# Patient Record
Sex: FEMALE | Race: WHITE | Employment: UNEMPLOYED | URBAN - METROPOLITAN AREA
[De-identification: names, ages, dates, MRNs, and addresses within clinical notes are randomized per-mention and may not be internally consistent; named-entity substitution may affect disease eponyms.]

---

## 2021-04-11 ENCOUNTER — APPOINTMENT (OUTPATIENT)
Dept: CT IMAGING | Age: 36
End: 2021-04-11
Payer: COMMERCIAL

## 2021-04-11 ENCOUNTER — HOSPITAL ENCOUNTER (EMERGENCY)
Age: 36
Discharge: HOME | End: 2021-04-11
Payer: COMMERCIAL

## 2021-04-11 VITALS — WEIGHT: 140 LBS | OXYGEN SATURATION: 98 % | HEART RATE: 82 BPM | TEMPERATURE: 97.4 F | RESPIRATION RATE: 17 BRPM
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
## 2021-04-11 DIAGNOSIS — S00.03XA CONTUSION OF SCALP, INITIAL ENCOUNTER: ICD-10-CM

## 2021-04-11 DIAGNOSIS — S16.1XXA STRAIN OF NECK MUSCLE, INITIAL ENCOUNTER: ICD-10-CM

## 2021-04-11 PROCEDURE — 99285 EMERGENCY DEPT VISIT HI MDM: CPT

## 2021-04-11 PROCEDURE — 72125 CT NECK SPINE W/O DYE: CPT

## 2021-04-11 PROCEDURE — 70450 CT HEAD/BRAIN W/O DYE: CPT

## 2021-04-11 PROCEDURE — 6370000000 HC RX 637 (ALT 250 FOR IP): Performed by: NURSE PRACTITIONER

## 2021-04-11 RX ORDER — NAPROXEN 500 MG/1
500 TABLET ORAL 2 TIMES DAILY
Qty: 20 TABLET | Refills: 0 | Status: ON HOLD | OUTPATIENT
Start: 2021-04-11 | End: 2022-06-13 | Stop reason: HOSPADM

## 2021-04-11 RX ORDER — ACETAMINOPHEN 500 MG
1000 TABLET ORAL ONCE
Status: COMPLETED | OUTPATIENT
Start: 2021-04-11 | End: 2021-04-11

## 2021-04-11 RX ADMIN — ACETAMINOPHEN 1000 MG: 500 TABLET ORAL at 16:32

## 2021-04-11 ASSESSMENT — ENCOUNTER SYMPTOMS
DIARRHEA: 0
SORE THROAT: 0
BACK PAIN: 0
CHEST TIGHTNESS: 0
WHEEZING: 0
NAUSEA: 0
ABDOMINAL PAIN: 0
SINUS PAIN: 0
COUGH: 0
SHORTNESS OF BREATH: 0
VOMITING: 0

## 2021-04-11 ASSESSMENT — PAIN SCALES - GENERAL: PAINLEVEL_OUTOF10: 10

## 2021-04-11 ASSESSMENT — PAIN DESCRIPTION - DESCRIPTORS: DESCRIPTORS: BURNING
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
## 2022-02-05 ENCOUNTER — APPOINTMENT (OUTPATIENT)
Dept: GENERAL RADIOLOGY | Age: 37
End: 2022-02-05
Payer: COMMERCIAL

## 2022-02-05 ENCOUNTER — HOSPITAL ENCOUNTER (EMERGENCY)
Age: 37
Discharge: HOME | End: 2022-02-05
Attending: EMERGENCY MEDICINE
Payer: COMMERCIAL

## 2022-02-05 VITALS
SYSTOLIC BLOOD PRESSURE: 156 MMHG | RESPIRATION RATE: 22 BRPM | DIASTOLIC BLOOD PRESSURE: 99 MMHG | HEIGHT: 65 IN | TEMPERATURE: 97.2 F | HEART RATE: 145 BPM | OXYGEN SATURATION: 98 % | BODY MASS INDEX: 27.49 KG/M2 | WEIGHT: 165 LBS

## 2022-02-05 DIAGNOSIS — R53.83 OTHER FATIGUE: Primary | ICD-10-CM

## 2022-02-05 LAB
ALBUMIN SERPL-MCNC: 4.4 G/DL (ref 3.5–4.6)
ALP BLD-CCNC: 109 U/L (ref 40–130)
ALT SERPL-CCNC: 156 U/L (ref 0–33)
ANION GAP SERPL CALCULATED.3IONS-SCNC: 16 MEQ/L (ref 9–15)
ANISOCYTOSIS: ABNORMAL
AST SERPL-CCNC: 201 U/L (ref 0–35)
BASOPHILS ABSOLUTE: 0 K/UL (ref 0–0.2)
BASOPHILS RELATIVE PERCENT: 0.9 %
BILIRUB SERPL-MCNC: 0.4 MG/DL (ref 0.2–0.7)
BUN BLDV-MCNC: 9 MG/DL (ref 6–20)
CALCIUM SERPL-MCNC: 9.2 MG/DL (ref 8.5–9.9)
CHLORIDE BLD-SCNC: 94 MEQ/L (ref 95–107)
CO2: 26 MEQ/L (ref 20–31)
CREAT SERPL-MCNC: 0.73 MG/DL (ref 0.5–0.9)
EOSINOPHILS ABSOLUTE: 0 K/UL (ref 0–0.7)
EOSINOPHILS RELATIVE PERCENT: 0.5 %
GFR AFRICAN AMERICAN: >60
GFR NON-AFRICAN AMERICAN: >60
GLOBULIN: 2.9 G/DL (ref 2.3–3.5)
GLUCOSE BLD-MCNC: 143 MG/DL (ref 70–99)
HCT VFR BLD CALC: 30.2 % (ref 37–47)
HEMOGLOBIN: 9.4 G/DL (ref 12–16)
HYPOCHROMIA: ABNORMAL
LYMPHOCYTES ABSOLUTE: 1.5 K/UL (ref 1–4.8)
LYMPHOCYTES RELATIVE PERCENT: 29.8 %
MCH RBC QN AUTO: 19.7 PG (ref 27–31.3)
MCHC RBC AUTO-ENTMCNC: 31.1 % (ref 33–37)
MCV RBC AUTO: 63.4 FL (ref 82–100)
MICROCYTES: ABNORMAL
MONOCYTES ABSOLUTE: 0.5 K/UL (ref 0.2–0.8)
MONOCYTES RELATIVE PERCENT: 10.1 %
NEUTROPHILS ABSOLUTE: 2.9 K/UL (ref 1.4–6.5)
NEUTROPHILS RELATIVE PERCENT: 58.7 %
PDW BLD-RTO: 21.2 % (ref 11.5–14.5)
PLATELET # BLD: 331 K/UL (ref 130–400)
PLATELET SLIDE REVIEW: NORMAL
POLYCHROMASIA: ABNORMAL
POTASSIUM SERPL-SCNC: 3.4 MEQ/L (ref 3.4–4.9)
RBC # BLD: 4.76 M/UL (ref 4.2–5.4)
SODIUM BLD-SCNC: 136 MEQ/L (ref 135–144)
TOTAL PROTEIN: 7.3 G/DL (ref 6.3–8)
WBC # BLD: 4.9 K/UL (ref 4.8–10.8)

## 2022-02-05 PROCEDURE — 36415 COLL VENOUS BLD VENIPUNCTURE: CPT

## 2022-02-05 PROCEDURE — 2580000003 HC RX 258: Performed by: EMERGENCY MEDICINE

## 2022-02-05 PROCEDURE — 71045 X-RAY EXAM CHEST 1 VIEW: CPT

## 2022-02-05 PROCEDURE — 80053 COMPREHEN METABOLIC PANEL: CPT

## 2022-02-05 PROCEDURE — 85025 COMPLETE CBC W/AUTO DIFF WBC: CPT

## 2022-02-05 PROCEDURE — 99284 EMERGENCY DEPT VISIT MOD MDM: CPT

## 2022-02-05 PROCEDURE — 93005 ELECTROCARDIOGRAM TRACING: CPT | Performed by: EMERGENCY MEDICINE

## 2022-02-05 RX ORDER — FERROUS SULFATE 325(65) MG
325 TABLET ORAL
Qty: 30 TABLET | Refills: 0 | Status: ON HOLD | OUTPATIENT
Start: 2022-02-05 | End: 2022-06-13 | Stop reason: HOSPADM

## 2022-02-05 RX ORDER — 0.9 % SODIUM CHLORIDE 0.9 %
1000 INTRAVENOUS SOLUTION INTRAVENOUS ONCE
Status: COMPLETED | OUTPATIENT
Start: 2022-02-05 | End: 2022-02-05

## 2022-02-05 RX ADMIN — SODIUM CHLORIDE 1000 ML: 9 INJECTION, SOLUTION INTRAVENOUS at 12:37

## 2022-02-05 NOTE — ED NOTES
Pt reports she had \"2-4 shots of vodka\" this morning prior to coming in because she \" didn't feel well and thought it would help\".      Millicent Barber RN  02/05/22 4652

## 2022-02-05 NOTE — ED PROVIDER NOTES
[address] [facility] ED  EMERGENCY DEPARTMENT ENCOUNTER      Pt Name: Lance Kaiser  MRN: [id number]  [place] 1985  Date of evaluation: 2/5/2022  Provider: River Mirza MD    [address] [place] [address]       Chief Complaint   Patient presents with    Fatigue     dx covid 1 week ago, went back to work 3 days ago, feels \"no energy\"         HISTORY OF PRESENT ILLNESS   (Location/Symptom, Timing/Onset, Context/Setting, Quality, Duration, Modifying Factors, Severity)  Note limiting factors. 58-year-old female presenting with no energy. Was diagnosed with Covid couple of weeks ago. For the last week has noted no energy. No specific symptoms noted. She denies any pain. Has not taken anything for relief. Notes significant alcohol use prior to arrival.  Denies SOB. Nursing Notes were reviewed. REVIEW OF SYSTEMS    (2-9 systems for level 4, 10 or more for level 5)     Review of Systems   Constitutional: Positive for fatigue. All other systems reviewed and are negative. Except as noted above the remainder of the review of systems was reviewed and negative. PAST MEDICAL HISTORY   History reviewed. No pertinent past medical history. SURGICAL HISTORY       Past Surgical History:   Procedure Laterality Date    APPENDECTOMY      FOOT SURGERY           CURRENT MEDICATIONS       Previous Medications    NAPROXEN (NAPROSYN) 500 MG TABLET    Take 1 tablet by mouth 2 times daily for 20 doses       ALLERGIES     Oxycodone-acetaminophen    FAMILY HISTORY     History reviewed. No pertinent family history.        SOCIAL HISTORY       Social History     Socioeconomic History    Marital status: Single     Spouse name: None    Number of children: None    Years of education: None    Highest education level: None   Occupational History    None   Tobacco Use    Smoking status: Current Every Day Smoker     Packs/day: 1.00     Types: Cigarettes    Smokeless tobacco: Never Used   Vaping Use    Vaping Use: Never used   Substance and Sexual Activity    Alcohol use: Not Currently    Drug use: None    Sexual activity: None   Other Topics Concern    None   Social History Narrative    None     Social Determinants of Health     Financial Resource Strain:     Difficulty of Paying Living Expenses: Not on file   Food Insecurity:     Worried About Running Out of Food in the Last Year: Not on file    Hitesh of Food in the Last Year: Not on file   Transportation Needs:     Lack of Transportation (Medical): Not on file    Lack of Transportation (Non-Medical): Not on file   Physical Activity:     Days of Exercise per Week: Not on file    Minutes of Exercise per Session: Not on file   Stress:     Feeling of Stress : Not on file   Social Connections:     Frequency of Communication with Friends and Family: Not on file    Frequency of Social Gatherings with Friends and Family: Not on file    Attends Voodoo Services: Not on file    Active Member of [address] [place] [address] [employer] or Organizations: Not on file    Attends Club or Organization Meetings: Not on file    Marital Status: Not on file   Intimate Partner Violence:     Fear of Current or Ex-Partner: Not on file    Emotionally Abused: Not on file    Physically Abused: Not on file    Sexually Abused: Not on file   Housing Stability:     Unable to Pay for Housing in the Last Year: Not on file    Number of [place] in the Last Year: Not on file    Unstable Housing in the Last Year: Not on file       SCREENINGS    [place] Coma Scale  Eye Opening: Spontaneous  Best Verbal Response: Oriented  Best Motor Response: Obeys commands  [place] Coma Scale Score: 15          PHYSICAL EXAM    (up to 7 for level 4, 8 or more for level 5)     ED Triage Vitals [02/05/22 1140]   BP Temp Temp Source Pulse Resp SpO2 Height Weight   (!) 156/99 97.2 °F (36.2 °C) Temporal 145 22 98 % 5' 5\" (1.651 m) 165 lb (74.8 kg)       Physical Exam  Vitals and nursing note reviewed.    Constitutional: General: She is not in acute distress. Appearance: Normal appearance. She is well-developed. She is not ill-appearing. Comments: Appears intoxicated   HENT:      Head: Normocephalic and atraumatic. Mouth/Throat:      Mouth: Mucous membranes are moist.      Pharynx: Oropharynx is clear. Eyes:      Extraocular Movements: Extraocular movements intact. Conjunctiva/sclera: Conjunctivae normal.   Cardiovascular:      Rate and Rhythm: Regular rhythm. Tachycardia present. Pulmonary:      Effort: Pulmonary effort is normal.      Breath sounds: Normal breath sounds. Abdominal:      General: Bowel sounds are normal.      Palpations: Abdomen is soft. Tenderness: There is no abdominal tenderness. Musculoskeletal:         General: No deformity. Normal range of motion. Cervical back: Normal range of motion and neck supple. Skin:     General: Skin is warm and dry. Capillary Refill: Capillary refill takes less than 2 seconds. Neurological:      General: No focal deficit present. Mental Status: She is alert and oriented to person, place, and time. Mental status is at baseline. Cranial Nerves: No cranial nerve deficit. Psychiatric:         Thought Content: Thought content normal.         DIAGNOSTIC RESULTS     EKG: All EKG's are interpreted by the Emergency Department Physician who either signs or Co-signs this chart in the absence of a cardiologist.    Sinus tachycardia, rate 111, normal intervals, no ST elevation/ depression    RADIOLOGY:   Non-plain film images such as CT, Ultrasound and MRI are read by the radiologist. Plain radiographic images are visualized and preliminarily interpreted by the emergency physician with the below findings:    Interpretation per the Radiologist below, if available at the time of this note:    XR CHEST PORTABLE   Final Result      No radiographic evidence of acute intrathoracic process.                 LABS:  Labs Reviewed   COMPREHENSIVE METABOLIC PANEL - Abnormal; Notable for the following components:       Result Value    Chloride 94 (*)     Anion Gap 16 (*)     Glucose 143 (*)      (*)      (*)     All other components within normal limits   CBC WITH AUTO DIFFERENTIAL - Abnormal; Notable for the following components:    Hemoglobin 9.4 (*)     Hematocrit 30.2 (*)     MCV 63.4 (*)     MCH 19.7 (*)     MCHC 31.1 (*)     RDW 21.2 (*)     All other components within normal limits       All other labs were within normal range or not returned as of this dictation. EMERGENCY DEPARTMENT COURSE and DIFFERENTIAL DIAGNOSIS/MDM:   Vitals:    Vitals:    02/05/22 1140   BP: (!) 156/99   Pulse: 145   Resp: 22   Temp: 97.2 °F (36.2 °C)   TempSrc: Temporal   SpO2: 98%   Weight: 165 lb (74.8 kg)   Height: 5' 5\" (1.651 m)       MDM  Number of Diagnoses or Management Options  Other fatigue  Diagnosis management comments: Presents with fatigue. Appears intoxicated but answers questions appropriately. Patient has a friend to come pick her up. Noted to be anemic, but based on MCV I think this is iron deficiency. Started on iron supplement. Patient will be discharged home in good condition. Patient has been hemodynamically stable throughout ED course and is appropriate for outpatient follow up. Patient should follow up with PCP in 2-3 days or return to ED immediately for any new or worsening symptoms. Patient is well appearing on discharge and agreeable with plan of care. Procedures    CRITICAL CARE TIME   Total Critical Care time was 0 minutes, excluding separately reportable procedures. There was a high probability of clinically significant/life threatening deterioration in the patient's condition which required my urgent intervention. FINAL IMPRESSION      1.  Other fatigue          DISPOSITION/PLAN   DISPOSITION Decision To Discharge 02/05/2022 01:22:15 PM      (Please note that portions of this note were completed with a voice recognition program.  Efforts were made to edit the dictations but occasionally words are mis-transcribed.)    Mark Anderson MD (electronically signed)  Attending Emergency Physician        Mark Anderson MD  02/05/22 3475

## 2022-02-05 NOTE — ED TRIAGE NOTES
Pt to Ed with c/o fatigue. States she was diagnosed with covid about a week ago, went back to work recently and now just feels like sleeping. Skin warm and dry. No cough, no SOB. Pt noted to be tachycardic. Denies chest pain.

## 2022-02-07 LAB
EKG ATRIAL RATE: 111 BPM
EKG P AXIS: 62 DEGREES
EKG P-R INTERVAL: 134 MS
EKG Q-T INTERVAL: 344 MS
EKG QRS DURATION: 90 MS
EKG QTC CALCULATION (BAZETT): 467 MS
EKG R AXIS: 48 DEGREES
EKG T AXIS: 36 DEGREES
EKG VENTRICULAR RATE: 111 BPM

## 2022-02-07 PROCEDURE — 93010 ELECTROCARDIOGRAM REPORT: CPT | Performed by: INTERNAL MEDICINE

## 2022-04-27 ENCOUNTER — HOSPITAL ENCOUNTER (OUTPATIENT)
Age: 37
Setting detail: SPECIMEN
Discharge: HOME | End: 2022-04-27
Payer: COMMERCIAL

## 2022-04-27 LAB
ALBUMIN SERPL-MCNC: 3.8 G/DL (ref 3.5–5.2)
ALBUMIN/GLOBULIN RATIO: 1.2 (ref 1–2.5)
ALP BLD-CCNC: 111 U/L (ref 35–104)
ALT SERPL-CCNC: 84 U/L (ref 5–33)
ANION GAP SERPL CALCULATED.3IONS-SCNC: 8 MMOL/L (ref 9–17)
AST SERPL-CCNC: 63 U/L
BILIRUB SERPL-MCNC: <0.1 MG/DL (ref 0.3–1.2)
BUN BLDV-MCNC: 10 MG/DL (ref 6–20)
BUN/CREAT BLD: 14 (ref 9–20)
CALCIUM SERPL-MCNC: 10 MG/DL (ref 8.6–10.4)
CHLORIDE BLD-SCNC: 99 MMOL/L (ref 98–107)
CO2: 28 MMOL/L (ref 20–31)
CREAT SERPL-MCNC: 0.72 MG/DL (ref 0.5–0.9)
GFR AFRICAN AMERICAN: >60 ML/MIN
GFR NON-AFRICAN AMERICAN: >60 ML/MIN
GFR SERPL CREATININE-BSD FRML MDRD: ABNORMAL ML/MIN/{1.73_M2}
GFR SERPL CREATININE-BSD FRML MDRD: ABNORMAL ML/MIN/{1.73_M2}
GLUCOSE BLD-MCNC: 108 MG/DL (ref 70–99)
HAV IGM SER IA-ACNC: NONREACTIVE
HCG QUALITATIVE: NEGATIVE
HCT VFR BLD CALC: 39.9 % (ref 36.3–47.1)
HEMOGLOBIN: 12.5 G/DL (ref 11.9–15.1)
HEPATITIS B CORE IGM ANTIBODY: NONREACTIVE
HEPATITIS B SURFACE ANTIGEN: NONREACTIVE
HEPATITIS C ANTIBODY: NONREACTIVE
HIV AG/AB: NONREACTIVE
MCH RBC QN AUTO: 29.6 PG (ref 25.2–33.5)
MCHC RBC AUTO-ENTMCNC: 31.3 G/DL (ref 28.4–34.8)
MCV RBC AUTO: 94.3 FL (ref 82.6–102.9)
NRBC AUTOMATED: 0 PER 100 WBC
PDW BLD-RTO: 17.8 % (ref 11.8–14.4)
PLATELET # BLD: 172 K/UL (ref 138–453)
PMV BLD AUTO: 10.1 FL (ref 8.1–13.5)
POTASSIUM SERPL-SCNC: 4.4 MMOL/L (ref 3.7–5.3)
RBC # BLD: 4.23 M/UL (ref 3.95–5.11)
SODIUM BLD-SCNC: 135 MMOL/L (ref 135–144)
TOTAL PROTEIN: 7 G/DL (ref 6.4–8.3)
WBC # BLD: 5.9 K/UL (ref 3.5–11.3)

## 2022-04-27 PROCEDURE — 36415 COLL VENOUS BLD VENIPUNCTURE: CPT

## 2022-04-27 PROCEDURE — 84703 CHORIONIC GONADOTROPIN ASSAY: CPT

## 2022-04-27 PROCEDURE — 80074 ACUTE HEPATITIS PANEL: CPT

## 2022-04-27 PROCEDURE — 85027 COMPLETE CBC AUTOMATED: CPT

## 2022-04-27 PROCEDURE — 80053 COMPREHEN METABOLIC PANEL: CPT

## 2022-04-27 PROCEDURE — P9603 ONE-WAY ALLOW PRORATED MILES: HCPCS

## 2022-04-27 PROCEDURE — 87389 HIV-1 AG W/HIV-1&-2 AB AG IA: CPT

## 2022-06-08 ENCOUNTER — HOSPITAL ENCOUNTER (INPATIENT)
Age: 37
LOS: 4 days | Discharge: HOME | DRG: 751 | End: 2022-06-13
Attending: STUDENT IN AN ORGANIZED HEALTH CARE EDUCATION/TRAINING PROGRAM | Admitting: PSYCHIATRY & NEUROLOGY
Payer: COMMERCIAL

## 2022-06-08 DIAGNOSIS — F10.929 ACUTE ALCOHOLIC INTOXICATION WITH COMPLICATION (HCC): Primary | ICD-10-CM

## 2022-06-08 DIAGNOSIS — F32.2 CURRENT SEVERE EPISODE OF MAJOR DEPRESSIVE DISORDER WITHOUT PSYCHOTIC FEATURES, UNSPECIFIED WHETHER RECURRENT (HCC): ICD-10-CM

## 2022-06-08 LAB
ACETAMINOPHEN LEVEL: <5 UG/ML (ref 10–30)
ALBUMIN SERPL-MCNC: 3.9 G/DL (ref 3.5–4.6)
ALP BLD-CCNC: 60 U/L (ref 40–130)
ALT SERPL-CCNC: 22 U/L (ref 0–33)
AMPHETAMINE SCREEN, URINE: NORMAL
ANION GAP SERPL CALCULATED.3IONS-SCNC: 11 MEQ/L (ref 9–15)
AST SERPL-CCNC: 34 U/L (ref 0–35)
BACTERIA: ABNORMAL /HPF
BARBITURATE SCREEN URINE: NORMAL
BASOPHILS ABSOLUTE: 0 K/UL (ref 0–0.2)
BASOPHILS RELATIVE PERCENT: 0.2 %
BENZODIAZEPINE SCREEN, URINE: NORMAL
BILIRUB SERPL-MCNC: 0.5 MG/DL (ref 0.2–0.7)
BILIRUBIN URINE: NEGATIVE
BLOOD, URINE: NEGATIVE
BUN BLDV-MCNC: 12 MG/DL (ref 6–20)
CALCIUM SERPL-MCNC: 7.8 MG/DL (ref 8.5–9.9)
CANNABINOID SCREEN URINE: NORMAL
CHLORIDE BLD-SCNC: 108 MEQ/L (ref 95–107)
CHOLESTEROL, TOTAL: 115 MG/DL (ref 0–199)
CLARITY: CLEAR
CO2: 27 MEQ/L (ref 20–31)
COCAINE METABOLITE SCREEN URINE: NORMAL
COLOR: YELLOW
CREAT SERPL-MCNC: 0.73 MG/DL (ref 0.5–0.9)
EOSINOPHILS ABSOLUTE: 0 K/UL (ref 0–0.7)
EOSINOPHILS RELATIVE PERCENT: 0.4 %
EPITHELIAL CELLS, UA: ABNORMAL /HPF (ref 0–5)
ETHANOL PERCENT: 0.38 G/DL
ETHANOL: 436 MG/DL (ref 0–0.08)
GFR AFRICAN AMERICAN: >60
GFR NON-AFRICAN AMERICAN: >60
GLOBULIN: 2.4 G/DL (ref 2.3–3.5)
GLUCOSE BLD-MCNC: 126 MG/DL (ref 70–99)
GLUCOSE URINE: NEGATIVE MG/DL
HCG(URINE) PREGNANCY TEST: NEGATIVE
HCT VFR BLD CALC: 36.1 % (ref 37–47)
HDLC SERPL-MCNC: 59 MG/DL (ref 40–59)
HEMOGLOBIN: 12.3 G/DL (ref 12–16)
HYALINE CASTS: ABNORMAL /HPF (ref 0–5)
KETONES, URINE: NEGATIVE MG/DL
LDL CHOLESTEROL CALCULATED: 8 MG/DL (ref 0–129)
LEUKOCYTE ESTERASE, URINE: ABNORMAL
LYMPHOCYTES ABSOLUTE: 1.9 K/UL (ref 1–4.8)
LYMPHOCYTES RELATIVE PERCENT: 35.4 %
Lab: NORMAL
MCH RBC QN AUTO: 29.1 PG (ref 27–31.3)
MCHC RBC AUTO-ENTMCNC: 34.2 % (ref 33–37)
MCV RBC AUTO: 85.2 FL (ref 82–100)
METHADONE SCREEN, URINE: NORMAL
MONOCYTES ABSOLUTE: 0.2 K/UL (ref 0.2–0.8)
MONOCYTES RELATIVE PERCENT: 4.4 %
NEUTROPHILS ABSOLUTE: 3.2 K/UL (ref 1.4–6.5)
NEUTROPHILS RELATIVE PERCENT: 59.6 %
NITRITE, URINE: NEGATIVE
OPIATE SCREEN URINE: NORMAL
OXYCODONE URINE: NORMAL
PDW BLD-RTO: 16.3 % (ref 11.5–14.5)
PH UA: 6 (ref 5–9)
PHENCYCLIDINE SCREEN URINE: NORMAL
PLATELET # BLD: 170 K/UL (ref 130–400)
POTASSIUM SERPL-SCNC: 3.4 MEQ/L (ref 3.4–4.9)
PROPOXYPHENE SCREEN: NORMAL
PROTEIN UA: NEGATIVE MG/DL
RBC # BLD: 4.24 M/UL (ref 4.2–5.4)
RBC UA: ABNORMAL /HPF (ref 0–5)
SALICYLATE, SERUM: <0.3 MG/DL (ref 15–30)
SARS-COV-2, NAAT: NOT DETECTED
SODIUM BLD-SCNC: 146 MEQ/L (ref 135–144)
SPECIFIC GRAVITY UA: 1.01 (ref 1–1.03)
TOTAL CK: 406 U/L (ref 0–170)
TOTAL PROTEIN: 6.3 G/DL (ref 6.3–8)
TRIGL SERPL-MCNC: 239 MG/DL (ref 0–150)
TSH SERPL DL<=0.05 MIU/L-ACNC: 0.59 UIU/ML (ref 0.44–3.86)
URINE REFLEX TO CULTURE: YES
UROBILINOGEN, URINE: 0.2 E.U./DL
WBC # BLD: 5.3 K/UL (ref 4.8–10.8)
WBC UA: ABNORMAL /HPF (ref 0–5)

## 2022-06-08 PROCEDURE — 80143 DRUG ASSAY ACETAMINOPHEN: CPT

## 2022-06-08 PROCEDURE — 80307 DRUG TEST PRSMV CHEM ANLYZR: CPT

## 2022-06-08 PROCEDURE — 80179 DRUG ASSAY SALICYLATE: CPT

## 2022-06-08 PROCEDURE — 87086 URINE CULTURE/COLONY COUNT: CPT

## 2022-06-08 PROCEDURE — 99285 EMERGENCY DEPT VISIT HI MDM: CPT

## 2022-06-08 PROCEDURE — 81001 URINALYSIS AUTO W/SCOPE: CPT

## 2022-06-08 PROCEDURE — 82077 ASSAY SPEC XCP UR&BREATH IA: CPT

## 2022-06-08 PROCEDURE — 85025 COMPLETE CBC W/AUTO DIFF WBC: CPT

## 2022-06-08 PROCEDURE — 6370000000 HC RX 637 (ALT 250 FOR IP): Performed by: STUDENT IN AN ORGANIZED HEALTH CARE EDUCATION/TRAINING PROGRAM

## 2022-06-08 PROCEDURE — 87635 SARS-COV-2 COVID-19 AMP PRB: CPT

## 2022-06-08 PROCEDURE — 36415 COLL VENOUS BLD VENIPUNCTURE: CPT

## 2022-06-08 PROCEDURE — 80053 COMPREHEN METABOLIC PANEL: CPT

## 2022-06-08 PROCEDURE — 80061 LIPID PANEL: CPT

## 2022-06-08 PROCEDURE — 82550 ASSAY OF CK (CPK): CPT

## 2022-06-08 PROCEDURE — 84443 ASSAY THYROID STIM HORMONE: CPT

## 2022-06-08 PROCEDURE — 84703 CHORIONIC GONADOTROPIN ASSAY: CPT

## 2022-06-08 RX ORDER — SODIUM CHLORIDE 0.9 % (FLUSH) 0.9 %
5-40 SYRINGE (ML) INJECTION EVERY 12 HOURS SCHEDULED
Status: DISCONTINUED | OUTPATIENT
Start: 2022-06-08 | End: 2022-06-09

## 2022-06-08 RX ORDER — MULTIVITAMIN WITH IRON
1 TABLET ORAL ONCE
Status: COMPLETED | OUTPATIENT
Start: 2022-06-08 | End: 2022-06-11

## 2022-06-08 RX ORDER — LORAZEPAM 2 MG/ML
2 INJECTION INTRAMUSCULAR
Status: DISCONTINUED | OUTPATIENT
Start: 2022-06-08 | End: 2022-06-13 | Stop reason: HOSPADM

## 2022-06-08 RX ORDER — LORAZEPAM 1 MG/1
3 TABLET ORAL
Status: DISCONTINUED | OUTPATIENT
Start: 2022-06-08 | End: 2022-06-13 | Stop reason: HOSPADM

## 2022-06-08 RX ORDER — LORAZEPAM 1 MG/1
4 TABLET ORAL
Status: DISCONTINUED | OUTPATIENT
Start: 2022-06-08 | End: 2022-06-13 | Stop reason: HOSPADM

## 2022-06-08 RX ORDER — LORAZEPAM 2 MG/ML
3 INJECTION INTRAMUSCULAR
Status: DISCONTINUED | OUTPATIENT
Start: 2022-06-08 | End: 2022-06-13 | Stop reason: HOSPADM

## 2022-06-08 RX ORDER — LANOLIN ALCOHOL/MO/W.PET/CERES
100 CREAM (GRAM) TOPICAL ONCE
Status: COMPLETED | OUTPATIENT
Start: 2022-06-08 | End: 2022-06-10

## 2022-06-08 RX ORDER — LORAZEPAM 1 MG/1
2 TABLET ORAL
Status: DISCONTINUED | OUTPATIENT
Start: 2022-06-08 | End: 2022-06-13 | Stop reason: HOSPADM

## 2022-06-08 RX ORDER — SODIUM CHLORIDE 9 MG/ML
INJECTION, SOLUTION INTRAVENOUS PRN
Status: DISCONTINUED | OUTPATIENT
Start: 2022-06-08 | End: 2022-06-09

## 2022-06-08 RX ORDER — LORAZEPAM 2 MG/ML
1 INJECTION INTRAMUSCULAR
Status: DISCONTINUED | OUTPATIENT
Start: 2022-06-08 | End: 2022-06-13 | Stop reason: HOSPADM

## 2022-06-08 RX ORDER — LORAZEPAM 2 MG/ML
4 INJECTION INTRAMUSCULAR
Status: DISCONTINUED | OUTPATIENT
Start: 2022-06-08 | End: 2022-06-13 | Stop reason: HOSPADM

## 2022-06-08 RX ORDER — SODIUM CHLORIDE 0.9 % (FLUSH) 0.9 %
5-40 SYRINGE (ML) INJECTION PRN
Status: DISCONTINUED | OUTPATIENT
Start: 2022-06-08 | End: 2022-06-09

## 2022-06-08 RX ORDER — LORAZEPAM 1 MG/1
1 TABLET ORAL
Status: DISCONTINUED | OUTPATIENT
Start: 2022-06-08 | End: 2022-06-13 | Stop reason: HOSPADM

## 2022-06-08 RX ADMIN — LORAZEPAM 3 MG: 1 TABLET ORAL at 14:01

## 2022-06-08 ASSESSMENT — LIFESTYLE VARIABLES
HOW OFTEN DO YOU HAVE A DRINK CONTAINING ALCOHOL: 4 OR MORE TIMES A WEEK
HOW MANY STANDARD DRINKS CONTAINING ALCOHOL DO YOU HAVE ON A TYPICAL DAY: 10 OR MORE

## 2022-06-08 ASSESSMENT — PAIN - FUNCTIONAL ASSESSMENT: PAIN_FUNCTIONAL_ASSESSMENT: NONE - DENIES PAIN

## 2022-06-08 ASSESSMENT — PATIENT HEALTH QUESTIONNAIRE - PHQ9: SUM OF ALL RESPONSES TO PHQ QUESTIONS 1-9: 20

## 2022-06-08 NOTE — ED NOTES
Patient awake and requested to use the phone to call her mom. States she would like her cell phone from security to obtain phone numbers. Call placed to [facility].       Holly Del Rio RN  06/08/22 0548

## 2022-06-08 NOTE — ED NOTES
Call placed to provider to request medication for anxiety. Patient crying out and wailing out at times.       Claudine Chandler RN  06/08/22 0721

## 2022-06-08 NOTE — ED NOTES
Patient admitted to [facility] bed 4. Patient changed into psych safe clothing and belongings secured. [employer] notified of belongings. Patient oriented to unit.       Elina Romo RN  06/08/22 4904
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
## 2022-06-08 NOTE — ED NOTES
Lab to bedside for draw at this time . Dr Luis Eduardo Benton saw patient for head to toe exam at [phone]. Skin check by Nicki Herrera. Now in hospital clothes. [facility] logging and securing belongings. Jaime Kendall  06/08/22 3550 [facility]  06/09/22 5111

## 2022-06-09 PROBLEM — F32.2 MAJOR DEPRESSIVE DISORDER, SEVERE (HCC): Status: ACTIVE | Noted: 2022-06-09

## 2022-06-09 LAB
ETHANOL PERCENT: 0.08 G/DL
ETHANOL: 93 MG/DL (ref 0–0.08)
URINE CULTURE, ROUTINE: NORMAL

## 2022-06-09 PROCEDURE — 1240000000 HC EMOTIONAL WELLNESS R&B

## 2022-06-09 PROCEDURE — 36415 COLL VENOUS BLD VENIPUNCTURE: CPT

## 2022-06-09 PROCEDURE — 6370000000 HC RX 637 (ALT 250 FOR IP): Performed by: EMERGENCY MEDICINE

## 2022-06-09 PROCEDURE — 6370000000 HC RX 637 (ALT 250 FOR IP): Performed by: PHYSICIAN ASSISTANT

## 2022-06-09 PROCEDURE — 6370000000 HC RX 637 (ALT 250 FOR IP): Performed by: PSYCHIATRY & NEUROLOGY

## 2022-06-09 PROCEDURE — 82077 ASSAY SPEC XCP UR&BREATH IA: CPT

## 2022-06-09 RX ORDER — NICOTINE 21 MG/24HR
1 PATCH, TRANSDERMAL 24 HOURS TRANSDERMAL DAILY
Status: DISCONTINUED | OUTPATIENT
Start: 2022-06-09 | End: 2022-06-13 | Stop reason: HOSPADM

## 2022-06-09 RX ORDER — MAGNESIUM HYDROXIDE/ALUMINUM HYDROXICE/SIMETHICONE 120; 1200; 1200 MG/30ML; MG/30ML; MG/30ML
30 SUSPENSION ORAL PRN
Status: DISCONTINUED | OUTPATIENT
Start: 2022-06-09 | End: 2022-06-13 | Stop reason: HOSPADM

## 2022-06-09 RX ORDER — HALOPERIDOL 5 MG/ML
5 INJECTION INTRAMUSCULAR EVERY 4 HOURS PRN
Status: DISCONTINUED | OUTPATIENT
Start: 2022-06-09 | End: 2022-06-13 | Stop reason: HOSPADM

## 2022-06-09 RX ORDER — LORAZEPAM 2 MG/ML
2 INJECTION INTRAMUSCULAR
Status: DISCONTINUED | OUTPATIENT
Start: 2022-06-09 | End: 2022-06-09

## 2022-06-09 RX ORDER — HYDROXYZINE HYDROCHLORIDE 50 MG/ML
50 INJECTION, SOLUTION INTRAMUSCULAR EVERY 6 HOURS PRN
Status: DISCONTINUED | OUTPATIENT
Start: 2022-06-09 | End: 2022-06-13 | Stop reason: HOSPADM

## 2022-06-09 RX ORDER — HYDROXYZINE HYDROCHLORIDE 50 MG/ML
50 INJECTION, SOLUTION INTRAMUSCULAR EVERY 6 HOURS PRN
Status: DISCONTINUED | OUTPATIENT
Start: 2022-06-09 | End: 2022-06-09

## 2022-06-09 RX ORDER — LORAZEPAM 1 MG/1
4 TABLET ORAL
Status: DISCONTINUED | OUTPATIENT
Start: 2022-06-09 | End: 2022-06-09

## 2022-06-09 RX ORDER — TRAZODONE HYDROCHLORIDE 50 MG/1
50 TABLET ORAL NIGHTLY PRN
Status: DISCONTINUED | OUTPATIENT
Start: 2022-06-09 | End: 2022-06-13 | Stop reason: HOSPADM

## 2022-06-09 RX ORDER — HYDROXYZINE PAMOATE 50 MG/1
50 CAPSULE ORAL ONCE
Status: COMPLETED | OUTPATIENT
Start: 2022-06-09 | End: 2022-06-09

## 2022-06-09 RX ORDER — LANOLIN ALCOHOL/MO/W.PET/CERES
100 CREAM (GRAM) TOPICAL DAILY
Status: DISCONTINUED | OUTPATIENT
Start: 2022-06-10 | End: 2022-06-13 | Stop reason: HOSPADM

## 2022-06-09 RX ORDER — LORAZEPAM 2 MG/ML
3 INJECTION INTRAMUSCULAR
Status: DISCONTINUED | OUTPATIENT
Start: 2022-06-09 | End: 2022-06-09

## 2022-06-09 RX ORDER — LORAZEPAM 1 MG/1
3 TABLET ORAL
Status: DISCONTINUED | OUTPATIENT
Start: 2022-06-09 | End: 2022-06-09

## 2022-06-09 RX ORDER — LORAZEPAM 0.5 MG/1
0.5 TABLET ORAL EVERY 4 HOURS PRN
Status: DISCONTINUED | OUTPATIENT
Start: 2022-06-09 | End: 2022-06-13 | Stop reason: HOSPADM

## 2022-06-09 RX ORDER — ONDANSETRON 4 MG/1
4 TABLET, ORALLY DISINTEGRATING ORAL ONCE
Status: COMPLETED | OUTPATIENT
Start: 2022-06-09 | End: 2022-06-09

## 2022-06-09 RX ORDER — HALOPERIDOL 5 MG
5 TABLET ORAL EVERY 4 HOURS PRN
Status: DISCONTINUED | OUTPATIENT
Start: 2022-06-09 | End: 2022-06-13 | Stop reason: HOSPADM

## 2022-06-09 RX ORDER — LORAZEPAM 2 MG/ML
1 INJECTION INTRAMUSCULAR
Status: DISCONTINUED | OUTPATIENT
Start: 2022-06-09 | End: 2022-06-09

## 2022-06-09 RX ORDER — LORAZEPAM 2 MG/ML
0.5 INJECTION INTRAMUSCULAR EVERY 4 HOURS PRN
Status: DISCONTINUED | OUTPATIENT
Start: 2022-06-09 | End: 2022-06-13 | Stop reason: HOSPADM

## 2022-06-09 RX ORDER — ACETAMINOPHEN 500 MG
1000 TABLET ORAL ONCE
Status: COMPLETED | OUTPATIENT
Start: 2022-06-09 | End: 2022-06-09

## 2022-06-09 RX ORDER — LORAZEPAM 2 MG/ML
4 INJECTION INTRAMUSCULAR
Status: DISCONTINUED | OUTPATIENT
Start: 2022-06-09 | End: 2022-06-09

## 2022-06-09 RX ORDER — LORAZEPAM 1 MG/1
1 TABLET ORAL
Status: DISCONTINUED | OUTPATIENT
Start: 2022-06-09 | End: 2022-06-09

## 2022-06-09 RX ORDER — LORAZEPAM 1 MG/1
2 TABLET ORAL
Status: DISCONTINUED | OUTPATIENT
Start: 2022-06-09 | End: 2022-06-09

## 2022-06-09 RX ORDER — LORAZEPAM 1 MG/1
1 TABLET ORAL ONCE
Status: COMPLETED | OUTPATIENT
Start: 2022-06-09 | End: 2022-06-09

## 2022-06-09 RX ORDER — HYDROXYZINE PAMOATE 50 MG/1
50 CAPSULE ORAL EVERY 6 HOURS PRN
Status: DISCONTINUED | OUTPATIENT
Start: 2022-06-09 | End: 2022-06-13 | Stop reason: HOSPADM

## 2022-06-09 RX ORDER — HYDROXYZINE PAMOATE 50 MG/1
50 CAPSULE ORAL EVERY 6 HOURS PRN
Status: DISCONTINUED | OUTPATIENT
Start: 2022-06-09 | End: 2022-06-09

## 2022-06-09 RX ORDER — ACETAMINOPHEN 325 MG/1
650 TABLET ORAL EVERY 4 HOURS PRN
Status: DISCONTINUED | OUTPATIENT
Start: 2022-06-09 | End: 2022-06-13 | Stop reason: HOSPADM

## 2022-06-09 RX ORDER — BENZTROPINE MESYLATE 1 MG/ML
2 INJECTION INTRAMUSCULAR; INTRAVENOUS 2 TIMES DAILY PRN
Status: DISCONTINUED | OUTPATIENT
Start: 2022-06-09 | End: 2022-06-13 | Stop reason: HOSPADM

## 2022-06-09 RX ORDER — MULTIVITAMIN WITH IRON
1 TABLET ORAL DAILY
Status: DISCONTINUED | OUTPATIENT
Start: 2022-06-10 | End: 2022-06-13 | Stop reason: HOSPADM

## 2022-06-09 RX ADMIN — LORAZEPAM 1 MG: 1 TABLET ORAL at 07:20

## 2022-06-09 RX ADMIN — LORAZEPAM 0.5 MG: 0.5 TABLET ORAL at 17:03

## 2022-06-09 RX ADMIN — LORAZEPAM 0.5 MG: 0.5 TABLET ORAL at 13:08

## 2022-06-09 RX ADMIN — TRAZODONE HYDROCHLORIDE 50 MG: 50 TABLET ORAL at 23:56

## 2022-06-09 RX ADMIN — ACETAMINOPHEN 1000 MG: 500 TABLET ORAL at 07:20

## 2022-06-09 RX ADMIN — HYDROXYZINE PAMOATE 50 MG: 50 CAPSULE ORAL at 16:58

## 2022-06-09 RX ADMIN — ONDANSETRON 4 MG: 4 TABLET, ORALLY DISINTEGRATING ORAL at 10:56

## 2022-06-09 RX ADMIN — HYDROXYZINE PAMOATE 50 MG: 50 CAPSULE ORAL at 01:23

## 2022-06-09 RX ADMIN — LORAZEPAM 0.5 MG: 0.5 TABLET ORAL at 21:19

## 2022-06-09 ASSESSMENT — SLEEP AND FATIGUE QUESTIONNAIRES
DO YOU HAVE DIFFICULTY SLEEPING: YES
AVERAGE NUMBER OF SLEEP HOURS: 6
DO YOU USE A SLEEP AID: NO
SLEEP PATTERN: DIFFICULTY FALLING ASLEEP;DISTURBED/INTERRUPTED SLEEP;DIFFICULTY ARISING

## 2022-06-09 ASSESSMENT — PAIN SCALES - GENERAL: PAINLEVEL_OUTOF10: 3

## 2022-06-09 ASSESSMENT — PAIN DESCRIPTION - ORIENTATION: ORIENTATION: RIGHT

## 2022-06-09 ASSESSMENT — PAIN DESCRIPTION - LOCATION: LOCATION: HEAD

## 2022-06-09 ASSESSMENT — PAIN DESCRIPTION - DESCRIPTORS: DESCRIPTORS: ACHING

## 2022-06-09 NOTE — ED NOTES
Patient resting quietly in bed no distress noted but patient requesting medication for withdraw and headache doctor informed     Mary Coronado RN  06/09/22 Talya Moss RN  06/09/22 4092
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
## 2022-06-09 NOTE — CONSULTS
Jos[facility]  MEDICINE    HISTORY AND PHYSICAL EXAM    PATIENT NAME:  Gregory Kauffman    MRN:  [id number]  SERVICE DATE:  6/9/2022   SERVICE TIME:  3:20 PM    Primary Care Physician: Radha Allen MD         SUBJECTIVE  CHIEF COMPLAINT:  Medically appropriate for inpatient psychiatry admission. Consult for medical H/P encounter. HPI:  This is a pleasant 43 y.o. female with PMH of tobacco use, marijuana use, EtOH use disorder who is admitted to the behavioral health unit for further psychiatric evaluation. Hospitalist service has been placed on consult for medical evaluation and comanagement of chronic conditions. Patient was seen and examined on the behavioral health unit. She states that she was recently released from 30-day alcohol rehab. Reports that she was sober for 1 week, then began drinking. She states that she previously drank unknown quantities of vodka. States that when she got out of rehab, she was drinking fireball, again states unknown quantities. She is a current smoker. Endorses marijuana use. Denies other illicit drug use or abuse.     PAST MEDICAL HISTORY:    Past Medical History:   Diagnosis Date    Tobacco dependence      PAST SURGICAL HISTORY:    Past Surgical History:   Procedure Laterality Date    APPENDECTOMY      FOOT SURGERY      reports 6 sx on L foot and one on right foot    TUBAL LIGATION       FAMILY HISTORY:    Family History   Problem Relation Age of Onset    High Cholesterol Mother     Hypertension Mother     Melanoma Father     High Cholesterol Father     Hypertension Father      SOCIAL HISTORY:    Social History     Socioeconomic History    Marital status: Single     Spouse name: Not on file    Number of children: Not on file    Years of education: Not on file    Highest education level: Not on file   Occupational History    Not on file   Tobacco Use    Smoking status: Current Every Day Smoker     Packs/day: 1.00     Types: Cigarettes    Smokeless tobacco: Never Used   Vaping Use    Vaping Use: Never used   Substance and Sexual Activity    Alcohol use: Not Currently    Drug use: Not on file    Sexual activity: Not on file   Other Topics Concern    Not on file   Social History Narrative    Not on file     Social Determinants of Health     Financial Resource Strain:     Difficulty of Paying Living Expenses: Not on file   Food Insecurity:     Worried About Running Out of Food in the Last Year: Not on file    Hitesh of Food in the Last Year: Not on file   Transportation Needs:     Lack of Transportation (Medical): Not on file    Lack of Transportation (Non-Medical):  Not on file   Physical Activity:     Days of Exercise per Week: Not on file    Minutes of Exercise per Session: Not on file   Stress:     Feeling of Stress : Not on file   Social Connections:     Frequency of Communication with Friends and Family: Not on file    Frequency of Social Gatherings with Friends and Family: Not on file    Attends [employer] Services: Not on file    Active Member of [address] [place] [address] or Organizations: Not on file    Attends Club or Organization Meetings: Not on file    Marital Status: Not on file   Intimate Partner Violence:     Fear of Current or Ex-Partner: Not on file    Emotionally Abused: Not on file    Physically Abused: Not on file    Sexually Abused: Not on file   Housing Stability:     Unable to Pay for Housing in the Last Year: Not on file    Number of [place] in the Last Year: Not on file    Unstable Housing in the Last Year: Not on file     MEDICATIONS:    Current Facility-Administered Medications   Medication Dose Route Frequency Provider Last Rate Last Admin    [START ON 6/10/2022] thiamine tablet 100 mg  100 mg Oral Daily MD Ulices Petty ON 6/10/2022] multivitamin 1 tablet  1 tablet Oral Daily Hanh Soler MD        acetaminophen (TYLENOL) tablet 650 mg  650 mg Oral Q4H PRN Hanh Soler MD        magnesium hydroxide (MILK OF MAGNESIA) 400 MG/5ML suspension 30 mL  30 mL Oral Daily PRN Phil Garrison MD        aluminum & magnesium hydroxide-simethicone (MAALOX) 200-200-20 MG/5ML suspension 30 mL  30 mL Oral PRN Phil Garrison MD        haloperidol (HALDOL) tablet 5 mg  5 mg Oral Q4H PRN Phil Garrison MD        Or    haloperidol lactate (HALDOL) injection 5 mg  5 mg IntraMUSCular Q4H PRN Phil Garrison MD        benztropine mesylate (COGENTIN) injection 2 mg  2 mg IntraMUSCular BID PRN Phil Garrison MD        hydrOXYzine (VISTARIL) injection 50 mg  50 mg IntraMUSCular Q6H PRN Phil Garrison MD        Or    hydrOXYzine (VISTARIL) injection 50 mg  50 mg IntraMUSCular Q6H PRN Phil Garrison MD        traZODone (DESYREL) tablet 50 mg  50 mg Oral Nightly PRN Phil Garrison MD        nicotine (NICODERM CQ) 21 MG/24HR 1 patch  1 patch TransDERmal Daily Phil Garrison MD   1 patch at 06/09/22 1247    LORazepam (ATIVAN) tablet 0.5 mg  0.5 mg Oral Q4H PRN Phil Garrison MD   0.5 mg at 06/09/22 1308    Or    LORazepam (ATIVAN) injection 0.5 mg  0.5 mg IntraMUSCular Q4H PRN Phil Garrison MD        thiamine tablet 100 mg  100 mg Oral Once Sandra Mccoy MD        multivitamin 1 tablet  1 tablet Oral Once Sandra Mccoy MD        LORazepam (ATIVAN) tablet 1 mg  1 mg Oral Q1H PRN Sandra Mccoy MD        Or    LORazepam (ATIVAN) injection 1 mg  1 mg IntraVENous Q1H PRN Sandra Mccoy MD        Or    LORazepam (ATIVAN) tablet 2 mg  2 mg Oral Q1H PRN Sandra Mccoy MD        Or    LORazepam (ATIVAN) injection 2 mg  2 mg IntraVENous Q1H PRN Sandra Mccoy MD        Or    LORazepam (ATIVAN) tablet 3 mg  3 mg Oral Q1H PRN Sandra Mccoy MD   3 mg at 06/08/22 1401    Or    LORazepam (ATIVAN) injection 3 mg  3 mg IntraVENous Q1H PRN Sandra Mccoy MD        Or    LORazepam (ATIVAN) tablet 4 mg  4 mg Oral Q1H PRN Sandra Mccoy MD        Or   [facility] LORazepam (ATIVAN) injection 4 mg  4 mg IntraVENous Q1H PRN Isaura Membreno MD           ALLERGIES: Oxycodone-acetaminophen    REVIEW OF SYSTEM:   ROS as noted in HPI, 12 point ROS reviewed and otherwise negative. OBJECTIVE  PHYSICAL EXAM: /82   Pulse 93   Temp 97.8 °F (36.6 °C) (Oral)   Resp 20   Wt 165 lb (74.8 kg)   SpO2 97%   BMI 27.46 kg/m²   CONSTITUTIONAL:  awake, alert, cooperative, no apparent distress, and appears stated age  EYES:  Lids and lashes normal, pupils equal, round and reactive to light, extra ocular muscles intact, sclera clear, conjunctiva normal  ENT:  Normocephalic, without obvious abnormality, atraumatic, sinuses nontender on palpation, external ears without lesions, oral pharynx with moist mucus membranes, tonsils without erythema or exudates, gums normal and good dentition. NECK:  Supple, symmetrical, trachea midline, no adenopathy, thyroid symmetric, not enlarged and no tenderness, skin normal  LUNGS:  No increased work of breathing, good air exchange, clear to auscultation bilaterally, no crackles or wheezing  CARDIOVASCULAR:  Normal apical impulse, regular rate and rhythm, normal S1 and S2, no S3 or S4, and no murmur noted  ABDOMEN:  No scars, normal bowel sounds, soft, non-distended, non-tender, no masses palpated, no hepatosplenomegally  MUSCULOSKELETAL:  There is no redness, warmth, or swelling of the joints. Full range of motion noted. Motor strength is 5 out of 5 all extremities bilaterally. Tone is normal.  NEUROLOGIC:  Awake, alert, oriented to name, place and time. Cranial nerves II-XII are grossly intact. Motor is 5 out of 5 bilaterally. Sensory is intact.  gait is normal.  SKIN:  no bruising or bleeding, normal skin color, texture, turgor, no redness, warmth, or swelling and no jaundice    DATA:     Diagnostic tests reviewed for today's visit:    Most recent labs and imaging results reviewed.      VTE Prophylaxis:     ASSESSMENT AND PLAN  Principal Problem:    Major depressive disorder, severe (HCC)  Plan:     Major depressive disorder, severe  Suicidal ideation  Alcohol use disorder  - Patient is admitted to the behavioral health unit for further evaluation and treatment. Further POC for behavioral health therapy, psychiatric regimen, substance abuse counseling/resources per primary team  -Continue vitamin regimen    Current smoker  - Cessation education, nicotine patch    This is only a history and physical examination and not medical management. The patient is to contact and follow up with their primary care physician and go over any abnormal labs, imaging, findings, medical concerns, or conditions that we have and have not addressed during this encounter.     Plan of care discussed with: patient    SIGNATURE: SANTA Cooper - CNP  DATE: June 9, 2022  TIME: 3:20 PM

## 2022-06-09 NOTE — ED NOTES
Patient up to the nurses station stating that she doesn't know what to do because she has court in the morning and requesting something to drink. Patient was reassured about being here and given gingerale to drink.       Hawa Her RN  06/09/22 2030

## 2022-06-09 NOTE — PROGRESS NOTES
Behavioral Services  Medicare Certification Upon Admission    I certify that this patient's inpatient psychiatric hospital admission is medically necessary for:    [x] (1) Treatment which could reasonably be expected to improve this patient's condition,       [x] (2) Or for diagnostic study;     AND     [x](2) The inpatient psychiatric services are provided while the individual is under the care of a physician and are included in the individualized plan of care.     Estimated length of stay/service 3-5 days    Plan for post-hospital care Op care    Electronically signed by Adarsh Machuca MD on 6/9/2022 at 11:49 AM

## 2022-06-09 NOTE — PROGRESS NOTES
Patient arrived to the unit, skin check completed with Sharda Wheeler RN. Patient oriented to unit and room. Assessment complete.
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
## 2022-06-09 NOTE — PROGRESS NOTES
Patient arrives to the unit via w/c and ambulates to room. She advised she was at an inpatient detox center for 30 days when she left and started drinking heavily again 2 weeks ago. Patient reports drinking heavily for the past 3 years. She has had thoughts of cutting her wrists in the past. She said she had a major breakdown and said she was going to hurt herself without a plan but not hurt anyone else. Patient states she does not feel this way anymore, but she would like help getting off of the alcohol. Patient is a half a pack per day smoker. She uses marijuana \"from time to time\". Patient reports losing her job at [employer] due to her drinking. She has 2 children, ages 3 and 23. She reports an amazing support system and states she does not know why she goes back to drinking because there are plenty of people to help her. Patient states she went through a really bad break up with her significant other of 20 years about 1 year ago. She appears sad and reports she is helpless. Rates her anxiety at 8/10 and depression 8/10 on a 10 point scale where 10 is the worst.  Patient denies SI, HI, and AVH. Patient requested a shower and change of clothes. She denies additional needs at this time.

## 2022-06-09 NOTE — ED NOTES
Patient resting in bed with even non-labored respirations at this time.        Diya Titus RN  06/08/22 2017

## 2022-06-09 NOTE — ED NOTES
Patient sent all belongings home with mother. Mother signed form acknowledging same and patient initialed. Brissa Thomas  06/09/22 8803

## 2022-06-09 NOTE — ED NOTES
HIPPA compliant message left for mother for collateral information. Octaviano Turner  [place], [facility]  06/09/22 7970

## 2022-06-09 NOTE — ED NOTES
Patient asking for something to help her sleep.  Dr. Edouard Mcleod informed     Nathalie Guthrie, RN  06/09/22 6420

## 2022-06-09 NOTE — ED NOTES
Medication considered effective since patient is resting with eyes closed and even non-labored respirations at this time.       Jacqueline Elizondo RN  06/09/22 6932

## 2022-06-09 NOTE — GROUP NOTE
Group Therapy Note    Date: 6/9/2022    Group Start Time: 4025  Group End Time: 1686  Group Topic: Healthy Living/Wellness    [facility]    Regine Onofre        Group Therapy Note    Attendees: 9/14         Patient's Goal:  To learn how to live a healthy lifestyle. Notes:  Patient participated in group discussion.      Status After Intervention:  Improved    Participation Level: Interactive    Participation Quality: Appropriate, Attentive and Sharing      Speech:  normal      Thought Process/Content: Logical      Affective Functioning: Congruent      Mood: euthymic      Level of consciousness:  Alert and Attentive      Response to Learning: Able to verbalize current knowledge/experience      Endings: None Reported    Modes of Intervention: Education      Discipline Responsible: [address], [facility] [employer]      Signature:  Regine Onofre
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
## 2022-06-09 NOTE — PROGRESS NOTES
Patient has been out and cooperative. Was able to eat partial dinner after getting vistaril. Patient denies all. States she only has visual hallucinations (shadows) when she is drinking and in her house. She also said she has good support which helps her stop drinking. Hopes to get into an IOP program after d/c.

## 2022-06-09 NOTE — ED NOTES
Collateral from Mother Estelita Flowers: [phone], [phone]    Mother reports that patient is labile at home. Mostly due to alcohol use. She does not believed that she would actually go through with any suicide attempt but states she did make the comments she was having thoughts of ending her life. Mother states she definetly does not  feel that she would ever harm anyone else. Mother believes that patient definitely needs help with her addiction and depression but is unsure if she would harm anyone else. Sofie Monsivaisia Crimes  06/09/22 602 [address]  06/09/22 3533

## 2022-06-10 PROCEDURE — 6370000000 HC RX 637 (ALT 250 FOR IP): Performed by: PSYCHIATRY & NEUROLOGY

## 2022-06-10 PROCEDURE — 99223 1ST HOSP IP/OBS HIGH 75: CPT | Performed by: PSYCHIATRY & NEUROLOGY

## 2022-06-10 PROCEDURE — 6370000000 HC RX 637 (ALT 250 FOR IP): Performed by: STUDENT IN AN ORGANIZED HEALTH CARE EDUCATION/TRAINING PROGRAM

## 2022-06-10 PROCEDURE — 1240000000 HC EMOTIONAL WELLNESS R&B

## 2022-06-10 RX ORDER — MIRTAZAPINE 15 MG/1
15 TABLET, FILM COATED ORAL NIGHTLY
Status: DISCONTINUED | OUTPATIENT
Start: 2022-06-10 | End: 2022-06-13 | Stop reason: HOSPADM

## 2022-06-10 RX ADMIN — ACETAMINOPHEN 650 MG: 325 TABLET ORAL at 05:49

## 2022-06-10 RX ADMIN — HYDROXYZINE PAMOATE 50 MG: 50 CAPSULE ORAL at 19:50

## 2022-06-10 RX ADMIN — Medication 100 MG: at 08:40

## 2022-06-10 RX ADMIN — ACETAMINOPHEN 650 MG: 325 TABLET ORAL at 22:06

## 2022-06-10 RX ADMIN — THERA TABS 1 TABLET: TAB at 08:37

## 2022-06-10 RX ADMIN — ACETAMINOPHEN 650 MG: 325 TABLET ORAL at 16:39

## 2022-06-10 RX ADMIN — Medication 100 MG: at 08:37

## 2022-06-10 RX ADMIN — MIRTAZAPINE 15 MG: 15 TABLET, FILM COATED ORAL at 20:53

## 2022-06-10 ASSESSMENT — PAIN SCALES - GENERAL
PAINLEVEL_OUTOF10: 7
PAINLEVEL_OUTOF10: 5
PAINLEVEL_OUTOF10: 7
PAINLEVEL_OUTOF10: 6

## 2022-06-10 ASSESSMENT — PAIN DESCRIPTION - LOCATION
LOCATION: BACK
LOCATION: BACK

## 2022-06-10 NOTE — GROUP NOTE
Group Therapy Note    Date: 6/9/2022    Group Start Time: 2000  Group End Time: 2130  Group Topic: Recreational    [facility]    Mandi Mention        Group Therapy Note    Attendees: 9/16         Patient's Goal:  To play Heads Up with the group. Notes:  Patient played the game with peers.      Status After Intervention:  Improved    Participation Level: Interactive    Participation Quality: Appropriate and Attentive      Speech:  normal      Thought Process/Content: Logical      Affective Functioning: Congruent      Mood: euthymic      Level of consciousness:  Alert and Attentive      Response to Learning: Progressing to goal      Endings: None Reported    Modes of Intervention: Activity      Discipline Responsible: [employer]      Signature:  Mandi Brower

## 2022-06-10 NOTE — PROGRESS NOTES
Pt assessment completed in the day room. Pt pleasant and cooperative. Pt visible on the unit and social with peers. Pt rated anxiety 2/10 and depression 0/10, with 10 being the highest.  Pt denies SI, HI, and AVH. Pt reports appetite is improving and sleep good after pt received trazodone 6/9/2022. LBM today no problems reported.

## 2022-06-10 NOTE — CARE COORDINATION
BH Psychosocial Assessment    Current Level of Psychosocial Functioning     Independent x  Dependent    Minimal Assist     Comments:  Patient      Psychosocial High Risk Factors (check all that apply)    Unable to obtain meds   Chronic illness/pain    Substance abuse   Lack of Family Support   Financial stress   Isolation   Inadequate Community Resources  Suicide attempt(s)  Not taking medications   Victim of crime   Developmental Delay  Unable to manage personal needs    Age 72 or older   Homeless  No transportation   Readmission within 30 days  Unemployment  Traumatic Event    Family/Supports identified: hanh Bassett [phone]    Sexual Orientation:  Did not identify    Patient Strengths: support, housing    Patient Barriers: no income, AOD issues    Safety plan:completed    CMHC/MH history: wants service restarted at [facility]     Plan of Care:  medication management, group/individual therapies, family meetings, psycho -education, treatment team meetings to assist with stabilization    Initial Discharge Plan:  Call collateral, refer to [facility]    Clinical Summary:  Patient stated that she has been drinking heavily for the past 1.5 years. Patient would like a referral to [facility] and IOP services set up. Patient lives alone and has a lot of support Denies SI/HI. Denies A/V hallucinations, .

## 2022-06-10 NOTE — GROUP NOTE
Group Therapy Note    Date: 6/10/2022    Group Start Time: 1330  Group End Time: 1400  Group Topic: Cognitive Skills    [facility]    Benny Hardy RN        Group Therapy Note    Attendees: 8/14         Patient's Goal:  To become more resilient. Status After Intervention:  Improved    Participation Level:  Active Listener and Interactive    Participation Quality: Appropriate and Attentive      Speech:  normal      Thought Process/Content: Logical  Linear      Affective Functioning: Congruent      Mood: euthymic      Level of consciousness:  Alert and Oriented x4      Response to Learning: Progressing to goal      Endings: None Reported    Modes of Intervention: Education, Support and Problem-solving      Discipline Responsible: Registered Nurse      Signature:  Benny Hardy RN

## 2022-06-10 NOTE — CARE COORDINATION
Brief Intervention and Referral to Treatment Summary    Patient was provided PHQ-9, AUDIT-C and DAST Screening:      PHQ-9 Score: 20  AUDIT-C Score:  12  DAST Score:  2    Patients substance use is considered     Low Risk/Healthy  Moderate Risk  Harmful x  Dependent    Patients depression is considered:     Minimal  Mild   Moderate  Moderately Severex  Severe    Brief Education Was Provided    Patient was receptivex  Patient was not receptive      Brief Intervention Is Provided (Only for AUDIT-C or DAST)     Patient reports readiness to decrease and/or stop use and a plan was discussed x  Patient denies readiness to decrease and/or stop use and a plan was not discussed      Recommendations/Referrals for Brief and/or Specialized Treatment Provided to Patient   Patient stated that she drinks daily. Patient wants to follow up with [facility] for IOP and add LGR.

## 2022-06-10 NOTE — GROUP NOTE
Group Therapy Note    Date: 6/10/2022    Group Start Time: 1600  Group End Time: 1700  Group Topic: Psychoeducation    [facility]    Emma Jerezident, YE BOSEW        Group Therapy Note    Attendees: 10         Patient's Goal:  To participate in a Psychoeducational group    Notes:  Patient participate in stress management techniques    Status After Intervention:  Improved    Participation Level: Interactive    Participation Quality: Attentive      Speech:  normal      Thought Process/Content: Logical      Affective Functioning: Congruent      Mood: calm      Level of consciousness:  Alert      Response to Learning: Able to verbalize current knowledge/experience      Endings: None Reported    Modes of Intervention: Education      Discipline Responsible: /Counselor      Signature:  Emma Castillo, JUHI BOSE

## 2022-06-10 NOTE — GROUP NOTE
Group Therapy Note    Date: 6/10/2022    Group Start Time: 1000  Group End Time: 1050  Group Topic: Psychoeducation    [facility]    Denny Sauceda        Group Therapy Note    Attendees: 12         Patient's Goal:  \"To make a phone call to court\"    Notes:  Patient attended the 1000 skills group. Patient was attentive and she worked well on her task.     Status After Intervention:  Unchanged    Participation Level: Good    Participation Quality: Appropriate      Speech:  normal      Thought Process/Content: Logical      Affective Functioning: Congruent      Mood: calm      Level of consciousness:  Alert      Response to Learning: Progressing to goal      Endings: None Reported    Modes of Intervention: Education, Socialization and Activity      Discipline Responsible: Psychoeducational Specialist      Signature:  Denny Sauceda

## 2022-06-10 NOTE — GROUP NOTE
Group Therapy Note    Date: 6/9/2022    Group Start Time: 1300  Group End Time: 9995  Group Topic: Music Therapy    [facility] MELA Renteria , [facility]        Group Therapy Note    Attendees: 9         Patient's Goal:  To participate in music group     Notes:  Patient played an instrument    Status After Intervention:  Improved    Participation Level: Interactive    Participation Quality: Appropriate      Speech:  normal      Thought Process/Content: Logical      Affective Functioning: Congruent      Mood: anxious      Level of consciousness:  Alert      Response to Learning: Progressing to goal      Endings: None Reported    Modes of Intervention: Support      Discipline Responsible: /Counselor      Signature:  Dexter Leonard, [facility]

## 2022-06-10 NOTE — GROUP NOTE
Group Therapy Note    Date: 6/10/2022    Group Start Time: 0200  Group End Time: 0230  Group Topic: Cognitive Skills    [facility]    HAYES Rivera        Group Therapy Note    Attendees: 7/13         Patient's Goal:  To identify techniques to utilize for self care    Notes:  Engaged appropriately     Status After Intervention:  Improved    Participation Level:  Active Listener and Interactive    Participation Quality: Appropriate, Attentive, Sharing and Supportive      Speech:  normal      Thought Process/Content: Logical      Affective Functioning: Flat      Mood: anxious and depressed      Level of consciousness:  Alert      Response to Learning: Progressing to goal      Endings: None Reported    Modes of Intervention: Education      Discipline Responsible: /Counselor      Signature:  HAYES Rivera

## 2022-06-10 NOTE — PROGRESS NOTES
Patient asks this nurse for help calling the [address] [place] [address] court. Patient is helped by the  with a number. Patient is instructed to speak with SALVADOR.

## 2022-06-10 NOTE — CARE COORDINATION
Faxed over letter to juvenile court that patient is currently admitted to excuse patient from her court at 9:30am today.

## 2022-06-10 NOTE — PROGRESS NOTES
Pt assessment completed in the day room. Pt pleasant and cooperative. Pt social with peers, attending groups, and watching TV. Pt rated anxiety 0/10 and depression 0/10, with 10 being the highest. Pt denies SI,HI, and AVH. Pt reports appetite is improving and sleep improved with Trazodone. Pt reported showering today.

## 2022-06-10 NOTE — PROGRESS NOTES
Morning Community Meeting Topics    Stephen Debbie attended the morning community meeting on 6/10/22. Topics discussed today     [x] Introduction   Day of the week and date   Mask distribution   Current mask requirements  [x]Teams   Explanation of  Green and Blue team criteria   Nurses assigned to each team for today   Explanation about green and blue paper  o Date  o Patient's Name  o Patient's Nurse  o Goals  [x] Visitation   Announce the visiting hours for the day   Announce which team is allowed to have visitors for the day   Review any updated Covid 19 requirements for visitors during visitation  o Vaccine Card or negative Covid test within 48 hours of visit  o State Identification   Patients are reminded to alert the  at least 1 hour before visitation   [x] Unit Orientation   Coffee use   Phone location and etiquette   Shower locations  [employer] and dryer location and process   Common area expectations   Staff rounds expectation  [x] Meals    Educate patient to the menu  o The patient is encouraged to fill out the menu to get preferences at mealtime  o The patient is educated that if they do not fill out the menu, they will get the standard tray  o The coffee pot is decaf, patient encouraged to order regular coffee from menu.    Educate patient to the meal process   Patient encouraged to eat snacks provided twice daily  o Snacks may stay in patient room     [x] Discharge Process   Discharge expectations   Fill out the survey after discharge   [x] Hygiene   Daily showers encouraged  o Showers availability discussed    Daily dressing encouraged  o Discussed wearing street clothing   Education provided on where to place linens and clothing  o Linens in the hamper  o personal clothing does not go into the linen hamper  [x] Group    Patient encouraged to attend group provided   Time of Group Meetings discussed   Gentle reminder that attendance is a Physician order  [x] Movement   Chair exercises completed   Stretching completed  Notes: Goal - \"To make a phone call to court\" Electronically signed by DANIEL Sargent on 6/10/2022 at 2:10 PM

## 2022-06-10 NOTE — PROGRESS NOTES
Pt requested/recieved PRN Tylenol as ordered for back pain rated 6/10, with 10 being the highest. will continue to monitor for effectiveness of medication.

## 2022-06-10 NOTE — PROGRESS NOTES
Pt approached this RN c/o anxiety 7/10. Stated she has been fine all day and is now suddenly feeling anxious. Pt was encouraged to walk some laps around the unit and deep breathing. Pt requested vistaril PRN and this was administered per [facility], pt currently walking the unit. Will continue to monitor for effectiveness of PRN.

## 2022-06-10 NOTE — GROUP NOTE
Group Therapy Note    Date: 6/10/2022    Group Start Time: 1100  Group End Time: 8555  Group Topic: Psychotherapy    [facility] MELA Renteria , [facility]        Group Therapy Note    Attendees: 8     to understand boundaries    Notes:  Patient participated    Status After Intervention:  Improved    Participation Level: Interactive    Participation Quality: Appropriate      Speech:  normal      Thought Process/Content: Logical      Affective Functioning: Congruent      Mood: anxious      Level of consciousness:  Alert      Response to Learning: Progressing to goal      Endings: None Reported    Modes of Intervention: Support      Discipline Responsible: /Counselor      Signature:  Dexter Leonard, [facility]

## 2022-06-11 PROCEDURE — 6370000000 HC RX 637 (ALT 250 FOR IP): Performed by: STUDENT IN AN ORGANIZED HEALTH CARE EDUCATION/TRAINING PROGRAM

## 2022-06-11 PROCEDURE — 1240000000 HC EMOTIONAL WELLNESS R&B

## 2022-06-11 PROCEDURE — 6370000000 HC RX 637 (ALT 250 FOR IP): Performed by: PSYCHIATRY & NEUROLOGY

## 2022-06-11 RX ADMIN — ACETAMINOPHEN 650 MG: 325 TABLET ORAL at 20:53

## 2022-06-11 RX ADMIN — ALUMINA, MAGNESIA, AND SIMETHICONE ORAL SUSPENSION REGULAR STRENGTH 30 ML: 1200; 1200; 120 SUSPENSION ORAL at 10:58

## 2022-06-11 RX ADMIN — MIRTAZAPINE 15 MG: 15 TABLET, FILM COATED ORAL at 20:53

## 2022-06-11 RX ADMIN — TRAZODONE HYDROCHLORIDE 50 MG: 50 TABLET ORAL at 20:53

## 2022-06-11 RX ADMIN — HYDROXYZINE PAMOATE 50 MG: 50 CAPSULE ORAL at 22:26

## 2022-06-11 RX ADMIN — ACETAMINOPHEN 650 MG: 325 TABLET ORAL at 16:31

## 2022-06-11 RX ADMIN — HYDROXYZINE PAMOATE 50 MG: 50 CAPSULE ORAL at 02:15

## 2022-06-11 RX ADMIN — ACETAMINOPHEN 650 MG: 325 TABLET ORAL at 02:15

## 2022-06-11 RX ADMIN — Medication 100 MG: at 08:01

## 2022-06-11 RX ADMIN — ACETAMINOPHEN 650 MG: 325 TABLET ORAL at 08:01

## 2022-06-11 RX ADMIN — THERA TABS 1 TABLET: TAB at 08:01

## 2022-06-11 ASSESSMENT — PAIN SCALES - GENERAL
PAINLEVEL_OUTOF10: 10
PAINLEVEL_OUTOF10: 8
PAINLEVEL_OUTOF10: 5
PAINLEVEL_OUTOF10: 2
PAINLEVEL_OUTOF10: 8
PAINLEVEL_OUTOF10: 7

## 2022-06-11 ASSESSMENT — PAIN DESCRIPTION - LOCATION
LOCATION: BACK
LOCATION: BACK
LOCATION: HEAD
LOCATION: BACK

## 2022-06-11 ASSESSMENT — PAIN DESCRIPTION - DESCRIPTORS
DESCRIPTORS: ACHING;CRAMPING
DESCRIPTORS: ACHING
DESCRIPTORS: DISCOMFORT

## 2022-06-11 ASSESSMENT — PAIN DESCRIPTION - ORIENTATION
ORIENTATION: MID
ORIENTATION: LOWER
ORIENTATION: RIGHT

## 2022-06-11 ASSESSMENT — PAIN - FUNCTIONAL ASSESSMENT: PAIN_FUNCTIONAL_ASSESSMENT: ACTIVITIES ARE NOT PREVENTED

## 2022-06-11 ASSESSMENT — PAIN SCALES - WONG BAKER: WONGBAKER_NUMERICALRESPONSE: 2

## 2022-06-11 NOTE — GROUP NOTE
Group Therapy Note    Date: 6/11/2022    Group Start Time: 1000  Group End Time: 1050  Group Topic: Activity    [facility]    LIDYA Duque LSW        Group Therapy Note    Attendees: 9         Patient's Goal:  To participate in a game/activity    Notes:  Patient participated in bingo    Status After Intervention:  Improved    Participation Level: Interactive    Participation Quality: Appropriate      Speech:  normal      Thought Process/Content: Logical      Affective Functioning: Congruent      Mood: calm      Level of consciousness:  Alert      Response to Learning: Able to verbalize current knowledge/experience      Endings: None Reported    Modes of Intervention: Education      Discipline Responsible: /Counselor      Signature:  LIDYA Duque LSW

## 2022-06-11 NOTE — PROGRESS NOTES
Pt to nurse's station requesting Tylenol for 7/10 back pain related to previous rib injury/fracture, per patient. Medicated with 650mg Tylenol per PRN order. No other complaints or concerns noted.

## 2022-06-11 NOTE — GROUP NOTE
Group Therapy Note    Date: 6/11/2022    Group Start Time: 1400  Group End Time: 4580  Group Topic: Healthy Living/Wellness    [facility] I    Rangel Majano RN        Group Therapy Note    Attendees: 10/14         Patient's Goal:  Participate in guided imagery meditation. Notes:   Pt actively participated group. Status After Intervention:  Improved    Participation Level:  Active Listener    Participation Quality: Appropriate      Speech:  normal      Thought Process/Content: Logical      Affective Functioning: Congruent      Mood: euthymic      Level of consciousness:  Alert and Oriented x4      Response to Learning: Progressing to goal      Endings: None Reported    Modes of Intervention: Education, Support and Socialization      Discipline Responsible: Registered Nurse      Signature:  Rangel Majano RN

## 2022-06-11 NOTE — GROUP NOTE
Group Therapy Note    Date: 6/10/2022    Group Start Time: 2000  Group End Time: 2045  Group Topic: Recreational    [facility]    LIDYA Duque LSW        Group Therapy Note    Attendees: 9         Patient's Goal:  To participate in a recreational group and wrap up    Notes:  Patient participated in padma fay and met her goal of attending groups.     Status After Intervention:  Improved    Participation Level: Interactive    Participation Quality: Appropriate      Speech:  normal      Thought Process/Content: Logical      Affective Functioning: Congruent      Mood: calm      Level of consciousness:  Alert      Response to Learning: Able to verbalize current knowledge/experience      Endings: None Reported    Modes of Intervention: Education      Discipline Responsible: /Counselor      Signature:  LIDYA Duque LSW

## 2022-06-11 NOTE — PROGRESS NOTES
Patient asks for Tylenol for back pain of 6-7/10. Patient is sitting in the day area coloring to ease her anxiety in the dining area.  Electronically signed by Kelly Benton LPN on 9/18/4217 at 0:82 AM

## 2022-06-11 NOTE — PROGRESS NOTES
Pt requested and received PRN Tylenol as ordered for back pain rated 8/10, with 10 being the highest. Will continue to monitor.

## 2022-06-11 NOTE — GROUP NOTE
Group Therapy Note    Date: 6/11/2022    Group Start Time: 1100  Group End Time: 1200  Group Topic: Psychoeducation    [facility]    LIDYA Sullivan LSW        Group Therapy Note    Attendees: 10         Patient's Goal:  To participate in a Psychoeducational group    Notes:  Patient participated in coping skills for anxiety    Status After Intervention:  Improved    Participation Level: Interactive    Participation Quality: Sharing      Speech:  normal      Thought Process/Content: Logical      Affective Functioning: Congruent      Mood: calm      Level of consciousness:  Alert      Response to Learning: Able to verbalize current knowledge/experience      Endings: None Reported    Modes of Intervention: Education      Discipline Responsible: /Counselor      Signature:  LIDYA Sullivan LSW

## 2022-06-11 NOTE — CARE COORDINATION
FAMILY COLLATERAL NOTE    Family/Support Name: Leydi Hernadez  Contact #: [phone]  Relationship to Pt[de-identified] Mother     Family/Support contact aware of hospitalization: Yes  Presenting Symptoms/Current Concerns:  Per collateral, patient  has an alcohol problem. Patient is having a hard time letting go of alcohol and keeping herself clean (from alcohol)  Mother went to Keli's house to check on her. \"She was crying saying she doesn't want to do this anymore. She was screaming she needed help. She was intoxicated at the time. \"  Collateral called an ambulance to take Nancy Naranjo to the hospital.  Collateral states she is not sure if patient would ever harm herself (when intoxicated),  but is certain that patient would not harm another person. I     Life Stressors:   Addiction to alcohol  Fired from her job of 15 years  Negative thoughts from the past  Misses her 11year old daughter (grandmother has temporary custody)    Background History Relevant to Current Hospitalization:  Patient been at [facility] but relapsed within 1-3 days after leaving that facility      Family Mental Health/Substance Use History:   Alcoholism grand parents and great grand parents (material)  Anxiety & depression cousin  No substance abuse history      Support Network's Goal for Hospitalization:    collateral would like patient to receive alcohol treatment services and counseling. *Per patient chart, [facility] is [facility] with [facility]. Discharge Plan:   Patient plans to discharge to her home. Mom will pick patient up and take her to her home.      Support Network Supportive of Discharge Plan:   yes    Support can confirm Safety of Location and Security of Weapons:   No weapons in the home    Support agreeable to Safeguard and Monitor Medications (including Prescription and OTC):   yes    Identified Barriers to Compliance with Discharge Plan:   None identified  Recommendations for Support Network:   Please be available for additional questions. Call [facility] if you have any questions or concerns.        Osmany Calvin, MSW, LSW

## 2022-06-11 NOTE — PROGRESS NOTES
Pt awake and requested PRN Vistaril for anxiety 7/10, pt reports she felt something when she woke up and it made her anxious and Tylenol for back pain and cramping related to possible starting menstrual cycle per patient 10/10. Will continue to monitor for effectiveness.

## 2022-06-12 VITALS
WEIGHT: 165 LBS | HEART RATE: 100 BPM | RESPIRATION RATE: 18 BRPM | DIASTOLIC BLOOD PRESSURE: 85 MMHG | BODY MASS INDEX: 27.46 KG/M2 | OXYGEN SATURATION: 100 % | SYSTOLIC BLOOD PRESSURE: 129 MMHG | TEMPERATURE: 97.9 F

## 2022-06-12 PROCEDURE — 1240000000 HC EMOTIONAL WELLNESS R&B

## 2022-06-12 PROCEDURE — 6370000000 HC RX 637 (ALT 250 FOR IP): Performed by: PSYCHIATRY & NEUROLOGY

## 2022-06-12 RX ADMIN — MIRTAZAPINE 15 MG: 15 TABLET, FILM COATED ORAL at 21:31

## 2022-06-12 RX ADMIN — ACETAMINOPHEN 650 MG: 325 TABLET ORAL at 21:45

## 2022-06-12 RX ADMIN — HYDROXYZINE PAMOATE 50 MG: 50 CAPSULE ORAL at 22:50

## 2022-06-12 RX ADMIN — THERA TABS 1 TABLET: TAB at 08:50

## 2022-06-12 RX ADMIN — TRAZODONE HYDROCHLORIDE 50 MG: 50 TABLET ORAL at 21:31

## 2022-06-12 RX ADMIN — Medication 100 MG: at 08:50

## 2022-06-12 RX ADMIN — ACETAMINOPHEN 650 MG: 325 TABLET ORAL at 03:11

## 2022-06-12 RX ADMIN — ACETAMINOPHEN 650 MG: 325 TABLET ORAL at 11:41

## 2022-06-12 ASSESSMENT — PAIN SCALES - GENERAL
PAINLEVEL_OUTOF10: 9
PAINLEVEL_OUTOF10: 6
PAINLEVEL_OUTOF10: 10

## 2022-06-12 ASSESSMENT — PAIN DESCRIPTION - DESCRIPTORS: DESCRIPTORS: CRAMPING

## 2022-06-12 ASSESSMENT — PAIN DESCRIPTION - LOCATION
LOCATION: BACK
LOCATION: PELVIS;ABDOMEN
LOCATION: HIP

## 2022-06-12 ASSESSMENT — PAIN - FUNCTIONAL ASSESSMENT: PAIN_FUNCTIONAL_ASSESSMENT: PREVENTS OR INTERFERES SOME ACTIVE ACTIVITIES AND ADLS

## 2022-06-12 ASSESSMENT — PAIN DESCRIPTION - ORIENTATION: ORIENTATION: MID

## 2022-06-12 NOTE — GROUP NOTE
Group Therapy Note    Date: 6/12/2022    Group Start Time: 1000  Group End Time: 1100  Group Topic: Activity    [facility]    Emma President, MSW, YEW        Group Therapy Note    Attendees: 9         Patient's Goal: to participate in a group activity    Notes: Patient participated in bingo.      Status After Intervention:  Improved    Participation Level: Interactive    Participation Quality: Attentive      Speech:  normal      Thought Process/Content: Logical      Affective Functioning: Congruent      Mood: calm      Level of consciousness:  Alert      Response to Learning: Able to verbalize current knowledge/experience      Endings: None Reported    Modes of Intervention: Education      Discipline Responsible: /Counselor      Signature:  Emma Jerezident, MSW, YEW

## 2022-06-12 NOTE — GROUP NOTE
Group Therapy Note    Date: 6/11/2022    Group Start Time: 1930  Group End Time: 2000  Group Topic: Recreational    [facility]    Aileen Fay        Group Therapy Note    Attendees: 12/15         Patient's Goal:  To play Heads Up with the group. Notes:  Patient played the game with peers.      Status After Intervention:  Improved    Participation Level: Interactive    Participation Quality: Appropriate and Attentive      Speech:  normal      Thought Process/Content: Logical      Affective Functioning: Congruent      Mood: euthymic      Level of consciousness:  Alert and Attentive      Response to Learning: Progressing to goal      Endings: None Reported    Modes of Intervention: Activity      Discipline Responsible: [employer]      Signature:  Aileen Fay

## 2022-06-12 NOTE — PROGRESS NOTES
Morning Community Meeting Topics    Jonniedemisbah Kirk attended the morning community meeting on 6/12/22. Topics discussed today     [x] Introduction   Day of the week and date   Mask distribution   Current mask requirements  [x]Teams   Explanation of  Green and Blue team criteria   Nurses assigned to each team for today   Explanation about green and blue paper  o Date  o Patient's Name  o Patient's Nurse  o Goals  [x] Visitation   Announce the visiting hours for the day   Announce which team is allowed to have visitors for the day   Review any updated Covid 19 requirements for visitors during visitation  o Vaccine Card or negative Covid test within 48 hours of visit  o State Identification   Patients are reminded to alert the  at least 1 hour before visitation   [x] Unit Orientation   Coffee use   Phone location and etiquette   Shower locations  [employer] and dryer location and process   Common area expectations   Staff rounds expectation  [x] Meals    Educate patient to the menu  o The patient is encouraged to fill out the menu to get preferences at mealtime  o The patient is educated that if they do not fill out the menu, they will get the standard tray  o The coffee pot is decaf, patient encouraged to order regular coffee from menu.    Educate patient to the meal process   Patient encouraged to eat snacks provided twice daily  o Snacks may stay in patient room     [x] Discharge Process   Discharge expectations   Fill out the survey after discharge   [x] Hygiene   Daily showers encouraged  o Showers availability discussed    Daily dressing encouraged  o Discussed wearing street clothing   Education provided on where to place linens and clothing  o Linens in the hamper  o personal clothing does not go into the linen hamper  [x] Group    Patient encouraged to attend group provided   Time of Group Meetings discussed   Gentle reminder that attendance is a Physician order  [x] Movement   Chair exercises completed   Stretching completed  Notes: Patient's goal is to do 10 laps around the unit and to do yoga.

## 2022-06-12 NOTE — PROGRESS NOTES
Pt reports no depression or anxiety, denies SI/HI/AVH. Pt reports attending all groups. Pt is visible on unit with select peers. Pt reports good appetite, and showers daily.

## 2022-06-12 NOTE — GROUP NOTE
Group Therapy Note    Date: 6/11/2022    Group Start Time: 2130  Group End Time: 2140  Group Topic: Wrap-Up    [facility]    Mandi Mention        Group Therapy Note    Attendees: 6/15         Patient's Goal:  \"to relax and enjoy the day\"    Notes:   Patient reported meeting their goal for the day. Patient shared she enjoyed coloring and proudly showed this tech a picture. Patient explained she colors a lot at home to cope with stress.     Status After Intervention:  Unchanged    Participation Level: Interactive    Participation Quality: Appropriate, Attentive and Sharing      Speech:  normal      Thought Process/Content: Logical      Affective Functioning: Congruent      Mood: euthymic      Level of consciousness:  Alert and Attentive      Response to Learning: Progressing to goal      Endings: None Reported    Modes of Intervention: Support      Discipline Responsible: [employer]      Signature:  Mandi Brower

## 2022-06-12 NOTE — PROGRESS NOTES
Pt assessment completed in the day room. Pt rates anxiety and depression 0/10, with 10 being the highest. Denies SI,HI, and AVH. Pt reports appetite good. Pt reports at bedtime anxiety increases. Pt reports that she is a night owl and usually watches TV until falling asleep. Pt is D/C focused, looking forward to starting IOP. Pt goal today is to walk 10 laps around the unit.

## 2022-06-12 NOTE — GROUP NOTE
Group Therapy Note    Date: 6/12/2022    Group Start Time: 3825  Group End Time: 5465  Group Topic: Healthy Living/Wellness    [facility] MELA Castro        Group Therapy Note    Attendees: 12/17         Patient's Goal:  To learn about sleep hygiene. Notes:  Patient participated in group discussion.      Status After Intervention:  Unchanged    Participation Level: Interactive    Participation Quality: Appropriate and Attentive      Speech:  normal      Thought Process/Content: Logical      Affective Functioning: Congruent      Mood: euthymic      Level of consciousness:  Alert and Attentive      Response to Learning: Able to verbalize current knowledge/experience      Endings: None Reported    Modes of Intervention: Education      Discipline Responsible: [address], [employer]      Signature:  Krishan Castro

## 2022-06-12 NOTE — GROUP NOTE
Group Therapy Note    Date: 6/12/2022    Group Start Time: 1400  Group End Time: 8134  Group Topic: Group Therapy    [facility]    BONNIE Ornelas        Group Therapy Note    Attendees: 12         Patient's Goal:  To participate in a goal oriented group. Notes:  Patient stated her goal is to go home, remain sober and take care of her children. Status After Intervention:  Improved    Participation Level: Active Listener    Participation Quality: Appropriate      Speech:  normal      Thought Process/Content: Logical      Affective Functioning: Congruent      Mood: elevated      Level of consciousness:  Alert      Response to Learning: Able to verbalize current knowledge/experience      Endings: None Reported    Modes of Intervention: Education      Discipline Responsible: /Counselor      Signature:   BONNIE Ornelas

## 2022-06-13 PROCEDURE — 99239 HOSP IP/OBS DSCHRG MGMT >30: CPT | Performed by: PSYCHIATRY & NEUROLOGY

## 2022-06-13 PROCEDURE — 6370000000 HC RX 637 (ALT 250 FOR IP): Performed by: PSYCHIATRY & NEUROLOGY

## 2022-06-13 RX ORDER — MULTIVITAMIN WITH IRON
1 TABLET ORAL DAILY
Qty: 30 TABLET | Refills: 1 | Status: SHIPPED | OUTPATIENT
Start: 2022-06-14

## 2022-06-13 RX ORDER — MIRTAZAPINE 15 MG/1
15 TABLET, FILM COATED ORAL NIGHTLY
Qty: 15 TABLET | Refills: 3 | Status: SHIPPED | OUTPATIENT
Start: 2022-06-13

## 2022-06-13 RX ADMIN — THERA TABS 1 TABLET: TAB at 08:56

## 2022-06-13 RX ADMIN — Medication 100 MG: at 08:56

## 2022-06-13 RX ADMIN — ACETAMINOPHEN 650 MG: 325 TABLET ORAL at 05:35

## 2022-06-13 ASSESSMENT — PAIN DESCRIPTION - LOCATION: LOCATION: BACK

## 2022-06-13 ASSESSMENT — PAIN SCALES - GENERAL: PAINLEVEL_OUTOF10: 7

## 2022-06-13 NOTE — GROUP NOTE
Group Therapy Note    Date: 6/13/2022    Group Start Time: 1300  Group End Time: 7754  Group Topic: Music Therapy    [facility]    Burnetta Fix        Group Therapy Note    Attendees: 11/18         Patient actively participated

## 2022-06-13 NOTE — PROGRESS NOTES
Department of Psychiatry  Attending Progress Note      SUBJECTIVE:  42 yo female admitted from ED after voicing suicidal ideations with plan and even homicidal ideations toward the father of her children. Patient had been drinking daily since released from rehab. Patient says she  was drinking heavily, difficult relationship drinking hard liqour  Today Reports less suicidal thoughts, mood still anxious denies homicidal ideations. Sleep reports being erratic.     OBJECTIVE slightly shaky feels a little better  Believes she is still in a mild withdrawal.  Physical  VITALS:  /85   Pulse 100   Temp 97.9 °F (36.6 °C) (Oral)   Resp 18   Wt 165 lb (74.8 kg)   SpO2 100%   BMI 27.46 kg/m²   CONSTITUTIONAL:  awake, alert, cooperative, no apparent distress, and appears stated age and   Mental Status Examination:  Level of consciousness: awake and alert  Appearance:  well-appearing  Behavior/Motor:  psychomotor agitation  Attitude toward examiner:  cooperative  Speech:  spontaneous, normal rate, normal volume and well articulated  Mood:  Still anxious and sad  Affect:  mood congruent  Thought processes:  goal directed  Thought content:  Homocidal ideation denies  Suicidal Ideation:  passive  Delusions:  paranoid  Perceptual Disturbance:  denies any perceptual disturbance  Cognition:  oriented to person, place, and time  Speech lisp  Mildly distractible  Poor insight  Poor judgement      Data  Labs:    CBC with Differential:    Lab Results   Component Value Date    WBC 5.3 06/08/2022    RBC 4.24 06/08/2022    HGB 12.3 06/08/2022    HCT 36.1 06/08/2022     06/08/2022    MCV 85.2 06/08/2022    MCH 29.1 06/08/2022    MCHC 34.2 06/08/2022    RDW 16.3 06/08/2022    LYMPHOPCT 35.4 06/08/2022    MONOPCT 4.4 06/08/2022    BASOPCT 0.2 06/08/2022    MONOSABS 0.2 06/08/2022    LYMPHSABS 1.9 06/08/2022    EOSABS 0.0 06/08/2022    BASOSABS 0.0 06/08/2022     CMP:    Lab Results   Component Value Date     06/08/2022 K 3.4 06/08/2022     06/08/2022    CO2 27 06/08/2022    BUN 12 06/08/2022    CREATININE 0.73 06/08/2022    GFRAA >60.0 06/08/2022    LABGLOM >60.0 06/08/2022    GLUCOSE 126 06/08/2022    PROT 6.3 06/08/2022    LABALBU 3.9 06/08/2022    CALCIUM 7.8 06/08/2022    BILITOT 0.5 06/08/2022    ALKPHOS 60 06/08/2022    AST 34 06/08/2022    ALT 22 06/08/2022       Medications  Current Facility-Administered Medications: mirtazapine (REMERON) tablet 15 mg, 15 mg, Oral, Nightly  thiamine tablet 100 mg, 100 mg, Oral, Daily  multivitamin 1 tablet, 1 tablet, Oral, Daily  acetaminophen (TYLENOL) tablet 650 mg, 650 mg, Oral, Q4H PRN  magnesium hydroxide (MILK OF MAGNESIA) 400 MG/5ML suspension 30 mL, 30 mL, Oral, Daily PRN  aluminum & magnesium hydroxide-simethicone (MAALOX) 200-200-20 MG/5ML suspension 30 mL, 30 mL, Oral, PRN  haloperidol (HALDOL) tablet 5 mg, 5 mg, Oral, Q4H PRN **OR** haloperidol lactate (HALDOL) injection 5 mg, 5 mg, IntraMUSCular, Q4H PRN  benztropine mesylate (COGENTIN) injection 2 mg, 2 mg, IntraMUSCular, BID PRN  traZODone (DESYREL) tablet 50 mg, 50 mg, Oral, Nightly PRN  nicotine (NICODERM CQ) 21 MG/24HR 1 patch, 1 patch, TransDERmal, Daily  LORazepam (ATIVAN) tablet 0.5 mg, 0.5 mg, Oral, Q4H PRN **OR** LORazepam (ATIVAN) injection 0.5 mg, 0.5 mg, IntraMUSCular, Q4H PRN  hydrOXYzine pamoate (VISTARIL) capsule 50 mg, 50 mg, Oral, Q6H PRN **OR** hydrOXYzine (VISTARIL) injection 50 mg, 50 mg, IntraMUSCular, Q6H PRN  LORazepam (ATIVAN) tablet 1 mg, 1 mg, Oral, Q1H PRN **OR** LORazepam (ATIVAN) injection 1 mg, 1 mg, IntraVENous, Q1H PRN **OR** LORazepam (ATIVAN) tablet 2 mg, 2 mg, Oral, Q1H PRN **OR** LORazepam (ATIVAN) injection 2 mg, 2 mg, IntraVENous, Q1H PRN **OR** LORazepam (ATIVAN) tablet 3 mg, 3 mg, Oral, Q1H PRN **OR** LORazepam (ATIVAN) injection 3 mg, 3 mg, IntraVENous, Q1H PRN **OR** LORazepam (ATIVAN) tablet 4 mg, 4 mg, Oral, Q1H PRN **OR** LORazepam (ATIVAN) injection 4 mg, 4 mg, IntraVENous, Q1H PRN    ASSESSMENT AND PLAN    Dx: Major Depressive Disorder  Alcohol use disorder  Cont medications. Jerod Dyson MD

## 2022-06-13 NOTE — PROGRESS NOTES
06/08/2022    K 3.4 06/08/2022     06/08/2022    CO2 27 06/08/2022    BUN 12 06/08/2022    CREATININE 0.73 06/08/2022    GFRAA >60.0 06/08/2022    LABGLOM >60.0 06/08/2022    GLUCOSE 126 06/08/2022    PROT 6.3 06/08/2022    LABALBU 3.9 06/08/2022    CALCIUM 7.8 06/08/2022    BILITOT 0.5 06/08/2022    ALKPHOS 60 06/08/2022    AST 34 06/08/2022    ALT 22 06/08/2022       Medications  Current Facility-Administered Medications: mirtazapine (REMERON) tablet 15 mg, 15 mg, Oral, Nightly  thiamine tablet 100 mg, 100 mg, Oral, Daily  multivitamin 1 tablet, 1 tablet, Oral, Daily  acetaminophen (TYLENOL) tablet 650 mg, 650 mg, Oral, Q4H PRN  magnesium hydroxide (MILK OF MAGNESIA) 400 MG/5ML suspension 30 mL, 30 mL, Oral, Daily PRN  aluminum & magnesium hydroxide-simethicone (MAALOX) 200-200-20 MG/5ML suspension 30 mL, 30 mL, Oral, PRN  haloperidol (HALDOL) tablet 5 mg, 5 mg, Oral, Q4H PRN **OR** haloperidol lactate (HALDOL) injection 5 mg, 5 mg, IntraMUSCular, Q4H PRN  benztropine mesylate (COGENTIN) injection 2 mg, 2 mg, IntraMUSCular, BID PRN  traZODone (DESYREL) tablet 50 mg, 50 mg, Oral, Nightly PRN  nicotine (NICODERM CQ) 21 MG/24HR 1 patch, 1 patch, TransDERmal, Daily  LORazepam (ATIVAN) tablet 0.5 mg, 0.5 mg, Oral, Q4H PRN **OR** LORazepam (ATIVAN) injection 0.5 mg, 0.5 mg, IntraMUSCular, Q4H PRN  hydrOXYzine pamoate (VISTARIL) capsule 50 mg, 50 mg, Oral, Q6H PRN **OR** hydrOXYzine (VISTARIL) injection 50 mg, 50 mg, IntraMUSCular, Q6H PRN  LORazepam (ATIVAN) tablet 1 mg, 1 mg, Oral, Q1H PRN **OR** LORazepam (ATIVAN) injection 1 mg, 1 mg, IntraVENous, Q1H PRN **OR** LORazepam (ATIVAN) tablet 2 mg, 2 mg, Oral, Q1H PRN **OR** LORazepam (ATIVAN) injection 2 mg, 2 mg, IntraVENous, Q1H PRN **OR** LORazepam (ATIVAN) tablet 3 mg, 3 mg, Oral, Q1H PRN **OR** LORazepam (ATIVAN) injection 3 mg, 3 mg, IntraVENous, Q1H PRN **OR** LORazepam (ATIVAN) tablet 4 mg, 4 mg, Oral, Q1H PRN **OR** LORazepam (ATIVAN) injection 4 mg, 4 mg, IntraVENous, Q1H PRN    ASSESSMENT AND PLAN    Dx:   Major depressive disorder  alcohol use disorder  Cont medications. Alisa Yuan MD

## 2022-06-13 NOTE — GROUP NOTE
Group Therapy Note    Date: 6/13/2022    Group Start Time: 8986  Group End Time: 5621  Group Topic: Group Therapy    [facility] ALMA DELIA García        Group Therapy Note    Attendees: 14/17         Patient's Goal:  To participate in an activity socializing and sharing with peers. Notes:  Patient actively participated when present. Arrived late. Status After Intervention:  Improved    Participation Level:  Active Listener and Interactive    Participation Quality: Appropriate, Attentive and Sharing      Speech:  normal      Thought Process/Content: Logical      Affective Functioning: Congruent      Mood: euthymic      Level of consciousness:  Alert and Attentive      Response to Learning: Progressing to goal      Endings: None Reported    Modes of Intervention: Socialization      Discipline Responsible: [address], [employer]      Signature:  Amie García

## 2022-06-13 NOTE — DISCHARGE INSTR - DIET
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
## 2022-06-13 NOTE — GROUP NOTE
Group Therapy Note    Date: 6/12/2022    Group Start Time: 2000  Group End Time: 2030  Group Topic: Recreational    [facility] I    Flaca Vazquez        Group Therapy Note    Attendees: 10/19         Patient's Goal:  To participate in activity group. Notes:  Patient participated in group with peers.      Status After Intervention:  Unchanged    Participation Level: Interactive    Participation Quality: Appropriate and Attentive      Speech:  normal      Thought Process/Content: Logical      Affective Functioning: Congruent      Mood: euthymic      Level of consciousness:  Alert and Attentive      Response to Learning: Progressing to goal      Endings: None Reported    Modes of Intervention: Activity      Discipline Responsible: [address], [employer]      Signature:  Flaca Vazquez

## 2022-06-13 NOTE — GROUP NOTE
Group Therapy Note    Date: 6/13/2022    Group Start Time: 1000  Group End Time: 1100  Group Topic: Psychoeducation    MLOZ 3W MELA Mcrae, CTRS        Group Therapy Note    Attendees: 13         Patient's Goal:  \"to read my book and prepare for dc\"     Notes:  Pt. attended the 1000 skill group. Regrets losing her job of 15 years and is motivated to get back up and get a job. Feels good and is hopeful. Status After Intervention:  Improved    Participation Level:  Active Listener and Interactive    Participation Quality: Appropriate, Attentive and Sharing      Speech:  normal      Thought Process/Content: Logical      Affective Functioning: Congruent      Mood: calm, happy      Level of consciousness:  Alert, Oriented x4 and Attentive      Response to Learning: Progressing to goal      Endings: None Reported    Modes of Intervention: Education, Support, Socialization and Activity      Discipline Responsible: Psychoeducational Specialist      Signature:  Danyell Kennedy

## 2022-06-13 NOTE — FLOWSHEET NOTE
Reviewed discharge instructions with patient. Pt. Verbalized understanding. Denies SI/HI/AVH. Awaiting ride.

## 2022-06-13 NOTE — GROUP NOTE
Group Therapy Note    Date: 6/13/2022    Group Start Time: 1100  Group End Time: 4963  Group Topic: Psychotherapy    MLNICOLE 3W MASHAI    Carmelina Borges, [facility]        Group Therapy Note    Attendees:9         Patient's Goal: to learn resources    Notes:  Patient participated    Status After Intervention:  Improved    Participation Level: Interactive    Participation Quality: Appropriate      Speech:  normal      Thought Process/Content: Logical      Affective Functioning: Congruent      Mood: anxious      Level of consciousness:  Alert      Response to Learning: Progressing to goal      Endings: None Reported    Modes of Intervention: Support      Discipline Responsible: /Counselor      Signature:  Tracie Springer, [facility]

## 2022-06-13 NOTE — GROUP NOTE
Group Therapy Note    Date: 6/12/2022    Group Start Time: 2105  Group End Time: 2115  Group Topic: Wrap-Up    MLOZ 3W ALMA DELIA Boo        Group Therapy Note    Attendees: 9/19         Patient's Goal:  \"do 10 laps\"    Notes:  Patient reported meeting their goal for the day. Patient shared she is looking forward to discharge tomorrow.     Status After Intervention:  Unchanged    Participation Level: Interactive    Participation Quality: Appropriate, Attentive and Sharing      Speech:  normal      Thought Process/Content: Logical      Affective Functioning: Congruent      Mood: euthymic      Level of consciousness:  Alert and Attentive      Response to Learning: Progressing to goal      Endings: None Reported    Modes of Intervention: Support      Discipline Responsible: [employer]      Signature:  Yessenia Boo

## 2022-06-13 NOTE — PROGRESS NOTES
Morning Community Meeting Topics    Aliza Lopez attended the morning community meeting on 6/13/22. Topics discussed today     [x] Introduction   Day of the week and date   Mask distribution   Current mask requirements  [x]Teams   Explanation of  Green and Blue team criteria   Nurses assigned to each team for today   Explanation about green and blue paper  o Date  o Patient's Name  o Patient's Nurse  o Goals  [x] Visitation   Announce the visiting hours for the day   Announce which team is allowed to have visitors for the day   Review any updated Covid 19 requirements for visitors during visitation  o Vaccine Card or negative Covid test within 48 hours of visit  o State Identification   Patients are reminded to alert the  at least 1 hour before visitation   [x] Unit Orientation   Coffee use   Phone location and etiquette   Shower locations  [employer] and dryer location and process   Common area expectations   Staff rounds expectation  [x] Meals    Educate patient to the menu  o The patient is encouraged to fill out the menu to get preferences at mealtime  o The patient is educated that if they do not fill out the menu, they will get the standard tray  o The coffee pot is decaf, patient encouraged to order regular coffee from menu.    Educate patient to the meal process   Patient encouraged to eat snacks provided twice daily  o Snacks may stay in patient room     [x] Discharge Process   Discharge expectations   Fill out the survey after discharge   [x] Hygiene   Daily showers encouraged  o Showers availability discussed    Daily dressing encouraged  o Discussed wearing street clothing   Education provided on where to place linens and clothing  o Linens in the hamper  o personal clothing does not go into the linen hamper  [x] Group    Patient encouraged to attend group provided   Time of Group Meetings discussed   Gentle reminder that attendance is a Physician order  [x] Movement   Chair exercises completed   Stretching completed  Notes:  GOAL : \" to read my book and prepare for dc\" Electronically signed by Shamar Atkins on 6/13/2022 at 2:26 PM

## 2022-06-18 ENCOUNTER — APPOINTMENT (OUTPATIENT)
Dept: CT IMAGING | Age: 37
End: 2022-06-18
Payer: COMMERCIAL

## 2022-06-18 ENCOUNTER — HOSPITAL ENCOUNTER (EMERGENCY)
Age: 37
Discharge: HOME | End: 2022-06-18
Payer: COMMERCIAL

## 2022-06-18 VITALS
TEMPERATURE: 97.5 F | BODY MASS INDEX: 29.99 KG/M2 | WEIGHT: 180 LBS | HEART RATE: 87 BPM | SYSTOLIC BLOOD PRESSURE: 112 MMHG | HEIGHT: 65 IN | RESPIRATION RATE: 16 BRPM | OXYGEN SATURATION: 97 % | DIASTOLIC BLOOD PRESSURE: 78 MMHG

## 2022-06-18 DIAGNOSIS — S00.03XA HEMATOMA OF FRONTAL SCALP, INITIAL ENCOUNTER: ICD-10-CM

## 2022-06-18 DIAGNOSIS — S02.2XXA CLOSED FRACTURE OF NASAL BONE, INITIAL ENCOUNTER: Primary | ICD-10-CM

## 2022-06-18 DIAGNOSIS — S00.81XA ABRASION OF FOREHEAD, INITIAL ENCOUNTER: ICD-10-CM

## 2022-06-18 PROCEDURE — 6370000000 HC RX 637 (ALT 250 FOR IP): Performed by: PHYSICIAN ASSISTANT

## 2022-06-18 PROCEDURE — 99285 EMERGENCY DEPT VISIT HI MDM: CPT

## 2022-06-18 PROCEDURE — 70450 CT HEAD/BRAIN W/O DYE: CPT

## 2022-06-18 PROCEDURE — 70486 CT MAXILLOFACIAL W/O DYE: CPT

## 2022-06-18 PROCEDURE — 90471 IMMUNIZATION ADMIN: CPT | Performed by: PHYSICIAN ASSISTANT

## 2022-06-18 PROCEDURE — 72125 CT NECK SPINE W/O DYE: CPT

## 2022-06-18 PROCEDURE — 90715 TDAP VACCINE 7 YRS/> IM: CPT | Performed by: PHYSICIAN ASSISTANT

## 2022-06-18 PROCEDURE — 6360000002 HC RX W HCPCS: Performed by: PHYSICIAN ASSISTANT

## 2022-06-18 RX ORDER — ACETAMINOPHEN 500 MG
1000 TABLET ORAL ONCE
Status: COMPLETED | OUTPATIENT
Start: 2022-06-18 | End: 2022-06-18

## 2022-06-18 RX ORDER — DIAPER,BRIEF,INFANT-TODD,DISP
EACH MISCELLANEOUS ONCE
Status: COMPLETED | OUTPATIENT
Start: 2022-06-18 | End: 2022-06-18

## 2022-06-18 RX ADMIN — BACITRACIN ZINC 1 G: 500 OINTMENT TOPICAL at 17:18

## 2022-06-18 RX ADMIN — TETANUS TOXOID, REDUCED DIPHTHERIA TOXOID AND ACELLULAR PERTUSSIS VACCINE, ADSORBED 0.5 ML: 5; 2.5; 8; 8; 2.5 SUSPENSION INTRAMUSCULAR at 17:19

## 2022-06-18 RX ADMIN — ACETAMINOPHEN 1000 MG: 500 TABLET ORAL at 17:39

## 2022-06-18 ASSESSMENT — PAIN DESCRIPTION - LOCATION
LOCATION: HEAD
LOCATION: HEAD;NECK

## 2022-06-18 ASSESSMENT — PAIN SCALES - GENERAL
PAINLEVEL_OUTOF10: 10
PAINLEVEL_OUTOF10: 10

## 2022-06-18 ASSESSMENT — ENCOUNTER SYMPTOMS
VOMITING: 0
EYE PAIN: 0
COUGH: 0
RHINORRHEA: 0
SORE THROAT: 0
BACK PAIN: 0
NAUSEA: 0
SHORTNESS OF BREATH: 0
ABDOMINAL PAIN: 0
DIARRHEA: 0
PHOTOPHOBIA: 0

## 2022-06-18 ASSESSMENT — PAIN - FUNCTIONAL ASSESSMENT: PAIN_FUNCTIONAL_ASSESSMENT: 0-10

## 2022-06-18 ASSESSMENT — PAIN DESCRIPTION - PAIN TYPE: TYPE: ACUTE PAIN

## 2022-06-18 ASSESSMENT — PAIN DESCRIPTION - ONSET: ONSET: ON-GOING

## 2022-06-18 ASSESSMENT — PAIN DESCRIPTION - DESCRIPTORS: DESCRIPTORS: PRESSURE

## 2022-06-18 NOTE — ED TRIAGE NOTES
Pt states she was picking up a car yordan when she lost her balance, fell forward and slid face across concrete last night. Abrasion down middle of forehead and onto nose, pt c/o neck pain. Pt states she went home and slept, c/o being tired.   Pt denies LOC

## 2022-06-18 NOTE — ED PROVIDER NOTES
[address] [facility] ED  eMERGENCY dEPARTMENTeNCOUnter      Pt Name: German Jorge  MRN: [id number]  [place] 1985  Date ofevaluation: 6/18/2022  Provider: Army Fred PA-C    CHIEF COMPLAINT       Chief Complaint   Patient presents with    Head Injury     slide across the concrete, abrasion on forehead and nose         HISTORY OF PRESENT ILLNESS   (Location/Symptom, Timing/Onset,Context/Setting, Quality, Duration, Modifying Factors, Severity)  Note limiting factors. German Jorge is a 43 y.o. female who presents to the emergency  injury. Patient states that she was trying to lift a car yordan and walk with her and fell and scraped her face on the concrete. She does admit to being intoxicated. She complains of swelling and abrasion to her face pain to her nose and neck pain. She denies any numbness tingling weakness. She denies any confusion she is acting her normal self per her mom who is at bedside. HPI    NursingNotes were reviewed. REVIEW OF SYSTEMS    (2-9 systems for level 4, 10 or more for level 5)     Review of Systems   Constitutional: Negative for chills, diaphoresis, fatigue and fever. HENT: Negative for congestion, rhinorrhea and sore throat. Eyes: Negative for photophobia and pain. Respiratory: Negative for cough and shortness of breath. Cardiovascular: Negative for chest pain and palpitations. Gastrointestinal: Negative for abdominal pain, diarrhea, nausea and vomiting. Genitourinary: Negative for dysuria and flank pain. Musculoskeletal: Positive for neck pain. Negative for back pain. Skin: Positive for wound. Negative for rash. Neurological: Negative for dizziness, light-headedness and headaches. Psychiatric/Behavioral: Negative. All other systems reviewed and are negative. Except as noted above the remainder of the review of systems was reviewed and negative.        PAST MEDICAL HISTORY     Past Medical History:   Diagnosis Date    Alcohol abuse     Tobacco dependence          SURGICALHISTORY       Past Surgical History:   Procedure Laterality Date    APPENDECTOMY      FOOT SURGERY      reports 6 sx on L foot and one on right foot    TUBAL LIGATION           CURRENT MEDICATIONS       Previous Medications    MIRTAZAPINE (REMERON) 15 MG TABLET    Take 1 tablet by mouth nightly    MULTIPLE VITAMIN (MULTIVITAMIN) TABS TABLET    Take 1 tablet by mouth daily       ALLERGIES     Oxycodone-acetaminophen    FAMILY HISTORY       Family History   Problem Relation Age of Onset    High Cholesterol Mother     Hypertension Mother     Melanoma Father     High Cholesterol Father     Hypertension Father           SOCIAL HISTORY       Social History     Socioeconomic History    Marital status: Single     Spouse name: None    Number of children: None    Years of education: None    Highest education level: None   Occupational History    None   Tobacco Use    Smoking status: Current Every Day Smoker     Packs/day: 1.00     Types: Cigarettes    Smokeless tobacco: Never Used   Vaping Use    Vaping Use: Never used   Substance and Sexual Activity    Alcohol use: Yes     Comment: 6/9/22: completed 30 day rehab 2 weeks ago, relapse one week ago; unable to quantify EtOH consumption    Drug use: Yes     Types: Marijuana Fronie Rafael)    Sexual activity: None   Other Topics Concern    None   Social History Narrative    None     Social Determinants of Health     Financial Resource Strain:     Difficulty of Paying Living Expenses: Not on file   Food Insecurity:     Worried About Running Out of Food in the Last Year: Not on file    Hitesh of Food in the Last Year: Not on file   Transportation Needs:     Lack of Transportation (Medical): Not on file    Lack of Transportation (Non-Medical):  Not on file   Physical Activity:     Days of Exercise per Week: Not on file    Minutes of Exercise per Session: Not on file   Stress:     Feeling of Stress : Not on file Social Connections:     Frequency of Communication with Friends and Family: Not on file    Frequency of Social Gatherings with Friends and Family: Not on file    Attends [employer] Services: Not on file    Active Member of Clubs or Organizations: Not on file    Attends Club or Organization Meetings: Not on file    Marital Status: Not on file   Intimate Partner Violence:     Fear of Current or Ex-Partner: Not on file    Emotionally Abused: Not on file    Physically Abused: Not on file    Sexually Abused: Not on file   Housing Stability:     Unable to Pay for Housing in the Last Year: Not on file    Number of [place] in the Last Year: Not on file    Unstable Housing in the Last Year: Not on file       SCREENINGS    [place] Coma Scale  Eye Opening: Spontaneous  Best Verbal Response: Oriented  Best Motor Response: Obeys commands  [place] Coma Scale Score: 15 @FLOW(62400903)@      PHYSICAL EXAM    (up to 7 for level 4, 8 or more for level 5)     ED Triage Vitals [06/18/22 1621]   BP Temp Temp Source Heart Rate Resp SpO2 Height Weight   131/84 97.5 °F (36.4 °C) Oral 96 16 99 % 5' 5\" (1.651 m) 180 lb (81.6 kg)       Physical Exam  Vitals and nursing note reviewed. Constitutional:       General: She is not in acute distress. Appearance: Normal appearance. She is well-developed. She is not diaphoretic. HENT:      Head: Normocephalic. Abrasion and contusion present. No raccoon eyes, Angulo's sign, right periorbital erythema or left periorbital erythema. Jaw: There is normal jaw occlusion. Nose: Signs of injury and nasal tenderness present. Right Nostril: No septal hematoma. Left Nostril: No septal hematoma. Eyes:      General: Lids are normal.      Conjunctiva/sclera: Conjunctivae normal.   Neck:      Comments: Bilateral equal hand grasp. Bilateral equal push pulls of upper lower extremities. Sensation tact light touch. Plus distal pulses.   Cardiovascular:      Rate and Rhythm: Normal rate and regular rhythm. Pulses: Normal pulses. Heart sounds: Normal heart sounds. Pulmonary:      Effort: Pulmonary effort is normal.      Breath sounds: Normal breath sounds. Abdominal:      General: Bowel sounds are normal.      Palpations: Abdomen is soft. Tenderness: There is no abdominal tenderness. Musculoskeletal:      Cervical back: Normal range of motion and neck supple. Pain with movement and muscular tenderness present. No spinous process tenderness. Lymphadenopathy:      Cervical: No cervical adenopathy. Skin:     General: Skin is warm and dry. Capillary Refill: Capillary refill takes less than 2 seconds. Findings: No rash. Neurological:      Mental Status: She is alert and oriented to person, place, and time. Comments: Patient is alert and oriented. Mom is at bedside. Psychiatric:         Thought Content: Thought content normal.         Judgment: Judgment normal.         DIAGNOSTIC RESULTS     EKG: All EKG's are interpreted by the Emergency Department Physician who either signs or Co-signsthis chart in the absence of a cardiologist.        RADIOLOGY:   Justice Bigger such as CT, Ultrasound and MRI are read by the radiologist. Plain radiographic images are visualized and preliminarily interpreted by the emergency physician with the below findings:        Interpretation per the Radiologist below, if available at the time ofthis note:    CT HEAD WO CONTRAST   Final Result   Impression:      Negative CT of the brain. All CT scans at this facility use dose modulation, iterative reconstruction, and/or weight based dosing when appropriate to reduce radiation dose to as low as reasonably achievable. CT FACIAL BONES WO CONTRAST   Final Result   Impression:      Minimally displaced nasal bone fractures. Bilateral frontal/supraorbital scalp hematoma.          All CT scans at this facility use dose modulation, iterative reconstruction, and/or weight based dosing when appropriate to reduce radiation dose to as low as reasonably achievable. CT CERVICAL SPINE WO CONTRAST   Final Result            No fracture. No malalignment. Loss cervical lordosis may be secondary to muscle spasm versus patient positioning. All CT scans at this facility use dose modulation, iterative reconstruction, and/or weight based dosing when appropriate to reduce radiation dose to as low as reasonably achievable. ED BEDSIDE ULTRASOUND:   Performed by ED Physician - none    LABS:  Labs Reviewed - No data to display    All other labs were within normal range or not returned as of this dictation. EMERGENCY DEPARTMENT COURSE and DIFFERENTIAL DIAGNOSIS/MDM:   Vitals:    Vitals:    06/18/22 1621 06/18/22 1725 06/18/22 1730   BP: 131/84 109/80 112/78   Pulse: 96 89 87   Resp: 16 16 16   Temp: 97.5 °F (36.4 °C)     TempSrc: Oral     SpO2: 99% 99% 97%   Weight: 180 lb (81.6 kg)     Height: 5' 5\" (1.651 m)             MDM    Patient fell while intoxicated last night. She sustained abrasions and swelling to her frontal region. She does not believe she lost consciousness but she is poor historian. She does have significant swelling to frontal region. CT head shows hematoma with no signs of fracture to the skull or intracranial bleeding. CT facial bones does show nasal fractures. Patient states that she has had a broken nose in the past.  She has no nosebleeds not think she needs antibiotics at this time. We will apply antibiotic ointment to facial abrasions after they were cleaned and tetanus was updated. Strict her to follow-up with family doctor in 2 days for wound recheck and to return to the ED for any new worse or concerning symptoms. Patient and mother verbalized understanding. Patient stable for discharge.   REASSESSMENT          CRITICAL CARE TIME   Total Critical Care time was  minutes, excluding separatelyreportable procedures. There was a high probability ofclinically significant/life threatening deterioration in the patient's condition which required my urgent intervention. CONSULTS:  None    PROCEDURES:  Unless otherwise noted below, none     Procedures    FINAL IMPRESSION      1. Closed fracture of nasal bone, initial encounter    2. Hematoma of frontal scalp, initial encounter    3.  Abrasion of forehead, initial encounter          DISPOSITION/PLAN   DISPOSITION Decision To Discharge 06/18/2022 05:36:42 PM      PATIENT REFERREDTO:  Korey Macedo MD  [phone]    Schedule an appointment as soon as possible for a visit in 2 days        DISCHARGEMEDICATIONS:  New Prescriptions    No medications on file          (Please note that portions of this note were completed with a voice recognition program.  Efforts were made to edit the dictations but occasionally words are mis-transcribed.)    Treasure Carter PA-C (electronically signed)  Attending Emergency Physician         Treasure Carter PA-C  06/18/22 [id number]

## 2022-07-03 ENCOUNTER — HOSPITAL ENCOUNTER (EMERGENCY)
Age: 37
Discharge: HOME | End: 2022-07-03
Payer: COMMERCIAL

## 2022-07-03 ENCOUNTER — APPOINTMENT (OUTPATIENT)
Dept: CT IMAGING | Age: 37
End: 2022-07-03
Payer: COMMERCIAL

## 2022-07-03 VITALS
DIASTOLIC BLOOD PRESSURE: 87 MMHG | HEART RATE: 105 BPM | RESPIRATION RATE: 18 BRPM | WEIGHT: 180 LBS | OXYGEN SATURATION: 97 % | BODY MASS INDEX: 29.99 KG/M2 | HEIGHT: 65 IN | SYSTOLIC BLOOD PRESSURE: 126 MMHG | TEMPERATURE: 98.3 F

## 2022-07-03 DIAGNOSIS — G44.209 MUSCLE TENSION HEADACHE: Primary | ICD-10-CM

## 2022-07-03 DIAGNOSIS — S16.1XXA STRAIN OF NECK MUSCLE, INITIAL ENCOUNTER: ICD-10-CM

## 2022-07-03 PROCEDURE — 99284 EMERGENCY DEPT VISIT MOD MDM: CPT

## 2022-07-03 PROCEDURE — 70450 CT HEAD/BRAIN W/O DYE: CPT

## 2022-07-03 RX ORDER — CYCLOBENZAPRINE HCL 10 MG
10 TABLET ORAL 3 TIMES DAILY PRN
Qty: 21 TABLET | Refills: 0 | Status: SHIPPED | OUTPATIENT
Start: 2022-07-03 | End: 2022-07-10

## 2022-07-03 ASSESSMENT — PAIN DESCRIPTION - FREQUENCY: FREQUENCY: CONTINUOUS

## 2022-07-03 ASSESSMENT — ENCOUNTER SYMPTOMS
ABDOMINAL DISTENTION: 0
RHINORRHEA: 0
EYE DISCHARGE: 0
CONSTIPATION: 0
ABDOMINAL PAIN: 0
COLOR CHANGE: 0
SORE THROAT: 0
SHORTNESS OF BREATH: 0

## 2022-07-03 ASSESSMENT — PAIN - FUNCTIONAL ASSESSMENT
PAIN_FUNCTIONAL_ASSESSMENT: 0-10
PAIN_FUNCTIONAL_ASSESSMENT: ACTIVITIES ARE NOT PREVENTED

## 2022-07-03 ASSESSMENT — PAIN DESCRIPTION - PAIN TYPE: TYPE: ACUTE PAIN

## 2022-07-03 ASSESSMENT — PAIN DESCRIPTION - DESCRIPTORS: DESCRIPTORS: ACHING

## 2022-07-03 ASSESSMENT — PAIN SCALES - GENERAL: PAINLEVEL_OUTOF10: 10

## 2022-07-03 NOTE — ED TRIAGE NOTES
Pt has co neck, back of head post trauma two weeks ago. Pt states she has broken nose and has not been able to sleep since the accident. Pt is aox4 pwd.

## 2022-07-03 NOTE — ED PROVIDER NOTES
[address] [facility] ED  eMERGENCY dEPARTMENT eNCOUnter      Pt Name: Taina Orta  MRN: [id number]  [place] 1985  Date of evaluation: 7/3/2022  Provider: Lydia Moeller PA-C    CHIEF COMPLAINT       Chief Complaint   Patient presents with    Neck Pain         HISTORY OF PRESENT ILLNESS   (Location/Symptom, Timing/Onset,Context/Setting, Quality, Duration, Modifying Factors, Severity)  Note limiting factors. Taina Orta is a 43 y.o. female who presents to the emergency department complaint of head pain with radiation to her neck, which patient states been ongoing since 6/18/2022 when she had fallen and injured herself. She states she was seen in the emergency department that time, a CT scan of her head and neck, which showed nasal fracture. She states that she is an alcoholic, she states she drinks every day. She states since initial injury she has had ongoing head pain, and sleeping a lot. She denies any nausea or vomiting, she rates her current pain at this time is an 8 out of 10, she states she has not take anything at home for pain control, she had a scheduled appointment 1 week ago for follow-up with her family doctor, but she states she did not go. Denies any numbness or tingling, no bowel or bladder dysfunction, no paresthesias. Past medical history significant for tobacco use, alcohol dependence. HPI    NursingNotes were reviewed. REVIEW OF SYSTEMS    (2-9 systems for level 4, 10 or more for level 5)     Review of Systems   Constitutional: Negative for activity change and appetite change. HENT: Negative for congestion, ear discharge, ear pain, nosebleeds, rhinorrhea and sore throat. Head pain   Eyes: Negative for discharge. Respiratory: Negative for shortness of breath. Cardiovascular: Negative for chest pain, palpitations and leg swelling. Gastrointestinal: Negative for abdominal distention, abdominal pain and constipation.    Genitourinary: Negative for difficulty urinating and dysuria. Musculoskeletal: Negative for arthralgias. Skin: Negative for color change. Neurological: Negative for dizziness, syncope, numbness and headaches. Psychiatric/Behavioral: Negative for agitation and confusion. Except as noted above the remainder of the review of systems was reviewed and negative.        PAST MEDICAL HISTORY     Past Medical History:   Diagnosis Date    Alcohol abuse     Tobacco dependence          SURGICALHISTORY       Past Surgical History:   Procedure Laterality Date    APPENDECTOMY      FOOT SURGERY      reports 6 sx on L foot and one on right foot    TUBAL LIGATION           CURRENT MEDICATIONS       Discharge Medication List as of 7/3/2022  7:50 AM      CONTINUE these medications which have NOT CHANGED    Details   mirtazapine (REMERON) 15 MG tablet Take 1 tablet by mouth nightly, Disp-15 tablet, R-3Normal      Multiple Vitamin (MULTIVITAMIN) TABS tablet Take 1 tablet by mouth daily, Disp-30 tablet, R-1Normal             ALLERGIES     Oxycodone-acetaminophen    FAMILY HISTORY       Family History   Problem Relation Age of Onset    High Cholesterol Mother     Hypertension Mother     Melanoma Father     High Cholesterol Father     Hypertension Father           SOCIAL HISTORY       Social History     Socioeconomic History    Marital status: Single     Spouse name: None    Number of children: None    Years of education: None    Highest education level: None   Occupational History    None   Tobacco Use    Smoking status: Current Every Day Smoker     Packs/day: 1.00     Types: Cigarettes    Smokeless tobacco: Never Used   Vaping Use    Vaping Use: Never used   Substance and Sexual Activity    Alcohol use: Yes     Comment: 6/9/22: completed 30 day rehab 2 weeks ago, relapse one week ago; unable to quantify EtOH consumption    Drug use: Yes     Types: Marijuana Kary Marie)    Sexual activity: None   Other Topics Concern    None   Social History Narrative    None     Social Determinants of Health     Financial Resource Strain:     Difficulty of Paying Living Expenses: Not on file   Food Insecurity:     Worried About Running Out of Food in the Last Year: Not on file    Hitesh of Food in the Last Year: Not on file   Transportation Needs:     Lack of Transportation (Medical): Not on file    Lack of Transportation (Non-Medical): Not on file   Physical Activity:     Days of Exercise per Week: Not on file    Minutes of Exercise per Session: Not on file   Stress:     Feeling of Stress : Not on file   Social Connections:     Frequency of Communication with Friends and Family: Not on file    Frequency of Social Gatherings with Friends and Family: Not on file    Attends Hinduism Services: Not on file    Active Member of [employer] or Organizations: Not on file    Attends Club or Organization Meetings: Not on file    Marital Status: Not on file   Intimate Partner Violence:     Fear of Current or Ex-Partner: Not on file    Emotionally Abused: Not on file    Physically Abused: Not on file    Sexually Abused: Not on file   Housing Stability:     Unable to Pay for Housing in the Last Year: Not on file    Number of [place] in the Last Year: Not on file    Unstable Housing in the Last Year: Not on file       SCREENINGS    Viry Coma Scale  Eye Opening: Spontaneous  Best Verbal Response: Oriented  Best Motor Response: Obeys commands  [place] Coma Scale Score: 15 @FLOW([id number])@      PHYSICAL EXAM    (up to 7 for level 4, 8 or more for level 5)     ED Triage Vitals [07/03/22 0536]   BP Temp Temp Source Heart Rate Resp SpO2 Height Weight   126/87 98.3 °F (36.8 °C) Temporal (!) 105 18 97 % 5' 5\" (1.651 m) 180 lb (81.6 kg)       Physical Exam  Vitals and nursing note reviewed. Constitutional:       General: She is not in acute distress. Appearance: She is well-developed. She is not ill-appearing, toxic-appearing or diaphoretic.    HENT:      Head: Normocephalic. Right Ear: Tympanic membrane normal.      Left Ear: Tympanic membrane normal.      Nose: No congestion. Mouth/Throat:      Mouth: Mucous membranes are moist.      Pharynx: No oropharyngeal exudate or posterior oropharyngeal erythema. Comments: Strong odor of alcohol to breath  Eyes:      Extraocular Movements: Extraocular movements intact. Conjunctiva/sclera: Conjunctivae normal.      Pupils: Pupils are equal, round, and reactive to light. Comments: Pupils equal round reactive to light, no nystagmus. Neck:      Vascular: No JVD. Trachea: No tracheal deviation. Comments: Neck is supple, there is no midline tenderness, no step-off, no crepitus, no deformity, full range of motion with no pain  Cardiovascular:      Rate and Rhythm: Normal rate. Pulses: Normal pulses. Heart sounds: Normal heart sounds. No murmur heard. No friction rub. No gallop. Pulmonary:      Effort: Pulmonary effort is normal. No tachypnea, accessory muscle usage, respiratory distress or retractions. Breath sounds: Normal breath sounds. No stridor. No wheezing, rhonchi or rales. Chest:      Chest wall: No tenderness. Abdominal:      General: Abdomen is flat. Bowel sounds are normal. There is no distension or abdominal bruit. Palpations: There is no shifting dullness, fluid wave, hepatomegaly, splenomegaly, mass or pulsatile mass. Tenderness: There is no abdominal tenderness. There is no right CVA tenderness, left CVA tenderness, guarding or rebound. Negative signs include Gonzalez's sign, Rovsing's sign and McBurney's sign. Musculoskeletal:         General: No deformity. Cervical back: Normal range of motion and neck supple. No rigidity. Comments: Patient is moving all extremities well,   Skin:     General: Skin is warm and dry. Capillary Refill: Capillary refill takes less than 2 seconds. Coloration: Skin is not jaundiced.    Neurological: General: No focal deficit present. Mental Status: She is alert and oriented to person, place, and time. Mental status is at baseline. Cranial Nerves: No cranial nerve deficit. Sensory: No sensory deficit. Motor: No weakness. Coordination: Coordination normal.      Comments: Patient alert and oriented, neurologically intact, no facial droop, no slurring of speech. Psychiatric:         Mood and Affect: Mood normal.         DIAGNOSTIC RESULTS     EKG: All EKG's are interpreted by the Emergency Department Physician who either signs or Co-signsthis chart in the absence of a cardiologist.        RADIOLOGY:   Edward Allis such as CT, Ultrasound and MRI are read by the radiologist. Jerrica Be radiographic images are visualized and preliminarily interpreted by the emergency physician with the below findings:        Interpretation per the Radiologist below, if available at the time ofthis note:    CT Head WO Contrast   Final Result      No acute intracranial process. Mild soft tissue edema/stranding within the frontal region near midline, overall improved from prior. ED BEDSIDE ULTRASOUND:   Performed by ED Physician - none    LABS:  Labs Reviewed - No data to display    All other labs were within normal range or not returned as of this dictation. EMERGENCY DEPARTMENT COURSE and DIFFERENTIAL DIAGNOSIS/MDM:   Vitals:    Vitals:    07/03/22 0536   BP: 126/87   Pulse: (!) 105   Resp: 18   Temp: 98.3 °F (36.8 °C)   TempSrc: Temporal   SpO2: 97%   Weight: 180 lb (81.6 kg)   Height: 5' 5\" (1.651 m)          MDM  Number of Diagnoses or Management Options  Muscle tension headache  Strain of neck muscle, initial encounter  Diagnosis management comments: Patient had presented ED with complaint of head and neck pain secondary to a fall which she states occurred on 6/18/2022, she was seen in the emergency department at that time for the same issue, diagnosed with nasal fracture.

## 2022-08-26 ENCOUNTER — HOSPITAL ENCOUNTER (EMERGENCY)
Age: 37
Discharge: HOME | End: 2022-08-26
Payer: COMMERCIAL

## 2022-08-26 VITALS
WEIGHT: 155 LBS | HEIGHT: 65 IN | TEMPERATURE: 98.1 F | HEART RATE: 115 BPM | OXYGEN SATURATION: 97 % | RESPIRATION RATE: 22 BRPM | SYSTOLIC BLOOD PRESSURE: 92 MMHG | BODY MASS INDEX: 25.83 KG/M2 | DIASTOLIC BLOOD PRESSURE: 63 MMHG

## 2022-08-26 DIAGNOSIS — F10.20 ALCOHOLISM (HCC): Primary | ICD-10-CM

## 2022-08-26 LAB
ALBUMIN SERPL-MCNC: 2.9 G/DL (ref 3.5–4.6)
ALP BLD-CCNC: 568 U/L (ref 40–130)
ALT SERPL-CCNC: 92 U/L (ref 0–33)
AMPHETAMINE SCREEN, URINE: NORMAL
ANION GAP SERPL CALCULATED.3IONS-SCNC: 12 MEQ/L (ref 9–15)
ANISOCYTOSIS: ABNORMAL
AST SERPL-CCNC: 339 U/L (ref 0–35)
BACTERIA: ABNORMAL /HPF
BARBITURATE SCREEN URINE: NORMAL
BASOPHILS ABSOLUTE: 0 K/UL (ref 0–0.2)
BASOPHILS RELATIVE PERCENT: 0.7 %
BENZODIAZEPINE SCREEN, URINE: NORMAL
BILIRUB SERPL-MCNC: 4.3 MG/DL (ref 0.2–0.7)
BILIRUBIN URINE: NEGATIVE
BLOOD, URINE: NEGATIVE
BUN BLDV-MCNC: <2 MG/DL (ref 6–20)
CALCIUM SERPL-MCNC: 8.4 MG/DL (ref 8.5–9.9)
CANNABINOID SCREEN URINE: NORMAL
CHLORIDE BLD-SCNC: 96 MEQ/L (ref 95–107)
CHP ED QC CHECK: YES
CLARITY: CLEAR
CO2: 31 MEQ/L (ref 20–31)
COCAINE METABOLITE SCREEN URINE: NORMAL
COLOR: YELLOW
CREAT SERPL-MCNC: 0.42 MG/DL (ref 0.5–0.9)
EKG ATRIAL RATE: 115 BPM
EKG P AXIS: 60 DEGREES
EKG P-R INTERVAL: 132 MS
EKG Q-T INTERVAL: 340 MS
EKG QRS DURATION: 88 MS
EKG QTC CALCULATION (BAZETT): 470 MS
EKG R AXIS: 40 DEGREES
EKG T AXIS: 47 DEGREES
EKG VENTRICULAR RATE: 115 BPM
EOSINOPHILS ABSOLUTE: 0 K/UL (ref 0–0.7)
EOSINOPHILS RELATIVE PERCENT: 0 %
EPITHELIAL CELLS, UA: ABNORMAL /HPF (ref 0–5)
ETHANOL PERCENT: 0.33 G/DL
ETHANOL: 377 MG/DL (ref 0–0.08)
FENTANYL SCREEN, URINE: NORMAL
GFR AFRICAN AMERICAN: >60
GFR NON-AFRICAN AMERICAN: >60
GLOBULIN: 3.7 G/DL (ref 2.3–3.5)
GLUCOSE BLD-MCNC: 197 MG/DL (ref 70–99)
GLUCOSE URINE: NEGATIVE MG/DL
HCT VFR BLD CALC: 33.4 % (ref 37–47)
HEMOGLOBIN: 11.5 G/DL (ref 12–16)
HYALINE CASTS: ABNORMAL /HPF (ref 0–5)
KETONES, URINE: NEGATIVE MG/DL
LEUKOCYTE ESTERASE, URINE: ABNORMAL
LIPASE: 25 U/L (ref 12–95)
LYMPHOCYTES ABSOLUTE: 2.1 K/UL (ref 1–4.8)
LYMPHOCYTES RELATIVE PERCENT: 32 %
Lab: NORMAL
MACROCYTES: ABNORMAL
MAGNESIUM: 1.3 MG/DL (ref 1.7–2.4)
MCH RBC QN AUTO: 38.8 PG (ref 27–31.3)
MCHC RBC AUTO-ENTMCNC: 34.4 % (ref 33–37)
MCV RBC AUTO: 112.8 FL (ref 82–100)
METHADONE SCREEN, URINE: NORMAL
MONOCYTES ABSOLUTE: 0.5 K/UL (ref 0.2–0.8)
MONOCYTES RELATIVE PERCENT: 7.8 %
NEUTROPHILS ABSOLUTE: 4 K/UL (ref 1.4–6.5)
NEUTROPHILS RELATIVE PERCENT: 60 %
NITRITE, URINE: NEGATIVE
OPIATE SCREEN URINE: NORMAL
OXYCODONE URINE: NORMAL
PDW BLD-RTO: 15.7 % (ref 11.5–14.5)
PH UA: 7 (ref 5–9)
PHENCYCLIDINE SCREEN URINE: NORMAL
PLATELET # BLD: 102 K/UL (ref 130–400)
PLATELET SLIDE REVIEW: ABNORMAL
POIKILOCYTES: ABNORMAL
POTASSIUM SERPL-SCNC: 2.9 MEQ/L (ref 3.4–4.9)
PREGNANCY TEST URINE, POC: NEGATIVE
PROPOXYPHENE SCREEN: NORMAL
PROTEIN UA: NEGATIVE MG/DL
RBC # BLD: 2.96 M/UL (ref 4.2–5.4)
RBC UA: ABNORMAL /HPF (ref 0–5)
SODIUM BLD-SCNC: 139 MEQ/L (ref 135–144)
SPECIFIC GRAVITY UA: 1 (ref 1–1.03)
TOTAL PROTEIN: 6.6 G/DL (ref 6.3–8)
URINE REFLEX TO CULTURE: ABNORMAL
UROBILINOGEN, URINE: 1 E.U./DL
WBC # BLD: 6.7 K/UL (ref 4.8–10.8)
WBC UA: ABNORMAL /HPF (ref 0–5)

## 2022-08-26 PROCEDURE — 96375 TX/PRO/DX INJ NEW DRUG ADDON: CPT

## 2022-08-26 PROCEDURE — 6360000002 HC RX W HCPCS: Performed by: PERSONAL EMERGENCY RESPONSE ATTENDANT

## 2022-08-26 PROCEDURE — 81001 URINALYSIS AUTO W/SCOPE: CPT

## 2022-08-26 PROCEDURE — C9113 INJ PANTOPRAZOLE SODIUM, VIA: HCPCS | Performed by: PERSONAL EMERGENCY RESPONSE ATTENDANT

## 2022-08-26 PROCEDURE — 83690 ASSAY OF LIPASE: CPT

## 2022-08-26 PROCEDURE — 96365 THER/PROPH/DIAG IV INF INIT: CPT

## 2022-08-26 PROCEDURE — 2580000003 HC RX 258: Performed by: PERSONAL EMERGENCY RESPONSE ATTENDANT

## 2022-08-26 PROCEDURE — 36415 COLL VENOUS BLD VENIPUNCTURE: CPT

## 2022-08-26 PROCEDURE — 6370000000 HC RX 637 (ALT 250 FOR IP): Performed by: PERSONAL EMERGENCY RESPONSE ATTENDANT

## 2022-08-26 PROCEDURE — 82077 ASSAY SPEC XCP UR&BREATH IA: CPT

## 2022-08-26 PROCEDURE — 2500000003 HC RX 250 WO HCPCS: Performed by: PERSONAL EMERGENCY RESPONSE ATTENDANT

## 2022-08-26 PROCEDURE — 80307 DRUG TEST PRSMV CHEM ANLYZR: CPT

## 2022-08-26 PROCEDURE — 83735 ASSAY OF MAGNESIUM: CPT

## 2022-08-26 PROCEDURE — 93005 ELECTROCARDIOGRAM TRACING: CPT | Performed by: PERSONAL EMERGENCY RESPONSE ATTENDANT

## 2022-08-26 PROCEDURE — 85025 COMPLETE CBC W/AUTO DIFF WBC: CPT

## 2022-08-26 PROCEDURE — 80053 COMPREHEN METABOLIC PANEL: CPT

## 2022-08-26 PROCEDURE — A4216 STERILE WATER/SALINE, 10 ML: HCPCS | Performed by: PERSONAL EMERGENCY RESPONSE ATTENDANT

## 2022-08-26 PROCEDURE — 96368 THER/DIAG CONCURRENT INF: CPT

## 2022-08-26 PROCEDURE — 93010 ELECTROCARDIOGRAM REPORT: CPT | Performed by: INTERNAL MEDICINE

## 2022-08-26 PROCEDURE — 96366 THER/PROPH/DIAG IV INF ADDON: CPT

## 2022-08-26 PROCEDURE — 99284 EMERGENCY DEPT VISIT MOD MDM: CPT

## 2022-08-26 RX ORDER — POTASSIUM CHLORIDE 20 MEQ/1
40 TABLET, EXTENDED RELEASE ORAL ONCE
Status: COMPLETED | OUTPATIENT
Start: 2022-08-26 | End: 2022-08-26

## 2022-08-26 RX ORDER — ONDANSETRON 2 MG/ML
4 INJECTION INTRAMUSCULAR; INTRAVENOUS ONCE
Status: COMPLETED | OUTPATIENT
Start: 2022-08-26 | End: 2022-08-26

## 2022-08-26 RX ORDER — MAGNESIUM SULFATE IN WATER 40 MG/ML
4000 INJECTION, SOLUTION INTRAVENOUS ONCE
Status: COMPLETED | OUTPATIENT
Start: 2022-08-26 | End: 2022-08-26

## 2022-08-26 RX ORDER — 0.9 % SODIUM CHLORIDE 0.9 %
1000 INTRAVENOUS SOLUTION INTRAVENOUS ONCE
Status: COMPLETED | OUTPATIENT
Start: 2022-08-26 | End: 2022-08-26

## 2022-08-26 RX ADMIN — SODIUM CHLORIDE 40 MG: 9 INJECTION, SOLUTION INTRAMUSCULAR; INTRAVENOUS; SUBCUTANEOUS at 05:36

## 2022-08-26 RX ADMIN — SODIUM CHLORIDE 1000 ML: 9 INJECTION, SOLUTION INTRAVENOUS at 05:38

## 2022-08-26 RX ADMIN — FOLIC ACID: 5 INJECTION, SOLUTION INTRAMUSCULAR; INTRAVENOUS; SUBCUTANEOUS at 05:39

## 2022-08-26 RX ADMIN — ONDANSETRON 4 MG: 2 INJECTION INTRAMUSCULAR; INTRAVENOUS at 05:36

## 2022-08-26 RX ADMIN — POTASSIUM CHLORIDE 40 MEQ: 1500 TABLET, EXTENDED RELEASE ORAL at 07:03

## 2022-08-26 RX ADMIN — MAGNESIUM SULFATE HEPTAHYDRATE 4000 MG: 40 INJECTION, SOLUTION INTRAVENOUS at 07:14

## 2022-08-26 ASSESSMENT — PAIN DESCRIPTION - LOCATION: LOCATION: ABDOMEN

## 2022-08-26 ASSESSMENT — ENCOUNTER SYMPTOMS
BLOOD IN STOOL: 0
VOMITING: 1
ABDOMINAL PAIN: 1
COUGH: 0
SHORTNESS OF BREATH: 0
ABDOMINAL DISTENTION: 1
COLOR CHANGE: 0
DIARRHEA: 1
NAUSEA: 1
RHINORRHEA: 0
SORE THROAT: 0

## 2022-08-26 ASSESSMENT — PAIN DESCRIPTION - FREQUENCY: FREQUENCY: CONTINUOUS

## 2022-08-26 ASSESSMENT — PAIN - FUNCTIONAL ASSESSMENT
PAIN_FUNCTIONAL_ASSESSMENT: NONE - DENIES PAIN
PAIN_FUNCTIONAL_ASSESSMENT: 0-10
PAIN_FUNCTIONAL_ASSESSMENT: NONE - DENIES PAIN

## 2022-08-26 ASSESSMENT — PAIN DESCRIPTION - ORIENTATION: ORIENTATION: RIGHT;LEFT

## 2022-08-26 ASSESSMENT — PAIN SCALES - GENERAL: PAINLEVEL_OUTOF10: 9

## 2022-08-26 ASSESSMENT — PAIN DESCRIPTION - DESCRIPTORS: DESCRIPTORS: CRAMPING

## 2022-08-26 ASSESSMENT — PAIN DESCRIPTION - ONSET: ONSET: ON-GOING

## 2022-08-26 NOTE — ED NOTES
Pt attempting contact boyfriend whom she said is sleeping in parking lot in truck.       Fredy Huang RN  08/26/22 8826

## 2022-08-26 NOTE — ED NOTES
MPD going to attempt to wake boyfriend in the parking lot to  pt.       Bhupinder Mccormack RN  08/26/22 0655

## 2022-08-26 NOTE — ED NOTES
Pt at this time states that she will call [facility] \"On my own\". Pt states that boyfriend is in parking lot to drive pt home at this time. Pt states \"I have things I have to do before I can go. \" Twan SANCHEZ updated at this time     Anna Read RN  08/26/22 [facility] [address], [facility]  08/26/22 3673

## 2022-08-26 NOTE — ED NOTES
Pt lying in bed a this time. Pt easily awoken at this time. Pt is alert and oriented times 4.  Pt up to restroom at this time     Yani Ochoa RN  08/26/22 0290

## 2022-08-26 NOTE — ED PROVIDER NOTES
[address] [facility] ED  eMERGENCY dEPARTMENT eNCOUnter      Pt Name: Miguel House  MRN: [id number]  [place] 1985  Date of evaluation: 8/26/2022  Provider: Karalee Favre, PA-C      HISTORY OF PRESENT ILLNESS    Miguel House is a 43 y.o. female with PMHx of alcohol abuse, depression presents to the emergency department with requesting alcohol detox. Patient states she relapsed on alcohol May 20 and has been drinking 1 to 2 pints of vodka/fireball daily. Last drink was 1 hour ago. She was having intermittent and scattered abdominal pain yesterday throughout lower abdomen with abdominal bloating. She is supposed to see hepatic specialist.  she does have emesis every day but states she has been trying to decrease her alcohol use that has improved. She has minimal oral intake for the past 5 days. She has had loose stools, states that been black but this has been occurring for months. She does take vitamins at home. She denies fevers, headaches, cough, chest pain, shortness of breath, urinary symptoms, body aches. She has not had a period in 2 months and is requesting pregnancy test    HPI    Nursing Notes were reviewed. REVIEW OF SYSTEMS       Review of Systems   Constitutional:  Negative for appetite change, chills and fever. HENT:  Negative for congestion, rhinorrhea and sore throat. Respiratory:  Negative for cough and shortness of breath. Cardiovascular:  Negative for chest pain. Gastrointestinal:  Positive for abdominal distention, abdominal pain, diarrhea, nausea and vomiting. Negative for blood in stool. Genitourinary:  Negative for difficulty urinating. Musculoskeletal:  Negative for neck stiffness. Skin:  Negative for color change and rash. Neurological:  Negative for dizziness, syncope, weakness, light-headedness, numbness and headaches. All other systems reviewed and are negative.           PAST MEDICAL HISTORY     Past Medical History:   Diagnosis Date    Alcohol abuse Tobacco dependence          SURGICAL HISTORY       Past Surgical History:   Procedure Laterality Date    APPENDECTOMY      FOOT SURGERY      reports 6 sx on L foot and one on right foot    TUBAL LIGATION           CURRENT MEDICATIONS       Previous Medications    MIRTAZAPINE (REMERON) 15 MG TABLET    Take 1 tablet by mouth nightly    MULTIPLE VITAMIN (MULTIVITAMIN) TABS TABLET    Take 1 tablet by mouth daily       ALLERGIES     Oxycodone-acetaminophen    FAMILY HISTORY       Family History   Problem Relation Age of Onset    High Cholesterol Mother     Hypertension Mother     Melanoma Father     High Cholesterol Father     Hypertension Father           SOCIAL HISTORY       Social History     Socioeconomic History    Marital status: Single     Spouse name: None    Number of children: None    Years of education: None    Highest education level: None   Tobacco Use    Smoking status: Every Day     Packs/day: 1.00     Types: Cigarettes    Smokeless tobacco: Never   Vaping Use    Vaping Use: Never used   Substance and Sexual Activity    Alcohol use: Yes     Comment: 6/9/22: completed 30 day rehab 2 weeks ago, relapse one week ago; unable to quantify EtOH consumption    Drug use: Yes     Types: Marijuana (Weed)         PHYSICAL EXAM         ED Triage Vitals [08/26/22 0425]   BP Temp Temp Source Heart Rate Resp SpO2 Height Weight   126/70 98.1 °F (36.7 °C) Oral (!) 134 18 99 % 5' 5\" (1.651 m) 155 lb (70.3 kg)       Physical Exam  Constitutional:       Appearance: She is well-developed. HENT:      Head: Normocephalic and atraumatic. Eyes:      Conjunctiva/sclera: Conjunctivae normal.      Pupils: Pupils are equal, round, and reactive to light. Neck:      Trachea: No tracheal deviation. Cardiovascular:      Heart sounds: Normal heart sounds. Pulmonary:      Effort: Pulmonary effort is normal. No respiratory distress. Breath sounds: Normal breath sounds. No stridor.    Abdominal:      General: Bowel sounds are normal. There is distension. Palpations: Abdomen is soft. There is no mass. Tenderness: There is no abdominal tenderness. There is no guarding or rebound. Comments: No obvious tenderness to palpation, abdomen soft but distended, no rebound or rigidity, no pulsatile mass or bruit, no ecchymosis   Musculoskeletal:         General: Normal range of motion. Cervical back: Normal range of motion and neck supple. Skin:     General: Skin is warm and dry. Capillary Refill: Capillary refill takes less than 2 seconds. Findings: No rash. Neurological:      Mental Status: She is alert and oriented to person, place, and time. Deep Tendon Reflexes: Reflexes are normal and symmetric. Psychiatric:         Behavior: Behavior normal.         Thought Content:  Thought content normal.         Judgment: Judgment normal.       DIAGNOSTIC RESULTS     EKG:All EKG's are interpreted by the Emergency Department Physician who either signs or Co-signs this chart in the absence of a cardiologist.    Sinus tachycardia, rate 115, normal intervals, normal axis, no ST segment changes    RADIOLOGY:   Non-plain film images such as CT, Ultrasound and MRI are read by theradiologist. Plain radiographic images are visualized and preliminarily interpreted by the emergency physician with the below findings:    Interpretation per theRadiologist below, if available at the time of this note:    No orders to display           LABS:  Labs Reviewed   COMPREHENSIVE METABOLIC PANEL - Abnormal; Notable for the following components:       Result Value    Potassium 2.9 (*)     Glucose 197 (*)     BUN <2 (*)     Creatinine 0.42 (*)     Calcium 8.4 (*)     Albumin 2.9 (*)     Total Bilirubin 4.3 (*)     Alkaline Phosphatase 568 (*)     ALT 92 (*)      (*)     Globulin 3.7 (*)     All other components within normal limits    Narrative:     Carissa LUCEROED tel. [id number],  K results called to and read back by Magdiel Hernandez, 08/26/2022 05:40, by Michael Fofana   CBC WITH AUTO DIFFERENTIAL - Abnormal; Notable for the following components:    RBC 2.96 (*)     Hemoglobin 11.5 (*)     Hematocrit 33.4 (*)     .8 (*)     MCH 38.8 (*)     RDW 15.7 (*)     Platelets 678 (*)     All other components within normal limits   URINALYSIS WITH REFLEX TO CULTURE - Abnormal; Notable for the following components:    Leukocyte Esterase, Urine SMALL (*)     All other components within normal limits   MAGNESIUM - Abnormal; Notable for the following components:    Magnesium 1.3 (*)     All other components within normal limits    Narrative:     Trina Bethany tel. [phone],  K results called to and read back by Magdiel Hernandez, 08/26/2022 05:40, by [address] [place] [address] [id number] - Abnormal; Notable for the following components:    Bacteria, UA MODERATE (*)     RBC, UA 3-5 (*)     All other components within normal limits   POCT URINE PREGNANCY - Normal   ETHANOL   URINE DRUG SCREEN   LIPASE    Narrative:     Trinaolivia Sims tel. [id number],  K results called to and read back by Magdiel 82, 08/26/2022 05:40, by Michael Fofana       All other labs were within normal range or not returned as of this dictation. EMERGENCY DEPARTMENT COURSE and DIFFERENTIAL DIAGNOSIS/MDM:   Vitals:    Vitals:    08/26/22 0915 08/26/22 0916 08/26/22 1049 08/26/22 1100   BP: (!) 91/52 (!) 100/52 (!) 94/54 92/63   Pulse: (!) 128 (!) 124 (!) 115 (!) 115   Resp: 19 22 22    Temp:       TempSrc:       SpO2: 98% 97% 99% 97%   Weight:       Height:             MDM  Number of Diagnoses or Management Options  Alcoholism ([facility] [address]  Diagnosis management comments: Reevaluation of patient at 10 AM in the morning, she is resting comfortably, sleeping, she displays no acute distress. Awaiting [facility] for evaluation for alcohol use disorder.     11:06 AM, patient states she does not want to wait for [facility] any longer, she states she knows one of the counselors personally, she will contact them herself from home. Patient was advised of risk for ongoing call consumption, she states she will follow-up as outpatient. Potassium 2.9, magnesium 1.3, ALT 92, , bilirubin 4.3. Ethanol 0.331. Patient given 1 L IV fluid, banana bag, Protonix, zofran, potassium 40meq and Mg2G IV. [facility] was called and cannot be here until ~11am d/t ETOH intoxication. CRITICAL CARE TIME   Total Critical Caretime was 0 minutes, excluding separately reportable procedures. There was a high probability of clinically significant/life threatening deterioration in the patient's condition which required my urgent intervention. Procedures    FINAL IMPRESSION      1.  Alcoholism [facility])          DISPOSITION/PLAN   DISPOSITION Decision To Discharge 08/26/2022 11:03:40 AM      PATIENT REFERRED TO:  [facility]  call today to schedule follow up treatment        DISCHARGE MEDICATIONS:  New Prescriptions    No medications on file          (Please notethat portions of this note were completed with a voice recognition program.  Efforts were made to edit the dictations but occasionally words are mis-transcribed.)    Monica Perez PA-C (electronically signed)  Emergency Physician Assistant         Monica Perez PA-C  08/26/22 9458

## 2022-08-26 NOTE — ED TRIAGE NOTES
Patient arrived from home via family with complaint of etoh use and wanting help. Patient also stated she hasn't had a menstrual cycle in 2 months. Patient A&OX4. Skin pink, warm, and dry. Patient very tearful in triage. Patient stated she also hasn't had anything to eat in 5 days. Patient is a 2-3 pints of liquor a day with 3-4 behzad hard drinks. Patient last drink was 1 hour ago. Patient requesting etoh assistance.

## 2022-09-04 ENCOUNTER — APPOINTMENT (OUTPATIENT)
Dept: CT IMAGING | Age: 37
DRG: 280 | End: 2022-09-04
Payer: COMMERCIAL

## 2022-09-04 ENCOUNTER — HOSPITAL ENCOUNTER (INPATIENT)
Age: 37
LOS: 11 days | Discharge: NURSING, REHAB OR LONG-TERM CARE | DRG: 280 | End: 2022-09-15
Attending: EMERGENCY MEDICINE | Admitting: INTERNAL MEDICINE
Payer: COMMERCIAL

## 2022-09-04 DIAGNOSIS — K70.10 ACUTE ALCOHOLIC HEPATITIS: Primary | ICD-10-CM

## 2022-09-04 PROBLEM — K70.11 ALCOHOLIC HEPATITIS WITH ASCITES: Status: ACTIVE | Noted: 2022-09-04

## 2022-09-04 LAB
ALBUMIN SERPL-MCNC: 2.3 G/DL (ref 3.5–4.6)
ALP BLD-CCNC: 524 U/L (ref 40–130)
ALT SERPL-CCNC: 98 U/L (ref 0–33)
AMYLASE: 60 U/L (ref 22–93)
ANION GAP SERPL CALCULATED.3IONS-SCNC: 21 MEQ/L (ref 9–15)
ANISOCYTOSIS: ABNORMAL
AST SERPL-CCNC: 371 U/L (ref 0–35)
BASOPHILS ABSOLUTE: 0 K/UL (ref 0–0.2)
BASOPHILS RELATIVE PERCENT: 0.4 %
BILIRUB SERPL-MCNC: 7.2 MG/DL (ref 0.2–0.7)
BUN BLDV-MCNC: <2 MG/DL (ref 6–20)
C-REACTIVE PROTEIN: 25 MG/L (ref 0–5)
CALCIUM SERPL-MCNC: 8.1 MG/DL (ref 8.5–9.9)
CHLORIDE BLD-SCNC: 90 MEQ/L (ref 95–107)
CO2: 23 MEQ/L (ref 20–31)
CREAT SERPL-MCNC: 0.69 MG/DL (ref 0.5–0.9)
EOSINOPHILS ABSOLUTE: 0 K/UL (ref 0–0.7)
EOSINOPHILS RELATIVE PERCENT: 0 %
GFR AFRICAN AMERICAN: >60
GFR NON-AFRICAN AMERICAN: >60
GLOBULIN: 4 G/DL (ref 2.3–3.5)
GLUCOSE BLD-MCNC: 127 MG/DL (ref 70–99)
HCT VFR BLD CALC: 34.2 % (ref 37–47)
HEMOGLOBIN: 11.1 G/DL (ref 12–16)
INR BLD: 1.4
LACTIC ACID: 12.1 MMOL/L (ref 0.5–2.2)
LIPASE: 118 U/L (ref 12–95)
LYMPHOCYTES ABSOLUTE: 0.7 K/UL (ref 1–4.8)
LYMPHOCYTES RELATIVE PERCENT: 10.6 %
MACROCYTES: ABNORMAL
MCH RBC QN AUTO: 37.2 PG (ref 27–31.3)
MCHC RBC AUTO-ENTMCNC: 32.5 % (ref 33–37)
MCV RBC AUTO: 114.4 FL (ref 82–100)
MONOCYTES ABSOLUTE: 0.5 K/UL (ref 0.2–0.8)
MONOCYTES RELATIVE PERCENT: 7.3 %
NEUTROPHILS ABSOLUTE: 5.2 K/UL (ref 1.4–6.5)
NEUTROPHILS RELATIVE PERCENT: 81.7 %
PDW BLD-RTO: 14.6 % (ref 11.5–14.5)
PLATELET # BLD: 50 K/UL (ref 130–400)
PLATELET SLIDE REVIEW: ABNORMAL
POLYCHROMASIA: ABNORMAL
POTASSIUM REFLEX MAGNESIUM: 3.7 MEQ/L (ref 3.4–4.9)
PROCALCITONIN: 0.69 NG/ML (ref 0–0.15)
PROTHROMBIN TIME: 17.5 SEC (ref 12.3–14.9)
RBC # BLD: 2.98 M/UL (ref 4.2–5.4)
SLIDE REVIEW: ABNORMAL
SODIUM BLD-SCNC: 134 MEQ/L (ref 135–144)
TOTAL PROTEIN: 6.3 G/DL (ref 6.3–8)
WBC # BLD: 6.3 K/UL (ref 4.8–10.8)

## 2022-09-04 PROCEDURE — 84145 PROCALCITONIN (PCT): CPT

## 2022-09-04 PROCEDURE — 80053 COMPREHEN METABOLIC PANEL: CPT

## 2022-09-04 PROCEDURE — 74176 CT ABD & PELVIS W/O CONTRAST: CPT

## 2022-09-04 PROCEDURE — 6360000002 HC RX W HCPCS: Performed by: FAMILY MEDICINE

## 2022-09-04 PROCEDURE — 85025 COMPLETE CBC W/AUTO DIFF WBC: CPT

## 2022-09-04 PROCEDURE — 2580000003 HC RX 258: Performed by: FAMILY MEDICINE

## 2022-09-04 PROCEDURE — 83605 ASSAY OF LACTIC ACID: CPT

## 2022-09-04 PROCEDURE — 82150 ASSAY OF AMYLASE: CPT

## 2022-09-04 PROCEDURE — 83690 ASSAY OF LIPASE: CPT

## 2022-09-04 PROCEDURE — 6370000000 HC RX 637 (ALT 250 FOR IP): Performed by: INTERNAL MEDICINE

## 2022-09-04 PROCEDURE — 85610 PROTHROMBIN TIME: CPT

## 2022-09-04 PROCEDURE — 96375 TX/PRO/DX INJ NEW DRUG ADDON: CPT

## 2022-09-04 PROCEDURE — 99285 EMERGENCY DEPT VISIT HI MDM: CPT

## 2022-09-04 PROCEDURE — 36415 COLL VENOUS BLD VENIPUNCTURE: CPT

## 2022-09-04 PROCEDURE — 96374 THER/PROPH/DIAG INJ IV PUSH: CPT

## 2022-09-04 PROCEDURE — 2500000003 HC RX 250 WO HCPCS: Performed by: FAMILY MEDICINE

## 2022-09-04 PROCEDURE — 1210000000 HC MED SURG R&B

## 2022-09-04 PROCEDURE — 86140 C-REACTIVE PROTEIN: CPT

## 2022-09-04 RX ORDER — LORAZEPAM 2 MG/ML
1 INJECTION INTRAMUSCULAR
Status: DISCONTINUED | OUTPATIENT
Start: 2022-09-04 | End: 2022-09-15 | Stop reason: HOSPADM

## 2022-09-04 RX ORDER — THIAMINE HYDROCHLORIDE 100 MG/ML
100 INJECTION, SOLUTION INTRAMUSCULAR; INTRAVENOUS DAILY
Status: DISCONTINUED | OUTPATIENT
Start: 2022-09-05 | End: 2022-09-15 | Stop reason: HOSPADM

## 2022-09-04 RX ORDER — LORAZEPAM 2 MG/ML
3 INJECTION INTRAMUSCULAR
Status: DISCONTINUED | OUTPATIENT
Start: 2022-09-04 | End: 2022-09-15 | Stop reason: HOSPADM

## 2022-09-04 RX ORDER — LORAZEPAM 1 MG/1
4 TABLET ORAL
Status: DISCONTINUED | OUTPATIENT
Start: 2022-09-04 | End: 2022-09-15 | Stop reason: HOSPADM

## 2022-09-04 RX ORDER — ONDANSETRON 2 MG/ML
4 INJECTION INTRAMUSCULAR; INTRAVENOUS EVERY 6 HOURS PRN
Status: DISCONTINUED | OUTPATIENT
Start: 2022-09-04 | End: 2022-09-15 | Stop reason: HOSPADM

## 2022-09-04 RX ORDER — LORAZEPAM 1 MG/1
1 TABLET ORAL
Status: DISCONTINUED | OUTPATIENT
Start: 2022-09-04 | End: 2022-09-15 | Stop reason: HOSPADM

## 2022-09-04 RX ORDER — POLYETHYLENE GLYCOL 3350 17 G/17G
17 POWDER, FOR SOLUTION ORAL DAILY PRN
Status: DISCONTINUED | OUTPATIENT
Start: 2022-09-04 | End: 2022-09-15 | Stop reason: HOSPADM

## 2022-09-04 RX ORDER — LORAZEPAM 2 MG/ML
2 INJECTION INTRAMUSCULAR
Status: DISCONTINUED | OUTPATIENT
Start: 2022-09-04 | End: 2022-09-15 | Stop reason: HOSPADM

## 2022-09-04 RX ORDER — LORAZEPAM 1 MG/1
3 TABLET ORAL
Status: DISCONTINUED | OUTPATIENT
Start: 2022-09-04 | End: 2022-09-15 | Stop reason: HOSPADM

## 2022-09-04 RX ORDER — LORAZEPAM 2 MG/ML
4 INJECTION INTRAMUSCULAR
Status: DISCONTINUED | OUTPATIENT
Start: 2022-09-04 | End: 2022-09-15 | Stop reason: HOSPADM

## 2022-09-04 RX ORDER — CHLORDIAZEPOXIDE HYDROCHLORIDE 10 MG/1
10 CAPSULE, GELATIN COATED ORAL 4 TIMES DAILY
Status: DISCONTINUED | OUTPATIENT
Start: 2022-09-04 | End: 2022-09-05

## 2022-09-04 RX ORDER — ONDANSETRON 4 MG/1
4 TABLET, ORALLY DISINTEGRATING ORAL EVERY 8 HOURS PRN
Status: DISCONTINUED | OUTPATIENT
Start: 2022-09-04 | End: 2022-09-15 | Stop reason: HOSPADM

## 2022-09-04 RX ORDER — LORAZEPAM 1 MG/1
2 TABLET ORAL
Status: DISCONTINUED | OUTPATIENT
Start: 2022-09-04 | End: 2022-09-15 | Stop reason: HOSPADM

## 2022-09-04 RX ORDER — NICOTINE 21 MG/24HR
1 PATCH, TRANSDERMAL 24 HOURS TRANSDERMAL DAILY
Status: DISCONTINUED | OUTPATIENT
Start: 2022-09-04 | End: 2022-09-15 | Stop reason: HOSPADM

## 2022-09-04 RX ORDER — LORAZEPAM 2 MG/ML
1 INJECTION INTRAMUSCULAR ONCE
Status: COMPLETED | OUTPATIENT
Start: 2022-09-04 | End: 2022-09-04

## 2022-09-04 RX ADMIN — ONDANSETRON 4 MG: 4 TABLET, ORALLY DISINTEGRATING ORAL at 20:44

## 2022-09-04 RX ADMIN — LORAZEPAM 2 MG: 1 TABLET ORAL at 20:43

## 2022-09-04 RX ADMIN — LORAZEPAM 1 MG: 2 INJECTION INTRAMUSCULAR; INTRAVENOUS at 14:46

## 2022-09-04 RX ADMIN — CHLORDIAZEPOXIDE HYDROCHLORIDE 10 MG: 10 CAPSULE ORAL at 20:43

## 2022-09-04 RX ADMIN — FOLIC ACID: 5 INJECTION, SOLUTION INTRAMUSCULAR; INTRAVENOUS; SUBCUTANEOUS at 14:51

## 2022-09-04 ASSESSMENT — ENCOUNTER SYMPTOMS
EYES NEGATIVE: 1
NAUSEA: 1
ALLERGIC/IMMUNOLOGIC NEGATIVE: 1
RESPIRATORY NEGATIVE: 1
VOMITING: 1
ABDOMINAL PAIN: 1

## 2022-09-04 ASSESSMENT — PAIN DESCRIPTION - LOCATION
LOCATION: ABDOMEN
LOCATION: ABDOMEN

## 2022-09-04 ASSESSMENT — PAIN - FUNCTIONAL ASSESSMENT: PAIN_FUNCTIONAL_ASSESSMENT: 0-10

## 2022-09-04 ASSESSMENT — LIFESTYLE VARIABLES
HOW OFTEN DO YOU HAVE A DRINK CONTAINING ALCOHOL: 4 OR MORE TIMES A WEEK
HOW MANY STANDARD DRINKS CONTAINING ALCOHOL DO YOU HAVE ON A TYPICAL DAY: 3 OR 4

## 2022-09-04 ASSESSMENT — PAIN SCALES - GENERAL
PAINLEVEL_OUTOF10: 8
PAINLEVEL_OUTOF10: 9

## 2022-09-04 ASSESSMENT — PAIN DESCRIPTION - PAIN TYPE: TYPE: ACUTE PAIN

## 2022-09-04 NOTE — ED TRIAGE NOTES
Patient presents with complaints of sharp abdominal pain and distention x 2 days. Patient has jaundiced sclera which she reports has been ongoing. Patient has a history of alcohol abuse, admits to still using alcohol. Patient reports headache and chills, tremors, and vomiting. Patient reports she has been trying to cut back on her drinking. No distress noted on arrival. Abdomen distended.

## 2022-09-04 NOTE — ED PROVIDER NOTES
[address]  eMERGENCY dEPARTMENT eNCOUnter      Pt Name: Javad Scruggs  MRN: [id number]  [place] 1985  Date of evaluation: 9/4/2022  Provider: Hillary Clemente MD    CHIEF COMPLAINT       Chief Complaint   Patient presents with    Abdominal Pain     Abd pain and distention x 2 days    Withdrawal     Alcohol withdrawal         HISTORY OF PRESENT ILLNESS   (Location/Symptom, Timing/Onset,Context/Setting, Quality, Duration, Modifying Factors, Severity)  Note limiting factors. Javad Scruggs is a 43 y.o. female who presents to the emergency department ab pain     43years old female patient female patient with known history of alcohol abuse in the last 3 to 4 years presented to the ER today with nausea abdominal distention and pain for the last few days decreased appetite unable to eat or drink anything and have body ache. The history is provided by the patient. NursingNotes were reviewed. REVIEW OF SYSTEMS    (2-9 systems for level 4, 10 or more for level 5)     Review of Systems   Constitutional: Negative. HENT: Negative. Eyes: Negative. Respiratory: Negative. Cardiovascular: Negative. Gastrointestinal:  Positive for abdominal pain, nausea and vomiting. Endocrine: Negative. Genitourinary: Negative. Musculoskeletal: Negative. Skin: Negative. Allergic/Immunologic: Negative. Neurological: Negative. Hematological: Negative. Psychiatric/Behavioral: Negative. All other systems reviewed and are negative. Except as noted above the remainder of the review of systems was reviewed and negative.        PAST MEDICAL HISTORY     Past Medical History:   Diagnosis Date    Alcohol abuse     Tobacco dependence          SURGICALHISTORY       Past Surgical History:   Procedure Laterality Date    APPENDECTOMY      FOOT SURGERY      reports 6 sx on L foot and one on right foot    TUBAL LIGATION           CURRENT MEDICATIONS       Current Discharge Medication List CONTINUE these medications which have NOT CHANGED    Details   mirtazapine (REMERON) 15 MG tablet Take 1 tablet by mouth nightly  Qty: 15 tablet, Refills: 3      Multiple Vitamin (MULTIVITAMIN) TABS tablet Take 1 tablet by mouth daily  Qty: 30 tablet, Refills: 1             ALLERGIES     Oxycodone-acetaminophen    FAMILY HISTORY       Family History   Problem Relation Age of Onset    High Cholesterol Mother     Hypertension Mother     Melanoma Father     High Cholesterol Father     Hypertension Father           SOCIAL HISTORY       Social History     Socioeconomic History    Marital status: Single     Spouse name: None    Number of children: None    Years of education: None    Highest education level: None   Tobacco Use    Smoking status: Every Day     Packs/day: 1.00     Types: Cigarettes    Smokeless tobacco: Never   Vaping Use    Vaping Use: Never used   Substance and Sexual Activity    Alcohol use: Yes     Comment: 6/9/22: completed 30 day rehab 2 weeks ago, relapse one week ago; unable to quantify EtOH consumption    Drug use: Yes     Types: Marijuana (Weed)       SCREENINGS    Viry Coma Scale  Eye Opening: Spontaneous  Best Verbal Response: Oriented  Best Motor Response: Obeys commands  Oakley Coma Scale Score: 15 @FLOW([id number])@      PHYSICAL EXAM    (up to 7 for level 4, 8 or more for level 5)     ED Triage Vitals [09/04/22 1250]   BP Temp Temp Source Heart Rate Resp SpO2 Height Weight   (!) 155/106 98.5 °F (36.9 °C) Oral (!) 145 20 100 % 5' 5\" (1.651 m) 140 lb (63.5 kg)       Physical Exam  Constitutional:       Appearance: She is well-developed. HENT:      Head: Normocephalic and atraumatic. Mouth/Throat:      Pharynx: Oropharynx is clear. Eyes:      General: Scleral icterus present. Extraocular Movements: Extraocular movements intact. Pupils: Pupils are equal, round, and reactive to light. Cardiovascular:      Rate and Rhythm: Normal rate and regular rhythm.    Pulmonary: Effort: Pulmonary effort is normal.      Breath sounds: Normal breath sounds. Abdominal:      General: There is distension. Tenderness: There is generalized abdominal tenderness. Skin:     General: Skin is warm and dry. Capillary Refill: Capillary refill takes less than 2 seconds. Neurological:      General: No focal deficit present. Mental Status: She is alert. DIAGNOSTIC RESULTS     EKG: All EKG's are interpreted by the Emergency Department Physician who either signs or Co-signsthis chart in the absence of a cardiologist.        RADIOLOGY:   Anitha Monmouth such as CT, Ultrasound and MRI are read by the radiologist. Plain radiographic images are visualized and preliminarily interpreted by the emergency physician with the below findings:        Interpretation per the Radiologist below, if available at the time ofthis note:    CT ABDOMEN PELVIS WO CONTRAST Additional Contrast? None   Final Result      ENLARGED FATTY LIVER. SMALL VOLUME ASCITES AND MILD ILL-DEFINED MESENTERIC AND RETROPERITONEAL INFLAMMATION, LIKELY EDEMA AND/OR PANCREATITIS. MILD PROBABLY REACTIVE WALL THICKENING OF THE COLON.                      ED BEDSIDE ULTRASOUND:   Performed by ED Physician - none    LABS:  Labs Reviewed   COMPREHENSIVE METABOLIC PANEL W/ REFLEX TO MG FOR LOW K - Abnormal; Notable for the following components:       Result Value    Sodium 134 (*)     Chloride 90 (*)     Anion Gap 21 (*)     Glucose 127 (*)     BUN <2 (*)     Calcium 8.1 (*)     Albumin 2.3 (*)     Total Bilirubin 7.2 (*)     Alkaline Phosphatase 524 (*)     ALT 98 (*)      (*)     Globulin 4.0 (*)     All other components within normal limits   CBC WITH AUTO DIFFERENTIAL - Abnormal; Notable for the following components:    RBC 2.98 (*)     Hemoglobin 11.1 (*)     Hematocrit 34.2 (*)     .4 (*)     MCH 37.2 (*)     MCHC 32.5 (*)     RDW 14.6 (*)     Platelets 50 (*)     Lymphocytes Absolute 0.7 (*)     All other components within normal limits   C-REACTIVE PROTEIN - Abnormal; Notable for the following components:    CRP 25.0 (*)     All other components within normal limits   LIPASE - Abnormal; Notable for the following components:    Lipase 118 (*)     All other components within normal limits   PROCALCITONIN - Abnormal; Notable for the following components:    Procalcitonin 0.69 (*)     All other components within normal limits   LACTIC ACID - Abnormal; Notable for the following components:    Lactic Acid 12.1 (*)     All other components within normal limits    Narrative:     CALL  [facility] tel. [phone],  Lactic  results called to and read back by Todd Flores, 09/04/2022 15:37, by  Loly Aviles - Abnormal; Notable for the following components:    Protime 17.5 (*)     All other components within normal limits   AMYLASE   HEPATITIS PANEL, ACUTE   COMPREHENSIVE METABOLIC PANEL W/ REFLEX TO MG FOR LOW K   PHOSPHORUS   VITAMIN B12   PROTIME-INR   CBC WITH AUTO DIFFERENTIAL       All other labs were within normal range or not returned as of this dictation. EMERGENCY DEPARTMENT COURSE and DIFFERENTIAL DIAGNOSIS/MDM:   Vitals:    Vitals:    09/04/22 1600 09/04/22 1630 09/04/22 1812 09/04/22 1915   BP: 117/74 123/83  123/75   Pulse: (!) 133 (!) 133  (!) 157   Resp: 21 22     Temp:    98.6 °F (37 °C)   TempSrc:   Oral    SpO2: 98% 100%  98%   Weight:       Height:                    MDM  Number of Diagnoses or Management Options  Acute alcoholic hepatitis  Diagnosis management comments: 43years old with known history of alcohol dependence who is currently trying to quit presented to the ER with abdominal pain distention nausea vomiting inability to keep anything down for the last week have gotten worse in the last 2 days patient was found to have an elevated liver function test bilirubin jaundiced on exam CT of the abdomen pelvis consists consistent with mild bursitis and fatty liver.   She will likely have acute alcoholic hepatitis will be admitted under the hospitalist service with a GI consult for acute alcoholic hepatitis with intractable nausea and vomit       Amount and/or Complexity of Data Reviewed  Clinical lab tests: ordered and reviewed  Tests in the radiology section of CPT®: ordered and reviewed          CONSULTS:  IP CONSULT TO SOCIAL WORK  IP CONSULT TO GI    PROCEDURES:  Unless otherwise noted below, none     Procedures    FINAL IMPRESSION      1. Acute alcoholic hepatitis          DISPOSITION/PLAN   DISPOSITION Decision To Admit 09/04/2022 04:43:10 PM      PATIENT REFERRED TO:  No follow-up provider specified.     DISCHARGE MEDICATIONS:  Current Discharge Medication List             (Please note thatportions of this note were completed with a voice recognition program.  Efforts were made to edit the dictations but occasionally words are mis-transcribed.)    Anju Alexandre MD (electronically signed)  Attending Emergency Physician          Kamila Alejandra MD  09/04/22 1164

## 2022-09-04 NOTE — H&P
123/83   Pulse (!) 133   Temp 98.5 °F (36.9 °C) (Oral)   Resp 22   Ht 5' 5\" (1.651 m)   Wt 140 lb (63.5 kg)   LMP  (LMP Unknown) Comment: tubal ligation  SpO2 100%   BMI 23.30 kg/m²   General appearance: alert, appears stated age and cooperative  Skin: Icterus  HEENT: Slceral icterus  Neck: supple, symmetrical, trachea midline  Lungs: clear to auscultation bilaterally  Heart: Tachycardic  Abdomen: RUQ pain and tenderness  Extremities: extremities normal, atraumatic, no cyanosis or edema  Neurologic: Generalized tremors    Recent Labs     09/04/22  1415   WBC 6.3   HGB 11.1*   PLT 50*     Recent Labs     09/04/22  1415   *   K 3.7   CL 90*   CO2 23   BUN <2*   CREATININE 0.69   GLUCOSE 127*   *   ALT 98*   BILITOT 7.2*   ALKPHOS 524*     Troponin T: No results for input(s): TROPONINI in the last 72 hours. ABGs: No results found for: PHART, PO2ART, FWQ4QZP  INR: No results for input(s): INR in the last 72 hours. URINALYSIS:No results for input(s): NITRITE, COLORU, PHUR, LABCAST, WBCUA, RBCUA, MUCUS, TRICHOMONAS, YEAST, BACTERIA, CLARITYU, SPECGRAV, LEUKOCYTESUR, UROBILINOGEN, BILIRUBINUR, BLOODU, GLUCOSEU, AMORPHOUS in the last 72 hours. Invalid input(s): Grecia Nicole  -----------------------------------------------------------------   CT ABDOMEN PELVIS WO CONTRAST Additional Contrast? None    Result Date: 9/4/2022  CT ABDOMEN PELVIS WO CONTRAST: 9/4/2022 CLINICAL HISTORY:  ab pain . COMPARISON: None available. TECHNIQUE: Spiral images were obtained of the abdomen and pelvis without contrast. All CT scans at this facility use dose modulation, iterative reconstruction, and/or weight based dosing when appropriate to reduce radiation dose to as low as reasonably achievable. FINDINGS: The liver is moderate to markedly enlarged with extensive fatty infiltration. A small volume of ascites is present in the pelvis. Mild retroperitoneal and mesenteric inflammation is likely edema and/or pancreatitis.

## 2022-09-05 LAB
ALBUMIN SERPL-MCNC: 2.3 G/DL (ref 3.5–4.6)
ALP BLD-CCNC: 470 U/L (ref 40–130)
ALT SERPL-CCNC: 90 U/L (ref 0–33)
ANION GAP SERPL CALCULATED.3IONS-SCNC: 14 MEQ/L (ref 9–15)
ANISOCYTOSIS: ABNORMAL
AST SERPL-CCNC: 349 U/L (ref 0–35)
BASOPHILS ABSOLUTE: 0 K/UL (ref 0–0.2)
BASOPHILS RELATIVE PERCENT: 0.3 %
BILIRUB SERPL-MCNC: 8.8 MG/DL (ref 0.2–0.7)
BUN BLDV-MCNC: 3 MG/DL (ref 6–20)
CALCIUM SERPL-MCNC: 7.5 MG/DL (ref 8.5–9.9)
CHLORIDE BLD-SCNC: 94 MEQ/L (ref 95–107)
CO2: 29 MEQ/L (ref 20–31)
CREAT SERPL-MCNC: 0.93 MG/DL (ref 0.5–0.9)
EOSINOPHILS ABSOLUTE: 0 K/UL (ref 0–0.7)
EOSINOPHILS RELATIVE PERCENT: 0 %
GFR AFRICAN AMERICAN: >60
GFR NON-AFRICAN AMERICAN: >60
GLOBULIN: 3.8 G/DL (ref 2.3–3.5)
GLUCOSE BLD-MCNC: 110 MG/DL (ref 70–99)
GLUCOSE BLD-MCNC: 52 MG/DL (ref 70–99)
GLUCOSE BLD-MCNC: 53 MG/DL (ref 70–99)
GLUCOSE BLD-MCNC: 73 MG/DL (ref 70–99)
GLUCOSE BLD-MCNC: 91 MG/DL (ref 70–99)
HAV IGM SER IA-ACNC: NONREACTIVE
HCT VFR BLD CALC: 29.9 % (ref 37–47)
HEMOGLOBIN: 9.8 G/DL (ref 12–16)
HEPATITIS B CORE IGM ANTIBODY: NONREACTIVE
HEPATITIS B SURFACE ANTIGEN: NONREACTIVE
HEPATITIS C ANTIBODY: NONREACTIVE
INR BLD: 1.4
LYMPHOCYTES ABSOLUTE: 1.3 K/UL (ref 1–4.8)
LYMPHOCYTES RELATIVE PERCENT: 19.1 %
MACROCYTES: ABNORMAL
MCH RBC QN AUTO: 37.7 PG (ref 27–31.3)
MCHC RBC AUTO-ENTMCNC: 32.8 % (ref 33–37)
MCV RBC AUTO: 115.2 FL (ref 82–100)
MONOCYTES ABSOLUTE: 0.6 K/UL (ref 0.2–0.8)
MONOCYTES RELATIVE PERCENT: 9.5 %
NEUTROPHILS ABSOLUTE: 4.7 K/UL (ref 1.4–6.5)
NEUTROPHILS RELATIVE PERCENT: 71.1 %
PDW BLD-RTO: 14.5 % (ref 11.5–14.5)
PERFORMED ON: ABNORMAL
PERFORMED ON: ABNORMAL
PERFORMED ON: NORMAL
PERFORMED ON: NORMAL
PHOSPHORUS: 3.2 MG/DL (ref 2.3–4.8)
PLATELET # BLD: 53 K/UL (ref 130–400)
PLATELET SLIDE REVIEW: ABNORMAL
POIKILOCYTES: ABNORMAL
POLYCHROMASIA: ABNORMAL
POTASSIUM REFLEX MAGNESIUM: 3.6 MEQ/L (ref 3.4–4.9)
PROTHROMBIN TIME: 17.4 SEC (ref 12.3–14.9)
RBC # BLD: 2.6 M/UL (ref 4.2–5.4)
SLIDE REVIEW: ABNORMAL
SODIUM BLD-SCNC: 137 MEQ/L (ref 135–144)
STOMATOCYTES: ABNORMAL
TARGET CELLS: ABNORMAL
TOTAL PROTEIN: 6.1 G/DL (ref 6.3–8)
VITAMIN B-12: >2000 PG/ML (ref 232–1245)
WBC # BLD: 6.6 K/UL (ref 4.8–10.8)

## 2022-09-05 PROCEDURE — 82607 VITAMIN B-12: CPT

## 2022-09-05 PROCEDURE — 6360000002 HC RX W HCPCS: Performed by: INTERNAL MEDICINE

## 2022-09-05 PROCEDURE — 99253 IP/OBS CNSLTJ NEW/EST LOW 45: CPT | Performed by: SPECIALIST

## 2022-09-05 PROCEDURE — 80074 ACUTE HEPATITIS PANEL: CPT

## 2022-09-05 PROCEDURE — 94761 N-INVAS EAR/PLS OXIMETRY MLT: CPT

## 2022-09-05 PROCEDURE — 2580000003 HC RX 258: Performed by: INTERNAL MEDICINE

## 2022-09-05 PROCEDURE — 94664 DEMO&/EVAL PT USE INHALER: CPT

## 2022-09-05 PROCEDURE — 1210000000 HC MED SURG R&B

## 2022-09-05 PROCEDURE — 85025 COMPLETE CBC W/AUTO DIFF WBC: CPT

## 2022-09-05 PROCEDURE — 85610 PROTHROMBIN TIME: CPT

## 2022-09-05 PROCEDURE — 6370000000 HC RX 637 (ALT 250 FOR IP): Performed by: INTERNAL MEDICINE

## 2022-09-05 PROCEDURE — 80053 COMPREHEN METABOLIC PANEL: CPT

## 2022-09-05 PROCEDURE — 36415 COLL VENOUS BLD VENIPUNCTURE: CPT

## 2022-09-05 PROCEDURE — 84100 ASSAY OF PHOSPHORUS: CPT

## 2022-09-05 RX ORDER — OXYCODONE HYDROCHLORIDE 5 MG/1
5 TABLET ORAL EVERY 4 HOURS PRN
Status: DISCONTINUED | OUTPATIENT
Start: 2022-09-05 | End: 2022-09-07

## 2022-09-05 RX ORDER — MORPHINE SULFATE 2 MG/ML
2 INJECTION, SOLUTION INTRAMUSCULAR; INTRAVENOUS EVERY 4 HOURS PRN
Status: DISCONTINUED | OUTPATIENT
Start: 2022-09-05 | End: 2022-09-15 | Stop reason: HOSPADM

## 2022-09-05 RX ORDER — CHLORDIAZEPOXIDE HYDROCHLORIDE 10 MG/1
10 CAPSULE, GELATIN COATED ORAL 3 TIMES DAILY
Status: COMPLETED | OUTPATIENT
Start: 2022-09-06 | End: 2022-09-08

## 2022-09-05 RX ORDER — DEXTROSE MONOHYDRATE 100 MG/ML
INJECTION, SOLUTION INTRAVENOUS CONTINUOUS PRN
Status: DISCONTINUED | OUTPATIENT
Start: 2022-09-05 | End: 2022-09-15 | Stop reason: HOSPADM

## 2022-09-05 RX ORDER — MULTIVITAMIN WITH IRON
1 TABLET ORAL DAILY
Status: DISCONTINUED | OUTPATIENT
Start: 2022-09-05 | End: 2022-09-15 | Stop reason: HOSPADM

## 2022-09-05 RX ORDER — ALBUTEROL SULFATE 2.5 MG/3ML
2.5 SOLUTION RESPIRATORY (INHALATION) 3 TIMES DAILY
Status: DISCONTINUED | OUTPATIENT
Start: 2022-09-05 | End: 2022-09-05

## 2022-09-05 RX ORDER — CLONIDINE HYDROCHLORIDE 0.1 MG/1
0.1 TABLET ORAL 2 TIMES DAILY
Status: DISPENSED | OUTPATIENT
Start: 2022-09-05 | End: 2022-09-07

## 2022-09-05 RX ORDER — MORPHINE SULFATE 2 MG/ML
2 INJECTION, SOLUTION INTRAMUSCULAR; INTRAVENOUS EVERY 4 HOURS PRN
Status: DISCONTINUED | OUTPATIENT
Start: 2022-09-05 | End: 2022-09-05

## 2022-09-05 RX ORDER — CHLORDIAZEPOXIDE HYDROCHLORIDE 25 MG/1
25 CAPSULE, GELATIN COATED ORAL 3 TIMES DAILY
Status: COMPLETED | OUTPATIENT
Start: 2022-09-05 | End: 2022-09-06

## 2022-09-05 RX ORDER — MORPHINE SULFATE 2 MG/ML
1 INJECTION, SOLUTION INTRAMUSCULAR; INTRAVENOUS EVERY 4 HOURS PRN
Status: DISCONTINUED | OUTPATIENT
Start: 2022-09-05 | End: 2022-09-05

## 2022-09-05 RX ORDER — ALBUTEROL SULFATE 2.5 MG/3ML
2.5 SOLUTION RESPIRATORY (INHALATION) EVERY 4 HOURS PRN
Status: DISCONTINUED | OUTPATIENT
Start: 2022-09-05 | End: 2022-09-15 | Stop reason: HOSPADM

## 2022-09-05 RX ORDER — GABAPENTIN 100 MG/1
100 CAPSULE ORAL 3 TIMES DAILY
Status: DISCONTINUED | OUTPATIENT
Start: 2022-09-05 | End: 2022-09-06

## 2022-09-05 RX ORDER — DEXTROSE AND SODIUM CHLORIDE 5; .9 G/100ML; G/100ML
INJECTION, SOLUTION INTRAVENOUS CONTINUOUS
Status: DISPENSED | OUTPATIENT
Start: 2022-09-05 | End: 2022-09-06

## 2022-09-05 RX ADMIN — LORAZEPAM 2 MG: 1 TABLET ORAL at 04:43

## 2022-09-05 RX ADMIN — LORAZEPAM 2 MG: 2 INJECTION INTRAMUSCULAR; INTRAVENOUS at 08:21

## 2022-09-05 RX ADMIN — GABAPENTIN 100 MG: 100 CAPSULE ORAL at 14:40

## 2022-09-05 RX ADMIN — DEXTROSE AND SODIUM CHLORIDE: 5; 900 INJECTION, SOLUTION INTRAVENOUS at 11:02

## 2022-09-05 RX ADMIN — LORAZEPAM 2 MG: 1 TABLET ORAL at 01:35

## 2022-09-05 RX ADMIN — CHLORDIAZEPOXIDE HYDROCHLORIDE 10 MG: 10 CAPSULE ORAL at 08:21

## 2022-09-05 RX ADMIN — DEXTROSE AND SODIUM CHLORIDE: 5; 900 INJECTION, SOLUTION INTRAVENOUS at 20:32

## 2022-09-05 RX ADMIN — CLONIDINE HYDROCHLORIDE 0.1 MG: 0.1 TABLET ORAL at 14:40

## 2022-09-05 RX ADMIN — CHLORDIAZEPOXIDE HYDROCHLORIDE 25 MG: 25 CAPSULE ORAL at 20:26

## 2022-09-05 RX ADMIN — CHLORDIAZEPOXIDE HYDROCHLORIDE 25 MG: 25 CAPSULE ORAL at 14:41

## 2022-09-05 RX ADMIN — GABAPENTIN 100 MG: 100 CAPSULE ORAL at 20:26

## 2022-09-05 RX ADMIN — DEXTROSE MONOHYDRATE 125 ML: 100 INJECTION, SOLUTION INTRAVENOUS at 08:28

## 2022-09-05 RX ADMIN — THIAMINE HYDROCHLORIDE 100 MG: 100 INJECTION, SOLUTION INTRAMUSCULAR; INTRAVENOUS at 08:20

## 2022-09-05 RX ADMIN — THERA TABS 1 TABLET: TAB at 14:40

## 2022-09-05 ASSESSMENT — PAIN SCALES - GENERAL: PAINLEVEL_OUTOF10: 0

## 2022-09-05 NOTE — CONSULTS
Consults    Patient Name: Raoul Joe Date: 2022  1:47 PM  MR #: [id number]  : 1985    Attending Physician: Rober Lynn DO  Reason for consult: Jaundice    History of Presenting Illness:      Radha Carvalho is a 43 y.o. female on hospital day 1 with a history of alcohol withdrawal, patient has a history of alcohol abuse and has been trying to cut down her alcohol consumption but still drinks occasionally and she felt that she was going through withdrawal symptoms. ,  She noticed that she turned yellow and was having upper abdominal discomfort, she reports having some dark stool. No history of hematemesis, no vomiting, no prior history of ulcer disease, patient is going through [facility] protocol now.  history Obtained From:  patient      History:      Past Medical History:   Diagnosis Date    Alcohol abuse     Tobacco dependence      Past Surgical History:   Procedure Laterality Date    APPENDECTOMY      FOOT SURGERY      reports 6 sx on L foot and one on right foot    TUBAL LIGATION         Family History  Family History   Problem Relation Age of Onset    High Cholesterol Mother     Hypertension Mother     Melanoma Father     High Cholesterol Father     Hypertension Father      [] Unable to obtain due to ventilated and/ or neurologic status    Social History     Socioeconomic History    Marital status: Single     Spouse name: Not on file    Number of children: Not on file    Years of education: Not on file    Highest education level: Not on file   Occupational History    Not on file   Tobacco Use    Smoking status: Every Day     Packs/day: 1.00     Types: Cigarettes    Smokeless tobacco: Never   Vaping Use    Vaping Use: Never used   Substance and Sexual Activity    Alcohol use: Yes     Comment: 20: completed 30 day rehab 2 weeks ago, relapse one week ago; unable to quantify EtOH consumption    Drug use: Yes     Types: Marijuana Mona Zhou)    Sexual activity: Not on file   Other Topics Concern    Not on file   Social History Narrative    Not on file     Social Determinants of Health     Financial Resource Strain: Not on file   Food Insecurity: Not on file   Transportation Needs: Not on file   Physical Activity: Not on file   Stress: Not on file   Social Connections: Not on file   Intimate Partner Violence: Not on file   Housing Stability: Not on file      [] Unable to obtain due to ventilated and/ or neurologic status      Home Medications:      Medications Prior to Admission: mirtazapine (REMERON) 15 MG tablet, Take 1 tablet by mouth nightly (Patient not taking: Reported on 9/4/2022)  Multiple Vitamin (MULTIVITAMIN) TABS tablet, Take 1 tablet by mouth daily (Patient not taking: Reported on 9/4/2022)    Current Hospital Medications:     Scheduled Meds:   chlordiazePOXIDE  25 mg Oral TID    Followed by    Rikki Burgess ON 9/6/2022] chlordiazePOXIDE  10 mg Oral TID    gabapentin  100 mg Oral TID    cloNIDine  0.1 mg Oral BID    multivitamin  1 tablet Oral Daily    thiamine  100 mg IntraVENous Daily    nicotine  1 patch TransDERmal Daily     Continuous Infusions:   dextrose      dextrose 5 % and 0.9 % NaCl 100 mL/hr at 09/05/22 1102     PRN Meds:.glucose, dextrose bolus **OR** dextrose bolus, glucagon (rDNA), dextrose, oxyCODONE, morphine, albuterol, ondansetron **OR** ondansetron, polyethylene glycol, LORazepam **OR** LORazepam **OR** LORazepam **OR** LORazepam **OR** LORazepam **OR** LORazepam **OR** LORazepam **OR** LORazepam  .   dextrose      dextrose 5 % and 0.9 % NaCl 100 mL/hr at 09/05/22 1102        Allergies: Allergies   Allergen Reactions    Oxycodone-Acetaminophen Itching     Itching and nausea          Review of Systems:       [x] CV, Resp, Neuro, , and all other systems reviewed and negative other than listed in HPI.       [] Unable to obtain due to ventilated and/ or neurologic status      Objective Findings:     Vitals:   Vitals:    09/04/22 1915 09/04/22 2215 09/05/22 0130 09/05/22 0819   BP: 123/75 (!) 117/59 (!) 110/56 (!) 105/52   Pulse: (!) 157 (!) 134 (!) 140 (!) 130   Resp:       Temp: 98.6 °F (37 °C)      TempSrc:       SpO2: 98% 99% 96%    Weight:       Height:            Physical Examination:  General: In some discomfort because of abdominal pain  HEENT: Normocephalic,  scleral icterus. Present. Neck: No jugular venous distention. Heart: Regular, no murmur, no rub/gallop. No right ventricular heave. Lungs: Clear to ascultation, no rales/wheezing/rhonchi. Good chest wall excursion. Abdomen: Soft, liver palpable about 3 to 4 fingerbreadths below the right costal margin and mildly tender, no clinical evidence of ascites  Extremities: No clubbing/cyanosis, no edema. Skin: Warm, dry, normal turgor, no rash, no bruise, no petichiae.   No cutaneous stigmata of chronic liver disease  Neuro: Alert and oriented, has mild tremor  Psych: Normal affect    Results/ Medications reviewed 9/5/2022, 2:56 PM     Laboratory, Microbiology, Pathology, Radiology, Cardiology, Medications and Transcriptions reviewed  Scheduled Meds:   chlordiazePOXIDE  25 mg Oral TID    Followed by    Devin Lopez ON 9/6/2022] chlordiazePOXIDE  10 mg Oral TID    gabapentin  100 mg Oral TID    cloNIDine  0.1 mg Oral BID    multivitamin  1 tablet Oral Daily    thiamine  100 mg IntraVENous Daily    nicotine  1 patch TransDERmal Daily     Continuous Infusions:   dextrose      dextrose 5 % and 0.9 % NaCl 100 mL/hr at 09/05/22 1102       Recent Labs     09/04/22  1415 09/05/22  0500   WBC 6.3 6.6   HGB 11.1* 9.8*   HCT 34.2* 29.9*   .4* 115.2*   PLT 50* 53*     Recent Labs     09/04/22  1415 09/05/22  0459   * 137   K 3.7 3.6   CL 90* 94*   CO2 23 29   PHOS  --  3.2   BUN <2* 3*   CREATININE 0.69 0.93*     Recent Labs     09/04/22  1415 09/05/22  0459   * 349*   ALT 98* 90*   BILITOT 7.2* 8.8*   ALKPHOS 524* 470*     Recent Labs     09/04/22  1415   LIPASE 118*   AMYLASE 60     Recent Labs     09/04/22  1415 09/04/22  1080 09/05/22  0459 09/05/22  0500   PROT 6.3  --  6.1*  --    INR  --  1.4  --  1.4     CT ABDOMEN PELVIS WO CONTRAST Additional Contrast? None    Result Date: 9/4/2022  CT ABDOMEN PELVIS WO CONTRAST: 9/4/2022 CLINICAL HISTORY:  ab pain . COMPARISON: None available. TECHNIQUE: Spiral images were obtained of the abdomen and pelvis without contrast. All CT scans at this facility use dose modulation, iterative reconstruction, and/or weight based dosing when appropriate to reduce radiation dose to as low as reasonably achievable. FINDINGS: The liver is moderate to markedly enlarged with extensive fatty infiltration. A small volume of ascites is present in the pelvis. Mild retroperitoneal and mesenteric inflammation is likely edema and/or pancreatitis. Mild wall thickening of the essentially collapsed colon is probably reactive and/or related to incomplete distention, rather than colitis. The gallbladder is mildly distended, but otherwise unremarkable in appearance. A very small fat-containing periumbilical hernia is noted. There is no abscess, free air, abnormal bowel or biliary dilatation, focal inflammatory changes, significant lymphadenopathy, hernias, or other complication identified. The spleen, pancreas, adrenal glands, kidneys, great vessels, small bowel loops, uterus, adnexa, urinary bladder, and additional images of the pelvis are unremarkable. The visualized lung bases are clear. ENLARGED FATTY LIVER. SMALL VOLUME ASCITES AND MILD ILL-DEFINED MESENTERIC AND RETROPERITONEAL INFLAMMATION, LIKELY EDEMA AND/OR PANCREATITIS. MILD PROBABLY REACTIVE WALL THICKENING OF THE COLON. Impression:   22-year-old female admitted with jaundice and alcohol withdrawal, serum bilirubin is 8.8 and AST 34 ALT 90 alk phos is 470. Albumin is 2.3.   AST ALT ratio more than 2.,  And lipase is 118.,  CBC shows white count of 6.6 hemoglobin is 9.8 and 29.9 the MCV is 115.2, platelet count is 53, pro time 17.4 with INR of 1.4, because of acute hepatitis AB and C was negative. It seems patient has alcoholic hepatitis superimposed on chronic liver disease, she has thrombocytopenia and hypoalbuminemia.,  Madrey's discriminant score is 20.3. Patient also has anemia and reports having dark stool rule out ulcer disease or esophageal varices  Plan:   Continue alcohol withdrawal protocol and other supportive care and will consider EGD in the next few days prior to discharge. Follow LFT and CBC  Comments: Thank you for allowing us to participate in the care of this patient. Will continue to follow. Please call if questions or concerns arise.     Electronically signed by Alesha Multani MD on 9/5/2022 at 2:56 PM

## 2022-09-05 NOTE — CARE COORDINATION
[facility] Case Management Initial Discharge Assessment    Met with Patient to discuss discharge plan. PCP: Dior Churchill MD          Date of Last Visit: \"awhile ago\"    [facility] Patient: No        [facility] Notified: no    If no PCP, list provided? N/A    Discharge Planning    Living Arrangements: independently at home    Who do you live with? Alone    Who helps you with your care:  self    If lives at home:     Do you have any barriers navigating in your home? no    Patient can perform ADL? Yes    Current Services (outpatient and in home) :  None    Dialysis: No    Is transportation available to get to your appointments? Yes    DME Equipment:  no    Respiratory equipment: None    Respiratory provider:  no     Pharmacy:  yes -     Consult with Medication Assistance Program?  No      Patient agreeable to [place] [id number]? Declined    Patient agreeable to SNF/Rehab? Declined    Other discharge needs identified? Other Let's get real?    Does Patient Have a High-Risk for Readmission Diagnosis (CHF, PN, MI, COPD)? No    Initial Discharge Plan? (Note: please see concurrent daily documentation for any updates after initial note). INITIAL D/C PLAN IS TO GO HOME. ACCEPTED INFORMATION FOR [facility]. DENIES ANY OTHER NEEDS UPON D/C.     Readmission Risk              Risk of Unplanned Readmission:  20         Electronically signed by Rudy Arceo RN on 9/5/2022 at 3:29 PM  [place]

## 2022-09-05 NOTE — PROGRESS NOTES
[facility] EMERGENCY [facility] AT [place] Respiratory Therapy Evaluation   Current Order:  TID albuterol      Home Regimen: no      Ordering Physician: Andrew Trotter  Re-evaluation Date:  na     Diagnosis: Alcohol, hepatitis,acitis      Patient Status: Stable / Unstable + Physician notified    The following MDI Criteria must be met in order to convert aerosol to MDI with spacer. If unable to meet, MDI will be converted to aerosol:  []  Patient able to demonstrate the ability to use MDI effectively  []  Patient alert and cooperative  []  Patient able to take deep breath with 5-10 second hold  []  Medication(s) available in this delivery method   []  Peak flow greater than or equal to 200 ml/min            Current Order Substituted To  (same drug, same frequency)   Aerosol to MDI [] Albuterol Sulfate 0.083% unit dose by aerosol Albuterol Sulfate MDI 2 puffs by inhalation with spacer    [] Levalbuterol 1.25 mg unit dose by aerosol Levalbuterol MDI 2 puffs by inhalation with spacer    [] Levalbuterol 0.63 mg unit dose by aerosol Levalbuterol MDI 2 puffs by inhalation with spacer    [] Ipratropium Bromide 0.02% unit dose by aerosol Ipratropium Bromide MDI 2 puffs by inhalation with spacer    [] Duoneb (Ipratropium + Albuterol) unit dose by aerosol Ipratropium MDI + Albuterol MDI 2 puffs by inhalation w/spacer   MDI to Aerosol [] Albuterol Sulfate MDI Albuterol Sulfate 0.083% unit dose by aerosol    [] Levalbuterol MDI 2 puffs by inhalation Levalbuterol 1.25 mg unit dose by aerosol    [] Ipratropium Bromide MDI by inhalation Ipratropium Bromide 0.02% unit dose by aerosol    [] Combivent (Ipratropium + Albuterol) MDI by inhalation Duoneb (Ipratropium + Albuterol) unit dose by aerosol       Treatment Assessment [Frequency/Schedule]:  Change frequency to: _________PRN albuterol_________________________________________per Protocol, P&T, MEC      Points 0 1 2 3 4   Pulmonary Status  Non-Smoker  []   Smoking history   < 20 pack years  [x]   Smoking history  ? 20 pack years  []   Pulmonary Disorder  (acute or chronic)  []   Severe or Chronic w/ Exacerbation  []     Surgical Status No [x]   Surgeries     General []   Surgery Lower []   Abdominal Thoracic or []   Upper Abdominal Thoracic with  PulmonaryDisorder  []     Chest X-ray Clear/Not  Ordered     [x]  Chronic Changes  Results Pending  []  Infiltrates, atelectasis, pleural effusion, or edema  []  Infiltrates in more than one lobe []  Infiltrate + Atelectasis, &/or pleural effusion  []    Respiratory Pattern Regular,  RR = 12-20 [x]  Increased,  RR = 21-25 []  BUSTAMANTE, irregular,  or RR = 26-30 []  Decreased FEV1  or RR = 31-35 []  Severe SOB, use  of accessory muscles, or RR ? 35  []    Mental Status Alert, oriented,  Cooperative [x]  Confused but Follows commands []  Lethargic or unable to follow commands []  Obtunded  []  Comatose  []    Breath Sounds Clear to  auscultation  []  Decreased unilaterally or  in bases only []  Decreased  bilaterally  [x]  Crackles or intermittent wheezes []  Wheezes []    Cough Strong, Spontan., & nonproductive []  Strong,  spontaneous, &  productive [x]  Weak,  Nonproductive []  Weak, productive or  with wheezes []  No spontaneous  cough or may require suctioning []    Level of Activity Ambulatory [x]  Ambulatory w/ Assist  []  Non-ambulatory []  Paraplegic []  Quadriplegic []    Total    Score:___4____     Triage Score:____5____      Tri       Triage:     1. (>20) Freq: Q3    2. (16-20) Freq: Q4   3. (11-15) Freq: QID & Albuterol Q2 PRN    4. (6-10) Freq: TID & Albuterol Q2 PRN    5. (0-5) Freq Q4prn

## 2022-09-06 LAB
ALBUMIN SERPL-MCNC: 1.7 G/DL (ref 3.5–4.6)
ALP BLD-CCNC: 392 U/L (ref 40–130)
ALT SERPL-CCNC: 68 U/L (ref 0–33)
ANION GAP SERPL CALCULATED.3IONS-SCNC: 8 MEQ/L (ref 9–15)
AST SERPL-CCNC: 284 U/L (ref 0–35)
BASOPHILS ABSOLUTE: 0 K/UL (ref 0–0.2)
BASOPHILS RELATIVE PERCENT: 0.9 %
BILIRUB SERPL-MCNC: 8.8 MG/DL (ref 0.2–0.7)
BUN BLDV-MCNC: 4 MG/DL (ref 6–20)
CALCIUM SERPL-MCNC: 6.8 MG/DL (ref 8.5–9.9)
CHLORIDE BLD-SCNC: 95 MEQ/L (ref 95–107)
CO2: 29 MEQ/L (ref 20–31)
CREAT SERPL-MCNC: 1.3 MG/DL (ref 0.5–0.9)
EOSINOPHILS ABSOLUTE: 0.1 K/UL (ref 0–0.7)
EOSINOPHILS RELATIVE PERCENT: 1.1 %
GFR AFRICAN AMERICAN: 55.3
GFR NON-AFRICAN AMERICAN: 45.7
GLOBULIN: 3.2 G/DL (ref 2.3–3.5)
GLUCOSE BLD-MCNC: 110 MG/DL (ref 70–99)
GLUCOSE BLD-MCNC: 121 MG/DL (ref 70–99)
GLUCOSE BLD-MCNC: 95 MG/DL (ref 70–99)
GLUCOSE BLD-MCNC: 96 MG/DL (ref 70–99)
HCT VFR BLD CALC: 25.7 % (ref 37–47)
HEMOGLOBIN: 8.7 G/DL (ref 12–16)
LYMPHOCYTES ABSOLUTE: 1.4 K/UL (ref 1–4.8)
LYMPHOCYTES RELATIVE PERCENT: 25.6 %
MAGNESIUM: 1 MG/DL (ref 1.7–2.4)
MCH RBC QN AUTO: 37.8 PG (ref 27–31.3)
MCHC RBC AUTO-ENTMCNC: 33.8 % (ref 33–37)
MCV RBC AUTO: 112 FL (ref 82–100)
MONOCYTES ABSOLUTE: 0.3 K/UL (ref 0.2–0.8)
MONOCYTES RELATIVE PERCENT: 6.5 %
NEUTROPHILS ABSOLUTE: 3.5 K/UL (ref 1.4–6.5)
NEUTROPHILS RELATIVE PERCENT: 65.9 %
PDW BLD-RTO: 14.4 % (ref 11.5–14.5)
PERFORMED ON: ABNORMAL
PERFORMED ON: NORMAL
PERFORMED ON: NORMAL
PHOSPHORUS: 0.8 MG/DL (ref 2.3–4.8)
PLATELET # BLD: 52 K/UL (ref 130–400)
POTASSIUM REFLEX MAGNESIUM: 3.1 MEQ/L (ref 3.4–4.9)
RBC # BLD: 2.3 M/UL (ref 4.2–5.4)
SODIUM BLD-SCNC: 132 MEQ/L (ref 135–144)
TOTAL PROTEIN: 4.9 G/DL (ref 6.3–8)
WBC # BLD: 5.3 K/UL (ref 4.8–10.8)

## 2022-09-06 PROCEDURE — 6370000000 HC RX 637 (ALT 250 FOR IP): Performed by: INTERNAL MEDICINE

## 2022-09-06 PROCEDURE — 6360000002 HC RX W HCPCS: Performed by: INTERNAL MEDICINE

## 2022-09-06 PROCEDURE — 83735 ASSAY OF MAGNESIUM: CPT

## 2022-09-06 PROCEDURE — 84100 ASSAY OF PHOSPHORUS: CPT

## 2022-09-06 PROCEDURE — 85025 COMPLETE CBC W/AUTO DIFF WBC: CPT

## 2022-09-06 PROCEDURE — 99232 SBSQ HOSP IP/OBS MODERATE 35: CPT | Performed by: SPECIALIST

## 2022-09-06 PROCEDURE — 36415 COLL VENOUS BLD VENIPUNCTURE: CPT

## 2022-09-06 PROCEDURE — 87040 BLOOD CULTURE FOR BACTERIA: CPT

## 2022-09-06 PROCEDURE — 2500000003 HC RX 250 WO HCPCS: Performed by: INTERNAL MEDICINE

## 2022-09-06 PROCEDURE — 80053 COMPREHEN METABOLIC PANEL: CPT

## 2022-09-06 PROCEDURE — 2580000003 HC RX 258: Performed by: INTERNAL MEDICINE

## 2022-09-06 PROCEDURE — 1210000000 HC MED SURG R&B

## 2022-09-06 RX ORDER — GABAPENTIN 100 MG/1
200 CAPSULE ORAL 3 TIMES DAILY
Status: DISCONTINUED | OUTPATIENT
Start: 2022-09-06 | End: 2022-09-10

## 2022-09-06 RX ORDER — POTASSIUM BICARBONATE 25 MEQ/1
50 TABLET, EFFERVESCENT ORAL EVERY 4 HOURS
Status: COMPLETED | OUTPATIENT
Start: 2022-09-06 | End: 2022-09-06

## 2022-09-06 RX ORDER — POTASSIUM BICARBONATE 25 MEQ/1
50 TABLET, EFFERVESCENT ORAL EVERY 4 HOURS
Status: DISCONTINUED | OUTPATIENT
Start: 2022-09-06 | End: 2022-09-06

## 2022-09-06 RX ORDER — POTASSIUM CHLORIDE 20 MEQ/1
40 TABLET, EXTENDED RELEASE ORAL EVERY 4 HOURS
Status: DISCONTINUED | OUTPATIENT
Start: 2022-09-06 | End: 2022-09-06

## 2022-09-06 RX ORDER — SODIUM CHLORIDE 9 MG/ML
INJECTION, SOLUTION INTRAVENOUS CONTINUOUS
Status: DISPENSED | OUTPATIENT
Start: 2022-09-06 | End: 2022-09-07

## 2022-09-06 RX ADMIN — CLONIDINE HYDROCHLORIDE 0.1 MG: 0.1 TABLET ORAL at 20:28

## 2022-09-06 RX ADMIN — POTASSIUM PHOSPHATE, MONOBASIC AND POTASSIUM PHOSPHATE, DIBASIC 30 MMOL: 224; 236 INJECTION, SOLUTION, CONCENTRATE INTRAVENOUS at 19:04

## 2022-09-06 RX ADMIN — LORAZEPAM 1 MG: 1 TABLET ORAL at 20:38

## 2022-09-06 RX ADMIN — THIAMINE HYDROCHLORIDE 100 MG: 100 INJECTION, SOLUTION INTRAMUSCULAR; INTRAVENOUS at 09:29

## 2022-09-06 RX ADMIN — POTASSIUM BICARBONATE 50 MEQ: 978 TABLET, EFFERVESCENT ORAL at 13:00

## 2022-09-06 RX ADMIN — CHLORDIAZEPOXIDE HYDROCHLORIDE 10 MG: 10 CAPSULE ORAL at 20:27

## 2022-09-06 RX ADMIN — CHLORDIAZEPOXIDE HYDROCHLORIDE 10 MG: 10 CAPSULE ORAL at 16:39

## 2022-09-06 RX ADMIN — DEXTROSE AND SODIUM CHLORIDE: 5; 900 INJECTION, SOLUTION INTRAVENOUS at 05:34

## 2022-09-06 RX ADMIN — GABAPENTIN 200 MG: 100 CAPSULE ORAL at 20:27

## 2022-09-06 RX ADMIN — CHLORDIAZEPOXIDE HYDROCHLORIDE 25 MG: 25 CAPSULE ORAL at 09:28

## 2022-09-06 RX ADMIN — MORPHINE SULFATE 2 MG: 2 INJECTION, SOLUTION INTRAMUSCULAR; INTRAVENOUS at 20:28

## 2022-09-06 RX ADMIN — MAGNESIUM SULFATE HEPTAHYDRATE 6000 MG: 500 INJECTION, SOLUTION INTRAMUSCULAR; INTRAVENOUS at 13:02

## 2022-09-06 RX ADMIN — POTASSIUM BICARBONATE 50 MEQ: 978 TABLET, EFFERVESCENT ORAL at 16:39

## 2022-09-06 RX ADMIN — THERA TABS 1 TABLET: TAB at 09:28

## 2022-09-06 RX ADMIN — GABAPENTIN 100 MG: 100 CAPSULE ORAL at 09:30

## 2022-09-06 RX ADMIN — SODIUM CHLORIDE: 9 INJECTION, SOLUTION INTRAVENOUS at 09:44

## 2022-09-06 RX ADMIN — GABAPENTIN 200 MG: 100 CAPSULE ORAL at 16:39

## 2022-09-06 ASSESSMENT — PAIN DESCRIPTION - LOCATION: LOCATION: ABDOMEN;BACK

## 2022-09-06 ASSESSMENT — PAIN SCALES - GENERAL: PAINLEVEL_OUTOF10: 7

## 2022-09-06 ASSESSMENT — PAIN DESCRIPTION - DESCRIPTORS: DESCRIPTORS: ACHING

## 2022-09-06 ASSESSMENT — PAIN DESCRIPTION - ORIENTATION: ORIENTATION: MID

## 2022-09-06 NOTE — PROGRESS NOTES
Assessment documented. Vital signs stable. Meds taken well. CIWA  negative. Denies any discomfort or pain. Call light within reach.      BP (!) 92/53   Pulse (!) 116   Temp 99.9 °F (37.7 °C) (Oral)   Resp 20   Ht 5' 5\" (1.651 m)   Wt 140 lb (63.5 kg)   LMP  (LMP Unknown) Comment: tubal ligation  SpO2 100%   BMI 23.30 kg/m²

## 2022-09-06 NOTE — PROGRESS NOTES
Physician Progress Note    9/6/2022   4:50 PM    Name:  Noelle Romo  MRN:    [id number]     IP Day: 2     Admit Date: 9/4/2022  1:47 PM  PCP: Satinder Anguiano MD    Code Status:  Full Code    Subjective:     She continues to feel better but is still having right side abdominal pain. Mild nausea but able to take liquids right now. Still with diarrhea.     Current Facility-Administered Medications   Medication Dose Route Frequency Provider Last Rate Last Admin    magnesium sulfate 6,000 mg in dextrose 5 % 100 mL IVPB  6,000 mg IntraVENous Once Reino Inks, DO 25 mL/hr at 09/06/22 1302 6,000 mg at 09/06/22 1302    0.9 % sodium chloride infusion   IntraVENous Continuous Reino Inks,  mL/hr at 09/06/22 0944 New Bag at 09/06/22 0944    gabapentin (NEURONTIN) capsule 200 mg  200 mg Oral TID Reino Inks, DO   200 mg at 09/06/22 1639    potassium phosphate 30 mmol in dextrose 5 % 250 mL IVPB  30 mmol IntraVENous Once Reino Inks, DO        Followed by    potassium phosphate 30 mmol in dextrose 5 % 250 mL IVPB  30 mmol IntraVENous Once Sigifredo Justin, DO        glucose chewable tablet 16 g  4 tablet Oral PRN David Landau, DO        dextrose bolus 10% 125 mL  125 mL IntraVENous PRN David Landau, DO   Stopped at 09/05/22 0848    Or    dextrose bolus 10% 250 mL  250 mL IntraVENous PRN David Landau, DO        glucagon (rDNA) injection 1 mg  1 mg SubCUTAneous PRN David Landau, DO        dextrose 10 % infusion   IntraVENous Continuous PRN David Landau, DO        chlordiazePOXIDE (LIBRIUM) capsule 10 mg  10 mg Oral TID Reino Inks, DO   10 mg at 09/06/22 1639    oxyCODONE (ROXICODONE) immediate release tablet 5 mg  5 mg Oral Q4H PRN Reino Inks, DO        morphine (PF) injection 2 mg  2 mg IntraVENous Q4H PRN Reino Inks, DO        cloNIDine (CATAPRES) tablet 0.1 mg  0.1 mg Oral BID Reino Inks, DO   0.1 mg at 09/05/22 1440    albuterol (PROVENTIL) nebulizer solution 2.5 mg  2.5 mg Nebulization Q4H PRN Cyndia Prom, DO        multivitamin 1 tablet  1 tablet Oral Daily Cyndia Prom, DO   1 tablet at 22 0928    ondansetron (ZOFRAN-ODT) disintegrating tablet 4 mg  4 mg Oral Q8H PRN Sterling Kauffman MD   4 mg at 22 2044    Or    ondansetron (ZOFRAN) injection 4 mg  4 mg IntraVENous Q6H PRN Sterling Kauffman MD        polyethylene glycol (GLYCOLAX) packet 17 g  17 g Oral Daily PRN Sterling Kauffman MD        thiamine (B-1) injection 100 mg  100 mg IntraVENous Daily Sterling Kauffman MD   100 mg at 22 8001    nicotine (NICODERM CQ) 14 MG/24HR 1 patch  1 patch TransDERmal Daily Sterling Kauffman MD        LORazepam (ATIVAN) tablet 1 mg  1 mg Oral Q1H PRN Sterling Kauffman MD        Or    LORazepam (ATIVAN) injection 1 mg  1 mg IntraVENous Q1H PRN Sterling Kauffman MD        Or    LORazepam (ATIVAN) tablet 2 mg  2 mg Oral Q1H PRN Sterling Kauffman MD   2 mg at 22 0443    Or    LORazepam (ATIVAN) injection 2 mg  2 mg IntraVENous Q1H PRN Sterling Kauffman MD   2 mg at 22 8774    Or    LORazepam (ATIVAN) tablet 3 mg  3 mg Oral Q1H PRN Sterling Kauffman MD        Or    LORazepam (ATIVAN) injection 3 mg  3 mg IntraVENous Q1H PRN Sterling Kauffman MD        Or    LORazepam (ATIVAN) tablet 4 mg  4 mg Oral Q1H PRN Sterling Kauffman MD        Or    LORazepam (ATIVAN) injection 4 mg  4 mg IntraVENous Q1H PRN Sterling Kauffman MD           Physical Examination:      Vitals:  BP (!) 99/56   Pulse (!) 118   Temp 99.7 °F (37.6 °C) (Oral)   Resp 18   Ht 5' 5\" (1.651 m)   Wt 140 lb (63.5 kg)   LMP  (LMP Unknown) Comment: tubal ligation  SpO2 97%   BMI 23.30 kg/m²   Temp (24hrs), Av.6 °F (37.6 °C), Min:99.3 °F (37.4 °C), Max:99.9 °F (37.7 °C)      General appearance: alert, cooperative and no distress.   Scleral icterus present  Mental Status: oriented to person, place and time and normal affect  Lungs: clear to auscultation bilaterally, normal effort  Heart: regular rate and rhythm, no murmur  Abdomen: TTP primarily in right upper quadrant. Otherwise abdomen is soft. Bowel sounds are present. No rebound tenderness or guarding. Extremities: no edema, redness, tenderness in the calves. Cap refill <2s    Data:     Labs:  Recent Labs     09/05/22  0500 09/06/22 0527   WBC 6.6 5.3   HGB 9.8* 8.7*   PLT 53* 52*     Recent Labs     09/05/22  0459 09/06/22 0527    132*   K 3.6 3.1*   CL 94* 95   CO2 29 29   BUN 3* 4*   CREATININE 0.93* 1.30*   GLUCOSE 52* 121*     Recent Labs     09/05/22  0459 09/06/22 0527   * 284*   ALT 90* 68*   BILITOT 8.8* 8.8*   ALKPHOS 470* 392*       Assessment and Plan:        1. Acute alcoholic hepatitis and pancreatitis: Gradually improving  -Supportive care with IVF, pain control  -Continue clears  -GI following    2. Severe alcohol use disorder with risk for acute alcohol withdrawal  -Supplement benzodiazepine and monitor on CIWA scale  -Adjunctive gabapentin and clonidine  -Thiamine, multivitamin    3. Hypokalemia, hypomagnesemia, hypophosphatemia: Replete     Tobacco use disorder  Thrombocytopenia    Diet: ADULT DIET; Clear Liquid;  Low Sodium (2 gm)  Ppx: SCDs  Full Code    >35 minutes in total care time    Electronically signed by Yani Corado DO on 9/6/2022 at 4:50 PM

## 2022-09-06 NOTE — CARE COORDINATION
This LSW met with patient at bedside this morning. Patient and I discussed discharge plans. Information given to patient on \" [facility] program\"- patient stated that she will consider calling to enroll in program.  Patient states that she will return home when medically cleared. She lives alone. Patient states that she does have good relationship with parents, sister. LSW / FABIAN to follow.   Electronically signed by LIDYA Nice, JUHI on 9/6/22 at 10:31 AM EDT

## 2022-09-06 NOTE — PROGRESS NOTES
0900: Shift assessment complete, VS low, Dr. Perla Fairly aware. Lab called critical Mag of 1.0, Dr. Perla Fairly aware. New orders in, AM meds given as per [facility]. Pt expresses shakiness, slight anxiety. Walked with pt to bathroom, slightly unsteady. Pt had liquid BM, brown/tan in color.      Electronically signed by Quirino Weathers RN on 9/6/22 at 9:53 AM EDT

## 2022-09-07 LAB
ALBUMIN SERPL-MCNC: 1.8 G/DL (ref 3.5–4.6)
ALP BLD-CCNC: 426 U/L (ref 40–130)
ALT SERPL-CCNC: 64 U/L (ref 0–33)
ANION GAP SERPL CALCULATED.3IONS-SCNC: 8 MEQ/L (ref 9–15)
AST SERPL-CCNC: 260 U/L (ref 0–35)
BASOPHILS ABSOLUTE: 0 K/UL (ref 0–0.2)
BASOPHILS RELATIVE PERCENT: 0.6 %
BILIRUB SERPL-MCNC: 10.4 MG/DL (ref 0.2–0.7)
BUN BLDV-MCNC: 5 MG/DL (ref 6–20)
CALCIUM SERPL-MCNC: 6.5 MG/DL (ref 8.5–9.9)
CHLORIDE BLD-SCNC: 97 MEQ/L (ref 95–107)
CO2: 29 MEQ/L (ref 20–31)
CREAT SERPL-MCNC: 1.14 MG/DL (ref 0.5–0.9)
EOSINOPHILS ABSOLUTE: 0.1 K/UL (ref 0–0.7)
EOSINOPHILS RELATIVE PERCENT: 1.7 %
GFR AFRICAN AMERICAN: >60
GFR NON-AFRICAN AMERICAN: 53.1
GLOBULIN: 3.4 G/DL (ref 2.3–3.5)
GLUCOSE BLD-MCNC: 81 MG/DL (ref 70–99)
GLUCOSE BLD-MCNC: 89 MG/DL (ref 70–99)
GLUCOSE BLD-MCNC: 91 MG/DL (ref 70–99)
GLUCOSE BLD-MCNC: 99 MG/DL (ref 70–99)
HCT VFR BLD CALC: 28.1 % (ref 37–47)
HEMOGLOBIN: 9.2 G/DL (ref 12–16)
LYMPHOCYTES ABSOLUTE: 1.3 K/UL (ref 1–4.8)
LYMPHOCYTES RELATIVE PERCENT: 22.6 %
MCH RBC QN AUTO: 37.3 PG (ref 27–31.3)
MCHC RBC AUTO-ENTMCNC: 32.7 % (ref 33–37)
MCV RBC AUTO: 114.1 FL (ref 82–100)
MONOCYTES ABSOLUTE: 0.5 K/UL (ref 0.2–0.8)
MONOCYTES RELATIVE PERCENT: 8.8 %
NEUTROPHILS ABSOLUTE: 3.8 K/UL (ref 1.4–6.5)
NEUTROPHILS RELATIVE PERCENT: 66.3 %
PDW BLD-RTO: 14.7 % (ref 11.5–14.5)
PERFORMED ON: NORMAL
PHOSPHORUS: 5.2 MG/DL (ref 2.3–4.8)
PLATELET # BLD: 51 K/UL (ref 130–400)
POTASSIUM REFLEX MAGNESIUM: 5.1 MEQ/L (ref 3.4–4.9)
RBC # BLD: 2.46 M/UL (ref 4.2–5.4)
SODIUM BLD-SCNC: 134 MEQ/L (ref 135–144)
TOTAL PROTEIN: 5.2 G/DL (ref 6.3–8)
WBC # BLD: 5.8 K/UL (ref 4.8–10.8)

## 2022-09-07 PROCEDURE — 85025 COMPLETE CBC W/AUTO DIFF WBC: CPT

## 2022-09-07 PROCEDURE — 99231 SBSQ HOSP IP/OBS SF/LOW 25: CPT | Performed by: SPECIALIST

## 2022-09-07 PROCEDURE — 6370000000 HC RX 637 (ALT 250 FOR IP): Performed by: INTERNAL MEDICINE

## 2022-09-07 PROCEDURE — 84100 ASSAY OF PHOSPHORUS: CPT

## 2022-09-07 PROCEDURE — 2500000003 HC RX 250 WO HCPCS: Performed by: INTERNAL MEDICINE

## 2022-09-07 PROCEDURE — 2580000003 HC RX 258: Performed by: INTERNAL MEDICINE

## 2022-09-07 PROCEDURE — 36415 COLL VENOUS BLD VENIPUNCTURE: CPT

## 2022-09-07 PROCEDURE — 6360000002 HC RX W HCPCS: Performed by: INTERNAL MEDICINE

## 2022-09-07 PROCEDURE — 80053 COMPREHEN METABOLIC PANEL: CPT

## 2022-09-07 PROCEDURE — 1210000000 HC MED SURG R&B

## 2022-09-07 RX ORDER — HYDROCODONE BITARTRATE AND ACETAMINOPHEN 5; 325 MG/1; MG/1
1 TABLET ORAL EVERY 4 HOURS PRN
Status: DISCONTINUED | OUTPATIENT
Start: 2022-09-07 | End: 2022-09-15 | Stop reason: HOSPADM

## 2022-09-07 RX ADMIN — SODIUM CHLORIDE: 9 INJECTION, SOLUTION INTRAVENOUS at 08:12

## 2022-09-07 RX ADMIN — HYDROCODONE BITARTRATE AND ACETAMINOPHEN 1 TABLET: 5; 325 TABLET ORAL at 08:44

## 2022-09-07 RX ADMIN — CHLORDIAZEPOXIDE HYDROCHLORIDE 10 MG: 10 CAPSULE ORAL at 22:00

## 2022-09-07 RX ADMIN — CHLORDIAZEPOXIDE HYDROCHLORIDE 10 MG: 10 CAPSULE ORAL at 08:13

## 2022-09-07 RX ADMIN — GABAPENTIN 200 MG: 100 CAPSULE ORAL at 22:00

## 2022-09-07 RX ADMIN — THIAMINE HYDROCHLORIDE 100 MG: 100 INJECTION, SOLUTION INTRAMUSCULAR; INTRAVENOUS at 08:13

## 2022-09-07 RX ADMIN — THERA TABS 1 TABLET: TAB at 08:13

## 2022-09-07 RX ADMIN — LORAZEPAM 1 MG: 1 TABLET ORAL at 08:44

## 2022-09-07 RX ADMIN — HYDROCODONE BITARTRATE AND ACETAMINOPHEN 1 TABLET: 5; 325 TABLET ORAL at 12:58

## 2022-09-07 RX ADMIN — GABAPENTIN 200 MG: 100 CAPSULE ORAL at 14:41

## 2022-09-07 RX ADMIN — GABAPENTIN 200 MG: 100 CAPSULE ORAL at 08:13

## 2022-09-07 RX ADMIN — POTASSIUM PHOSPHATE, MONOBASIC AND POTASSIUM PHOSPHATE, DIBASIC 30 MMOL: 224; 236 INJECTION, SOLUTION, CONCENTRATE INTRAVENOUS at 00:00

## 2022-09-07 RX ADMIN — CHLORDIAZEPOXIDE HYDROCHLORIDE 10 MG: 10 CAPSULE ORAL at 14:42

## 2022-09-07 RX ADMIN — HYDROCODONE BITARTRATE AND ACETAMINOPHEN 1 TABLET: 5; 325 TABLET ORAL at 22:01

## 2022-09-07 ASSESSMENT — PAIN SCALES - GENERAL
PAINLEVEL_OUTOF10: 8
PAINLEVEL_OUTOF10: 10
PAINLEVEL_OUTOF10: 8

## 2022-09-07 ASSESSMENT — PAIN DESCRIPTION - DESCRIPTORS: DESCRIPTORS: ACHING

## 2022-09-07 ASSESSMENT — PAIN DESCRIPTION - ORIENTATION: ORIENTATION: LEFT

## 2022-09-07 ASSESSMENT — PAIN DESCRIPTION - LOCATION
LOCATION: BACK
LOCATION: ANKLE

## 2022-09-07 ASSESSMENT — PAIN - FUNCTIONAL ASSESSMENT: PAIN_FUNCTIONAL_ASSESSMENT: PREVENTS OR INTERFERES SOME ACTIVE ACTIVITIES AND ADLS

## 2022-09-07 NOTE — PROGRESS NOTES
Assessment complete. Alert and oriented X4. Complaining of abdominal pain; morphine given per order. Patient scored a 7 for CIWA protocol; 1mg ativan was given. Denies any other needs and call light is within reach.   Electronically signed by Lenin Spence RN on 9/6/2022 at 8:27 PM

## 2022-09-07 NOTE — CARE COORDINATION
This LSW met with patient at bedside this am.  Patient continues to plan to return home when medically stable. [employer] [employer] information given to patient, she is considering calling to enroll in program.  Patient denies needs at this time. YEW / FABIAN to follow.   Electronically signed by LIDYA Francis, JUHI on 9/7/22 at 10:18 AM EDT

## 2022-09-07 NOTE — PROGRESS NOTES
Physician Progress Note      Genevieve Spears  CSN #:                  [id number]  :                       1985  ADMIT DATE:       2022 1:47 PM  [address] DATE:   Service  PROVIDER #:        Leah Boyer DO          QUERY TEXT:    Pt admitted with acute alcoholic hepatitis and pancreatitis. Pt noted to have   Scr 0.69>1.3. If possible, please document in the progress notes and discharge   summary if you are evaluating and/or treating any of the following: The medical record reflects the following:  Risk Factors: severe alcohol use d/o, acute alcoholic hepatitis and   pancreatitis  Clinical Indicators: Admission Scr 0.69>0.93>1.3>1.14  Historical  Scr 0.42-0.73  Treatment: IVF, BMP    Defined by Kidney Disease Improving Global Outcomes (KDIGO) clinical practice   guideline for a  -Increase or decrease in SCr to greater than or equal to 1.5 times baseline,   which is known or presumed to have occurred within the prior 7 days; or  -Urine volume < 0.5ml/kg/h for 6 hours    Thank you, Sammy Chaudhari RN BSN [facility]  [phone]  Options provided:  -- Acute kidney injury  -- Acute kidney failure  -- Other - I will add my own diagnosis  -- Disagree - Not applicable / Not valid  -- Disagree - Clinically unable to determine / Unknown  -- Refer to Clinical Documentation Reviewer    PROVIDER RESPONSE TEXT:    This patient has an Acute kidney injury.     Query created by: Anjel Lomeli on 2022 10:32 AM      Electronically signed by:  Leah Boyer DO 2022 4:18 PM

## 2022-09-07 NOTE — PROGRESS NOTES
Ayah Moseley is a 43 y.o. female patient.     Current Facility-Administered Medications   Medication Dose Route Frequency Provider Last Rate Last Admin    HYDROcodone-acetaminophen (NORCO) 5-325 MG per tablet 1 tablet  1 tablet Oral Q4H PRN Jame Sandovals, DO   1 tablet at 09/07/22 1258    gabapentin (NEURONTIN) capsule 200 mg  200 mg Oral TID Jame Sandovals, DO   200 mg at 09/07/22 1441    glucose chewable tablet 16 g  4 tablet Oral PRN [facility] DO        dextrose bolus 10% 125 mL  125 mL IntraVENous PRN [facility] DO   Stopped at 09/05/22 0848    Or    dextrose bolus 10% 250 mL  250 mL IntraVENous PRN [facility] DO        glucagon (rDNA) injection 1 mg  1 mg SubCUTAneous PRN [facility] DO        dextrose 10 % infusion   IntraVENous Continuous PRN [facility] DO        chlordiazePOXIDE (LIBRIUM) capsule 10 mg  10 mg Oral TID Jame Sandovals, DO   10 mg at 09/07/22 1442    morphine (PF) injection 2 mg  2 mg IntraVENous Q4H PRN Jame Gonzalez, DO   2 mg at 09/06/22 2028    albuterol (PROVENTIL) nebulizer solution 2.5 mg  2.5 mg Nebulization Q4H PRN Jame Sandovals, DO        multivitamin 1 tablet  1 tablet Oral Daily Jame Gonzalez, DO   1 tablet at 09/07/22 0813    ondansetron (ZOFRAN-ODT) disintegrating tablet 4 mg  4 mg Oral Q8H PRN Renuka Romano MD   4 mg at 09/04/22 2044    Or    ondansetron (ZOFRAN) injection 4 mg  4 mg IntraVENous Q6H PRN Renuka Romano MD        polyethylene glycol (GLYCOLAX) packet 17 g  17 g Oral Daily PRN Renuka Romano MD        thiamine (B-1) injection 100 mg  100 mg IntraVENous Daily Renuka Romano MD   100 mg at 09/07/22 0813    nicotine (NICODERM CQ) 14 MG/24HR 1 patch  1 patch TransDERmal Daily Renuka Romano MD        LORazepam (ATIVAN) tablet 1 mg  1 mg Oral Q1H PRN Renuka Romano MD   1 mg at 09/07/22 0844    Or    LORazepam (ATIVAN) injection 1 mg  1 mg IntraVENous Q1H PRN Renuka Romano MD        Or    LORazepam (ATIVAN) tablet 2 mg  2 mg Oral Q1H PRN Zainab Tamayo MD   2 mg at 09/05/22 0443    Or    LORazepam (ATIVAN) injection 2 mg  2 mg IntraVENous Q1H PRN Zainab Tamayo MD   2 mg at 09/05/22 9213    Or    LORazepam (ATIVAN) tablet 3 mg  3 mg Oral Q1H PRN Zainab Tamayo MD        Or    LORazepam (ATIVAN) injection 3 mg  3 mg IntraVENous Q1H PRN Zainab Tamayo MD        Or    LORazepam (ATIVAN) tablet 4 mg  4 mg Oral Q1H PRN Zainab Tamayo MD        Or    LORazepam (ATIVAN) injection 4 mg  4 mg IntraVENous Q1H PRN Zainab Tamayo MD         Allergies   Allergen Reactions    Oxycodone-Acetaminophen Itching     Itching and nausea       Principal Problem:    Alcoholic hepatitis with ascites  Resolved Problems:    * No resolved hospital problems. *    Blood pressure 96/60, pulse (!) 102, temperature 98.8 °F (37.1 °C), temperature source Oral, resp. rate 16, height 5' 5\" (1.651 m), weight 140 lb (63.5 kg), SpO2 100 %. Subjective no emesis. Objective hemoglobin and hematocrit is 9.2 and 28.1, serum bilirubin is 10.4 this has gone up  Assessment & Plan history of dark stool earlier.,  Will schedule EGD a.m. to rule out any significant esophageal or gastric varices.     Onur Stephens MD  9/7/2022

## 2022-09-07 NOTE — PROGRESS NOTES
Physician Progress Note    9/7/2022   4:17 PM    Name:  Casey Mitchell  MRN:    [id number]     IP Day: 3     Admit Date: 9/4/2022  1:47 PM  PCP: Payton Nuñez MD    Code Status:  Full Code    Subjective:     She reports significant improvement in abdominal pain. She would like to try solid food.     Current Facility-Administered Medications   Medication Dose Route Frequency Provider Last Rate Last Admin    HYDROcodone-acetaminophen (NORCO) 5-325 MG per tablet 1 tablet  1 tablet Oral Q4H PRN Alvaro Marana, DO   1 tablet at 09/07/22 1258    gabapentin (NEURONTIN) capsule 200 mg  200 mg Oral TID Alvaro Marana, DO   200 mg at 09/07/22 1441    glucose chewable tablet 16 g  4 tablet Oral PRN Zena Lei, DO        dextrose bolus 10% 125 mL  125 mL IntraVENous PRN Zena Lei, DO   Stopped at 09/05/22 0848    Or    dextrose bolus 10% 250 mL  250 mL IntraVENous PRN Zena Lei, DO        glucagon (rDNA) injection 1 mg  1 mg SubCUTAneous PRN Zena Lei, DO        dextrose 10 % infusion   IntraVENous Continuous PRN Zena Lei, DO        chlordiazePOXIDE (LIBRIUM) capsule 10 mg  10 mg Oral TID Alvaro Marana, DO   10 mg at 09/07/22 1442    morphine (PF) injection 2 mg  2 mg IntraVENous Q4H PRN Alvaro Marana, DO   2 mg at 09/06/22 2028    albuterol (PROVENTIL) nebulizer solution 2.5 mg  2.5 mg Nebulization Q4H PRN Alvaro Marana, DO        multivitamin 1 tablet  1 tablet Oral Daily Alvaro Marana, DO   1 tablet at 09/07/22 0813    ondansetron (ZOFRAN-ODT) disintegrating tablet 4 mg  4 mg Oral Q8H PRN Darnell Guy MD   4 mg at 09/04/22 2044    Or    ondansetron (ZOFRAN) injection 4 mg  4 mg IntraVENous Q6H PRN Darnell Guy MD        polyethylene glycol (GLYCOLAX) packet 17 g  17 g Oral Daily PRN Darnell Guy MD        thiamine (B-1) injection 100 mg  100 mg IntraVENous Daily Darnell Guy MD   100 mg at 09/07/22 0813    nicotine (NICODERM CQ) 14 MG/24HR 1 patch  1 patch TransDERmal Daily Darnell Guy, MD        LORazepam (ATIVAN) tablet 1 mg  1 mg Oral Q1H PRN Raymond Ball MD   1 mg at 22 0844    Or    LORazepam (ATIVAN) injection 1 mg  1 mg IntraVENous Q1H PRN Raymond Ball MD        Or    LORazepam (ATIVAN) tablet 2 mg  2 mg Oral Q1H PRN Raymond Ball MD   2 mg at 22 0443    Or    LORazepam (ATIVAN) injection 2 mg  2 mg IntraVENous Q1H PRN Raymond Ball MD   2 mg at 22 7769    Or    LORazepam (ATIVAN) tablet 3 mg  3 mg Oral Q1H PRN Raymond Ball MD        Or    LORazepam (ATIVAN) injection 3 mg  3 mg IntraVENous Q1H PRN Raymond Ball MD        Or    LORazepam (ATIVAN) tablet 4 mg  4 mg Oral Q1H PRN Raymond Ball MD        Or    LORazepam (ATIVAN) injection 4 mg  4 mg IntraVENous Q1H PRN Raymond Ball MD           Physical Examination:      Vitals:  BP (!) 93/51   Pulse (!) 106   Temp 98.8 °F (37.1 °C) (Oral)   Resp 16   Ht 5' 5\" (1.651 m)   Wt 140 lb (63.5 kg)   LMP  (LMP Unknown) Comment: tubal ligation  SpO2 100%   BMI 23.30 kg/m²   Temp (24hrs), Av.8 °F (37.1 °C), Min:98.8 °F (37.1 °C), Max:98.8 °F (37.1 °C)      General appearance: alert, cooperative and no distress. Scleral icterus present  Mental Status: oriented to person, place and time and normal affect  Lungs: clear to auscultation bilaterally, normal effort  Heart: regular rate and rhythm, no murmur  Abdomen: TTP primarily in right upper quadrant. Otherwise abdomen is soft. Bowel sounds are present. No rebound tenderness or guarding. Extremities: no edema, redness, tenderness in the calves.  Cap refill <2s    Data:     Labs:  Recent Labs     22   WBC 5.3 5.8   HGB 8.7* 9.2*   PLT 52* 51*     Recent Labs     22   * 134*   K 3.1* 5.1*   CL 95 97   CO2 29 29   BUN 4* 5*   CREATININE 1.30* 1.14*   GLUCOSE 121* 81     Recent Labs     22  0522  0542   * 260*   ALT 68* 64*   BILITOT 8.8* 10.4*   ALKPHOS 392* 426*       Assessment and Plan:        1. Acute alcoholic hepatitis and pancreatitis: Gradually improving  -Supportive care with IVF, pain control  -Continue clears  -GI following    2. Severe alcohol use disorder with risk for acute alcohol withdrawal  -Supplement benzodiazepine and monitor on CIWA scale  -Adjunctive gabapentin and clonidine  -Thiamine, multivitamin    3. Hypokalemia, hypomagnesemia, hypophosphatemia: Replete    4. Mild WILLIE: Continue IVF. Improved     Tobacco use disorder  Thrombocytopenia    Diet: ADULT DIET; Regular;  Low Sodium (2 gm)  Ppx: SCDs  Full Code    >35 minutes in total care time    Electronically signed by Varsha York DO on 9/7/2022 at 4:17 PM

## 2022-09-08 ENCOUNTER — ANESTHESIA (OUTPATIENT)
Dept: ENDOSCOPY | Age: 37
DRG: 280 | End: 2022-09-08
Payer: COMMERCIAL

## 2022-09-08 ENCOUNTER — ANESTHESIA EVENT (OUTPATIENT)
Dept: ENDOSCOPY | Age: 37
DRG: 280 | End: 2022-09-08
Payer: COMMERCIAL

## 2022-09-08 PROBLEM — K92.2 GI BLEEDING: Status: ACTIVE | Noted: 2022-09-04

## 2022-09-08 LAB
ALBUMIN SERPL-MCNC: 1.8 G/DL (ref 3.5–4.6)
ALP BLD-CCNC: 416 U/L (ref 40–130)
ALT SERPL-CCNC: 59 U/L (ref 0–33)
ANION GAP SERPL CALCULATED.3IONS-SCNC: 10 MEQ/L (ref 9–15)
AST SERPL-CCNC: 251 U/L (ref 0–35)
BILIRUB SERPL-MCNC: 11 MG/DL (ref 0.2–0.7)
BUN BLDV-MCNC: 6 MG/DL (ref 6–20)
CALCIUM SERPL-MCNC: 6.5 MG/DL (ref 8.5–9.9)
CHLORIDE BLD-SCNC: 95 MEQ/L (ref 95–107)
CO2: 25 MEQ/L (ref 20–31)
CREAT SERPL-MCNC: 1.48 MG/DL (ref 0.5–0.9)
GFR AFRICAN AMERICAN: 47.6
GFR NON-AFRICAN AMERICAN: 39.3
GLOBULIN: 3.4 G/DL (ref 2.3–3.5)
GLUCOSE BLD-MCNC: 105 MG/DL (ref 70–99)
GLUCOSE BLD-MCNC: 112 MG/DL (ref 70–99)
GLUCOSE BLD-MCNC: 65 MG/DL (ref 70–99)
GLUCOSE BLD-MCNC: 90 MG/DL (ref 70–99)
PERFORMED ON: ABNORMAL
PERFORMED ON: ABNORMAL
PERFORMED ON: NORMAL
PHOSPHORUS: 2.7 MG/DL (ref 2.3–4.8)
POTASSIUM REFLEX MAGNESIUM: 4.3 MEQ/L (ref 3.4–4.9)
SODIUM BLD-SCNC: 130 MEQ/L (ref 135–144)
TOTAL PROTEIN: 5.2 G/DL (ref 6.3–8)

## 2022-09-08 PROCEDURE — 1210000000 HC MED SURG R&B

## 2022-09-08 PROCEDURE — 36415 COLL VENOUS BLD VENIPUNCTURE: CPT

## 2022-09-08 PROCEDURE — 80053 COMPREHEN METABOLIC PANEL: CPT

## 2022-09-08 PROCEDURE — 6370000000 HC RX 637 (ALT 250 FOR IP): Performed by: INTERNAL MEDICINE

## 2022-09-08 PROCEDURE — 84100 ASSAY OF PHOSPHORUS: CPT

## 2022-09-08 PROCEDURE — 2580000003 HC RX 258: Performed by: SPECIALIST

## 2022-09-08 PROCEDURE — 6360000002 HC RX W HCPCS: Performed by: INTERNAL MEDICINE

## 2022-09-08 PROCEDURE — 2580000003 HC RX 258: Performed by: INTERNAL MEDICINE

## 2022-09-08 RX ORDER — DEXTROSE AND SODIUM CHLORIDE 5; .9 G/100ML; G/100ML
INJECTION, SOLUTION INTRAVENOUS CONTINUOUS
Status: DISCONTINUED | OUTPATIENT
Start: 2022-09-08 | End: 2022-09-11

## 2022-09-08 RX ORDER — SODIUM CHLORIDE 0.9 % (FLUSH) 0.9 %
5-40 SYRINGE (ML) INJECTION EVERY 12 HOURS SCHEDULED
Status: DISCONTINUED | OUTPATIENT
Start: 2022-09-08 | End: 2022-09-15 | Stop reason: HOSPADM

## 2022-09-08 RX ORDER — SODIUM CHLORIDE 0.9 % (FLUSH) 0.9 %
5-40 SYRINGE (ML) INJECTION PRN
Status: DISCONTINUED | OUTPATIENT
Start: 2022-09-08 | End: 2022-09-15 | Stop reason: HOSPADM

## 2022-09-08 RX ORDER — SODIUM CHLORIDE 9 MG/ML
25 INJECTION, SOLUTION INTRAVENOUS PRN
Status: DISCONTINUED | OUTPATIENT
Start: 2022-09-08 | End: 2022-09-15 | Stop reason: HOSPADM

## 2022-09-08 RX ADMIN — GABAPENTIN 200 MG: 100 CAPSULE ORAL at 20:00

## 2022-09-08 RX ADMIN — GABAPENTIN 200 MG: 100 CAPSULE ORAL at 08:33

## 2022-09-08 RX ADMIN — Medication 10 ML: at 20:00

## 2022-09-08 RX ADMIN — THERA TABS 1 TABLET: TAB at 08:33

## 2022-09-08 RX ADMIN — Medication 10 ML: at 10:01

## 2022-09-08 RX ADMIN — DEXTROSE AND SODIUM CHLORIDE: 5; 900 INJECTION, SOLUTION INTRAVENOUS at 13:08

## 2022-09-08 RX ADMIN — THIAMINE HYDROCHLORIDE 100 MG: 100 INJECTION, SOLUTION INTRAMUSCULAR; INTRAVENOUS at 08:33

## 2022-09-08 RX ADMIN — CHLORDIAZEPOXIDE HYDROCHLORIDE 10 MG: 10 CAPSULE ORAL at 08:33

## 2022-09-08 RX ADMIN — GABAPENTIN 200 MG: 100 CAPSULE ORAL at 13:03

## 2022-09-08 ASSESSMENT — PAIN SCALES - GENERAL: PAINLEVEL_OUTOF10: 0

## 2022-09-08 NOTE — PLAN OF CARE
Problem: Discharge Planning  Goal: Discharge to home or other facility with appropriate resources  Outcome: Progressing     Problem: Pain  Goal: Verbalizes/displays adequate comfort level or baseline comfort level  Outcome: Progressing     Problem: Safety - Adult  Goal: Free from fall injury  Outcome: Progressing     Problem: Confusion  Goal: Confusion, delirium, dementia, or psychosis is improved or at baseline  Description: INTERVENTIONS:  1. Assess for possible contributors to thought disturbance, including medications, impaired vision or hearing, underlying metabolic abnormalities, dehydration, psychiatric diagnoses, and notify attending LIP  2. [place] high risk fall precautions, as indicated  3. Provide frequent short contacts to provide reality reorientation, refocusing and direction  4. Decrease environmental stimuli, including noise as appropriate  5. Monitor and intervene to maintain adequate nutrition, hydration, elimination, sleep and activity  6. If unable to ensure safety without constant attention obtain sitter and review sitter guidelines with assigned personnel  7.  Initiate Psychosocial CNS and Spiritual Care consult, as indicated  Outcome: Progressing     Problem: ABCDS Injury Assessment  Goal: Absence of physical injury  Outcome: Progressing

## 2022-09-08 NOTE — CARE COORDINATION
This LSW met with patient at bedside this am. Patient and I discussed discharge plans. Patient will return home when medically stable. Patient stated that she has not called \"[facility] [facility] [employer] \". Patient denies needs. YEW /FABIAN to follow.   Electronically signed by LIDYA Menjivar, LSW on 9/8/22 at 10:53 AM EDT

## 2022-09-08 NOTE — PROGRESS NOTES
Physician Progress Note    9/8/2022   3:29 PM    Name:  Ayah Moseley  MRN:    [id number]      Day: 4     Admit Date: 9/4/2022  1:47 PM  PCP: Sam Vaca MD    Code Status:  Full Code    Subjective:     She was tolerating diet yesterday and this morning but just had emesis. Abdominal pain overall is much improved. She is with slight nausea right now.     Current Facility-Administered Medications   Medication Dose Route Frequency Provider Last Rate Last Admin    sodium chloride flush 0.9 % injection 5-40 mL  5-40 mL IntraVENous 2 times per day Kyra Mclaughlin MD   10 mL at 09/08/22 1001    sodium chloride flush 0.9 % injection 5-40 mL  5-40 mL IntraVENous PRN Kyra Mclaughlin MD        0.9 % sodium chloride infusion  25 mL IntraVENous PRN Kyra Mclaughlin MD        dextrose 5 % and 0.9 % sodium chloride infusion   IntraVENous Continuous Kilgoreroselia Sandovals,  mL/hr at 09/08/22 1308 New Bag at 09/08/22 1308    HYDROcodone-acetaminophen (NORCO) 5-325 MG per tablet 1 tablet  1 tablet Oral Q4H PRN Jame Sandovals, DO   1 tablet at 09/07/22 2201    gabapentin (NEURONTIN) capsule 200 mg  200 mg Oral TID Kilgoreroselia Sandovals, DO   200 mg at 09/08/22 1303    glucose chewable tablet 16 g  4 tablet Oral PRN [facility] DO        dextrose bolus 10% 125 mL  125 mL IntraVENous PRN [facility] DO   Stopped at 09/05/22 0848    Or    dextrose bolus 10% 250 mL  250 mL IntraVENous PRN [facility] DO        glucagon (rDNA) injection 1 mg  1 mg [facility]neous [facility]        dextrose 10 % infusion   IntraVENous Continuous PRN [facility] DO        morphine (PF) injection 2 mg  2 mg IntraVENous Q4H PRN Jame Sandovals, DO   2 mg at 09/06/22 2028    albuterol (PROVENTIL) nebulizer solution 2.5 mg  2.5 mg Nebulization Q4H PRN Kilgore Jews, DO        multivitamin 1 tablet  1 tablet Oral Daily Jame Gonzalez DO   1 tablet at 09/08/22 0833    ondansetron (ZOFRAN-ODT) disintegrating tablet 4 mg  4 mg Oral Q8H PRN Raymond Ball MD   4 mg at 22 2044    Or    ondansetron (ZOFRAN) injection 4 mg  4 mg IntraVENous Q6H PRN Raymond Ball MD        polyethylene glycol (GLYCOLAX) packet 17 g  17 g Oral Daily PRN Raymond Ball MD        thiamine (B-1) injection 100 mg  100 mg IntraVENous Daily Raymond Ball MD   100 mg at 22 6486    nicotine (NICODERM CQ) 14 MG/24HR 1 patch  1 patch TransDERmal Daily Raymond Ball MD        LORazepam (ATIVAN) tablet 1 mg  1 mg Oral Q1H PRN Raymond Ball MD   1 mg at 22 0844    Or    LORazepam (ATIVAN) injection 1 mg  1 mg IntraVENous Q1H PRN Raymond Ball MD        Or    LORazepam (ATIVAN) tablet 2 mg  2 mg Oral Q1H PRN Raymond Ball MD   2 mg at 22 0443    Or    LORazepam (ATIVAN) injection 2 mg  2 mg IntraVENous Q1H PRN Raymond Ball MD   2 mg at 22 8425    Or    LORazepam (ATIVAN) tablet 3 mg  3 mg Oral Q1H PRN Raymond Ball MD        Or    LORazepam (ATIVAN) injection 3 mg  3 mg IntraVENous Q1H PRN Raymond Ball MD        Or    LORazepam (ATIVAN) tablet 4 mg  4 mg Oral Q1H PRN Raymond Ball MD        Or    LORazepam (ATIVAN) injection 4 mg  4 mg IntraVENous Q1H PRN Raymond Ball MD           Physical Examination:      Vitals:  BP (!) 102/56   Pulse (!) 108   Temp 99 °F (37.2 °C) (Oral)   Resp 17   Ht 5' 5\" (1.651 m)   Wt 140 lb (63.5 kg)   LMP  (LMP Unknown) Comment: tubal ligation  SpO2 96%   BMI 23.30 kg/m²   Temp (24hrs), Av.6 °F (37 °C), Min:98.1 °F (36.7 °C), Max:99 °F (37.2 °C)      General appearance: alert, cooperative and no distress. Scleral icterus present  Mental Status: oriented to person, place and time and normal affect  Lungs: clear to auscultation bilaterally, normal effort  Heart: regular rate and rhythm, no murmur  Abdomen: TTP primarily in right upper quadrant. Otherwise abdomen is soft. Bowel sounds are present. No rebound tenderness or guarding. Extremities: no edema, redness, tenderness in the calves.  Cap refill <2s    Data:     Labs:  Recent Labs     09/06/22  0527 09/07/22  0542   WBC 5.3 5.8   HGB 8.7* 9.2*   PLT 52* 51*     Recent Labs     09/07/22  0542 09/08/22  0855   * 130*   K 5.1* 4.3   CL 97 95   CO2 29 25   BUN 5* 6   CREATININE 1.14* 1.48*   GLUCOSE 81 65*     Recent Labs     09/07/22  0542 09/08/22  0855   * 251*   ALT 64* 59*   BILITOT 10.4* 11.0*   ALKPHOS 426* 416*       Assessment and Plan:        1. Acute alcoholic hepatitis and pancreatitis: Gradually improving  -Supportive care with IVF, pain control  -Continue clears  -GI following-EGD 9/8    2. Severe alcohol use disorder with risk for acute alcohol withdrawal  -Supplement benzodiazepine and monitor on CIWA scale  -Adjunctive gabapentin and clonidine  -Thiamine, multivitamin    3. Hypokalemia, hypomagnesemia, hypophosphatemia: Repleted    4.   Mild WILLIE: Resume dextrose containing IVF today     Tobacco use disorder  Thrombocytopenia    Diet: Diet NPO Exceptions are: Sips of Water with Meds  Ppx: SCDs  Full Code    >35 minutes in total care time    Electronically signed by Duy Diamond DO on 9/8/2022 at 3:29 PM

## 2022-09-08 NOTE — FLOWSHEET NOTE
Assessment completed. VS obtained. Alert et oriented x4. No c/o nausea or vomiting. Denies pain. Pt up to bathroom with walker and standby assist, gait is unsteady. Electronically signed by Mica Varma RN on 9/8/2022 at 6:45 PM

## 2022-09-08 NOTE — ANESTHESIA PRE PROCEDURE
Department of Anesthesiology  Preprocedure Note       Name:  Taina Orta   Age:  43 y.o.  :  1985                                          MRN:  [id number]         Date:  2022      Surgeon: Rika Chan):  Pablito Burrows MD    Procedure: Procedure(s):  EGD BIOPSY    Medications prior to admission:   Prior to Admission medications    Medication Sig Start Date End Date Taking?  Authorizing Provider   mirtazapine (REMERON) 15 MG tablet Take 1 tablet by mouth nightly  Patient not taking: Reported on 2022   Jacoby Zazueta MD   Multiple Vitamin (MULTIVITAMIN) TABS tablet Take 1 tablet by mouth daily  Patient not taking: Reported on 2022   Jacoby Zazueta MD       Current medications:    Current Facility-Administered Medications   Medication Dose Route Frequency Provider Last Rate Last Admin    sodium chloride flush 0.9 % injection 5-40 mL  5-40 mL IntraVENous 2 times per day Pablito Burrows MD   10 mL at 22 1001    sodium chloride flush 0.9 % injection 5-40 mL  5-40 mL IntraVENous PRN Pablito Burrows MD        0.9 % sodium chloride infusion  25 mL IntraVENous PRN Pablito Burrows MD        HYDROcodone-acetaminophen [facility]) 5-325 MG per tablet 1 tablet  1 tablet Oral Q4H PRN Amymisbah Deist, DO   1 tablet at 22 2201    gabapentin (NEURONTIN) capsule 200 mg  200 mg Oral TID Amye Deist, DO   200 mg at 22 1594    glucose chewable tablet 16 g  4 tablet Oral PRN Mary Mariedo, DO        dextrose bolus 10% 125 mL  125 mL IntraVENous PRN Mary Mariedo, DO   Stopped at 22 0848    Or    dextrose bolus 10% 250 mL  250 mL IntraVENous PRN Mary Mariedo, DO        glucagon (rDNA) injection 1 mg  1 mg SubCUTAneous PRN Lyndy Albino, DO        dextrose 10 % infusion   IntraVENous Continuous PRN Mary Mariedo, DO        morphine (PF) injection 2 mg  2 mg IntraVENous Q4H PRN Amymisbah Deist, DO   2 mg at 22    albuterol (PROVENTIL) nebulizer solution 2.5 mg  2.5 mg Nebulization Q4H PRN Marnette Res, DO        multivitamin 1 tablet  1 tablet Oral Daily Marnette Res, DO   1 tablet at 09/08/22 1629    ondansetron (ZOFRAN-ODT) disintegrating tablet 4 mg  4 mg Oral Q8H PRN Vivien Primrose, MD   4 mg at 09/04/22 2044    Or    ondansetron (ZOFRAN) injection 4 mg  4 mg IntraVENous Q6H PRN Vivien Primrose, MD        polyethylene glycol (GLYCOLAX) packet 17 g  17 g Oral Daily PRN Vivien Primrose, MD        thiamine (B-1) injection 100 mg  100 mg IntraVENous Daily Vivien Primrose, MD   100 mg at 09/08/22 0833    nicotine (NICODERM CQ) 14 MG/24HR 1 patch  1 patch TransDERmal Daily Vivien Primrose, MD        LORazepam (ATIVAN) tablet 1 mg  1 mg Oral Q1H PRN Vivien Primrose, MD   1 mg at 09/07/22 0844    Or    LORazepam (ATIVAN) injection 1 mg  1 mg IntraVENous Q1H PRN Vivien Primrose, MD        Or    LORazepam (ATIVAN) tablet 2 mg  2 mg Oral Q1H PRN Vivien Primrose, MD   2 mg at 09/05/22 0443    Or    LORazepam (ATIVAN) injection 2 mg  2 mg IntraVENous Q1H PRN Vivien Primrose, MD   2 mg at 09/05/22 0715    Or    LORazepam (ATIVAN) tablet 3 mg  3 mg Oral Q1H PRN Vivien Primrose, MD        Or    LORazepam (ATIVAN) injection 3 mg  3 mg IntraVENous Q1H PRN Vivien Primrose, MD        Or    LORazepam (ATIVAN) tablet 4 mg  4 mg Oral Q1H PRN Vivien Primrose, MD        Or    LORazepam (ATIVAN) injection 4 mg  4 mg IntraVENous Q1H PRN Vivien Primrose, MD           Allergies:     Allergies   Allergen Reactions    Oxycodone-Acetaminophen Itching     Itching and nausea         Problem List:    Patient Active Problem List   Diagnosis Code    Major depressive disorder, severe ([facility] [address] H28.9    Alcoholic hepatitis with ascites K70.11    GI bleeding K92.2       Past Medical History:        Diagnosis Date    Alcohol abuse     Tobacco dependence        Past Surgical History:        Procedure Laterality Date    APPENDECTOMY      FOOT SURGERY      reports 6 sx on L foot and one on right foot    TUBAL LIGATION         Social History:    Social History     Tobacco Use    Smoking status: Every Day     Packs/day: 1.00     Types: Cigarettes    Smokeless tobacco: Never   Substance Use Topics    Alcohol use: Yes     Comment: 6/9/22: completed 30 day rehab 2 weeks ago, relapse one week ago; unable to quantify EtOH consumption                                Ready to quit: Not Answered  Counseling given: Not Answered      Vital Signs (Current):   Vitals:    09/07/22 2231 09/08/22 0713 09/08/22 0715 09/08/22 0840   BP:  (!) 85/43 (!) 89/38 (!) 102/56   Pulse:  (!) 107 (!) 108    Resp: 16 17     Temp:  37.2 °C (99 °F)     TempSrc:  Oral     SpO2:  96% 96%    Weight:       Height:                                                  BP Readings from Last 3 Encounters:   09/08/22 (!) 102/56   08/26/22 92/63   07/03/22 126/87       NPO Status:                                                                                 BMI:   Wt Readings from Last 3 Encounters:   09/04/22 140 lb (63.5 kg)   08/26/22 155 lb (70.3 kg)   07/03/22 180 lb (81.6 kg)     Body mass index is 23.3 kg/m².     CBC:   Lab Results   Component Value Date/Time    WBC 5.8 09/07/2022 05:42 AM    RBC 2.46 09/07/2022 05:42 AM    HGB 9.2 09/07/2022 05:42 AM    HCT 28.1 09/07/2022 05:42 AM    .1 09/07/2022 05:42 AM    RDW 14.7 09/07/2022 05:42 AM    PLT 51 09/07/2022 05:42 AM       CMP:   Lab Results   Component Value Date/Time     09/08/2022 08:55 AM    K 4.3 09/08/2022 08:55 AM    CL 95 09/08/2022 08:55 AM    CO2 25 09/08/2022 08:55 AM    BUN 6 09/08/2022 08:55 AM    CREATININE 1.48 09/08/2022 08:55 AM    GFRAA 47.6 09/08/2022 08:55 AM    LABGLOM 39.3 09/08/2022 08:55 AM    GLUCOSE 65 09/08/2022 08:55 AM    PROT 5.2 09/08/2022 08:55 AM    CALCIUM 6.5 09/08/2022 08:55 AM    BILITOT 11.0 09/08/2022 08:55 AM    ALKPHOS 416 09/08/2022 08:55 AM     09/08/2022 08:55 AM    ALT 59 09/08/2022 08:55 AM       POC Tests:   Recent Labs 09/08/22  1113   POCGLU 90       Coags:   Lab Results   Component Value Date/Time    PROTIME 17.4 09/05/2022 05:00 AM    INR 1.4 09/05/2022 05:00 AM       HCG (If Applicable):   Lab Results   Component Value Date    PREGTESTUR Negative 08/26/2022        ABGs: No results found for: PHART, PO2ART, YLR9QNF, YMU8KEZ, BEART, L3FTXBUY     Type & Screen (If Applicable):  No results found for: LABABO, LABRH    Drug/Infectious Status (If Applicable):  Lab Results   Component Value Date/Time    HEPCAB NONREACTIVE 09/05/2022 05:00 AM       COVID-19 Screening (If Applicable):   Lab Results   Component Value Date/Time    COVID19 Not Detected 06/08/2022 01:35 PM           Anesthesia Evaluation  Patient summary reviewed and Nursing notes reviewed  Airway: Mallampati: II  TM distance: >3 FB   Neck ROM: full  Mouth opening: > = 3 FB   Dental: normal exam         Pulmonary:                              Cardiovascular:                      Neuro/Psych:               GI/Hepatic/Renal:             Endo/Other:                     Abdominal:             Vascular: Other Findings:           Anesthesia Plan      MAC     ASA 3             Anesthetic plan and risks discussed with patient. Use of blood products discussed with patient whom consented to blood products.                      SANTA Andrade - CAMILO   9/8/2022

## 2022-09-09 ENCOUNTER — ANESTHESIA (OUTPATIENT)
Dept: ENDOSCOPY | Age: 37
DRG: 280 | End: 2022-09-09
Payer: COMMERCIAL

## 2022-09-09 ENCOUNTER — ANESTHESIA EVENT (OUTPATIENT)
Dept: ENDOSCOPY | Age: 37
DRG: 280 | End: 2022-09-09
Payer: COMMERCIAL

## 2022-09-09 LAB
ALBUMIN SERPL-MCNC: 1.7 G/DL (ref 3.5–4.6)
ALP BLD-CCNC: 451 U/L (ref 40–130)
ALT SERPL-CCNC: 56 U/L (ref 0–33)
ANION GAP SERPL CALCULATED.3IONS-SCNC: 10 MEQ/L (ref 9–15)
ANISOCYTOSIS: ABNORMAL
AST SERPL-CCNC: 243 U/L (ref 0–35)
BASOPHILS ABSOLUTE: 0 K/UL (ref 0–0.2)
BASOPHILS RELATIVE PERCENT: 1 %
BILIRUB SERPL-MCNC: 11 MG/DL (ref 0.2–0.7)
BILIRUBIN DIRECT: 8.7 MG/DL (ref 0–0.4)
BILIRUBIN, INDIRECT: 2.3 MG/DL (ref 0–0.6)
BUN BLDV-MCNC: 7 MG/DL (ref 6–20)
CALCIUM SERPL-MCNC: 6.6 MG/DL (ref 8.5–9.9)
CHLORIDE BLD-SCNC: 101 MEQ/L (ref 95–107)
CO2: 24 MEQ/L (ref 20–31)
CREAT SERPL-MCNC: 1.28 MG/DL (ref 0.5–0.9)
EOSINOPHILS ABSOLUTE: 0.1 K/UL (ref 0–0.7)
EOSINOPHILS RELATIVE PERCENT: 1 %
GFR AFRICAN AMERICAN: 56.2
GFR NON-AFRICAN AMERICAN: 46.5
GLUCOSE BLD-MCNC: 102 MG/DL (ref 70–99)
GLUCOSE BLD-MCNC: 112 MG/DL (ref 70–99)
GLUCOSE BLD-MCNC: 113 MG/DL (ref 70–99)
GLUCOSE BLD-MCNC: 118 MG/DL (ref 70–99)
HCT VFR BLD CALC: 32.2 % (ref 37–47)
HEMOGLOBIN: 10.2 G/DL (ref 12–16)
HYPOCHROMIA: ABNORMAL
INR BLD: 1.5
LYMPHOCYTES ABSOLUTE: 0.5 K/UL (ref 1–4.8)
LYMPHOCYTES RELATIVE PERCENT: 7 %
MACROCYTES: ABNORMAL
MCH RBC QN AUTO: 37.3 PG (ref 27–31.3)
MCHC RBC AUTO-ENTMCNC: 31.7 % (ref 33–37)
MCV RBC AUTO: 117.7 FL (ref 82–100)
METAMYELOCYTES RELATIVE PERCENT: 1 %
MONOCYTES ABSOLUTE: 0.5 K/UL (ref 0.2–0.8)
MONOCYTES RELATIVE PERCENT: 6.7 %
MYELOCYTE PERCENT: 1 %
NEUTROPHILS ABSOLUTE: 6.4 K/UL (ref 1.4–6.5)
NEUTROPHILS RELATIVE PERCENT: 84 %
PDW BLD-RTO: 15.1 % (ref 11.5–14.5)
PERFORMED ON: ABNORMAL
PHOSPHORUS: 2 MG/DL (ref 2.3–4.8)
PLATELET # BLD: 81 K/UL (ref 130–400)
PLATELET SLIDE REVIEW: ABNORMAL
POLYCHROMASIA: ABNORMAL
POTASSIUM REFLEX MAGNESIUM: 3.8 MEQ/L (ref 3.4–4.9)
PROTHROMBIN TIME: 18.6 SEC (ref 12.3–14.9)
RBC # BLD: 2.73 M/UL (ref 4.2–5.4)
SARS-COV-2, NAAT: NOT DETECTED
SODIUM BLD-SCNC: 135 MEQ/L (ref 135–144)
TARGET CELLS: ABNORMAL
TOTAL PROTEIN: 5.4 G/DL (ref 6.3–8)
WBC # BLD: 7.4 K/UL (ref 4.8–10.8)

## 2022-09-09 PROCEDURE — 6370000000 HC RX 637 (ALT 250 FOR IP): Performed by: INTERNAL MEDICINE

## 2022-09-09 PROCEDURE — 3609017100 HC EGD: Performed by: SPECIALIST

## 2022-09-09 PROCEDURE — 85025 COMPLETE CBC W/AUTO DIFF WBC: CPT

## 2022-09-09 PROCEDURE — 6370000000 HC RX 637 (ALT 250 FOR IP): Performed by: SPECIALIST

## 2022-09-09 PROCEDURE — 2580000003 HC RX 258: Performed by: NURSE ANESTHETIST, CERTIFIED REGISTERED

## 2022-09-09 PROCEDURE — 87186 SC STD MICRODIL/AGAR DIL: CPT

## 2022-09-09 PROCEDURE — 0DJ08ZZ INSPECTION OF UPPER INTESTINAL TRACT, VIA NATURAL OR ARTIFICIAL OPENING ENDOSCOPIC: ICD-10-PCS | Performed by: SPECIALIST

## 2022-09-09 PROCEDURE — 2580000003 HC RX 258: Performed by: INTERNAL MEDICINE

## 2022-09-09 PROCEDURE — 84100 ASSAY OF PHOSPHORUS: CPT

## 2022-09-09 PROCEDURE — 3700000000 HC ANESTHESIA ATTENDED CARE: Performed by: SPECIALIST

## 2022-09-09 PROCEDURE — 7100000010 HC PHASE II RECOVERY - FIRST 15 MIN: Performed by: SPECIALIST

## 2022-09-09 PROCEDURE — 2500000003 HC RX 250 WO HCPCS: Performed by: NURSE ANESTHETIST, CERTIFIED REGISTERED

## 2022-09-09 PROCEDURE — 85610 PROTHROMBIN TIME: CPT

## 2022-09-09 PROCEDURE — 2580000003 HC RX 258: Performed by: SPECIALIST

## 2022-09-09 PROCEDURE — 87088 URINE BACTERIA CULTURE: CPT

## 2022-09-09 PROCEDURE — 6360000002 HC RX W HCPCS: Performed by: INTERNAL MEDICINE

## 2022-09-09 PROCEDURE — 6360000002 HC RX W HCPCS: Performed by: NURSE ANESTHETIST, CERTIFIED REGISTERED

## 2022-09-09 PROCEDURE — 80048 BASIC METABOLIC PNL TOTAL CA: CPT

## 2022-09-09 PROCEDURE — 87086 URINE CULTURE/COLONY COUNT: CPT

## 2022-09-09 PROCEDURE — 80076 HEPATIC FUNCTION PANEL: CPT

## 2022-09-09 PROCEDURE — 2500000003 HC RX 250 WO HCPCS: Performed by: INTERNAL MEDICINE

## 2022-09-09 PROCEDURE — 43235 EGD DIAGNOSTIC BRUSH WASH: CPT | Performed by: SPECIALIST

## 2022-09-09 PROCEDURE — 2709999900 HC NON-CHARGEABLE SUPPLY: Performed by: SPECIALIST

## 2022-09-09 PROCEDURE — 87635 SARS-COV-2 COVID-19 AMP PRB: CPT

## 2022-09-09 PROCEDURE — 1210000000 HC MED SURG R&B

## 2022-09-09 PROCEDURE — 36415 COLL VENOUS BLD VENIPUNCTURE: CPT

## 2022-09-09 PROCEDURE — 7100000011 HC PHASE II RECOVERY - ADDTL 15 MIN: Performed by: SPECIALIST

## 2022-09-09 RX ORDER — MAGNESIUM HYDROXIDE 1200 MG/15ML
LIQUID ORAL PRN
Status: DISCONTINUED | OUTPATIENT
Start: 2022-09-09 | End: 2022-09-09 | Stop reason: ALTCHOICE

## 2022-09-09 RX ORDER — SIMETHICONE 20 MG/.3ML
EMULSION ORAL PRN
Status: DISCONTINUED | OUTPATIENT
Start: 2022-09-09 | End: 2022-09-09 | Stop reason: ALTCHOICE

## 2022-09-09 RX ORDER — SODIUM CHLORIDE 9 MG/ML
INJECTION, SOLUTION INTRAVENOUS PRN
Status: CANCELLED | OUTPATIENT
Start: 2022-09-09

## 2022-09-09 RX ORDER — SODIUM CHLORIDE 0.9 % (FLUSH) 0.9 %
5-40 SYRINGE (ML) INJECTION EVERY 12 HOURS SCHEDULED
Status: CANCELLED | OUTPATIENT
Start: 2022-09-09

## 2022-09-09 RX ORDER — LIDOCAINE HYDROCHLORIDE 10 MG/ML
1 INJECTION, SOLUTION EPIDURAL; INFILTRATION; INTRACAUDAL; PERINEURAL
Status: CANCELLED | OUTPATIENT
Start: 2022-09-09 | End: 2022-09-09

## 2022-09-09 RX ORDER — SODIUM CHLORIDE 9 MG/ML
INJECTION, SOLUTION INTRAVENOUS CONTINUOUS PRN
Status: DISCONTINUED | OUTPATIENT
Start: 2022-09-09 | End: 2022-09-09 | Stop reason: SDUPTHER

## 2022-09-09 RX ORDER — SODIUM CHLORIDE 9 MG/ML
INJECTION, SOLUTION INTRAVENOUS CONTINUOUS
Status: DISCONTINUED | OUTPATIENT
Start: 2022-09-09 | End: 2022-09-11

## 2022-09-09 RX ORDER — PROPOFOL 10 MG/ML
INJECTION, EMULSION INTRAVENOUS PRN
Status: DISCONTINUED | OUTPATIENT
Start: 2022-09-09 | End: 2022-09-09 | Stop reason: SDUPTHER

## 2022-09-09 RX ORDER — SODIUM CHLORIDE 0.9 % (FLUSH) 0.9 %
5-40 SYRINGE (ML) INJECTION PRN
Status: CANCELLED | OUTPATIENT
Start: 2022-09-09

## 2022-09-09 RX ORDER — LIDOCAINE HYDROCHLORIDE 20 MG/ML
INJECTION, SOLUTION INFILTRATION; PERINEURAL PRN
Status: DISCONTINUED | OUTPATIENT
Start: 2022-09-09 | End: 2022-09-09 | Stop reason: SDUPTHER

## 2022-09-09 RX ORDER — ONDANSETRON 2 MG/ML
4 INJECTION INTRAMUSCULAR; INTRAVENOUS
Status: CANCELLED | OUTPATIENT
Start: 2022-09-09 | End: 2022-09-09

## 2022-09-09 RX ORDER — PANTOPRAZOLE SODIUM 40 MG/1
40 TABLET, DELAYED RELEASE ORAL
Status: DISCONTINUED | OUTPATIENT
Start: 2022-09-10 | End: 2022-09-15 | Stop reason: HOSPADM

## 2022-09-09 RX ADMIN — GABAPENTIN 200 MG: 100 CAPSULE ORAL at 08:38

## 2022-09-09 RX ADMIN — POTASSIUM PHOSPHATE, MONOBASIC POTASSIUM PHOSPHATE, DIBASIC 15 MMOL: 224; 236 INJECTION, SOLUTION, CONCENTRATE INTRAVENOUS at 10:01

## 2022-09-09 RX ADMIN — GABAPENTIN 200 MG: 100 CAPSULE ORAL at 20:56

## 2022-09-09 RX ADMIN — Medication 10 ML: at 20:57

## 2022-09-09 RX ADMIN — THIAMINE HYDROCHLORIDE 100 MG: 100 INJECTION, SOLUTION INTRAMUSCULAR; INTRAVENOUS at 08:37

## 2022-09-09 RX ADMIN — DEXTROSE AND SODIUM CHLORIDE: 5; 900 INJECTION, SOLUTION INTRAVENOUS at 03:21

## 2022-09-09 RX ADMIN — DEXTROSE AND SODIUM CHLORIDE: 5; 900 INJECTION, SOLUTION INTRAVENOUS at 15:08

## 2022-09-09 RX ADMIN — PROPOFOL 160 MG: 10 INJECTION, EMULSION INTRAVENOUS at 13:06

## 2022-09-09 RX ADMIN — THERA TABS 1 TABLET: TAB at 08:38

## 2022-09-09 RX ADMIN — GABAPENTIN 200 MG: 100 CAPSULE ORAL at 15:08

## 2022-09-09 RX ADMIN — LIDOCAINE HYDROCHLORIDE 60 MG: 20 INJECTION, SOLUTION INFILTRATION; PERINEURAL at 13:06

## 2022-09-09 RX ADMIN — HYDROCODONE BITARTRATE AND ACETAMINOPHEN 1 TABLET: 5; 325 TABLET ORAL at 08:37

## 2022-09-09 RX ADMIN — SODIUM CHLORIDE: 9 INJECTION, SOLUTION INTRAVENOUS at 13:01

## 2022-09-09 RX ADMIN — Medication 10 ML: at 08:38

## 2022-09-09 ASSESSMENT — PAIN SCALES - GENERAL
PAINLEVEL_OUTOF10: 8
PAINLEVEL_OUTOF10: 0

## 2022-09-09 ASSESSMENT — LIFESTYLE VARIABLES: SMOKING_STATUS: 1

## 2022-09-09 ASSESSMENT — PAIN - FUNCTIONAL ASSESSMENT: PAIN_FUNCTIONAL_ASSESSMENT: 0-10

## 2022-09-09 NOTE — ANESTHESIA POSTPROCEDURE EVALUATION
Department of Anesthesiology  Postprocedure Note    Patient: Goldy Greene  MRN: [id number]  YOB: 1985  Date of evaluation: 9/9/2022      Procedure Summary     Date: 09/09/22 Room / Location: [facility]  OR 01 / [facility]     Anesthesia Start: 1301 Anesthesia Stop: 7790    Procedure: EGD DIAGNOSTIC ONLY Diagnosis:       Dark stools      (Dark stools [R19.5])    Surgeons: Asa Ormond, MD Responsible Provider: SANTA Colon CRNA    Anesthesia Type: MAC ASA Status: 3          Anesthesia Type: No value filed.     Josi Phase I:      Josi Phase II:        Anesthesia Post Evaluation    Patient location during evaluation: PACU  Patient participation: complete - patient participated  Level of consciousness: awake  Pain score: 0  Airway patency: patent  Nausea & Vomiting: no nausea and no vomiting  Complications: no  Cardiovascular status: hemodynamically stable  Respiratory status: acceptable  Hydration status: euvolemic

## 2022-09-09 NOTE — PROGRESS NOTES
Shift assessment complete. VSS. Pt is AxOx4. Lungs clear. Abd is soft, bowel sounds hypoactive. Meds given per mar. Norco given for 9/10 pain. Pt up to the sick and washed up. Call light within reach. Will continue to monitor.      Electronically signed by Kristine Villegas RN on 9/9/2022 at 9:05 AM

## 2022-09-09 NOTE — ANESTHESIA PRE PROCEDURE
Department of Anesthesiology  Preprocedure Note       Name:  Kimberly Martinez   Age:  43 y.o.  :  1985                                          MRN:  [id number]         Date:  2022      Surgeon: Ana Chaudhary):  Sofía Guillory MD    Procedure: Procedure(s):  EGD DIAGNOSTIC ONLY    Medications prior to admission:   Prior to Admission medications    Medication Sig Start Date End Date Taking?  Authorizing Provider   mirtazapine (REMERON) 15 MG tablet Take 1 tablet by mouth nightly  Patient not taking: Reported on 2022   Catina Arora MD   Multiple Vitamin (MULTIVITAMIN) TABS tablet Take 1 tablet by mouth daily  Patient not taking: Reported on 2022   Catina Arora MD       Current medications:    Current Facility-Administered Medications   Medication Dose Route Frequency Provider Last Rate Last Admin    0.9 % sodium chloride infusion   IntraVENous Continuous Sofía Guillory MD        sodium chloride flush 0.9 % injection 5-40 mL  5-40 mL IntraVENous 2 times per day Sofía Guillory MD   10 mL at 22 0838    sodium chloride flush 0.9 % injection 5-40 mL  5-40 mL IntraVENous PRN Sofía Guillory MD        0.9 % sodium chloride infusion  25 mL IntraVENous PRN Sofía Guillory MD        dextrose 5 % and 0.9 % sodium chloride infusion   IntraVENous Continuous Queenie Levo,  mL/hr at 22 0321 New Bag at 22 0321    HYDROcodone-acetaminophen (NORCO) 5-325 MG per tablet 1 tablet  1 tablet Oral Q4H PRN Queenie Levo, DO   1 tablet at 22 0837    gabapentin (NEURONTIN) capsule 200 mg  200 mg Oral TID Queenie Levo, DO   200 mg at 22 0838    glucose chewable tablet 16 g  4 tablet Oral PRN [facility] DO        dextrose bolus 10% 125 mL  125 mL IntraVENous PRN [facility] DO   Stopped at 22 0848    Or    dextrose bolus 10% 250 mL  250 mL IntraVENous PRN [facility] DO        glucagon (rDNA) injection 1 mg  1 mg SubCUTAneous PRN Derril Peru AM     09/09/2022 05:42 AM    CO2 24 09/09/2022 05:42 AM    BUN 7 09/09/2022 05:42 AM    CREATININE 1.28 09/09/2022 05:42 AM    GFRAA 56.2 09/09/2022 05:42 AM    LABGLOM 46.5 09/09/2022 05:42 AM    GLUCOSE 112 09/09/2022 05:42 AM    PROT 5.4 09/09/2022 05:42 AM    CALCIUM 6.6 09/09/2022 05:42 AM    BILITOT 11.0 09/09/2022 05:42 AM    ALKPHOS 451 09/09/2022 05:42 AM     09/09/2022 05:42 AM    ALT 56 09/09/2022 05:42 AM       POC Tests:   Recent Labs     09/09/22  1105   POCGLU 102*       Coags:   Lab Results   Component Value Date/Time    PROTIME 17.4 09/05/2022 05:00 AM    INR 1.4 09/05/2022 05:00 AM       HCG (If Applicable):   Lab Results   Component Value Date    PREGTESTUR Negative 08/26/2022        ABGs: No results found for: PHART, PO2ART, XTX6MMZ, ABR4UUQ, BEART, C1SYGJTY     Type & Screen (If Applicable):  No results found for: LABABO, LABRH    Drug/Infectious Status (If Applicable):  Lab Results   Component Value Date/Time    HEPCAB NONREACTIVE 09/05/2022 05:00 AM       COVID-19 Screening (If Applicable):   Lab Results   Component Value Date/Time    COVID19 Not Detected 06/08/2022 01:35 PM           Anesthesia Evaluation  Patient summary reviewed and Nursing notes reviewed no history of anesthetic complications:   Airway: Mallampati: II  TM distance: >3 FB   Neck ROM: full  Mouth opening: > = 3 FB   Dental:    (+) poor dentition      Pulmonary:   (+) current smoker                           Cardiovascular:          ECG reviewed                        Neuro/Psych:   (+) depression/anxiety  (dep)            GI/Hepatic/Renal:   (+) hepatitis:,          ROS comment: Marijuana use    Acute etoh withdrawal, last drink 4 days ago per pt. Endo/Other:                     Abdominal:             Vascular: Other Findings:           Anesthesia Plan      MAC     ASA 3       Induction: intravenous. Anesthetic plan and risks discussed with patient.                         Burak Reveal, APRN - CRNA   9/9/2022

## 2022-09-09 NOTE — PROGRESS NOTES
Physician Progress Note    9/9/2022   4:26 PM    Name:  Pati Colon  MRN:    [id number]     IP Day: 5     Admit Date: 9/4/2022  1:47 PM  PCP: Ángel Fritz MD    Code Status:  Full Code    Subjective:     Tired after endoscopy but requesting to eat. No nausea at the moment and abd pain has currently subsided.      Current Facility-Administered Medications   Medication Dose Route Frequency Provider Last Rate Last Admin    0.9 % sodium chloride infusion   IntraVENous Continuous Clifford Brothers MD        [START ON 9/10/2022] pantoprazole (PROTONIX) tablet 40 mg  40 mg Oral QAM AC Clifford Brothers MD        sodium chloride flush 0.9 % injection 5-40 mL  5-40 mL IntraVENous 2 times per day Clifford Brothers MD   10 mL at 09/09/22 0838    sodium chloride flush 0.9 % injection 5-40 mL  5-40 mL IntraVENous PRN Clifford Brothers MD        0.9 % sodium chloride infusion  25 mL IntraVENous PRN Clifford Brothers MD        dextrose 5 % and 0.9 % sodium chloride infusion   IntraVENous Continuous Marnette Res,  mL/hr at 09/09/22 1508 New Bag at 09/09/22 1508    HYDROcodone-acetaminophen (NORCO) 5-325 MG per tablet 1 tablet  1 tablet Oral Q4H PRN Marnette Res, DO   1 tablet at 09/09/22 0837    gabapentin (NEURONTIN) capsule 200 mg  200 mg Oral TID Marnette Res, DO   200 mg at 09/09/22 1508    glucose chewable tablet 16 g  4 tablet Oral PRN [facility] DO        dextrose bolus 10% 125 mL  125 mL IntraVENous PRN [facility] DO   Stopped at 09/05/22 0848    Or    dextrose bolus 10% 250 mL  250 mL IntraVENous PRN [facility] DO        glucagon (rDNA) injection 1 mg  1 mg SubCUTAneous PRN [facility]        dextrose 10 % infusion   IntraVENous Continuous PRN [facility] DO        morphine (PF) injection 2 mg  2 mg IntraVENous Q4H PRN Marnette Res, DO   2 mg at 09/06/22 2028    albuterol (PROVENTIL) nebulizer solution 2.5 mg  2.5 mg Nebulization Q4H PRN Radha Res, DO        multivitamin 1 tablet  1 tablet Oral Daily Duy Ou, DO   1 tablet at 22 0838    ondansetron (ZOFRAN-ODT) disintegrating tablet 4 mg  4 mg Oral Q8H PRN Josey Arora MD   4 mg at 22 2044    Or    ondansetron (ZOFRAN) injection 4 mg  4 mg IntraVENous Q6H PRN Josey Arora MD        polyethylene glycol (GLYCOLAX) packet 17 g  17 g Oral Daily PRN Josey Arora MD        thiamine (B-1) injection 100 mg  100 mg IntraVENous Daily Josey Arora MD   100 mg at 22 0837    nicotine (NICODERM CQ) 14 MG/24HR 1 patch  1 patch TransDERmal Daily Josey Arora MD        LORazepam (ATIVAN) tablet 1 mg  1 mg Oral Q1H PRN Josey Arora MD   1 mg at 22 0844    Or    LORazepam (ATIVAN) injection 1 mg  1 mg IntraVENous Q1H PRN Josey Arora MD        Or    LORazepam (ATIVAN) tablet 2 mg  2 mg Oral Q1H PRN Josey Arora MD   2 mg at 22 0443    Or    LORazepam (ATIVAN) injection 2 mg  2 mg IntraVENous Q1H PRN Josey Arora MD   2 mg at 22 5199    Or    LORazepam (ATIVAN) tablet 3 mg  3 mg Oral Q1H PRN Josey Arora MD        Or    LORazepam (ATIVAN) injection 3 mg  3 mg IntraVENous Q1H PRN Josey Arora MD        Or    LORazepam (ATIVAN) tablet 4 mg  4 mg Oral Q1H PRN Josey Arora MD        Or    LORazepam (ATIVAN) injection 4 mg  4 mg IntraVENous Q1H PRN Josey Arora MD           Physical Examination:      Vitals:  BP (!) 94/54   Pulse 94   Temp 98 °F (36.7 °C) (Temporal)   Resp 16   Ht 5' 5\" (1.651 m)   Wt 140 lb (63.5 kg)   LMP  (LMP Unknown) Comment: tubal ligation  SpO2 96%   BMI 23.30 kg/m²   Temp (24hrs), Av.1 °F (37.3 °C), Min:98 °F (36.7 °C), Max:100.2 °F (37.9 °C)      General appearance: alert, cooperative and no distress. Scleral icterus present  Mental Status: oriented to person, place and time and normal affect  Lungs: clear to auscultation bilaterally, normal effort  Heart: regular rate and rhythm, no murmur  Abdomen: TTP primarily in right upper quadrant.   Otherwise abdomen is soft.  Bowel sounds are present. No rebound tenderness or guarding. Extremities: no edema, redness, tenderness in the calves. Cap refill <2s    Data:     Labs:  Recent Labs     09/07/22  0542 09/09/22  0542   WBC 5.8 7.4   HGB 9.2* 10.2*   PLT 51* 81*     Recent Labs     09/08/22  0855 09/09/22  0542   * 135   K 4.3 3.8   CL 95 101   CO2 25 24   BUN 6 7   CREATININE 1.48* 1.28*   GLUCOSE 65* 112*     Recent Labs     09/08/22  0855 09/09/22  0542   * 243*   ALT 59* 56*   BILITOT 11.0* 11.0*   ALKPHOS 416* 451*       Assessment and Plan:        1. Acute alcoholic hepatitis and pancreatitis: Gradually improving  -Supportive care with IVF, pain control  -trial regular diet  -GI following-EGD 9/9  -noted low grade temperature- prior Bcx negative. Will check UA and Covid  -PT/OT    2. Severe alcohol use disorder with risk for acute alcohol withdrawal  -Supplement benzodiazepine and monitor on CIWA scale  -Adjunctive gabapentin   -Thiamine, multivitamin    3. Hypokalemia, hypomagnesemia, hypophosphatemia: Repleted    4. Mild WILLIE: Resume dextrose containing IVF today     Tobacco use disorder  Thrombocytopenia    Diet: ADULT DIET; Regular;  Low Sodium (2 gm)  Ppx: SCDs  Full Code    >35 minutes in total care time    Electronically signed by Lucy Aparicio DO on 9/9/2022 at 4:26 PM

## 2022-09-09 NOTE — PROGRESS NOTES
Serum bilirubin is 11 and INR is 1.5. Madrey's discriminant score is 28.0 , Dr. Justice Cardenas will follow the patient starting this evening.

## 2022-09-09 NOTE — CARE COORDINATION
This LSW met with patient at bedside this am. Patient and I discussed discharge plans. Patient states that she will return home when medically cleared. Patient again stated that she has not called \" [facility]\". Patient scheduled for EGD today, 9/9/2022. LSW / FABIAN to follow.

## 2022-09-10 LAB
ALBUMIN SERPL-MCNC: 1.4 G/DL (ref 3.5–4.6)
ALP BLD-CCNC: 348 U/L (ref 40–130)
ALT SERPL-CCNC: 43 U/L (ref 0–33)
ANION GAP SERPL CALCULATED.3IONS-SCNC: 8 MEQ/L (ref 9–15)
ANISOCYTOSIS: ABNORMAL
AST SERPL-CCNC: 187 U/L (ref 0–35)
BACTERIA: ABNORMAL /HPF
BASOPHILS ABSOLUTE: 0 K/UL (ref 0–0.2)
BASOPHILS RELATIVE PERCENT: 0.4 %
BILIRUB SERPL-MCNC: 8.9 MG/DL (ref 0.2–0.7)
BILIRUBIN DIRECT: 7.2 MG/DL (ref 0–0.4)
BILIRUBIN URINE: ABNORMAL
BILIRUBIN, INDIRECT: 1.7 MG/DL (ref 0–0.6)
BLOOD, URINE: NEGATIVE
BUN BLDV-MCNC: 7 MG/DL (ref 6–20)
CALCIUM SERPL-MCNC: 6.5 MG/DL (ref 8.5–9.9)
CHLORIDE BLD-SCNC: 105 MEQ/L (ref 95–107)
CLARITY: ABNORMAL
CO2: 24 MEQ/L (ref 20–31)
COLOR: ABNORMAL
CREAT SERPL-MCNC: 1.03 MG/DL (ref 0.5–0.9)
EOSINOPHILS ABSOLUTE: 0.1 K/UL (ref 0–0.7)
EOSINOPHILS RELATIVE PERCENT: 1.2 %
EPITHELIAL CELLS, UA: ABNORMAL /HPF (ref 0–5)
GFR AFRICAN AMERICAN: >60
GFR NON-AFRICAN AMERICAN: 59.7
GLUCOSE BLD-MCNC: 104 MG/DL (ref 70–99)
GLUCOSE BLD-MCNC: 118 MG/DL (ref 70–99)
GLUCOSE BLD-MCNC: 133 MG/DL (ref 70–99)
GLUCOSE BLD-MCNC: 98 MG/DL (ref 70–99)
GLUCOSE URINE: NEGATIVE MG/DL
HCT VFR BLD CALC: 28.2 % (ref 37–47)
HEMOGLOBIN: 9.3 G/DL (ref 12–16)
HYALINE CASTS: ABNORMAL /HPF (ref 0–5)
HYPOCHROMIA: ABNORMAL
KETONES, URINE: NEGATIVE MG/DL
LEUKOCYTE ESTERASE, URINE: ABNORMAL
LYMPHOCYTES ABSOLUTE: 1.4 K/UL (ref 1–4.8)
LYMPHOCYTES RELATIVE PERCENT: 27 %
MACROCYTES: ABNORMAL
MAGNESIUM: 2 MG/DL (ref 1.7–2.4)
MCH RBC QN AUTO: 37.6 PG (ref 27–31.3)
MCHC RBC AUTO-ENTMCNC: 33 % (ref 33–37)
MCV RBC AUTO: 113.8 FL (ref 82–100)
MONOCYTES ABSOLUTE: 0.8 K/UL (ref 0.2–0.8)
MONOCYTES RELATIVE PERCENT: 15 %
NEUTROPHILS ABSOLUTE: 3 K/UL (ref 1.4–6.5)
NEUTROPHILS RELATIVE PERCENT: 56.4 %
NITRITE, URINE: POSITIVE
PDW BLD-RTO: 14.5 % (ref 11.5–14.5)
PERFORMED ON: ABNORMAL
PH UA: 5.5 (ref 5–9)
PHOSPHORUS: 2.2 MG/DL (ref 2.3–4.8)
PLATELET # BLD: 82 K/UL (ref 130–400)
PLATELET SLIDE REVIEW: ABNORMAL
POIKILOCYTES: ABNORMAL
POLYCHROMASIA: ABNORMAL
POTASSIUM REFLEX MAGNESIUM: 3.2 MEQ/L (ref 3.4–4.9)
PROTEIN UA: ABNORMAL MG/DL
RBC # BLD: 2.48 M/UL (ref 4.2–5.4)
RBC UA: ABNORMAL /HPF (ref 0–2)
SODIUM BLD-SCNC: 137 MEQ/L (ref 135–144)
SPECIFIC GRAVITY UA: 1.02 (ref 1–1.03)
STOMATOCYTES: ABNORMAL
TARGET CELLS: ABNORMAL
TOTAL PROTEIN: 4.5 G/DL (ref 6.3–8)
URINE REFLEX TO CULTURE: YES
UROBILINOGEN, URINE: 1 E.U./DL
WBC # BLD: 5.4 K/UL (ref 4.8–10.8)
WBC UA: ABNORMAL /HPF (ref 0–5)

## 2022-09-10 PROCEDURE — 80076 HEPATIC FUNCTION PANEL: CPT

## 2022-09-10 PROCEDURE — 84100 ASSAY OF PHOSPHORUS: CPT

## 2022-09-10 PROCEDURE — 97166 OT EVAL MOD COMPLEX 45 MIN: CPT

## 2022-09-10 PROCEDURE — 6360000002 HC RX W HCPCS: Performed by: INTERNAL MEDICINE

## 2022-09-10 PROCEDURE — 2500000003 HC RX 250 WO HCPCS: Performed by: INTERNAL MEDICINE

## 2022-09-10 PROCEDURE — 85025 COMPLETE CBC W/AUTO DIFF WBC: CPT

## 2022-09-10 PROCEDURE — 80048 BASIC METABOLIC PNL TOTAL CA: CPT

## 2022-09-10 PROCEDURE — 6370000000 HC RX 637 (ALT 250 FOR IP): Performed by: SPECIALIST

## 2022-09-10 PROCEDURE — 83735 ASSAY OF MAGNESIUM: CPT

## 2022-09-10 PROCEDURE — 2580000003 HC RX 258: Performed by: SPECIALIST

## 2022-09-10 PROCEDURE — 81001 URINALYSIS AUTO W/SCOPE: CPT

## 2022-09-10 PROCEDURE — 2580000003 HC RX 258: Performed by: INTERNAL MEDICINE

## 2022-09-10 PROCEDURE — 6370000000 HC RX 637 (ALT 250 FOR IP): Performed by: INTERNAL MEDICINE

## 2022-09-10 PROCEDURE — 99233 SBSQ HOSP IP/OBS HIGH 50: CPT | Performed by: NURSE PRACTITIONER

## 2022-09-10 PROCEDURE — 1210000000 HC MED SURG R&B

## 2022-09-10 PROCEDURE — 36415 COLL VENOUS BLD VENIPUNCTURE: CPT

## 2022-09-10 PROCEDURE — 97162 PT EVAL MOD COMPLEX 30 MIN: CPT

## 2022-09-10 RX ORDER — GABAPENTIN 100 MG/1
100 CAPSULE ORAL 3 TIMES DAILY
Status: COMPLETED | OUTPATIENT
Start: 2022-09-10 | End: 2022-09-12

## 2022-09-10 RX ORDER — POTASSIUM BICARBONATE 25 MEQ/1
50 TABLET, EFFERVESCENT ORAL ONCE
Status: COMPLETED | OUTPATIENT
Start: 2022-09-10 | End: 2022-09-10

## 2022-09-10 RX ADMIN — THERA TABS 1 TABLET: TAB at 08:14

## 2022-09-10 RX ADMIN — GABAPENTIN 200 MG: 100 CAPSULE ORAL at 13:19

## 2022-09-10 RX ADMIN — HYDROCODONE BITARTRATE AND ACETAMINOPHEN 1 TABLET: 5; 325 TABLET ORAL at 13:24

## 2022-09-10 RX ADMIN — Medication 10 ML: at 21:10

## 2022-09-10 RX ADMIN — Medication 10 ML: at 08:19

## 2022-09-10 RX ADMIN — POTASSIUM PHOSPHATE, MONOBASIC AND POTASSIUM PHOSPHATE, DIBASIC 20 MMOL: 224; 236 INJECTION, SOLUTION, CONCENTRATE INTRAVENOUS at 18:44

## 2022-09-10 RX ADMIN — PANTOPRAZOLE SODIUM 40 MG: 40 TABLET, DELAYED RELEASE ORAL at 08:14

## 2022-09-10 RX ADMIN — POTASSIUM BICARBONATE 50 MEQ: 978 TABLET, EFFERVESCENT ORAL at 18:44

## 2022-09-10 RX ADMIN — CEFTRIAXONE SODIUM 1000 MG: 1 INJECTION, POWDER, FOR SOLUTION INTRAMUSCULAR; INTRAVENOUS at 23:15

## 2022-09-10 RX ADMIN — GABAPENTIN 100 MG: 100 CAPSULE ORAL at 21:10

## 2022-09-10 RX ADMIN — DEXTROSE AND SODIUM CHLORIDE: 5; 900 INJECTION, SOLUTION INTRAVENOUS at 03:56

## 2022-09-10 RX ADMIN — GABAPENTIN 200 MG: 100 CAPSULE ORAL at 08:14

## 2022-09-10 RX ADMIN — HYDROCODONE BITARTRATE AND ACETAMINOPHEN 1 TABLET: 5; 325 TABLET ORAL at 08:13

## 2022-09-10 RX ADMIN — THIAMINE HYDROCHLORIDE 100 MG: 100 INJECTION, SOLUTION INTRAMUSCULAR; INTRAVENOUS at 08:12

## 2022-09-10 ASSESSMENT — PAIN - FUNCTIONAL ASSESSMENT
PAIN_FUNCTIONAL_ASSESSMENT: PREVENTS OR INTERFERES SOME ACTIVE ACTIVITIES AND ADLS
PAIN_FUNCTIONAL_ASSESSMENT: PREVENTS OR INTERFERES SOME ACTIVE ACTIVITIES AND ADLS

## 2022-09-10 ASSESSMENT — PAIN SCALES - GENERAL
PAINLEVEL_OUTOF10: 3
PAINLEVEL_OUTOF10: 8
PAINLEVEL_OUTOF10: 3
PAINLEVEL_OUTOF10: 8
PAINLEVEL_OUTOF10: 0

## 2022-09-10 ASSESSMENT — PAIN DESCRIPTION - ORIENTATION
ORIENTATION: LOWER
ORIENTATION: LOWER

## 2022-09-10 ASSESSMENT — PAIN DESCRIPTION - LOCATION
LOCATION: ABDOMEN
LOCATION: ABDOMEN

## 2022-09-10 ASSESSMENT — PAIN SCALES - WONG BAKER
WONGBAKER_NUMERICALRESPONSE: 2
WONGBAKER_NUMERICALRESPONSE: 2

## 2022-09-10 ASSESSMENT — PAIN DESCRIPTION - DESCRIPTORS
DESCRIPTORS: SORE;ACHING
DESCRIPTORS: SHARP

## 2022-09-10 NOTE — PROGRESS NOTES
Gastroenterology Progress Note    Noelle Romo is a 43 y.o. female patient. Hospitalization Day:6    Chief C/O: jaundice    SUBJECTIVE: Seen and examined, no abdominal pain, tolerating diet, no overt bleeding    ROS:  Gastrointestinal ROS: no abdominal pain, change in bowel habits, or black or bloody stools    Physical    VITALS:  BP (!) 97/58   Pulse (!) 106   Temp 98.6 °F (37 °C)   Resp 18   Ht 5' 5\" (1.651 m)   Wt 182 lb 3.2 oz (82.6 kg)   LMP  (LMP Unknown) Comment: tubal ligation  SpO2 98%   BMI 30.32 kg/m²   TEMPERATURE:  Current - Temp: 98.6 °F (37 °C); Max - Temp  Av.6 °F (37 °C)  Min: 98 °F (36.7 °C)  Max: 99.1 °F (37.3 °C)    General:  Alert and oriented,  No apparent distress  Skin- with jaundice  Eyes: icteric sclera  Cardiac: RRR, Nl s1s2, without murmurs  Lungs CTA Bilaterally, normal effort  Abdomen soft, distended, NT, no HSM, Bowel sounds normal  Ext: without edema  Neuro: no asterixis     Data    Data Review:    Recent Labs     09/09/22  0542 09/10/22  0452   WBC 7.4 5.4   HGB 10.2* 9.3*   HCT 32.2* 28.2*   .7* 113.8*   PLT 81* 82*     Recent Labs     09/08/22  0458 09/08/22  0855 09/09/22  0542 09/10/22  0452   NA  --  130* 135 137   K  --  4.3 3.8 3.2*   CL  --  95 101 105   CO2  --  25 24 24   PHOS 2.7  --  2.0*  --    BUN  --  6 7 7   CREATININE  --  1.48* 1.28* 1.03*     Recent Labs     09/08/22  0855 09/09/22  0542 09/10/22  0452   * 243* 187*   ALT 59* 56* 43*   BILIDIR  --  8.7* 7.2*   BILITOT 11.0* 11.0* 8.9*   ALKPHOS 416* 451* 348*     No results for input(s): LIPASE, AMYLASE in the last 72 hours. Recent Labs     22  1349   PROTIME 18.6*   INR 1.5           ASSESSMENT:  25-year-old female admitted with alcoholic hepatitis, with ? Underlying liver disease, clinically improved since arrival, on arrival  serum bilirubin is 8.8 and AST 34 ALT 90 alk phos is 470. Albumin is 2.3.   AST ALT ratio more than 2.,  And lipase is 118.,  CBC shows white count of 6.6 hemoglobin is 9.8 and 29.9 the MCV is 115.2, platelet count is 53, pro time 17.4 with INR of 1.4, viral acute hepatitis AB and C was negative. PLAN :  1- Alcoholic hepatitis VS Decompensated cirrhosis   - MELD 19, Maddrey Discriminant Factor < 32, No encephalopathy  - EtOH cessation stressed to pt. Rec EtOH abuse counseling as outpatient  -Monitor electrolytes and replete as needed  -noted low grade temp overnight, R/o infectious causes: UA with reflex culture pending , blood cultures negative   -Monitor LFTs and INR daily  - DF less than 32. No indication for prednisolone at this time   2- Mild Ascites by imaging:   Consider diagnostic paracentesis if etiology of fever unknown  3-  EGD for Variceal surveillance  LA grade A esophagitis and PHG  No active GI bleeding   4-  HCC screening:   Recent imaging Negative for liver mass  5-  No overt Hepatic encephalopathy     Thank you for allowing me to participate in the care of your patient. Please feel free to contact me with any concerns.     Yazmin Ervin, APRN - CNP

## 2022-09-10 NOTE — PROGRESS NOTES
Physician Progress Note    9/10/2022   5:23 PM    Name:  Ara Garcia  MRN:    [id number]      Day: 6     Admit Date: 9/4/2022  1:47 PM  PCP: Pratima King MD    Code Status:  Full Code    Subjective:     She continues to feel pretty tired and is unsteady on her feet. No nausea at the moment. Her abdominal discomfort has improved significantly.     Current Facility-Administered Medications   Medication Dose Route Frequency Provider Last Rate Last Admin    potassium phosphate 20 mmol in dextrose 5 % 250 mL IVPB  20 mmol IntraVENous Once Sigifredomauro Churchdar, DO        potassium bicarbonate (K-LYTE) disintegrating tablet 50 mEq  50 mEq Oral Once Camella Dings, DO        cefTRIAXone (ROCEPHIN) 1,000 mg in dextrose 5 % 50 mL IVPB mini-bag  1,000 mg IntraVENous Q24H Sigifredomauro Justin, DO        0.9 % sodium chloride infusion   IntraVENous Continuous Cortney Trent MD        pantoprazole (PROTONIX) tablet 40 mg  40 mg Oral QAM AC Cortney Trent MD   40 mg at 09/10/22 9172    sodium chloride flush 0.9 % injection 5-40 mL  5-40 mL IntraVENous 2 times per day Cortney Trent MD   10 mL at 09/10/22 0819    sodium chloride flush 0.9 % injection 5-40 mL  5-40 mL IntraVENous PRN Cortney Trent MD        0.9 % sodium chloride infusion  25 mL IntraVENous PRN Cortney Trent MD        dextrose 5 % and 0.9 % sodium chloride infusion   IntraVENous Continuous Louis Overton,  mL/hr at 09/10/22 0356 New Bag at 09/10/22 0356    HYDROcodone-acetaminophen (NORCO) 5-325 MG per tablet 1 tablet  1 tablet Oral Q4H PRN Louis Petersons, DO   1 tablet at 09/10/22 1324    gabapentin (NEURONTIN) capsule 200 mg  200 mg Oral TID Kathylla Nicholass, DO   200 mg at 09/10/22 1319    glucose chewable tablet 16 g  4 tablet Oral PRN [facility] DO        dextrose bolus 10% 125 mL  125 mL IntraVENous PRN [facility] DO   Stopped at 09/05/22 0848    Or    dextrose bolus 10% 250 mL  250 mL [facility]ous PRN [facility]        glucagon (rDNA) injection 1 mg  1 mg SubCUTAneous PRN Estrella Dilip, DO        dextrose 10 % infusion   IntraVENous Continuous PRN [place] Dilip, DO        morphine (PF) injection 2 mg  2 mg IntraVENous Q4H PRN Marnette Res, DO   2 mg at 22    albuterol (PROVENTIL) nebulizer solution 2.5 mg  2.5 mg Nebulization Q4H PRN Marnette Res, DO        multivitamin 1 tablet  1 tablet Oral Daily Marnette Res, DO   1 tablet at 09/10/22 0814    ondansetron (ZOFRAN-ODT) disintegrating tablet 4 mg  4 mg Oral Q8H PRN Vivien Primrose, MD   4 mg at 22    Or    ondansetron (ZOFRAN) injection 4 mg  4 mg IntraVENous Q6H PRN Vivien Primrose, MD        polyethylene glycol (GLYCOLAX) packet 17 g  17 g Oral Daily PRN Vivien Primrose, MD        thiamine (B-1) injection 100 mg  100 mg IntraVENous Daily Vivien Primrose, MD   100 mg at 09/10/22 6613    nicotine (NICODERM CQ) 14 MG/24HR 1 patch  1 patch TransDERmal Daily Vivien Primrose, MD        LORazepam (ATIVAN) tablet 1 mg  1 mg Oral Q1H PRN Vivien Primrose, MD   1 mg at 22 0844    Or    LORazepam (ATIVAN) injection 1 mg  1 mg IntraVENous Q1H PRN Vivien Primrose, MD        Or    LORazepam (ATIVAN) tablet 2 mg  2 mg Oral Q1H PRN Vivien Primrose, MD   2 mg at 22 0443    Or    LORazepam (ATIVAN) injection 2 mg  2 mg IntraVENous Q1H PRN Vivien Primrose, MD   2 mg at 22 6345    Or    LORazepam (ATIVAN) tablet 3 mg  3 mg Oral Q1H PRN Vivien Primrose, MD        Or    LORazepam (ATIVAN) injection 3 mg  3 mg IntraVENous Q1H PRN Vivien Primrose, MD        Or    LORazepam (ATIVAN) tablet 4 mg  4 mg Oral Q1H PRN Vivien Primrose, MD        Or    LORazepam (ATIVAN) injection 4 mg  4 mg IntraVENous Q1H PRN Vivien Primrose, MD           Physical Examination:      Vitals:  BP (!) 97/58   Pulse (!) 106   Temp 98.6 °F (37 °C)   Resp 18   Ht 5' 5\" (1.651 m)   Wt 182 lb 3.2 oz (82.6 kg)   LMP  (LMP Unknown) Comment: tubal ligation  SpO2 98%   BMI 30.32 kg/m²   Temp (24hrs), Av.9 °F (37.2

## 2022-09-10 NOTE — PROGRESS NOTES
Shift assessment complete. Meds as ordered. PRN pain med given as per request with good effect. Bed in lowest position. Call light within reach. Will continue to monitor.

## 2022-09-10 NOTE — PROGRESS NOTES
See OT evaluation for all goals and OT POC.  Electronically signed by GIOVANY Greenwood/L on 9/10/2022 at 1:04 PM

## 2022-09-10 NOTE — PROGRESS NOTES
[facility] OCCUPATIONAL THERAPY EVALUATION - ACUTE     NAME: Javad Scruggs  : 1985 (43 y.o.)  MRN: [id number]  CODE STATUS: Full Code  Room: [place] [id number]    Date of Service: 9/10/2022    Patient Diagnosis(es): Acute alcoholic hepatitis [Q68.52]  Alcoholic hepatitis with ascites [K70.11]   Patient Active Problem List    Diagnosis Date Noted    Alcoholic hepatitis with ascites 2022    Major depressive disorder, severe ([facility] [address] 2022    GI bleeding 2022        Past Medical History:   Diagnosis Date    Alcohol abuse     Tobacco dependence      Past Surgical History:   Procedure Laterality Date    APPENDECTOMY      FOOT SURGERY      reports 6 sx on L foot and one on right foot    TUBAL LIGATION      UPPER GASTROINTESTINAL ENDOSCOPY N/A 2022    EGD DIAGNOSTIC ONLY performed by Veda Perez MD at [facility]        Restrictions  Restrictions/Precautions: Seizure, Fall Risk              Safety Devices: Safety Devices  Type of Devices:  All fall risk precautions in place     Patient's date of birth confirmed: Yes    General:       Subjective          Pain at start of treatment: Yes: 10    Pain at end of treatment: Yes: 7/10    Location: stomach   Nursing notified: No  RN: pt stated RN  aware and recently had pain medication   Intervention: None    Prior Level of Function:  Social/Functional History  Lives With: Alone (SO sometimes stays with pt)  Type of Home: House  Home Layout: Two level, Laundry in basement (flights to and from basement and second floor with HR)  Home Access: Stairs to enter with rails  Entrance Stairs - Number of Steps: 5  Entrance Stairs - Rails: Both  Bathroom Shower/Tub: Tub/Shower unit  Home Equipment:  (no AD)  ADL Assistance: Independent  Homemaking Assistance: Independent  Ambulation Assistance: Independent (no AD)  Transfer Assistance: Independent  Active : Yes  Type of Occupation: unemployed    OBJECTIVE:     Orientation Status:  Orientation  Overall Orientation Status: Within Functional Limits    Observation:  Observation/Palpation  Observation: pt on RA, pt agreeable to OT eval    Cognition Status:  Cognition  Cognition Comment: pt with increased time to process    Perception Status:  Perception  Overall Perceptual Status: WFL    Vision and Hearing Status:  Hearing  Hearing: Within functional limits        GROSS ASSESSMENT AROM/PROM:  AROM: Within functional limits       ROM:   LUE AROM (degrees)  LUE AROM : WFL  Left Hand AROM (degrees)  Left Hand AROM: WFL  RUE AROM (degrees)  RUE AROM : WFL  Right Hand AROM (degrees)  Right Hand AROM: WFL    UE STRENGTH:  Strength: Generally decreased, functional    UE COORDINATION:  Coordination: Generally decreased, functional    UE TONE:  Tone: Normal    UE SENSATION:  Sensation: Intact    Hand Dominance:  Hand Dominance  Hand Dominance: Right    ADL Status:  ADL  Feeding: Independent  Grooming: Setup  UE Bathing: Setup  LE Bathing: Moderate assistance  UE Dressing: Setup  LE Dressing: Moderate assistance  LE Dressing Skilled Clinical Factors: pt unable to don socks d/t pain  Toileting: Unable to assess(comment) (pt declined need)  Additional Comments: ADL's simulated unless otherwise stated above  Toilet Transfers  Toilet Transfer: Unable to assess  Toilet Transfers Comments: anticpated CGA    Functional Mobility:    Transfers  Sit to stand: Contact guard assistance  Stand to sit: Contact guard assistance    Patient ambulated to head of bed with [facility] at [address]. VC's to sequence.      Bed Mobility  Bed mobility  Supine to Sit: Contact guard assistance  Sit to Supine: Contact guard assistance  Bed Mobility Comments: VC's to sequence    Seated and Standing Balance:  Balance  Sitting: Intact  Standing: Impaired  Standing - Static: Fair  Standing - Dynamic:  (CGA)    Functional Endurance:  Activity Tolerance  Activity Tolerance: Patient limited by pain    D/C Recommendations:  OT D/C RECOMMENDATIONS  REQUIRES OT FOLLOW-UP: Yes    Equipment Recommendations:  OT Equipment Recommendations  Other: continue to assess    OT Education:   Patient Education  Education Given To: Patient  Education Provided: Role of Therapy;Plan of Care  Education Method: Verbal  Barriers to Learning: None  Education Outcome: Verbalized understanding    OT Follow Up:   OT D/C RECOMMENDATIONS  REQUIRES OT FOLLOW-UP: Yes       Assessment/Discharge Disposition:  Assessment: Pt is a 43 y.o. female with above mentioned deficits impairing ability to complete ADL's at reported baseline. Pt may benefit from OT services to address deficits and maximize safety and function during ADL's.   Performance deficits / Impairments: Decreased functional mobility , Decreased ADL status, Decreased strength, Decreased cognition, Decreased endurance, Decreased high-level IADLs, Decreased coordination  Prognosis: Good  Discharge Recommendations: Continue to assess pending progress  Decision Making: Medium Complexity  History: multi comorb  Exam: 7 perf imp  Assistance / Modification: Min A    AMPA (Six Click) Self care Score   How much help for putting on and taking off regular lower body clothing?: A Lot  How much help for Bathing?: A Lot  How much help for Toileting?: A Little  How much help for putting on and taking off regular upper body clothing?: A Little  How much help for taking care of personal grooming?: A Little  How much help for eating meals?: None  AM-[facility] Inpatient Daily Activity Raw Score: 17  AM-PAC Inpatient ADL T-Scale Score : 37.26  ADL Inpatient CMS 0-100% Score: 50.11    Therapy key for assistance levels -   Independent/Mod I = Pt. is able to perform task with no assistance but may require a device   Stand by assistance = Pt. does not perform task at an independent level but does not need physical assistance, requires verbal cues  Minimal, Moderate, Maximal Assistance = Pt. requires physical assistance (25%, 50%, 75% assist from helper) for task but is able to actively participate in task   Dependent = Pt. requires total assistance with task and is not able to actively participate with task completion     Plan:  Plan  Times per Week: 1-4  Plan Weeks: LOS  Current Treatment Recommendations: Strengthening, Functional mobility training, Endurance training, Pain management, Safety education & training, Patient/Caregiver education & training, Equipment evaluation, education, & procurement, Self-Care / ADL, Coordination training, Home management training    Goals:   Patient will:    - Improve functional endurance to tolerate/complete 30 mins of ADL's  - Be Supervised in UB ADLs   - Be Supervised in LB ADLs  - Be Supervised in ADL transfers without LOB  - Be Supervised in toileting tasks  - Improve B UE strength and endurance to 5/5 in order to participate in self-care activities as projected. - Sequence self-care tasks with without VC's    Patient Goal:    get better   Discussed and agreed upon: Yes Comments:       Therapy Time:   Individual   Time In 1143   Time Out 1156   Minutes 13          Eval: 13 minutes     Electronically signed by:     ANNALISA Downing,   9/10/2022, 1:03 PM

## 2022-09-10 NOTE — PROGRESS NOTES
Physical Therapy Med Surg Initial Assessment  Facility/Department: [facility]  [facility]       NAME: Omar Martin  : 1985 (43 y.o.)  MRN: [id number]  CODE STATUS: Full Code    Date of Service: 9/10/2022    Patient Diagnosis(es): Acute alcoholic hepatitis [F75.71]  Alcoholic hepatitis with ascites [K70.11]   Chief Complaint   Patient presents with    Abdominal Pain     Abd pain and distention x 2 days    Withdrawal     Alcohol withdrawal     Patient Active Problem List    Diagnosis Date Noted    Alcoholic hepatitis with ascites 2022    Major depressive disorder, severe ([facility] [address] 2022    GI bleeding 2022        Past Medical History:   Diagnosis Date    Alcohol abuse     Tobacco dependence      Past Surgical History:   Procedure Laterality Date    APPENDECTOMY      FOOT SURGERY      reports 6 sx on L foot and one on right foot    TUBAL LIGATION      UPPER GASTROINTESTINAL ENDOSCOPY N/A 2022    EGD DIAGNOSTIC ONLY performed by Sunny Essex, MD at [facility]       Chart Reviewed: Yes  Family / Caregiver Present: No  General Comment  Comments: Pt resting in bed - agreeable to PT evaluation    Restrictions:  Restrictions/Precautions: Fall Risk     SUBJECTIVE:   Subjective: \"I just got my pain meds. \"    Pain  Pain: Abdominal pain 8/10 pre and post session. RN notified. Pt recieved pain medication prior to session.     Prior Level of Function:  Social/Functional History  Lives With: Alone (SO sometimes stays with pt)  Type of Home: House  Home Layout: Two level, Laundry in basement (flights to and from basement and second floor with HR)  Home Access: Stairs to enter with rails  Entrance Stairs - Number of Steps: 5  Entrance Stairs - Rails: Right  Home Equipment:  (no AD)  ADL Assistance: Independent  Homemaking Assistance: Independent  Ambulation Assistance: Independent (no AD)  Transfer Assistance: Independent  Active : Yes  Type of Occupation: unemployed    OBJECTIVE:   Vision  Vision: Within Functional Limits (pt reports a bit of blurred vision associated with her medical issues upon admission)  Hearing: Within functional limits    Cognition:  Overall Orientation Status: Within Functional Limits  Follows Commands: Within Functional Limits    Observation/Palpation  Observation: No acute distress noted. Pt with delay in processing in conversation. Mild flat affect. ROM:  RLE PROM: WFL  RLE General PROM: Uncomfortable hip flexion d/t abdominal distention and pain. LLE PROM: WFL  LLE General PROM: Uncomfortable hip flexion d/t abdominal distention and pain. Strength:  Strength RLE  Comment: Grossly 3-/5  Strength LLE  Comment: Grossly 3-/5  Strength Other  Other: Trunk strength grossly 2/5 and limited by pain. Neuro:  Balance  Sitting - Static: Good  Sitting - Dynamic: Fair  Standing - Static: Poor; Fair  Standing - Dynamic: Poor                      Tone: Normal    Sensation: Intact    Bed mobility  Rolling to Right: Minimal assistance  Supine to Sit: Minimal assistance  Sit to Supine: Contact guard assistance  Scooting: Contact guard assistance  Bed Mobility Comments: pt with slow completion of all transitions. Verbal cues for sequencing. Transfers  Sit to Stand: Contact guard assistance  Stand to sit: Contact guard assistance  Bed to Chair: Contact guard assistance  Comment: Verbal cues for sequencing and effecient    Ambulation  Surface: level tile  Device: Rolling Walker  Assistance: Contact guard assistance;Minimal assistance  Quality of Gait: Steadying assist required dt intermittent lateral LOB. Difficutly managing [employer] and obstacles requiring Iain. Slow pacing with [employer] posture. Cues for extension and approximation within walker.   Distance: 5ft X 2 with turn                   Activity Tolerance  Activity Tolerance: Patient tolerated treatment well    Patient Education  Education Given To: Patient  Education Provided: Role of Therapy;Plan of Care  Education Method: Verbal  Education Outcome: Verbalized understanding       ASSESSMENT:   Body Structures, Functions, Activity Limitations Requiring Skilled Therapeutic Intervention: Decreased functional mobility ; Decreased ADL status; Decreased strength;Decreased endurance;Decreased balance;Decreased vision/visual deficit; Decreased coordination; Increased pain  Decision Making: Medium Complexity  History: High  Exam: Med  Clinical Presentation: Med    Therapy Prognosis: Good  Barriers to Learning: none         DISCHARGE RECOMMENDATIONS:  Discharge Recommendations: Continue to assess pending progress, Patient would benefit from continued therapy after discharge    Assessment: Continued PT indicated to progress mobility and facilitate DC at highest level of indep and safety. Concerns with pt returning home upon DC d/t severity of her mobility deficits and neuromuscular control. Rec therapy stay prior to DC home. Requires PT Follow-Up: Yes      PLAN OF CARE:  Plan  Plan: 1 time a day 3-6 times a week  Current Treatment Recommendations: Strengthening, ROM, Balance training, Functional mobility training, Transfer training, ADL/Self-care training, Endurance training, Gait training, Stair training, Neuromuscular re-education, Pain management, Home exercise program, Safety education & training, Patient/Caregiver education & training, Equipment evaluation, education, & procurement, Modalities, Positioning    Safety Devices  Type of Devices:  All fall risk precautions in place    Goals:  Long Term Goals  Long term goal 1: Pt to complete bed mobility with indep  Long term goal 2: Pt to complete transfers with Supervision  Long term goal 3: Pt to ambulate 50-150ft with LRD and SBA  Long term goal 4: Pt to manage flight of steps with HR and SBA    [facility] (6 CLICK) BASIC MOBILITY  AM-PAC Inpatient Mobility Raw Score : 16     Therapy Time:   Individual   Time In 0840   Time Out 0856   Minutes Thu, [address] [facility],

## 2022-09-11 LAB
ALBUMIN SERPL-MCNC: 1.5 G/DL (ref 3.5–4.6)
ALP BLD-CCNC: 367 U/L (ref 40–130)
ALT SERPL-CCNC: 45 U/L (ref 0–33)
ANION GAP SERPL CALCULATED.3IONS-SCNC: 8 MEQ/L (ref 9–15)
ANISOCYTOSIS: ABNORMAL
AST SERPL-CCNC: 199 U/L (ref 0–35)
BASOPHILS ABSOLUTE: 0 K/UL (ref 0–0.2)
BASOPHILS RELATIVE PERCENT: 0.7 %
BILIRUB SERPL-MCNC: 9.6 MG/DL (ref 0.2–0.7)
BILIRUBIN DIRECT: 7.3 MG/DL (ref 0–0.4)
BILIRUBIN, INDIRECT: 2.3 MG/DL (ref 0–0.6)
BLOOD CULTURE, ROUTINE: NORMAL
BUN BLDV-MCNC: 7 MG/DL (ref 6–20)
CALCIUM SERPL-MCNC: 7.2 MG/DL (ref 8.5–9.9)
CHLORIDE BLD-SCNC: 105 MEQ/L (ref 95–107)
CO2: 21 MEQ/L (ref 20–31)
CREAT SERPL-MCNC: 1 MG/DL (ref 0.5–0.9)
CULTURE, BLOOD 2: NORMAL
EOSINOPHILS ABSOLUTE: 0 K/UL (ref 0–0.7)
EOSINOPHILS RELATIVE PERCENT: 0.9 %
GFR AFRICAN AMERICAN: >60
GFR NON-AFRICAN AMERICAN: >60
GLUCOSE BLD-MCNC: 79 MG/DL (ref 70–99)
GLUCOSE BLD-MCNC: 86 MG/DL (ref 70–99)
HCT VFR BLD CALC: 33.9 % (ref 37–47)
HEMOGLOBIN: 10.5 G/DL (ref 12–16)
LYMPHOCYTES ABSOLUTE: 1 K/UL (ref 1–4.8)
LYMPHOCYTES RELATIVE PERCENT: 21.9 %
MACROCYTES: ABNORMAL
MCH RBC QN AUTO: 36.5 PG (ref 27–31.3)
MCHC RBC AUTO-ENTMCNC: 31.1 % (ref 33–37)
MCV RBC AUTO: 117.6 FL (ref 82–100)
MONOCYTES ABSOLUTE: 0.7 K/UL (ref 0.2–0.8)
MONOCYTES RELATIVE PERCENT: 16.7 %
NEUTROPHILS ABSOLUTE: 2.6 K/UL (ref 1.4–6.5)
NEUTROPHILS RELATIVE PERCENT: 59.8 %
PDW BLD-RTO: 15 % (ref 11.5–14.5)
PERFORMED ON: NORMAL
PHOSPHORUS: 2.7 MG/DL (ref 2.3–4.8)
PLATELET # BLD: 90 K/UL (ref 130–400)
PLATELET SLIDE REVIEW: ABNORMAL
POLYCHROMASIA: ABNORMAL
POTASSIUM REFLEX MAGNESIUM: 4.8 MEQ/L (ref 3.4–4.9)
RBC # BLD: 2.89 M/UL (ref 4.2–5.4)
SODIUM BLD-SCNC: 134 MEQ/L (ref 135–144)
TARGET CELLS: ABNORMAL
TOTAL PROTEIN: 5 G/DL (ref 6.3–8)
WBC # BLD: 4.3 K/UL (ref 4.8–10.8)

## 2022-09-11 PROCEDURE — 2580000003 HC RX 258: Performed by: SPECIALIST

## 2022-09-11 PROCEDURE — 2580000003 HC RX 258: Performed by: INTERNAL MEDICINE

## 2022-09-11 PROCEDURE — 1210000000 HC MED SURG R&B

## 2022-09-11 PROCEDURE — 36415 COLL VENOUS BLD VENIPUNCTURE: CPT

## 2022-09-11 PROCEDURE — 85025 COMPLETE CBC W/AUTO DIFF WBC: CPT

## 2022-09-11 PROCEDURE — 6360000002 HC RX W HCPCS: Performed by: INTERNAL MEDICINE

## 2022-09-11 PROCEDURE — 6370000000 HC RX 637 (ALT 250 FOR IP): Performed by: SPECIALIST

## 2022-09-11 PROCEDURE — 80076 HEPATIC FUNCTION PANEL: CPT

## 2022-09-11 PROCEDURE — 6370000000 HC RX 637 (ALT 250 FOR IP): Performed by: INTERNAL MEDICINE

## 2022-09-11 PROCEDURE — 84100 ASSAY OF PHOSPHORUS: CPT

## 2022-09-11 PROCEDURE — 80048 BASIC METABOLIC PNL TOTAL CA: CPT

## 2022-09-11 PROCEDURE — 99232 SBSQ HOSP IP/OBS MODERATE 35: CPT | Performed by: NURSE PRACTITIONER

## 2022-09-11 RX ADMIN — GABAPENTIN 100 MG: 100 CAPSULE ORAL at 14:21

## 2022-09-11 RX ADMIN — THIAMINE HYDROCHLORIDE 100 MG: 100 INJECTION, SOLUTION INTRAMUSCULAR; INTRAVENOUS at 09:25

## 2022-09-11 RX ADMIN — Medication 10 ML: at 09:34

## 2022-09-11 RX ADMIN — CEFTRIAXONE SODIUM 1000 MG: 1 INJECTION, POWDER, FOR SOLUTION INTRAMUSCULAR; INTRAVENOUS at 17:49

## 2022-09-11 RX ADMIN — PANTOPRAZOLE SODIUM 40 MG: 40 TABLET, DELAYED RELEASE ORAL at 09:18

## 2022-09-11 RX ADMIN — THERA TABS 1 TABLET: TAB at 09:19

## 2022-09-11 RX ADMIN — GABAPENTIN 100 MG: 100 CAPSULE ORAL at 21:11

## 2022-09-11 RX ADMIN — GABAPENTIN 100 MG: 100 CAPSULE ORAL at 09:19

## 2022-09-11 RX ADMIN — Medication 5 ML: at 21:27

## 2022-09-11 ASSESSMENT — PAIN SCALES - GENERAL: PAINLEVEL_OUTOF10: 0

## 2022-09-11 NOTE — CARE COORDINATION
I spoke to the pt about PT recommendation for Rehab. Pt not safe to go home until she gains back some strength. Pt is in agreement with rehab her at [facility]. Order for consult obtained.

## 2022-09-11 NOTE — PROGRESS NOTES
Gastroenterology Progress Note    Noelle Romo is a 43 y.o. female patient. Hospitalization Day:7    Chief C/O: alcoholic hepatitis/cirrhosis    SUBJECTIVE: Seen and examined, overnight events noted, no abdominal pain, tolerating diet. A.m. labs noted, low-grade temp overnight, on ceftriaxone, UA is positive culture pending. ROS:  Gastrointestinal ROS: no abdominal pain, change in bowel habits, or black or bloody stools    Physical    VITALS:  BP (!) 105/53   Pulse (!) 103   Temp 99.3 °F (37.4 °C) (Oral)   Resp 16   Ht 5' 5\" (1.651 m)   Wt 185 lb 11.2 oz (84.2 kg)   LMP  (LMP Unknown) Comment: tubal ligation  SpO2 96%   BMI 30.90 kg/m²   TEMPERATURE:  Current - Temp: 99.3 °F (37.4 °C); Max - Temp  Av.8 °F (37.1 °C)  Min: 98.2 °F (36.8 °C)  Max: 99.3 °F (37.4 °C)    General:  Alert and oriented,  No apparent distress  Skin- without jaundice  Eyes: anicteric sclera  Cardiac: RRR, Nl s1s2, without murmurs  Lungs CTA Bilaterally, normal effort  Abdomen soft, ND, NT, no HSM, Bowel sounds normal  Ext: without edema  Neuro: no asterixis     Data    Data Review:    Recent Labs     22  0542 09/10/22  0452 09/11/22  0533   WBC 7.4 5.4 4.3*   HGB 10.2* 9.3* 10.5*   HCT 32.2* 28.2* 33.9*   .7* 113.8* 117.6*   PLT 81* 82* 90*     Recent Labs     22  0542 09/10/22  0452 22  0533    137 134*   K 3.8 3.2* 4.8    105 105   CO2 24 24 21   PHOS 2.0* 2.2* 2.7   BUN 7 7 7   CREATININE 1.28* 1.03* 1.00*     Recent Labs     22  0542 09/10/22  0452 22  0533   * 187* 199*   ALT 56* 43* 45*   BILIDIR 8.7* 7.2* 7.3*   BILITOT 11.0* 8.9* 9.6*   ALKPHOS 451* 348* 367*     No results for input(s): LIPASE, AMYLASE in the last 72 hours. Recent Labs     22  1349   PROTIME 18.6*   INR 1.5           ASSESSMENT:  12-year-old female admitted with alcoholic hepatitis, with ?  Underlying liver disease, clinically improved since arrival, on arrival  serum bilirubin is 8.8 and

## 2022-09-11 NOTE — CONSULTS
Physical Medicine & Rehabilitation  Consult Note      Admitting Physician: Mona Arias DO    Primary Care Provider: Spencer Romero MD     Reason for Consult:  Asses rehab needs, promote physical and mental function, analyze level of care to determine rehab needs, improve ability to actively participate in the rehabilitation process, and decrease likelihood of re-admit to the hospital after discharge. History of Present Illness:    Chanelle Thurman is a 43 y.o. female admitted to [facility] on 9/4/2022. Weakness         HPI     43 y.o. with PMH of alcohol abuse and tobacco abuse who presented to ED with alcoholic hepatitis. Patient stated she knew her liver was \"gone\" so she had been trying to cut back on drinking, but still drinks 4-5 small bottles of fireball a day. Her last drink was on 9/4/2022 (day of admission) at 4am after waking up. She later developed sharp RUQ pain. CT of abdomen showed enlarged fatty liver, small volume ascites and mild ill-defined mesenteric and retroperitoneal inflammation, likely edema and/or pancreatitis. I reviewed recent nursing notes discussed care with acute care providers, \"  see notes \". Events from the previous 24 hours reviewed    . Their inpatient work up has included:    Imaging:  Imaging and other studies reviewed and discussed with patient and staff    CT ABDOMEN PELVIS WO CONTRAST Additional Contrast? None    Result Date: 9/4/2022  CT ABDOMEN PELVIS WO CONTRAST: 9/4/2022 CLINICAL HISTORY:  ab pain . COMPARISON: None available. TECHNIQUE: Spiral images were obtained of the abdomen and pelvis without contrast. All CT scans at this facility use dose modulation, iterative reconstruction, and/or weight based dosing when appropriate to reduce radiation dose to as low as reasonably achievable. FINDINGS: The liver is moderate to markedly enlarged with extensive fatty infiltration. A small volume of ascites is present in the pelvis.  Mild retroperitoneal and mesenteric inflammation is likely edema and/or pancreatitis. Mild wall thickening of the essentially collapsed colon is probably reactive and/or related to incomplete distention, rather than colitis. The gallbladder is mildly distended, but otherwise unremarkable in appearance. A very small fat-containing periumbilical hernia is noted. There is no abscess, free air, abnormal bowel or biliary dilatation, focal inflammatory changes, significant lymphadenopathy, hernias, or other complication identified. The spleen, pancreas, adrenal glands, kidneys, great vessels, small bowel loops, uterus, adnexa, urinary bladder, and additional images of the pelvis are unremarkable. The visualized lung bases are clear. ENLARGED FATTY LIVER. SMALL VOLUME ASCITES AND MILD ILL-DEFINED MESENTERIC AND RETROPERITONEAL INFLAMMATION, LIKELY EDEMA AND/OR PANCREATITIS. MILD PROBABLY REACTIVE WALL THICKENING OF THE COLON.               Labs:    Labs reviewed and discussed with patient and staff    Lab Results   Component Value Date/Time    POCGLU 86 09/11/2022 04:08 PM    POCGLU 104 09/10/2022 09:05 PM    POCGLU 118 09/10/2022 04:21 PM    POCGLU 133 09/10/2022 11:42 AM    POCGLU 118 09/09/2022 04:15 PM     Lab Results   Component Value Date/Time     09/11/2022 05:33 AM    K 4.8 09/11/2022 05:33 AM     09/11/2022 05:33 AM    CO2 21 09/11/2022 05:33 AM    BUN 7 09/11/2022 05:33 AM    CREATININE 1.00 09/11/2022 05:33 AM    CALCIUM 7.2 09/11/2022 05:33 AM    LABALBU 1.5 09/11/2022 05:33 AM    BILITOT 9.6 09/11/2022 05:33 AM    ALKPHOS 367 09/11/2022 05:33 AM     09/11/2022 05:33 AM    ALT 45 09/11/2022 05:33 AM     Lab Results   Component Value Date/Time    WBC 4.3 09/11/2022 05:33 AM    RBC 2.89 09/11/2022 05:33 AM    HGB 10.5 09/11/2022 05:33 AM    HCT 33.9 09/11/2022 05:33 AM    .6 09/11/2022 05:33 AM    MCH 36.5 09/11/2022 05:33 AM    MCHC 31.1 09/11/2022 05:33 AM    RDW 15.0 09/11/2022 05:33 AM PLT 90 09/11/2022 05:33 AM    MPV 10.1 04/27/2022 08:00 AM     No results found for: VITD25  Lab Results   Component Value Date/Time    COLORU DARK YELLOW 09/10/2022 08:18 AM    NITRU POSITIVE 09/10/2022 08:18 AM    GLUCOSEU Negative 09/10/2022 08:18 AM    KETUA Negative 09/10/2022 08:18 AM    UROBILINOGEN 1.0 09/10/2022 08:18 AM    BILIRUBINUR LARGE 09/10/2022 08:18 AM     Lab Results   Component Value Date/Time    PROTIME 18.6 09/09/2022 01:49 PM     Lab Results   Component Value Date/Time    INR 1.5 09/09/2022 01:49 PM         I discussed results with patient. Current Rehabilitation Assessments:    Rehabilitation:  Physical Therapy  Bed mobility:  Bed mobility  Rolling to Right: Minimal assistance (09/10/22 0908)  Supine to Sit: Minimal assistance (09/10/22 0908)  Sit to Supine: Contact guard assistance (09/10/22 0908)  Scooting: Contact guard assistance (09/10/22 0908)  Bed Mobility Comments: pt with slow completion of all transitions. Verbal cues for sequencing. (09/10/22 0908)  Transfers:  Transfers  Sit to Stand: Contact guard assistance (09/10/22 0908)  Stand to sit: Contact guard assistance (09/10/22 0908)  Bed to Chair: Contact guard assistance (09/10/22 0908)  Comment: Verbal cues for sequencing and effecient (09/10/22 0908)  Gait:   Ambulation  Surface: level tile (09/10/22 0909)  Device: Kale [place] (09/10/22 0909)  Assistance: Contact guard assistance;Minimal assistance (09/10/22 0909)  Quality of Gait: Steadying assist required dt intermittent lateral LOB. Difficutly managing [employer] and obstacles requiring Iain. Slow pacing with [employer] posture. Cues for extension and approximation within walker. (09/10/22 0909)  Distance: 5ft X 2 with turn (09/10/22 0909)  Stairs:     W/C mobility:         Occupational therapy:   Hand Dominance: Right  ADL  Feeding: Independent (09/10/22 1252)  Grooming: Setup (09/10/22 1252)  UE Bathing: Setup (09/10/22 1252)  LE Bathing:  Moderate assistance (09/10/22 1252)  MANOJ Dressing: Setup (09/10/22 1252)  LE Dressing: Moderate assistance (09/10/22 1252)  LE Dressing Skilled Clinical Factors: pt unable to don socks d/t pain (09/10/22 1252)  Toileting: Unable to assess(comment) (pt declined need) (09/10/22 1252)  Additional Comments: ADL's simulated unless otherwise stated above (09/10/22 1252)  Toilet Transfers  Toilet Transfer: Unable to assess (09/10/22 1257)  Toilet Transfers Comments: anticpated CGA (09/10/22 1257)            Speech therapy:            Diet/Swallow:                         COGNITION  OT: Cognition Comment: pt with increased time to process  SP:             Re-evals pending      Past Medical History:        Diagnosis Date    Alcohol abuse     Tobacco dependence          PastSurgical History:        Procedure Laterality Date    APPENDECTOMY      FOOT SURGERY      reports 6 sx on L foot and one on right foot    TUBAL LIGATION      UPPER GASTROINTESTINAL ENDOSCOPY N/A 9/9/2022    EGD DIAGNOSTIC ONLY performed by Adelso Ramires MD at [facility]         Allergies:     Allergies   Allergen Reactions    Oxycodone-Acetaminophen Itching     Itching and nausea            CurrentMedications:   Current Facility-Administered Medications: cefTRIAXone (ROCEPHIN) 1,000 mg in dextrose 5 % 50 mL IVPB mini-bag, 1,000 mg, IntraVENous, Q24H  gabapentin (NEURONTIN) capsule 100 mg, 100 mg, Oral, TID  pantoprazole (PROTONIX) tablet 40 mg, 40 mg, Oral, QAM AC  sodium chloride flush 0.9 % injection 5-40 mL, 5-40 mL, IntraVENous, 2 times per day  sodium chloride flush 0.9 % injection 5-40 mL, 5-40 mL, IntraVENous, PRN  0.9 % sodium chloride infusion, 25 mL, IntraVENous, PRN  HYDROcodone-acetaminophen (NORCO) 5-325 MG per tablet 1 tablet, 1 tablet, Oral, Q4H PRN  glucose chewable tablet 16 g, 4 tablet, Oral, PRN  dextrose bolus 10% 125 mL, 125 mL, IntraVENous, PRN **OR** dextrose bolus 10% 250 mL, 250 mL, IntraVENous, PRN  glucagon (rDNA) injection 1 mg, 1 mg, SubCUTAneous, PRN  dextrose 10 % infusion, , IntraVENous, Continuous PRN  morphine (PF) injection 2 mg, 2 mg, IntraVENous, Q4H PRN  albuterol (PROVENTIL) nebulizer solution 2.5 mg, 2.5 mg, Nebulization, Q4H PRN  multivitamin 1 tablet, 1 tablet, Oral, Daily  ondansetron (ZOFRAN-ODT) disintegrating tablet 4 mg, 4 mg, Oral, Q8H PRN **OR** ondansetron (ZOFRAN) injection 4 mg, 4 mg, IntraVENous, Q6H PRN  polyethylene glycol (GLYCOLAX) packet 17 g, 17 g, Oral, Daily PRN  thiamine (B-1) injection 100 mg, 100 mg, IntraVENous, Daily  nicotine (NICODERM CQ) 14 MG/24HR 1 patch, 1 patch, TransDERmal, Daily  LORazepam (ATIVAN) tablet 1 mg, 1 mg, Oral, Q1H PRN **OR** LORazepam (ATIVAN) injection 1 mg, 1 mg, IntraVENous, Q1H PRN **OR** LORazepam (ATIVAN) tablet 2 mg, 2 mg, Oral, Q1H PRN **OR** LORazepam (ATIVAN) injection 2 mg, 2 mg, IntraVENous, Q1H PRN **OR** LORazepam (ATIVAN) tablet 3 mg, 3 mg, Oral, Q1H PRN **OR** LORazepam (ATIVAN) injection 3 mg, 3 mg, IntraVENous, Q1H PRN **OR** LORazepam (ATIVAN) tablet 4 mg, 4 mg, Oral, Q1H PRN **OR** LORazepam (ATIVAN) injection 4 mg, 4 mg, IntraVENous, Q1H PRN      Social History:  Social History     Socioeconomic History    Marital status: Single     Spouse name: Not on file    Number of children: Not on file    Years of education: Not on file    Highest education level: Not on file   Occupational History    Not on file   Tobacco Use    Smoking status: Every Day     Packs/day: 1.00     Types: Cigarettes    Smokeless tobacco: Never   Vaping Use    Vaping Use: Never used   Substance and Sexual Activity    Alcohol use: Yes     Comment: 6/9/22: completed 30 day rehab 2 weeks ago, relapse one week ago; unable to quantify EtOH consumption    Drug use: Yes     Types: Marijuana Hulon Gan)    Sexual activity: Not on file   Other Topics Concern    Not on file   Social History Narrative    Not on file     Social Determinants of Health     Financial Resource Strain: Not on file   Food Insecurity: Not on file significant change in deep tendon reflexes or sensation  Lungs:  Diminished, CTA-B. Respiration effort is   normal at rest.     Heart:   S1 = S2,   RRR. Abdomen:  Soft, non-tender, no enlargement of liver or spleen. Extremities:    lower extremity edema    Skin:   Intact to general survey,      Rehabilitation:  Physical Therapy:   Bed mobility:  Bed mobility  Rolling to Right: Minimal assistance (09/10/22 0908)  Supine to Sit: Minimal assistance (09/10/22 0908)  Sit to Supine: Contact guard assistance (09/10/22 0908)  Scooting: Contact guard assistance (09/10/22 0908)  Bed Mobility Comments: pt with slow completion of all transitions. Verbal cues for sequencing. (09/10/22 0908)  Transfers:  Transfers  Sit to Stand: Contact guard assistance (09/10/22 0908)  Stand to sit: Contact guard assistance (09/10/22 0908)  Bed to Chair: Contact guard assistance (09/10/22 0908)  Comment: Verbal cues for sequencing and effecient (09/10/22 0908)  Gait:   Ambulation  Surface: level tile (09/10/22 0909)  Device: Nando Rang (09/10/22 0909)  Assistance: Contact guard assistance;Minimal assistance (09/10/22 0909)  Quality of Gait: Steadying assist required dt intermittent lateral LOB. Difficutly managing [employer] and obstacles requiring Iain. Slow pacing with [employer] posture. Cues for extension and approximation within walker. (09/10/22 0909)  Distance: 5ft X 2 with turn (09/10/22 0909)  Stairs:     W/C mobility:       Occupational Therapy:   Hand Dominance: Right  ADL  Feeding: Independent (09/10/22 1252)  Grooming: Setup (09/10/22 1252)  UE Bathing: Setup (09/10/22 1252)  LE Bathing: Moderate assistance (09/10/22 1252)  UE Dressing: Setup (09/10/22 1252)  LE Dressing:  Moderate assistance (09/10/22 1252)  LE Dressing Skilled Clinical Factors: pt unable to don socks d/t pain (09/10/22 1252)  Toileting: Unable to assess(comment) (pt declined need) (09/10/22 1252)  Additional Comments: ADL's simulated unless otherwise stated above (09/10/22 1252)  Toilet Transfers  Toilet Transfer: Unable to assess (09/10/22 1257)  Toilet Transfers Comments: anticpated CGA (09/10/22 1257)          Speech Therapy:            Diet/Swallow:                      Lab/X-ray studies reviewed, analyzed and discussed with patient and staff:   Recent Results (from the past 24 hour(s))   POCT Glucose    Collection Time: 09/10/22  9:05 PM   Result Value Ref Range    POC Glucose 104 (H) 70 - 99 mg/dl    Performed on ACCU-CHEK    CBC with Auto Differential    Collection Time: 09/11/22  5:33 AM   Result Value Ref Range    WBC 4.3 (L) 4.8 - 10.8 K/uL    RBC 2.89 (L) 4.20 - 5.40 M/uL    Hemoglobin 10.5 (L) 12.0 - 16.0 g/dL    Hematocrit 33.9 (L) 37.0 - 47.0 %    .6 (H) 82.0 - 100.0 fL    MCH 36.5 (H) 27.0 - 31.3 pg    MCHC 31.1 (L) 33.0 - 37.0 %    RDW 15.0 (H) 11.5 - 14.5 %    Platelets 90 (L) 930 - 400 K/uL    PLATELET SLIDE REVIEW Decreased     Neutrophils % 59.8 %    Lymphocytes % 21.9 %    Monocytes % 16.7 %    Eosinophils % 0.9 %    Basophils % 0.7 %    Neutrophils Absolute 2.6 1.4 - 6.5 K/uL    Lymphocytes Absolute 1.0 1.0 - 4.8 K/uL    Monocytes Absolute 0.7 0.2 - 0.8 K/uL    Eosinophils Absolute 0.0 0.0 - 0.7 K/uL    Basophils Absolute 0.0 0.0 - 0.2 K/uL    Anisocytosis 1+     Macrocytes 1+     Polychromasia 1+     Target Cells 1+    Basic Metabolic Panel w/ Reflex to MG    Collection Time: 09/11/22  5:33 AM   Result Value Ref Range    Sodium 134 (L) 135 - 144 mEq/L    Potassium reflex Magnesium 4.8 3.4 - 4.9 mEq/L    Chloride 105 95 - 107 mEq/L    CO2 21 20 - 31 mEq/L    Anion Gap 8 (L) 9 - 15 mEq/L    Glucose 79 70 - 99 mg/dL    BUN 7 6 - 20 mg/dL    Creatinine 1.00 (H) 0.50 - 0.90 mg/dL    GFR Non-African American >60.0 >60    GFR  >60.0 >60    Calcium 7.2 (L) 8.5 - 9.9 mg/dL   Hepatic Function Panel    Collection Time: 09/11/22  5:33 AM   Result Value Ref Range    Total Protein 5.0 (L) 6.3 - 8.0 g/dL    Albumin 1.5 (L) 3.5 - 4.6 g/dL Alkaline Phosphatase 367 (H) 40 - 130 U/L    ALT 45 (H) 0 - 33 U/L     (H) 0 - 35 U/L    Total Bilirubin 9.6 (H) 0.2 - 0.7 mg/dL    Bilirubin, Direct 7.3 (H) 0.0 - 0.4 mg/dL    Bilirubin, Indirect 2.3 (H) 0.0 - 0.6 mg/dL   Phosphorus    Collection Time: 09/11/22  5:33 AM   Result Value Ref Range    Phosphorus 2.7 2.3 - 4.8 mg/dL   POCT Glucose    Collection Time: 09/11/22  4:08 PM   Result Value Ref Range    POC Glucose 86 70 - 99 mg/dl    Performed on ACCU-CHEK        CT ABDOMEN PELVIS WO CONTRAST Additional Contrast? None    Result Date: 9/4/2022  CT ABDOMEN PELVIS WO CONTRAST: 9/4/2022 CLINICAL HISTORY:  ab pain . COMPARISON: None available. TECHNIQUE: Spiral images were obtained of the abdomen and pelvis without contrast. All CT scans at this facility use dose modulation, iterative reconstruction, and/or weight based dosing when appropriate to reduce radiation dose to as low as reasonably achievable. FINDINGS: The liver is moderate to markedly enlarged with extensive fatty infiltration. A small volume of ascites is present in the pelvis. Mild retroperitoneal and mesenteric inflammation is likely edema and/or pancreatitis. Mild wall thickening of the essentially collapsed colon is probably reactive and/or related to incomplete distention, rather than colitis. The gallbladder is mildly distended, but otherwise unremarkable in appearance. A very small fat-containing periumbilical hernia is noted. There is no abscess, free air, abnormal bowel or biliary dilatation, focal inflammatory changes, significant lymphadenopathy, hernias, or other complication identified. The spleen, pancreas, adrenal glands, kidneys, great vessels, small bowel loops, uterus, adnexa, urinary bladder, and additional images of the pelvis are unremarkable. The visualized lung bases are clear. ENLARGED FATTY LIVER. SMALL VOLUME ASCITES AND MILD ILL-DEFINED MESENTERIC AND RETROPERITONEAL INFLAMMATION, LIKELY EDEMA AND/OR PANCREATITIS. MILD PROBABLY REACTIVE WALL THICKENING OF THE COLON. Previous extensive, complex labs, notes and diagnostics reviewed and analyzed.      ALLERGIES:    Allergies as of 09/04/2022 - Fully Reviewed 09/04/2022   Allergen Reaction Noted    Oxycodone-acetaminophen Itching 01/11/2016      (please also verify by checking MAR)           Diagnostics:    Recent Results (from the past 24 hour(s))   POCT Glucose    Collection Time: 09/10/22  9:05 PM   Result Value Ref Range    POC Glucose 104 (H) 70 - 99 mg/dl    Performed on ACCU-CHEK    CBC with Auto Differential    Collection Time: 09/11/22  5:33 AM   Result Value Ref Range    WBC 4.3 (L) 4.8 - 10.8 K/uL    RBC 2.89 (L) 4.20 - 5.40 M/uL    Hemoglobin 10.5 (L) 12.0 - 16.0 g/dL    Hematocrit 33.9 (L) 37.0 - 47.0 %    .6 (H) 82.0 - 100.0 fL    MCH 36.5 (H) 27.0 - 31.3 pg    MCHC 31.1 (L) 33.0 - 37.0 %    RDW 15.0 (H) 11.5 - 14.5 %    Platelets 90 (L) 548 - 400 K/uL    PLATELET SLIDE REVIEW Decreased     Neutrophils % 59.8 %    Lymphocytes % 21.9 %    Monocytes % 16.7 %    Eosinophils % 0.9 %    Basophils % 0.7 %    Neutrophils Absolute 2.6 1.4 - 6.5 K/uL    Lymphocytes Absolute 1.0 1.0 - 4.8 K/uL    Monocytes Absolute 0.7 0.2 - 0.8 K/uL    Eosinophils Absolute 0.0 0.0 - 0.7 K/uL    Basophils Absolute 0.0 0.0 - 0.2 K/uL    Anisocytosis 1+     Macrocytes 1+     Polychromasia 1+     Target Cells 1+    Basic Metabolic Panel w/ Reflex to MG    Collection Time: 09/11/22  5:33 AM   Result Value Ref Range    Sodium 134 (L) 135 - 144 mEq/L    Potassium reflex Magnesium 4.8 3.4 - 4.9 mEq/L    Chloride 105 95 - 107 mEq/L    CO2 21 20 - 31 mEq/L    Anion Gap 8 (L) 9 - 15 mEq/L    Glucose 79 70 - 99 mg/dL    BUN 7 6 - 20 mg/dL    Creatinine 1.00 (H) 0.50 - 0.90 mg/dL    GFR Non-African American >60.0 >60    GFR  >60.0 >60    Calcium 7.2 (L) 8.5 - 9.9 mg/dL   Hepatic Function Panel    Collection Time: 09/11/22  5:33 AM   Result Value Ref Range    Total Protein 5.0 (L) 6.3 - 8.0 g/dL    Albumin 1.5 (L) 3.5 - 4.6 g/dL    Alkaline Phosphatase 367 (H) 40 - 130 U/L    ALT 45 (H) 0 - 33 U/L     (H) 0 - 35 U/L    Total Bilirubin 9.6 (H) 0.2 - 0.7 mg/dL    Bilirubin, Direct 7.3 (H) 0.0 - 0.4 mg/dL    Bilirubin, Indirect 2.3 (H) 0.0 - 0.6 mg/dL   Phosphorus    Collection Time: 09/11/22  5:33 AM   Result Value Ref Range    Phosphorus 2.7 2.3 - 4.8 mg/dL   POCT Glucose    Collection Time: 09/11/22  4:08 PM   Result Value Ref Range    POC Glucose 86 70 - 99 mg/dl    Performed on ACCU-CHEK               Impression:    Impaired mobility and ADLs due to alcoholic encephalopathy awaiting cog eval, debility, proximal muscle weakness   Factors favoring recovery include:  near independent premorbid function        Complex Active General Medical Issues that complicate care and require daily medical supervision: 1. Principal Problem:    Alcoholic hepatitis with ascites  Active Problems:    GI bleeding  Resolved Problems:    * No resolved hospital problems. *      Patient Active Problem List   Diagnosis    Major depressive disorder, severe (HCC)    Alcoholic hepatitis with ascites    GI bleeding           Recommendations:    Considering all of the factors above including the patient's current medical status, social status/home environment, their functional needs, and their ability to participate in a therapy program, I feel that they would best be served at:    acute intensive comprehensive inpatient rehabilitation program.  Due to the diagnoses above the patient has had significant decline in function and is now requiring acute intensive therapy to optimize function. They are expected to achieve meaningful functional gains.   Due to medical complexity as above, rehabilitation physician services is reasonable and necessary including face-to-face visits at least 3 days/week with anticipated need to treat, manage and modify the rehabilitation course of treatment including interdisciplinary team conferences. They will require rehab physician care to monitor for neurogenic bowel bladder, postoperative pain, titration of opiate and high risk medications,   blood pressure and blood sugar control. It is my opinion that they will be able to tolerate and benefit from 3 hours of therapy a day. I reviewed the various options re: levels of care with the patient and family. Please see pre-admission screen note for further details. I discussed acute rehab with the patient and verify that the patient is able and willing to participate in 3 hours of therapy a day. Rehab and Acute Care Case Management has also reinforced this expectation. This patient requires multidisciplinary rehabilitation treatment, including daily care and management from a PM&R physician, 24-hour rehabilitation nursing, Physical Therapy, Occupational Therapy, rehabilitation psychology, consideration of speech and language pathology, recreational therapy, nutritional services, and a rehabilitation . I feel that it is reasonable to plan for a discharge to home setting after acute rehab. Specialized nursing care to focus on: Bowel and bladder issues-Monitor for urinary retention-check PVRs, bladder scan--cath if no void. Wound management re   -pressure relief protocols-side to side turns  IV medication administration      Monitor endurance and if necessary spread therapy out over a 7-day window-adding scheduled rest breaks when needed. Focus on energy conservation. Monitor heart rate and   cardiac medications effects on heart rate and blood pressure before, during and after therapy. Progress toward endurance training with pulse ox monitoring for saturation and heart rate. continue to monitor closely for dehydration-- Improve hydration and nutrition by adding Vitamin B12 shot times one, adding Protein supplements and push PO fluids.       Treat and monitor for higher level cognitive deficits, focus on difficulty with sequencing and problem-solving. Focus on higher-level balance and falls risk issues focusing on balance training and monitoring for orthostasis. Above recommendations are indicated to address medical complexity and need for appropriate rehab services. Will tailor individual care and rehab plan per individuals needs re  . Focus of today's plan-   transfer to acute rehab once medically stable, SLP eval       Required Certification Data (potential inpatient rehabilitation facility patient's only)    Deficits:weakness, nutrition, mobility, impaired gait, decreased endurance, deconditioning , debility, balance, and ADL's    Disability:mobility    Potential barriers to progress/discharge:complex medical conditions         It was my pleasure to evaluate [facility] today. Please call [phone] with questions.     Marissa Corbin MD

## 2022-09-11 NOTE — CARE COORDINATION
[facility] Pre-Admission Screening Document      Patient Name: Deysi Ramirez       MRN: [id number]    : 1985    Age: 43 y.o. Gender: female   Payor: Payor: Get Lara / Plan: Alina Oakes / Product Type: *No Product type* /   MSSP: No    Admitted from: [facility] Floor: 4W  Attending Care Provider: Lois Rizzo MD  Inpatient Rehab Referring Care Provider: Dr. Irasema Doe  Primary Care Provider: Ana Hood MD  Inpatient Treatment Team including Consults: Treatment Team: Attending Provider: Lois Rizzo MD; Consulting Physician: Clementina Holder MD; Surgeon: Clementina Holder MD; Utilization Reviewer: Lisbet Mason RN; Consulting Physician: Olegario Todd MD; : Johnathan Paez RN; Registered Nurse: Nida Sanchez RN; : Kathy Smith, RN; Registered Nurse: Sergio Capps RN    Reason for Hospitalization:   1. Acute alcoholic hepatitis      Chief Complaint   Patient presents with    Abdominal Pain     Abd pain and distention x 2 days    Withdrawal     Alcohol withdrawal     Isolation:No active isolations    Hospital Course:  Admit Date: 2022  1:47 PM  Inpatient Rehab Referral Date: 2022  Narrative of hospital course/history of present illness: 43 y.o. with PMH of alcohol abuse and tobacco abuse who presented to ED with alcoholic hepatitis. Patient stated she knew her liver was \"gone\" so she had been trying to cut back on drinking, but still drinks 4-5 small bottles of fireball a day. Her last drink was on 2022 (day of admission) at 4am after waking up. She later developed sharp RUQ pain. CT of abdomen showed enlarged fatty liver, small volume ascites and mild ill-defined mesenteric and retroperitoneal inflammation, likely edema and/or pancreatitis. Gastro: Alcoholic hepatitis VS Decompensated cirrhosis, Grade II Ascites. Monitor LFTs and INR daily, therapeutic paracentesis as needed.   22 US Guided Paracentesis. Total 1510 ml removed      Medical & Surgical History/Current Comorbidities:  Past Medical History:   Diagnosis Date    Alcohol abuse     Tobacco dependence      Past Surgical History:   Procedure Laterality Date    APPENDECTOMY      FOOT SURGERY      reports 6 sx on L foot and one on right foot    PARACENTESIS Left 09/13/2022    1510 ml removed per Dr Daksha Dewey  specimen obtained    TUBAL LIGATION      UPPER GASTROINTESTINAL ENDOSCOPY N/A 09/09/2022    EGD DIAGNOSTIC ONLY performed by Anette Barrow MD at [address] [place] [address] ACP-Advance Directive ACP-Power of     Not on File Not on File Not on File Not on File            Labs/Infection Control:  Recent Labs     09/13/22  0622 09/14/22  0530 09/15/22  0454 09/15/22  0455 09/15/22  0554   WBC 8.0 7.1 7.0  --   --    HGB 9.7* 9.6* 9.6*  --   --    HCT 29.7* 29.2* 29.2*  --   --    * 126* 124*  --   --    BUN 8 9  --  7  --    CREATININE 0.95* 0.77  --  0.81  --    GLUCOSE 88 84  --  93  --     134*  --  135  --    K 3.9 3.9  --  3.8  --    INR 1.5 1.5  --   --  1.4   CALCIUM 7.7* 7.8*  --  7.9*  --    PROT 5.0* 5.0*  --  5.3*  --       Blood cultures:  No results for input(s): BC in the last 72 hours. Urinalysis/C&S:  No results for input(s): NITRITE, LABCAST, WBCUA, RBCUA, MUCUS, TRICHOMONAS, YEAST, BACTERIA, LEUKOCYTESUR, BLOODU, GLUCOSEU, KETUA, AMORPHOUS, LABURIN in the last 72 hours. Radiology:  CT ABDOMEN PELVIS WO CONTRAST   Result Date: 9/4/2022  ENLARGED FATTY LIVER. SMALL VOLUME ASCITES AND MILD ILL-DEFINED MESENTERIC AND RETROPERITONEAL INFLAMMATION, LIKELY EDEMA AND/OR PANCREATITIS. MILD PROBABLY REACTIVE WALL THICKENING OF THE COLON. Medications/IV's:  The patient is currently None at this time for DVT prophylaxis.      Scheduled:    cefTRIAXone (ROCEPHIN) IV, 1,000 mg, IntraVENous, Q24H    magnesium oxide, 400 mg, Oral, Daily    pantoprazole, 40 mg, Oral, QAM AC    sodium chloride flush, 5-40 mL, IntraVENous, 2 times per day    multivitamin, 1 tablet, Oral, Daily    thiamine, 100 mg, IntraVENous, Daily    nicotine, 1 patch, TransDERmal, Daily    PRN:  sodium chloride flush, sodium chloride, HYDROcodone 5 mg - acetaminophen, glucose, dextrose bolus **OR** dextrose bolus, glucagon (rDNA), dextrose, morphine, albuterol, ondansetron **OR** ondansetron, polyethylene glycol, LORazepam **OR** LORazepam **OR** LORazepam **OR** LORazepam **OR** LORazepam **OR** LORazepam **OR** LORazepam **OR** LORazepam    Allergies: Allergies   Allergen Reactions    Oxycodone-Acetaminophen Itching     Itching and nausea           Most Recent Vitals, Height and Weight  BP 95/64   Pulse 97   Temp 98.6 °F (37 °C) (Oral)   Resp 18   Ht 5' 5\" (1.651 m)   Wt 185 lb 11.2 oz (84.2 kg)   LMP  (LMP Unknown) Comment: tubal ligation  SpO2 97%   BMI 30.90 kg/m²     Weight Bearing Restrictions: WBAT      Current Diet Order: ADULT DIET; Regular; Low Sodium (2 gm)    Skin: Jaundice, BLE weak and unsteady  Wound Care Documentation:   none       Lungs: WDL      Cognition and Behavior:  Language Preference (if other than English):      Alertness/Behavior  Neuro (WDL): Within Defined Limits  Level of Consciousness: Alert (0)  History of Falling: Yes Cognition Comment: Patient requires increased time for processing.     Short Term Memory Deficits     History of Falling: Yes    Safety          Prior Level of Function and Living Arrangements:  Social/Functional History  Lives With: Alone (SO sometimes stays with pt)  Type of Home: House  Home Layout: Two level, Laundry in basement (flights to and from basement and second floor with HR)  Home Access: Stairs to enter with rails  Entrance Stairs - Number of Steps: 5  Entrance Stairs - Rails: Both  Bathroom Shower/Tub: Tub/Shower unit  Home Equipment:  (no AD)  ADL Assistance: Independent  Homemaking Assistance: Independent  Ambulation Assistance: Independent (no AD)  Transfer Assistance: Independent  Active : Yes  Type of Occupation: unemployed  Living Arrangements: Alone  Support Systems: Children  Type of [address] None  Dental Appliances: None  Vision - Corrective Lenses: None  Hearing Aid: None  Personal Equipment:   Dental Appliances: None  Vision - Corrective Lenses: None  Hearing Aid: None      CURRENT FUNCTIONAL LEVEL:  Physical Therapy  Bed mobility:  Bed mobility  Rolling to Left: Stand by assistance (09/12/22 1410)  Rolling to Right: Stand by assistance (09/14/22 1103)  Supine to Sit: Stand by assistance (09/15/22 0834)  Sit to Supine: Stand by assistance (09/15/22 0834)  Scooting: Contact guard assistance (09/10/22 0908)  Bed Mobility Comments: HOB slightly elevated. vc's for sequencing and hand placement. Increased time and effort to complete. (09/15/22 0834)  Transfers:  Transfers  Sit to Stand: Stand by assistance (09/15/22 1022)  Stand to sit: Stand by assistance (09/15/22 9819)  Bed to Chair: Stand by assistance;Contact guard assistance (09/14/22 1104)  Comment: Slow paced d/t increased abdominal pain. steady. (09/15/22 1787)  Gait:   Ambulation  Surface: level tile (09/15/22 0835)  Device: No Device (09/15/22 0835)  Assistance: Stand by assistance (09/15/22 0835)  Quality of Gait: WBOS, waddle pattern, slight FF posture, varying pace, vc's for normalizing gait pattern (09/15/22 0835)  Gait Deviations: Slow Kamila; Increased JOHNNA; Decreased step length (09/14/22 1105)  Distance: 150' (09/15/22 0835)  Comments: pt tolerates increased distance without issue. (09/15/22 0835)  Stairs:  Stairs/Curb  Stairs?: Yes (09/15/22 0835)  Stairs  # Steps : 12 (09/15/22 0835)  Stairs Height: 6\" (09/15/22 0835)  Rails: Right ascending (09/15/22 0835)  Device: No Device (09/15/22 0835)  Assistance: Contact guard assistance (09/15/22 0835)  Comment: small posterior LOB while ascending, CGA needed to correct and prevent fall. pt given education to ensure family member is behind her while she completes stairs if dc home. (09/15/22 0891)  W/C mobility:         Occupational Therapy  Hand Dominance: Right  ADL  Feeding: Independent (09/10/22 1252)  Grooming: Stand by assistance (09/13/22 1201)  Grooming Skilled Clinical Factors: in standing (09/13/22 1201)  UE Bathing: Setup (09/10/22 1252)  LE Bathing: Moderate assistance (09/10/22 1252)  UE Dressing: Setup (09/10/22 1252)  LE Dressing:  Moderate assistance (09/13/22 1201)  LE Dressing Skilled Clinical Factors: Patient able to doff B hospital socks - unable to don B hospital socks (09/13/22 1201)  Toileting: Unable to assess(comment) (pt declined need) (09/10/22 1252)  Additional Comments: ADL's simulated unless otherwise stated above (09/10/22 1252)  Toilet Transfers  Toilet Transfer: Unable to assess (09/10/22 1257)  Toilet Transfers Comments: anticpated CGA (09/10/22 1257)            Speech Language Pathology n/a      Current Conditions Requiring Inpatient Rehabilitation  Bowel/Bladder Dysfunction: Yes  Intervention Required = Frequent toileting  Risk for Medical/Clinical Complications = low  Skin Healing/Breakdown Risk: Yes  Intervention Required = Side to side turns  Risk for Medical/Clinical Complications = moderate  Nutrition/Hydration Deficiency: Yes  Intervention Required = Monitor I&Os, Check Labs, and Dietary Eval  Risk for Medical/Clinical Complications = moderate  Medical Comorbidities: Yes  Intervention Required = DVT risk, alcohol abuse, tobacco dependence  Risk for Medical/Clinical Complications = high    Rehab/Skilled Needs:   3 hours of Intensive Acute Rehab therapy daily, 5 days/week for a total of 900 minutes  PT Treatment Time:  1.5 hrs/day  OT Treatment Time: 1.5 hrs/day  Rehabilitation Nursing   Case management/Social work  Dietitian/Nutrition    Cultural needs:   Values / Beliefs  Do You Have Any Ethnic, Cultural, Sacramental, or Spiritual Hoahaoism Needs You Would Like Us To Be Aware of While You Are in the Hospital : No   Funding needs:   Potential Assistance Purchasing Medications: No     Expected Level of Improvement with Rehab  Assist for ADL Supervision / [address] for Transfers Supervision / [address] for Gait Supervision / Standby Assist    Patient's willingness to participate: Yes  Patient's ability to tolerate proposed care: Yes  Patient/Family Goals of Rehab (in patient's/family's own words): return home alone    Anticipated Discharge Plan:  Home with   Home Health, RN PT OT Aide Social Work to be determined      Barriers to Discharge:  Home entry accessibility  Multi-level home  Equipment needs  Caregiver availability  Resource availability      Rehab evaluation plan: Recommend Acute Inpatient Rehab  Rehabilitation Impairment Group Code: 2.1  Rehab Impairment Group: Non-traumatic Brain Dysfunction   Estimated Length of Stay (days): 10  Rehab Diagnosis: Impaired mobility and ADL's due to alcoholic encephalopathy  Reviewer's Signature: Electronically signed by Alejandro Quijano RN on 9/15/22 at 2:43 PM EDT      I have reviewed and concur with the above Preadmission Screening.    Rehab Admitting Doctor: Dr. Byron Herrera DO

## 2022-09-11 NOTE — PROGRESS NOTES
09/011/2022    From: Home. Drinks 4-5 small bottles of Fireball daily    Admit: ETOH withdrawal; abd pain; nausea     PMH:ETOH abuse;  Tobacco use    Anticipated Discharge Disposition: Rehab    Patient Mobility or PT/OT ordered: yes  Consults: GI    Clinical:   Alcoholic hepatitis with ascites    Barriers to Discharge:  CIWA; IVF; NPO; Pain control; librium and thiamine  9/8 EGD-     Assessments: CMI/DCCOP DONE

## 2022-09-11 NOTE — PROGRESS NOTES
Shift assessment complete. Meds as ordered. Patient care provided. Bed in lowest position. Call light within reach. Will continue to monitor.

## 2022-09-11 NOTE — PROGRESS NOTES
Physician Progress Note    9/11/2022   9:23 AM    Name:  Rosanne Meehan  MRN:    [id number]     IP Day: 7     Admit Date: 9/4/2022  1:47 PM  PCP: Gavin Gomes MD    Code Status:  Full Code    Subjective:     No nausea or vomiting. Abdominal pain has improved significantly. Unfortunately she remains very weak and unsteady. She requires significant assistance going to the bathroom and back.       Current Facility-Administered Medications   Medication Dose Route Frequency Provider Last Rate Last Admin    cefTRIAXone (ROCEPHIN) 1,000 mg in dextrose 5 % 50 mL IVPB mini-bag  1,000 mg IntraVENous Q24H Margespinoza Blum, DO   Stopped at 09/10/22 2345    gabapentin (NEURONTIN) capsule 100 mg  100 mg Oral TID Ottoniel Blum, DO   100 mg at 09/11/22 0919    pantoprazole (PROTONIX) tablet 40 mg  40 mg Oral QAM AC Redia Pain, MD   40 mg at 09/11/22 0918    sodium chloride flush 0.9 % injection 5-40 mL  5-40 mL IntraVENous 2 times per day Redia Pain, MD   10 mL at 09/10/22 2110    sodium chloride flush 0.9 % injection 5-40 mL  5-40 mL IntraVENous PRN Teraia Pain, MD        0.9 % sodium chloride infusion  25 mL IntraVENous PRN Redia Pain, MD        HYDROcodone-acetaminophen [facility]) 5-325 MG per tablet 1 tablet  1 tablet Oral Q4H PRN Ottoniel Blum, DO   1 tablet at 09/10/22 1324    glucose chewable tablet 16 g  4 tablet Oral PRN Zuleika Barreto, DO        dextrose bolus 10% 125 mL  125 mL IntraVENous PRN Zuleika Barreto, DO   Stopped at 09/05/22 0848    Or    dextrose bolus 10% 250 mL  250 mL IntraVENous PRN Zuleika Barreto, DO        glucagon (rDNA) injection 1 mg  1 mg SubCUTAneous PRN Zuleika Barreto, DO        dextrose 10 % infusion   IntraVENous Continuous PRN Zuleika Barreto, DO        morphine (PF) injection 2 mg  2 mg IntraVENous Q4H PRN Ottoniel Blum, DO   2 mg at 09/06/22 2028    albuterol (PROVENTIL) nebulizer solution 2.5 mg  2.5 mg Nebulization Q4H PRN Ottoniel Marshallen, DO        multivitamin 1 tablet  1 tablet Oral Daily Sol Blush, DO   1 tablet at 22 0919    ondansetron (ZOFRAN-ODT) disintegrating tablet 4 mg  4 mg Oral Q8H PRANNE Patrick MD   4 mg at 22 2044    Or    ondansetron (ZOFRAN) injection 4 mg  4 mg IntraVENous Q6H PRN Chel Patrick MD        polyethylene glycol (GLYCOLAX) packet 17 g  17 g Oral Daily PRN Chel Patrick MD        thiamine (B-1) injection 100 mg  100 mg IntraVENous Daily Chel Patrick MD   100 mg at 09/10/22 1006    nicotine (NICODERM CQ) 14 MG/24HR 1 patch  1 patch TransDERmal Daily Chel Patrick MD        LORazepam (ATIVAN) tablet 1 mg  1 mg Oral Q1H PRN Chel Patrick MD   1 mg at 22 0844    Or    LORazepam (ATIVAN) injection 1 mg  1 mg IntraVENous Q1H PRN Chel Patrick MD        Or    LORazepam (ATIVAN) tablet 2 mg  2 mg Oral Q1H PRANNE Patrick MD   2 mg at 22 0443    Or    LORazepam (ATIVAN) injection 2 mg  2 mg IntraVENous Q1H PRN Chel Patrick MD   2 mg at 22 3973    Or    LORazepam (ATIVAN) tablet 3 mg  3 mg Oral Q1H PRN Chel Patrick MD        Or    LORazepam (ATIVAN) injection 3 mg  3 mg IntraVENous Q1H PRANNE Patrick MD        Or    LORazepam (ATIVAN) tablet 4 mg  4 mg Oral Q1H PRN Chel Patrick MD        Or    LORazepam (ATIVAN) injection 4 mg  4 mg IntraVENous Q1H PRN Chel Patrick MD           Physical Examination:      Vitals:  BP (!) 105/53   Pulse (!) 103   Temp 99.3 °F (37.4 °C) (Oral)   Resp 16   Ht 5' 5\" (1.651 m)   Wt 185 lb 11.2 oz (84.2 kg)   LMP  (LMP Unknown) Comment: tubal ligation  SpO2 96%   BMI 30.90 kg/m²   Temp (24hrs), Av.8 °F (37.1 °C), Min:98.2 °F (36.8 °C), Max:99.3 °F (37.4 °C)      General appearance: alert, cooperative and no distress. Mild jaundice. Scleral icterus present.   Obese  Mental Status: oriented to person, place and time and normal affect  Lungs: clear to auscultation bilaterally, normal effort  Heart: regular rate and rhythm, no murmur  Abdomen: Mild TTP primarily in right upper quadrant. Otherwise abdomen is soft. Bowel sounds are present. No rebound tenderness or guarding. Extremities: no edema, redness, tenderness in the calves. Cap refill <2s    Data:     Labs:  Recent Labs     09/10/22  0452 09/11/22  0533   WBC 5.4 4.3*   HGB 9.3* 10.5*   PLT 82* 90*     Recent Labs     09/10/22  0452 09/11/22  0533    134*   K 3.2* 4.8    105   CO2 24 21   BUN 7 7   CREATININE 1.03* 1.00*   GLUCOSE 98 79     Recent Labs     09/10/22  0452 09/11/22  0533   * 199*   ALT 43* 45*   BILITOT 8.9* 9.6*   ALKPHOS 348* 367*       Assessment and Plan:        1. Acute alcoholic hepatitis and pancreatitis: Gradually improving  -Supportive care with IVF, pain control  -Continue regular diet. Trial off IVF  -GI following  -Noted low-grade temperature-UA shows pyuria. Continue empiric ceftriaxone and follow culture  -PT/OT-May need placement    2. Severe alcohol use disorder with risk for acute alcohol withdrawal  -monitor on CIWA scale  -Thiamine, multivitamin    3. Hypokalemia, hypomagnesemia, hypophosphatemia: Repleted    4. Mild WILLIE: Improved    5. Pyuria: Empiric ceftriaxone 3-day course. Follow culture     Tobacco use disorder  Thrombocytopenia    Diet: ADULT DIET; Regular; Low Sodium (2 gm)  Ppx: SCDs  Full Code    Hepatitis, pancreatitis is stabilizing however patient demonstrates significant weakness-discussed possibility of placement with case management.     >35 minutes in total care time    Electronically signed by Kofi Veliz DO on 9/11/2022 at 9:23 AM

## 2022-09-12 PROBLEM — R52 GENERALIZED PAIN: Status: ACTIVE | Noted: 2022-09-12

## 2022-09-12 PROBLEM — M79.10 MYALGIA, UNSPECIFIED SITE: Status: ACTIVE | Noted: 2022-01-16

## 2022-09-12 PROBLEM — Z74.09 IMPAIRED MOBILITY AND ACTIVITIES OF DAILY LIVING: Status: ACTIVE | Noted: 2022-09-12

## 2022-09-12 PROBLEM — Z78.9 IMPAIRED MOBILITY AND ACTIVITIES OF DAILY LIVING: Status: ACTIVE | Noted: 2022-09-12

## 2022-09-12 LAB
ALBUMIN SERPL-MCNC: 1.4 G/DL (ref 3.5–4.6)
ALP BLD-CCNC: 341 U/L (ref 40–130)
ALT SERPL-CCNC: 39 U/L (ref 0–33)
ANION GAP SERPL CALCULATED.3IONS-SCNC: 9 MEQ/L (ref 9–15)
ANISOCYTOSIS: ABNORMAL
AST SERPL-CCNC: 164 U/L (ref 0–35)
BANDED NEUTROPHILS RELATIVE PERCENT: 2 % (ref 5–11)
BASOPHILS ABSOLUTE: 0 K/UL (ref 0–0.2)
BASOPHILS RELATIVE PERCENT: 0.5 %
BILIRUB SERPL-MCNC: 7.9 MG/DL (ref 0.2–0.7)
BUN BLDV-MCNC: 8 MG/DL (ref 6–20)
CALCIUM SERPL-MCNC: 7.6 MG/DL (ref 8.5–9.9)
CHLORIDE BLD-SCNC: 104 MEQ/L (ref 95–107)
CO2: 22 MEQ/L (ref 20–31)
CREAT SERPL-MCNC: 1 MG/DL (ref 0.5–0.9)
EOSINOPHILS ABSOLUTE: 0 K/UL (ref 0–0.7)
EOSINOPHILS RELATIVE PERCENT: 0.7 %
GFR AFRICAN AMERICAN: >60
GFR NON-AFRICAN AMERICAN: >60
GLOBULIN: 3.4 G/DL (ref 2.3–3.5)
GLUCOSE BLD-MCNC: 114 MG/DL (ref 70–99)
GLUCOSE BLD-MCNC: 74 MG/DL (ref 70–99)
GLUCOSE BLD-MCNC: 78 MG/DL (ref 70–99)
GLUCOSE BLD-MCNC: 96 MG/DL (ref 70–99)
HCT VFR BLD CALC: 30.6 % (ref 37–47)
HEMOGLOBIN: 9.8 G/DL (ref 12–16)
HYPOCHROMIA: ABNORMAL
LYMPHOCYTES ABSOLUTE: 0.3 K/UL (ref 1–4.8)
LYMPHOCYTES RELATIVE PERCENT: 5 %
MACROCYTES: ABNORMAL
MCH RBC QN AUTO: 36.7 PG (ref 27–31.3)
MCHC RBC AUTO-ENTMCNC: 32.2 % (ref 33–37)
MCV RBC AUTO: 114.1 FL (ref 82–100)
MONOCYTES ABSOLUTE: 0.6 K/UL (ref 0.2–0.8)
MONOCYTES RELATIVE PERCENT: 10 %
NEUTROPHILS ABSOLUTE: 4.8 K/UL (ref 1.4–6.5)
NEUTROPHILS RELATIVE PERCENT: 83 %
ORGANISM: ABNORMAL
PDW BLD-RTO: 14.8 % (ref 11.5–14.5)
PERFORMED ON: ABNORMAL
PERFORMED ON: NORMAL
PERFORMED ON: NORMAL
PLATELET # BLD: 103 K/UL (ref 130–400)
PLATELET SLIDE REVIEW: ABNORMAL
POIKILOCYTES: ABNORMAL
POLYCHROMASIA: ABNORMAL
POTASSIUM REFLEX MAGNESIUM: 4.2 MEQ/L (ref 3.4–4.9)
RBC # BLD: 2.68 M/UL (ref 4.2–5.4)
SODIUM BLD-SCNC: 135 MEQ/L (ref 135–144)
TARGET CELLS: ABNORMAL
TOTAL PROTEIN: 4.8 G/DL (ref 6.3–8)
URINE CULTURE, ROUTINE: ABNORMAL
URINE CULTURE, ROUTINE: ABNORMAL
WBC # BLD: 5.7 K/UL (ref 4.8–10.8)

## 2022-09-12 PROCEDURE — 36415 COLL VENOUS BLD VENIPUNCTURE: CPT

## 2022-09-12 PROCEDURE — 97535 SELF CARE MNGMENT TRAINING: CPT

## 2022-09-12 PROCEDURE — 6360000002 HC RX W HCPCS: Performed by: INTERNAL MEDICINE

## 2022-09-12 PROCEDURE — 2580000003 HC RX 258: Performed by: SPECIALIST

## 2022-09-12 PROCEDURE — 85025 COMPLETE CBC W/AUTO DIFF WBC: CPT

## 2022-09-12 PROCEDURE — 99232 SBSQ HOSP IP/OBS MODERATE 35: CPT | Performed by: PHYSICAL MEDICINE & REHABILITATION

## 2022-09-12 PROCEDURE — 2580000003 HC RX 258: Performed by: INTERNAL MEDICINE

## 2022-09-12 PROCEDURE — 99232 SBSQ HOSP IP/OBS MODERATE 35: CPT | Performed by: NURSE PRACTITIONER

## 2022-09-12 PROCEDURE — 6370000000 HC RX 637 (ALT 250 FOR IP): Performed by: INTERNAL MEDICINE

## 2022-09-12 PROCEDURE — 1210000000 HC MED SURG R&B

## 2022-09-12 PROCEDURE — 6370000000 HC RX 637 (ALT 250 FOR IP): Performed by: SPECIALIST

## 2022-09-12 PROCEDURE — 80053 COMPREHEN METABOLIC PANEL: CPT

## 2022-09-12 RX ADMIN — THIAMINE HYDROCHLORIDE 100 MG: 100 INJECTION, SOLUTION INTRAMUSCULAR; INTRAVENOUS at 08:11

## 2022-09-12 RX ADMIN — Medication 10 ML: at 22:10

## 2022-09-12 RX ADMIN — THERA TABS 1 TABLET: TAB at 08:11

## 2022-09-12 RX ADMIN — GABAPENTIN 100 MG: 100 CAPSULE ORAL at 13:26

## 2022-09-12 RX ADMIN — CEFTRIAXONE SODIUM 1000 MG: 1 INJECTION, POWDER, FOR SOLUTION INTRAMUSCULAR; INTRAVENOUS at 17:35

## 2022-09-12 RX ADMIN — PANTOPRAZOLE SODIUM 40 MG: 40 TABLET, DELAYED RELEASE ORAL at 05:08

## 2022-09-12 RX ADMIN — Medication 10 ML: at 08:20

## 2022-09-12 RX ADMIN — GABAPENTIN 100 MG: 100 CAPSULE ORAL at 08:11

## 2022-09-12 ASSESSMENT — PAIN SCALES - WONG BAKER: WONGBAKER_NUMERICALRESPONSE: 2

## 2022-09-12 NOTE — PROGRESS NOTES
Hospitalist Progress Note      PCP: Ana Hood MD    Date of Admission: 9/4/2022    Chief Complaint:  no acute events, afebrile, SBP in the 90-100s    Medications:  Reviewed    Infusion Medications    sodium chloride      dextrose       Scheduled Medications    cefTRIAXone (ROCEPHIN) IV  1,000 mg IntraVENous Q24H    gabapentin  100 mg Oral TID    pantoprazole  40 mg Oral QAM AC    sodium chloride flush  5-40 mL IntraVENous 2 times per day    multivitamin  1 tablet Oral Daily    thiamine  100 mg IntraVENous Daily    nicotine  1 patch TransDERmal Daily     PRN Meds: sodium chloride flush, sodium chloride, HYDROcodone 5 mg - acetaminophen, glucose, dextrose bolus **OR** dextrose bolus, glucagon (rDNA), dextrose, morphine, albuterol, ondansetron **OR** ondansetron, polyethylene glycol, LORazepam **OR** LORazepam **OR** LORazepam **OR** LORazepam **OR** LORazepam **OR** LORazepam **OR** LORazepam **OR** LORazepam    No intake or output data in the 24 hours ending 09/12/22 1132    Exam:    BP (!) 98/57   Pulse 92   Temp 97.7 °F (36.5 °C) (Oral)   Resp 18   Ht 5' 5\" (1.651 m)   Wt 185 lb 11.2 oz (84.2 kg)   LMP  (LMP Unknown) Comment: tubal ligation  SpO2 98%   BMI 30.90 kg/m²     General appearance: appears stated age and cooperative. Respiratory:  clear to auscultation, bilaterally   Cardiovascular: Regular rate and rhythm, S1/S2 . Abdomen: Soft, active bowel sounds. Musculoskeletal: No edema bilaterally.      Labs:   Recent Labs     09/10/22  0452 09/11/22  0533 09/12/22  0608   WBC 5.4 4.3* 5.7   HGB 9.3* 10.5* 9.8*   HCT 28.2* 33.9* 30.6*   PLT 82* 90* 103*     Recent Labs     09/10/22  0452 09/11/22  0533 09/12/22  0608    134* 135   K 3.2* 4.8 4.2    105 104   CO2 24 21 22   BUN 7 7 8   CREATININE 1.03* 1.00* 1.00*   CALCIUM 6.5* 7.2* 7.6*   PHOS 2.2* 2.7  --      Recent Labs     09/10/22  0452 09/11/22  0533 09/12/22  0608   * 199* 164*   ALT 43* 45* 39*   BILIDIR 7.2* 7.3*  -- BILITOT 8.9* 9.6* 7.9*   ALKPHOS 348* 367* 341*     Recent Labs     09/09/22  1349   INR 1.5     No results for input(s): CKTOTAL, TROPONINI in the last 72 hours. Urinalysis:      Lab Results   Component Value Date/Time    NITRU POSITIVE 09/10/2022 08:18 AM    WBCUA 21-50 09/10/2022 08:18 AM    BACTERIA MANY 09/10/2022 08:18 AM    RBCUA 0-2 09/10/2022 08:18 AM    BLOODU Negative 09/10/2022 08:18 AM    SPECGRAV 1.018 09/10/2022 08:18 AM    GLUCOSEU Negative 09/10/2022 08:18 AM       Radiology:  CT ABDOMEN PELVIS WO CONTRAST Additional Contrast? None   Final Result      ENLARGED FATTY LIVER. SMALL VOLUME ASCITES AND MILD ILL-DEFINED MESENTERIC AND RETROPERITONEAL INFLAMMATION, LIKELY EDEMA AND/OR PANCREATITIS. MILD PROBABLY REACTIVE WALL THICKENING OF THE COLON. Assessment/Plan:    42 y/o female with history of alcohol and tobacco use disorder who presented with:     Acute alcoholic hepatitis / pancreatitis  - slowly improving  - treated with IVFs, pain control  - Maddrey score < 32, no indication for prednisolone per GI    E. Coli UTI  - on IV Rocephin    Severe alcohol use disorder with risk for acute alcohol withdrawal  - monitor on CIWA scale  - Thiamine, multivitamin     Hypokalemia, hypomagnesemia, hypophosphatemia  - repleted    Mild WILLIE  - improved    Macrocytic anemia  - in the setting of alcohol use    Thrombocytopenia  - mild, likely related to alcohol use    Diet: ADULT DIET; Regular;  Low Sodium (2 gm)    Code Status: Full Code    Disposition -  acute rehab, waiting for precert          Electronically signed by Leanna Olmedo MD on 9/12/2022 at 11:32 AM

## 2022-09-12 NOTE — PROGRESS NOTES
470.  Albumin is 2.3. AST ALT ratio more than 2.,  And lipase is 118.,  CBC shows white count of 6.6 hemoglobin is 9.8 and 29.9 the MCV is 115.2, platelet count is 53, pro time 17.4 with INR of 1.4, viral acute hepatitis AB and C was negative  9/11/22 low-grade temp overnight, UA is positive, cultures pending, on ceftriaxone, no abdominal pain, tolerating diet. 9/12/22 no fever overnight, on ceftriaxone for UTI, no abdominal pain, tolerating diet, reports mild nausea. PLAN :  1- Alcoholic hepatitis VS Decompensated cirrhosis   - MELD 20, Maddrey Discriminant Factor < 32, No encephalopathy  - EtOH cessation stressed to pt. Rec EtOH abuse counseling as outpatient  -Monitor electrolytes and replete as needed  -no fever overnight, + UA with reflex culture pending - on ceftriaxone, blood cultures negative   -Monitor LFTs and INR daily  - DF less than 32. No indication for prednisolone at this time   2- Mild Ascites by imaging:   3-  EGD for Variceal surveillance 9/9/22  LA grade A esophagitis and PHG  No active GI bleeding   4-  HCC screening:   Recent imaging Negative for liver mass  5-  No overt Hepatic encephalopathy     Thank you for allowing me to participate in the care of your patient. Please feel free to contact me with any concerns.     SANTA Adorno - CNP

## 2022-09-12 NOTE — PROGRESS NOTES
the stairs her bedroom and bathroom are upstairs. \".    ROS x10: The patient also complains of severely impaired mobility and activities of daily living. Otherwise no new problems with vision, hearing, nose, mouth, throat, dermal, cardiovascular, GI, , pulmonary, musculoskeletal, psychiatric or neurological.        Vital signs:  /64   Pulse (!) 103   Temp 99.1 °F (37.3 °C) (Oral)   Resp 16   Ht 5' 5\" (1.651 m)   Wt 185 lb 11.2 oz (84.2 kg)   LMP  (LMP Unknown) Comment: tubal ligation  SpO2 97%   BMI 30.90 kg/m²   I/O:   PO/Intake:    fair PO intake, continue to monitor closely for dehydration    Bowel/Bladder:   continent,    General:  Patient is well developed, adequately nourished, and    well kempt. HEENT:    PERRLA, hearing intact to loud voice, external inspection of ear and nose benign. Inspection of lips, tongue and gums benign  Musculoskeletal: No significant change in strength or tone. All joints stable. Inspection and palpation of digits and nails show no clubbing, cyanosis or inflammatory conditions. Neuro/Psychiatric: Affect: flat-  Alert and oriented to self and situation with  min cues. No significant change in deep tendon reflexes or sensation  Lungs:  Diminished, CTA-B  . Respiration effort is normal at rest.   Heart:   S1 = S2,   RRR. Abdomen:  Soft, non-tender    Extremities:  Trace  lower extremity edema but no unusual tenderness. Skin:   BUE bruises dt blood draws      Rehabilitation:  Physical Therapy:   Bed mobility:  Bed mobility  Rolling to Right: Minimal assistance (09/10/22 0908)  Supine to Sit: Minimal assistance (09/10/22 0908)  Sit to Supine: Contact guard assistance (09/10/22 0908)  Scooting: Contact guard assistance (09/10/22 0908)  Bed Mobility Comments: pt with slow completion of all transitions.  Verbal cues for sequencing. (09/10/22 0908)  Transfers:  Transfers  Sit to Stand: Contact guard assistance (09/10/22 0908)  Stand to sit: Contact guard assistance (09/10/22 0908)  Bed to Chair: Contact guard assistance (09/10/22 0908)  Comment: Verbal cues for sequencing and effecient (09/10/22 0908)  Gait:   Ambulation  Surface: level tile (09/10/22 0909)  Device: Rolling Walker (09/10/22 0909)  Assistance: Contact guard assistance;Minimal assistance (09/10/22 0909)  Quality of Gait: Steadying assist required dt intermittent lateral LOB. Difficutly managing [employer] and obstacles requiring Iain. Slow pacing with [employer] posture. Cues for extension and approximation within walker. (09/10/22 0909)  Distance: 5ft X 2 with turn (09/10/22 0909)  Stairs:     W/C mobility:       Occupational Therapy:   Hand Dominance: Right  ADL  Feeding: Independent (09/10/22 1252)  Grooming: Setup (09/10/22 1252)  UE Bathing: Setup (09/10/22 1252)  LE Bathing: Moderate assistance (09/10/22 1252)  UE Dressing: Setup (09/10/22 1252)  LE Dressing:  Moderate assistance (09/10/22 1252)  LE Dressing Skilled Clinical Factors: pt unable to don socks d/t pain (09/10/22 1252)  Toileting: Unable to assess(comment) (pt declined need) (09/10/22 1252)  Additional Comments: ADL's simulated unless otherwise stated above (09/10/22 1252)  Toilet Transfers  Toilet Transfer: Unable to assess (09/10/22 1257)  Toilet Transfers Comments: anticpated CGA (09/10/22 1257)          Speech Therapy:            Diet/Swallow:                   COGNITION  OT: Cognition Comment: pt with increased time to process  SP:           Lab/X-ray studies reviewed, analyzed and discussed with patient and staff:   Recent Results (from the past 24 hour(s))   POCT Glucose    Collection Time: 09/11/22  4:08 PM   Result Value Ref Range    POC Glucose 86 70 - 99 mg/dl    Performed on ACCU-CHEK    CBC with Auto Differential    Collection Time: 09/12/22  6:08 AM   Result Value Ref Range    WBC 5.7 4.8 - 10.8 K/uL    RBC 2.68 (L) 4.20 - 5.40 M/uL    Hemoglobin 9.8 (L) 12.0 - 16.0 g/dL    Hematocrit 30.6 (L) 37.0 - 47.0 %    .1 (H) 82.0 - 100.0 fL    MCH 36.7 (H) 27.0 - 31.3 pg    MCHC 32.2 (L) 33.0 - 37.0 %    RDW 14.8 (H) 11.5 - 14.5 %    Platelets 285 (L) 886 - 400 K/uL     Previous extensive, complex labs, notes and diagnostics reviewed and analyzed. 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 Updated functional and medical status reassessed regarding patients ability to participate in therapies and patient found to be able to participate in:        acute intensive comprehensive inpatient rehabilitation program including PT/OT to improve balance, ambulation, ADLs, and to improve the P/AROM. It is my opinion that they will be able to tolerate 3 hours of therapy a day and benefit from it at an acute level. I again discussed acute rehab with the patient and verify that the patient is able and willing to participate in 3 hours of therapy a day. Rehab and Acute Care Case Management has also reinforced this expectation. Will continue to follow to attempt to get patient to the most efficient but most effective level of care will be in their best interest.  Continue to focus on energy conservation heart rate and blood pressure monitoring before during and after therapy endurance and consistency of function. We will need precertification from her insurance. Bowel constipation   and Bladder dysfunction   overactive, neurogenic bladder:  frequent toileting, ambulate to bathroom with assistance, check post void residuals.   Check for C.difficile x1 if >2 loose stools in 24 hours, continue bowel & bladder program.  Monitor for UTI symptoms including lethargy and confusion    Moderate to severe low back pain and generalized OA pain: reassess pain every shift and prior to and after each therapy session, give prn Tylenol   and consider scheduled Tylenol, modalities prn in therapy, consider Lidoderm, K-pad prn. Skin healing    breakdown   risk:  continue pressure relief program.  Daily skin exams and reports from nursing. Severe fatigue due to immobility and nutritional deficits: decline in PO intake is a concern-considering supplements   Add vitamin B12 vitamin D and CoQ10 titrate dosing and add protein supplementation with low carb content. Complex discharge planning:   Discussed with care team-last 24 hour events noted. I will continue to follow along and reassess functional and medical status as we strive to improve patient's functional and medical outcomes progressing to the most efficient and lowest level of care. Complex Active General Medical Issues that complicate care:     1. Principal Problem:    Alcoholic hepatitis with ascites  Active Problems:    Major depressive disorder, severe (HCC)    Impaired mobility and activities of daily living    Myalgia, unspecified site    GI bleeding  Resolved Problems:    * No resolved hospital problems. *          Events and functional changes in the past 24 hours reviewed improvements in functional status are encouraging       Focus of today's plan-   Focus on endurance, activity pacing, reassessing rehab goals and discharge planning.         Agusto Clifton D.O., PM&R     Attending    [address]

## 2022-09-12 NOTE — PROGRESS NOTES
Physical Therapy Med Surg Daily Treatment Note  [facility]  [facility]       NAME: Rey Parish  : 1985 (43 y.o.)  MRN: [id number]  CODE STATUS: Full Code    Date of Service: 2022    Patient Diagnosis(es): Acute alcoholic hepatitis [F24.81]  Alcoholic hepatitis with ascites [K70.11]   Chief Complaint   Patient presents with    Abdominal Pain     Abd pain and distention x 2 days    Withdrawal     Alcohol withdrawal     Patient Active Problem List    Diagnosis Date Noted    Impaired mobility and activities of daily living 2022    Generalized pain     Alcoholic hepatitis with ascites 2022    Major depressive disorder, severe ([facility] [address] 2022    Myalgia, unspecified site 2022    Toe deformity, acquired 2015    Pain in left foot 2014    Bursitis of foot 2012    GI bleeding 2022        Past Medical History:   Diagnosis Date    Alcohol abuse     Tobacco dependence      Past Surgical History:   Procedure Laterality Date    APPENDECTOMY      FOOT SURGERY      reports 6 sx on L foot and one on right foot    TUBAL LIGATION      UPPER GASTROINTESTINAL ENDOSCOPY N/A 2022    EGD DIAGNOSTIC ONLY performed by Chapis Angel MD at [facility]       Chart Reviewed: Yes    Restrictions:  Restrictions/Precautions: Seizure; Fall Risk    SUBJECTIVE:   Subjective: I'll try to get up to the chair. Pain  Pain: 8/10 abdominal pain pre and post session     OBJECTIVE:   Orientation  Overall Orientation Status: Within Functional Limits    Bed mobility  Rolling to Left: Stand by assistance  Supine to Sit: Minimal assistance  Bed Mobility Comments: HOB slightly elevated. vc's for sequencing and hand placement. Slight trunk lifting assist required.     Transfers  Sit to Stand: Contact guard assistance;Stand by assistance  Stand to sit: Contact guard assistance;Stand by assistance  Bed to Chair: Contact guard assistance;Stand by assistance  Comment: vc's for technique with [employer]. Once standing pt pushes [employer] out of the way and states \"I want to try this without the walker. \"    Ambulation  Surface: level tile  Device: No Device  Assistance: Contact guard assistance  Quality of Gait: WBOS, waddle gait pattern; increased time to complete  Gait Deviations: Slow Kamila; Increased JOHNNA; Decreased step length  Distance: 5'                              Activity Tolerance  Activity Tolerance: Patient limited by pain          ASSESSMENT   Assessment: Pt ambulated without device however with increased pain and slightly unsteady. Pt up to the chair post session. \"I don't think I can sit here for an hour. \"     Discharge Recommendations:  Continue to assess pending progress, Patient would benefit from continued therapy after discharge         Goals  Long Term Goals  Long term goal 1: Pt to complete bed mobility with indep  Long term goal 2: Pt to complete transfers with Supervision  Long term goal 3: Pt to ambulate 50-150ft with LRD and SBA  Long term goal 4: Pt to manage flight of steps with HR and SBA    PLAN    Plan: 1 time a day 3-6 times a week  Safety Devices  Type of Devices: All fall risk precautions in place, Call light within reach, Chair alarm in place, Left in chair     [facility] (6 CLICK) [address] Inpatient Mobility Raw Score : 16      Therapy Time   Individual   Time In 1348   Time Out 1409   Minutes 21      Bm/Trsf - 13 mins  Gait - 8 mins       Carlos Ruiz, PTA, 09/12/22 at 2:16 PM         Definitions for assistance levels  Independent = pt does not require any physical supervision or assistance from another person for activity completion. Device may be needed.   Stand by assistance = pt requires verbal cues or instructions from another person, close to but not touching, to perform the activity  Minimal assistance= pt performs 75% or more of the activity; assistance is required to complete the activity  Moderate assistance= pt performs 50% of the activity; assistance is required to complete the activity  Maximal assistance = pt performs 25% of the activity; assistance is required to complete the activity  Dependent = pt requires total physical assistance to accomplish the task

## 2022-09-12 NOTE — PROGRESS NOTES
Shift assessments completed and documented, see flowsheets. A&OX4. Denies pain. Medications administered per MAR. Pt very sluggish and slow-moving. Bed alarm on. Call light within reach. No further needs verbalized at this time by pt.

## 2022-09-12 NOTE — PROGRESS NOTES
Shift assessment complete, VSS, meds given per orders, Denies further needs at this time, call light within reach.  Electronically signed by Adrien Aase, RN on 9/12/2022 at 9:08 AM

## 2022-09-12 NOTE — CARE COORDINATION
This LSW met with patient at bedside this am. I notified patient that she has been accepted to [facility]. Pre-cert has been initiated, on 9/12/2022, per [facility] AND [facility] admissions. YEW / FABIAN to follow.   Electronically signed by LIDYA Gutierrez, YEW on 9/12/22 at 10:13 AM EDT

## 2022-09-12 NOTE — CARE COORDINATION
Inpatient Rehab referral received. Met with patient and explained [facility] Acute Inpatient Rehab program and requirements, including 3 hours of intense therapy daily, anticipated length of stay and goal of discharge to home. All questions answered and patient verbalized understanding. Freedom of choice provided and patient requests admit to [facility] if approved by insurance. PM&R consult completed. Patient is from home with her children she has a 10year old and 22 yo. She was not currently working. She admits to drinking ETOH daily 3 double shots and said her drinking was worsening for 3 months. She is interested in stopping now that she has come off of it. She would like to do acute inpatient rehab to get stronger and return home she has 4-5 steps into a multi-level home and needs to get stronger to do the stairs her bedroom and bathroom are upstairs.   Precert to [facility] YQ#[id number]  Electronically signed by Brendan Villagomez RN on 9/12/22 at 10:21 AM EDT

## 2022-09-13 ENCOUNTER — APPOINTMENT (OUTPATIENT)
Dept: INTERVENTIONAL RADIOLOGY/VASCULAR | Age: 37
DRG: 280 | End: 2022-09-13
Payer: COMMERCIAL

## 2022-09-13 LAB
ALBUMIN SERPL-MCNC: 1.6 G/DL (ref 3.5–4.6)
ALP BLD-CCNC: 342 U/L (ref 40–130)
ALT SERPL-CCNC: 38 U/L (ref 0–33)
ANION GAP SERPL CALCULATED.3IONS-SCNC: 8 MEQ/L (ref 9–15)
APPEARANCE FLUID: CLEAR
AST SERPL-CCNC: 158 U/L (ref 0–35)
BASOPHILS ABSOLUTE: 0 K/UL (ref 0–0.2)
BASOPHILS RELATIVE PERCENT: 0.3 %
BILIRUB SERPL-MCNC: 7.4 MG/DL (ref 0.2–0.7)
BUN BLDV-MCNC: 8 MG/DL (ref 6–20)
CALCIUM SERPL-MCNC: 7.7 MG/DL (ref 8.5–9.9)
CELL COUNT FLUID TYPE: NORMAL
CHLORIDE BLD-SCNC: 104 MEQ/L (ref 95–107)
CLOT EVALUATION: NORMAL
CO2: 24 MEQ/L (ref 20–31)
COLOR FLUID: YELLOW
CREAT SERPL-MCNC: 0.95 MG/DL (ref 0.5–0.9)
EOSINOPHILS ABSOLUTE: 0 K/UL (ref 0–0.7)
EOSINOPHILS RELATIVE PERCENT: 0.3 %
GFR AFRICAN AMERICAN: >60
GFR NON-AFRICAN AMERICAN: >60
GLOBULIN: 3.4 G/DL (ref 2.3–3.5)
GLUCOSE BLD-MCNC: 106 MG/DL (ref 70–99)
GLUCOSE BLD-MCNC: 108 MG/DL (ref 70–99)
GLUCOSE BLD-MCNC: 117 MG/DL (ref 70–99)
GLUCOSE BLD-MCNC: 83 MG/DL (ref 70–99)
GLUCOSE BLD-MCNC: 88 MG/DL (ref 70–99)
HCT VFR BLD CALC: 29.7 % (ref 37–47)
HEMOGLOBIN: 9.7 G/DL (ref 12–16)
INR BLD: 1.5
LYMPHOCYTES ABSOLUTE: 1.5 K/UL (ref 1–4.8)
LYMPHOCYTES RELATIVE PERCENT: 18.1 %
LYMPHOCYTES, BODY FLUID: 37 %
MAGNESIUM: 1.6 MG/DL (ref 1.7–2.4)
MCH RBC QN AUTO: 36.2 PG (ref 27–31.3)
MCHC RBC AUTO-ENTMCNC: 32.6 % (ref 33–37)
MCV RBC AUTO: 111.1 FL (ref 82–100)
MONOCYTE, FLUID: 30 %
MONOCYTES ABSOLUTE: 1 K/UL (ref 0.2–0.8)
MONOCYTES RELATIVE PERCENT: 12.2 %
NEUTROPHIL, FLUID: 33 %
NEUTROPHILS ABSOLUTE: 5.5 K/UL (ref 1.4–6.5)
NEUTROPHILS RELATIVE PERCENT: 69.1 %
NUCLEATED CELLS FLUID: 27 /CUMM
NUMBER OF CELLS COUNTED FLUID: 100
PDW BLD-RTO: 14.8 % (ref 11.5–14.5)
PERFORMED ON: ABNORMAL
PERFORMED ON: NORMAL
PLATELET # BLD: 117 K/UL (ref 130–400)
POTASSIUM REFLEX MAGNESIUM: 3.9 MEQ/L (ref 3.4–4.9)
PROTHROMBIN TIME: 18.4 SEC (ref 12.3–14.9)
RBC # BLD: 2.67 M/UL (ref 4.2–5.4)
RBC FLUID: 43 /CUMM
SODIUM BLD-SCNC: 136 MEQ/L (ref 135–144)
TOTAL PROTEIN: 5 G/DL (ref 6.3–8)
WBC # BLD: 8 K/UL (ref 4.8–10.8)

## 2022-09-13 PROCEDURE — 82150 ASSAY OF AMYLASE: CPT

## 2022-09-13 PROCEDURE — 99232 SBSQ HOSP IP/OBS MODERATE 35: CPT | Performed by: NURSE PRACTITIONER

## 2022-09-13 PROCEDURE — 0W9G3ZZ DRAINAGE OF PERITONEAL CAVITY, PERCUTANEOUS APPROACH: ICD-10-PCS | Performed by: RADIOLOGY

## 2022-09-13 PROCEDURE — 80053 COMPREHEN METABOLIC PANEL: CPT

## 2022-09-13 PROCEDURE — 88112 CYTOPATH CELL ENHANCE TECH: CPT

## 2022-09-13 PROCEDURE — 2500000003 HC RX 250 WO HCPCS: Performed by: RADIOLOGY

## 2022-09-13 PROCEDURE — 97116 GAIT TRAINING THERAPY: CPT

## 2022-09-13 PROCEDURE — 99223 1ST HOSP IP/OBS HIGH 75: CPT | Performed by: RADIOLOGY

## 2022-09-13 PROCEDURE — 88341 IMHCHEM/IMCYTCHM EA ADD ANTB: CPT

## 2022-09-13 PROCEDURE — 87205 SMEAR GRAM STAIN: CPT

## 2022-09-13 PROCEDURE — 88342 IMHCHEM/IMCYTCHM 1ST ANTB: CPT

## 2022-09-13 PROCEDURE — 88305 TISSUE EXAM BY PATHOLOGIST: CPT

## 2022-09-13 PROCEDURE — 83615 LACTATE (LD) (LDH) ENZYME: CPT

## 2022-09-13 PROCEDURE — 85025 COMPLETE CBC W/AUTO DIFF WBC: CPT

## 2022-09-13 PROCEDURE — 89051 BODY FLUID CELL COUNT: CPT

## 2022-09-13 PROCEDURE — 83735 ASSAY OF MAGNESIUM: CPT

## 2022-09-13 PROCEDURE — 6370000000 HC RX 637 (ALT 250 FOR IP): Performed by: INTERNAL MEDICINE

## 2022-09-13 PROCEDURE — 6360000002 HC RX W HCPCS: Performed by: INTERNAL MEDICINE

## 2022-09-13 PROCEDURE — 1210000000 HC MED SURG R&B

## 2022-09-13 PROCEDURE — 87186 SC STD MICRODIL/AGAR DIL: CPT

## 2022-09-13 PROCEDURE — 82042 OTHER SOURCE ALBUMIN QUAN EA: CPT

## 2022-09-13 PROCEDURE — 87077 CULTURE AEROBIC IDENTIFY: CPT

## 2022-09-13 PROCEDURE — 6370000000 HC RX 637 (ALT 250 FOR IP): Performed by: SPECIALIST

## 2022-09-13 PROCEDURE — 85610 PROTHROMBIN TIME: CPT

## 2022-09-13 PROCEDURE — 82945 GLUCOSE OTHER FLUID: CPT

## 2022-09-13 PROCEDURE — 49083 ABD PARACENTESIS W/IMAGING: CPT | Performed by: RADIOLOGY

## 2022-09-13 PROCEDURE — 49083 ABD PARACENTESIS W/IMAGING: CPT

## 2022-09-13 PROCEDURE — 97535 SELF CARE MNGMENT TRAINING: CPT

## 2022-09-13 PROCEDURE — 2709999900 IR US GUIDED PARACENTESIS

## 2022-09-13 PROCEDURE — 84157 ASSAY OF PROTEIN OTHER: CPT

## 2022-09-13 PROCEDURE — 36415 COLL VENOUS BLD VENIPUNCTURE: CPT

## 2022-09-13 PROCEDURE — 2580000003 HC RX 258: Performed by: SPECIALIST

## 2022-09-13 PROCEDURE — 87070 CULTURE OTHR SPECIMN AEROBIC: CPT

## 2022-09-13 PROCEDURE — 99232 SBSQ HOSP IP/OBS MODERATE 35: CPT | Performed by: PHYSICAL MEDICINE & REHABILITATION

## 2022-09-13 PROCEDURE — 87181 SC STD AGAR DILUTION PER AGT: CPT

## 2022-09-13 RX ORDER — LIDOCAINE HYDROCHLORIDE 20 MG/ML
INJECTION, SOLUTION INFILTRATION; PERINEURAL
Status: COMPLETED | OUTPATIENT
Start: 2022-09-13 | End: 2022-09-13

## 2022-09-13 RX ORDER — LANOLIN ALCOHOL/MO/W.PET/CERES
400 CREAM (GRAM) TOPICAL DAILY
Status: DISCONTINUED | OUTPATIENT
Start: 2022-09-13 | End: 2022-09-15 | Stop reason: HOSPADM

## 2022-09-13 RX ADMIN — THERA TABS 1 TABLET: TAB at 08:16

## 2022-09-13 RX ADMIN — PANTOPRAZOLE SODIUM 40 MG: 40 TABLET, DELAYED RELEASE ORAL at 06:19

## 2022-09-13 RX ADMIN — LIDOCAINE HYDROCHLORIDE 10 ML: 20 INJECTION, SOLUTION INFILTRATION; PERINEURAL at 14:42

## 2022-09-13 RX ADMIN — HYDROCODONE BITARTRATE AND ACETAMINOPHEN 1 TABLET: 5; 325 TABLET ORAL at 16:20

## 2022-09-13 RX ADMIN — Medication 10 ML: at 08:16

## 2022-09-13 RX ADMIN — Medication 10 ML: at 21:00

## 2022-09-13 RX ADMIN — HYDROCODONE BITARTRATE AND ACETAMINOPHEN 1 TABLET: 5; 325 TABLET ORAL at 08:22

## 2022-09-13 RX ADMIN — THIAMINE HYDROCHLORIDE 100 MG: 100 INJECTION, SOLUTION INTRAMUSCULAR; INTRAVENOUS at 08:16

## 2022-09-13 RX ADMIN — LORAZEPAM 1 MG: 1 TABLET ORAL at 08:31

## 2022-09-13 RX ADMIN — Medication 400 MG: at 13:42

## 2022-09-13 ASSESSMENT — PAIN SCALES - GENERAL
PAINLEVEL_OUTOF10: 0
PAINLEVEL_OUTOF10: 7

## 2022-09-13 NOTE — PROGRESS NOTES
Physical Therapy Med Surg Daily Treatment Note  [facility]  [facility]       NAME: Ara Garcia  : 1985 (43 y.o.)  MRN: [id number]  CODE STATUS: Full Code    Date of Service: 2022    Patient Diagnosis(es): Acute alcoholic hepatitis [T24.40]  Alcoholic hepatitis with ascites [K70.11]   Chief Complaint   Patient presents with    Abdominal Pain     Abd pain and distention x 2 days    Withdrawal     Alcohol withdrawal     Patient Active Problem List    Diagnosis Date Noted    Impaired mobility and activities of daily living 2022    Generalized pain     Alcoholic hepatitis with ascites 2022    Major depressive disorder, severe ([facility] [address] 2022    Myalgia, unspecified site 2022    Toe deformity, acquired 2015    Pain in left foot 2014    Bursitis of foot 2012    GI bleeding 2022        Past Medical History:   Diagnosis Date    Alcohol abuse     Tobacco dependence      Past Surgical History:   Procedure Laterality Date    APPENDECTOMY      FOOT SURGERY      reports 6 sx on L foot and one on right foot    PARACENTESIS Left 2022    1510 ml removed per Dr De Souza Mode  specimen obtained    TUBAL LIGATION      UPPER GASTROINTESTINAL ENDOSCOPY N/A 2022    EGD DIAGNOSTIC ONLY performed by Cortney Trent MD at [facility]       Chart Reviewed: Yes    Restrictions:  Restrictions/Precautions: Seizure; Fall Risk    SUBJECTIVE:   Subjective: I'm feeling a little bit better than yesterday. Pain  Pain: Pt reported abdominal pain however did not rate. OBJECTIVE:   Orientation  Overall Orientation Status: Within Functional Limits  Cognition  Cognition Comment: Patient requires increased time for processing. Bed mobility  Rolling to Right: Stand by assistance  Supine to Sit: Stand by assistance  Bed Mobility Comments: HOB slightly elevated. vc's for sequencing and hand placement.  Increased time and effort to complete. Transfers  Sit to Stand: Stand by assistance;Contact guard assistance  Stand to sit: Stand by assistance;Contact guard assistance  Bed to Chair: Stand by assistance;Contact guard assistance  Comment: vc's for hand placement and technique without device; Slow paced d/t increased abdominal pain. Ambulation  Surface: level tile  Device: No Device  Assistance: Contact guard assistance;Stand by assistance  Quality of Gait: WBOS, waddle pattern, slight FF posture; vc's for normalizing gait pattern  Gait Deviations: Slow Kamila; Increased JOHNNA; Decreased step length  Distance: 13' with turns                              Activity Tolerance  Activity Tolerance: Patient tolerated treatment well;Patient limited by pain          ASSESSMENT   Assessment: Ambulated without device and slow paced. Increased pain however reported that it \"felt better than yesterday. \" Pt recently return from paracentesis. Discharge Recommendations:  Continue to assess pending progress, Patient would benefit from continued therapy after discharge         Goals  Long Term Goals  Long term goal 1: Pt to complete bed mobility with indep  Long term goal 2: Pt to complete transfers with Supervision  Long term goal 3: Pt to ambulate 50-150ft with LRD and SBA  Long term goal 4: Pt to manage flight of steps with HR and SBA    PLAN    Plan: 1 time a day 3-6 times a week  Safety Devices  Type of Devices: All fall risk precautions in place, Call light within reach, Chair alarm in place, Left in chair     [facility] (6 CLICK) [place] [address] Raw Score : 17      Therapy Time   Individual   Time In 1538   Time Out 1553   Minutes 15      Bm/Trsf - 5 mins  Gait - 10 mins       [facility], [facility], 09/13/22 at 4:01 PM         Definitions for assistance levels  Independent = pt does not require any physical supervision or assistance from another person for activity completion. Device may be needed.   Stand by assistance = pt requires verbal cues or instructions from another person, close to but not touching, to perform the activity  Minimal assistance= pt performs 75% or more of the activity; assistance is required to complete the activity  Moderate assistance= pt performs 50% of the activity; assistance is required to complete the activity  Maximal assistance = pt performs 25% of the activity; assistance is required to complete the activity  Dependent = pt requires total physical assistance to accomplish the task

## 2022-09-13 NOTE — PROGRESS NOTES
Comprehensive Nutrition Assessment    Type and Reason for Visit:  Initial, RD Nutrition Re-Screen/LOS    Nutrition Recommendations/Plan:   Continue Low Sodium diet     Malnutrition Assessment:  Malnutrition Status:  No malnutrition (09/13/22 1424)    Context:  Chronic Illness     Findings of the 6 clinical characteristics of malnutrition:  Energy Intake:  No significant decrease in energy intake  Weight Loss:  No significant weight loss     Body Fat Loss:  No significant body fat loss     Muscle Mass Loss:  No significant muscle mass loss    Fluid Accumulation:  Unable to assess     Strength:  Not Performed    Nutrition Assessment:    Pt admitted with acute alcoholic hepatitis. Pt c/o severe weakness, however po intakes have been fair-good during admission. Pt declines need for oral nutrition supplements at this time    Nutrition Related Findings:      Wound Type: None       Current Nutrition Intake & Therapies:    Average Meal Intake: 51-75%  Average Supplements Intake: None Ordered  ADULT DIET; Regular; Low Sodium (2 gm)    Anthropometric Measures:  Height: 5' 5\" (165.1 cm)  Ideal Body Weight (IBW): 125 lbs (57 kg)    Admission Body Weight: 140 lb (63.5 kg) (stated)  Current Body Weight: 185 lb (83.9 kg),   IBW. Weight Source: Bed Scale  Current BMI (kg/m2): 30.8  Usual Body Weight: 140 lb (63.5 kg) (stated)  % Weight Change (Calculated): 32.1                    BMI Categories: Obese Class 1 (BMI 30.0-34. 9)    Nutrition Interventions:   Food and/or Nutrient Delivery: Continue Current Diet (Continue Low Sodium diet)  Nutrition Education/Counseling: No recommendation at this time  Coordination of Nutrition Care: Continue to monitor while inpatient       Goals:     Goals: PO intake 75% or greater       Nutrition Monitoring and Evaluation:      Food/Nutrient Intake Outcomes: Food and Nutrient Intake  Physical Signs/Symptoms Outcomes: Biochemical Data, Weight    Discharge Planning:     Too soon to determine Joseph [address] RD, LD

## 2022-09-13 NOTE — PROGRESS NOTES
Subjective: The patient complains of severe acute on chronic progressive fatigue and CONFUSION, DIZZINESS partially relieved by rest, PT, OT and meds   and exacerbated by exertion and recent illness. She initiallypresented to ED with alcoholic hepatitis. Patient stated she knew her liver was \"gone\" so she had been trying to cut back on drinking, but still drinks 4-5 small bottles of fireball a day. Her last drink was on 9/4/2022 (day of admission) at 4am after waking up. She later developed sharp RUQ pain. CT of abdomen showed enlarged fatty liver, small volume ascites and mild ill-defined mesenteric and retroperitoneal inflammation, likely edema and/or pancreatitis. I am concerned about patients medical complexities including:  Principal Problem:    Alcoholic hepatitis with ascites  Active Problems:    Major depressive disorder, severe (HCC)    Impaired mobility and activities of daily living    Myalgia, unspecified site    GI bleeding  Resolved Problems:    * No resolved hospital problems. *      .    Reviewed recent nursing note and discussed current status and planned care with acute care providers, \" Shift assessment complete, pt complained of back pain, denies nausea SOB. Pt is alert and oriented X4, friendly and talkative. Pt made it up to restroom twice and had two bowel movements. Meds given per mar. Pt denies any further needs, and will continue to monitor. \".  I am concerned about her cognitive deficits, ascites and possible need for paracentesis. ROS x10: The patient also complains of severely impaired mobility and activities of daily living.   Otherwise no new problems with vision, hearing, nose, mouth, throat, dermal, cardiovascular, GI, , pulmonary, musculoskeletal, psychiatric or neurological.        Vital signs:  /68   Pulse (!) 121   Temp 97.7 °F (36.5 °C) (Oral)   Resp 18   Ht 5' 5\" (1.651 m)   Wt 185 lb 11.2 oz (84.2 kg)   LMP  (LMP Unknown) Comment: tubal ligation  SpO2 98%   BMI 30.90 kg/m²   I/O:   PO/Intake:    fair PO intake, continue to monitor closely for dehydration    Bowel/Bladder:   continent,    General:  Patient is well developed, adequately nourished, and    well kempt. HEENT:    PERRLA, hearing intact to loud voice, external inspection of ear and nose benign. Inspection of lips, tongue and gums benign  Musculoskeletal: No significant change in strength or tone. All joints stable. Inspection and palpation of digits and nails show no clubbing, cyanosis or inflammatory conditions. Neuro/Psychiatric: Affect: flat-  Alert and oriented to self and situation with  min cues. No significant change in deep tendon reflexes or sensation  Lungs:  Diminished, CTA-B  . Respiration effort is normal at rest.   Heart:   S1 = S2,   RRR. Abdomen:  Ascites, distended, Soft, non-tender    Extremities:   Mild lower extremity edema but no unusual tenderness. Skin:   BUE bruises dt blood draws      Rehabilitation:  Physical Therapy:   Bed mobility:  Bed mobility  Rolling to Left: Stand by assistance (09/12/22 1410)  Rolling to Right: Minimal assistance (09/10/22 0908)  Supine to Sit: Minimal assistance (09/12/22 1410)  Sit to Supine: Contact guard assistance (09/10/22 0908)  Scooting: Contact guard assistance (09/10/22 0908)  Bed Mobility Comments: HOB slightly elevated. vc's for sequencing and hand placement. Slight trunk lifting assist required. (09/12/22 1410)  Transfers:  Transfers  Sit to Stand: Contact guard assistance;Stand by assistance (09/12/22 1412)  Stand to sit: Contact guard assistance;Stand by assistance (09/12/22 1412)  Bed to Chair: Contact guard assistance;Stand by assistance (09/12/22 1412)  Comment: vc's for technique with [employer]. Once standing pt pushes [employer] out of the way and states \"I want to try this without the walker. \" (09/12/22 1412)  Gait:   Ambulation  Surface: level tile (09/12/22 1413)  Device: No Device (09/12/22 1413)  Assistance: Contact guard assistance (09/12/22 1413)  Quality of Gait: WBOS, waddle gait pattern; increased time to complete (09/12/22 1413)  Gait Deviations: Slow Kamila; Increased JOHNNA; Decreased step length (09/12/22 1413)  Distance: 5' (09/12/22 1413)  Stairs:     W/C mobility:       Occupational Therapy:   Hand Dominance: Right  ADL  Feeding: Independent (09/10/22 1252)  Grooming: Setup (09/10/22 1252)  UE Bathing: Setup (09/10/22 1252)  LE Bathing: Moderate assistance (09/10/22 1252)  UE Dressing: Setup (09/10/22 1252)  LE Dressing:  Moderate assistance (09/10/22 1252)  LE Dressing Skilled Clinical Factors: pt unable to don socks d/t pain (09/10/22 1252)  Toileting: Unable to assess(comment) (pt declined need) (09/10/22 1252)  Additional Comments: ADL's simulated unless otherwise stated above (09/10/22 1252)  Toilet Transfers  Toilet Transfer: Unable to assess (09/10/22 1257)  Toilet Transfers Comments: anticpated CGA (09/10/22 1257)          Speech Therapy:            Diet/Swallow:                   COGNITION  OT: Cognition Comment: pt with increased time to process  SP:           Lab/X-ray studies reviewed, analyzed and discussed with patient and staff:   Recent Results (from the past 24 hour(s))   POCT Glucose    Collection Time: 09/12/22 11:22 AM   Result Value Ref Range    POC Glucose 96 70 - 99 mg/dl    Performed on ACCU-CHEK    POCT Glucose    Collection Time: 09/12/22  3:46 PM   Result Value Ref Range    POC Glucose 114 (H) 70 - 99 mg/dl    Performed on ACCU-CHEK    CBC with Auto Differential    Collection Time: 09/13/22  6:22 AM   Result Value Ref Range    WBC 8.0 4.8 - 10.8 K/uL    RBC 2.67 (L) 4.20 - 5.40 M/uL    Hemoglobin 9.7 (L) 12.0 - 16.0 g/dL    Hematocrit 29.7 (L) 37.0 - 47.0 %    .1 (H) 82.0 - 100.0 fL    MCH 36.2 (H) 27.0 - 31.3 pg    MCHC 32.6 (L) 33.0 - 37.0 %    RDW 14.8 (H) 11.5 - 14.5 %    Platelets 388 (L) 686 - 400 K/uL    Neutrophils % 69.1 %    Lymphocytes % 18.1 %    Monocytes % 12.2 %    Eosinophils % 0.3 %    Basophils % 0.3 %    Neutrophils Absolute 5.5 1.4 - 6.5 K/uL    Lymphocytes Absolute 1.5 1.0 - 4.8 K/uL    Monocytes Absolute 1.0 (H) 0.2 - 0.8 K/uL    Eosinophils Absolute 0.0 0.0 - 0.7 K/uL    Basophils Absolute 0.0 0.0 - 0.2 K/uL   Comprehensive Metabolic Panel w/ Reflex to MG    Collection Time: 09/13/22  6:22 AM   Result Value Ref Range    Sodium 136 135 - 144 mEq/L    Potassium reflex Magnesium 3.9 3.4 - 4.9 mEq/L    Chloride 104 95 - 107 mEq/L    CO2 24 20 - 31 mEq/L    Anion Gap 8 (L) 9 - 15 mEq/L    Glucose 88 70 - 99 mg/dL    BUN 8 6 - 20 mg/dL    Creatinine 0.95 (H) 0.50 - 0.90 mg/dL    GFR Non-African American >60.0 >60    GFR  >60.0 >60    Calcium 7.7 (L) 8.5 - 9.9 mg/dL    Total Protein 5.0 (L) 6.3 - 8.0 g/dL    Albumin 1.6 (L) 3.5 - 4.6 g/dL    Total Bilirubin 7.4 (H) 0.2 - 0.7 mg/dL    Alkaline Phosphatase 342 (H) 40 - 130 U/L    ALT 38 (H) 0 - 33 U/L     (H) 0 - 35 U/L    Globulin 3.4 2.3 - 3.5 g/dL   Magnesium    Collection Time: 09/13/22  6:22 AM   Result Value Ref Range    Magnesium 1.6 (L) 1.7 - 2.4 mg/dL   Protime-INR    Collection Time: 09/13/22  6:22 AM   Result Value Ref Range    Protime 18.4 (H) 12.3 - 14.9 sec    INR 1.5    POCT Glucose    Collection Time: 09/13/22  7:53 AM   Result Value Ref Range    POC Glucose 83 70 - 99 mg/dl    Performed on ACCU-CHEK      Previous extensive, complex labs, notes and diagnostics reviewed and analyzed. 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   Updated functional and medical status reassessed regarding patients ability to participate in therapies and patient found to be able to participate in:        acute intensive comprehensive inpatient rehabilitation program including PT/OT to improve balance, ambulation, ADLs, and to improve the P/AROM. It is my opinion that they will be able to tolerate 3 hours of therapy a day and benefit from it at an acute level. I again discussed acute rehab with the patient and verify that the patient is able and willing to participate in 3 hours of therapy a day. Rehab and Acute Care Case Management has also reinforced this expectation. Will continue to follow to attempt to get patient to the most efficient but most effective level of care will be in their best interest.  Continue to focus on energy conservation heart rate and blood pressure monitoring before during and after therapy endurance and consistency of function. We will need precertification from her insurance. Bowel constipation   and Bladder dysfunction   overactive, neurogenic bladder:  frequent toileting, ambulate to bathroom with assistance, check post void residuals. Check for C.difficile x1 if >2 loose stools in 24 hours, continue bowel & bladder program.  Monitor for UTI symptoms including lethargy and confusion    Moderate to severe low back pain and generalized OA pain: reassess pain every shift and prior to and after each therapy session, give prn try to limit Tylenol   and consider scheduled Oxy IR to avoid Tylenol, modalities prn in therapy, consider Lidoderm, K-pad prn. Skin healing    breakdown   risk:  continue pressure relief program.  Daily skin exams and reports from nursing. Severe fatigue due to immobility and nutritional deficits: decline in PO intake is a concern-considering supplements   Add vitamin B12 vitamin D and CoQ10 titrate dosing and add protein supplementation with low carb content. Complex discharge planning:   Discussed with care team-last 24 hour events noted.   I will continue to follow along and reassess functional

## 2022-09-13 NOTE — PROGRESS NOTES
Hospitalist Progress Note      PCP: Max Daniel MD    Date of Admission: 9/4/2022    Chief Complaint:  no acute events, afebrile, SBP in the 90-100s    Medications:  Reviewed    Infusion Medications    sodium chloride      dextrose       Scheduled Medications    pantoprazole  40 mg Oral QAM AC    sodium chloride flush  5-40 mL IntraVENous 2 times per day    multivitamin  1 tablet Oral Daily    thiamine  100 mg IntraVENous Daily    nicotine  1 patch TransDERmal Daily     PRN Meds: sodium chloride flush, sodium chloride, HYDROcodone 5 mg - acetaminophen, glucose, dextrose bolus **OR** dextrose bolus, glucagon (rDNA), dextrose, morphine, albuterol, ondansetron **OR** ondansetron, polyethylene glycol, LORazepam **OR** LORazepam **OR** LORazepam **OR** LORazepam **OR** LORazepam **OR** LORazepam **OR** LORazepam **OR** LORazepam      Intake/Output Summary (Last 24 hours) at 9/13/2022 1258  Last data filed at 9/13/2022 0512  Gross per 24 hour   Intake 500 ml   Output --   Net 500 ml       Exam:    /68   Pulse (!) 121   Temp 98.6 °F (37 °C) (Oral)   Resp 20   Ht 5' 5\" (1.651 m)   Wt 185 lb 11.2 oz (84.2 kg)   LMP  (LMP Unknown) Comment: tubal ligation  SpO2 (!) 83%   BMI 30.90 kg/m²     General appearance: appears stated age and cooperative. Respiratory:  clear to auscultation, bilaterally   Cardiovascular: Regular rate and rhythm, S1/S2 . Abdomen: Soft, distended, active bowel sounds. Musculoskeletal: No edema bilaterally.      Labs:   Recent Labs     09/11/22  0533 09/12/22  0608 09/13/22  0622   WBC 4.3* 5.7 8.0   HGB 10.5* 9.8* 9.7*   HCT 33.9* 30.6* 29.7*   PLT 90* 103* 117*       Recent Labs     09/11/22  0533 09/12/22  0608 09/13/22  0622   * 135 136   K 4.8 4.2 3.9    104 104   CO2 21 22 24   BUN 7 8 8   CREATININE 1.00* 1.00* 0.95*   CALCIUM 7.2* 7.6* 7.7*   PHOS 2.7  --   --        Recent Labs     09/11/22  0533 09/12/22  0608 09/13/22  0622   * 164* 158*   ALT 45* 39* 38* BILIDIR 7.3*  --   --    BILITOT 9.6* 7.9* 7.4*   ALKPHOS 367* 341* 342*       Recent Labs     09/13/22  0622   INR 1.5       No results for input(s): CKTOTAL, TROPONINI in the last 72 hours. Urinalysis:      Lab Results   Component Value Date/Time    NITRU POSITIVE 09/10/2022 08:18 AM    WBCUA 21-50 09/10/2022 08:18 AM    BACTERIA MANY 09/10/2022 08:18 AM    RBCUA 0-2 09/10/2022 08:18 AM    BLOODU Negative 09/10/2022 08:18 AM    SPECGRAV 1.018 09/10/2022 08:18 AM    GLUCOSEU Negative 09/10/2022 08:18 AM       Radiology:  CT ABDOMEN PELVIS WO CONTRAST Additional Contrast? None   Final Result      ENLARGED FATTY LIVER. SMALL VOLUME ASCITES AND MILD ILL-DEFINED MESENTERIC AND RETROPERITONEAL INFLAMMATION, LIKELY EDEMA AND/OR PANCREATITIS. MILD PROBABLY REACTIVE WALL THICKENING OF THE COLON. Assessment/Plan:    44 y/o female with history of alcohol and tobacco use disorder who presented with:     Acute alcoholic hepatitis / pancreatitis  - slowly improving  - treated with IVFs, pain control  - Maddrey score < 32, no indication for prednisolone per GI  - s/p paracentesis with drainage of 1500 ml per IR    Esophagitis   - continue protonix    E. Coli UTI  - treated with IV Rocephin    Severe alcohol use disorder with risk for acute alcohol withdrawal  - treated with librium, gabapentin, clonidine   - on Lorazepam per [facility] protocol  - continue thiamine, multivitamin     Hypokalemia, hypomagnesemia, hypophosphatemia  - repleted    Mild WLILIE  - improved    Macrocytic anemia  - in the setting of alcohol use    Thrombocytopenia  - mild, likely related to alcohol use and liver disease, improving    Diet: ADULT DIET; Regular;  Low Sodium (2 gm)    Code Status: Full Code      Disposition -  acute rehab, waiting for precert          Electronically signed by Maddie Bird MD on 9/13/2022 at 12:58 PM

## 2022-09-13 NOTE — PROGRESS NOTES
Gastroenterology Progress Note    Ryan Monahan is a 43 y.o. female patient. Hospitalization Day:9    Chief C/O: alcoholic hepatitis    SUBJECTIVE: Seen and examined, alert, oriented, tolerating diet, no abdominal pain, no overt bleeding    ROS:  Gastrointestinal ROS: no abdominal pain, change in bowel habits, or black or bloody stools    Physical    VITALS:  /68   Pulse (!) 121   Temp 98.6 °F (37 °C) (Oral)   Resp 20   Ht 5' 5\" (1.651 m)   Wt 185 lb 11.2 oz (84.2 kg)   LMP  (LMP Unknown) Comment: tubal ligation  SpO2 (!) 83%   BMI 30.90 kg/m²   TEMPERATURE:  Current - Temp: 98.6 °F (37 °C); Max - Temp  Av.6 °F (37 °C)  Min: 98.6 °F (37 °C)  Max: 98.6 °F (37 °C)    General:  Alert and oriented  Skin- with jaundice  Eyes: icteric sclera  Cardiac: RRR, Nl s1s2, without murmurs  Lungs CTA Bilaterally, normal effort  Abdomen soft, ND, NT, no HSM, Bowel sounds normal  Ext: without edema  Neuro: no asterixis     Data    Data Review:    Recent Labs     22  0522  0608 22   WBC 4.3* 5.7 8.0   HGB 10.5* 9.8* 9.7*   HCT 33.9* 30.6* 29.7*   .6* 114.1* 111.1*   PLT 90* 103* 117*     Recent Labs     22  0522  0608 22  06   * 135 136   K 4.8 4.2 3.9    104 104   CO2 21 22 24   PHOS 2.7  --   --    BUN 7 8 8   CREATININE 1.00* 1.00* 0.95*     Recent Labs     22  0522  0608 22   * 164* 158*   ALT 45* 39* 38*   BILIDIR 7.3*  --   --    BILITOT 9.6* 7.9* 7.4*   ALKPHOS 367* 341* 342*     No results for input(s): LIPASE, AMYLASE in the last 72 hours. Recent Labs     22  0622   PROTIME 18.4*   INR 1.5            ASSESSMENT:  61-year-old female admitted with alcoholic hepatitis, with ? Underlying liver disease, clinically improved since arrival, on arrival  serum bilirubin is 8.8 and AST 34 ALT 90 alk phos is 470. Albumin is 2.3.   AST ALT ratio more than 2.,  And lipase is 118.,  CBC shows white count of 6.6 hemoglobin is 9.8 and 29.9 the MCV is 115.2, platelet count is 53, pro time 17.4 with INR of 1.4, viral acute hepatitis AB and C was negative  9/11/22 low-grade temp overnight, UA is positive, cultures pending, on ceftriaxone, no abdominal pain, tolerating diet. 9/12/22 no fever overnight, on ceftriaxone for UTI, no abdominal pain, tolerating diet, reports mild nausea. 9/13/22 no fever overnight, on ceftriaxone for E. coli UTI, no abdominal pain, tolerating diet, no further nausea. Awaiting bed placement for acute rehab    PLAN :  1- Alcoholic hepatitis VS Decompensated cirrhosis   - MELD 20, Maddrey Discriminant Factor < 32, No encephalopathy  - EtOH cessation stressed to pt. Rec EtOH abuse counseling as outpatient  -Monitor electrolytes and replete as needed  -no fever overnight, + UA with reflex culture pending - on ceftriaxone, blood cultures negative   -Monitor LFTs and INR daily  - DF less than 32. No indication for prednisolone at this time   2- Grade II Ascites   Schedule for diagnostic/therapeutic paracentesis in AM. Albumin (6 to 8 g/L of fluid removed) should  be administered ( if removed more than 5 L)  -2 Gm Na diet  3-  EGD for Variceal surveillance 9/9/22  LA grade A esophagitis and PHG  No active GI bleeding   4-  HCC screening:   Recent imaging Negative for liver mass  5-  No overt Hepatic encephalopathy        Thank you for allowing me to participate in the care of your patient. Please feel free to contact me with any concerns.     Marci Bah, APRN - CNP

## 2022-09-13 NOTE — CARE COORDINATION
Patient awaiting pre-cert to be transferred to [address] [place] [address] will be transferred when pre-cert is obtained and patient is medically cleared. YEW / FABIAN to follow.   Electronically signed by LIDYA Bahena, JUHI on 9/13/22 at 10:04 AM EDT

## 2022-09-13 NOTE — PROGRESS NOTES
Shift assessment complete, pt in bed alert/oriented, c/o anxiety/nervous, CIWA scale 8, medicated per orders. Am medications given per orders, repositioned for comfort, denies further needs at this time, call light within reach.  .Tommy Hem

## 2022-09-13 NOTE — OR NURSING
NO SEDATION    1420 pt brought to xray room 6 via cart. jreport received from [facility]. Procedure explained. Lying on left side. Obtained images prior to start of procedure using U/S. Pt  VSS. 1425  Procedure explained, consent signed. 1440 Timeout completed for Ultrasound Guided Paracentesis. Using U/S, Dr. Ap Bee marked, prepped LLQ with Chloraprep and draped with sterile drape and towels. 1442  Site numbed with lidocaine. [id number] Centesis 5F catheter placed using Ultrasound guidance. Fluid appears clear yellow. Specimen collected for lab orders. Catheter tubing connected to Echo it machine to remove fluid. 1450 pt tolerating drain well, denies complaints. Reminded pt to keep her left arm above her head, very cooperative. 1505 Pt tolerated well. Total 1510 ml removed. Catheter removed, pressure held and bandaid applied. Verbal and tactile reassurance given throughout. 1510 report called to Johnna Yoon. Specimen taken to the lab.     1520 pt states she feels better, less pressure in her abdomen. Transport here to take her back to her room via cart.

## 2022-09-13 NOTE — PROGRESS NOTES
[facility] OCCUPATIONAL THERAPY MED SURG TREATMENT NOTE     Date: 2022  Patient Name: Rosanne Meehan        MRN: [id number]  Account: [de-identified]   : 1985  (43 y.o.)  [facility]    Chart Review:    Restrictions  Restrictions/Precautions  Restrictions/Precautions: Seizure, Fall Risk     Safety:  Safety Devices  Type of Devices: All fall risk precautions in place    Patient's birthday verified: Yes    Subjective:    Subjective: I don't care about my hair right now. Pain at start of treatment: Yes: 10/10    Pain at end of treatment: Yes: 10/10    Location: stomach  Nursing notified: Declined  Intervention: None    Objective:    ADL Status:  ADL  Grooming: Stand by assistance  Grooming Skilled Clinical Factors: in standing  LE Dressing: Moderate assistance  LE Dressing Skilled Clinical Factors: Patient able to doff B hospital socks - unable to don B hospital socks    Therapy key for assistance levels -   Independent/Mod I = Pt. is able to perform task with no assistance but may require a device   Stand by assistance = Pt. does not perform task at an independent level but does not need physical assistance, requires verbal cues  Minimal, Moderate, Maximal Assistance = Pt. requires physical assistance (25%, 50%, 75% assist from helper) for task but is able to actively participate in task   Dependent = Pt. requires total assistance with task and is not able to actively participate with task completion    Orientation Status:  Orientation  Overall Orientation Status: Within Functional Limits    Cognition Status:  Cognition  Cognition Comment: Patient requires increased time for processing. Functional Mobility:  Patient ambulated to/from sink with Other: ww -> sink - No AD -> bed  at [facility] level.      Transfers:  Transfers  Stand Step Transfers: Stand by assistance;Contact guard assistance  Sit to stand: Stand by assistance  Stand to sit: Stand by assistance  Transfer Comments: Patient ambulated with ww bed -> sink at SBA - Patient ambulated sink -> bed at CGA without AD.     Functional Endurance:  Activity Tolerance  Activity Tolerance: Patient limited by pain    Treatment consisted of:    ADL training    Plan:    Continue OT per POC    Patient Education:       Equipment recommendations:       Goals/Plan of care addressed during this session:        Patient Goal:      Improve [place] with ADLs    Therapy Time:   Individual Group Co-Treat   Time In 1107       Time Out 1122         Minutes 15                ADL/IADL training: 15 minutes    Electronically signed by:    CHAPINCITO Augustine    9/13/2022, 12:04 PM

## 2022-09-13 NOTE — PROGRESS NOTES
Shift assessment complete, pt complained of back pain, denies nausea SOB. Pt is alert and oriented X4, friendly and talkative. Pt made it up to restroom twice and had two bowel movements. Meds given per mar. Pt denies any further needs, and will continue to monitor.

## 2022-09-14 PROBLEM — K70.10 ACUTE ALCOHOLIC HEPATITIS: Status: ACTIVE | Noted: 2022-09-14

## 2022-09-14 LAB
ALBUMIN FLUID: 0.2 G/DL
ALBUMIN SERPL-MCNC: 1.5 G/DL (ref 3.5–4.6)
ALP BLD-CCNC: 306 U/L (ref 40–130)
ALT SERPL-CCNC: 37 U/L (ref 0–33)
AMYLASE FLUID: 8 U/L
ANION GAP SERPL CALCULATED.3IONS-SCNC: 8 MEQ/L (ref 9–15)
AST SERPL-CCNC: 149 U/L (ref 0–35)
BASOPHILS ABSOLUTE: 0 K/UL (ref 0–0.2)
BASOPHILS RELATIVE PERCENT: 0.5 %
BILIRUB SERPL-MCNC: 6.7 MG/DL (ref 0.2–0.7)
BUN BLDV-MCNC: 9 MG/DL (ref 6–20)
CALCIUM SERPL-MCNC: 7.8 MG/DL (ref 8.5–9.9)
CHLORIDE BLD-SCNC: 103 MEQ/L (ref 95–107)
CO2: 23 MEQ/L (ref 20–31)
CREAT SERPL-MCNC: 0.77 MG/DL (ref 0.5–0.9)
EOSINOPHILS ABSOLUTE: 0 K/UL (ref 0–0.7)
EOSINOPHILS RELATIVE PERCENT: 0.7 %
FLUID TYPE: NORMAL
GFR AFRICAN AMERICAN: >60
GFR NON-AFRICAN AMERICAN: >60
GLOBULIN: 3.5 G/DL (ref 2.3–3.5)
GLUCOSE BLD-MCNC: 101 MG/DL (ref 70–99)
GLUCOSE BLD-MCNC: 108 MG/DL (ref 70–99)
GLUCOSE BLD-MCNC: 117 MG/DL (ref 70–99)
GLUCOSE BLD-MCNC: 84 MG/DL (ref 70–99)
GLUCOSE BLD-MCNC: 85 MG/DL (ref 70–99)
GLUCOSE, FLUID: 120 MG/DL
HCT VFR BLD CALC: 29.2 % (ref 37–47)
HEMOGLOBIN: 9.6 G/DL (ref 12–16)
INR BLD: 1.5
LACTATE DEHYDROGENASE, FLUID: 68 U/L
LYMPHOCYTES ABSOLUTE: 1.6 K/UL (ref 1–4.8)
LYMPHOCYTES RELATIVE PERCENT: 22.6 %
MAGNESIUM: 1.6 MG/DL (ref 1.7–2.4)
MCH RBC QN AUTO: 36.3 PG (ref 27–31.3)
MCHC RBC AUTO-ENTMCNC: 32.9 % (ref 33–37)
MCV RBC AUTO: 110.3 FL (ref 82–100)
MONOCYTES ABSOLUTE: 0.9 K/UL (ref 0.2–0.8)
MONOCYTES RELATIVE PERCENT: 12 %
NEUTROPHILS ABSOLUTE: 4.6 K/UL (ref 1.4–6.5)
NEUTROPHILS RELATIVE PERCENT: 64.2 %
PDW BLD-RTO: 14.9 % (ref 11.5–14.5)
PERFORMED ON: ABNORMAL
PERFORMED ON: NORMAL
PLATELET # BLD: 126 K/UL (ref 130–400)
POTASSIUM REFLEX MAGNESIUM: 3.9 MEQ/L (ref 3.4–4.9)
PROTEIN FLUID: 0.6 G/DL
PROTHROMBIN TIME: 18.5 SEC (ref 12.3–14.9)
RBC # BLD: 2.65 M/UL (ref 4.2–5.4)
SODIUM BLD-SCNC: 134 MEQ/L (ref 135–144)
SPECIMEN TYPE: NORMAL
TOTAL PROTEIN: 5 G/DL (ref 6.3–8)
WBC # BLD: 7.1 K/UL (ref 4.8–10.8)

## 2022-09-14 PROCEDURE — 36415 COLL VENOUS BLD VENIPUNCTURE: CPT

## 2022-09-14 PROCEDURE — 1210000000 HC MED SURG R&B

## 2022-09-14 PROCEDURE — 97535 SELF CARE MNGMENT TRAINING: CPT

## 2022-09-14 PROCEDURE — 6370000000 HC RX 637 (ALT 250 FOR IP): Performed by: INTERNAL MEDICINE

## 2022-09-14 PROCEDURE — 2580000003 HC RX 258: Performed by: SPECIALIST

## 2022-09-14 PROCEDURE — 6360000002 HC RX W HCPCS: Performed by: INTERNAL MEDICINE

## 2022-09-14 PROCEDURE — 99232 SBSQ HOSP IP/OBS MODERATE 35: CPT | Performed by: PHYSICAL MEDICINE & REHABILITATION

## 2022-09-14 PROCEDURE — 97116 GAIT TRAINING THERAPY: CPT

## 2022-09-14 PROCEDURE — 85025 COMPLETE CBC W/AUTO DIFF WBC: CPT

## 2022-09-14 PROCEDURE — 85610 PROTHROMBIN TIME: CPT

## 2022-09-14 PROCEDURE — 99232 SBSQ HOSP IP/OBS MODERATE 35: CPT | Performed by: NURSE PRACTITIONER

## 2022-09-14 PROCEDURE — 6370000000 HC RX 637 (ALT 250 FOR IP): Performed by: SPECIALIST

## 2022-09-14 PROCEDURE — 83735 ASSAY OF MAGNESIUM: CPT

## 2022-09-14 PROCEDURE — 80053 COMPREHEN METABOLIC PANEL: CPT

## 2022-09-14 RX ADMIN — Medication 400 MG: at 07:51

## 2022-09-14 RX ADMIN — PANTOPRAZOLE SODIUM 40 MG: 40 TABLET, DELAYED RELEASE ORAL at 05:49

## 2022-09-14 RX ADMIN — HYDROCODONE BITARTRATE AND ACETAMINOPHEN 1 TABLET: 5; 325 TABLET ORAL at 15:46

## 2022-09-14 RX ADMIN — Medication 10 ML: at 20:16

## 2022-09-14 RX ADMIN — Medication 10 ML: at 07:51

## 2022-09-14 RX ADMIN — THERA TABS 1 TABLET: TAB at 07:51

## 2022-09-14 RX ADMIN — THIAMINE HYDROCHLORIDE 100 MG: 100 INJECTION, SOLUTION INTRAMUSCULAR; INTRAVENOUS at 07:51

## 2022-09-14 RX ADMIN — HYDROCODONE BITARTRATE AND ACETAMINOPHEN 1 TABLET: 5; 325 TABLET ORAL at 07:51

## 2022-09-14 ASSESSMENT — ENCOUNTER SYMPTOMS
RESPIRATORY NEGATIVE: 1
DIARRHEA: 0
CONSTIPATION: 0
SHORTNESS OF BREATH: 0
WHEEZING: 0
ABDOMINAL PAIN: 1
NAUSEA: 0
COUGH: 0
VOMITING: 0
PHOTOPHOBIA: 0
BACK PAIN: 0
EYES NEGATIVE: 1

## 2022-09-14 ASSESSMENT — PAIN SCALES - GENERAL
PAINLEVEL_OUTOF10: 10
PAINLEVEL_OUTOF10: 7

## 2022-09-14 ASSESSMENT — PAIN SCALES - WONG BAKER: WONGBAKER_NUMERICALRESPONSE: 2

## 2022-09-14 ASSESSMENT — PAIN DESCRIPTION - LOCATION: LOCATION: ABDOMEN;BACK

## 2022-09-14 NOTE — PROGRESS NOTES
VSS, Pt tearful and expressing feelings of worry. She is worried about the damage she may have done to her body. Pt declines using the walker and wants to do as much as she can by herself. She expressed wanting to get stronger and fight to get better. Meds given per mar orders. Call light in reach, safety maintained.

## 2022-09-14 NOTE — PROGRESS NOTES
Subjective: The patient complains of severe acute on chronic progressive fatigue and CONFUSION, DIZZINESS partially relieved by rest, PT, OT and meds   and exacerbated by exertion and recent illness. She initiallypresented to ED with alcoholic hepatitis. Patient stated she knew her liver was \"gone\" so she had been trying to cut back on drinking, but still drinks 4-5 small bottles of fireball a day. Her last drink was on 9/4/2022 (day of admission) at 4am after waking up. She later developed sharp RUQ pain. CT of abdomen showed enlarged fatty liver, small volume ascites and mild ill-defined mesenteric and retroperitoneal inflammation, likely edema and/or pancreatitis. I am concerned about patients medical complexities including:  Principal Problem:    Alcoholic hepatitis with ascites  Active Problems:    Major depressive disorder, severe (HCC)    Impaired mobility and activities of daily living    Myalgia, unspecified site    GI bleeding  Resolved Problems:    * No resolved hospital problems. *      .    Reviewed recent nursing note and discussed current status and planned care with acute care providers, \"VSS, Pt tearful and expressing feelings of worry. She is worried about the damage she may have done to her body. Pt declines using the walker and wants to do as much as she can by herself. She expressed wanting to get stronger and fight to get better. Meds given per mar orders. Call light in reach, safety maintained. \". He had a paracentesis done on 9/13/2022 1500 cc removed. Abd is feeling better. However is still feeling very emotional about her disease process and feels like the ascites is getting worse again. Its been less than a day since she was drained. ROS x10: The patient also complains of severely impaired mobility and activities of daily living.   Otherwise no new problems with vision, hearing, nose, mouth, throat, dermal, cardiovascular, GI, , pulmonary, musculoskeletal, psychiatric or neurological.        Vital signs:  BP (!) 109/55   Pulse 93   Temp 98.6 °F (37 °C) (Oral)   Resp 18   Ht 5' 5\" (1.651 m)   Wt 185 lb 11.2 oz (84.2 kg)   LMP  (LMP Unknown) Comment: tubal ligation  SpO2 93%   BMI 30.90 kg/m²   I/O:   PO/Intake:    fair PO intake, continue to monitor closely for dehydration    Bowel/Bladder:   continent,    General:  Patient is well developed, adequately nourished, and    well kempt. HEENT:    PERRLA, hearing intact to loud voice, external inspection of ear and nose benign. Inspection of lips, tongue and gums benign  Musculoskeletal: No significant change in strength or tone. All joints stable. Inspection and palpation of digits and nails show no clubbing, cyanosis or inflammatory conditions. Neuro/Psychiatric: Affect: flat-  Alert and oriented to self and situation with  min cues. No significant change in deep tendon reflexes or sensation  Lungs:  Diminished, CTA-B  . Respiration effort is normal at rest.   Heart:   S1 = S2,   RRR. Abdomen:  Ascites, less distended, Soft, non-tender    Extremities:   Mild lower extremity edema but no unusual tenderness. Skin:   BUE bruises dt blood draws      Rehabilitation:  Physical Therapy:   Bed mobility:  Bed mobility  Rolling to Left: Stand by assistance (09/12/22 1410)  Rolling to Right: Stand by assistance (09/13/22 1557)  Supine to Sit: Stand by assistance (09/13/22 1557)  Sit to Supine: Contact guard assistance (09/10/22 0908)  Scooting: Contact guard assistance (09/10/22 0908)  Bed Mobility Comments: HOB slightly elevated. vc's for sequencing and hand placement.  Increased time and effort to complete. (09/13/22 1557)  Transfers:  Transfers  Sit to Stand: Stand by assistance;Contact guard assistance (09/13/22 1558)  Stand to sit: Stand by assistance;Contact guard assistance (09/13/22 1558)  Bed to Chair: Stand by assistance;Contact guard assistance (09/13/22 1558)  Comment: vc's for hand placement and technique without device; Slow paced d/t increased abdominal pain. (09/13/22 1558)  Gait:   Ambulation  Surface: level tile (09/13/22 1559)  Device: No Device (09/13/22 1559)  Assistance: Contact guard assistance;Stand by assistance (09/13/22 1559)  Quality of Gait: WBOS, waddle pattern, slight FF posture; vc's for normalizing gait pattern (09/13/22 1559)  Gait Deviations: Slow Kamila; Increased JOHNNA; Decreased step length (09/13/22 1559)  Distance: 13' with turns (09/13/22 1559)  Stairs:     W/C mobility:       Occupational Therapy:   Hand Dominance: Right  ADL  Feeding: Independent (09/10/22 1252)  Grooming: Stand by assistance (09/13/22 1201)  Grooming Skilled Clinical Factors: in standing (09/13/22 1201)  UE Bathing: Setup (09/10/22 1252)  LE Bathing: Moderate assistance (09/10/22 1252)  UE Dressing: Setup (09/10/22 1252)  LE Dressing: Moderate assistance (09/13/22 1201)  LE Dressing Skilled Clinical Factors: Patient able to doff B hospital socks - unable to don B hospital socks (09/13/22 1201)  Toileting: Unable to assess(comment) (pt declined need) (09/10/22 1252)  Additional Comments: ADL's simulated unless otherwise stated above (09/10/22 1252)  Toilet Transfers  Toilet Transfer: Unable to assess (09/10/22 1257)  Toilet Transfers Comments: anticpated CGA (09/10/22 1257)          Speech Therapy:            Diet/Swallow:                   COGNITION  OT: Cognition Comment: Patient requires increased time for processing.   SP:           Lab/X-ray studies reviewed, analyzed and discussed with patient and staff:   Recent Results (from the past 24 hour(s))   POCT Glucose    Collection Time: 09/13/22 11:07 AM   Result Value Ref Range    POC Glucose 117 (H) 70 - 99 mg/dl    Performed on ACCU-CHEK    Cell Count with Differential, Body Fluid    Collection Time: 09/13/22  3:20 PM   Result Value Ref Range    Cell Count Fluid Type Ascitic Fluid     Color, Fluid Yellow     Appearance, Fluid Clear     Clot Eval. see below Nucl Cell, Fluid 27 /cumm    RBC, Fluid 43 /cumm    Neutrophil Count, Fluid 33 %    Lymphocytes, Body Fluid 37 %    Monocyte Count, Fluid 30 %    Number of Cells Counted Fluid 100    Lactate dehydrogenase, body fluid    Collection Time: 09/13/22  3:22 PM   Result Value Ref Range    Specimen Type Ascites    Amylase, body fluid    Collection Time: 09/13/22  3:22 PM   Result Value Ref Range    Specimen Type Ascites    Albumin, Body Fluid    Collection Time: 09/13/22  3:22 PM   Result Value Ref Range    Specimen Type Ascites    POCT Glucose    Collection Time: 09/13/22  4:00 PM   Result Value Ref Range    POC Glucose 106 (H) 70 - 99 mg/dl    Performed on ACCU-CHEK    POCT Glucose    Collection Time: 09/13/22  8:32 PM   Result Value Ref Range    POC Glucose 108 (H) 70 - 99 mg/dl    Performed on ACCU-CHEK    CBC with Auto Differential    Collection Time: 09/14/22  5:30 AM   Result Value Ref Range    WBC 7.1 4.8 - 10.8 K/uL    RBC 2.65 (L) 4.20 - 5.40 M/uL    Hemoglobin 9.6 (L) 12.0 - 16.0 g/dL    Hematocrit 29.2 (L) 37.0 - 47.0 %    .3 (H) 82.0 - 100.0 fL    MCH 36.3 (H) 27.0 - 31.3 pg    MCHC 32.9 (L) 33.0 - 37.0 %    RDW 14.9 (H) 11.5 - 14.5 %    Platelets 946 (L) 586 - 400 K/uL    Neutrophils % 64.2 %    Lymphocytes % 22.6 %    Monocytes % 12.0 %    Eosinophils % 0.7 %    Basophils % 0.5 %    Neutrophils Absolute 4.6 1.4 - 6.5 K/uL    Lymphocytes Absolute 1.6 1.0 - 4.8 K/uL    Monocytes Absolute 0.9 (H) 0.2 - 0.8 K/uL    Eosinophils Absolute 0.0 0.0 - 0.7 K/uL    Basophils Absolute 0.0 0.0 - 0.2 K/uL   Comprehensive Metabolic Panel w/ Reflex to MG    Collection Time: 09/14/22  5:30 AM   Result Value Ref Range    Sodium 134 (L) 135 - 144 mEq/L    Potassium reflex Magnesium 3.9 3.4 - 4.9 mEq/L    Chloride 103 95 - 107 mEq/L    CO2 23 20 - 31 mEq/L    Anion Gap 8 (L) 9 - 15 mEq/L    Glucose 84 70 - 99 mg/dL    BUN 9 6 - 20 mg/dL    Creatinine 0.77 0.50 - 0.90 mg/dL    GFR Non-African American >60.0 >60    GFR  >60.0 >60    Calcium 7.8 (L) 8.5 - 9.9 mg/dL    Total Protein 5.0 (L) 6.3 - 8.0 g/dL    Albumin 1.5 (L) 3.5 - 4.6 g/dL    Total Bilirubin 6.7 (H) 0.2 - 0.7 mg/dL    Alkaline Phosphatase 306 (H) 40 - 130 U/L    ALT 37 (H) 0 - 33 U/L     (H) 0 - 35 U/L    Globulin 3.5 2.3 - 3.5 g/dL   Magnesium    Collection Time: 09/14/22  5:30 AM   Result Value Ref Range    Magnesium 1.6 (L) 1.7 - 2.4 mg/dL   Protime-INR    Collection Time: 09/14/22  5:30 AM   Result Value Ref Range    Protime 18.5 (H) 12.3 - 14.9 sec    INR 1.5    POCT Glucose    Collection Time: 09/14/22  7:21 AM   Result Value Ref Range    POC Glucose 85 70 - 99 mg/dl    Performed on ACCU-CHEK      Previous extensive, complex labs, notes and diagnostics reviewed and analyzed. 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 Updated functional and medical status reassessed regarding patients ability to participate in therapies and patient found to be able to participate in:        acute intensive comprehensive inpatient rehabilitation program including PT/OT to improve balance, ambulation, ADLs, and to improve the P/AROM. It is my opinion that they will be able to tolerate 3 hours of therapy a day and benefit from it at an acute level. I again discussed acute rehab with the patient and verify that the patient is able and willing to participate in 3 hours of therapy a day. Rehab and Acute Care Case Management has also reinforced this expectation.   Will continue to follow to attempt to get patient to the most efficient but most effective level of care will be in their best interest.  Continue to focus on energy conservation heart rate and blood pressure monitoring before during and after therapy endurance and consistency of function. We will need precertification from her insurance. Bowel constipation   and Bladder dysfunction   overactive, neurogenic bladder:  frequent toileting, ambulate to bathroom with assistance, check post void residuals. Check for C.difficile x1 if >2 loose stools in 24 hours, continue bowel & bladder program.  Monitor for UTI symptoms including lethargy and confusion    Moderate to severe low back pain and generalized OA pain: reassess pain every shift and prior to and after each therapy session, give prn try to limit Tylenol   and consider scheduled Oxy IR to avoid Tylenol, modalities prn in therapy, consider Lidoderm, K-pad prn. Skin healing    breakdown   risk:  continue pressure relief program.  Daily skin exams and reports from nursing. Severe fatigue due to immobility and nutritional deficits: decline in PO intake is a concern-considering supplements   Add vitamin B12 vitamin D and CoQ10 titrate dosing and add protein supplementation with low carb content. Complex discharge planning:   Discussed with care team-last 24 hour events noted. I will continue to follow along and reassess functional and medical status as we strive to improve patient's functional and medical outcomes progressing to the most efficient and lowest level of care. Depression-add spiritual car      Complex Active General Medical Issues that complicate care:     1. Principal Problem:    Alcoholic hepatitis with ascites  Active Problems:    Major depressive disorder, severe (HCC)    Impaired mobility and activities of daily living    Myalgia, unspecified site    GI bleeding  Resolved Problems:    * No resolved hospital problems.  *          Events and functional changes in the past 24 hours reviewed improvements in functional status are encouraging      Review and simplify inpatient and outpatient medications and dosing times. Transition to self medication program if able. Await approval from care source for acute rehab.           Agustin Mcneill D.O., PM&R     Attending    [address]

## 2022-09-14 NOTE — CARE COORDINATION
Patient awaiting pre-cert for transfer to [facility]. LSW /FABIAN to follow.   Electronically signed by LIDYA Hernandez, JUHI on 9/14/22 at 8:31 AM EDT

## 2022-09-14 NOTE — PROGRESS NOTES
Shift assessment complete, vss, alert/oriented, denies sob/chest pain, c/o 8/10 back pain, prn norco given per orders, Denies further needs at this time, call light within reach.  Electronically signed by Tiara Vallejo RN on 9/14/2022 at 9:59 AM

## 2022-09-14 NOTE — FLOWSHEET NOTE
Spoke to [facility] to follow up after yesterday's paracentesis and states patient is feeling much better. Band-aide clean, dry, and intact, and no signs of infection. Encouraged nurse to call back if any questions or concerns arise regarding paracentesis.

## 2022-09-14 NOTE — PROGRESS NOTES
Gastroenterology Progress Note    Tammy Rivera is a 43 y.o. female patient. Hospitalization Day:10    Chief C/O: cirrhosis    SUBJECTIVE: Seen and examined, no acute events overnight, status post paracentesis, less abdominal distention, no abdominal pain, no nausea or vomiting, tolerating diet, fluid analysis is pending. ROS:  Gastrointestinal ROS: no abdominal pain, change in bowel habits, or black or bloody stools    Physical    VITALS:  BP (!) 109/55   Pulse 97   Temp 99 °F (37.2 °C) (Oral)   Resp 18   Ht 5' 5\" (1.651 m)   Wt 185 lb 11.2 oz (84.2 kg)   LMP  (LMP Unknown) Comment: tubal ligation  SpO2 97%   BMI 30.90 kg/m²   TEMPERATURE:  Current - Temp: 99 °F (37.2 °C); Max - Temp  Av.5 °F (36.9 °C)  Min: 98.1 °F (36.7 °C)  Max: 99 °F (37.2 °C)    General:  Alert and oriented,  No apparent distress  Skin- without jaundice  Eyes: anicteric sclera  Cardiac: RRR, Nl s1s2, without murmurs  Lungs CTA Bilaterally, normal effort  Abdomen soft, ND, NT, no HSM, Bowel sounds normal  Ext: without edema  Neuro: no asterixis     Data    Data Review:    Recent Labs     22  0622  0530   WBC 5.7 8.0 7.1   HGB 9.8* 9.7* 9.6*   HCT 30.6* 29.7* 29.2*   .1* 111.1* 110.3*   * 117* 126*     Recent Labs     22  0622  0622 22  0530    136 134*   K 4.2 3.9 3.9    104 103   CO2 22 24 23   BUN 8 8 9   CREATININE 1.00* 0.95* 0.77     Recent Labs     22  0622  0530   * 158* 149*   ALT 39* 38* 37*   BILITOT 7.9* 7.4* 6.7*   ALKPHOS 341* 342* 306*     No results for input(s): LIPASE, AMYLASE in the last 72 hours. Recent Labs     22  0622 22  0530   PROTIME 18.4* 18.5*   INR 1.5 1.5           ASSESSMENT:  78-year-old female admitted with alcoholic hepatitis, with ? Underlying liver disease, clinically improved since arrival, on arrival  serum bilirubin is 8.8 and AST 34 ALT 90 alk phos is 470.   Albumin is 2.3.  AST ALT ratio more than 2.,  And lipase is 118.,  CBC shows white count of 6.6 hemoglobin is 9.8 and 29.9 the MCV is 115.2, platelet count is 53, pro time 17.4 with INR of 1.4, viral acute hepatitis AB and C was negative  9/11/22 low-grade temp overnight, UA is positive, cultures pending, on ceftriaxone, no abdominal pain, tolerating diet. 9/12/22 no fever overnight, on ceftriaxone for UTI, no abdominal pain, tolerating diet, reports mild nausea. 9/13/22 no fever overnight, on ceftriaxone for E. coli UTI, no abdominal pain, tolerating diet, no further nausea. Awaiting bed placement for acute rehab   9/14/22 no fever overnight, status post paracentesis, ascitic fluid negative for SBP, on ceftriaxone for E. coli UTI, no abdominal pain, tolerating diet, no further nausea vomiting, awaiting bed placement for acute rehab. PLAN :  1- Alcoholic hepatitis VS Decompensated cirrhosis   - MELD 20, Maddrey Discriminant Factor < 32, No encephalopathy  - EtOH cessation stressed to pt. Rec EtOH abuse counseling as outpatient  -Monitor electrolytes and replete as needed  -no fever overnight, + UA with reflex culture pending - on ceftriaxone, blood cultures negative   -Monitor LFTs and INR daily  - DF less than 32. No indication for prednisolone at this time   2- Grade II Ascites   Schedule for therapeutic paracentesis asa needed. Albumin (6 to 8 g/L of fluid removed) should  be administered ( if removed more than 5 L)  -2 Gm Na diet  -ascitic fluid negative for SBP  3-  EGD for Variceal surveillance 9/9/22  LA grade A esophagitis and PHG  No active GI bleeding   4-  HCC screening:   Recent imaging Negative for liver mass  5-  No overt Hepatic encephalopathy     Thank you for allowing me to participate in the care of your patient. Please feel free to contact me with any concerns.     Melissa Frank, SANTA - CNP

## 2022-09-14 NOTE — CONSULTS
CC:   Chief Complaint   Patient presents with    Abdominal Pain     Abd pain and distention x 2 days    Withdrawal     Alcohol withdrawal        HPI:  Patient is a pleasant 26-year-old woman who presented to the hospital on 9/4/2022 with a complaint of abdominal pain and alcohol withdrawal. Patient has a past medical history of alcohol abuse and tobacco abuse and was diagnosed with alcoholic hepatitis. Upon admission she was noted to be in hepatic failure with ascites. Interventional radiology was consulted for diagnostic and therapeutic ultrasound-guided paracentesis.      Family History   Problem Relation Age of Onset    High Cholesterol Mother     Hypertension Mother     Melanoma Father     High Cholesterol Father     Hypertension Father        Past Surgical History:   Procedure Laterality Date    APPENDECTOMY      FOOT SURGERY      reports 6 sx on L foot and one on right foot    PARACENTESIS Left 09/13/2022    1510 ml removed per Dr Janette Garber  specimen obtained    TUBAL LIGATION      UPPER GASTROINTESTINAL ENDOSCOPY N/A 09/09/2022    EGD DIAGNOSTIC ONLY performed by Melburn Buerger, [facility] at [facility]        Past Medical History:   Diagnosis Date    Alcohol abuse     Tobacco dependence        Social History     Socioeconomic History    Marital status: Single     Spouse name: None    Number of children: None    Years of education: None    Highest education level: None   Tobacco Use    Smoking status: Every Day     Packs/day: 1.00     Types: Cigarettes    Smokeless tobacco: Never   Vaping Use    Vaping Use: Never used   Substance and Sexual Activity    Alcohol use: Yes     Comment: 6/9/22: completed 30 day rehab 2 weeks ago, relapse one week ago; unable to quantify EtOH consumption    Drug use: Yes     Types: Marijuana Champ Cue)   Social History Narrative    Lives With: Alone (SO sometimes stays with pt)    Type of Home: House in [address]    Home Layout: Two level, Laundry in basement (flights to and from basement and second floor with HR)    Home Access: Stairs to enter with rails- Number of Steps: 5- Rails: Both    Bathroom Shower/Tub: Tub/Shower unit    Home Equipment:  (no AD)    ADL Assistance: Independent    Homemaking Assistance: Independent    Ambulation Assistance: Independent (no AD)    Transfer Assistance: Independent    Active : Yes    Type of Occupation: unemployed           Allergies   Allergen Reactions    Oxycodone-Acetaminophen Itching     Itching and nausea         No current facility-administered medications on file prior to encounter. Current Outpatient Medications on File Prior to Encounter   Medication Sig Dispense Refill    mirtazapine (REMERON) 15 MG tablet Take 1 tablet by mouth nightly (Patient not taking: Reported on 9/4/2022) 15 tablet 3    Multiple Vitamin (MULTIVITAMIN) TABS tablet Take 1 tablet by mouth daily (Patient not taking: Reported on 9/4/2022) 30 tablet 1       Review of Systems   Constitutional: Negative. Negative for chills, diaphoresis and fever. HENT: Negative. Negative for congestion, ear pain, hearing loss and tinnitus. Eyes: Negative. Negative for photophobia. Respiratory: Negative. Negative for cough, shortness of breath and wheezing. Cardiovascular: Negative. Negative for chest pain, palpitations and leg swelling. Gastrointestinal:  Positive for abdominal pain. Negative for constipation, diarrhea, nausea and vomiting. Genitourinary: Negative. Negative for dysuria, flank pain, frequency, hematuria and urgency. Musculoskeletal: Negative. Negative for back pain and neck pain. Skin: Negative. Negative for rash. Allergic/Immunologic: Negative for environmental allergies. Neurological: Negative. Negative for dizziness, tremors, weakness and headaches. Hematological:  Does not bruise/bleed easily. Psychiatric/Behavioral: Negative. Negative for hallucinations and suicidal ideas. The patient is not nervous/anxious. OBJECTIVE:  BP (!) 109/55   Pulse 97   Temp 99 °F (37.2 °C) (Oral)   Resp 18   Ht 5' 5\" (1.651 m)   Wt 185 lb 11.2 oz (84.2 kg)   LMP  (LMP Unknown) Comment: tubal ligation  SpO2 97%   BMI 30.90 kg/m²     Physical Exam  Constitutional:       General: She is not in acute distress. Appearance: She is well-developed. She is not diaphoretic. HENT:      Head: Normocephalic and atraumatic. Nose: Nose normal.      Mouth/Throat:      Pharynx: No oropharyngeal exudate. Eyes:      General: Scleral icterus present. Right eye: No discharge. Left eye: No discharge. Conjunctiva/sclera: Conjunctivae normal.   Neck:      Thyroid: No thyromegaly. Vascular: No JVD. Trachea: No tracheal deviation. Cardiovascular:      Rate and Rhythm: Normal rate and regular rhythm. Heart sounds: Normal heart sounds. No murmur heard. No friction rub. No gallop. Pulmonary:      Effort: No respiratory distress. Breath sounds: No stridor. No wheezing or rales. Chest:      Chest wall: No tenderness. Abdominal:      General: Bowel sounds are normal. There is distension. Palpations: Abdomen is soft. There is no mass. Tenderness: There is abdominal tenderness. There is no guarding or rebound. Hernia: No hernia is present. Musculoskeletal:         General: No tenderness or deformity. Cervical back: Neck supple. Skin:     General: Skin is dry. Coloration: Skin is jaundiced. Skin is not pale. Findings: No erythema or rash. Neurological:      Mental Status: She is alert and oriented to person, place, and time. Cranial Nerves: No cranial nerve deficit. Psychiatric:         Behavior: Behavior normal.         Thought Content:  Thought content normal.         Judgment: Judgment normal.       Lab Results   Component Value Date/Time    WBC 7.1 09/14/2022 05:30 AM    HGB 9.6 09/14/2022 05:30 AM    HCT 29.2 09/14/2022 05:30 AM     09/14/2022 05:30 AM    .3 09/14/2022 05:30 AM         ASSESSMENT ANDPLAN:    ASSESSMENT: Patient with alcoholic hepatitis with decompensation, now with ascites    PLAN: Diagnostic and therapeutic ultrasound-guided paracentesis. Patient understands the risks, benefits, and alternatives including bleeding, infection, and damage to adjacent organs, and agrees to proceed with the procedure.     Rita Love MD, Frank Love

## 2022-09-14 NOTE — PROGRESS NOTES
Hospitalist Progress Note      PCP: Ailyn Iglesias MD    Date of Admission: 9/4/2022    Chief Complaint:  no acute events, afebrile, SBP in the 90-100s    Medications:  Reviewed    Infusion Medications    sodium chloride      dextrose       Scheduled Medications    magnesium oxide  400 mg Oral Daily    pantoprazole  40 mg Oral QAM AC    sodium chloride flush  5-40 mL IntraVENous 2 times per day    multivitamin  1 tablet Oral Daily    thiamine  100 mg IntraVENous Daily    nicotine  1 patch TransDERmal Daily     PRN Meds: sodium chloride flush, sodium chloride, HYDROcodone 5 mg - acetaminophen, glucose, dextrose bolus **OR** dextrose bolus, glucagon (rDNA), dextrose, morphine, albuterol, ondansetron **OR** ondansetron, polyethylene glycol, LORazepam **OR** LORazepam **OR** LORazepam **OR** LORazepam **OR** LORazepam **OR** LORazepam **OR** LORazepam **OR** LORazepam      Intake/Output Summary (Last 24 hours) at 9/14/2022 1111  Last data filed at 9/14/2022 0547  Gross per 24 hour   Intake 500 ml   Output 0 ml   Net 500 ml         Exam:    BP (!) 109/55   Pulse 97   Temp 99 °F (37.2 °C) (Oral)   Resp 18   Ht 5' 5\" (1.651 m)   Wt 185 lb 11.2 oz (84.2 kg)   LMP  (LMP Unknown) Comment: tubal ligation  SpO2 97%   BMI 30.90 kg/m²     General appearance: appears stated age and cooperative. Respiratory:  clear to auscultation, bilaterally   Cardiovascular: Regular rate and rhythm, S1/S2 . Abdomen: Soft, distended, active bowel sounds. Musculoskeletal: No edema bilaterally.      Labs:   Recent Labs     09/12/22  0608 09/13/22  0622 09/14/22  0530   WBC 5.7 8.0 7.1   HGB 9.8* 9.7* 9.6*   HCT 30.6* 29.7* 29.2*   * 117* 126*       Recent Labs     09/12/22  0608 09/13/22  0622 09/14/22  0530    136 134*   K 4.2 3.9 3.9    104 103   CO2 22 24 23   BUN 8 8 9   CREATININE 1.00* 0.95* 0.77   CALCIUM 7.6* 7.7* 7.8*       Recent Labs     09/12/22  0608 09/13/22  0622 09/14/22  0530   * 158* 149*   ALT 39* 38* 37*   BILITOT 7.9* 7.4* 6.7*   ALKPHOS 341* 342* 306*       Recent Labs     09/13/22  0622 09/14/22  0530   INR 1.5 1.5       No results for input(s): Adalbinoa Freddyose in the last 72 hours. Urinalysis:      Lab Results   Component Value Date/Time    NITRU POSITIVE 09/10/2022 08:18 AM    WBCUA 21-50 09/10/2022 08:18 AM    BACTERIA MANY 09/10/2022 08:18 AM    RBCUA 0-2 09/10/2022 08:18 AM    BLOODU Negative 09/10/2022 08:18 AM    SPECGRAV 1.018 09/10/2022 08:18 AM    GLUCOSEU Negative 09/10/2022 08:18 AM       Radiology:  CT ABDOMEN PELVIS WO CONTRAST Additional Contrast? None   Final Result      ENLARGED FATTY LIVER. SMALL VOLUME ASCITES AND MILD ILL-DEFINED MESENTERIC AND RETROPERITONEAL INFLAMMATION, LIKELY EDEMA AND/OR PANCREATITIS. MILD PROBABLY REACTIVE WALL THICKENING OF THE COLON. IR US GUIDED PARACENTESIS    (Results Pending)           Assessment/Plan:    44 y/o female with history of alcohol and tobacco use disorder who presented with:     Acute alcoholic hepatitis / pancreatitis  - slowly improving  - treated with IVFs, pain control  - Maddrey score < 32, no indication for prednisolone per GI  - s/p paracentesis with drainage of 1500 ml per IR  - fluid analysis was negative for SBP    Esophagitis   - continue protonix    E. Coli UTI  - treated with IV Rocephin    Severe alcohol use disorder with risk for acute alcohol withdrawal  - treated with librium, gabapentin, clonidine   - on Lorazepam per [facility] protocol  - continue thiamine, multivitamin     Hypokalemia, hypomagnesemia, hypophosphatemia  - repleted    Mild WILLIE  - improved    Macrocytic anemia  - in the setting of alcohol use    Thrombocytopenia  - mild, likely related to alcohol use and liver disease, improving    Diet: ADULT DIET; Regular;  Low Sodium (2 gm)    Code Status: Full Code      Disposition -  acute rehab, waiting for precert          Electronically signed by Karla Ni MD on 9/14/2022 at 11:11 AM

## 2022-09-15 ENCOUNTER — HOSPITAL ENCOUNTER (INPATIENT)
Age: 37
LOS: 7 days | Discharge: HOME | DRG: 280 | End: 2022-09-22
Attending: PHYSICAL MEDICINE & REHABILITATION | Admitting: PHYSICAL MEDICINE & REHABILITATION
Payer: COMMERCIAL

## 2022-09-15 VITALS
SYSTOLIC BLOOD PRESSURE: 95 MMHG | RESPIRATION RATE: 18 BRPM | DIASTOLIC BLOOD PRESSURE: 64 MMHG | WEIGHT: 185.7 LBS | BODY MASS INDEX: 30.94 KG/M2 | TEMPERATURE: 98.6 F | HEART RATE: 97 BPM | OXYGEN SATURATION: 97 % | HEIGHT: 65 IN

## 2022-09-15 DIAGNOSIS — M79.10 MYALGIA, UNSPECIFIED SITE: ICD-10-CM

## 2022-09-15 DIAGNOSIS — R10.10 PAIN OF UPPER ABDOMEN: Primary | ICD-10-CM

## 2022-09-15 DIAGNOSIS — Z78.9 IMPAIRED MOBILITY AND ACTIVITIES OF DAILY LIVING: ICD-10-CM

## 2022-09-15 DIAGNOSIS — Z78.9 IMPAIRED MOBILITY AND ADLS: ICD-10-CM

## 2022-09-15 DIAGNOSIS — K70.11 ALCOHOLIC HEPATITIS WITH ASCITES: ICD-10-CM

## 2022-09-15 DIAGNOSIS — Z74.09 IMPAIRED MOBILITY AND ACTIVITIES OF DAILY LIVING: ICD-10-CM

## 2022-09-15 DIAGNOSIS — Z74.09 IMPAIRED MOBILITY AND ADLS: ICD-10-CM

## 2022-09-15 LAB
ALBUMIN SERPL-MCNC: 1.5 G/DL (ref 3.5–4.6)
ALP BLD-CCNC: 299 U/L (ref 40–130)
ALT SERPL-CCNC: 37 U/L (ref 0–33)
ANION GAP SERPL CALCULATED.3IONS-SCNC: 6 MEQ/L (ref 9–15)
ANISOCYTOSIS: ABNORMAL
AST SERPL-CCNC: 151 U/L (ref 0–35)
BANDED NEUTROPHILS RELATIVE PERCENT: 1 % (ref 5–11)
BASOPHILS ABSOLUTE: 0 K/UL (ref 0–0.2)
BASOPHILS RELATIVE PERCENT: 0.6 %
BILIRUB SERPL-MCNC: 5.9 MG/DL (ref 0.2–0.7)
BUN BLDV-MCNC: 7 MG/DL (ref 6–20)
C-REACTIVE PROTEIN: 42.1 MG/L (ref 0–5)
CALCIUM SERPL-MCNC: 7.9 MG/DL (ref 8.5–9.9)
CHLORIDE BLD-SCNC: 103 MEQ/L (ref 95–107)
CO2: 26 MEQ/L (ref 20–31)
CREAT SERPL-MCNC: 0.81 MG/DL (ref 0.5–0.9)
EOSINOPHILS ABSOLUTE: 0.1 K/UL (ref 0–0.7)
EOSINOPHILS RELATIVE PERCENT: 1 %
GFR AFRICAN AMERICAN: >60
GFR NON-AFRICAN AMERICAN: >60
GLOBULIN: 3.8 G/DL (ref 2.3–3.5)
GLUCOSE BLD-MCNC: 113 MG/DL (ref 70–99)
GLUCOSE BLD-MCNC: 82 MG/DL (ref 70–99)
GLUCOSE BLD-MCNC: 93 MG/DL (ref 70–99)
HCT VFR BLD CALC: 29.2 % (ref 37–47)
HEMOGLOBIN: 9.6 G/DL (ref 12–16)
INR BLD: 1.4
LYMPHOCYTES ABSOLUTE: 0.6 K/UL (ref 1–4.8)
LYMPHOCYTES RELATIVE PERCENT: 8 %
MACROCYTES: ABNORMAL
MAGNESIUM: 1.6 MG/DL (ref 1.7–2.4)
MCH RBC QN AUTO: 36.2 PG (ref 27–31.3)
MCHC RBC AUTO-ENTMCNC: 32.7 % (ref 33–37)
MCV RBC AUTO: 110.4 FL (ref 82–100)
MONOCYTES ABSOLUTE: 0 K/UL (ref 0.2–0.8)
MONOCYTES RELATIVE PERCENT: 11 %
NEUTROPHILS ABSOLUTE: 6.4 K/UL (ref 1.4–6.5)
NEUTROPHILS RELATIVE PERCENT: 90 %
PDW BLD-RTO: 15.1 % (ref 11.5–14.5)
PERFORMED ON: ABNORMAL
PERFORMED ON: NORMAL
PLATELET # BLD: 124 K/UL (ref 130–400)
PLATELET SLIDE REVIEW: ABNORMAL
POIKILOCYTES: ABNORMAL
POLYCHROMASIA: ABNORMAL
POTASSIUM REFLEX MAGNESIUM: 3.8 MEQ/L (ref 3.4–4.9)
PROCALCITONIN: 0.74 NG/ML (ref 0–0.15)
PROTHROMBIN TIME: 17.5 SEC (ref 12.3–14.9)
RBC # BLD: 2.65 M/UL (ref 4.2–5.4)
SARS-COV-2, NAAT: NOT DETECTED
SODIUM BLD-SCNC: 135 MEQ/L (ref 135–144)
TARGET CELLS: ABNORMAL
TOTAL PROTEIN: 5.3 G/DL (ref 6.3–8)
WBC # BLD: 7 K/UL (ref 4.8–10.8)

## 2022-09-15 PROCEDURE — 36415 COLL VENOUS BLD VENIPUNCTURE: CPT

## 2022-09-15 PROCEDURE — 85610 PROTHROMBIN TIME: CPT

## 2022-09-15 PROCEDURE — 6370000000 HC RX 637 (ALT 250 FOR IP): Performed by: INTERNAL MEDICINE

## 2022-09-15 PROCEDURE — 99232 SBSQ HOSP IP/OBS MODERATE 35: CPT | Performed by: PHYSICAL MEDICINE & REHABILITATION

## 2022-09-15 PROCEDURE — 86140 C-REACTIVE PROTEIN: CPT

## 2022-09-15 PROCEDURE — 84145 PROCALCITONIN (PCT): CPT

## 2022-09-15 PROCEDURE — 87635 SARS-COV-2 COVID-19 AMP PRB: CPT

## 2022-09-15 PROCEDURE — 85025 COMPLETE CBC W/AUTO DIFF WBC: CPT

## 2022-09-15 PROCEDURE — 6360000002 HC RX W HCPCS: Performed by: INTERNAL MEDICINE

## 2022-09-15 PROCEDURE — 99232 SBSQ HOSP IP/OBS MODERATE 35: CPT | Performed by: NURSE PRACTITIONER

## 2022-09-15 PROCEDURE — 6370000000 HC RX 637 (ALT 250 FOR IP): Performed by: SPECIALIST

## 2022-09-15 PROCEDURE — 80053 COMPREHEN METABOLIC PANEL: CPT

## 2022-09-15 PROCEDURE — 97116 GAIT TRAINING THERAPY: CPT

## 2022-09-15 PROCEDURE — 94664 DEMO&/EVAL PT USE INHALER: CPT

## 2022-09-15 PROCEDURE — 1180000000 HC REHAB R&B

## 2022-09-15 PROCEDURE — 2580000003 HC RX 258: Performed by: INTERNAL MEDICINE

## 2022-09-15 PROCEDURE — 83735 ASSAY OF MAGNESIUM: CPT

## 2022-09-15 PROCEDURE — 2580000003 HC RX 258: Performed by: SPECIALIST

## 2022-09-15 RX ORDER — LORAZEPAM 2 MG/ML
3 INJECTION INTRAMUSCULAR
Status: DISCONTINUED | OUTPATIENT
Start: 2022-09-15 | End: 2022-09-22 | Stop reason: HOSPADM

## 2022-09-15 RX ORDER — LORAZEPAM 1 MG/1
3 TABLET ORAL
Status: DISCONTINUED | OUTPATIENT
Start: 2022-09-15 | End: 2022-09-22 | Stop reason: HOSPADM

## 2022-09-15 RX ORDER — SODIUM CHLORIDE 0.9 % (FLUSH) 0.9 %
5-40 SYRINGE (ML) INJECTION PRN
OUTPATIENT
Start: 2022-09-15

## 2022-09-15 RX ORDER — DEXTROSE MONOHYDRATE 100 MG/ML
INJECTION, SOLUTION INTRAVENOUS CONTINUOUS PRN
Status: DISCONTINUED | OUTPATIENT
Start: 2022-09-15 | End: 2022-09-22 | Stop reason: HOSPADM

## 2022-09-15 RX ORDER — LORAZEPAM 2 MG/ML
4 INJECTION INTRAMUSCULAR
Status: CANCELLED | OUTPATIENT
Start: 2022-09-15

## 2022-09-15 RX ORDER — FUROSEMIDE 10 MG/ML
20 INJECTION INTRAMUSCULAR; INTRAVENOUS ONCE
Status: COMPLETED | OUTPATIENT
Start: 2022-09-15 | End: 2022-09-15

## 2022-09-15 RX ORDER — MULTIVITAMIN WITH IRON
1 TABLET ORAL DAILY
Status: DISCONTINUED | OUTPATIENT
Start: 2022-09-16 | End: 2022-09-22 | Stop reason: HOSPADM

## 2022-09-15 RX ORDER — MULTIVITAMIN WITH IRON
1 TABLET ORAL DAILY
Status: CANCELLED | OUTPATIENT
Start: 2022-09-16

## 2022-09-15 RX ORDER — LORAZEPAM 1 MG/1
2 TABLET ORAL
Status: CANCELLED | OUTPATIENT
Start: 2022-09-15

## 2022-09-15 RX ORDER — LANOLIN ALCOHOL/MO/W.PET/CERES
400 CREAM (GRAM) TOPICAL DAILY
Status: DISCONTINUED | OUTPATIENT
Start: 2022-09-16 | End: 2022-09-22 | Stop reason: HOSPADM

## 2022-09-15 RX ORDER — HYDROCODONE BITARTRATE AND ACETAMINOPHEN 5; 325 MG/1; MG/1
1 TABLET ORAL EVERY 4 HOURS PRN
Status: DISCONTINUED | OUTPATIENT
Start: 2022-09-15 | End: 2022-09-16

## 2022-09-15 RX ORDER — LORAZEPAM 2 MG/ML
2 INJECTION INTRAMUSCULAR
Status: DISCONTINUED | OUTPATIENT
Start: 2022-09-15 | End: 2022-09-22 | Stop reason: HOSPADM

## 2022-09-15 RX ORDER — LORAZEPAM 1 MG/1
3 TABLET ORAL
Status: CANCELLED | OUTPATIENT
Start: 2022-09-15

## 2022-09-15 RX ORDER — LORAZEPAM 2 MG/ML
1 INJECTION INTRAMUSCULAR
Status: DISCONTINUED | OUTPATIENT
Start: 2022-09-15 | End: 2022-09-22 | Stop reason: HOSPADM

## 2022-09-15 RX ORDER — LANOLIN ALCOHOL/MO/W.PET/CERES
400 CREAM (GRAM) TOPICAL DAILY
Status: CANCELLED | OUTPATIENT
Start: 2022-09-16

## 2022-09-15 RX ORDER — NICOTINE 21 MG/24HR
1 PATCH, TRANSDERMAL 24 HOURS TRANSDERMAL DAILY
Status: CANCELLED | OUTPATIENT
Start: 2022-09-16

## 2022-09-15 RX ORDER — LORAZEPAM 1 MG/1
4 TABLET ORAL
Status: CANCELLED | OUTPATIENT
Start: 2022-09-15

## 2022-09-15 RX ORDER — ALBUTEROL SULFATE 2.5 MG/3ML
2.5 SOLUTION RESPIRATORY (INHALATION) EVERY 4 HOURS PRN
Status: DISCONTINUED | OUTPATIENT
Start: 2022-09-15 | End: 2022-09-22 | Stop reason: HOSPADM

## 2022-09-15 RX ORDER — ONDANSETRON 4 MG/1
4 TABLET, ORALLY DISINTEGRATING ORAL EVERY 8 HOURS PRN
Status: DISCONTINUED | OUTPATIENT
Start: 2022-09-15 | End: 2022-09-22 | Stop reason: HOSPADM

## 2022-09-15 RX ORDER — LORAZEPAM 2 MG/ML
4 INJECTION INTRAMUSCULAR
Status: DISCONTINUED | OUTPATIENT
Start: 2022-09-15 | End: 2022-09-22 | Stop reason: HOSPADM

## 2022-09-15 RX ORDER — ONDANSETRON 4 MG/1
4 TABLET, ORALLY DISINTEGRATING ORAL EVERY 8 HOURS PRN
Status: CANCELLED | OUTPATIENT
Start: 2022-09-15

## 2022-09-15 RX ORDER — PANTOPRAZOLE SODIUM 40 MG/1
40 TABLET, DELAYED RELEASE ORAL
Status: DISCONTINUED | OUTPATIENT
Start: 2022-09-16 | End: 2022-09-22 | Stop reason: HOSPADM

## 2022-09-15 RX ORDER — FUROSEMIDE 20 MG/1
20 TABLET ORAL DAILY
Status: DISCONTINUED | OUTPATIENT
Start: 2022-09-15 | End: 2022-09-15

## 2022-09-15 RX ORDER — LORAZEPAM 2 MG/ML
1 INJECTION INTRAMUSCULAR
Status: CANCELLED | OUTPATIENT
Start: 2022-09-15

## 2022-09-15 RX ORDER — SODIUM CHLORIDE 0.9 % (FLUSH) 0.9 %
5-40 SYRINGE (ML) INJECTION EVERY 12 HOURS SCHEDULED
OUTPATIENT
Start: 2022-09-15

## 2022-09-15 RX ORDER — LORAZEPAM 2 MG/ML
3 INJECTION INTRAMUSCULAR
Status: CANCELLED | OUTPATIENT
Start: 2022-09-15

## 2022-09-15 RX ORDER — SODIUM CHLORIDE 9 MG/ML
25 INJECTION, SOLUTION INTRAVENOUS PRN
OUTPATIENT
Start: 2022-09-15

## 2022-09-15 RX ORDER — LORAZEPAM 1 MG/1
4 TABLET ORAL
Status: DISCONTINUED | OUTPATIENT
Start: 2022-09-15 | End: 2022-09-22 | Stop reason: HOSPADM

## 2022-09-15 RX ORDER — NICOTINE 21 MG/24HR
1 PATCH, TRANSDERMAL 24 HOURS TRANSDERMAL DAILY
Status: DISCONTINUED | OUTPATIENT
Start: 2022-09-16 | End: 2022-09-22 | Stop reason: HOSPADM

## 2022-09-15 RX ORDER — DEXTROSE MONOHYDRATE 100 MG/ML
INJECTION, SOLUTION INTRAVENOUS CONTINUOUS PRN
Status: CANCELLED | OUTPATIENT
Start: 2022-09-15

## 2022-09-15 RX ORDER — SPIRONOLACTONE 25 MG/1
25 TABLET ORAL DAILY
Status: DISCONTINUED | OUTPATIENT
Start: 2022-09-15 | End: 2022-09-15

## 2022-09-15 RX ORDER — THIAMINE HYDROCHLORIDE 100 MG/ML
100 INJECTION, SOLUTION INTRAMUSCULAR; INTRAVENOUS DAILY
Status: CANCELLED | OUTPATIENT
Start: 2022-09-16

## 2022-09-15 RX ORDER — LORAZEPAM 2 MG/ML
2 INJECTION INTRAMUSCULAR
Status: CANCELLED | OUTPATIENT
Start: 2022-09-15

## 2022-09-15 RX ORDER — LORAZEPAM 1 MG/1
2 TABLET ORAL
Status: DISCONTINUED | OUTPATIENT
Start: 2022-09-15 | End: 2022-09-22 | Stop reason: HOSPADM

## 2022-09-15 RX ORDER — ONDANSETRON 2 MG/ML
4 INJECTION INTRAMUSCULAR; INTRAVENOUS EVERY 6 HOURS PRN
Status: CANCELLED | OUTPATIENT
Start: 2022-09-15

## 2022-09-15 RX ORDER — POLYETHYLENE GLYCOL 3350 17 G/17G
17 POWDER, FOR SOLUTION ORAL DAILY PRN
Status: CANCELLED | OUTPATIENT
Start: 2022-09-15

## 2022-09-15 RX ORDER — HYDROCODONE BITARTRATE AND ACETAMINOPHEN 5; 325 MG/1; MG/1
1 TABLET ORAL EVERY 4 HOURS PRN
Status: CANCELLED | OUTPATIENT
Start: 2022-09-15

## 2022-09-15 RX ORDER — LORAZEPAM 1 MG/1
1 TABLET ORAL
Status: CANCELLED | OUTPATIENT
Start: 2022-09-15

## 2022-09-15 RX ORDER — PANTOPRAZOLE SODIUM 40 MG/1
40 TABLET, DELAYED RELEASE ORAL
Status: CANCELLED | OUTPATIENT
Start: 2022-09-16

## 2022-09-15 RX ORDER — THIAMINE HYDROCHLORIDE 100 MG/ML
100 INJECTION, SOLUTION INTRAMUSCULAR; INTRAVENOUS DAILY
Status: DISCONTINUED | OUTPATIENT
Start: 2022-09-16 | End: 2022-09-17

## 2022-09-15 RX ORDER — ALBUTEROL SULFATE 2.5 MG/3ML
2.5 SOLUTION RESPIRATORY (INHALATION) EVERY 4 HOURS PRN
Status: CANCELLED | OUTPATIENT
Start: 2022-09-15

## 2022-09-15 RX ORDER — ONDANSETRON 2 MG/ML
4 INJECTION INTRAMUSCULAR; INTRAVENOUS EVERY 6 HOURS PRN
Status: DISCONTINUED | OUTPATIENT
Start: 2022-09-15 | End: 2022-09-22 | Stop reason: HOSPADM

## 2022-09-15 RX ORDER — LORAZEPAM 1 MG/1
1 TABLET ORAL
Status: DISCONTINUED | OUTPATIENT
Start: 2022-09-15 | End: 2022-09-22 | Stop reason: HOSPADM

## 2022-09-15 RX ORDER — POLYETHYLENE GLYCOL 3350 17 G/17G
17 POWDER, FOR SOLUTION ORAL DAILY PRN
Status: DISCONTINUED | OUTPATIENT
Start: 2022-09-15 | End: 2022-09-22 | Stop reason: HOSPADM

## 2022-09-15 RX ADMIN — Medication 400 MG: at 09:31

## 2022-09-15 RX ADMIN — PANTOPRAZOLE SODIUM 40 MG: 40 TABLET, DELAYED RELEASE ORAL at 06:05

## 2022-09-15 RX ADMIN — HYDROCODONE BITARTRATE AND ACETAMINOPHEN 1 TABLET: 5; 325 TABLET ORAL at 14:33

## 2022-09-15 RX ADMIN — THIAMINE HYDROCHLORIDE 100 MG: 100 INJECTION, SOLUTION INTRAMUSCULAR; INTRAVENOUS at 09:31

## 2022-09-15 RX ADMIN — FUROSEMIDE 20 MG: 10 INJECTION, SOLUTION INTRAMUSCULAR; INTRAVENOUS at 14:33

## 2022-09-15 RX ADMIN — Medication 10 ML: at 09:31

## 2022-09-15 RX ADMIN — THERA TABS 1 TABLET: TAB at 09:31

## 2022-09-15 RX ADMIN — CEFTRIAXONE SODIUM 1000 MG: 1 INJECTION, POWDER, FOR SOLUTION INTRAMUSCULAR; INTRAVENOUS at 14:40

## 2022-09-15 RX ADMIN — HYDROCODONE BITARTRATE AND ACETAMINOPHEN 1 TABLET: 5; 325 TABLET ORAL at 20:21

## 2022-09-15 RX ADMIN — HYDROCODONE BITARTRATE AND ACETAMINOPHEN 1 TABLET: 5; 325 TABLET ORAL at 09:31

## 2022-09-15 RX ADMIN — HYDROCODONE BITARTRATE AND ACETAMINOPHEN 1 TABLET: 5; 325 TABLET ORAL at 05:21

## 2022-09-15 ASSESSMENT — PAIN SCALES - GENERAL
PAINLEVEL_OUTOF10: 7
PAINLEVEL_OUTOF10: 9
PAINLEVEL_OUTOF10: 8

## 2022-09-15 ASSESSMENT — PAIN DESCRIPTION - DESCRIPTORS: DESCRIPTORS: ACHING;SHOOTING

## 2022-09-15 ASSESSMENT — PAIN DESCRIPTION - LOCATION
LOCATION: BACK;ABDOMEN
LOCATION: BACK

## 2022-09-15 ASSESSMENT — PAIN DESCRIPTION - ORIENTATION: ORIENTATION: LEFT;MID

## 2022-09-15 NOTE — PROGRESS NOTES
Assessment complete. Alert and oriented X4. Denies any pain. Resting comfortably in bed; repositioned. Denies any other needs and call light within reach.   Electronically signed by Jose Mcrae RN on 9/14/2022 at 8:16 PM

## 2022-09-15 NOTE — PROGRESS NOTES
Gastroenterology Progress Note    Kris Cuellar is a 43 y.o. female patient. Hospitalization Day:11    Chief C/O: cirrhosis    SUBJECTIVE: Seen and examined, tolerating diet, no acute events overnight, no abdominal pain or distention, no nausea or vomiting, fluid negative for SBP. ROS:  Gastrointestinal ROS: no abdominal pain, change in bowel habits, or black or bloody stools    Physical    VITALS:  BP 95/64   Pulse 97   Temp 98.6 °F (37 °C) (Oral)   Resp 18   Ht 5' 5\" (1.651 m)   Wt 185 lb 11.2 oz (84.2 kg)   LMP  (LMP Unknown) Comment: tubal ligation  SpO2 97%   BMI 30.90 kg/m²   TEMPERATURE:  Current - Temp: 98.6 °F (37 °C); Max - Temp  Av.4 °F (36.9 °C)  Min: 98.1 °F (36.7 °C)  Max: 98.6 °F (37 °C)    General:  Alert and oriented,  No apparent distress  Skin- without jaundice  Eyes: anicteric sclera  Cardiac: RRR, Nl s1s2, without murmurs  Lungs CTA Bilaterally, normal effort  Abdomen soft, ND, NT, no HSM, Bowel sounds normal  Ext: without edema  Neuro: no asterixis     Data    Data Review:    Recent Labs     09/13/22  0622 09/14/22  0530 09/15/22  0454   WBC 8.0 7.1 7.0   HGB 9.7* 9.6* 9.6*   HCT 29.7* 29.2* 29.2*   .1* 110.3* 110.4*   * 126* 124*     Recent Labs     09/13/22  0622 09/14/22  0530 09/15/22  0455    134* 135   K 3.9 3.9 3.8    103 103   CO2 24 23 26   BUN 8 9 7   CREATININE 0.95* 0.77 0.81     Recent Labs     09/13/22  0622 09/14/22  0530 09/15/22  0455   * 149* 151*   ALT 38* 37* 37*   BILITOT 7.4* 6.7* 5.9*   ALKPHOS 342* 306* 299*     No results for input(s): LIPASE, AMYLASE in the last 72 hours. Recent Labs     22/22  0530 09/15/22  0554   PROTIME 18.4* 18.5* 17.5*   INR 1.5 1.5 1.4           ASSESSMENT:  58-year-old female admitted with alcoholic hepatitis, with ? Underlying liver disease, clinically improved since arrival, on arrival  serum bilirubin is 8.8 and AST 34 ALT 90 alk phos is 470. Albumin is 2.3.   AST ALT ratio more than 2.,  And lipase is 118.,  CBC shows white count of 6.6 hemoglobin is 9.8 and 29.9 the MCV is 115.2, platelet count is 53, pro time 17.4 with INR of 1.4, viral acute hepatitis AB and C was negative  9/11/22 low-grade temp overnight, UA is positive, cultures pending, on ceftriaxone, no abdominal pain, tolerating diet. 9/12/22 no fever overnight, on ceftriaxone for UTI, no abdominal pain, tolerating diet, reports mild nausea. 9/13/22 no fever overnight, on ceftriaxone for E. coli UTI, no abdominal pain, tolerating diet, no further nausea. Awaiting bed placement for acute rehab   9/14/22 no fever overnight, status post paracentesis, ascitic fluid negative for SBP, on ceftriaxone for E. coli UTI, no abdominal pain, tolerating diet, no further nausea vomiting, awaiting bed placement for acute rehab  9/15/22 no fever overnight, status post paracentesis ascitic fluid is negative for SBP, on ceftriaxone for E. coli UTI, no abdominal pain, tolerating diet, for acute rehab today. PLAN :  1- Alcoholic hepatitis VS Decompensated cirrhosis   - MELD 20, Maddrey Discriminant Factor < 32, No encephalopathy  - EtOH cessation stressed to pt. Rec EtOH abuse counseling as outpatient  -Monitor electrolytes and replete as needed  -no fever overnight, + UA with reflex culture pending - on ceftriaxone, blood cultures negative   -Monitor LFTs and INR daily  - DF less than 32. No indication for prednisolone at this time   2- Grade II Ascites   Schedule for therapeutic paracentesis as needed. Albumin (6 to 8 g/L of fluid removed) should  be administered ( if removed more than 5 L)  -2 Gm Na diet  -ascitic fluid negative for SBP  3-  EGD for Variceal surveillance 9/9/22  LA grade A esophagitis and PHG  No active GI bleeding   4-  HCC screening:   Recent imaging Negative for liver mass  5-  No overt Hepatic encephalopathy   OK for acute rehab    Thank you for allowing me to participate in the care of your patient.   Please feel free to contact me with any concerns.     Milton Landry, SANTA - CNP

## 2022-09-15 NOTE — PROGRESS NOTES
Subjective: The patient complains of severe acute on chronic progressive fatigue and CONFUSION, DIZZINESS partially relieved by rest, PT, OT and meds   and exacerbated by exertion and recent illness. She initiallypresented to ED with alcoholic hepatitis. Patient stated she knew her liver was \"gone\" so she had been trying to cut back on drinking, but still drinks 4-5 small bottles of fireball a day. Her last drink was on 9/4/2022 (day of admission) at 4am after waking up. She later developed sharp RUQ pain. CT of abdomen showed enlarged fatty liver, small volume ascites and mild ill-defined mesenteric and retroperitoneal inflammation, likely edema and/or pancreatitis. I am concerned about patients medical complexities including:  Principal Problem:    Alcoholic hepatitis with ascites  Active Problems:    Major depressive disorder, severe (HCC)    Impaired mobility and activities of daily living    Myalgia, unspecified site    Acute alcoholic hepatitis    GI bleeding  Resolved Problems:    * No resolved hospital problems. *      .    Reviewed recent nursing note and discussed current status and planned care with acute care providers, \" Notified Dr. Adelso Thao via perfect serve regarding critical value of fluid cultures drawn from 9/13 growing gram + cocci in chains\". He had a paracentesis done on 9/13/2022 1500 cc removed. Abd is feeling better. However is still feeling very emotional about her disease process and feels like the ascites is getting worse again. Its been less than a day since she was drained. ROS x10: The patient also complains of severely impaired mobility and activities of daily living.   Otherwise no new problems with vision, hearing, nose, mouth, throat, dermal, cardiovascular, GI, , pulmonary, musculoskeletal, psychiatric or neurological.        Vital signs:  BP 95/64   Pulse 97   Temp 98.6 °F (37 °C) (Oral)   Resp 18   Ht 5' 5\" (1.651 m)   Wt 185 lb 11.2 oz (84.2 kg) FF posture, varying pace, vc's for normalizing gait pattern (09/15/22 0835)  Gait Deviations: Slow Kamila; Increased JOHNNA; Decreased step length (09/14/22 1105)  Distance: 150' (09/15/22 0835)  Comments: pt tolerates increased distance without issue. (09/15/22 0835)  Stairs:  Stairs/Curb  Stairs?: Yes (09/15/22 0835)  Stairs  # Steps : 12 (09/15/22 0835)  Stairs Height: 6\" (09/15/22 0835)  Rails: Right ascending (09/15/22 0835)  Device: No Device (09/15/22 0835)  Assistance: Contact guard assistance (09/15/22 0835)  Comment: small posterior LOB while ascending, CGA needed to correct and prevent fall. pt given education to ensure family member is behind her while she completes stairs if dc home. (09/15/22 0835)  W/C mobility:       Occupational Therapy:   Hand Dominance: Right  ADL  Feeding: Independent (09/10/22 1252)  Grooming: Stand by assistance (09/13/22 1201)  Grooming Skilled Clinical Factors: in standing (09/13/22 1201)  UE Bathing: Setup (09/10/22 1252)  LE Bathing: Moderate assistance (09/10/22 1252)  UE Dressing: Setup (09/10/22 1252)  LE Dressing: Moderate assistance (09/13/22 1201)  LE Dressing Skilled Clinical Factors: Patient able to doff B hospital socks - unable to don B hospital socks (09/13/22 1201)  Toileting: Unable to assess(comment) (pt declined need) (09/10/22 1252)  Additional Comments: ADL's simulated unless otherwise stated above (09/10/22 1252)  Toilet Transfers  Toilet Transfer: Unable to assess (09/10/22 1257)  Toilet Transfers Comments: anticpated CGA (09/10/22 1257)          Speech Therapy:            Diet/Swallow:                   COGNITION  OT: Cognition Comment: Patient requires increased time for processing.   SP:           Lab/X-ray studies reviewed, analyzed and discussed with patient and staff:   Recent Results (from the past 24 hour(s))   POCT Glucose    Collection Time: 09/14/22 11:17 AM   Result Value Ref Range    POC Glucose 101 (H) 70 - 99 mg/dl    Performed on ACCU-CHEK POCT Glucose    Collection Time: 09/14/22  4:10 PM   Result Value Ref Range    POC Glucose 117 (H) 70 - 99 mg/dl    Performed on ACCU-CHEK    POCT Glucose    Collection Time: 09/14/22  7:42 PM   Result Value Ref Range    POC Glucose 108 (H) 70 - 99 mg/dl    Performed on ACCU-CHEK    CBC with Auto Differential    Collection Time: 09/15/22  4:54 AM   Result Value Ref Range    WBC 7.0 4.8 - 10.8 K/uL    RBC 2.65 (L) 4.20 - 5.40 M/uL    Hemoglobin 9.6 (L) 12.0 - 16.0 g/dL    Hematocrit 29.2 (L) 37.0 - 47.0 %    .4 (H) 82.0 - 100.0 fL    MCH 36.2 (H) 27.0 - 31.3 pg    MCHC 32.7 (L) 33.0 - 37.0 %    RDW 15.1 (H) 11.5 - 14.5 %    Platelets 144 (L) 416 - 400 K/uL    PLATELET SLIDE REVIEW Decreased     Neutrophils % 90.0 %    Lymphocytes % 8.0 %    Monocytes % 11.0 %    Eosinophils % 1.0 %    Basophils % 0.6 %    Neutrophils Absolute 6.4 1.4 - 6.5 K/uL    Lymphocytes Absolute 0.6 (L) 1.0 - 4.8 K/uL    Monocytes Absolute 0.0 (L) 0.2 - 0.8 K/uL    Eosinophils Absolute 0.1 0.0 - 0.7 K/uL    Basophils Absolute 0.0 0.0 - 0.2 K/uL    Bands Relative 1 (L) 5 - 11 %    Anisocytosis 2+     Macrocytes 3+     Polychromasia 1+     Poikilocytes 1+     Target Cells 2+    Comprehensive Metabolic Panel w/ Reflex to MG    Collection Time: 09/15/22  4:55 AM   Result Value Ref Range    Sodium 135 135 - 144 mEq/L    Potassium reflex Magnesium 3.8 3.4 - 4.9 mEq/L    Chloride 103 95 - 107 mEq/L    CO2 26 20 - 31 mEq/L    Anion Gap 6 (L) 9 - 15 mEq/L    Glucose 93 70 - 99 mg/dL    BUN 7 6 - 20 mg/dL    Creatinine 0.81 0.50 - 0.90 mg/dL    GFR Non-African American >60.0 >60    GFR  >60.0 >60    Calcium 7.9 (L) 8.5 - 9.9 mg/dL    Total Protein 5.3 (L) 6.3 - 8.0 g/dL    Albumin 1.5 (L) 3.5 - 4.6 g/dL    Total Bilirubin 5.9 (H) 0.2 - 0.7 mg/dL    Alkaline Phosphatase 299 (H) 40 - 130 U/L    ALT 37 (H) 0 - 33 U/L     (H) 0 - 35 U/L    Globulin 3.8 (H) 2.3 - 3.5 g/dL   Magnesium    Collection Time: 09/15/22  4:55 AM Result Value Ref Range    Magnesium 1.6 (L) 1.7 - 2.4 mg/dL   Protime-INR    Collection Time: 09/15/22  5:54 AM   Result Value Ref Range    Protime 17.5 (H) 12.3 - 14.9 sec    INR 1.4    POCT Glucose    Collection Time: 09/15/22  7:21 AM   Result Value Ref Range    POC Glucose 82 70 - 99 mg/dl    Performed on ACCU-CHEK      Previous extensive, complex labs, notes and diagnostics reviewed and analyzed. 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 Updated functional and medical status reassessed regarding patients ability to participate in therapies and patient found to be able to participate in:      Short stay  acute intensive comprehensive inpatient rehabilitation program including PT/OT to improve balance, ambulation, ADLs, and to improve the P/AROM. It is my opinion that they will be able to tolerate 3 hours of therapy a day and benefit from it at an acute level. I again discussed acute rehab with the patient and verify that the patient is able and willing to participate in 3 hours of therapy a day. Rehab and Acute Care Case Management has also reinforced this expectation. Will continue to follow to attempt to get patient to the most efficient but most effective level of care will be in their best interest.  Continue to focus on energy conservation heart rate and blood pressure monitoring before during and after therapy endurance and consistency of function. We will need precertification from her insurance.   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 Bowel constipation   and Bladder dysfunction   overactive, neurogenic bladder:  frequent toileting, ambulate to bathroom with assistance, check post void residuals. Check for C.difficile x1 if >2 loose stools in 24 hours, continue bowel & bladder program.  Monitor for UTI symptoms including lethargy and confusion    Moderate to severe low back pain and generalized OA pain: reassess pain every shift and prior to and after each therapy session, give prn try to limit Tylenol   and consider scheduled Oxy IR to avoid Tylenol, modalities prn in therapy, consider Lidoderm, K-pad prn. Skin healing    breakdown   risk:  continue pressure relief program.  Daily skin exams and reports from nursing. Severe fatigue due to immobility and nutritional deficits: decline in PO intake is a concern-considering supplements   Add vitamin B12 vitamin D and CoQ10 titrate dosing and add protein supplementation with low carb content. Complex discharge planning:   Discussed with care team-last 24 hour events noted. I will continue to follow along and reassess functional and medical status as we strive to improve patient's functional and medical outcomes progressing to the most efficient and lowest level of care. Depression-add spiritual car      Complex Active General Medical Issues that complicate care:     1. Principal Problem:    Alcoholic hepatitis with ascites  Active Problems:    Major depressive disorder, severe (HCC)    Impaired mobility and activities of daily living    Myalgia, unspecified site    Acute alcoholic hepatitis    GI bleeding  Resolved Problems:    * No resolved hospital problems. *          Events and functional changes in the past 24 hours reviewed improvements in functional status are encouraging      Focus on reassessing rehab goals and coordinating therapy and medical self care issues. Await pre-cert.             Devin Townsend D.O., PM&R     Attending    [phone]   [facility] [address] [place] [address]

## 2022-09-15 NOTE — PROGRESS NOTES
Shift assessment complete. VSS. Pt is AxOx4. Lungs clear. Abd distended. BLE +2, pitting. Meds given per mar. Call light within reach. Will continue to monitor.     Electronically signed by Juanjose Dc RN on 9/15/2022 at 9:43 AM

## 2022-09-15 NOTE — PROGRESS NOTES
Physical Therapy Med Surg Daily Treatment Note  Facility/Department: [facility]  [place] [facility] [place]       NAME: Andrew Lomas  : 1985 (43 y.o.)  MRN: [id number]  CODE STATUS: Full Code    Date of Service: 9/15/2022    Patient Diagnosis(es): Acute alcoholic hepatitis [Y21.96]  Alcoholic hepatitis with ascites [K70.11]   Chief Complaint   Patient presents with    Abdominal Pain     Abd pain and distention x 2 days    Withdrawal     Alcohol withdrawal     Patient Active Problem List    Diagnosis Date Noted    Acute alcoholic hepatitis     Impaired mobility and activities of daily living 2022    Generalized pain     Alcoholic hepatitis with ascites 2022    Major depressive disorder, severe ([address] 2022    Myalgia, unspecified site 2022    Toe deformity, acquired 2015    Pain in left foot 2014    Bursitis of foot 2012    GI bleeding 2022        Past Medical History:   Diagnosis Date    Alcohol abuse     Tobacco dependence      Past Surgical History:   Procedure Laterality Date    APPENDECTOMY      FOOT SURGERY      reports 6 sx on L foot and one on right foot    PARACENTESIS Left 2022    1510 ml removed per Dr Springer Shark  specimen obtained    TUBAL LIGATION      UPPER GASTROINTESTINAL ENDOSCOPY N/A 2022    EGD DIAGNOSTIC ONLY performed by Octaviano De Los Santos MD at [facility]       Chart Reviewed: Yes  Family / Caregiver Present: No    Restrictions:  Restrictions/Precautions: Seizure; Fall Risk    SUBJECTIVE:   Subjective: \"I had a good day then a bad day then an okay day. \"    Pain  Pain: 6/10 abdominal/back pain. OBJECTIVE:        Bed mobility  Supine to Sit: Stand by assistance  Sit to Supine: Stand by assistance  Bed Mobility Comments: HOB slightly elevated. vc's for sequencing and hand placement. Increased time and effort to complete.     Transfers  Sit to Stand: Stand by assistance  Stand to sit: Stand by assistance  Comment: Slow paced d/t increased abdominal pain. steady. Ambulation  Surface: level tile  Device: No Device  Assistance: Stand by assistance  Quality of Gait: WBOS, waddle pattern, slight FF posture, varying pace, vc's for normalizing gait pattern  Distance: 150'  Comments: pt tolerates increased distance without issue. Stairs/Curb  Stairs?: Yes  Stairs  # Steps : 12  Stairs Height: 6\"  Rails: Right ascending  Device: No Device  Assistance: Contact guard assistance  Comment: small posterior LOB while ascending, CGA needed to correct and prevent fall. pt given education to ensure family member is behind her while she completes stairs if dc home. PT Exercises  Dynamic Standing Balance Exercises: Self care /hygenic activities at mirror. x 2'          Activity Tolerance  Activity Tolerance: Patient tolerated treatment well;Patient limited by pain          ASSESSMENT   Assessment: pt able to greatly increase gait distance and progress to stair training with CGA, pt reports desire to dc to rehab d/t needing to be responsible for  of 10year old once home.      Discharge Recommendations:  Continue to assess pending progress, Patient would benefit from continued therapy after discharge         Goals  Long Term Goals  Long term goal 1: Pt to complete bed mobility with indep  Long term goal 2: Pt to complete transfers with Supervision  Long term goal 3: Pt to ambulate 50-150ft with LRD and SBA  Long term goal 4: Pt to manage flight of steps with HR and SBA    PLAN    Plan: 1 time a day 3-6 times a week  Safety Devices  Type of Devices: Call light within reach, Left in bed     AMPAC (6 CLICK) BASIC MOBILITY  AM-PAC Inpatient Mobility Raw Score : 18      Therapy Time   Individual   Time In 0759   Time Out 0825   Minutes 26      Gait: 23  BM/Trsf: 3        Huber Cesar PTA, 09/15/22 at 8:38 AM         Definitions for assistance levels  Independent = pt does not require any physical supervision or assistance from another person for activity completion. Device may be needed.   Stand by assistance = pt requires verbal cues or instructions from another person, close to but not touching, to perform the activity  Minimal assistance= pt performs 75% or more of the activity; assistance is required to complete the activity  Moderate assistance= pt performs 50% of the activity; assistance is required to complete the activity  Maximal assistance = pt performs 25% of the activity; assistance is required to complete the activity  Dependent = pt requires total physical assistance to accomplish the task

## 2022-09-15 NOTE — PROGRESS NOTES
Hospitalist Progress Note      PCP: Jayden Daniel MD    Date of Admission: 9/4/2022    Chief Complaint:  no acute events, afebrile, SBP in the 90-100s    Medications:  Reviewed    Infusion Medications    sodium chloride      dextrose       Scheduled Medications    magnesium oxide  400 mg Oral Daily    pantoprazole  40 mg Oral QAM AC    sodium chloride flush  5-40 mL IntraVENous 2 times per day    multivitamin  1 tablet Oral Daily    thiamine  100 mg IntraVENous Daily    nicotine  1 patch TransDERmal Daily     PRN Meds: sodium chloride flush, sodium chloride, HYDROcodone 5 mg - acetaminophen, glucose, dextrose bolus **OR** dextrose bolus, glucagon (rDNA), dextrose, morphine, albuterol, ondansetron **OR** ondansetron, polyethylene glycol, LORazepam **OR** LORazepam **OR** LORazepam **OR** LORazepam **OR** LORazepam **OR** LORazepam **OR** LORazepam **OR** LORazepam    No intake or output data in the 24 hours ending 09/15/22 1203      Exam:    BP 95/64   Pulse 97   Temp 98.6 °F (37 °C) (Oral)   Resp 18   Ht 5' 5\" (1.651 m)   Wt 185 lb 11.2 oz (84.2 kg)   LMP  (LMP Unknown) Comment: tubal ligation  SpO2 97%   BMI 30.90 kg/m²     General appearance: appears stated age and cooperative. Respiratory:  clear to auscultation, bilaterally   Cardiovascular: Regular rate and rhythm, S1/S2 . Abdomen: Soft, distended, active bowel sounds. Musculoskeletal: No edema bilaterally.      Labs:   Recent Labs     09/13/22  0622 09/14/22  0530 09/15/22  0454   WBC 8.0 7.1 7.0   HGB 9.7* 9.6* 9.6*   HCT 29.7* 29.2* 29.2*   * 126* 124*       Recent Labs     09/13/22 0622 09/14/22  0530 09/15/22  0455    134* 135   K 3.9 3.9 3.8    103 103   CO2 24 23 26   BUN 8 9 7   CREATININE 0.95* 0.77 0.81   CALCIUM 7.7* 7.8* 7.9*       Recent Labs     09/13/22 0622 09/14/22  0530 09/15/22  0455   * 149* 151*   ALT 38* 37* 37*   BILITOT 7.4* 6.7* 5.9*   ALKPHOS 342* 306* 299*       Recent Labs     09/13/22  0622 09/14/22  0530 09/15/22  0554   INR 1.5 1.5 1.4       No results for input(s): Stacey Polo in the last 72 hours. Urinalysis:      Lab Results   Component Value Date/Time    NITRU POSITIVE 09/10/2022 08:18 AM    WBCUA 21-50 09/10/2022 08:18 AM    BACTERIA MANY 09/10/2022 08:18 AM    RBCUA 0-2 09/10/2022 08:18 AM    BLOODU Negative 09/10/2022 08:18 AM    SPECGRAV 1.018 09/10/2022 08:18 AM    GLUCOSEU Negative 09/10/2022 08:18 AM       Radiology:  IR US GUIDED PARACENTESIS   Final Result   1. Status post technically successful ultrasound-guided paracentesis. Radha Molina is a Female of 43 years age, referred for Ultrasound Guided Paracentesis. PROCEDURE: Survey of the abdomen showed large amount of ascites fluid. After obtaining informed consent, the patient was positioned supine on the sonography table. Using ultrasound, the skin over the left hemiabdomen was locally anesthetized with 1% lidocaine. Following that, a Yueh needle was advanced into the fluid    pocket using ultrasound visualization. 1510cc, of clear yellow fluid were aspirated and sent for cytology, and pathology. The needle was removed, and hemostasis was obtained with pressure. A Band-Aid was placed. Post procedure images did not demonstrate hemorrhage at the target site. The patient tolerated the procedure well. The patient left the department in good condition. A radiology nurse was in presence monitoring vital signs, assisting throughout the procedure. CT ABDOMEN PELVIS WO CONTRAST Additional Contrast? None   Final Result      ENLARGED FATTY LIVER. SMALL VOLUME ASCITES AND MILD ILL-DEFINED MESENTERIC AND RETROPERITONEAL INFLAMMATION, LIKELY EDEMA AND/OR PANCREATITIS. MILD PROBABLY REACTIVE WALL THICKENING OF THE COLON.                        Assessment/Plan:    42 y/o female with history of alcohol and tobacco use disorder who presented with:     Acute alcoholic hepatitis / pancreatitis   - treated with IVFs, pain control  - Maddrey score < 32, no indication for prednisolone per GI  - s/p paracentesis with drainage of 1500 ml per IR  - slowly improving  - fluid analysis was not suggestive of SBP  - fluid culture is growing GPC in chains  - started on Rocephin, follow final culture result    Esophagitis   - continue protonix    E. Coli UTI  - on IV Rocephin    Severe alcohol use disorder with risk for acute alcohol withdrawal  - treated with librium, gabapentin, clonidine   - on Lorazepam per [facility] protocol  - continue thiamine, multivitamin     Hypokalemia, hypomagnesemia, hypophosphatemia  - repleted    Mild WILLIE  - improved    Macrocytic anemia  - in the setting of alcohol use    Thrombocytopenia  - mild, likely related to alcohol use and liver disease, improving    Diet: ADULT DIET; Regular;  Low Sodium (2 gm)    Code Status: Full Code      Disposition -  acute rehab today          Electronically signed by Patricia Cooley MD on 9/15/2022 at 12:03 PM

## 2022-09-15 NOTE — CARE COORDINATION
This LSW met with patient at bedside this am. Patient resting comfortably. D/C PLAN : Patient awaiting pre-cert to [address] [place] [address]  / [facility] to follow.   Electronically signed by LIDYA Martin, JUHI on 9/15/22 at 10:04 AM EDT

## 2022-09-15 NOTE — PROGRESS NOTES
Notified Dr. Michelle Lema via perfect serve regarding critical value of fluid cultures drawn from 9/13 growing gram + cocci in chains.

## 2022-09-15 NOTE — DISCHARGE SUMMARY
Hospital Medicine Discharge Summary    Omar Martin  :  1985  MRN:  [id number]    Admit date:  2022  Discharge date:  9/15/2022    Admitting Physician:  Shai Zhang MD  Primary Care Physician: Romulo Birch MD      Discharge Diagnoses:    Principal Problem:    Alcoholic hepatitis with ascites  Active Problems:    Major depressive disorder, severe (HCC)    Impaired mobility and activities of daily living    Myalgia, unspecified site    Acute alcoholic hepatitis    GI bleeding  Resolved Problems:    * No resolved hospital problems. *      Hospital Course:   Omar Martin is a 43 y.o. female that was admitted and treated at [facility] for the following medical issues:     Acute alcoholic hepatitis / pancreatitis   - treated with IVFs, pain control  - Maddrey score < 32, no indication for prednisolone per GI  - s/p paracentesis with drainage of 1500 ml per IR  - slowly improving  - fluid analysis was not suggestive of SBP  - fluid culture is growing GPC in chains  - started on Rocephin, follow final culture result    Esophagitis   - continue Protonix    E. Coli UTI  - on IV Rocephin    Severe alcohol use disorder with risk for acute alcohol withdrawal  - treated with librium, gabapentin, clonidine   - improved  - on Lorazepam per [facility] protocol  - continue thiamine, multivitamin     Hypokalemia, hypomagnesemia, hypophosphatemia  - repleted    Mild WILLIE  - improved    Macrocytic anemia  - in the setting of alcohol use    Thrombocytopenia  - mild, likely related to alcohol use and liver disease, improving        Disposition -  acute rehab      Patient was seen by the following consultants while admitted to [facility]:   Consults:  IP CONSULT TO SOCIAL WORK  IP CONSULT TO GI  IP CONSULT TO REHAB/TCU ADMISSION COORDINATOR  IP CONSULT TO INTERVENTIONAL RADIOLOGY    Significant Diagnostic Studies:    CT ABDOMEN PELVIS WO CONTRAST Additional Contrast? None    Result Date: 2022  CT ABDOMEN PELVIS WO CONTRAST: 9/4/2022 CLINICAL HISTORY:  ab pain . COMPARISON: None available. TECHNIQUE: Spiral images were obtained of the abdomen and pelvis without contrast. All CT scans at this facility use dose modulation, iterative reconstruction, and/or weight based dosing when appropriate to reduce radiation dose to as low as reasonably achievable. FINDINGS: The liver is moderate to markedly enlarged with extensive fatty infiltration. A small volume of ascites is present in the pelvis. Mild retroperitoneal and mesenteric inflammation is likely edema and/or pancreatitis. Mild wall thickening of the essentially collapsed colon is probably reactive and/or related to incomplete distention, rather than colitis. The gallbladder is mildly distended, but otherwise unremarkable in appearance. A very small fat-containing periumbilical hernia is noted. There is no abscess, free air, abnormal bowel or biliary dilatation, focal inflammatory changes, significant lymphadenopathy, hernias, or other complication identified. The spleen, pancreas, adrenal glands, kidneys, great vessels, small bowel loops, uterus, adnexa, urinary bladder, and additional images of the pelvis are unremarkable. The visualized lung bases are clear. ENLARGED FATTY LIVER. SMALL VOLUME ASCITES AND MILD ILL-DEFINED MESENTERIC AND RETROPERITONEAL INFLAMMATION, LIKELY EDEMA AND/OR PANCREATITIS. MILD PROBABLY REACTIVE WALL THICKENING OF THE COLON. Disposition:   Discharged to acute rehab. Any [facility] needs that were indicated and/or required as been addressed and set up by Social Work. Condition at discharge: Pt was medically stable at the time of discharge. Activity: activity as tolerated, fall precautions. Total time taken for discharging this patient: 40 minutes. Greater than 70% of time was spent focused exclusively on this patient.  Time was taken to review chart, discuss plans with consultants, reconciling medications, discussing plan answering questions with patient. Signed:  Pretty Cook MD  9/15/2022, 4:11 PM  ----------------------------------------------------------------------------------------------------------------------    Funmilayo Franklin,     Please return to ER or call 911 if you develop any significant signs or symptoms. I may not have addressed all of your medical illnesses or the abnormal blood work or imaging therefore please ask your PCP Taniya Jorge MD and other out patient specialists and providers  to obtain [facility] record entirely to follow up on all of the abnormal labs, imaging and findings that I have and have not addressed during your hospitalization. Discharging you from the hospital does not mean that your medical care ends here and now. You may still need additional work up, investigation, monitoring, and treatment to be handled from this point on by outside providers including your PCP, Taniya Jorge MD , Specialists and other healthcare providers. Please review your list of discharge medications prior to resuming medications you might still have at home, as the medications you need to be taking, dosages or how often you must take them may have changed. For medication questions, contact your retail pharmacy and your PCP, Taniya Jorge MD .     ** I STRONGLY RECOMMEND that you follow up with Taniya Jorge MD within 3 to 5 days for a post hospitalization evaluation. This specific office visit is covered by your insurance, and is not the same as your annual doctor visit/ check up. This office visit is important, as it may prevent need for repeat and/or future hospitalizations. **    Your medical team at [facility]) appreciates the opportunity to work with you to get well!     Sincerely,  Pretty Cook MD

## 2022-09-16 PROBLEM — Z78.9 IMPAIRED MOBILITY AND ADLS: Status: ACTIVE | Noted: 2022-09-16

## 2022-09-16 PROBLEM — Z74.09 IMPAIRED MOBILITY AND ADLS: Status: ACTIVE | Noted: 2022-09-16

## 2022-09-16 PROCEDURE — 97530 THERAPEUTIC ACTIVITIES: CPT

## 2022-09-16 PROCEDURE — 97166 OT EVAL MOD COMPLEX 45 MIN: CPT

## 2022-09-16 PROCEDURE — 6370000000 HC RX 637 (ALT 250 FOR IP): Performed by: PHYSICAL MEDICINE & REHABILITATION

## 2022-09-16 PROCEDURE — 92610 EVALUATE SWALLOWING FUNCTION: CPT

## 2022-09-16 PROCEDURE — 6360000002 HC RX W HCPCS: Performed by: PHYSICAL MEDICINE & REHABILITATION

## 2022-09-16 PROCEDURE — 6360000002 HC RX W HCPCS: Performed by: INTERNAL MEDICINE

## 2022-09-16 PROCEDURE — 1180000000 HC REHAB R&B

## 2022-09-16 PROCEDURE — 97116 GAIT TRAINING THERAPY: CPT

## 2022-09-16 PROCEDURE — 2580000003 HC RX 258: Performed by: INTERNAL MEDICINE

## 2022-09-16 PROCEDURE — 97162 PT EVAL MOD COMPLEX 30 MIN: CPT

## 2022-09-16 PROCEDURE — 99223 1ST HOSP IP/OBS HIGH 75: CPT | Performed by: PHYSICAL MEDICINE & REHABILITATION

## 2022-09-16 PROCEDURE — 97129 THER IVNTJ 1ST 15 MIN: CPT

## 2022-09-16 PROCEDURE — 92523 SPEECH SOUND LANG COMPREHEN: CPT

## 2022-09-16 PROCEDURE — 6370000000 HC RX 637 (ALT 250 FOR IP): Performed by: INTERNAL MEDICINE

## 2022-09-16 PROCEDURE — 97112 NEUROMUSCULAR REEDUCATION: CPT

## 2022-09-16 RX ORDER — BISACODYL 10 MG
10 SUPPOSITORY, RECTAL RECTAL DAILY PRN
Status: DISCONTINUED | OUTPATIENT
Start: 2022-09-16 | End: 2022-09-22 | Stop reason: HOSPADM

## 2022-09-16 RX ORDER — CALCIUM CARBONATE 200(500)MG
500 TABLET,CHEWABLE ORAL 2 TIMES DAILY
Status: DISCONTINUED | OUTPATIENT
Start: 2022-09-16 | End: 2022-09-22 | Stop reason: HOSPADM

## 2022-09-16 RX ORDER — LIDOCAINE 4 G/G
3 PATCH TOPICAL DAILY
Status: DISCONTINUED | OUTPATIENT
Start: 2022-09-16 | End: 2022-09-22 | Stop reason: HOSPADM

## 2022-09-16 RX ORDER — SODIUM PHOSPHATE, DIBASIC AND SODIUM PHOSPHATE, MONOBASIC 7; 19 G/133ML; G/133ML
1 ENEMA RECTAL DAILY PRN
Status: DISCONTINUED | OUTPATIENT
Start: 2022-09-16 | End: 2022-09-22 | Stop reason: HOSPADM

## 2022-09-16 RX ORDER — HYDROCODONE BITARTRATE AND ACETAMINOPHEN 5; 325 MG/1; MG/1
1 TABLET ORAL EVERY 6 HOURS PRN
Status: DISCONTINUED | OUTPATIENT
Start: 2022-09-16 | End: 2022-09-19

## 2022-09-16 RX ORDER — VITAMIN B COMPLEX
2000 TABLET ORAL
Status: DISCONTINUED | OUTPATIENT
Start: 2022-09-16 | End: 2022-09-22 | Stop reason: HOSPADM

## 2022-09-16 RX ORDER — CYANOCOBALAMIN 1000 UG/ML
1000 INJECTION INTRAMUSCULAR; SUBCUTANEOUS WEEKLY
Status: DISCONTINUED | OUTPATIENT
Start: 2022-09-16 | End: 2022-09-22 | Stop reason: HOSPADM

## 2022-09-16 RX ORDER — UBIDECARENONE 100 MG
100 CAPSULE ORAL DAILY
Status: DISCONTINUED | OUTPATIENT
Start: 2022-09-16 | End: 2022-09-22 | Stop reason: HOSPADM

## 2022-09-16 RX ORDER — L. ACIDOPHILUS/L.BULGARICUS 1MM CELL
2 TABLET ORAL
Status: DISCONTINUED | OUTPATIENT
Start: 2022-09-16 | End: 2022-09-22 | Stop reason: HOSPADM

## 2022-09-16 RX ADMIN — THERA TABS 1 TABLET: TAB at 08:31

## 2022-09-16 RX ADMIN — LACTOBACILLUS TAB 2 TABLET: TAB at 17:26

## 2022-09-16 RX ADMIN — PANTOPRAZOLE SODIUM 40 MG: 40 TABLET, DELAYED RELEASE ORAL at 05:40

## 2022-09-16 RX ADMIN — HYDROCODONE BITARTRATE AND ACETAMINOPHEN 1 TABLET: 5; 325 TABLET ORAL at 08:31

## 2022-09-16 RX ADMIN — Medication 2000 UNITS: at 17:25

## 2022-09-16 RX ADMIN — CEFTRIAXONE SODIUM 1000 MG: 1 INJECTION, POWDER, FOR SOLUTION INTRAMUSCULAR; INTRAVENOUS at 12:58

## 2022-09-16 RX ADMIN — THIAMINE HYDROCHLORIDE 100 MG: 100 INJECTION, SOLUTION INTRAMUSCULAR; INTRAVENOUS at 08:30

## 2022-09-16 RX ADMIN — HYDROCODONE BITARTRATE AND ACETAMINOPHEN 1 TABLET: 5; 325 TABLET ORAL at 00:28

## 2022-09-16 RX ADMIN — ANTACID TABLETS 500 MG: 500 TABLET, CHEWABLE ORAL at 20:39

## 2022-09-16 RX ADMIN — ANTACID TABLETS 500 MG: 500 TABLET, CHEWABLE ORAL at 10:35

## 2022-09-16 RX ADMIN — CYANOCOBALAMIN 1000 MCG: 1000 INJECTION, SOLUTION INTRAMUSCULAR; SUBCUTANEOUS at 08:31

## 2022-09-16 RX ADMIN — Medication 400 MG: at 08:31

## 2022-09-16 RX ADMIN — HYDROCODONE BITARTRATE AND ACETAMINOPHEN 1 TABLET: 5; 325 TABLET ORAL at 14:18

## 2022-09-16 RX ADMIN — LACTOBACILLUS TAB 2 TABLET: TAB at 10:35

## 2022-09-16 RX ADMIN — Medication 100 MG: at 08:31

## 2022-09-16 ASSESSMENT — ENCOUNTER SYMPTOMS
CONSTIPATION: 1
NAUSEA: 0
COUGH: 0
VISUAL CHANGE: 0
BLOOD IN STOOL: 0
EYE REDNESS: 0
ABDOMINAL PAIN: 1
BOWEL INCONTINENCE: 0
SORE THROAT: 0
PHOTOPHOBIA: 0
WHEEZING: 0
VOMITING: 0
BACK PAIN: 1
DIARRHEA: 0
EYE PAIN: 0
SHORTNESS OF BREATH: 1
ABDOMINAL DISTENTION: 1
STRIDOR: 0

## 2022-09-16 ASSESSMENT — PAIN DESCRIPTION - DESCRIPTORS
DESCRIPTORS: SHARP
DESCRIPTORS: ACHING
DESCRIPTORS: SHARP

## 2022-09-16 ASSESSMENT — PAIN DESCRIPTION - LOCATION
LOCATION: ABDOMEN
LOCATION: ANKLE;ABDOMEN
LOCATION: OTHER (COMMENT)
LOCATION: ABDOMEN

## 2022-09-16 ASSESSMENT — PAIN SCALES - GENERAL
PAINLEVEL_OUTOF10: 9
PAINLEVEL_OUTOF10: 9
PAINLEVEL_OUTOF10: 8
PAINLEVEL_OUTOF10: 9

## 2022-09-16 ASSESSMENT — PAIN DESCRIPTION - ORIENTATION
ORIENTATION: LEFT
ORIENTATION: LEFT

## 2022-09-16 NOTE — CONSULTS
Hospitalist Consult/Progress Note  9/16/2022 10:12 AM    Assessment and Plan:   Acute alcoholic hepatitis / pancreatitis   - treated with IVFs, pain control  - Maddrey score < 32, no indication for prednisolone per GI  - s/p paracentesis with drainage of 1500 ml per IR  - slowly improving  - fluid analysis was not suggestive of SBP  - fluid culture is growing GPC in chains  - started on Rocephin, follow final culture result     Esophagitis   - continue protonix     E. Coli UTI  - on IV Rocephin    Severe alcohol use disorder with risk for acute alcohol withdrawal  - treated with librium, gabapentin, clonidine   - on Lorazepam per [facility] protocol  - continue thiamine, multivitamin     Hypokalemia, hypomagnesemia, hypophosphatemia  - labs in am    Mild WILLIE  - improved  - check labs in am     Macrocytic anemia  - in the setting of alcohol use     Thrombocytopenia  - mild, likely related to alcohol use and liver disease, improving     Generalized weakness , Gait instability and Decreased, Functional Status  - Fall precautions  - PT OT to evaluate  - Maximize nutrition status  - Assessing if needs DME at home  - SW on board. Bowel Regimen and GI Ppx  - stool softners PRN     Diet:  ADULT DIET; Regular; Low Sodium (2 gm)    Advance Directive:  Full Code     Nutrition status  - Supplemental Vitamins ordered  - Dietitian assessment    Vaccinations  - Immunization records reviewed    DVT prophylaxis  - DIVINE hose    Discharge planning  - SW on board. High Risk Readmission Screening Tool Score Noted.      Additionally, the following hospital problems were addressed:  Principal Problem:    Impaired mobility and activities of daily living dt encephalopathy and gait ataxia  Active Problems:    Alcoholic hepatitis with ascites    Generalized pain    Myalgia, unspecified site    Toe deformity, acquired    Acute alcoholic hepatitis    Impaired mobility and ADLs    GI bleeding  Resolved Problems:    * No resolved hospital problems. *      ** Total time spent reviewing medical records, evaluating patient, speaking with RN's and consultants where I was focused exclusively on this patient: 60 minutes. This time is excluding time spent performing procedures or significant events occurring earlier or later in the day requiring my attention and focus. Subjective:   Admit Date: 9/15/2022  PCP: Chris Stewart MD  44 y/o female with history of alcohol and tobacco use disorder who is transferred to acute rehab for further therapies after inpatient admission for acute alcoholic hepatitis and pancreatitis. Underwent paracentesis. Cultures growing GPC in chains and she is on rocephin. Once medically optimized she was transferred to acute rehab for further treatment and therapies. No acute events overnight. Afebrile. No new complaints. Pt denies chest pain, SOB, N/V, fevers or chills. Objective:     Vitals:    09/15/22 1845 09/15/22 1930 09/15/22 2200 09/16/22 0728   BP: 108/66 110/72  102/67   Pulse: 95 89  97   Resp: 18 17  16   Temp: 98.2 °F (36.8 °C) 98.2 °F (36.8 °C)  97.9 °F (36.6 °C)   TempSrc: Oral Oral  Oral   SpO2: 98%   96%   Weight:   185 lb (83.9 kg)    Height:   5' 5\" (1.651 m)      General appearance: No acute distress,  No conversational dyspnea noted. Dentition intact. Answers questions appropriately  Neurological: Alert, awake, and oriented x3. Motor and sensory grossly intact. No focal deficits. GCS of 15. Lungs: CTAB, no exp wheezes, No rales No retractions; No use of accessory muscles  Heart:  S1, S2 normal, RRR, no MRG appreciated  Abdomen: (+) BS, round, distended,   Extremities:  no cyanosis, 2+ edema bilat lower exts, no calf tenderness bilaterally.  Dry skin noted       Medications:      dextrose        Vitamin D  2,000 Units Oral Dinner    cyanocobalamin  1,000 mcg IntraMUSCular Weekly    coenzyme Q10  100 mg Oral Daily    lidocaine  3 patch TransDERmal Daily    calcium carbonate  500 mg Oral BID lactobacillus acidophilus  2 tablet Oral TID AC    cefTRIAXone (ROCEPHIN) IV  1,000 mg IntraVENous Q24H    magnesium oxide  400 mg Oral Daily    multivitamin  1 tablet Oral Daily    nicotine  1 patch TransDERmal Daily    pantoprazole  40 mg Oral QAM AC    thiamine  100 mg IntraVENous Daily       LABS Reviewed    IMAGING Reviewed    SANTA Conte - NP  Rounding Hospitalist    Additional work up or/and treatment plan may be added today or then after based on clinical progression. I am managing a portion of pt care. Some medical issues are handled by other specialists and Primary Rehabilitation provider. Additional work up and treatment should be done in out pt setting by pt PCP and other out pt providers.

## 2022-09-16 NOTE — PROGRESS NOTES
OCCUPATIONAL THERAPY  INPATIENT REHAB TREATMENT NOTE  [facility]      NAME: Gibran Ross  : 1985 (43 y.o.)  MRN: [id number]  CODE STATUS: Full Code  Room: R251/R251-01    Date of Service: 2022    Referring Physician: Piero aShu  Rehab Diagnosis: Impaired mobility and ADLs due to alcoholic encephalopathy    Restrictions  Restrictions/Precautions  Restrictions/Precautions: Seizure, Fall Risk              Patient's date of birth confirmed: Yes    SAFETY:  Safety Devices  Safety Devices in place: Yes  Type of devices: All fall risk precautions in place    SUBJECTIVE:       Pain at start of treatment: Yes: 8/10    Pain at end of treatment: Yes: 8/10    Location: abdomen  Nursing notified: Yes  RN: Lon Arenas  Intervention: Repositioned    COGNITION:   Initiated SLUMS assessment. Patient is right handed and had IV actively running at antecubital. IV reported occlusion with elbow bending so RN requested patient keep her arm completely straight. Patient was unable to complete the written portion of the SLUMS. Will finish it at a later date    OBJECTIVE:     Patient rolled red theraputty into a 2 foot long roll and then pinched it with the left hand while unable to use the right arm per RN request.     Patient required extra effort to get out of bed and into the wheelchair but no physical assistance       Education:   Importance of therapy    Equipment recommendations:  OT Equipment Recommendations  Other: continue to assess      ASSESSMENT:  Activity Tolerance: Patient tolerated treatment well      PLAN OF CARE:  Strengthening, Functional mobility training, Endurance training, Pain management, Safety education & training, Patient/Caregiver education & training, Equipment evaluation, education, & procurement, Self-Care / ADL, Home management training, Coordination training  continue with OT plan of care       Long Term Goal 1: Improve endurance for self care  Long Term Goal 2:  Increase self care independence  Long Term Goal 3: Improve bilateral upper extremity strength for I/ADL performance        Therapy Time:   Individual Group Co-Treat   Time In 1300       Time Out 1330         Minutes 30                   Therapeutic activities: 15 minutes  Cognitive Retraining: 15 minutes     Electronically signed by:    ANNALISA Duron,   9/16/2022, 4:00 PM
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
## 2022-09-16 NOTE — PROGRESS NOTES
Physical Therapy Rehab Treatment Note  Facility/Department: [facility]  Room: R251/R251-01       NAME: Miguel House  : 1985 (43 y.o.)  MRN: [id number]  CODE STATUS: Full Code    Date of Service: 2022       Restrictions:  Restrictions/Precautions: Seizure, Fall Risk       SUBJECTIVE:   Subjective: \"I want to get back to my normal self. \"    Pain  Pain: 7-8/10 L side/stomach pre and post uncomfortable Pt reports she was already medicated. OBJECTIVE:         Bed mobility  Sit to Supine: Supervision    Transfers  Sit to Stand: Stand by assistance  Stand to sit: Stand by assistance           Balance  Comments: Kendra Sensing 46/56               ASSESSMENT/PROGRESS TOWARDS GOALS:   Assessment: Pt is in the low risk for falls catagory per Quiñones balance test however has not yet met goal.  Pt challanged in SLS during Quiñones. Goals:  Long Term Goals  Long term goal 1: indep and effecient bed mobility  Long term goal 2: indep and effecient bed, chair and car transfers  Long term goal 3: indep gait with no device 50 feet in familiar environment and supervision for 150 feet in open or unpredictable environments  Long term goal 5: Quiñones to be completed at 50/56 level    PLAN OF CARE/Safety:   Plan Comment: Cont per POC.       Therapy Time:   Individual   Time In 1000   Time Out 1030   Minutes 30     Minutes:  Transfer/Bed mobility trainin  Gait trainin  Neuro re education:25  Therapeutic ex:0      Toby Correa PTA, 22 at 11:28 AM

## 2022-09-16 NOTE — PROGRESS NOTES
Swallowing Strategies : Alternate solids and liquids;Upright as possible for all oral intake;Remain upright for 30-45 minutes after meals      Reason for Referral  Rosanne Meehan was referred for a bedside swallow evaluation to assess the efficiency of her swallow function, identify signs and symptoms of aspiration, identify risk factors, and make recommendations regarding safe dietary consistencies, effective compensatory strategies, and safe eating environment. General  Chart Reviewed: Yes  Behavior/Cognition: Cooperative  Temperature Spikes Noted: No  Respiratory Status: Room air  O2 Device: None (Room air)  Communication Observation: Dysarthria  Follows Directions: Simple  Dentition: Some missing teeth  Patient Positioning: Upright in bed  Baseline Vocal Quality: Normal  Prior Dysphagia History: No recorded history. Pt said occasionally she has reflux. Consistencies Administered: Regular; Soft and Bite-Sized;Pureed; Thin    Vision and Hearing  Hearing  Hearing: Within functional limits    Current Diet level  Current Diet : Regular  Current Liquid Diet : Thin    Oral Motor  Labial: No impairment  Dentition: Natural  Oral Hygiene: Moist;Clean  Lingual: No impairment  Velum: No Impairment  Mandible: No impairment  Gag: No Impairment    Oral/Pharyngeal Phase  Oral Phase - Comment: Oral phase WFL  Pharyngeal Phase: Pharyngeal phase WFL via observation with no overt s/s of aspiration    PO Trials  Neuromuscular Estim Used: No  Assessment Method(s): Observation;Palpation  Vocal Quality: Low volume  Consistency Presented: Regular; Soft & Bite Sized;Pureed; Thin  How Presented: Self-fed/presented  How much presented:  (2 trials of each)  Bolus Acceptance: No impairment  Bolus Formation/Control: No impairment  Propulsion: No impairment  Oral Residue: None  Initiation of Swallow: No impairment  Laryngeal Elevation: Functional  Aspiration Signs/Symptoms: None  Pharyngeal Phase Characteristics: No impairment, issues, or

## 2022-09-16 NOTE — CARE COORDINATION
YEW met with pt and significant other and discussed discharge date of 9/25, both are in agreement.  Electronically signed by LIDYA Wilkins, UJHI on 9/16/2022 at 5:06 PM

## 2022-09-16 NOTE — PROGRESS NOTES
IV Rocephin infused well into 20g in RAC. Pt working with therapy in the gym, c/o abd pain 9/10. PRN Norco administered, will monitor.

## 2022-09-16 NOTE — PROGRESS NOTES
Facility/Department: [facility] Initial Assessment: Occupational Therapy  Room: R251/R251-01    NAME: Juan Ramon Epperson  : 1985  MRN: [id number]    Date of Service: 2022    Rehab Diagnosis(es): Impaired mobility and ADLs due to alcoholic encephalopathy  Patient Active Problem List    Diagnosis Date Noted    Impaired mobility and ADLs     Acute alcoholic hepatitis     Impaired mobility and activities of daily living dt encephalopathy and gait ataxia 2022    Generalized pain     Alcoholic hepatitis with ascites 2022    Major depressive disorder, severe ([facility] [address] 2022    Myalgia, unspecified site 2022    Toe deformity, acquired 2015    Pain in left foot 2014    Bursitis of foot 2012    GI bleeding 2022       Past Medical History:   Diagnosis Date    Alcohol abuse     Tobacco dependence      Past Surgical History:   Procedure Laterality Date    APPENDECTOMY      FOOT SURGERY      reports 6 sx on L foot and one on right foot    PARACENTESIS Left 2022    1510 ml removed per Dr Dudley Se  specimen obtained    TUBAL LIGATION      UPPER GASTROINTESTINAL ENDOSCOPY N/A 2022    EGD DIAGNOSTIC ONLY performed by Ronit Elizondo MD at [facility]       Restrictions:  Restrictions/Precautions  Restrictions/Precautions: Seizure; Fall Risk    Subjective:  General  Chart Reviewed: Yes  Patient assessed for rehabilitation services?: Yes  Referring Practitioner: Brandee Trujillo  Diagnosis: Impaired mobility and ADLs due to alcoholic encephalopathy  Subjective: I feel like my arms are weaker than my legs are    Patient's date of birth confirmed: Yes     Pain at start of treatment: Yes: 9/10    Pain at end of treatment: Yes: 8/10    Location: Abdomen  Nursing notified: Yes  RN: Dejan Walter  Intervention: Repositioned    Social Functional:  Social/Functional History  Lives With: Alone (Pt caregiver for daughter who lives with her sometimes. Pt has support from her mother, father, and boyfriend)  Type of Home: House   Home Layout: Two level; Laundry in basement   Home Access: Stairs to enter with rails   Entrance Stairs - Number of Steps: 5   Entrance Stairs - Rails: Both   Bathroom Shower/Tub: Tub/Shower unit  Bathroom Toilet: Standard  Bathroom Equipment:  (Pt does not have any bathroom equipment)  Has the patient had two or more falls in the past year or any fall with injury in the past year?: Yes (One fall a few months ago d/t alcohol consumption)  ADL Assistance: Independent   Homemaking Assistance: Independent   Ambulation Assistance: Independent   Transfer Assistance: Independent  Active : Yes   Mode of Transportation: Car  Occupation: Unemployed  Type of Occupation: Pt is currently unemployed and was not working prior to hospital admission  IADL Comments: Pt is driving. Pt is indpendent in IADL activities but can recieve support from family members as needed for household tasks and grocery shopping    Objective:  Self Care Status:  ADL  Feeding: Independent  Grooming: Setup  Grooming Skilled Clinical Factors: Grooming standing at the sink  UE Bathing: Setup  LE Bathing: Minimal assistance  LE Bathing Skilled Clinical Factors: Required assistance to wash feet  UE Dressing: Setup  LE Dressing: Minimal assistance  LE Dressing Skilled Clinical Factors: Minimal assist. Pt required min assist to don/pull up pants. Pt able to don/doff B hosptial socks but was limited d/t pain   Toileting: Unable to assess(comment)  Toileting Skilled Clinical Factors: Pt declined need to toliet      Functional Mobility:  Balance  Sitting Balance: Contact guard assistance  Standing Balance: Contact guard assistance  Functional Mobility  Functional - Mobility Device: No device  Activity: To/from bathroom;Transport items; Retrieve items  Assist Level: Contact guard assistance     Bed mobility and transfers:  Bed mobility  Rolling to Right: Stand by assistance  Supine to Sit: Stand by assistance  Sit to Supine: Stand by assistance  Bed Mobility Comments: HOB slightly elevated. Pt required increased time and effort      Observation:  Observation/Palpation  Posture: Fair    Orientation and Cognition:  Orientation  Overall Orientation Status: Within Functional Limits  Orientation Level: Oriented X4  Cognition  Overall Cognitive Status: WFL  Cognition Comment: Patient requires increased time for processing and problem solving      Vision and Hearing:  Vision  Vision: Within Functional Limits (Pt reports occasional blurry vision associated to her medical conditions)    Range of Motion:  LUE AROM (degrees)  LUE AROM : WFL  Left Hand AROM (degrees)  Left Hand AROM: WFL  RUE AROM (degrees)  RUE AROM : WFL  Right Hand AROM (degrees)  Right Hand AROM: WFL    Hand dominance: Right handed     Strength:  LUE Strength  LUE Strength: 4-/5  RUE Strength: 4-/5  RUE Strength Comment    Sensation:  Sensation  Overall Sensation Status: WNL      Safety:  Safety Devices  Type of Devices: Left in bed; All fall risk precautions in place; Bed alarm in place;Call light within reach    Assessment:  Activity Tolerance  Activity Tolerance: Patient limited by pain; Patient limited by fatigue      OT Education:  Education Given To: Patient  Education Provided: Role of Therapy, Plan of Care, Safety  Education Method: Verbal  Barriers to Learning: None  Education Outcome: Verbalized understanding      Plan:  Plan  Times per Week: 5-7  Plan Weeks: 1-1.5  Current Treatment Recommendations: Strengthening; Functional mobility training; Endurance training;Pain management; Safety education & training;Patient/Caregiver education & training;Equipment evaluation, education, & procurement;Self-Care / ADL; Home management training;Coordination training    Goals:  Patient goals : \"I want to get stronger because I need to be able to take care of my daughter\"    Long Term Goal 1: improve bilateral upper extremity strength/endurance for functional tasks  Long Term Goal 4: improve independence with all self care to return to PLOF  Long Term Goal 3: Improve endurance for self care tasks     Patient will complete self care as followed using the recommended adaptive equipment and/or adaptive techniques as instructed:  Feeding: Independent   Grooming: Independent  Bathing: Mod I  UE Dressing: Independent  LE Dressing: Mod I  Toileting: Mod I  Toilet Transfer: Mod I  Shower/Tub Transfer: Mod I   - Patient will improve static and dynamic standing balance to complete pants management at Mod I  level  - Patient will improve functional endurance to tolerate/complete 60 minutes of ADLs. - Patient will improve B UE strength and endurance to 5/5 in order to participate in self-care activities as projected. - Patient will improve B hand fine motor coordination to [facility] in order to manage clothing fasteners/self-care containers in a timely manner  - Patient will perform kitchen mobility at independent level without episodes of LOB and good safety awareness   - Patient will perform basic room mobility at Independent level. - Patient and/or caregiver will demonstrate understanding of recommended HEP for BUE  HEP.        Therapy Time:   Individual   Time In 0830   Time Out 0930   Minutes 60       Eval: 60 minutes     Electronically signed by:    Yaniv Gaona OT,   9/16/2022, 12:53 PM

## 2022-09-16 NOTE — PROGRESS NOTES
Admission completed  Patient in bed during assessment  Patient alert and oriented x 4  Skin warm and dry  Patient c/o abdominal and left ankle pain  Patient medicated per order for pain with [facility]  Patient denies falling or injuring ankle  Skin warm and dry  No tremors noted  VSS  Patient given call light and advised patient to call when getting up to use restroom. Patient states understanding.
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
## 2022-09-16 NOTE — H&P
HISTORY & PHYSICAL       DATE OF ADMISSION:  9/15/2022    DATE OF SERVICE:  9/16/22    Subjective:    Ara Richardson, 43 y.o. female presents today with:     CHIEF COMPLAINT:   43 y.o. with PMH of alcohol abuse and tobacco abuse who presented to ED with alcoholic hepatitis. Patient stated she knew her liver was \"gone\" so she had been trying to cut back on drinking, but still drinks 4-5 small bottles of fireball a day. Her last drink was on 9/4/2022 (day of admission) at 4am after waking up. She later developed sharp RUQ pain. CT of abdomen showed enlarged fatty liver, small volume ascites and mild ill-defined mesenteric and retroperitoneal inflammation, likely edema and/or pancreatitis. According to the chart she has a history of alcohol abuse and tobacco abuse she presented to the emergency room with alcoholic hepatitis. She apparently had been trying to cut back her drinking but still drinks 4-5 small bottles of fireball's a day. She apparently had her last drink the day of admission around 4 AM.  She had woken that morning with right upper quadrant pain. Work-up included CT of the abdomen which revealed fatty liver and ascites. Patient has had paracentesis with fair results regarding her abdominal pain but continues to have decreased appetite abdominal bloating and pancreatitis for which she is on antibiotics. She was initially noted to have generalized weakness and cognitive deficits secondary to hepatic encephalopathy and metabolic encephalopathy. Her balance is improving she continues to have poor judgment reasoning and insight. 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 We need to focus on balance cognition strengthening energy conservation and ambulation on uneven uneven surfaces.   Problem-solving judgment reasoning and memory will be focused on. Neurologic Problem  The patient's primary symptoms include an altered mental status, clumsiness, a loss of balance, memory loss and weakness. The patient's pertinent negatives include no focal sensory loss, focal weakness or visual change. This is a new problem. The current episode started in the past 7 days. The neurological problem developed insidiously. The problem has been gradually improving since onset. There was no focality noted. Associated symptoms include abdominal pain, back pain, confusion, dizziness, fatigue and shortness of breath. Pertinent negatives include no bladder incontinence, bowel incontinence, chest pain, diaphoresis, fever, headaches, nausea, neck pain, palpitations or vomiting. Past treatments include sleep, walking, drinking, bed rest and medication. The treatment provided moderate relief. Her past medical history is significant for a clotting disorder, liver disease and mood changes. Mental Health Problem  The primary symptoms include dysphoric mood, bizarre behavior and somatic symptoms. The primary symptoms do not include hallucinations. This is a recurrent problem. Somatic symptoms include fatigue, abdominal pain, constipation, back pain and myalgias. Somatic symptoms do not include headaches. Additional symptoms of the illness include anhedonia, insomnia, hypersomnia, appetite change, fatigue, psychomotor retardation, feelings of worthlessness, distractible, poor judgment and abdominal pain. Additional symptoms of the illness do not include attention impairment, euphoric mood, increased goal-directed activity, flight of ideas, inflated self-esteem, decreased need for sleep, visual change, headaches or seizures. She does not admit to suicidal ideas. She does not have a plan to attempt suicide. She does not contemplate harming herself. She does not contemplate injuring another person. She has not already  injured another person.  Risk factors that are present for mental illness include substance abuse. Abdominal Pain  This is a new problem. The current episode started 1 to 4 weeks ago. The onset quality is gradual. The problem has been gradually improving. The pain is located in the generalized abdominal region. The pain is at a severity of 8/10. The pain is severe. The quality of the pain is aching. The abdominal pain does not radiate. Associated symptoms include constipation and myalgias. Pertinent negatives include no diarrhea, dysuria, fever, frequency, headaches, hematuria, nausea or vomiting. The pain is relieved by Movement, sitting up and liquids. The treatment provided moderate relief. Prior diagnostic workup includes CT scan. I reviewed recent nursing note, \" Admission completed. Jen Trejo Patient in bed during assessment. Jen Trejo Patient alert and oriented x 4. .Skin warm and dry. Jen Trejo Patient c/o abdominal and left ankle pain  Patient medicated per order for pain with Soper  Patient denies falling or injuring ankle  Skin warm and dry  No tremors noted  VSS  Patient given call light and advised patient to call when getting up to use restroom. Patient states understanding. \". Today I evaluated this patient for periodic reassessment of medical and functional status. The patient was discussed in detail at the treatment team meeting focusing on current medical issues, progress in therapies, social issues, psychological issues, barriers to progress and strategies to address these barriers, and discharge planning. See the addendum to rehab progress note-as a second progress note in the chart. The patient continues to be high risk for future disability and their medical and rehabilitation prognosis continue to be good and therefore, we will continue the patient's rehabilitation course as planned. The patient's tentative discharge date was set. Patient and family education was discussed.   The patient was made aware of the team discussion regarding their progress. The patient has stabilized medically and is able to participate at acute level rehab but is too medically complex for SNF due to need for therapy at the acute level with at least 15 hours a week of PT OT and cognitive and recreational therapy at an acute level with daily medical monitoring. Imaging:  Imaging and other studies reviewed and discussed with patient and staff    CT ABDOMEN PELVIS   9/4/2022   The liver is moderate to markedly enlarged with extensive fatty infiltration. A small volume of ascites is present in the pelvis. Mild retroperitoneal and mesenteric inflammation is likely edema and/or pancreatitis. Mild wall thickening of the essentially collapsed colon is probably reactive and/or related to incomplete distention, rather than colitis. The gallbladder is mildly distended, but otherwise unremarkable in appearance. A very small fat-containing periumbilical hernia is noted. There is no abscess, free air, abnormal bowel or biliary dilatation, focal inflammatory changes, significant lymphadenopathy, hernias, or other complication identified. The spleen, pancreas, adrenal glands, kidneys, great vessels, small bowel loops, uterus, adnexa, urinary bladder, and additional images of the pelvis are unremarkable. The visualized lung bases are clear. ENLARGED FATTY LIVER. SMALL VOLUME ASCITES AND MILD ILL-DEFINED MESENTERIC AND RETROPERITONEAL INFLAMMATION, LIKELY EDEMA AND/OR PANCREATITIS. MILD PROBABLY REACTIVE WALL THICKENING OF THE COLON.      IR US GUIDED PARACENTESIS    WBC 7.1 09/14/2022 05:30 AM     HGB 9.6 09/14/2022 05:30 AM     HCT 29.2 09/14/2022 05:30 AM      09/14/2022 05:30 AM     .3 09/14/2022 05:30 AM               Labs:    Labs reviewed and discussed with patient and staff    Lab Results   Component Value Date/Time    POCGLU 113 09/15/2022 11:16 AM    POCGLU 82 09/15/2022 07:21 AM    POCGLU 108 09/14/2022 07:42 PM    POCGLU 117 09/14/2022 04:10 PM POCGLU 101 09/14/2022 11:17 AM     Lab Results   Component Value Date/Time     09/15/2022 04:55 AM    K 3.8 09/15/2022 04:55 AM     09/15/2022 04:55 AM    CO2 26 09/15/2022 04:55 AM    BUN 7 09/15/2022 04:55 AM    CREATININE 0.81 09/15/2022 04:55 AM    CALCIUM 7.9 09/15/2022 04:55 AM    LABALBU 1.5 09/15/2022 04:55 AM    BILITOT 5.9 09/15/2022 04:55 AM    ALKPHOS 299 09/15/2022 04:55 AM     09/15/2022 04:55 AM    ALT 37 09/15/2022 04:55 AM     Lab Results   Component Value Date/Time    WBC 7.0 09/15/2022 04:54 AM    RBC 2.65 09/15/2022 04:54 AM    HGB 9.6 09/15/2022 04:54 AM    HCT 29.2 09/15/2022 04:54 AM    .4 09/15/2022 04:54 AM    MCH 36.2 09/15/2022 04:54 AM    MCHC 32.7 09/15/2022 04:54 AM    RDW 15.1 09/15/2022 04:54 AM     09/15/2022 04:54 AM    MPV 10.1 04/27/2022 08:00 AM     No results found for: VITD25  Lab Results   Component Value Date/Time    COLORU DARK YELLOW 09/10/2022 08:18 AM    NITRU POSITIVE 09/10/2022 08:18 AM    GLUCOSEU Negative 09/10/2022 08:18 AM    KETUA Negative 09/10/2022 08:18 AM    UROBILINOGEN 1.0 09/10/2022 08:18 AM    BILIRUBINUR LARGE 09/10/2022 08:18 AM     Lab Results   Component Value Date/Time    PROTIME 17.5 09/15/2022 05:54 AM     Lab Results   Component Value Date/Time    INR 1.4 09/15/2022 05:54 AM           The patient remains highly medically complex and continues to have severe problems with activities of daily living and mobility. The patient was assessed to be able to tolerate intensive rehabilitation and therefore was admitted to Rehabilitation to address these needs.     The patient has been found to have severe abnormality of gait and mobility with impaired self care and is admitted to the acute inpatient rehab program.       Prior Function; everyday activities:     Social History     Socioeconomic History    Marital status: Single     Spouse name: Not on file    Number of children: Not on file    Years of education: Not on file Highest education level: Not on file   Occupational History    Not on file   Tobacco Use    Smoking status: Every Day     Packs/day: 1.00     Types: Cigarettes    Smokeless tobacco: Never   Vaping Use    Vaping Use: Never used   Substance and Sexual Activity    Alcohol use: Yes     Comment: 6/9/22: completed 30 day rehab 2 weeks ago, relapse one week ago; unable to quantify EtOH consumption    Drug use: Yes     Types: Marijuana Taniya Aelx)    Sexual activity: Not on file   Other Topics Concern    Not on file   Social History Narrative    Lives With: Alone (SO sometimes stays with pt)    Type of Home: House in [address]     Home Layout: Two level, Laundry in basement (flights to and from basement and second floor with HR)    Home Access: Stairs to enter with rails- Number of Steps: 5- Rails: Both    Bathroom Shower/Tub: Tub/Shower unit    Home Equipment:  (no AD)    ADL Assistance: Independent    Homemaking Assistance: Independent    Ambulation Assistance: Independent (no AD)    Transfer Assistance: Independent    Active : Yes    Type of Occupation: unemployed         Social Determinants of Health     Financial Resource Strain: Not on file   Food Insecurity: Not on file   Transportation Needs: Not on file   Physical Activity: Not on file   Stress: Not on file   Social Connections: Not on file   Intimate Partner Violence: Not on file   Housing Stability: Not on file     Social supports listed above.       Prior Device(s) used:  As above    History of falls:  Rarely falls    In depth analysis of complex functional data; the patient has been:    Current Rehabilitation Assessments:  Physical Therapy:   Bed mobility:  Bed mobility  Bridging: Modified independent  (09/16/22 0953)  Rolling to Left: Stand by assistance (09/16/22 0953)  Rolling to Right: Stand by assistance (09/16/22 0953)  Supine to Sit: Stand by assistance (09/16/22 0953)  Sit to Supine: Stand by assistance (09/16/22 8970)  Transfers:  Transfers  Sit to Stand: Stand by assistance (09/16/22 0955)  Stand to sit: Stand by assistance (09/16/22 0955)  Bed to Chair: Stand by assistance;Contact guard assistance (09/16/22 0955)  Gait:      Stairs:     W/C mobility:       Occupational Therapy:      ADL  Feeding: Independent (09/16/22 0953)  Grooming: Setup (09/16/22 0953)  Grooming Skilled Clinical Factors: Grooming standing at the sink (09/16/22 0953)  UE Bathing: Setup (09/16/22 0953)  LE Bathing: Minimal assistance (09/16/22 0953)  LE Bathing Skilled Clinical Factors: Required assistance to wash feet (09/16/22 0953)  UE Dressing: Setup (09/16/22 0953)  LE Dressing: Minimal assistance (09/16/22 0953)  LE Dressing Skilled Clinical Factors: Pt able to don/doff B hosptial socks but required min assist to don/pull up pants (09/16/22 0953)  Toileting: Unable to assess(comment) (09/16/22 0953)  Toileting Skilled Clinical Factors: Pt declined need to toliet (09/16/22 0953)             Speech Therapy:            Diet/Swallow:                         COGNITION:  OT: Cognition Comment: Patient requires increased time for processing and problem solving  SP:      Poor judgment reasoning and insight. Prior to admission patient was independent with all ADLs and mobilityand did not require any outside services.        Past Medical History:   Diagnosis Date    Alcohol abuse     Tobacco dependence        Past Surgical History:   Procedure Laterality Date    APPENDECTOMY      FOOT SURGERY      reports 6 sx on L foot and one on right foot    PARACENTESIS Left 09/13/2022    1510 ml removed per Dr You Hall  specimen obtained    TUBAL LIGATION      UPPER GASTROINTESTINAL ENDOSCOPY N/A 09/09/2022    EGD DIAGNOSTIC ONLY performed by Veda Perez MD at [facility]       Current Facility-Administered Medications   Medication Dose Route Frequency Provider Last Rate Last Admin    Vitamin D (CHOLECALCIFEROL) tablet 2,000 Units  2,000 Units Oral Dinner Lucrecia Scullin, DO        cyanocobalamin injection 1,000 mcg  1,000 mcg IntraMUSCular Weekly Lucrecia Scullin, DO   1,000 mcg at 09/16/22 0831    coenzyme Q10 capsule 100 mg  100 mg Oral Daily Lucrecia Scullin, DO   100 mg at 09/16/22 0831    lidocaine 4 % external patch 3 patch  3 patch TransDERmal Daily Lucrecia Scullin, DO        bisacodyl (DULCOLAX) suppository 10 mg  10 mg Rectal Daily PRN Sona Luke, DO        fleet rectal enema 1 enema  1 enema Rectal Daily PRN Lucrecia Scullin, DO        HYDROcodone-acetaminophen (NORCO) 5-325 MG per tablet 1 tablet  1 tablet Oral Q6H PRN Lucrecia Scullin, DO   1 tablet at 09/16/22 0831    calcium carbonate (TUMS) chewable tablet 500 mg  500 mg Oral BID Lucrecia Scullin, DO        lactobacillus acidophilus (FLORANEX) 2 tablet  2 tablet Oral TID AC Lucrecia Scullin, DO        albuterol (PROVENTIL) nebulizer solution 2.5 mg  2.5 mg Nebulization Q4H PRN Frederick Richard MD        cefTRIAXone (ROCEPHIN) 1,000 mg in dextrose 5 % 50 mL IVPB mini-bag  1,000 mg IntraVENous Q24H Frederick Richard MD        dextrose 10 % infusion   IntraVENous Continuous PRN Frederick Richard MD        dextrose bolus 10% 125 mL  125 mL IntraVENous PRN Frederick Richard MD        Or    dextrose bolus 10% 250 mL  250 mL IntraVENous PRN Frederick Richard MD        glucagon (rDNA) injection 1 mg  1 mg SubCUTAneous PRN Frederick Richard MD        glucose chewable tablet 16 g  4 tablet Oral PRN Frederick Richard MD        LORazepam (ATIVAN) tablet 1 mg  1 mg Oral Q1H PRN Frederick Richard MD        Or    LORazepam (ATIVAN) injection 1 mg  1 mg IntraVENous Q1H PRN Frederick Richard MD        Or    LORazepam (ATIVAN) tablet 2 mg  2 mg Oral Q1H PRN Frederick Richard MD        Or    LORazepam (ATIVAN) injection 2 mg  2 mg IntraVENous Q1H PRN Frederick Richard MD        Or    LORazepam (ATIVAN) tablet 3 mg  3 mg Oral Q1H PRN Frederick Richard MD        Or    LORazepam (ATIVAN) injection 3 mg  3 mg IntraVENous Q1H PRN Frederick Richard MD        Or Musculoskeletal:  Positive for back pain, gait problem and myalgias. Negative for neck pain. Skin:  Negative for rash. Allergic/Immunologic: Positive for immunocompromised state. Negative for environmental allergies. Neurological:  Positive for dizziness, weakness and loss of balance. Negative for tremors, focal weakness, seizures and headaches. Hematological:  Does not bruise/bleed easily. Psychiatric/Behavioral:  Positive for confusion, dysphoric mood and memory loss. Negative for hallucinations and suicidal ideas. The patient has insomnia. The patient is not nervous/anxious. Objective  /67   Pulse 97   Temp 97.9 °F (36.6 °C) (Oral)   Resp 16   Ht 5' 5\" (1.651 m)   Wt 185 lb (83.9 kg)   LMP  (LMP Unknown) Comment: tubal ligation  SpO2 96%   BMI 30.79 kg/m² *    Physical Exam  Constitutional:       General: She is not in acute distress. Appearance: She is well-developed. She is not toxic-appearing or diaphoretic. HENT:      Head: Normocephalic. Not macrocephalic and not microcephalic. No raccoon eyes or Angulo's sign. Mouth/Throat:      Pharynx: Oropharyngeal exudate present. Eyes:      General: No scleral icterus. Right eye: No discharge. Left eye: No discharge. Conjunctiva/sclera: Conjunctivae normal.      Pupils: Pupils are equal, round, and reactive to light. Neck:      Thyroid: No thyromegaly. Vascular: Normal carotid pulses. No carotid bruit, hepatojugular reflux or JVD. Trachea: No tracheal deviation. Pulmonary:      Effort: Pulmonary effort is normal.      Breath sounds: No stridor. Decreased breath sounds present. Abdominal:      General: There is distension. Tenderness: There is generalized abdominal tenderness. Musculoskeletal:      Cervical back: Normal range of motion. Tenderness present. Spinous process tenderness and muscular tenderness present. Thoracic back: Tenderness present. Decreased range of motion. Lumbar back: Tenderness present. Decreased range of motion. Right lower leg: Swelling present. Edema present. Left lower leg: Swelling present. Edema present. Skin:     General: Skin is warm and dry. Coloration: Skin is pale. Findings: Bruising present. Comments: Old IV sites   Neurological:      Sensory: Sensory deficit present. Coordination: Coordination abnormal. Finger-Nose-Finger Test abnormal and Romberg Test abnormal.      Gait: Gait abnormal and tandem walk abnormal.      Deep Tendon Reflexes:      Reflex Scores:       Tricep reflexes are 1+ on the right side and 1+ on the left side. Bicep reflexes are 1+ on the right side and 1+ on the left side. Brachioradialis reflexes are 1+ on the right side and 1+ on the left side. Patellar reflexes are 0 on the right side and 0 on the left side. Achilles reflexes are 0 on the right side and 0 on the left side. Psychiatric:         Attention and Perception: She is attentive. Mood and Affect: Mood is not anxious or depressed. Affect is not angry. Speech: Speech is tangential. Speech is not rapid and pressured. Behavior: Behavior is slowed. Behavior is not withdrawn. Thought Content: Thought content normal.         Cognition and Memory: Memory is not impaired. She exhibits impaired recent memory. She does not exhibit impaired remote memory. Judgment: Judgment is not impulsive or inappropriate. Ortho Exam  Neurologic Exam     Mental Status   Follows 2 step commands. Attention: decreased. Concentration: decreased. Level of consciousness: alert  Knowledge: poor. Able to name object. Able to read. Able to repeat. Able to write. Abnormal comprehension. Cranial Nerves     CN III, IV, VI   Pupils are equal, round, and reactive to light.     Motor Exam   Muscle bulk: normal  Overall muscle tone: normal    Sensory Exam   Right leg light touch: decreased from ankle  Left leg light touch: decreased from ankle    Gait, Coordination, and Reflexes     Gait  Gait: wide-based    Coordination   Romberg: positive  Finger to nose coordination: abnormal  Tandem walking coordination: abnormal    Reflexes   Right brachioradialis: 1+  Left brachioradialis: 1+  Right biceps: 1+  Left biceps: 1+  Right triceps: 1+  Left triceps: 1+  Right patellar: 0  Left patellar: 0  Right achilles: 0  Left achilles: 0    After extensive review of the records and above physical exam, I have formulated the following diagnoses and plan:      DIAGNOSES:    1. The patient was admitted to the acute rehabilitation unit with the primary rehab diagnosis being severe abnormality of gait and mobility and impaired self care and ADL's due to Hepatitc Encephalopathy. Compared to Pre-Admission Assessment, patients medical and functional status remain challengingly complex and patient continues to requireintensive therapeutic intervention from multiple therapies, therefore, initiate acute intensive comprehensive inpatient rehabilitation program including PT/OT to improve balance, ambulation, ADLs, and to improve the P/AROM. Functional and medical status reassessed regarding patients ability to participate in therapies and patient found to be able to participate in acute intensive comprehensive inpatient rehabilitation program.  Therapeutic modifications regarding activities in therapies, place, amount of time per day and intensity of therapy made daily. Enroll in acute course of therapy program to include 1 1/2 hours per day of PT 5 to 7days per week and 1 1/2 hours per day of OT 5 to 7 days per week,   SLP and Rec T 1/2 hour per day 3-5 days per week. The patient is stable medically and physically on previous exam.  If deemed necessary therapy will be spread out over a 7-day window.      This patient presented with significant new onset decreased mobility and inability to perform activities of daily living skills independently and is at significant risk for prolonged disability  For this reason they have been admitted to [address] [place] [address]. The patient's current functional and medical status are highly complex but the patient is able to participate in intensive rehabilitation. A comprehensive inpatient rehabilitation program is appropriate. The patient will undergo initial evaluation by the rehabilitation team and be discussed at regular treatment team meetings to assess progress, mobility, self care, mood and discharge issues. Physical therapy will be consulted for mobility and endurance issues and should be performed 1 to 2 times per day, 7 days per week for the length of stay. Occupational therapy will be consulted for activities of daily living and should be performed 1 to 2 times per day, 7 days per week for the length of stay. Their capacity to participate at an acute level, decision to be treated in the gym, room or on the unit, their activity goals for the day and the number of minutes of active participation will be reassessed and re-prescribed daily. Because this patient is medically complex, I will check a CBC, BMP, UA and orthostatic blood pressures. They will be reassessed daily regarding their ability to participate in an acute level rehabilitation program.  Recreational Therapy will be consulted for community re-entry and adjustment to disability. Communication, cognitive and emotional issues will also be addressed during this rehabilitation stay by rehabilitation psychologist or speech therapist as appropriate. I reviewed the patient's old and current charts and discussed other rehabilitation options with the rehabilitation team including the rehab RN and the admission team as well as the patient.   I feel that the patient's functional recovery would be best served at an acute inpatient rehabilitation program because the patient needs intense therapy three hours per day, direct RN supervision and daily monitoring by a physician for medical status. This cannot be sufficiently provided by home health care, a skilled nursing facility or in an outpatient setting. I further feel that the patient has the potential to improve functional abilities in an acute intensive rehabilitation program.    Old records were reviewed and summarized. 2.  Other diagnoses which complicate rehabilitation stay include:     Principal Problem:    Impaired mobility and activities of daily living dt encephalopathy and gait ataxia  Active Problems:    Alcoholic hepatitis with ascites-avoid toxic medications discontinue Tylenol and limited use of Norco for severe pain  GERD,   GI bleeding-add Tums add PPI-Elevate head of bed after meals, monitor stools for blood, lowest effective dose of PPI, consider Tums. Acute pancreatitis and abdominal infection-IV Rocephin add lactobacillus  ETOHism-transition to top 12-step program add CIWA protocol  Depression-emotional support provided daily, vitamin B12, encourage participation in rehabilitation support group and recreational therapy, adjust/add medications (titration of benzodiazepines consider SSRI)  COPD-Acute rehab for endurance traing with Pulse Ox to monitoring oxygen saturation and heart rate with O2 titration to lowest effective dose. Pulse oximeter checks to shift and at HS to dose and titrate oxygen and aerosol treatments monitor for nocturnal hypoxemia, monitor vital signs, oxygen prn. Focus on energy conservation.   Vit B12 deficiency-Add high-dose vitamin D, recheck vitamin D level out after discharge,    Generalized pain, Myalgia, unspecified site    Toe deformity, and active chronic foot pain-topical agents, probably proper footwear avoid opiates if possible    Acute alcoholic hepatitis-avoid toxic medications  Impulsivity-and encephalopathy with impaired cognition-focus on balance and safety add speech-language pathology         I am especially concerned about their recent medical complexities. The patient's high risk medication use includes Norco.    The patient is high risk for urinary tract infection, an admission urinalysis has been ordered. I will have the nurses check post void residual bladder volumes and place acatheter if excessive urine is retained in the bladder after voiding. The patient is risk for deep venous thromosis, complex deep venous thrombosis protocol prophylaxis has been ordered avoid blood thinners for now because of acute liver problems. The patient is high risk for orthostasis and a hydration program and orthostatic blood pressure screening have been ordered. I will attempt to get old records from the patient's previous hospital stay. Care everywhere on two.42.solutions was utilized. 3.  Current and previous medications were reviewed and summarized and compared to old medication lists from home and from the acute floor. 4.  Complex labs and x-rays were reviewed. I will review patient's old EKG and labs. 5.  Will provide emotional support for this patient regarding adjustment to their disability. Cognition and mood will be screened daily and addressed by rehabilitation psychology and/or speech therapy as appropriate. I have encouraged the patient to attend the Rehab Adjustment to Disability Support Group and recreational therapy. 6.  Estimated length of stay is  1/ 2  weeks. Discharge to home with help from family and home health PT, OT, RN, and aide. Patient should be independent at discharge. 7.  The patient's medical and rehab prognosis are good. [address] regarding the patient's back up to general medical needs. A welcome letter was presented with an explanation of my services, my specialty and what to expect during the rehabilitation process.   As well as introducing myself, I also wrote my name on their bedside marker board with their name as well as the names of the other physicians with an explanation of our individual roles in their care, as well as the rehab process.             Kentrell Way D.O., F.A.A.PLLOYD.&ANA

## 2022-09-16 NOTE — PROGRESS NOTES
INDIVIDUALIZED OVERALL REHAB PLAN OF CARE  ADDENDUM TO REHAB PROGRESS NOTE-for audit purposes must also refer to this day's clinical note and combine the information      Date: 2022  Patient Name: Jorje Bah   Room: G820/U384-06    MRN: [id number]    : 1985  (43 y.o.)  Gender: female       Today 2022 during weekly team meeting, I reviewed the patient Jorje Bah in detail with the therapists and nurses involved in patient's care gathering complex physiatric data regarding current medical issues, progress in therapies, factors limiting progress, social issues, psychological issues, ongoing therapeutic plans and discharge planning. Legend:  I= independent Im =Modified independent  S=Supervised SB=stand by MOREIRA=set up CG=contact akil Min= minimal Mod=Moderate Max=maximal Max of 2 =maximal assist of 2 people      CURRENT FUNCTIONAL STATUS:    Barriers to progress and discharge complex medical conditions      NURSING ISSUES:     Patient in bed during assessment, Patient alert and oriented x 4, Skin warm and dry  Patient c/o abdominal and left ankle pain, Patient medicated per order for pain with Reardan  Patient denies falling or injuring ankle, Skin warm and dry, No tremors noted, VSS  Patient given call light and advised patient to call when getting up to use restroom. Patient states understanding., Nursing will continue to focus on bowel and bladder continence transitioning toward independence by time of discharge. Monitoring post void residuals monitoring for severe constipation and bowel obstruction.     Bowel function- constipation  Plans to address- laxative     Bladder function-  continent    Plans to address- schedule voids before bed and therapy     Skin deficits- dressing changes   Plans to address- pressure relief     Hydration/Nutritional deficits- monitoring for dysphagia  Plans to address-Push PO, assist with feeds as needed     Low BP- poor PO intake  Plans to address- check ortho BPs before therapies-and use abdominal binder and TEDs as needed    Pt and Family training goals-  teach home technology options like Essie use and home assistive devices      Focus on achieving ADL goals with co-treating with OT when possible. Focus on cognition and co treat with SLP when possible. PHYSICAL THERAPY  Bed mobility:  Bed mobility  Bridging: Modified independent  (09/16/22 0953)  Rolling to Left: Stand by assistance (09/16/22 0953)  Rolling to Right: Stand by assistance (09/16/22 0953)  Supine to Sit: Stand by assistance (09/16/22 0953)  Sit to Supine: Supervision (09/16/22 1118)  Bed Mobility Comments: HOB flat with no rails - increased time and effort required (09/16/22 0953)  Transfers:  Transfers  Sit to Stand: Stand by assistance (09/16/22 1024)  Stand to sit: Stand by assistance (09/16/22 1024)  Bed to Chair: Stand by assistance;Contact guard assistance (Contact cues required due to mild lat LOB) (09/16/22 0955)  Comment: Pt performs tasks with good attention and appropriately slow pace. Unsteadiness noted (09/16/22 0955)  Gait:   Ambulation  Surface: level tile (09/16/22 1013)  Device: No Device (09/16/22 1013)  Assistance: Stand by assistance;Contact guard assistance;Minimal assistance (09/16/22 1013)  Quality of Gait: WBOS with excessive lat sway of trunk, slight FF posture, varying pace, knees stable however diminished knee flexion in swing (09/16/22 1013)  Gait Deviations: Decreased head and trunk rotation;Decreased step height;Decreased step length (09/16/22 1013)  Distance: 15 feet; 50 feet (09/16/22 1013)  Comments: Pt fatigued following OT session for shower and unable to ambulate further distance.  Lat sway results in intermittent LOB requiring recovery assistance intermittently (09/16/22 1013)  Stairs:  Stairs/Curb  Stairs?: Yes (09/16/22 1013)  Stairs  # Steps : 4 (09/16/22 1013)  Stairs Height: 6\" (09/16/22 1013)  Rails: Bilateral (09/16/22 1013)  Assistance: Contact guard assistance (09/16/22 1013)  Comment: mildly unsteady iwth each step with intermittent hands on assist required (09/16/22 1013)  W/C mobility:  Wheelchair Activities  Propulsion: Yes (09/16/22 1013)  Propulsion 1  Propulsion: Manual (09/16/22 1013)  Level: Level Tile (09/16/22 1013)  Method: Bernardine Finders (09/16/22 1013)  Level of Assistance: Modified independent (09/16/22 1013)  Distance: 50 feet - limited by time (09/16/22 1013)        Pt and Family training goals-  Focus on energy conservation and consistency of function        OCCUPATIONAL THERAPY     ADL  Feeding: Independent (09/16/22 0953)  Grooming: Setup (09/16/22 0953)  Grooming Skilled Clinical Factors: Grooming standing at the sink (09/16/22 0953)  UE Bathing: Setup (09/16/22 0953)  LE Bathing: Minimal assistance (09/16/22 0953)  LE Bathing Skilled Clinical Factors: Required assistance to wash feet (09/16/22 0953)  UE Dressing: Setup (09/16/22 0953)  LE Dressing: Minimal assistance (09/16/22 0953)  LE Dressing Skilled Clinical Factors: Pt able to don/doff B hosptial socks but required min assist to don/pull up pants (09/16/22 0953)  Toileting: Unable to assess(comment) (09/16/22 0953)  Toileting Skilled Clinical Factors: Pt declined need to toliet (09/16/22 0754)    Plans for addressing ADL deficits affecting: Focus on sequencing. Skin deficits- no problems noted   Plans to address- continue to monitor                SPEECH THERAPY/SLP             Plans for cognitive and communication issues affecting addressing:   Pt and Family training goals-  teach patient family memory strategies      Diet/Swallow:        Dysphagia Outcome Severity Scale: Level 7: Normal in all situations    Compensatory Swallowing Strategies :  Alternate solids and liquids, Upright as possible for all oral intake, Remain upright for 30-45 minutes after meals             COGNITION  OT: Cognition Comment: Patient requires increased time for processing and problem solving @FLOWTIME(304  SP: Social History     Socioeconomic History    Marital status: Single     Spouse name: Not on file    Number of children: 2    Years of education: Not on file    Highest education level: Not on file   Occupational History    Not on file   Tobacco Use    Smoking status: Every Day     Packs/day: 1.00     Types: Cigarettes    Smokeless tobacco: Never   Vaping Use    Vaping Use: Never used   Substance and Sexual Activity    Alcohol use: Yes     Comment: 6/9/22: completed 30 day rehab 2 weeks ago, relapse one week ago; unable to quantify EtOH consumption    Drug use: Yes     Types: Marijuana Charmayne Stai)    Sexual activity: Not on file   Other Topics Concern    Not on file   Social History Narrative    Lives With: 10 yo daughter, SO sometimes stays with pt, he works odd jobs    Older 24 yo dtr lives with a boyfriend    Type of Home: [place] [facility] Two level, Laundry in basement (flights to and from basement and second floor with HR)    Home Access: Stairs to enter with rails- Number of Steps: 5- Rails: Both    Bathroom Shower/Tub: Tub/Shower unit    Home Equipment:  (no AD)    ADL Assistance: Independent    Homemaking Assistance: Independent    Ambulation Assistance: Independent (no AD)    Transfer Assistance: Independent    Active : Yes    Type of Occupation: unemployed    GED--then some college.          Social Determinants of Health     Financial Resource Strain: Not on file   Food Insecurity: Not on file   Transportation Needs: Not on file   Physical Activity: Not on file   Stress: Not on file   Social Connections: Not on file   Intimate Partner Violence: Not on file   Housing Stability: Not on file           THERAPY, MEDICAL AND NURSING COORDINATION:    [x]  Pain medication before therapies     [x]  Check orthostatic BP and monitor heart rate and medications effects with therapy      [x]  Ambulate to the bathroom in room    [x]  Add scheduled rest beaks     [x]  In room therapies as Severe pain exacerbation     [x]  Impaired mental status    []  Urinary incontinence    []  Bowel incontinence           Plan to correct barriers to functional progress: Add scheduled rest breaks, CoQ 10 and Vit B 12, control pain by using ice Lidoderm rest and massage as well as pain medications prior to therapy. Spread therapy of 15 hours out over a 7 day window to accommodate rest breaks and medical interventions. Patient seems to be making fair to good response to these interventions. Based on a comprehensive evaluation of the above, the individualized therapy and Discharge plan will be:    -Times stated are an average that will be varied based on the patient's daily need. PT    1 1/2  hrs/day 5-7 days per week      OT    1 1/2 hrs per day 5-7 days per week     ST     1/2    hrs /day 3-5 days per week       Estimated LOS  1 1/2 week(s)    -Overall functional prognosis:     [x]  Good    []  Fair    []  Poor     -Medical Prognosis:   [x]  Good to   [x]  Fair    []  Poor    This patient was made aware of the discussion of Plan of Care, their projected dicharge date and their projected function at discharge.          Annette Heimlich, DO

## 2022-09-16 NOTE — PROGRESS NOTES
Facility/Department: [facility]  Rehabilitation Initial Assessment: Physical Therapy  Room: R251/R251-01    NAME: Rosanne Meehan  : 1985  MRN: [id number]    Date of Service: 2022    Rehab Diagnosis(es): Impaired mobility and ADL's due to alcoholic encephalopathy  Patient Active Problem List    Diagnosis Date Noted    Impaired mobility and ADLs     Acute alcoholic hepatitis     Impaired mobility and activities of daily living dt encephalopathy and gait ataxia 2022    Generalized pain     Alcoholic hepatitis with ascites 2022    Major depressive disorder, severe ([facility] [address] 2022    Myalgia, unspecified site 2022    Toe deformity, acquired 2015    Pain in left foot 2014    Bursitis of foot 2012    GI bleeding 2022       Past Medical History:   Diagnosis Date    Alcohol abuse     Tobacco dependence      Past Surgical History:   Procedure Laterality Date    APPENDECTOMY      FOOT SURGERY      reports 6 sx on L foot and one on right foot    PARACENTESIS Left 2022    1510 ml removed per Dr Chikis Araujo  specimen obtained    TUBAL LIGATION      UPPER GASTROINTESTINAL ENDOSCOPY N/A 2022    EGD DIAGNOSTIC ONLY performed by Joel Leung MD at [facility]       Chart Reviewed: Yes  Patient assessed for rehabilitation services?: Yes  Diagnosis: Impaired mobility and ADL's due to alcoholic encephalopathy    Restrictions:  Restrictions/Precautions: Seizure, Fall Risk     SUBJECTIVE:      Pain  Pain: 9-10/10 in left flank at drain site initially - medication given. 7-8/10 pain reported at end of session - Pt requests no pain medication    Prior Level of Function:  Social/Functional History  Lives With: Alone (SO sometimes stays with pt)  Type of Home: House  Home Layout: Two level, Laundry in basement (flights to and from basement and second floor with HR)  Home Access: Stairs to enter with rails  Entrance Stairs - Number of Steps: 5  Entrance Stairs - Rails: Both  Bathroom Shower/Tub: Tub/Shower unit  Home Equipment:  (no AD)  ADL Assistance: Independent  Homemaking Assistance: Independent  Ambulation Assistance: Independent (no AD)  Transfer Assistance: Independent  Active : Yes  Type of Occupation: unemployed    OBJECTIVE:   Vision/Hearing:  Vision  Vision: Within Functional Limits (occ blurring)  Hearing: Within functional limits    Cognition/Observation:  Follows Commands: Within Functional Limits         ROM:  RLE AROM: WFL  LLE AROM : WFL    Strength:  Strength RLE  Comment: 3+/5  Strength LLE  Comment: 3+/5  Strength Other  Other: Poor abdominal strength due to abdominal distention    Neuro:  Balance  Sitting - Static: Good  Sitting - Dynamic: Good  Standing - Static: Fair;- (pt able to stand with SBA due to increased sway initially with standing)  Standing - Dynamic: Poor (frequesnt LOB results in pt requiring assistance for gait without device)                         Bed mobility  Bridging: Modified independent   Rolling to Left: Stand by assistance  Rolling to Right: Stand by assistance  Supine to Sit: Stand by assistance  Sit to Supine: Stand by assistance  Bed Mobility Comments: HOB flat with no rails - increased time and effort required    Transfers  Sit to Stand: Stand by assistance (pt demonstrated mild unsteadiness in intial standing however no physical assist for balance required)  Stand to sit: Stand by assistance  Bed to Chair: Stand by assistance;Contact guard assistance (Contact cues required due to mild lat LOB)  Comment: Pt performs tasks with good attention and appropriately slow pace.  Unsteadiness noted    Ambulation  Surface: level tile  Device: No Device  Assistance: Stand by assistance;Contact guard assistance;Minimal assistance  Quality of Gait: WBOS with excessive lat sway of trunk, slight FF posture, varying pace, knees stable however diminished knee flexion in swing  Gait Deviations: Decreased head and trunk rotation;Decreased step height;Decreased step length  Distance: 15 feet; 50 feet  Comments: Pt fatigued following OT session for shower and unable to ambulate further distance. Lat sway results in intermittent LOB requiring recovery assistance intermittently    Stairs/Curb  Stairs?: Yes  Stairs  # Steps : 4  Stairs Height: 6\"  Rails: Bilateral  Assistance: Contact guard assistance  Comment: mildly unsteady iwth each step with intermittent hands on assist required    Wheelchair Activities  Propulsion: Yes  Propulsion 1  Propulsion: Manual  Level: Level Tile  Method: LUE;RUE  Level of Assistance: Modified independent  Distance: 50 feet - limited by time         Activity Tolerance  Activity Tolerance: Patient tolerated evaluation without incident    Patient Education  Education Given To: Patient  Education Provided: Role of Therapy;Plan of Care  Barriers to Learning: None  Education Outcome: Verbalized understanding    Quality Indicators (IRF-SHANI):  Rolling L and R: Supervision or Touching Assistance - 4  Sit>Supine: Supervision or Touching Assistance - 4  Supine>Sit: Supervision or Touching Assistance - 4  Sit>Stand: Supervision or Touching Assistance - 4  Chair/Bed>Chair Transfer: Supervision or Touching Assistance - 4  Car Transfers: Not attempted due to Environmental Limitations (i.e. weather, equipment unavailable) - 10  Walk 10 ft: Supervision or Touching Assistance - 4  Walk 50 ft with two 90 degree turns: Supervision or Touching Assistance - 4  Walk 150 ft in [address]: Not attempted due to Medical Condition or Safety Concerns (I.e. unsafe or physician orders) - 88  Walking 10 ft on Unlevel Surface: Supervision or Touching Assistance - 4  Picking up Objects from Standing Position: Supervision or Touching Assistance - 4  Stairs: Yes  WC Mobility: Yes    ASSESSMENT:  Body Structures, Functions, Activity Limitations Requiring Skilled Therapeutic Intervention: Decreased functional mobility ; Decreased ADL status; Decreased strength;Decreased endurance;Decreased balance;Decreased vision/visual deficit; Decreased coordination; Increased pain  Decision Making: Medium Complexity  History: High  Exam: Med  Clinical Presentation: Med    Barriers to Learning: none           CLINICAL IMPRESSION: Pt is in the low risk for falls catagory per Quiñones balance test however has not yet met goal.  Pt challanged in SLS during [facility]. PLAN OF CARE:  Frequency: 1-2 treatment sessions per day, 5-7 days per week  Plan Comment: Cont per POC.   Current Treatment Recommendations: Strengthening, Balance training, Functional mobility training, Transfer training, ADL/Self-care training, Endurance training, Gait training, Stair training, Neuromuscular re-education, Pain management, Home exercise program, Safety education & training, Patient/Caregiver education & training, Equipment evaluation, education, & procurement, Modalities, Positioning    Patient's Goal:  to be able to return to home indep with no devices for walking    GOALS:  Patient goals : to be able to return to home indep with no devices for walking  Long Term Goals  Long term goal 1: indep and effecient bed mobility  Long term goal 2: indep and effecient bed, chair and car transfers  Long term goal 3: indep gait with no device 50 feet in familiar environment and supervision for 150 feet in open or unpredictable environments  Long term goal 4: indep flight of stairs with one rail  Long term goal 5: Quiñones to be completed at 50/56 level    ELOS:   Plan weeks: 1 - 11/2    Therapy Time:    Individual   Time In 0930   Time Out 1000   Minutes [address], [facility], 09/16/22 at 2:07 PM

## 2022-09-16 NOTE — PROGRESS NOTES
[facility] EMERGENCY [facility] AT [place] Respiratory Therapy Evaluation   Current Order:albuterol q4prn     Home Regimen: none     Ordering Physician: karoline  Re-evaluation Date:  eval done    Diagnosis: cirrhosis  Patient Status: Stable / Unstable + Physician notified    The following MDI Criteria must be met in order to convert aerosol to MDI with spacer. If unable to meet, MDI will be converted to aerosol:  []  Patient able to demonstrate the ability to use MDI effectively  []  Patient alert and cooperative  []  Patient able to take deep breath with 5-10 second hold  []  Medication(s) available in this delivery method   []  Peak flow greater than or equal to 200 ml/min            Current Order Substituted To  (same drug, same frequency)   Aerosol to MDI [] Albuterol Sulfate 0.083% unit dose by aerosol Albuterol Sulfate MDI 2 puffs by inhalation with spacer    [] Levalbuterol 1.25 mg unit dose by aerosol Levalbuterol MDI 2 puffs by inhalation with spacer    [] Levalbuterol 0.63 mg unit dose by aerosol Levalbuterol MDI 2 puffs by inhalation with spacer    [] Ipratropium Bromide 0.02% unit dose by aerosol Ipratropium Bromide MDI 2 puffs by inhalation with spacer    [] Duoneb (Ipratropium + Albuterol) unit dose by aerosol Ipratropium MDI + Albuterol MDI 2 puffs by inhalation w/spacer   MDI to Aerosol [] Albuterol Sulfate MDI Albuterol Sulfate 0.083% unit dose by aerosol    [] Levalbuterol MDI 2 puffs by inhalation Levalbuterol 1.25 mg unit dose by aerosol    [] Ipratropium Bromide MDI by inhalation Ipratropium Bromide 0.02% unit dose by aerosol    [] Combivent (Ipratropium + Albuterol) MDI by inhalation Duoneb (Ipratropium + Albuterol) unit dose by aerosol       Treatment Assessment [Frequency/Schedule]:  Change frequency to: ____no changes______________________________________________per Protocol, P&T, MEC      Points 0 1 2 3 4   Pulmonary Status  Non-Smoker  []   Smoking history   < 20 pack years  []   Smoking history  ?  20 pack years  [x]
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
## 2022-09-16 NOTE — PROGRESS NOTES
Physical Therapy  Facility/Department: [facility] S559/G140-90  Physical Therapy Discharge      NAME: Javad Scruggs    : 1985 (43 y.o.)  MRN: [id number]    Account: [de-identified]  Gender: female      Patient has been discharged from acute care hospital. DC patient from current PT program.      Electronically signed by Maria Ines Lucia PT on 22 at 1:03 PM EDT

## 2022-09-16 NOTE — PROGRESS NOTES
Physical Therapy Rehab Treatment Note  Facility/Department: [facility]  Room: R251/R251-01       NAME: Ara Richardson  : 1985 (43 y.o.)  MRN: [id number]  CODE STATUS: Full Code    Date of Service: 2022       Restrictions:  Restrictions/Precautions: Seizure, Fall Risk       SUBJECTIVE:   Subjective: \"I want to get back to my normal self. \"    Pain  Pain: 9-10/10 stomach uncomfortable pain  RN notified      OBJECTIVE:        Bed mobility  Sit to Supine: Supervision    Transfers  Sit to Stand: Stand by assistance  Stand to sit: Stand by assistance    Ambulation  Surface: carpet  Device: No Device  Assistance: Stand by assistance;Contact guard assistance  Quality of Gait: Increased lateral sway; gaurded  Gait Deviations: Slow Kamila;Decreased step length;Decreased arm swing  Distance: 125ft       Balance  Comments: DGI 15/24       ASSESSMENT/PROGRESS TOWARDS GOALS:   Assessment: Pt is a falls risk per DGI. Goals:  Long Term Goals  Long term goal 1: indep and effecient bed mobility  Long term goal 2: indep and effecient bed, chair and car transfers  Long term goal 3: indep gait with no device 50 feet in familiar environment and supervision for 150 feet in open or unpredictable environments  Long term goal 5: Quiñones to be completed at 50/56 level    PLAN OF CARE/Safety:   Plan Comment: Cont per POC.       Therapy Time:   Individual   Time In 1400   Time Out 1430   Minutes 30     Minutes:  Transfer/Bed mobility trainin  Gait training:10  Neuro re education:15  Therapeutic ex:0    Didier Felix PTA, 22 at 3:47 PM

## 2022-09-17 LAB
ALBUMIN SERPL-MCNC: 1.4 G/DL (ref 3.5–4.6)
ALP BLD-CCNC: 283 U/L (ref 40–130)
ALT SERPL-CCNC: 35 U/L (ref 0–33)
ANION GAP SERPL CALCULATED.3IONS-SCNC: 8 MEQ/L (ref 9–15)
AST SERPL-CCNC: 145 U/L (ref 0–35)
BASOPHILS ABSOLUTE: 0.1 K/UL (ref 0–0.2)
BASOPHILS RELATIVE PERCENT: 0.8 %
BILIRUB SERPL-MCNC: 5.8 MG/DL (ref 0.2–0.7)
BUN BLDV-MCNC: 8 MG/DL (ref 6–20)
CALCIUM SERPL-MCNC: 7.9 MG/DL (ref 8.5–9.9)
CHLORIDE BLD-SCNC: 104 MEQ/L (ref 95–107)
CO2: 24 MEQ/L (ref 20–31)
CREAT SERPL-MCNC: 0.72 MG/DL (ref 0.5–0.9)
EOSINOPHILS ABSOLUTE: 0.1 K/UL (ref 0–0.7)
EOSINOPHILS RELATIVE PERCENT: 0.9 %
GFR AFRICAN AMERICAN: >60
GFR NON-AFRICAN AMERICAN: >60
GLOBULIN: 3.6 G/DL (ref 2.3–3.5)
GLUCOSE BLD-MCNC: 71 MG/DL (ref 70–99)
HCT VFR BLD CALC: 29.4 % (ref 37–47)
HEMOGLOBIN: 9.5 G/DL (ref 12–16)
LYMPHOCYTES ABSOLUTE: 1.7 K/UL (ref 1–4.8)
LYMPHOCYTES RELATIVE PERCENT: 22.2 %
MAGNESIUM: 1.6 MG/DL (ref 1.7–2.4)
MCH RBC QN AUTO: 35.3 PG (ref 27–31.3)
MCHC RBC AUTO-ENTMCNC: 32.5 % (ref 33–37)
MCV RBC AUTO: 108.9 FL (ref 82–100)
MONOCYTES ABSOLUTE: 0.7 K/UL (ref 0.2–0.8)
MONOCYTES RELATIVE PERCENT: 8.5 %
NEUTROPHILS ABSOLUTE: 5.3 K/UL (ref 1.4–6.5)
NEUTROPHILS RELATIVE PERCENT: 67.6 %
PDW BLD-RTO: 15.2 % (ref 11.5–14.5)
PLATELET # BLD: 126 K/UL (ref 130–400)
POTASSIUM REFLEX MAGNESIUM: 3.7 MEQ/L (ref 3.4–4.9)
RBC # BLD: 2.7 M/UL (ref 4.2–5.4)
SODIUM BLD-SCNC: 136 MEQ/L (ref 135–144)
TOTAL PROTEIN: 5 G/DL (ref 6.3–8)
WBC # BLD: 7.8 K/UL (ref 4.8–10.8)

## 2022-09-17 PROCEDURE — 36415 COLL VENOUS BLD VENIPUNCTURE: CPT

## 2022-09-17 PROCEDURE — 6360000002 HC RX W HCPCS: Performed by: INTERNAL MEDICINE

## 2022-09-17 PROCEDURE — 85025 COMPLETE CBC W/AUTO DIFF WBC: CPT

## 2022-09-17 PROCEDURE — 6370000000 HC RX 637 (ALT 250 FOR IP): Performed by: PHYSICAL MEDICINE & REHABILITATION

## 2022-09-17 PROCEDURE — 2580000003 HC RX 258: Performed by: INTERNAL MEDICINE

## 2022-09-17 PROCEDURE — 97530 THERAPEUTIC ACTIVITIES: CPT

## 2022-09-17 PROCEDURE — 1180000000 HC REHAB R&B

## 2022-09-17 PROCEDURE — 80053 COMPREHEN METABOLIC PANEL: CPT

## 2022-09-17 PROCEDURE — 6370000000 HC RX 637 (ALT 250 FOR IP): Performed by: INTERNAL MEDICINE

## 2022-09-17 PROCEDURE — 97110 THERAPEUTIC EXERCISES: CPT

## 2022-09-17 PROCEDURE — 97535 SELF CARE MNGMENT TRAINING: CPT

## 2022-09-17 PROCEDURE — 83735 ASSAY OF MAGNESIUM: CPT

## 2022-09-17 PROCEDURE — 97116 GAIT TRAINING THERAPY: CPT

## 2022-09-17 RX ORDER — LANOLIN ALCOHOL/MO/W.PET/CERES
100 CREAM (GRAM) TOPICAL DAILY
Status: DISCONTINUED | OUTPATIENT
Start: 2022-09-17 | End: 2022-09-22 | Stop reason: HOSPADM

## 2022-09-17 RX ADMIN — LACTOBACILLUS TAB 2 TABLET: TAB at 17:03

## 2022-09-17 RX ADMIN — HYDROCODONE BITARTRATE AND ACETAMINOPHEN 1 TABLET: 5; 325 TABLET ORAL at 00:02

## 2022-09-17 RX ADMIN — HYDROCODONE BITARTRATE AND ACETAMINOPHEN 1 TABLET: 5; 325 TABLET ORAL at 17:02

## 2022-09-17 RX ADMIN — HYDROCODONE BITARTRATE AND ACETAMINOPHEN 1 TABLET: 5; 325 TABLET ORAL at 09:00

## 2022-09-17 RX ADMIN — LACTOBACILLUS TAB 2 TABLET: TAB at 05:39

## 2022-09-17 RX ADMIN — Medication 400 MG: at 09:00

## 2022-09-17 RX ADMIN — Medication 100 MG: at 20:00

## 2022-09-17 RX ADMIN — Medication 2000 UNITS: at 17:03

## 2022-09-17 RX ADMIN — LACTOBACILLUS TAB 2 TABLET: TAB at 12:47

## 2022-09-17 RX ADMIN — THERA TABS 1 TABLET: TAB at 09:00

## 2022-09-17 RX ADMIN — Medication 100 MG: at 09:00

## 2022-09-17 RX ADMIN — CEFTRIAXONE SODIUM 1000 MG: 1 INJECTION, POWDER, FOR SOLUTION INTRAMUSCULAR; INTRAVENOUS at 12:56

## 2022-09-17 RX ADMIN — PANTOPRAZOLE SODIUM 40 MG: 40 TABLET, DELAYED RELEASE ORAL at 05:39

## 2022-09-17 ASSESSMENT — PAIN SCALES - GENERAL
PAINLEVEL_OUTOF10: 10
PAINLEVEL_OUTOF10: 9
PAINLEVEL_OUTOF10: 8

## 2022-09-17 ASSESSMENT — PAIN DESCRIPTION - LOCATION
LOCATION: FLANK
LOCATION: GENERALIZED
LOCATION: ABDOMEN

## 2022-09-17 ASSESSMENT — PAIN DESCRIPTION - DESCRIPTORS
DESCRIPTORS: ACHING
DESCRIPTORS: ACHING

## 2022-09-17 ASSESSMENT — PAIN DESCRIPTION - ORIENTATION
ORIENTATION: LEFT
ORIENTATION: LEFT

## 2022-09-17 NOTE — PROGRESS NOTES
Patient was referred for psychological evaluation due to history of chronic alcoholism. Chart was reviewed    When I came in this morning, she said that she had been having severe abdominal pain since last night, and was trying to sleep. She said that fluid in her abdomen seemed to be increasing. A/P- I consulted with the RN about the patient's concern. From reviewing the records, it seems that the patient refused a referral to the substance abuse program (see note by  from 9/16). As the patient may not be a reliable informant, I would recommend that we contact the family concerning the extent of the alcohol use disorder, and specifically assess whether the patient's alcohol use is presently interfering with her ability to care for her child. (The patient would need to provide consent for us to do so.) We may need to contact [facility].

## 2022-09-17 NOTE — PROGRESS NOTES
OCCUPATIONAL THERAPY  INPATIENT REHAB TREATMENT NOTE  [facility]      NAME: Taina Orta  : 1985 (43 y.o.)  MRN: [id number]  CODE STATUS: Full Code  Room: R251/R251-01    Date of Service: 2022    Referring Physician: Elif Zhang  Rehab Diagnosis: Impaired mobility and ADLs due to alcoholic encephalopathy    Restrictions  Restrictions/Precautions  Restrictions/Precautions: Seizure, Fall Risk        Patient's date of birth confirmed: Yes    SAFETY:  Safety Devices  Type of devices: All fall risk precautions in place;Call light within reach; Bed alarm in place; Left in bed    SUBJECTIVE:  Subjective: \"I would like a shower this morning. \"    Pain at start of treatment: Yes: 7/10    Pain at end of treatment: Yes: 7/10    Location: left side  Nursing notified: Yes  RN: Marlyn Abebe  Intervention: RN provided pain medication    COGNITION:    Pt's current cognitive status is:  Patient's cognition will improve or maintain baseline to safely perform ADLs:  Comprehension: Independent  Expression: Independent  Social Interaction: Independent  Problem Solving: Supervision  Memory: Independent    OBJECTIVE:         Grooming/Oral Hygiene  Assistance Level: Independent  Skilled Clinical Factors: Pt completed self-care tasks of brushing teeth and combing hair while standing at sink. Upper Extremity Bathing  Assistance Level: Set-up  Skilled Clinical Factors: Pt complete UE bathing seated in shower chair. Lower Extremity Bathing  Assistance Level: Minimal assistance  Skilled Clinical Factors: Pt requires assistance reaching lower parts of LEs and feet due to limited ROM/functional reach at this time. Upper Extremity Dressing  Assistance Level: Set-up  Lower Extremity Dressing  Assistance Level: Minimal assistance  Skilled Clinical Factors: Pt requires assistance threading hospital underwear and pants over feet due to limited functional reach abilities at this time.   Putting [address]. training, Pain management, Safety education & training, Patient/Caregiver education & training, Equipment evaluation, education, & procurement, Self-Care / ADL, Home management training, Coordination training  continue with OT plan of care       Long Term Goal 1: Improve endurance for self care  Long Term Goal 2:  Increase self care independence  Long Term Goal 3: Improve bilateral upper extremity strength for I/ADL performance        Therapy Time:   Individual Group Co-Treat   Time In 0800       Time Out 0900         Minutes 60                   ADL/IADL trainin minutes  Therapeutic activities: 10 minutes     Electronically signed by:    CHAPINCITO Llamas,   2022, 9:54 AM

## 2022-09-17 NOTE — PROGRESS NOTES
2: indep and effecient bed, chair and car transfers  Long term goal 3: indep gait with no device 50 feet in familiar environment and supervision for 150 feet in open or unpredictable environments  Long term goal 4: indep flight of stairs with one rail  Long term goal 5: Quiñones to be completed at 50/56 level  Patient Goals   Patient goals : to be able to return to home indep with no devices for walking    PLAN OF CARE/Safety:   Safety Devices  Type of Devices:  All fall risk precautions in place;Call light within reach      Therapy Time:   Individual   Time In 0900   Time Out 1000   Minutes 60     Minutes:60  Transfer/Bed mobility training: 10  Gait trainin  Neuro re education: 0  Therapeutic ex: [address], [place], 22 at 10:06 AM

## 2022-09-17 NOTE — PROGRESS NOTES
Patient resting in bed, and expresses generalized pain. Medication intervention given. Bed alarm is on, call light is within reach. Will continue to monitor.
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
## 2022-09-17 NOTE — PROGRESS NOTES
Physical Therapy Rehab Treatment Note  [facility]  [facility]       NAME: Fam Bruner  : 1985 (43 y.o.)  MRN: [id number]  CODE STATUS: Full Code    Date of Service: 2022       Restrictions:  Restrictions/Precautions: Seizure, Fall Risk       SUBJECTIVE:   Subjective: \"Im feeling a little better than this morning. \"    Pain  Pain: 7/10 abdominal pain - patient states she recieved pain medication at lunch      OBJECTIVE:             Bed mobility  Supine to Sit: Supervision  Bed Mobility Comments: HOB slightly elevated    Transfers  Sit to Stand: Stand by assistance  Stand to sit: Stand by assistance  Comment: Good sequencing and safety. Ambulation  Surface: carpet  Device: No Device  Assistance: Stand by assistance  Quality of Gait: Improved arm swing and less guarding d/t reduced pain. Gait Deviations: Slow Kamila;Decreased step length  Distance: 150ft with turns                PT Exercises  Exercise Treatment: Step taps to 6\" box 2 x 10  A/AROM Exercises: Seated: Toe/Heel Raises, LAQ, Heel Slides, marches x 10  Static Standing Balance Exercises: FT static stand x 1 minute  Dynamic Standing Balance Exercises: Modifed tandem with OOBOS reaching in various directions, each side          Activity Tolerance  Activity Tolerance: Patient tolerated treatment well             ASSESSMENT/PROGRESS TOWARDS GOALS:   Assessment: Patient demonstrated improving dynamic balance with OOBOS reaching and step taps with no UE support. Displayed increased arm swing when ambulating this session d/t reduced abdominal pain.   Assessment  Activity Tolerance: Patient tolerated treatment well    Goals:  Long Term Goals  Long term goal 1: indep and effecient bed mobility  Long term goal 2: indep and effecient bed, chair and car transfers  Long term goal 3: indep gait with no device 50 feet in familiar environment and supervision for 150 feet in open or unpredictable environments  Long term goal 4: indep flight of stairs with one rail  Long term goal 5: Quiñones to be completed at 50/56 level  Patient Goals   Patient goals : to be able to return to home indep with no devices for walking    PLAN OF CARE/Safety:   Safety Devices  Type of Devices:  All fall risk precautions in place;Call light within reach      Therapy Time:   Individual   Time In 1330   Time Out 1400   Minutes 30     Minutes:  Transfer/Bed mobility trainin  Gait training: 10  Neuro re education:  Therapeutic ex: [address], PTA, 22 at 3:25 PM

## 2022-09-17 NOTE — PROGRESS NOTES
Subjective: The patient complains of ,\" moderate acute on chronic progressive fatigue and  weakness  partially relieved by rest, medications, PT,  OT,   SLP and rest and exacerbated by recent illness  . I am concerned about patients medical complexities and barriers to advancing in rehab goals including ETOH abuse. I reviewed current care and plans for further care with other rehab providers including nursing and case management. According to recent nursing note, \" see notes \". ROS x10: The patient also complains of severely impaired mobility and activities of daily living. Otherwise no new problems with vision, hearing, nose, mouth, throat, dermal, cardiovascular, GI, , pulmonary, musculoskeletal, psychiatric or neurological. See also Acute Rehab PM&R H&P. Vital signs:  /73   Pulse 96   Temp 98.2 °F (36.8 °C) (Oral)   Resp 18   Ht 5' 5\" (1.651 m)   Wt 188 lb 9.6 oz (85.5 kg)   LMP  (LMP Unknown) Comment: tubal ligation  SpO2 100%   BMI 31.38 kg/m²   I/O:   PO/Intake:  fair PO intake,    diet    Bowel:   continent   Bladder: continent  no    greenberg  General:  Patient is well developed,   adequately nourished, and    well kempt. HEENT:    Pupils equal, hearing intact to loud voice, external inspection of ear and nose benign. Inspection of lips, tongue and gums benign    Musculoskeletal: No significant change in strength or tone. All joints stable. Inspection and palpation of digits and nails show no clubbing, cyanosis or inflammatory conditions. Neuro/Psychiatric: Affect: flat but pleasant. Alert and oriented to person, place and situation with    cues. No significant change in deep tendon reflexes or sensation  Lungs:  Diminished, CTA-B. Respiration effort is   normal at rest.     Heart:   S1 = S2,   RRR. Abdomen:  Soft, non-tender, no enlargement of liver or spleen.   Extremities:    lower extremity edema    Skin:   Intact to general survey, Rehabilitation:  Physical Therapy:   Bed mobility:  Bed mobility  Bridging: Modified independent  (09/16/22 0953)  Rolling to Left: Stand by assistance (09/16/22 0953)  Rolling to Right: Stand by assistance (09/16/22 0953)  Supine to Sit: Supervision (09/17/22 0912)  Sit to Supine: Supervision (09/16/22 1118)  Bed Mobility Comments: HOB slightly elevated (09/17/22 0912)  Transfers:  Transfers  Sit to Stand: Stand by assistance (09/17/22 1334)  Stand to sit: Stand by assistance (09/17/22 1334)  Bed to Chair: Stand by assistance;Contact guard assistance (Contact cues required due to mild lat LOB) (09/16/22 0955)  Comment: Good sequencing and safety. (09/17/22 0912)  Gait:   Ambulation  Surface: carpet (09/17/22 1341)  Device: No Device (09/17/22 1341)  Assistance: Stand by assistance (09/17/22 1341)  Quality of Gait: Improved arm swing and less guarding d/t reduced pain. (09/17/22 1341)  Gait Deviations: Slow Kamila;Decreased step length (09/17/22 1341)  Distance: 150ft with turns (09/17/22 1341)  Comments: Pt fatigued following OT session for shower and unable to ambulate further distance.  Lat sway results in intermittent LOB requiring recovery assistance intermittently (09/16/22 1013)  Stairs:  Stairs/Curb  Stairs?: Yes (09/17/22 0917)  Stairs  # Steps : 12 (09/17/22 0917)  Stairs Height: 6\" (09/17/22 0917)  Rails: Bilateral (Ligth touch) (09/17/22 0917)  Device: No Device (09/17/22 0917)  Assistance: Stand by assistance (09/17/22 7990)  Comment: Recip pattern with safe technique ascending and descending. (09/17/22 0917)  W/C mobility:  Wheelchair Activities  Propulsion: Yes (09/16/22 1013)  Propulsion 1  Propulsion: Manual (09/16/22 1013)  Level: Level Tile (09/16/22 1013)  Method: [place] (09/16/22 1013)  Level of Assistance: Modified independent (09/16/22 1013)  Distance: 50 feet - limited by time (09/16/22 1013)    Occupational Therapy:      ADL  Feeding: Independent (09/16/22 0953)  Grooming: Setup (09/16/22 5494)  Grooming Skilled Clinical Factors: Grooming standing at the sink (09/16/22 0953)  UE Bathing: Setup (09/16/22 0953)  LE Bathing: Minimal assistance (09/16/22 0953)  LE Bathing Skilled Clinical Factors: Required assistance to wash feet (09/16/22 0953)  UE Dressing: Setup (09/16/22 0953)  LE Dressing: Minimal assistance (09/16/22 0953)  LE Dressing Skilled Clinical Factors: Pt able to don/doff B hosptial socks but required min assist to don/pull up pants (09/16/22 0953)  Toileting: Unable to assess(comment) (09/16/22 0953)  Toileting Skilled Clinical Factors: Pt declined need to toliet (09/16/22 0953)             Speech Therapy:      Comprehension: Within Functional Limits  Verbal Expression: Within functional limits  Diet/Swallow:        Dysphagia Outcome Severity Scale: Level 7: Normal in all situations  Compensatory Swallowing Strategies :  Alternate solids and liquids, Upright as possible for all oral intake, Remain upright for 30-45 minutes after meals          Lab/X-ray studies reviewed, analyzed and discussed with patient and staff:   Recent Results (from the past 24 hour(s))   Comprehensive Metabolic Panel w/ Reflex to MG    Collection Time: 09/17/22  5:38 AM   Result Value Ref Range    Sodium 136 135 - 144 mEq/L    Potassium reflex Magnesium 3.7 3.4 - 4.9 mEq/L    Chloride 104 95 - 107 mEq/L    CO2 24 20 - 31 mEq/L    Anion Gap 8 (L) 9 - 15 mEq/L    Glucose 71 70 - 99 mg/dL    BUN 8 6 - 20 mg/dL    Creatinine 0.72 0.50 - 0.90 mg/dL    GFR Non-African American >60.0 >60    GFR  >60.0 >60    Calcium 7.9 (L) 8.5 - 9.9 mg/dL    Total Protein 5.0 (L) 6.3 - 8.0 g/dL    Albumin 1.4 (L) 3.5 - 4.6 g/dL    Total Bilirubin 5.8 (H) 0.2 - 0.7 mg/dL    Alkaline Phosphatase 283 (H) 40 - 130 U/L    ALT 35 (H) 0 - 33 U/L     (H) 0 - 35 U/L    Globulin 3.6 (H) 2.3 - 3.5 g/dL   Magnesium    Collection Time: 09/17/22  5:38 AM   Result Value Ref Range    Magnesium 1.6 (L) 1.7 - 2.4 mg/dL CBC with Auto Differential    Collection Time: 09/17/22  5:38 AM   Result Value Ref Range    WBC 7.8 4.8 - 10.8 K/uL    RBC 2.70 (L) 4.20 - 5.40 M/uL    Hemoglobin 9.5 (L) 12.0 - 16.0 g/dL    Hematocrit 29.4 (L) 37.0 - 47.0 %    .9 (H) 82.0 - 100.0 fL    MCH 35.3 (H) 27.0 - 31.3 pg    MCHC 32.5 (L) 33.0 - 37.0 %    RDW 15.2 (H) 11.5 - 14.5 %    Platelets 172 (L) 879 - 400 K/uL    Neutrophils % 67.6 %    Lymphocytes % 22.2 %    Monocytes % 8.5 %    Eosinophils % 0.9 %    Basophils % 0.8 %    Neutrophils Absolute 5.3 1.4 - 6.5 K/uL    Lymphocytes Absolute 1.7 1.0 - 4.8 K/uL    Monocytes Absolute 0.7 0.2 - 0.8 K/uL    Eosinophils Absolute 0.1 0.0 - 0.7 K/uL    Basophils Absolute 0.1 0.0 - 0.2 K/uL       CT ABDOMEN PELVIS WO CONTRAST Additional Contrast? None    Result Date: 9/4/2022  CT ABDOMEN PELVIS WO CONTRAST: 9/4/2022 CLINICAL HISTORY:  ab pain . COMPARISON: None available. TECHNIQUE: Spiral images were obtained of the abdomen and pelvis without contrast. All CT scans at this facility use dose modulation, iterative reconstruction, and/or weight based dosing when appropriate to reduce radiation dose to as low as reasonably achievable. FINDINGS: The liver is moderate to markedly enlarged with extensive fatty infiltration. A small volume of ascites is present in the pelvis. Mild retroperitoneal and mesenteric inflammation is likely edema and/or pancreatitis. Mild wall thickening of the essentially collapsed colon is probably reactive and/or related to incomplete distention, rather than colitis. The gallbladder is mildly distended, but otherwise unremarkable in appearance. A very small fat-containing periumbilical hernia is noted. There is no abscess, free air, abnormal bowel or biliary dilatation, focal inflammatory changes, significant lymphadenopathy, hernias, or other complication identified.  The spleen, pancreas, adrenal glands, kidneys, great vessels, small bowel loops, uterus, adnexa, urinary bladder, and additional images of the pelvis are unremarkable. The visualized lung bases are clear. ENLARGED FATTY LIVER. SMALL VOLUME ASCITES AND MILD ILL-DEFINED MESENTERIC AND RETROPERITONEAL INFLAMMATION, LIKELY EDEMA AND/OR PANCREATITIS. MILD PROBABLY REACTIVE WALL THICKENING OF THE COLON. IR US GUIDED PARACENTESIS    1. Status post technically successful ultrasound-guided paracentesis. Larry Garcia is a Female of 43 years age, referred for Ultrasound Guided Paracentesis. PROCEDURE: Survey of the abdomen showed large amount of ascites fluid. After obtaining informed consent, the patient was positioned supine on the sonography table. Using ultrasound, the skin over the left hemiabdomen was locally anesthetized with 1% lidocaine. Following that, a Retewieh needle was advanced into the fluid pocket using ultrasound visualization. 1510cc, of clear yellow fluid were aspirated and sent for cytology, and pathology. The needle was removed, and hemostasis was obtained with pressure. A Band-Aid was placed. Post procedure images did not demonstrate hemorrhage at the target site. The patient tolerated the procedure well. The patient left the department in good condition. A radiology nurse was in presence monitoring vital signs, assisting throughout the procedure. Previous extensive, complex labs, notes and diagnostics reviewed and analyzed. ALLERGIES:    Allergies as of 09/15/2022 - Fully Reviewed 09/15/2022   Allergen Reaction Noted    Oxycodone-acetaminophen Itching 01/11/2016      (please also verify by checking [facility])       Complex Physical Medicine & Rehab Issues Assess & Plan:   Severe abnormality of gait and mobility and impaired self-care and ADL's secondary to progressive  Hepatitc Encephalopathy .   Functional and medical status reassessed regarding patients ability to participate in therapies and patient found to be able to participate in acute intensive comprehensive inpatient rehabilitation program including PT/OT to improve balance, ambulation, ADLs, and to improve the P/AROM. Therapeutic modifications regarding activities in therapies, place, amount of time per day and intensity of therapy made daily. In bed therapies or bedside therapies prn. Bowel and Bladder dysfunction  , Neurogenic bowel and bladder:  frequent toileting, ambulate to bathroom with assistance, check post void residuals. Check for C.difficile x1 if >2 loose stools in 24 hours, continue bowel & bladder program.  Monitor bowel and bladder function. Lactinex 2 PO every AC. MOM prn, Brown Bomb prn, Glycerin suppository prn, enema prn. Encourage therapy and nursing to co-treat and problem solve re continence. Moderate   pain as well as generalized OA pain: reassess pain every shift and prior to and after each therapy session, give prn Tylenol and consider scheduled Tylenol, modalities prn in therapy, masage, Lidoderm, K-pad prn. Consider scheduled AM pain meds. Skin healing   and breakdown risk:  continue pressure relief program.  Daily skin exams and reports from nursing. Fatigue due to nutritional and hydration deficiency: Add and titrate vitamin B12 vitamin D and CoQ10 continue to monitor I&Os, calorie counts prn, dietary consult prn. Add healthy snack at night. Acute episodic insomnia with situational adjustment disorder:  prn Ambien, monitor for day time sedation. Falls risk elevated:  patient to use call light to get nursing assistance to get up, bed and chair alarm. Elevated DVT risk: progressive activities in PT, continue prophylaxis DIVINE hose, elevation and  see mar  . Complex discharge planning:  Weekly team meeting every Monday to re-assess progress towards goals, discuss and address social, psychological and medical comorbidities and to address difficulties they may be having progressing in therapy. Patient and family education is in progress.   The patient is to follow-up with their family physician after discharge.         Complex Active General Medical Issues that complicate care Assess & Plan:    Patient Active Problem List   Diagnosis    Major depressive disorder, severe (HCC)    Alcoholic hepatitis with ascites    GI bleeding    Impaired mobility and activities of daily living dt encephalopathy and gait ataxia    Bursitis of foot    Generalized pain    Myalgia, unspecified site    Pain in left foot    Toe deformity, acquired    Acute alcoholic hepatitis    Impaired mobility and ADLs            Focus of today's plan-  Initiate and modify therapuetic plan to meet patients individual needs, add rest breaks as needed, and  see orders     MD Gamal Navas D.O., PM&R     Attending    [address] [place], [place] [address]en [facility]

## 2022-09-18 PROCEDURE — 6360000002 HC RX W HCPCS: Performed by: INTERNAL MEDICINE

## 2022-09-18 PROCEDURE — 6370000000 HC RX 637 (ALT 250 FOR IP): Performed by: INTERNAL MEDICINE

## 2022-09-18 PROCEDURE — 6370000000 HC RX 637 (ALT 250 FOR IP): Performed by: PHYSICAL MEDICINE & REHABILITATION

## 2022-09-18 PROCEDURE — 2580000003 HC RX 258: Performed by: INTERNAL MEDICINE

## 2022-09-18 PROCEDURE — 1180000000 HC REHAB R&B

## 2022-09-18 PROCEDURE — 99222 1ST HOSP IP/OBS MODERATE 55: CPT | Performed by: INTERNAL MEDICINE

## 2022-09-18 RX ORDER — FUROSEMIDE 20 MG/1
20 TABLET ORAL DAILY
Status: DISCONTINUED | OUTPATIENT
Start: 2022-09-18 | End: 2022-09-20

## 2022-09-18 RX ORDER — LACTULOSE 10 G/15ML
20 SOLUTION ORAL 3 TIMES DAILY
Status: DISCONTINUED | OUTPATIENT
Start: 2022-09-18 | End: 2022-09-22 | Stop reason: HOSPADM

## 2022-09-18 RX ORDER — SPIRONOLACTONE 25 MG/1
50 TABLET ORAL DAILY
Status: DISCONTINUED | OUTPATIENT
Start: 2022-09-18 | End: 2022-09-20

## 2022-09-18 RX ADMIN — Medication 100 MG: at 09:34

## 2022-09-18 RX ADMIN — PANTOPRAZOLE SODIUM 40 MG: 40 TABLET, DELAYED RELEASE ORAL at 06:25

## 2022-09-18 RX ADMIN — CEFTRIAXONE SODIUM 1000 MG: 1 INJECTION, POWDER, FOR SOLUTION INTRAMUSCULAR; INTRAVENOUS at 14:28

## 2022-09-18 RX ADMIN — HYDROCODONE BITARTRATE AND ACETAMINOPHEN 1 TABLET: 5; 325 TABLET ORAL at 23:14

## 2022-09-18 RX ADMIN — LACTULOSE 20 G: 20 SOLUTION ORAL at 21:06

## 2022-09-18 RX ADMIN — Medication 2000 UNITS: at 17:08

## 2022-09-18 RX ADMIN — LACTOBACILLUS TAB 2 TABLET: TAB at 17:07

## 2022-09-18 RX ADMIN — HYDROCODONE BITARTRATE AND ACETAMINOPHEN 1 TABLET: 5; 325 TABLET ORAL at 17:07

## 2022-09-18 RX ADMIN — LACTULOSE 20 G: 20 SOLUTION ORAL at 17:08

## 2022-09-18 RX ADMIN — LACTOBACILLUS TAB 2 TABLET: TAB at 06:25

## 2022-09-18 RX ADMIN — FUROSEMIDE 20 MG: 20 TABLET ORAL at 17:07

## 2022-09-18 RX ADMIN — HYDROCODONE BITARTRATE AND ACETAMINOPHEN 1 TABLET: 5; 325 TABLET ORAL at 03:55

## 2022-09-18 RX ADMIN — HYDROCODONE BITARTRATE AND ACETAMINOPHEN 1 TABLET: 5; 325 TABLET ORAL at 09:34

## 2022-09-18 RX ADMIN — SPIRONOLACTONE 50 MG: 25 TABLET ORAL at 17:08

## 2022-09-18 RX ADMIN — Medication 400 MG: at 09:34

## 2022-09-18 RX ADMIN — THERA TABS 1 TABLET: TAB at 09:34

## 2022-09-18 RX ADMIN — LACTOBACILLUS TAB 2 TABLET: TAB at 14:28

## 2022-09-18 ASSESSMENT — PAIN SCALES - GENERAL
PAINLEVEL_OUTOF10: 9
PAINLEVEL_OUTOF10: 6
PAINLEVEL_OUTOF10: 9
PAINLEVEL_OUTOF10: 8
PAINLEVEL_OUTOF10: 9

## 2022-09-18 ASSESSMENT — PAIN DESCRIPTION - ORIENTATION: ORIENTATION: RIGHT;LEFT

## 2022-09-18 ASSESSMENT — PAIN DESCRIPTION - DESCRIPTORS
DESCRIPTORS: ACHING
DESCRIPTORS: ACHING

## 2022-09-18 ASSESSMENT — PAIN DESCRIPTION - LOCATION
LOCATION: BACK;FOOT
LOCATION: ABDOMEN;LEG
LOCATION: ABDOMEN

## 2022-09-18 NOTE — CONSULTS
Inpatient consult to GI  Consult performed by: Senait Luis MD  Consult ordered by: Sharee Mart MD    Patient Name: Nettie Lewis Date: 9/15/2022  6:32 PM  MR #: [id number]  : 1985    Attending Physician: Michael aRmos DO  Reason for consult: Cirrhosis, recurrent ascites, role of diuretics  History Obtained From:  patient, electronic medical record, staff nurse  History of Presenting Illness:      Deysi Ramirez is a 43 y.o. female on hospital day 3 with PMH ETOH abuse and tobacco dependence. Past surgical history includes appendectomy, tubal ligation, paracentesis (2022). Admitted to inpatient rehab on 2022 after inpatient hospital stay from 2022 - 9/15/2022 for acute alcoholic hepatitis/pancreatitis, EtOH withdrawal, mild WILLIE, macrocytic anemia, thrombocytopenia, treated with IV fluids and pain control, s/p paracentesis with approximately 1500 cc removed per IR, fluid analysis not suggestive of SBP however fluid culture growing GPC in chains, started on Rocephin, EGD revealing esophagitis, and UA positive for E. coli UTI. GI consulted for cirrhosis, recurrent ascites, role of diuretics. Patient reports feeling as though her abdomen has increased in girth since admission to inpatient rehab. She endorses history of heavy alcohol use, increased over the past 3 years. She denies GERD, nausea/vomiting, hematemesis, dysphagia, hematochezia or melena. She does report issues with constipation since admission to the hospital.  States she normally has a bowel movement 1-2 times a day. States she still has bowel movements here in the hospital, however they are small in caliber. She reports smoking approximately half pack of cigarettes per day prior to admission. States she uses marijuana on rare occasion. Also endorses taking ibuprofen as needed on rare occasion.   On 2022, sodium 134, glucose 127, BUN less than 2, creatinine 0.69, Albumin 2.3, total bilirubin 7.2, alkaline phosphatase 524, ALT 98, , WBC 6.3, Hgb 11.1, .4, platelets 50, CRP 25, lipase 118, amylase 60, INR 1.4, lactic acid 12.1, procalcitonin 0.69, acute hepatitis panel was negative, blood cultures negative, urine culture positive for E. coli, ascitic fluid culture preliminary results positive for gram-positive cocci in chains, culture in progress. On 9/17/2022, WBCs 7.8, Hgb 9.5, .9, platelets 128, BUN 8, creatinine 0.72, albumin 1.4, total bilirubin 5.8, alk phos 283, ALT 35, ,   CT abdomen pelvis without contrast 9/4/2022: Enlarged fatty liver. Small volume ascites and mild ill-defined mesenteric and retroperitoneal inflammation, likely edema and/or pancreatitis. Mild probably reactive wall thickening of the colon. IR performed ultrasound-guided paracentesis on 9/14/2022 with 1510 cc of clear yellow fluid aspirated. Previous endoscopic history:  EGD 9/9/2022: Web in the upper third of the esophagus. Z-line regular, 36 cm from the incisors. Erythematous, granular and congested mucosa in the cardia, gastric fundus, gastric body and antrum. Normal examined duodenum. LA Grade A reflux esophagitis with no bleeding. No specimens collected.     History:      Past Medical History:   Diagnosis Date    Alcohol abuse     Tobacco dependence      Past Surgical History:   Procedure Laterality Date    APPENDECTOMY      FOOT SURGERY      reports 6 sx on L foot and one on right foot    PARACENTESIS Left 09/13/2022    1510 ml removed per Dr Dana Mcnair  specimen obtained    TUBAL LIGATION      UPPER GASTROINTESTINAL ENDOSCOPY N/A 09/09/2022    EGD DIAGNOSTIC ONLY performed by Coni Harrington MD at [facility]     Family History  Family History   Problem Relation Age of Onset    High Cholesterol Mother     Hypertension Mother     Melanoma Father     High Cholesterol Father     Hypertension Father      [] Unable to obtain due to ventilated and/ or neurologic status  Social History Socioeconomic History    Marital status: Single     Spouse name: Not on file    Number of children: 2    Years of education: Not on file    Highest education level: Not on file   Occupational History    Not on file   Tobacco Use    Smoking status: Every Day     Packs/day: 1.00     Types: Cigarettes    Smokeless tobacco: Never   Vaping Use    Vaping Use: Never used   Substance and Sexual Activity    Alcohol use: Yes     Comment: 6/9/22: completed 30 day rehab 2 weeks ago, relapse one week ago; unable to quantify EtOH consumption    Drug use: Yes     Types: Marijuana Maciej Kos)    Sexual activity: Not on file   Other Topics Concern    Not on file   Social History Narrative    Lives With: 10 yo daughter, SO sometimes stays with pt, he works odd jobs    Older 22 yo dtr lives with a boyfriend    Type of Home: House in [facility] [place] [address]: Two level, Laundry in basement (flights to and from basement and second floor with HR)    Home Access: Stairs to enter with rails- Number of Steps: 5- Rails: Both    Bathroom Shower/Tub: Tub/Shower unit    Home Equipment:  (no AD)    ADL Assistance: Independent    Homemaking Assistance: Independent    Ambulation Assistance: Independent (no AD)    Transfer Assistance: Independent    Active : Yes    Type of Occupation: unemployed    GED--then some college.          Social Determinants of Health     Financial Resource Strain: Not on file   Food Insecurity: Not on file   Transportation Needs: Not on file   Physical Activity: Not on file   Stress: Not on file   Social Connections: Not on file   Intimate Partner Violence: Not on file   Housing Stability: Not on file      [] Unable to obtain due to ventilated and/ or neurologic status  Home Medications:   Medications Prior to Admission: mirtazapine (REMERON) 15 MG tablet, Take 1 tablet by mouth nightly (Patient not taking: Reported on 9/4/2022)  Multiple Vitamin (MULTIVITAMIN) TABS tablet, Take 1 tablet by mouth daily (Patient not taking: Reported on 9/4/2022)  Current Hospital Medications:   Scheduled Meds:   thiamine  100 mg Oral Daily    Vitamin D  2,000 Units Oral Dinner    cyanocobalamin  1,000 mcg IntraMUSCular Weekly    coenzyme Q10  100 mg Oral Daily    lidocaine  3 patch TransDERmal Daily    calcium carbonate  500 mg Oral BID    lactobacillus acidophilus  2 tablet Oral TID AC    cefTRIAXone (ROCEPHIN) IV  1,000 mg IntraVENous Q24H    magnesium oxide  400 mg Oral Daily    multivitamin  1 tablet Oral Daily    nicotine  1 patch TransDERmal Daily    pantoprazole  40 mg Oral QAM AC     Continuous Infusions:   dextrose       PRN Meds:.bisacodyl, fleet, HYDROcodone 5 mg - acetaminophen, albuterol, dextrose, dextrose bolus **OR** dextrose bolus, glucagon (rDNA), glucose, LORazepam **OR** LORazepam **OR** LORazepam **OR** LORazepam **OR** LORazepam **OR** LORazepam **OR** LORazepam **OR** LORazepam, ondansetron **OR** ondansetron, polyethylene glycol   dextrose        Allergies: Allergies   Allergen Reactions    Oxycodone-Acetaminophen Itching     Itching and nausea        Review of Systems:    [x] CV, Resp, Neuro, , and all other systems reviewed and negative other than listed in HPI. Objective Findings:     Vitals:   Vitals:    09/17/22 0808 09/17/22 1900 09/17/22 2002 09/18/22 0802   BP: 114/73  (!) 99/55 125/76   Pulse: 96  97 98   Resp: 18 16 18   Temp: 98.2 °F (36.8 °C)  98.4 °F (36.9 °C) 98.2 °F (36.8 °C)   TempSrc: Oral   Oral   SpO2: 100%  98% 99%   Weight:  188 lb 4 oz (85.4 kg)     Height:          Physical Examination:  General: Alert, oriented, in no acute distress  HEENT: Normocephalic, + scleral icterus. Neck: Soft/supple  Heart: Regular, no murmur, no rub/gallop. Lungs: clear to ascultation, no rales/wheezing/rhonchi. equal chest wall excursion. Abdomen: Abdomen soft, non-tender. BS normal. No masses,  No organomegaly  Extremities: no clubbing/cyanosis, 2-3+ BLE edema.    Skin: Warm, dry, normal turgor, no rash, + BUE bruising, no petichiae. Neuro: without focal deficit, moves all extremities, no asterixis  Psych: normal affect    Results/ Medications reviewed 9/18/2022, 8:16 AM     Laboratory, Microbiology, Pathology, Radiology, Cardiology, Medications and Transcriptions reviewed  Scheduled Meds:   thiamine  100 mg Oral Daily    Vitamin D  2,000 Units Oral Dinner    cyanocobalamin  1,000 mcg IntraMUSCular Weekly    coenzyme Q10  100 mg Oral Daily    lidocaine  3 patch TransDERmal Daily    calcium carbonate  500 mg Oral BID    lactobacillus acidophilus  2 tablet Oral TID AC    cefTRIAXone (ROCEPHIN) IV  1,000 mg IntraVENous Q24H    magnesium oxide  400 mg Oral Daily    multivitamin  1 tablet Oral Daily    nicotine  1 patch TransDERmal Daily    pantoprazole  40 mg Oral QAM AC     Continuous Infusions:   dextrose         Recent Labs     09/17/22  0538   WBC 7.8   HGB 9.5*   HCT 29.4*   .9*   *     Recent Labs     09/17/22  0538      K 3.7      CO2 24   BUN 8   CREATININE 0.72     Recent Labs     09/17/22  0538   *   ALT 35*   BILITOT 5.8*   ALKPHOS 283*     No results for input(s): LIPASE, AMYLASE in the last 72 hours. Recent Labs     09/17/22  0538   PROT 5.0*     CT ABDOMEN PELVIS WO CONTRAST Additional Contrast? None  Result Date: 9/4/2022  CT ABDOMEN PELVIS WO CONTRAST: 9/4/2022 CLINICAL HISTORY:  ab pain . COMPARISON: None available. TECHNIQUE: Spiral images were obtained of the abdomen and pelvis without contrast. All CT scans at this facility use dose modulation, iterative reconstruction, and/or weight based dosing when appropriate to reduce radiation dose to as low as reasonably achievable. FINDINGS: The liver is moderate to markedly enlarged with extensive fatty infiltration. A small volume of ascites is present in the pelvis. Mild retroperitoneal and mesenteric inflammation is likely edema and/or pancreatitis.  Mild wall thickening of the essentially collapsed colon is probably reactive and/or related to incomplete distention, rather than colitis. The gallbladder is mildly distended, but otherwise unremarkable in appearance. A very small fat-containing periumbilical hernia is noted. There is no abscess, free air, abnormal bowel or biliary dilatation, focal inflammatory changes, significant lymphadenopathy, hernias, or other complication identified. The spleen, pancreas, adrenal glands, kidneys, great vessels, small bowel loops, uterus, adnexa, urinary bladder, and additional images of the pelvis are unremarkable. The visualized lung bases are clear. ENLARGED FATTY LIVER. SMALL VOLUME ASCITES AND MILD ILL-DEFINED MESENTERIC AND RETROPERITONEAL INFLAMMATION, LIKELY EDEMA AND/OR PANCREATITIS. MILD PROBABLY REACTIVE WALL THICKENING OF THE COLON. IR US GUIDED PARACENTESIS    1. Status post technically successful ultrasound-guided paracentesis. Alesha Multani is a Female of 43 years age, referred for Ultrasound Guided Paracentesis. PROCEDURE: Survey of the abdomen showed large amount of ascites fluid. After obtaining informed consent, the patient was positioned supine on the sonography table. Using ultrasound, the skin over the left hemiabdomen was locally anesthetized with 1% lidocaine. Following that, a Yueh needle was advanced into the fluid pocket using ultrasound visualization. 1510cc, of clear yellow fluid were aspirated and sent for cytology, and pathology. The needle was removed, and hemostasis was obtained with pressure. A Band-Aid was placed. Post procedure images did not demonstrate hemorrhage at the target site. The patient tolerated the procedure well. The patient left the department in good condition. A radiology nurse was in presence monitoring vital signs, assisting throughout the procedure.      Impression:   43 y.o. female admitted to inpatient rehab 9/16/2022 after hospital stay from 9/4/2022 - 9/15/2022 for acute alcoholic hepatitis/pancreatitis, EtOH withdrawal, mild WILLIE, macrocytic anemia, thrombocytopenia. Given IV fluids and pain control, s/p paracentesis with approximately 1500 cc removed, fluid analysis not suggestive of SBP, however fluid culture growing GPC in chains, started on Rocephin, EGD revealing esophagitis-no varices, and UA positive for E. coli UTI. GI consulted for cirrhosis, recurrent ascites, role of diuretics. Pt reports increased abdominal girth and bilateral lower extremity edema worsening since admission to rehab. On 9/4/2022, sodium 134, glucose 127, BUN less than 2, creatinine 0.69, Albumin 2.3, total bilirubin 7.2, alkaline phosphatase 524, ALT 98, , WBC 6.3, Hgb 11.1, .4, platelets 50, CRP 25, lipase 118, amylase 60, INR 1.4, lactic acid 12.1, procalcitonin 0.69, acute hepatitis panel was negative, blood cultures negative, urine culture positive for E. coli, ascitic fluid culture preliminary results positive for gram-positive cocci in chains, culture in progress. On 9/17/2022, WBCs 7.8, Hgb 9.5, .9, platelets 182, BUN 8, creatinine 0.72, albumin 1.4, total bilirubin 5.8, alk phos 283, ALT 35, ,   CT abdomen pelvis without contrast 9/4/2022: Enlarged fatty liver. Small volume ascites and mild ill-defined mesenteric and retroperitoneal inflammation, likely edema and/or pancreatitis. Mild probably reactive wall thickening of the colon. 26-year-old female with cirrhosis, likely related to alcohol use. MELD score as of 9/4/2022=20 with 19.6% estimated 3-month mortality. Plan:   -Medical management per primary/inpatient rehab team.  - Ascites (grade 2) and Lower extremity edema: Start spironolactone 50mg daily, Lasix 20mg daily. Check BMP in 2 days. Large volume paracentesis not currently needed as abdomen is not tense, may consider on PRN basis. Limit sodium to < 2gm /day.   - Hx of SBP: recent tap with cultures growing gram-positive cocci in chains, final cultures pending, await results. Prophylaxis: Continue Rocephin 1 g IV every 24 hours  - EGD for Variceal screening: Completed 9/9/2022 revealing esophageal web, erythematous, granular and congested mucosa in the cardia, gastric fundus, gastric body and antrum consistent with portal hypertensive gastropathy, normal duodenum, LA grade a esophagitis without bleeding.  - HCC screening/surveillance: Recommend every 6 months. Last imaging: CT scan without liver mass/lesion  - Possible early/mild hepatic encephalopathy: Start Lactulose - titrate to 2-3 soft bm's per day. - Advised to limit NSAID use   -Discussed with patient at length importance of complete alcohol abstinence. Reviewed criteria for transplantation, need for alcohol cessation. Comments: Thank you for allowing us to participate in the care of this patient. Will continue to follow. Please call if questions or concerns arise. Electronically signed by Juju Kendrick MD on 9/18/2022 at 8:16 AM    Please note this report has been partially produced using speech recognition software and may cause contain errors related to that system including grammar, punctuation and spelling as well as words and phrases that may seem inappropriate. If there are questions or concerns please feel free to contact me to clarify.

## 2022-09-18 NOTE — PROGRESS NOTES
Subjective: The patient complains of ,\" moderate acute on chronic progressive fatigue and  weakness  partially relieved by rest, medications, PT,  OT,   SLP and rest and exacerbated by recent illness  . I am concerned about patients medical complexities and barriers to advancing in rehab goals including ETOH abuse. I reviewed current care and plans for further care with other rehab providers including nursing and case management. According to recent nursing note, \" see notes \". ROS x10: The patient also complains of severely impaired mobility and activities of daily living. Otherwise no new problems with vision, hearing, nose, mouth, throat, dermal, cardiovascular, GI, , pulmonary, musculoskeletal, psychiatric or neurological. See also Acute Rehab PM&R H&P. Vital signs:  /76   Pulse 98   Temp 98.2 °F (36.8 °C) (Oral)   Resp 18   Ht 5' 5\" (1.651 m)   Wt 188 lb 4 oz (85.4 kg)   LMP  (LMP Unknown) Comment: tubal ligation  SpO2 99%   BMI 31.33 kg/m²   I/O:   PO/Intake:  fair PO intake,    diet    Bowel:   continent   Bladder: continent  no    greenberg  General:  Patient is well developed,   adequately nourished, and    well kempt. HEENT:    Pupils equal, hearing intact to loud voice, external inspection of ear and nose benign. Inspection of lips, tongue and gums benign    Musculoskeletal: No significant change in strength or tone. All joints stable. Inspection and palpation of digits and nails show no clubbing, cyanosis or inflammatory conditions. Neuro/Psychiatric: Affect: flat but pleasant. Alert and oriented to person, place and situation with    cues. No significant change in deep tendon reflexes or sensation  Lungs:  Diminished, CTA-B. Respiration effort is   normal at rest.     Heart:   S1 = S2,   RRR. Abdomen:  Soft, non-tender, no enlargement of liver or spleen.   Extremities:    lower extremity edema    Skin:   Intact to general survey, Rehabilitation:  Physical Therapy:   Bed mobility:  Bed mobility  Bridging: Modified independent  (09/16/22 0953)  Rolling to Left: Stand by assistance (09/16/22 0953)  Rolling to Right: Stand by assistance (09/16/22 0953)  Supine to Sit: Supervision (09/17/22 0912)  Sit to Supine: Supervision (09/16/22 1118)  Bed Mobility Comments: HOB slightly elevated (09/17/22 0912)  Transfers:  Transfers  Sit to Stand: Stand by assistance (09/17/22 1334)  Stand to sit: Stand by assistance (09/17/22 1334)  Bed to Chair: Stand by assistance;Contact guard assistance (Contact cues required due to mild lat LOB) (09/16/22 0955)  Comment: Good sequencing and safety. (09/17/22 0912)  Gait:   Ambulation  Surface: carpet (09/17/22 1341)  Device: No Device (09/17/22 1341)  Assistance: Stand by assistance (09/17/22 1341)  Quality of Gait: Improved arm swing and less guarding d/t reduced pain. (09/17/22 1341)  Gait Deviations: Slow Kamila;Decreased step length (09/17/22 1341)  Distance: 150ft with turns (09/17/22 1341)  Comments: Pt fatigued following OT session for shower and unable to ambulate further distance.  Lat sway results in intermittent LOB requiring recovery assistance intermittently (09/16/22 1013)  Stairs:  Stairs/Curb  Stairs?: Yes (09/17/22 0917)  Stairs  # Steps : 12 (09/17/22 0917)  Stairs Height: 6\" (09/17/22 0917)  Rails: Bilateral (Ligth touch) (09/17/22 0917)  Device: No Device (09/17/22 0917)  Assistance: Stand by assistance (09/17/22 1523)  Comment: Recip pattern with safe technique ascending and descending. (09/17/22 0917)  W/C mobility:  Wheelchair Activities  Propulsion: Yes (09/16/22 1013)  Propulsion 1  Propulsion: Manual (09/16/22 1013)  Level: Level Tile (09/16/22 1013)  Method: Thompson Asters (09/16/22 1013)  Level of Assistance: Modified independent (09/16/22 1013)  Distance: 50 feet - limited by time (09/16/22 1013)    Occupational Therapy:      ADL  Feeding: Independent (09/16/22 0953)  Grooming: Setup (09/16/22 6725)  Grooming Skilled Clinical Factors: Grooming standing at the sink (09/16/22 0953)  UE Bathing: Setup (09/16/22 0953)  LE Bathing: Minimal assistance (09/16/22 0953)  LE Bathing Skilled Clinical Factors: Required assistance to wash feet (09/16/22 0953)  UE Dressing: Setup (09/16/22 0953)  LE Dressing: Minimal assistance (09/16/22 0953)  LE Dressing Skilled Clinical Factors: Pt able to don/doff B hosptial socks but required min assist to don/pull up pants (09/16/22 0953)  Toileting: Unable to assess(comment) (09/16/22 0953)  Toileting Skilled Clinical Factors: Pt declined need to toliet (09/16/22 0953)             Speech Therapy:      Comprehension: Within Functional Limits  Verbal Expression: Within functional limits  Diet/Swallow:        Dysphagia Outcome Severity Scale: Level 7: Normal in all situations  Compensatory Swallowing Strategies : Alternate solids and liquids, Upright as possible for all oral intake, Remain upright for 30-45 minutes after meals          Lab/X-ray studies reviewed, analyzed and discussed with patient and staff:   No results found for this or any previous visit (from the past 24 hour(s)). CT ABDOMEN PELVIS WO CONTRAST Additional Contrast? None    Result Date: 9/4/2022  CT ABDOMEN PELVIS WO CONTRAST: 9/4/2022 CLINICAL HISTORY:  ab pain . COMPARISON: None available. TECHNIQUE: Spiral images were obtained of the abdomen and pelvis without contrast. All CT scans at this facility use dose modulation, iterative reconstruction, and/or weight based dosing when appropriate to reduce radiation dose to as low as reasonably achievable. FINDINGS: The liver is moderate to markedly enlarged with extensive fatty infiltration. A small volume of ascites is present in the pelvis. Mild retroperitoneal and mesenteric inflammation is likely edema and/or pancreatitis.  Mild wall thickening of the essentially collapsed colon is probably reactive and/or related to incomplete distention, rather than colitis. The gallbladder is mildly distended, but otherwise unremarkable in appearance. A very small fat-containing periumbilical hernia is noted. There is no abscess, free air, abnormal bowel or biliary dilatation, focal inflammatory changes, significant lymphadenopathy, hernias, or other complication identified. The spleen, pancreas, adrenal glands, kidneys, great vessels, small bowel loops, uterus, adnexa, urinary bladder, and additional images of the pelvis are unremarkable. The visualized lung bases are clear. ENLARGED FATTY LIVER. SMALL VOLUME ASCITES AND MILD ILL-DEFINED MESENTERIC AND RETROPERITONEAL INFLAMMATION, LIKELY EDEMA AND/OR PANCREATITIS. MILD PROBABLY REACTIVE WALL THICKENING OF THE COLON. IR US GUIDED PARACENTESIS    1. Status post technically successful ultrasound-guided paracentesis. Ronit Elizondo is a Female of 43 years age, referred for Ultrasound Guided Paracentesis. PROCEDURE: Survey of the abdomen showed large amount of ascites fluid. After obtaining informed consent, the patient was positioned supine on the sonography table. Using ultrasound, the skin over the left hemiabdomen was locally anesthetized with 1% lidocaine. Following that, a Yueh needle was advanced into the fluid pocket using ultrasound visualization. 1510cc, of clear yellow fluid were aspirated and sent for cytology, and pathology. The needle was removed, and hemostasis was obtained with pressure. A Band-Aid was placed. Post procedure images did not demonstrate hemorrhage at the target site. The patient tolerated the procedure well. The patient left the department in good condition. A radiology nurse was in presence monitoring vital signs, assisting throughout the procedure. Previous extensive, complex labs, notes and diagnostics reviewed and analyzed.      ALLERGIES:    Allergies as of 09/15/2022 - Fully Reviewed 09/15/2022   Allergen Reaction Noted    Oxycodone-acetaminophen Itching 01/11/2016      (please also verify by checking [facility])       Complex Physical Medicine & Rehab Issues Assess & Plan:   Severe abnormality of gait and mobility and impaired self-care and ADL's secondary to progressive  Hepatitc Encephalopathy . Functional and medical status reassessed regarding patients ability to participate in therapies and patient found to be able to participate in acute intensive comprehensive inpatient rehabilitation program including PT/OT to improve balance, ambulation, ADLs, and to improve the P/AROM. Therapeutic modifications regarding activities in therapies, place, amount of time per day and intensity of therapy made daily. In bed therapies or bedside therapies prn. Bowel and Bladder dysfunction  , Neurogenic bowel and bladder:  frequent toileting, ambulate to bathroom with assistance, check post void residuals. Check for C.difficile x1 if >2 loose stools in 24 hours, continue bowel & bladder program.  Monitor bowel and bladder function. Lactinex 2 PO every AC. MOM prn, Brown Bomb prn, Glycerin suppository prn, enema prn. Encourage therapy and nursing to co-treat and problem solve re continence. Moderate   pain as well as generalized OA pain: reassess pain every shift and prior to and after each therapy session, give prn Tylenol and consider scheduled Tylenol, modalities prn in therapy, masage, Lidoderm, K-pad prn. Consider scheduled AM pain meds. Skin healing   and breakdown risk:  continue pressure relief program.  Daily skin exams and reports from nursing. Fatigue due to nutritional and hydration deficiency: Add and titrate vitamin B12 vitamin D and CoQ10 continue to monitor I&Os, calorie counts prn, dietary consult prn. Add healthy snack at night. Acute episodic insomnia with situational adjustment disorder:  prn Ambien, monitor for day time sedation. Falls risk elevated:  patient to use call light to get nursing assistance to get up, bed and chair alarm.   Elevated DVT risk: progressive activities in PT, continue prophylaxis DIVINE hose, elevation and  see mar  . Complex discharge planning:  Weekly team meeting every Monday to re-assess progress towards goals, discuss and address social, psychological and medical comorbidities and to address difficulties they may be having progressing in therapy. Patient and family education is in progress. The patient is to follow-up with their family physician after discharge.         Complex Active General Medical Issues that complicate care Assess & Plan:    Patient Active Problem List   Diagnosis    Major depressive disorder, severe (HCC)    Alcoholic hepatitis with ascites    GI bleeding    Impaired mobility and activities of daily living dt encephalopathy and gait ataxia    Bursitis of foot    Generalized pain    Myalgia, unspecified site    Pain in left foot    Toe deformity, acquired    Acute alcoholic hepatitis    Impaired mobility and ADLs            Focus of today's plan-  Initiate and modify therapuetic plan to meet patients individual needs, add rest breaks as needed, and  see orders  Low platelets  1.6 Mg  GFR above 60   MD Antoni Ring D.O., PM&R     Attending    [address]

## 2022-09-18 NOTE — PROGRESS NOTES
Assessment completed. A&O x4. Medicated with [place] for 8/10 pain to BLE and abdomen. Paracentesis site to LUQ is weeping a small amount of clear fluid. Applied new 4x4 to site. 3+ edema noted to BLE. Will wrap legs with ace wraps after shower. In bed with alarm activated. Feet elevated. Call light in reach.  Electronically signed by Funmilayo Preston LPN on 8/16/1601 at 42:73 AM

## 2022-09-18 NOTE — PROGRESS NOTES
Assessment completed at 2002. Pt denied any pain stated was just medicated greg's why she is just \"sleepy\" went to get 2000 thiamine and pt was in the BR. Encouraged pt to use light for assistance- she verbalized understanding. While ambulating pt back to bed she stated her abd was leaking. Checked LUQ of abd and was oozing fluid applied DSD- will check drainge in AM. Call light within reach as well as bedside table.

## 2022-09-18 NOTE — PROGRESS NOTES
Comprehensive Nutrition Assessment    Type and Reason for Visit:  Initial, Consult (rehab admit : eval and treat)    Nutrition Recommendations/Plan:   Continue current plan of care  Counseled on importance of adequate energy & protein intake, for recovery and disease management, with emphasis on nutrient rich food choices and appropriate supplement use  Follow up for low na diet ed needs prior to c/d     Malnutrition Assessment:  Malnutrition Status: Moderate malnutrition (09/18/22 1156)    Context:  Chronic Illness     Findings of the 6 clinical characteristics of malnutrition:  Energy Intake:  Mild decrease in energy intake (Comment)  Weight Loss:  Unable to assess (due to ascites, edema)     Body Fat Loss:  Mild body fat loss Triceps   Muscle Mass Loss:  Mild muscle mass loss Clavicles (pectoralis & deltoids)  Fluid Accumulation:  Severe     Strength:  Not Performed    Nutrition Assessment:    Pt admitted with moderate mlanutrition , presence of significant edema and ascites, make determination of acutal weight losses difficult, per diet history energy and protein intake were inadeqaute PTA, with ETOH, now currently with early satiety and c/o abdominal pain related to ascited.  Current diet appropriate and pt receptive to oral supplement 1 x daily    Nutrition Related Findings:    originally admitted 9/4, then admitted to rehab 9/15 ;PMH of alcohol abuse and toboacco abuse who presents with alcoholic hepatitis, paracentesis (9/13- 1500 ml), abdomend remains distended, last Bm 9/16, jaundice present, 3+ BLE edema, making determination of wt loss PTA difficult, on CIWA protocol, No intake recorded since 9/9 in EMR, pt rerports ' fair', c/o abdominal pain, early satiety, keeps foods for between meal snack, Meds include : MVI, thiamin, Labs noted :elevated LFT's, no recent Nh3, no signs of encephalopathy Wound Type: None       Current Nutrition Intake & Therapies:    Average Meal Intake: 26-50%, 51-75% (per pt)  Average Supplements Intake: 26-50%, 51-75% (per pt)  ADULT DIET; Regular; Low Sodium (2 gm)  ADULT ORAL NUTRITION SUPPLEMENT; Dinner; Standard High Calorie/High Protein Oral Supplement    Anthropometric Measures:  Height: 5' 5\" (165.1 cm)  Ideal Body Weight (IBW): 125 lbs (57 kg)    Admission Body Weight: 182 lb (82.6 kg)  Current Body Weight: 188 lb (85.3 kg) (edema/ascites present),   UTD ( edema/ascites) IBW. Weight Source: Bed Scale  Current BMI (kg/m2): 31.3  Usual Body Weight: 153 lb (69.4 kg) (12/2021)  % Weight Change (Calculated): 22.9                    BMI Categories: Overweight (BMI 25.0-29. 9) (based on UBW)    Estimated Daily Nutrient Needs:  Energy Requirements Based On: Kcal/kg  Weight Used for Energy Requirements: Usual  Energy (kcal/day): ~ 2100 kcals @ 30 kcal/kg UBW  Weight Used for Protein Requirements: Usual  Protein (g/day): ~ 83 g protein @ 1.2 g/kg UBW  Method Used for Fluid Requirements: ml/Kg  Fluid (ml/day): ~ 1725 ml @ 25 ml/kg    Nutrition Diagnosis:   Moderate malnutrition, In context of chronic illness related to inadequate protein-energy intake, other (comment) (abdominal pain, ETOH) as evidenced by Criteria as identified in malnutrition assessment    Nutrition Interventions:   Food and/or Nutrient Delivery: Continue Current Diet, Continue Oral Nutrition Supplement  Nutrition Education/Counseling: Education initiated  Coordination of Nutrition Care: Continue to monitor while inpatient       Goals:     Goals: PO intake 75% or greater, by next RD assessment, other (specify)  Specify Other Goals: anticipate weight loss as edema/ascites resolve    Nutrition Monitoring and Evaluation:   Behavioral-Environmental Outcomes: None Identified  Food/Nutrient Intake Outcomes: Food and Nutrient Intake, Supplement Intake, Diet Advancement/Tolerance  Physical Signs/Symptoms Outcomes: GI Status, Meal Time Behavior, Weight, Biochemical Data    Discharge Planning:     Too soon to determine     AFRRAH PAOLO FRAGOSO, LD

## 2022-09-18 NOTE — PLAN OF CARE
Problem: Discharge Planning  Goal: Discharge to home or other facility with appropriate resources  Outcome: Progressing  Flowsheets (Taken 9/17/2022 2002)  Discharge to home or other facility with appropriate resources: Identify barriers to discharge with patient and caregiver

## 2022-09-18 NOTE — PROGRESS NOTES
Agree with LPN assessment of this patient.  Electronically signed by Sera Yeh RN on 9/18/2022 at 12:45 PM

## 2022-09-18 NOTE — PLAN OF CARE
Nutrition Problem #1: Moderate malnutrition, In context of chronic illness  Intervention: Food and/or Nutrient Delivery: Continue Current Diet, Continue Oral Nutrition Supplement  Nutritional    Po > 75% meals and supplements, anticipate wt loss as edema/ascites resolve

## 2022-09-19 LAB
BODY FLUID CULTURE, STERILE: ABNORMAL
ORGANISM: ABNORMAL
ORGANISM: ABNORMAL

## 2022-09-19 PROCEDURE — 99233 SBSQ HOSP IP/OBS HIGH 50: CPT | Performed by: PHYSICAL MEDICINE & REHABILITATION

## 2022-09-19 PROCEDURE — 97530 THERAPEUTIC ACTIVITIES: CPT

## 2022-09-19 PROCEDURE — 97112 NEUROMUSCULAR REEDUCATION: CPT

## 2022-09-19 PROCEDURE — 1180000000 HC REHAB R&B

## 2022-09-19 PROCEDURE — 97535 SELF CARE MNGMENT TRAINING: CPT

## 2022-09-19 PROCEDURE — 6370000000 HC RX 637 (ALT 250 FOR IP): Performed by: PHYSICAL MEDICINE & REHABILITATION

## 2022-09-19 PROCEDURE — 97130 THER IVNTJ EA ADDL 15 MIN: CPT

## 2022-09-19 PROCEDURE — 2580000003 HC RX 258: Performed by: INTERNAL MEDICINE

## 2022-09-19 PROCEDURE — 99232 SBSQ HOSP IP/OBS MODERATE 35: CPT | Performed by: NURSE PRACTITIONER

## 2022-09-19 PROCEDURE — 97116 GAIT TRAINING THERAPY: CPT

## 2022-09-19 PROCEDURE — 6360000002 HC RX W HCPCS: Performed by: INTERNAL MEDICINE

## 2022-09-19 PROCEDURE — 97110 THERAPEUTIC EXERCISES: CPT

## 2022-09-19 PROCEDURE — 6370000000 HC RX 637 (ALT 250 FOR IP): Performed by: INTERNAL MEDICINE

## 2022-09-19 PROCEDURE — 97129 THER IVNTJ 1ST 15 MIN: CPT

## 2022-09-19 RX ORDER — HYDROCODONE BITARTRATE AND ACETAMINOPHEN 5; 325 MG/1; MG/1
1 TABLET ORAL EVERY 8 HOURS PRN
Status: DISCONTINUED | OUTPATIENT
Start: 2022-09-19 | End: 2022-09-22 | Stop reason: HOSPADM

## 2022-09-19 RX ADMIN — LACTOBACILLUS TAB 2 TABLET: TAB at 05:48

## 2022-09-19 RX ADMIN — LACTOBACILLUS TAB 2 TABLET: TAB at 17:03

## 2022-09-19 RX ADMIN — SPIRONOLACTONE 50 MG: 25 TABLET ORAL at 08:38

## 2022-09-19 RX ADMIN — ANTACID TABLETS 500 MG: 500 TABLET, CHEWABLE ORAL at 20:05

## 2022-09-19 RX ADMIN — THERA TABS 1 TABLET: TAB at 08:37

## 2022-09-19 RX ADMIN — Medication 100 MG: at 08:37

## 2022-09-19 RX ADMIN — LACTOBACILLUS TAB 2 TABLET: TAB at 12:00

## 2022-09-19 RX ADMIN — FUROSEMIDE 20 MG: 20 TABLET ORAL at 08:37

## 2022-09-19 RX ADMIN — HYDROCODONE BITARTRATE AND ACETAMINOPHEN 1 TABLET: 5; 325 TABLET ORAL at 20:09

## 2022-09-19 RX ADMIN — CEFTRIAXONE SODIUM 1000 MG: 1 INJECTION, POWDER, FOR SOLUTION INTRAMUSCULAR; INTRAVENOUS at 12:50

## 2022-09-19 RX ADMIN — HYDROCODONE BITARTRATE AND ACETAMINOPHEN 1 TABLET: 5; 325 TABLET ORAL at 05:48

## 2022-09-19 RX ADMIN — Medication 400 MG: at 08:37

## 2022-09-19 RX ADMIN — HYDROCODONE BITARTRATE AND ACETAMINOPHEN 1 TABLET: 5; 325 TABLET ORAL at 12:03

## 2022-09-19 RX ADMIN — LACTULOSE 20 G: 20 SOLUTION ORAL at 20:05

## 2022-09-19 RX ADMIN — PANTOPRAZOLE SODIUM 40 MG: 40 TABLET, DELAYED RELEASE ORAL at 05:48

## 2022-09-19 RX ADMIN — Medication 2000 UNITS: at 17:03

## 2022-09-19 ASSESSMENT — PAIN DESCRIPTION - LOCATION
LOCATION: ABDOMEN
LOCATION: OTHER (COMMENT)
LOCATION: ABDOMEN

## 2022-09-19 ASSESSMENT — PAIN SCALES - GENERAL
PAINLEVEL_OUTOF10: 10
PAINLEVEL_OUTOF10: 6
PAINLEVEL_OUTOF10: 8
PAINLEVEL_OUTOF10: 6

## 2022-09-19 ASSESSMENT — PAIN DESCRIPTION - DESCRIPTORS: DESCRIPTORS: ACHING

## 2022-09-19 NOTE — PROGRESS NOTES
Physical Therapy Rehab Treatment Note  [facility]  [facility]       NAME: Chanelle Thurman  : 1985 (43 y.o.)  MRN: [id number]  CODE STATUS: Full Code    Date of Service: 2022       Restrictions:  Restrictions/Precautions: Seizure, Fall Risk       SUBJECTIVE:        Pain  Pain: reports discomfort only at beginning and end of session - she did reqport intermittent pain in abdomen and thighs during session related to swelling. No medicationi for pain requested      OBJECTIVE:             Bed Mobility  Overall Assistance Level: Independent    Transfers  Surface: To chair with arms;From chair with arms; Wheelchair;Standard toilet;From bed; To bed; To chair without arms;From chair without arms; To mat;From mat  Additional Factors: Verbal cues (occasional verbal cues only in a distracting environment)  Device:  (no device)  Sit to Stand  Assistance Level: Supervision;Modified independent  Stand to Sit  Assistance Level: Supervision;Modified independent  Bed To/From Chair  Assistance Level: Supervision;Modified independent  Car Transfer  Assistance Level: Modified independent    Ambulation  Surface: Level surface; Uneven surface; Carpet; Ramp  Device:  (no device)  Distance: 50 feet; 200 feet (multiple additional trials in varyiing environments)  Activity: Retrieve Items;Transport Items; Within Room; Within Unit  Assistance Level: Stand by assist;Modified independent  Gait Deviations: Decreased arm swing bilateral;Unsteady gait; Wide base of support  Skilled Clinical Factors: pt with mild balance reaction delay however no assistance for recovery required    Stairs  Stair Height: 6''  Device: Bilateral handrails; One handrail  Number of Stairs: 12  Assistance Level: Stand by assist  Skilled Clinical Factors: reciprocal ascend, non- reciprocal descend - requires use of UE for support         Neuromuscular Education  Facilitation techniques: standing and ambulatory balance facilitation.  Challenges included carrying items incombination with obstacle course, TUG, DGI and 5xSTS testing, targeted movement tasks with object avoidance and touching  included, in room mobility  Neuromuscular Comments: 17 sec needed to complete 5xSTS; 9 sec for TUG; 16/24 for DGI. Pt demonstrated indep performance of in room mobility inculding multiple surface transfers, open / closing doors and moving items in pathway               Activity Tolerance  Activity Tolerance: Patient tolerated treatment well             ASSESSMENT/PROGRESS TOWARDS GOALS:   Assessment  Assessment: Pt assessed for indep in room mobility. She demonstrated indep performance. Pt is demonstrating mild gait deviation and mild delay in balance reactions with higher level tasks.   Activity Tolerance: Patient tolerated treatment well    Goals:  Long Term Goals  Long term goal 1: indep and effecient bed mobility  Long term goal 2: indep and effecient bed, chair and car transfers  Long term goal 3: indep gait with no device 50 feet in familiar environment and supervision for 150 feet in open or unpredictable environments  Long term goal 4: indep flight of stairs with one rail  Long term goal 5: [place] to be completed at 50/56 level  Patient Goals   Patient goals : to be able to return to home indep with no devices for walking    PLAN OF CARE/Safety:   Safety Devices  Type of Devices: Left in chair;Chair alarm in place      Therapy Time:   Individual   Time In 1000   Time Out 1100   Minutes 60     Minutes:  Transfer/Bed mobility training:10  Gait training:15  Neuro re education:35        Bhupinder Ulloa PT, 09/19/22 at 12:03 PM

## 2022-09-19 NOTE — PROGRESS NOTES
OCCUPATIONAL THERAPY  INPATIENT REHAB TREATMENT NOTE  [facility]      NAME: Surendra Bermudez  : 1985 (43 y.o.)  MRN: [id number]  CODE STATUS: Full Code  Room: R251/R251-01    Date of Service: 2022    Referring Physician: Randi Meyers  Rehab Diagnosis: Impaired mobility and ADLs due to alcoholic encephalopathy    Restrictions  Restrictions/Precautions  Restrictions/Precautions: Seizure, Fall Risk              Patient's date of birth confirmed: Yes    SAFETY:       SUBJECTIVE:  Subjective: \"My mom used to be chubby but I worried her so much with my drinking that she lost a lot of weight. She looks good but that is not a healthy way to do it\"  Pain: 8/10 abdomen    Pain at start of treatment: Yes: 5/10    Pain at end of treatment: Yes: 5/10    Location: abdomen  Nursing notified: No      COGNITION:  Orientation  Overall Orientation Status: Within Functional Limits  Cognition  Overall Cognitive Status: WFL      OBJECTIVE:     Patient transferred to the wheelchair from the bed level with no assistance without a device. Patient completed endurance tasks with using a moderate resistance clothespin to  and place 100 pop beads onto the holes of the peg board. Patient needed to switch hands multiple times throughout the task due to fatigue of the hands especially the wrists which patient reported had been injured previously. Patient was able to complete the task and then completed hand strengthening with a blue gripper with min difficulty        Education:  Education  Education Given To: Patient  Education Provided: Equipment  Education Provided Comments: Follow up to her discussion this am with another OTR about adaptive equipment. Patient reported that she is interested in knowing if her insurance company will purchase the equipment but if not she will try to purchase it.  Discussed this with the LSW who will check on tub chair purchase  Education Method: Verbal  Barriers to Learning: None  Education Outcome: Verbalized understanding    Equipment recommendations:  OT Equipment Recommendations  Other: tub chair and hip kit      ASSESSMENT:  Assessment: Patient was motivated to participate in therapy this pm and demonstrated an understanding regarding endurance tasks to increase safety and independence at home  Activity Tolerance: Patient tolerated treatment well      PLAN OF CARE:  Strengthening, Functional mobility training, Endurance training, Pain management, Safety education & training, Patient/Caregiver education & training, Equipment evaluation, education, & procurement, Self-Care / ADL, Home management training, Coordination training  continue with OT plan of care       Long Term Goal 1: Improve endurance for self care  Long Term Goal 2:  Increase self care independence  Long Term Goal 3: Improve bilateral upper extremity strength for I/ADL performance        Therapy Time:   Individual Group Co-Treat   Time In 1300       Time Out 1330         Minutes 30                   Therapeutic activities: 30 minutes     Electronically signed by:    ANNALISA Pablo,   9/19/2022, 4:32 PM

## 2022-09-19 NOTE — PROGRESS NOTES
Subjective: The patient complains of severe acute on chronic progressive confusion, fatigue and gait ataxia and progressive ascites partially relieved by rest, medications, PT,  OT,   SLP and rest and exacerbated by recent illness -pancreatitis and toxic encephalopathy with gait ataxia-PMH of alcohol abuse and tobacco abuse who presented to ED with alcoholic hepatitis. Patient stated she knew her liver was \"gone\" so she had been trying to cut back on drinking, but still drinks 4-5 small bottles of fireball a day. Her last drink was on 9/4/2022 (day of admission) at 4am after waking up. She later developed sharp RUQ pain. CT of abdomen showed enlarged fatty liver, small volume ascites and mild ill-defined mesenteric and retroperitoneal inflammation, likely edema and/or pancreatitis. I am concerned about patients medical complexities and barriers to advancing in rehab goals including low back pain and refusal to stop drinking. I reviewed current care and plans for further care with other rehab providers including nursing and case management. According to recent nursing note, \" Per pt awake since 0445- again to BR, with out calling for assistance. Reinforced call llight. Pt C/O \"large, large bout of diarrhea\" wants to decrease lactulose. C/O of L abdUQ pain of \"6\" medicated for pain. l \". I reviewed her [facility] prescription monitoring service data sheets in hopes of eliminating polypharmacy and weaning to the lowest effective dose of pain medications and eliminating the concomitant use of benzodiazepines. I see no medications of concern. I am however concerned about her history of alcoholism. I discussed with her using modalities instead of Norco which is toxic to her liver and potentially addicting in light of her history of addiction and liver failure I would like to get her off of this medication or at least wean it down to a lower dose. ROS x10:   The patient also complains of severely (Contact cues required due to mild lat LOB) (09/16/22 0955)  Comment: Good sequencing and safety. (09/17/22 0912)  Gait:   Ambulation  Surface: carpet (09/17/22 1341)  Device: No Device (09/17/22 1341)  Assistance: Stand by assistance (09/17/22 1341)  Quality of Gait: Improved arm swing and less guarding d/t reduced pain. (09/17/22 1341)  Gait Deviations: Slow Kamila;Decreased step length (09/17/22 1341)  Distance: 150ft with turns (09/17/22 1341)  Comments: Pt fatigued following OT session for shower and unable to ambulate further distance.  Lat sway results in intermittent LOB requiring recovery assistance intermittently (09/16/22 1013)  Stairs:  Stairs/Curb  Stairs?: Yes (09/17/22 0917)  Stairs  # Steps : 12 (09/17/22 0917)  Stairs Height: 6\" (09/17/22 0917)  Rails: Bilateral (Ligth touch) (09/17/22 0917)  Device: No Device (09/17/22 0917)  Assistance: Stand by assistance (09/17/22 4059)  Comment: Recip pattern with safe technique ascending and descending. (09/17/22 0917)  W/C mobility:  Wheelchair Activities  Propulsion: Yes (09/16/22 1013)  Propulsion 1  Propulsion: Manual (09/16/22 1013)  Level: Level Tile (09/16/22 1013)  Method: Tiffany Mcqueen (09/16/22 1013)  Level of Assistance: Modified independent (09/16/22 1013)  Distance: 50 feet - limited by time (09/16/22 1013)    Occupational Therapy:      ADL  Feeding: Independent (09/16/22 0953)  Grooming: Setup (09/16/22 0953)  Grooming Skilled Clinical Factors: Grooming standing at the sink (09/16/22 0953)  UE Bathing: Setup (09/16/22 0953)  LE Bathing: Minimal assistance (09/16/22 0953)  LE Bathing Skilled Clinical Factors: Required assistance to wash feet (09/16/22 0953)  UE Dressing: Setup (09/16/22 0953)  LE Dressing: Minimal assistance (09/16/22 0953)  LE Dressing Skilled Clinical Factors: Pt able to don/doff B hosptial socks but required min assist to don/pull up pants (09/16/22 0953)  Toileting: Unable to assess(comment) (09/16/22 0165)  Toileting Skilled Clinical Factors: Pt declined need to toliet (09/16/22 0953)             Speech Therapy:      Comprehension: Within Functional Limits  Verbal Expression: Within functional limits  Diet/Swallow:        Dysphagia Outcome Severity Scale: Level 7: Normal in all situations  Compensatory Swallowing Strategies : Alternate solids and liquids, Upright as possible for all oral intake, Remain upright for 30-45 minutes after meals          Lab/X-ray studies reviewed, analyzed and discussed with patient and staff:   No results found for this or any previous visit (from the past 24 hour(s)). CT ABDOMEN PELVIS  9/4/2022  The liver is moderate to markedly enlarged with extensive fatty infiltration. A small volume of ascites is present in the pelvis. Mild retroperitoneal and mesenteric inflammation is likely edema and/or pancreatitis. Mild wall thickening of the essentially collapsed colon is probably reactive and/or related to incomplete distention, rather than colitis. The gallbladder is mildly distended, but otherwise unremarkable in appearance. A very small fat-containing periumbilical hernia is noted. There is no abscess, free air, abnormal bowel or biliary dilatation, focal inflammatory changes, significant lymphadenopathy, hernias, or other complication identified. The spleen, pancreas, adrenal glands, kidneys, great vessels, small bowel loops, uterus, adnexa, urinary bladder, and additional images of the pelvis are unremarkable. The visualized lung bases are clear. ENLARGED FATTY LIVER. SMALL VOLUME ASCITES AND MILD ILL-DEFINED MESENTERIC AND RETROPERITONEAL INFLAMMATION, LIKELY EDEMA AND/OR PANCREATITIS. MILD PROBABLY REACTIVE WALL THICKENING OF THE COLON. IR US GUIDED PARACENTESIS  : Survey of the abdomen showed large amount of ascites fluid. After obtaining informed consent, the patient was positioned supine on the sonography table.  Using ultrasound, the skin over the left hemiabdomen was locally anesthetized with 1% lidocaine. Following that, a Yueh needle was advanced into the fluid pocket using ultrasound visualization. 1510cc, of clear yellow fluid were aspirated and sent for cytology, and pathology. The needle was removed, and hemostasis was obtained with pressure. A Band-Aid was placed. Post procedure images did not demonstrate hemorrhage at the target site. The patient tolerated the procedure well. The patient left the department in good condition. A radiology nurse was in presence monitoring vital signs, assisting throughout the procedure. Previous extensive, complex labs, notes and diagnostics reviewed and analyzed. ALLERGIES:    Allergies as of 09/15/2022 - Fully Reviewed 09/15/2022   Allergen Reaction Noted    Oxycodone-acetaminophen Itching 01/11/2016      (please also verify by checking MAR)     Today I evaluated this patient for periodic reassessment of medical and functional status. The patient was discussed in detail at the treatment team meeting focusing on current medical issues, progress in therapies, social issues, psychological issues, barriers to progress and strategies to address these barriers, and discharge planning. See the addendum to rehab progress note-as a second progress note in the chart. The patient continues to be high risk for future disability and their medical and rehabilitation prognosis continue to be good and therefore, we will continue the patient's rehabilitation course as planned. The patient's tentative discharge date was set. Patient and family education was discussed. The patient was made aware of the team discussion regarding their progress. Complex Physical Medicine & Rehab Issues Assess & Plan:   Severe abnormality of gait and mobility and impaired self-care and ADL's secondary to progressive  Hepatitc Encephalopathy .   Functional and medical status reassessed regarding patients ability to participate in therapies and patient found to be able to participate in acute intensive comprehensive inpatient rehabilitation program including PT/OT to improve balance, ambulation, ADLs, and to improve the P/AROM. Therapeutic modifications regarding activities in therapies, place, amount of time per day and intensity of therapy made daily. In bed therapies or bedside therapies prn. Bowel and Bladder dysfunction  , Neurogenic bowel and bladder:  frequent toileting, ambulate to bathroom with assistance, check post void residuals. Check for C.difficile x1 if >2 loose stools in 24 hours, continue bowel & bladder program.  Monitor bowel and bladder function. Lactinex 2 PO every AC. MOM prn, Brown Bomb prn, Glycerin suppository prn, enema prn. Encourage therapy and nursing to co-treat and problem solve re continence. Severe mid and low back pain as well as generalized OA pain: reassess pain every shift and prior to and after each therapy session, give prn lowest effective dose of Norco Tylenol and consider scheduled Tylenol, modalities prn in therapy, masage, Lidoderm, K-pad prn. Consider scheduled AM pain meds. Skin healing  and breakdown risk:  continue pressure relief program.  Daily skin exams and reports from nursing. Fatigue due to nutritional and hydration deficiency: Add and titrate vitamin B12 vitamin D and CoQ10 continue to monitor I&Os, calorie counts prn, dietary consult prn. Add healthy snack at night. Acute episodic insomnia with situational adjustment disorder:  prn Ambien, monitor for day time sedation. Falls risk elevated:  patient to use call light to get nursing assistance to get up, bed and chair alarm. Elevated DVT risk: progressive activities in PT, continue prophylaxis DIVINE hose, elevation . Complex discharge planning: Discharge 9/25/2020 to home with significant other.   We are advising a 12-step program however patient has recently been in at least a month of alcohol rehab over this past summer and went back to drinking within a week.  Until then continue weekly team meeting every Monday to re-assess progress towards goals, discuss and address social, psychological and medical comorbidities and to address difficulties they may be having progressing in therapy. Patient and family education is in progress. The patient is to follow-up with their family physician after discharge. Complex Active General Medical Issues that complicate care Assess & Plan:      Alcoholic hepatitis with ascites-avoid toxic medications discontinue Tylenol and limited use of Norco for severe pain lower dose and minimize reliance on Norco let  GERD,   GI bleeding-add Tums add PPI-Elevate head of bed after meals, monitor stools for blood, lowest effective dose of PPI, consider Tums. Acute pancreatitis and abdominal infection-IV Rocephin add lactobacillus  ETOHism-transition to top 12-step program add CIWA protocol  Depression-emotional support provided daily, vitamin B12, encourage participation in rehabilitation support group and recreational therapy, adjust/add medications (titration of benzodiazepines consider SSRI)  COPD-Acute rehab for endurance traing with Pulse Ox to monitoring oxygen saturation and heart rate with O2 titration to lowest effective dose. Pulse oximeter checks to shift and at HS to dose and titrate oxygen and aerosol treatments monitor for nocturnal hypoxemia, monitor vital signs, oxygen prn. Focus on energy conservation.   Vit B12 deficiency-Add high-dose vitamin D, recheck vitamin D level out after discharge,    Generalized pain, Myalgia, unspecified site    Toe deformity, and active chronic foot pain-topical agents, probably proper footwear avoid opiates if possible    Acute alcoholic hepatitis-avoid toxic medications  Impulsivity-and encephalopathy with impaired cognition-focus on balance and safety add speech-language pathology       Focus of today's plan-  Initiate and modify therapuetic plan to meet patients individual needs, add rest breaks as needed, and focus on endurance, activity pacing, reassessing rehab goals and discharge planning.       Electronically signed by Therese George DO on 9/19/22 at 8:21 AM SANDEEP Mckeon D.O., PM&R     Attending    [facility]
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
## 2022-09-19 NOTE — PROGRESS NOTES
requiring SBA when accelerating to reach and  ball but overall demos good ability to maintain balance with the additional challenge. Pt performed side-stepping at wall with bouncing yellow ball against to promote balance maintenance during dynamic gait activities. Pt requires increased time to complete and needs frequent breaks to reposition during activity. Dynamic Standing Balance Exercises: catch with yellow ball standing at EOM to promote reaching OOBOS and across midline while maintaining balance, GUTIERREZ tasks such as turn 360 degrees in both directions and single leg support activities. Pt SBA for GUTIERREZ activities but demos good ability to maintain balance with all. ASSESSMENT/PROGRESS TOWARDS GOALS:   Assessment  Assessment: Only able to complete 10 min of BM/transfers before pt called back to room by nursing for IV line placement. Nrsg requests for therapist to place pt on make-up time and return when able. Therapist called back to room shortly to make up minutes. Focus of session was addressing pt's balance deficits and working towards improved blance during dynamic gait activities. Goals:  Long Term Goals  Long term goal 1: indep and effecient bed mobility  Long term goal 2: indep and effecient bed, chair and car transfers  Long term goal 3: indep gait with no device 50 feet in familiar environment and supervision for 150 feet in open or unpredictable environments  Long term goal 4: indep flight of stairs with one rail  Long term goal 5: Gutierrez to be completed at 50/56 level  Patient Goals   Patient goals : to be able to return to home indep with no devices for walking    PLAN OF CARE/Safety:   Safety Devices  Type of Devices: All fall risk precautions in place;Call light within reach; Left in bed;Nurse notified      Therapy Time:   Individual   Time In 1420   Time Out 1440   Minutes 20   Therapy Time   Individual Concurrent Group Co-treatment   Time In 1420         Time Out 1440         Minutes 20            Minutes: 10  Transfer/Bed mobility training: 10  Gait trainin  Neuro re education:  Therapeutic ex: 15  Gap in time d/t pt being returned to room for IV line placement.     Rosalba Wise PTA, 22 at 3:56 PM

## 2022-09-19 NOTE — PLAN OF CARE
Problem: Discharge Planning  Goal: Discharge to home or other facility with appropriate resources  9/18/2022 2359 by Ramana Guy RN  Outcome: Progressing  9/18/2022 1013 by Blossom Villa RN  Outcome: Progressing

## 2022-09-19 NOTE — PROGRESS NOTES
[address] [facility] REHABILITATION  INDEPENDENT IN ROOM NOTE  Room: R251/R251-01  Admit Date: 9/15/2022       Date: 2022  Patient Name: Naomy Perez        MRN: [id number]    : 1985  (40 y.o.)  Gender: female      Diagnosis: Impaired mobility and ADL's due to alcoholic encephalopathy         Discipline pre admission summary:    Nursing:  Patient orientation to the room/suite:  [x] Yes    []   No  (Safety checks to consider: Oxygen, Fire Alarm, Stove safety, Call alarm, Bed alarm,   Bed power, TV, Phone)        1. Pt physical ability to be independent in room/suite:  [x] Yes    []   No   Comment:    2. Pt demonstrates cognitive ability to be independent in room/suite:  [x] Yes    []   No   Comment:    3. Pt demonstrates emotional ability to be independent in room/suite:  [x] Yes    []   No   Comment:    4. Special Considerations/Equipment if needed: [x] NA   Comment:    Recommend independent in room/suite:  [x] Yes    []   No            Signature: Electronically signed by Katherina Kayser, RN on 2022 at 7:25 PM        Occupational Therapy:  1. Pt physical ability to be independent in room/suite:  [x] Yes    []   No   Comment:     2. Pt demonstrates cognitive ability to be independent in room/suite:  [x] Yes    []   No   Comment:    3. Pt demonstrates emotional ability to be independent in room/suite:  [x] Yes    []   No   Comment:    4. Special Considerations/Equipment if needed: [] NA   Comment: Pt is not to shower alone. [id number] Adelina Hood for toileting     Recommend independent in room/suite:  [x] Yes    []   No       Signature: Electronically signed by Cosme Cardenas OT on 22 at 4:14 PM EDT        Physical Therapy:  1. Pt physical ability to be independent in room/suite:  [x] Yes    []   No   Comment:     2. Pt demonstrates cognitive ability to be independent in room/suite:  [x] Yes    []   No   Comment:    3.  Pt demonstrates emotional ability to be independent in room/suite:  [x] Yes    [] No   Comment:    4. Special Considerations/Equipment if needed: [x] NA   Comment:    Recommend independent in room/suite:  [x] Yes    []   No       Signature: Electronically signed by Bhupinder Ulloa PT on 9/19/22 at 12:04 PM EDT     Physician: The recommendations and comments have been reviewed.           Physician Signature: Danika Mendoza DO  Electronically signed by LIDYA Cedeno, YEW on 9/20/2022 at 8:30 AM

## 2022-09-19 NOTE — PROGRESS NOTES
Gastroenterology Progress Note    Sandra Oneil is a 43 y.o. female patient. Hospitalization Day:4    Chief C/O: Cirrhosis, recurrent ascites, role of diuretics    SUBJECTIVE: Seen and examined on rehab unit. Patient reports having diarrhea this morning with receiving lactulose. She does continue to report abdominal bloating/swelling along with lower extremity swelling. She denies nausea/vomiting or hematemesis. States she is getting up to urinate more often. Ascitic fluid cultures positive for Streptococcus viridans and staphylococcus epidermidis. She continues to receive Rocephin 1 gram IV daily. She is receiving Lasix 20 mg daily and Aldactone 50 mg daily as prescribed. ROS:  Gastrointestinal ROS: no black or bloody stools    Physical    VITALS:  /77   Pulse (!) 104   Temp 99 °F (37.2 °C) (Oral)   Resp 18   Ht 5' 5\" (1.651 m)   Wt 188 lb 4 oz (85.4 kg)   LMP  (LMP Unknown) Comment: tubal ligation  SpO2 97%   BMI 31.33 kg/m²   TEMPERATURE:  Current - Temp: 99 °F (37.2 °C);  Max - Temp  Av.6 °F (37 °C)  Min: 98.2 °F (36.8 °C)  Max: 99 °F (37.2 °C)    General -alert, oriented, sitting up in wheelchair  Eyes -+ icterus, no pallor  Cardiovascular - RRR  Lungs -clear to auscultation bilaterally  Abdomen -+ distended (grade 2),  non tender, no organomegaly, Bowel sounds present  Extremities -2-3+ BLE edema  Skin -warm/dry, + jaundice  Neuro: Without focal deficit, moves all extremities, no asterixis     Data    Data Review:    Recent Labs     22  0538   WBC 7.8   HGB 9.5*   HCT 29.4*   .9*   *     Recent Labs     22  0538      K 3.7      CO2 24   BUN 8   CREATININE 0.72     Recent Labs     22  0538   *   ALT 35*   BILITOT 5.8*   ALKPHOS 283*     Component      Latest Ref Rng & Units 2022           3:19 PM   Body Fluid Culture, Sterile          Organism       Staphylococcus epidermidis (A)     Component      Latest Ref Rng & Units 2022 3:19 PM   Body Fluid Culture, Sterile       Direct Exam:  RARE NEUTROPHILS (A) . . .   Organism       Streptococcus viridans group (A)       No results for input(s): LIPASE, AMYLASE in the last 72 hours. No results for input(s): PROTIME, INR in the last 72 hours. Radiology Review:      CT abdomen pelvis without contrast 9/4/2022: Enlarged fatty liver. Small volume ascites and mild ill-defined mesenteric and retroperitoneal inflammation, likely edema and/or pancreatitis. Mild probably reactive wall thickening of the colon. IR performed ultrasound-guided paracentesis on 9/14/2022 with 1510 cc of clear yellow fluid aspirated. Previous endoscopic history:  EGD 9/9/2022: Web in the upper third of the esophagus. Z-line regular, 36 cm from the incisors. Erythematous, granular and congested mucosa in the cardia, gastric fundus, gastric body and antrum. Normal examined duodenum. LA Grade A reflux esophagitis with no bleeding. No specimens collected. ASSESSMENT:  43 y.o. female admitted to inpatient rehab 9/16/2022 after hospital stay from 9/4/2022 - 9/15/2022 for acute alcoholic hepatitis/pancreatitis, EtOH withdrawal, mild WILLIE, macrocytic anemia, thrombocytopenia. Given IV fluids and pain control, s/p paracentesis with approximately 1500 cc removed, fluid analysis not suggestive of SBP, however fluid culture growing GPC in chains, started on Rocephin, EGD revealing esophagitis-no varices, and UA positive for E. coli UTI. GI consulted for cirrhosis, recurrent ascites, role of diuretics. Pt reports increased abdominal girth and bilateral lower extremity edema worsening since admission to rehab.    On 9/4/2022, sodium 134, glucose 127, BUN less than 2, creatinine 0.69, Albumin 2.3, total bilirubin 7.2, alkaline phosphatase 524, ALT 98, , WBC 6.3, Hgb 11.1, .4, platelets 50, CRP 25, lipase 118, amylase 60, INR 1.4, lactic acid 12.1, procalcitonin 0.69, acute hepatitis panel was negative, blood cultures negative, urine culture positive for E. coli, ascitic fluid culture positive for Streptococcus viridans and staphylococcus epidermidis. On 9/17/2022, WBCs 7.8, Hgb 9.5, .9, platelets 488, BUN 8, creatinine 0.72, albumin 1.4, total bilirubin 5.8, alk phos 283, ALT 35, ,   CT abdomen pelvis without contrast 9/4/2022: Enlarged fatty liver. Small volume ascites and mild ill-defined mesenteric and retroperitoneal inflammation, likely edema and/or pancreatitis. Mild probably reactive wall thickening of the colon. 41-year-old female with cirrhosis, likely related to alcohol use. MELD score as of 9/4/2022=20 with 19.6% estimated 3-month mortality. PLAN :  -Medical management per primary/inpatient rehab team.  - Ascites (grade 2) and Lower extremity edema: Start spironolactone 50mg daily, Lasix 20mg daily. Check BMP in 2 days. Large volume paracentesis not currently needed as abdomen is not tense, may consider on PRN basis. Limit sodium to < 2gm /day. - Hx of SBP: recent tap with cultures growing gram-positive cocci in chains, final cultures positive for positive for Streptococcus viridans and staphylococcus epidermidis. Continue Rocephin 1 g IV every 24 hours  - EGD for Variceal screening: Completed 9/9/2022 revealing esophageal web, erythematous, granular and congested mucosa in the cardia, gastric fundus, gastric body and antrum consistent with portal hypertensive gastropathy, normal duodenum, LA grade a esophagitis without bleeding.  - HCC screening/surveillance: Recommend every 6 months. Last imaging: CT scan without liver mass/lesion  - Possible early/mild hepatic encephalopathy: Continue Lactulose - titrate to 2-3 soft bm's per day. - Advised to limit NSAID use   -Discussed with patient at length importance of complete alcohol abstinence. Reviewed criteria for transplantation, need for alcohol cessation.      Thank you for allowing me to participate in the care of your patient. Please feel free to contact me with any concerns.     Anitra Srinivasan, APRN - CNP

## 2022-09-19 NOTE — PROGRESS NOTES
Per pt awake since 0445- again to BR, with out calling for assistance. Reinforced call llight. Pt C/O \"large, large bout of diarrhea\" wants to decrease lactulose. C/O of L abdUQ pain of \"6\" medicated for pain. l

## 2022-09-19 NOTE — PROGRESS NOTES
OCCUPATIONAL THERAPY  INPATIENT REHAB TREATMENT NOTE  [facility] Laila Fordyce      NAME: Miguel House  : 1985 (43 y.o.)  MRN: [id number]  CODE STATUS: Full Code  Room: [facility]/R251-01    Date of Service: 2022    Referring Physician: Laurence Rayo  Rehab Diagnosis: Impaired mobility and ADLs due to alcoholic encephalopathy    Restrictions  Restrictions/Precautions  Restrictions/Precautions: Seizure, Fall Risk            Patient's date of birth confirmed: Yes    SAFETY:  Safety Devices  Safety Devices in place: Yes  Type of devices: All fall risk precautions in place    SUBJECTIVE:  Subjective: Matilde Aguilar put me on water pills\"  Pain: 8/10 abdomen    OBJECTIVE:    Medication management    Med manage Level of Assistance: Modified independent    Health Management: Patient completed medication management simulation utilizing 7 provided medication bottles with written instruction to place a total of 74 beads into the provided weekly medication organizer. Patient with no difficulty opening medication bottles. Patient with no difficulty opening organizer boxes. Patient placed a total of 74 beads into organizer with 0 verbal cues and a total of 74 being correct. 100%    Patient with no difficulty manipulating beads. Patient able to sort beads back into correct bottles with 0 errors. Patient able to securely close 7/7 caps on medication bottles. ADL    MI for fx mobility and STS transfers     AE training    Sock management completed using sock aide/dressing stick as per earlier ADL note pt needed assisting managing footwear. Educated pt wear to purchase hip kit    Pt able to don/doff footwear with setup and SUP with min cuing after initial demonstrating using AE. Tub transfer     Pt has tub shower at home without chair or grab bars. Per discussion- pt to have boyfriend place 2 grab bars in her shower for safe ADL completion and transfer.  Trialed both tub bench and shower chair pt able to complete transfers with SBA-SUP with cues for technique. Pt preferred shower chair due to thinking that a bench may cause water spillage/leakage. Educated pt where to purchase     Independent in room assessment    Pt demo'ed ability to safely navigate throughout her room without AD. Pt able to open bathroom door, manage curtain, toilet transfer and simulate toileting tasks without assistance. Pt also able to manage moving chair within bathroom for grooming tasks and move bedside table out of her pathway while maintaining good balance. While seated, challenged strength, activity tolerance, ROM, and flexibility for improved ADL performance. Issued pt BUE HEP handout    Challenged pt through usage of 2# to complete BUE exercises. 2 sets x 10 reps completed. Exercises focused on all UE joints and planes of motion including scapular protraction/retraction, shoulder flexion/extension/rotation/horizontal abduction, elbow flexion/extension, supination/pronation, and wrist/digit flexion/extension. Pt able to demo good ability to read through handout and complete with min cuing      Education:   POC, AE, IADL safety    ASSESSMENT:     PLAN OF CARE:  Strengthening, Functional mobility training, Endurance training, Pain management, Safety education & training, Patient/Caregiver education & training, Equipment evaluation, education, & procurement, Self-Care / ADL, Home management training, Coordination training  continue with OT plan of care      Long Term Goal 1: Improve endurance for self care  Long Term Goal 2:  Increase self care independence  Long Term Goal 3: Improve bilateral upper extremity strength for I/ADL performance    Therapy Time:   Individual Group Co-Treat   Time In 1100       Time Out 1200         Minutes 60             ADL/IADL trainin minutes  Therapeutic activities: 15 minutes     Electronically signed by:    Luna Werner OT,   2022, 11:02 AM

## 2022-09-19 NOTE — PROGRESS NOTES
OCCUPATIONAL THERAPY  [facility]PATIENT REHAB TREATMENT NOTE  [facility]      NAME: Radha Carvalho  : 1985 (43 y.o.)  MRN: [id number]  CODE STATUS: Full Code  Room: R251/R251-01    Date of Service: 2022    Referring Physician: Francisco Villagomez  Rehab Diagnosis: Impaired mobility and ADLs due to alcoholic encephalopathy    Restrictions  Restrictions/Precautions  Restrictions/Precautions: Seizure, Fall Risk     Patient's date of birth confirmed: Yes    SAFETY:  Safety Devices  Safety Devices in place: Yes  Type of devices: All fall risk precautions in place    SUBJECTIVE:  Subjective: \" I can't reach past my belly to put my socks on. \"     Pain at start of treatment:  Yes 4/10    Pain at end of treatment:   Yes 4/10    Pain Location: L lateral abdomen  Pain Descriptors: swollen  Nursing notified: no  Intervention: None    COGNITION:  Orientation  Overall Orientation Status: Within Functional Limits  Cognition  Overall Cognitive Status: WFL    OBJECTIVE:    Therapist answered patients call light. Patient ambulating in room without device with shower water running and stating that she is going to take a shower. Therapist covered patients heplock and assisted patient at the following levels. Grooming/Oral Hygiene  Assistance Level: Independent  Skilled Clinical Factors: in standing  Upper Extremity Bathing  Assistance Level: Modified independent  Lower Extremity Bathing  Assistance Level: Supervision  Upper Extremity Dressing  Assistance Level: Set-up  Lower Extremity Dressing  Assistance Level: Supervision  Putting On/Taking Off Footwear  Assistance Level: Moderate assistance  Skilled Clinical Factors: able to doff B socks - assist to don B socks  Tub/Shower Transfers  Type: Shower  Transfer To: Shower chair with back  Assistance Level: Supervision  Skilled Clinical Factors: grab bars - 0 AD    Patient left standing at sink next to bed with [employer] present. ASSESSMENT: Patient pleasant.     Activity Tolerance: Patient tolerated treatment well      PLAN OF CARE:  Strengthening, Functional mobility training, Endurance training, Pain management, Safety education & training, Patient/Caregiver education & training, Equipment evaluation, education, & procurement, Self-Care / ADL, Home management training, Coordination training    continue with OT plan of care       Long Term Goal 1: Improve endurance for self care  Long Term Goal 2:  Increase self care independence  Long Term Goal 3: Improve bilateral upper extremity strength for I/ADL performance        Therapy Time:   Individual Group Co-Treat   Time In 0807       Time Out 0837         Minutes 30                   ADL/IADL trainin minutes     Electronically signed by:    CHAPINCITO Forde,   2022, 8:59 AM

## 2022-09-19 NOTE — PROGRESS NOTES
INDIVIDUALIZED OVERALL REHAB PLAN OF CARE  ADDENDUM TO REHAB PROGRESS NOTE-for audit purposes must also refer to this day's clinical note and combine the information      Date: 2022  Patient Name: Kimberly Martinez   Room: T397/M357-37    MRN: [id number]    : 1985  (43 y.o.)  Gender: female       Today 2022 during weekly team meeting, I reviewed the patient Kimberly Martinez in detail with the therapists and nurses involved in patient's care gathering complex physiatric data regarding current medical issues, progress in therapies, factors limiting progress, social issues, psychological issues, ongoing therapeutic plans and discharge planning. Legend:  I= independent Im =Modified independent  S=Supervised SB=stand by MOREIRA=set up CG=contact akil Min= minimal Mod=Moderate Max=maximal Max of 2 =maximal assist of 2 people      CURRENT FUNCTIONAL STATUS:    Barriers to progress and discharge complex medical conditions      NURSING ISSUES:   Per pt awake since 0445- again to BR, with out calling for assistance. Reinforced call llight. Pt C/O \"large, large bout of diarrhea\" wants to decrease lactulose. C/O of L abdUQ pain of \"6\" medicated for pain.l  Patient given call light and advised patient to call when getting up to use restroom. Patient states understanding., Nursing will continue to focus on bowel and bladder continence transitioning toward independence by time of discharge. Monitoring post void residuals monitoring for severe constipation and bowel obstruction.     Bowel function- constipation  Plans to address- laxative     Bladder function-  continent    Plans to address- schedule voids before bed and therapy     Skin deficits- dressing changes   Plans to address- pressure relief     Hydration/Nutritional deficits- monitoring for dysphagia  Plans to address-Push PO, assist with feeds as needed     Low BP- poor PO intake  Plans to address- check ortho BPs before therapies-and use abdominal binder and TEDs as needed    Pt and Family training goals-  teach home technology options like Essie use and home assistive devices      Focus on achieving ADL goals with co-treating with OT when possible. Focus on cognition and co treat with SLP when possible. PHYSICAL THERAPY  Bed mobility:  Bed mobility  Bridging: Modified independent  (09/16/22 0953)  Rolling to Left: Stand by assistance (09/16/22 0953)  Rolling to Right: Stand by assistance (09/16/22 0953)  Supine to Sit: Supervision (09/17/22 0912)  Sit to Supine: Supervision (09/16/22 1118)  Bed Mobility Comments: HOB slightly elevated (09/17/22 0912)  Bed Mobility  Overall Assistance Level: Independent (09/19/22 1421)  Overall Assistance Level: Independent (09/19/22 1421)  Sit to Supine  Assistance Level: Modified independent (09/19/22 1421)  Supine to Sit  Assistance Level: Modified independent (09/19/22 1421)  Scooting  Assistance Level: Modified independent (09/19/22 1421)  Transfers:  Transfers  Sit to Stand: Stand by assistance (09/17/22 1334)  Stand to sit: Stand by assistance (09/17/22 1334)  Bed to Chair: Stand by assistance;Contact guard assistance (Contact cues required due to mild lat LOB) (09/16/22 0955)  Comment: Good sequencing and safety. (09/17/22 0912)  Transfers  Surface: From bed; Wheelchair; To bed (09/19/22 1421)  Additional Factors: Verbal cues (09/19/22 1421)  Device:  (no device) (09/19/22 1421)  Sit to Stand  Assistance Level: Supervision;Modified independent (09/19/22 1421)  Skilled Clinical Factors: Pt needs increased time to complete d/t abdominal pain (09/19/22 1421)  Stand to Sit  Assistance Level: Supervision;Modified independent (09/19/22 1421)  Skilled Clinical Factors: Pt needs increased time to complete d/t abdominal pain (09/19/22 1421)  Bed To/From Chair  Assistance Level: Supervision;Modified independent (09/19/22 1421)  Car Transfer  Assistance Level: Modified independent (09/19/22 1022)  Gait:   Ambulation  Surface: carpet (09/17/22 1341)  Device: No Device (09/17/22 1341)  Assistance: Stand by assistance (09/17/22 1341)  Quality of Gait: Improved arm swing and less guarding d/t reduced pain. (09/17/22 1341)  Gait Deviations: Slow Kamila;Decreased step length (09/17/22 1341)  Distance: 150ft with turns (09/17/22 1341)  Comments: Pt fatigued following OT session for shower and unable to ambulate further distance. Lat sway results in intermittent LOB requiring recovery assistance intermittently (09/16/22 1013)  Ambulation  Surface: Level surface; Uneven surface; Carpet; Ramp (09/19/22 1030)  Device:  (no device) (09/19/22 1030)  Distance: 50 feet; 200 feet (multiple additional trials in varyiing environments) (09/19/22 1030)  Activity: Retrieve Items;Transport Items; Within Room; Within Unit (09/19/22 1030)  Assistance Level: Stand by assist;Modified independent (09/19/22 1030)  Gait Deviations: Decreased arm swing bilateral;Unsteady gait; Wide base of support (09/19/22 1030)  Skilled Clinical Factors: pt with mild balance reaction delay however no assistance for recovery required (09/19/22 1030)  Stairs:  Stairs/Curb  Stairs?: Yes (09/17/22 0917)  Stairs  # Steps : 12 (09/17/22 0917)  Stairs Height: 6\" (09/17/22 0917)  Rails: Bilateral (Ligth touch) (09/17/22 0917)  Device: No Device (09/17/22 0917)  Assistance: Stand by assistance (09/17/22 5791)  Comment: Recip pattern with safe technique ascending and descending. (09/17/22 0917)  Stairs  Stair Height: 6'' (09/19/22 1030)  Device: Bilateral handrails; One handrail (09/19/22 1030)  Number of Stairs: 12 (09/19/22 1030)  Assistance Level: Stand by assist (09/19/22 1030)  Skilled Clinical Factors: reciprocal ascend, non- reciprocal descend - requires use of UE for support (09/19/22 1030)  W/C mobility:  Wheelchair Activities  Propulsion: Yes (09/16/22 1013)  Propulsion 1  Propulsion: Manual (09/16/22 1013)  Level: Level Tile (09/16/22 1013)  Method: NITO (09/16/22 1013)  Level of Assistance: Modified independent (09/16/22 1013)  Distance: 50 feet - limited by time (09/16/22 1013)        Pt and Family training goals-  Focus on energy conservation and consistency of function        OCCUPATIONAL THERAPY  Grooming/Oral Hygiene  Assistance Level: Independent (09/19/22 0857)  Skilled Clinical Factors: in standing (09/19/22 0857)  Upper Extremity Bathing  Assistance Level: Modified independent (09/19/22 0857)  Skilled Clinical Factors: Pt complete UE bathing seated in shower chair. (09/17/22 0934)  Lower Extremity Bathing  Assistance Level: Supervision (09/19/22 0857)  Skilled Clinical Factors: Pt requires assistance reaching lower parts of LEs and feet due to limited ROM/functional reach at this time. (09/17/22 0934)  Upper Extremity Dressing  Assistance Level: Set-up (09/19/22 0857)  Lower Extremity Dressing  Assistance Level: Supervision (09/19/22 0857)  Skilled Clinical Factors: Pt requires assistance threading hospital underwear and pants over feet due to limited functional reach abilities at this time. (09/17/22 0934)  Putting On/Taking Off Footwear  Assistance Level: Moderate assistance (09/19/22 0857)  Skilled Clinical Factors: able to doff B socks - assist to don B socks (09/19/22 0857)  Toileting  Assistance Level: Modified independent (09/17/22 1336)  Toilet Transfers  Technique: Stand step (09/17/22 1336)  Equipment: Standard toilet;Grab bars (09/17/22 1336)  Assistance Level: Supervision (09/17/22 1336)  Skilled Clinical Factors: grab bars - 0 AD (09/17/22 1336)  Tub/Shower Transfers  Type: Shower (09/19/22 0857)  Transfer From: Bed (09/17/22 3071)  Transfer To: Shower chair with back (09/19/22 0857)  Additional Factors: Set-up; Verbal cues;Cues for hand placement; Increased time to complete; With handrails (09/17/22 0934)  Assistance Level: Supervision (09/19/22 0857)  Skilled Clinical Factors: grab bars - 0 AD (09/19/22 0857)  ADL  Feeding: Independent (09/16/22 5627)  Grooming: Setup (09/16/22 2367)  Grooming Skilled Clinical Factors: Grooming standing at the sink (09/16/22 0953)  UE Bathing: Setup (09/16/22 0953)  LE Bathing: Minimal assistance (09/16/22 0953)  LE Bathing Skilled Clinical Factors: Required assistance to wash feet (09/16/22 0953)  UE Dressing: Setup (09/16/22 0953)  LE Dressing: Minimal assistance (09/16/22 0953)  LE Dressing Skilled Clinical Factors: Pt able to don/doff B hosptial socks but required min assist to don/pull up pants (09/16/22 0953)  Toileting: Unable to assess(comment) (09/16/22 0953)  Toileting Skilled Clinical Factors: Pt declined need to toliet (09/16/22 5249)    Plans for addressing ADL deficits affecting: Focus on sequencing. Skin deficits- no problems noted   Plans to address- continue to monitor                SPEECH THERAPY/SLP     Comprehension: Within Functional Limits  Verbal Expression: Within functional limits    Plans for cognitive and communication issues affecting addressing:   Pt and Family training goals-  teach patient family memory strategies      Diet/Swallow:        Dysphagia Outcome Severity Scale: Level 7: Normal in all situations    Compensatory Swallowing Strategies :  Alternate solids and liquids, Upright as possible for all oral intake, Remain upright for 30-45 minutes after meals             COGNITION  OT: Cognition Comment: Patient requires increased time for processing and problem solving @FLOWTIME(304  SP:        Social History     Socioeconomic History    Marital status: Single     Spouse name: Not on file    Number of children: 2    Years of education: Not on file    Highest education level: Not on file   Occupational History    Not on file   Tobacco Use    Smoking status: Every Day     Packs/day: 1.00     Types: Cigarettes    Smokeless tobacco: Never   Vaping Use    Vaping Use: Never used   Substance and Sexual Activity    Alcohol use: Yes     Comment: 6/9/22: completed 30 day rehab 2 weeks ago, relapse one week ago; unable to quantify EtOH consumption    Drug use: Yes     Types: Marijuana [employer])    Sexual activity: Not on file   Other Topics Concern    Not on file   Social History Narrative    Lives With: 10 yo daughter, SO sometimes stays with pt, he works odd jobs    Older 22 yo dtr lives with a boyfriend    Type of Home: House [place] [facility]: Two level, Laundry in basement (flights to and from basement and second floor with HR)    Home Access: Stairs to enter with rails- Number of Steps: 5- Rails: Both    Bathroom Shower/Tub: Tub/Shower unit    Home Equipment:  (no AD)    ADL Assistance: Independent    Homemaking Assistance: Independent    Ambulation Assistance: Independent (no AD)    Transfer Assistance: Independent    Active : Yes    Type of Occupation: unemployed    GED--then some college. Social Determinants of Health     Financial Resource Strain: Not on file   Food Insecurity: Not on file   Transportation Needs: Not on file   Physical Activity: Not on file   Stress: Not on file   Social Connections: Not on file   Intimate Partner Violence: Not on file   Housing Stability: Not on file           THERAPY, MEDICAL AND NURSING COORDINATION:    [x]  Pain medication before therapies     [x]  Check orthostatic BP and monitor heart rate and medications effects with therapy      [x]  Ambulate to the bathroom in room    [x]  Add scheduled rest beaks     [x]  In room therapies as needed      Discharge date set for:               9/22/22 sp IV antibiotics      Home with:   SO  with help from   SO            And:      Home Health Care:     [x]  PT    [x]  OT    []  ST   [x]  Aide   [x]  SW    [x]  RN               Or preferably     Outpatient Therapy:  []  PT    []  OT    []  ST   []  Rehab     Psych                 Equipment:  [employer]      At D/C their function is goaled at:   PT:Long term goal 1: indep and effecient bed mobility  Long term goal 2: indep and effecient bed, chair and car transfers  Long term goal 3: medications prior to therapy. Spread therapy of 15 hours out over a 7 day window to accommodate rest breaks and medical interventions. Patient seems to be making fair to good response to these interventions. Based on a comprehensive evaluation of the above, the individualized therapy and Discharge plan will be:    -Times stated are an average that will be varied based on the patient's daily need. PT    1 1/2  hrs/day 5-7 days per week      OT    1 1/2 hrs per day 5-7 days per week     ST     1/2    hrs /day 3-5 days per week       Estimated LOS  1 1/2 week(s)    -Overall functional prognosis:     [x]  Good    []  Fair    []  Poor     -Medical Prognosis:   [x]  Good to   [x]  Fair    []  Poor    This patient was made aware of the discussion of Plan of Care, their projected dicharge date and their projected function at discharge.          Makayla Pham DO

## 2022-09-19 NOTE — PROGRESS NOTES
Pt assessment completed at 2020. C/O pain of \"5\"-Day RN medicated pt at 1700. Dinner heated up for pt. Abd semi-soft bandaid over paracentesis site. Dr. Nicky Tate saw pt early last shift. Pt stared on diuretics- POPPY pedal edma noted pt with aces applied by day RN. Removed ace bandages at 2300 and applied SCD's -pedal edema better. Pt also was medicated for pain of \"8\". Call light with in reach. Reinforcement needed for pt to use for assistance .

## 2022-09-20 LAB
AMMONIA: 64 UMOL/L (ref 11–51)
ANION GAP SERPL CALCULATED.3IONS-SCNC: 7 MEQ/L (ref 9–15)
BUN BLDV-MCNC: 7 MG/DL (ref 6–20)
CALCIUM SERPL-MCNC: 8.1 MG/DL (ref 8.5–9.9)
CHLORIDE BLD-SCNC: 102 MEQ/L (ref 95–107)
CO2: 25 MEQ/L (ref 20–31)
CREAT SERPL-MCNC: 0.55 MG/DL (ref 0.5–0.9)
GFR AFRICAN AMERICAN: >60
GFR NON-AFRICAN AMERICAN: >60
GLUCOSE BLD-MCNC: 90 MG/DL (ref 70–99)
POTASSIUM SERPL-SCNC: 3.8 MEQ/L (ref 3.4–4.9)
SODIUM BLD-SCNC: 134 MEQ/L (ref 135–144)

## 2022-09-20 PROCEDURE — 97530 THERAPEUTIC ACTIVITIES: CPT

## 2022-09-20 PROCEDURE — 97130 THER IVNTJ EA ADDL 15 MIN: CPT

## 2022-09-20 PROCEDURE — 2580000003 HC RX 258: Performed by: INTERNAL MEDICINE

## 2022-09-20 PROCEDURE — 6370000000 HC RX 637 (ALT 250 FOR IP): Performed by: NURSE PRACTITIONER

## 2022-09-20 PROCEDURE — 97129 THER IVNTJ 1ST 15 MIN: CPT

## 2022-09-20 PROCEDURE — 36415 COLL VENOUS BLD VENIPUNCTURE: CPT

## 2022-09-20 PROCEDURE — 80048 BASIC METABOLIC PNL TOTAL CA: CPT

## 2022-09-20 PROCEDURE — 97112 NEUROMUSCULAR REEDUCATION: CPT

## 2022-09-20 PROCEDURE — 99232 SBSQ HOSP IP/OBS MODERATE 35: CPT | Performed by: PHYSICAL MEDICINE & REHABILITATION

## 2022-09-20 PROCEDURE — 97116 GAIT TRAINING THERAPY: CPT

## 2022-09-20 PROCEDURE — 99232 SBSQ HOSP IP/OBS MODERATE 35: CPT | Performed by: NURSE PRACTITIONER

## 2022-09-20 PROCEDURE — 6370000000 HC RX 637 (ALT 250 FOR IP): Performed by: INTERNAL MEDICINE

## 2022-09-20 PROCEDURE — 6360000002 HC RX W HCPCS: Performed by: INTERNAL MEDICINE

## 2022-09-20 PROCEDURE — 6370000000 HC RX 637 (ALT 250 FOR IP): Performed by: PHYSICAL MEDICINE & REHABILITATION

## 2022-09-20 PROCEDURE — 1180000000 HC REHAB R&B

## 2022-09-20 PROCEDURE — 82140 ASSAY OF AMMONIA: CPT

## 2022-09-20 PROCEDURE — 97535 SELF CARE MNGMENT TRAINING: CPT

## 2022-09-20 RX ORDER — SIMETHICONE 80 MG
80 TABLET,CHEWABLE ORAL EVERY 6 HOURS PRN
Status: DISCONTINUED | OUTPATIENT
Start: 2022-09-20 | End: 2022-09-22 | Stop reason: HOSPADM

## 2022-09-20 RX ORDER — FUROSEMIDE 40 MG/1
40 TABLET ORAL DAILY
Status: DISCONTINUED | OUTPATIENT
Start: 2022-09-21 | End: 2022-09-22 | Stop reason: HOSPADM

## 2022-09-20 RX ORDER — SPIRONOLACTONE 25 MG/1
100 TABLET ORAL DAILY
Status: DISCONTINUED | OUTPATIENT
Start: 2022-09-21 | End: 2022-09-22 | Stop reason: HOSPADM

## 2022-09-20 RX ORDER — FUROSEMIDE 20 MG/1
20 TABLET ORAL ONCE
Status: COMPLETED | OUTPATIENT
Start: 2022-09-20 | End: 2022-09-20

## 2022-09-20 RX ORDER — SPIRONOLACTONE 25 MG/1
25 TABLET ORAL ONCE
Status: COMPLETED | OUTPATIENT
Start: 2022-09-20 | End: 2022-09-20

## 2022-09-20 RX ADMIN — SPIRONOLACTONE 25 MG: 25 TABLET ORAL at 11:23

## 2022-09-20 RX ADMIN — LACTOBACILLUS TAB 2 TABLET: TAB at 11:22

## 2022-09-20 RX ADMIN — ANTACID TABLETS 500 MG: 500 TABLET, CHEWABLE ORAL at 07:52

## 2022-09-20 RX ADMIN — CEFTRIAXONE SODIUM 1000 MG: 1 INJECTION, POWDER, FOR SOLUTION INTRAMUSCULAR; INTRAVENOUS at 12:29

## 2022-09-20 RX ADMIN — LACTULOSE 20 G: 20 SOLUTION ORAL at 13:54

## 2022-09-20 RX ADMIN — Medication 100 MG: at 07:52

## 2022-09-20 RX ADMIN — LACTOBACILLUS TAB 2 TABLET: TAB at 17:08

## 2022-09-20 RX ADMIN — HYDROCODONE BITARTRATE AND ACETAMINOPHEN 1 TABLET: 5; 325 TABLET ORAL at 22:29

## 2022-09-20 RX ADMIN — LACTOBACILLUS TAB 2 TABLET: TAB at 05:30

## 2022-09-20 RX ADMIN — HYDROCODONE BITARTRATE AND ACETAMINOPHEN 1 TABLET: 5; 325 TABLET ORAL at 05:30

## 2022-09-20 RX ADMIN — LACTULOSE 20 G: 20 SOLUTION ORAL at 07:52

## 2022-09-20 RX ADMIN — HYDROCODONE BITARTRATE AND ACETAMINOPHEN 1 TABLET: 5; 325 TABLET ORAL at 13:55

## 2022-09-20 RX ADMIN — Medication 2000 UNITS: at 17:08

## 2022-09-20 RX ADMIN — THERA TABS 1 TABLET: TAB at 07:52

## 2022-09-20 RX ADMIN — Medication 400 MG: at 07:52

## 2022-09-20 RX ADMIN — LACTULOSE 20 G: 20 SOLUTION ORAL at 19:46

## 2022-09-20 RX ADMIN — FUROSEMIDE 20 MG: 20 TABLET ORAL at 11:22

## 2022-09-20 RX ADMIN — FUROSEMIDE 20 MG: 20 TABLET ORAL at 07:52

## 2022-09-20 RX ADMIN — ANTACID TABLETS 500 MG: 500 TABLET, CHEWABLE ORAL at 19:46

## 2022-09-20 RX ADMIN — SPIRONOLACTONE 50 MG: 25 TABLET ORAL at 07:52

## 2022-09-20 RX ADMIN — PANTOPRAZOLE SODIUM 40 MG: 40 TABLET, DELAYED RELEASE ORAL at 07:57

## 2022-09-20 ASSESSMENT — PAIN DESCRIPTION - LOCATION
LOCATION: ABDOMEN
LOCATION: OTHER (COMMENT)
LOCATION: ABDOMEN

## 2022-09-20 ASSESSMENT — PAIN SCALES - GENERAL
PAINLEVEL_OUTOF10: 5
PAINLEVEL_OUTOF10: 8
PAINLEVEL_OUTOF10: 10
PAINLEVEL_OUTOF10: 7

## 2022-09-20 ASSESSMENT — PAIN DESCRIPTION - DESCRIPTORS
DESCRIPTORS: ACHING
DESCRIPTORS: ACHING

## 2022-09-20 NOTE — PROGRESS NOTES
Gastroenterology Progress Note    Chio Gallardo is a 43 y.o. female patient. Hospitalization Day:5    Chief C/O: Cirrhosis, recurrent ascites, role of diuretics    SUBJECTIVE: Seen and examined on rehab unit. Patient reports having a single bowel movement this morning with receiving lactulose. She does continue to report abdominal bloating/swelling along with lower extremity swelling. She denies nausea/vomiting or hematemesis. States she feels as though she is not making enough urine this morning. Ascitic fluid cultures positive for Streptococcus viridans and staphylococcus epidermidis. She continues to receive Rocephin 1 gram IV daily. She is receiving Lasix 20 mg daily and Aldactone 50 mg daily as prescribed. ROS:  Gastrointestinal ROS: no black or bloody stools    Physical    VITALS:  /80   Pulse (!) 101   Temp 99.1 °F (37.3 °C) (Oral)   Resp 15   Ht 5' 5\" (1.651 m)   Wt 187 lb 4 oz (84.9 kg)   LMP  (LMP Unknown) Comment: tubal ligation  SpO2 99%   BMI 31.16 kg/m²   TEMPERATURE:  Current - Temp: 99.1 °F (37.3 °C); Max - Temp  Av.1 °F (37.3 °C)  Min: 99.1 °F (37.3 °C)  Max: 99.1 °F (37.3 °C)    General -alert, oriented, sitting up in wheelchair  Eyes -+ icterus, no pallor  Cardiovascular - RRR  Lungs -clear to auscultation bilaterally  Abdomen -+ distended (grade 2),  non tender, no organomegaly, Bowel sounds present  Extremities -2-3+ BLE edema  Skin -warm/dry, + jaundice  Neuro: Without focal deficit, moves all extremities, no asterixis     Data    Data Review:    No results for input(s): WBC, HGB, HCT, MCV, PLT in the last 72 hours. Recent Labs     22  0530   *   K 3.8      CO2 25   BUN 7   CREATININE 0.55     No results for input(s): AST, ALT, ALB, BILIDIR, BILITOT, ALKPHOS in the last 72 hours.     Component      Latest Ref Rng & Units 2022           3:19 PM   Body Fluid Culture, Sterile          Organism       Staphylococcus epidermidis (A)     Component Latest Ref Rng & Units 9/13/2022           3:19 PM   Body Fluid Culture, Sterile       Direct Exam:  RARE NEUTROPHILS (A) . . .   Organism       Streptococcus viridans group (A)       No results for input(s): LIPASE, AMYLASE in the last 72 hours. No results for input(s): PROTIME, INR in the last 72 hours. Radiology Review:      CT abdomen pelvis without contrast 9/4/2022: Enlarged fatty liver. Small volume ascites and mild ill-defined mesenteric and retroperitoneal inflammation, likely edema and/or pancreatitis. Mild probably reactive wall thickening of the colon. IR performed ultrasound-guided paracentesis on 9/14/2022 with 1510 cc of clear yellow fluid aspirated. Previous endoscopic history:  EGD 9/9/2022: Web in the upper third of the esophagus. Z-line regular, 36 cm from the incisors. Erythematous, granular and congested mucosa in the cardia, gastric fundus, gastric body and antrum. Normal examined duodenum. LA Grade A reflux esophagitis with no bleeding. No specimens collected. ASSESSMENT:  43 y.o. female admitted to inpatient rehab 9/16/2022 after hospital stay from 9/4/2022 - 9/15/2022 for acute alcoholic hepatitis/pancreatitis, EtOH withdrawal, mild WILLIE, macrocytic anemia, thrombocytopenia. Given IV fluids and pain control, s/p paracentesis with approximately 1500 cc removed, fluid analysis not suggestive of SBP, however fluid culture growing GPC in chains, started on Rocephin, EGD revealing esophagitis-no varices, and UA positive for E. coli UTI. GI consulted for cirrhosis, recurrent ascites, role of diuretics. Pt reports increased abdominal girth and bilateral lower extremity edema worsening since admission to rehab.    On 9/4/2022, sodium 134, glucose 127, BUN less than 2, creatinine 0.69, Albumin 2.3, total bilirubin 7.2, alkaline phosphatase 524, ALT 98, , WBC 6.3, Hgb 11.1, .4, platelets 50, CRP 25, lipase 118, amylase 60, INR 1.4, lactic acid 12.1, procalcitonin 0.69, acute hepatitis panel was negative, blood cultures negative, urine culture positive for E. coli, ascitic fluid culture positive for Streptococcus viridans and staphylococcus epidermidis. On 9/17/2022, WBCs 7.8, Hgb 9.5, .9, platelets 252, BUN 8, creatinine 0.72, albumin 1.4, total bilirubin 5.8, alk phos 283, ALT 35, AST 15,     59-year-old female with cirrhosis, likely related to alcohol use. MELD score as of 9/4/2022=20 with 19.6% estimated 3-month mortality. PLAN :  -Medical management per primary/inpatient rehab team.  - Ascites (grade 2) and Lower extremity edema: Increase spironolactone to 100mg mg daily, and Lasix to 40mg daily. Check BMP on 9/22/22. Large volume paracentesis not currently needed as abdomen is not tense, may consider on PRN basis. Limit sodium to < 2gm /day. Add simethicone for gas/bloating.  - Hx of SBP: recent tap with cultures growing gram-positive cocci in chains, final cultures positive for positive for Streptococcus viridans and staphylococcus epidermidis. Continue Rocephin 1 g IV every 24 hours till discharge  - EGD for Variceal screening: Completed 9/9/2022 revealing esophageal web, erythematous, granular and congested mucosa in the cardia, gastric fundus, gastric body and antrum consistent with portal hypertensive gastropathy, normal duodenum, LA grade a esophagitis without bleeding.  - HCC screening/surveillance: Recommend every 6 months. Last imaging: CT scan without liver mass/lesion  - Possible early/mild hepatic encephalopathy: Continue Lactulose - titrate to 2-3 soft bm's per day. Continue at discharge. - Advised to limit NSAID use   -Discussed with patient at length importance of complete alcohol abstinence. Reviewed criteria for transplantation, need for alcohol cessation. Thank you for allowing me to participate in the care of your patient. Please feel free to contact me with any concerns.     SANTA Mcginnis - CNP

## 2022-09-20 NOTE — PROGRESS NOTES
OCCUPATIONAL THERAPY  INPATIENT REHAB TREATMENT NOTE  [facility]      NAME: Rey Parish  : 1985 (43 y.o.)  MRN: [id number]  CODE STATUS: Full Code  Room: [address]    Date of Service: 2022    Referring Physician: Kori Collier  Rehab Diagnosis: Impaired mobility and ADLs due to alcoholic encephalopathy    Restrictions  Restrictions/Precautions  Restrictions/Precautions: Seizure, Fall Risk      Patient's date of birth confirmed: Yes    SAFETY:  Safety Devices  Type of devices: All fall risk precautions in place    SUBJECTIVE:  Subjective: \"This belly makes things hard\"  Pain: 9/10 abdomen    COGNITION:     Comprehension: Independent  Expression: Independent  Social Interaction: Independent  Problem Solving: Supervision  Memory: Independent    OBJECTIVE:  ADL completed per pt request    Grooming/Oral Hygiene  Assistance Level: Independent  Skilled Clinical Factors: in standing  Upper Extremity Bathing  Assistance Level: Modified independent  Skilled Clinical Factors: Pt complete UE bathing seated in shower chair. Lower Extremity Bathing  Assistance Level: Supervision  Upper Extremity Dressing  Assistance Level: Set-up; Modified independent  Skilled Clinical Factors: setup for bra, MI for shirt  Lower Extremity Dressing  Assistance Level: Supervision  Skilled Clinical Factors: cued pt to complete in sitting due to fatigue and limited balance  Putting On/Taking Off Footwear  Assistance Level: Supervision  Skilled Clinical Factors: assist to doff socks  Toileting  Skilled Clinical Factors: NT  Toilet Transfers  Skilled Clinical Factors: NT  Tub/Shower Transfers  Type: Shower  Transfer To:  Shower chair with back  Assistance Level: Supervision  Skilled Clinical Factors: grab bars     Functional Mobility  Activity: To/From bathroom  Assistance Level: Modified independent     Education:  Education  Education Given To: Patient  Education Method: Verbal  Education Outcome: Verbalized understanding    Equipment recommendations:   Hip kit approved by unit manager and provided today. Pt left with sock aide and fresh non skid socks- instructed pt to attempt to complete after lunch d/t time restraints and pt wanting to wait until feet were completely dry to don     ASSESSMENT:   ADL performance continues to improve however increased time needed    PLAN OF CARE:  Strengthening, Functional mobility training, Endurance training, Pain management, Safety education & training, Patient/Caregiver education & training, Equipment evaluation, education, & procurement, Self-Care / ADL, Home management training, Coordination training  continue with OT plan of care       Long Term Goal 1: Improve endurance for self care  Long Term Goal 2:  Increase self care independence  Long Term Goal 3: Improve bilateral upper extremity strength for I/ADL performance    Therapy Time:   Individual Group Co-Treat   Time In 1130       Time Out 1200         Minutes 30             ADL/IADL trainin minutes     Electronically signed by:    Jaclyn Wolff OT,   2022, 12:52 PM

## 2022-09-20 NOTE — PROGRESS NOTES
Physical Therapy Missed Treatment   Facility/Department: [facility] [id number]/[id number]    NAME: Fam Bruner    : 1985 (43 y.o.)  MRN: [id number]    Account: [de-identified]  Gender: female      Pt declined to attend PT in dept reporting to transport she does not feel well. Attempted to treat pt in room. Upon arrival to room pt shivering uncontrollably. Reports stomach pain and sick to stomach. Pt unable to participate at this time.           Neri Lopez, PTA, 22 at 2:14 PM

## 2022-09-20 NOTE — PROGRESS NOTES
Physical Therapy Rehab Treatment Note  Facility/Department: [place] [address]  Room: [place] [address]       NAME: Rey Parish  : 1985 (43 y.o.)  MRN: [id number]  CODE STATUS: Full Code    Date of Service: 2022       Restrictions:  Restrictions/Precautions: Seizure, Fall Risk       SUBJECTIVE:   Subjective: Pt reports her pain is better than it's been. States she feels so full. Pain  Pain: 9/10 abdomen  Declined interventon      OBJECTIVE:         Bed mobility  Rolling to Left: Independent  Rolling to Right: Independent  Supine to Sit: Independent    Transfers  Sit to Stand: Modified independent  Stand to sit: Modified independent  Bed to Chair: Modified independent    Ambulation  Surface: level tile;carpet  Device: No Device  Distance: 300ft  Comments: Pt ambulated multiple short distances arounf room and therapy retreiving and transporting items indep. Stairs/Curb  Stairs?: Yes  Stairs  # Steps : 12  Stairs Height: 6\"  Rails: Bilateral  Device: No Device  Assistance: Modified independent      Neuromuscular Education  Facilitation techniques: Obstacle course stepping on, over, and around various objects. SBA-Min assist.  Min assist required on balance beam and river rock. ASSESSMENT/PROGRESS TOWARDS GOALS: Pt challenged on balance beam and non compliant surfaces. Goals:  Long Term Goals  Long term goal 1: indep and effecient bed mobility  Long term goal 2: indep and effecient bed, chair and car transfers  Long term goal 3: indep gait with no device 50 feet in familiar environment and supervision for 150 feet in open or unpredictable environments  Long term goal 5: [place] to be completed at 50/56 level    PLAN OF CARE/Safety:   Plan Comment: Cont per POC.     Therapy Time:   Individual   Time In 0900   Time Out 0930   Minutes 30     Minutes:  Transfer/Bed mobility trainin  Gait training:15  Neuro re education:15  Therapeutic ex:0      Carmen Carrasquillo PTA, 22 at 3:35 PM

## 2022-09-20 NOTE — PROGRESS NOTES
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 \"  Pain: 9/10 abdomen    Pain at start of treatment: Yes: 9/10    Pain at end of treatment: Yes: 9/10    Location: ENTIRE STOMACH  Nursing notified: No  RN:   Intervention: None    COGNITION:  Orientation  Overall Orientation Status: Within Functional Limits  Orientation Level: Oriented X4  Cognition  Overall Cognitive Status: WFL          OBJECTIVE:     Provided pt with positioning in bed from laying on side to supine to participate in activity for therapy. Pt in extreme pain but willing to participate. Provided min vc's to bring body into alignment. Pt completed horiz dowel kaleb/ring activity. Pt used BUE switching back and forth using lowest 4 dowels to don/doff rings, d/t top dowel causing too much pain to reach to. Pt states that it hurt worse to reach to the L side than the R side. Pt completed task to improve functional act tolerance and reaching to continue improving functional ADL tasks skills. Toileting  Assistance Level: Modified independent  Toilet Transfers  Technique: Stand step  Equipment: Standard toilet;Grab bars  Assistance Level: Modified independent  Skilled Clinical Factors: ambulated  Pt completed task 2x's.        Functional Mobility  Device:  (No AD)  Activity: To/From bathroom  Assistance Level: Modified independent  Sit to Supine  Assistance Level: Modified independent  Supine to Sit  Assistance Level: Modified independent  Scooting  Assistance Level: Modified independent  Sit to Stand  Assistance Level: Modified independent  Stand to Sit  Assistance Level: Modified independent     Provided nut and bolt task  Pt completed seated using BUE hands with fine/gross motor skills. Pt completed task screwing/unscrewing nut off bolt with  1-3rd digits with R hand and stabilizing bolt with L hand. Pt demonstrated no difficulty but required slow pacing to complete task d/t stomach pain. Pt completed task to improve functional act tolerance and continue participation in therapy to continue improving pt ease and Ind with functional ADL tasks. ASSESSMENT:  Assessment: Pt laying on side in bed; severe stomach pain, completed therapy bed side this day  Activity Tolerance: Patient tolerated treatment well      PLAN OF CARE:  Strengthening, Functional mobility training, Endurance training, Pain management, Safety education & training, Patient/Caregiver education & training, Equipment evaluation, education, & procurement, Self-Care / ADL, Home management training, Coordination training  continue with OT plan of care       Long Term Goal 1: Improve endurance for self care  Long Term Goal 2: Increase self care independence  Long Term Goal 3: Improve bilateral upper extremity strength for I/ADL performance        Therapy Time:   Individual Group Co-Treat   Time In 1500       Time Out 1600         Minutes 60                   ADL/IADL training: 15 minutes  Therapeutic activities: 45 minutes     Electronically signed by:     CHAPINCITO Stubbs,   9/20/2022, 3:50 PM

## 2022-09-20 NOTE — PROGRESS NOTES
Subjective: The patient complains of severe acute on chronic progressive confusion, fatigue and gait ataxia and progressive ascites partially relieved by rest, medications, PT,  OT,   SLP and rest and exacerbated by recent illness -pancreatitis and toxic encephalopathy with gait ataxia-PMH of alcohol abuse and tobacco abuse who presented to ED with alcoholic hepatitis. Patient stated she knew her liver was \"gone\" so she had been trying to cut back on drinking, but still drinks 4-5 small bottles of fireball a day. Her last drink was on 9/4/2022 (day of admission) at 4am after waking up. She later developed sharp RUQ pain. CT of abdomen showed enlarged fatty liver, small volume ascites and mild ill-defined mesenteric and retroperitoneal inflammation, likely edema and/or pancreatitis. I am concerned about patients medical complexities and barriers to advancing in rehab goals including low back pain and refusal to stop drinking. I reviewed current care and plans for further care with other rehab providers including nursing and case management. According to recent nursing note, \" pt jaundice, abd distended. pt ambulating in her room, education given on lactulose, SCD's on,. Call light within reach. Pt does complain of left quad belly pain, relieved with norco. Pt notes gas pain and bloating to be a concern as well. Floronex and protonix given as ordered. Plan to get ammonia level, pt did reheat and eat her dinner late. \".    I decreased her dosing of the Norco she is not to take Tylenol but claims to be allergic to oxycodone codon. I am concerned that she is over relying on opiates especially opiates containing Tylenol for her pain control. We are trying topicals heating pad lighted Derm. We again discussed the importance of her stopping drinking. That there is no medication that will help cure her body of the toxins of the alcohol as long as she keeps drinking. ROS x10:   The patient also complains of severely impaired mobility and activities of daily living. Otherwise no new problems with vision, hearing, nose, mouth, throat, dermal, cardiovascular, GI, , pulmonary, musculoskeletal, psychiatric or neurological. See also Acute Rehab PM&R H&P. Vital signs:  /80   Pulse (!) 101   Temp 99.1 °F (37.3 °C) (Oral)   Resp 15   Ht 5' 5\" (1.651 m)   Wt 187 lb 4 oz (84.9 kg)   LMP  (LMP Unknown) Comment: tubal ligation  SpO2 99%   BMI 31.16 kg/m²   I/O:   PO/Intake:  fair PO intake,   regular low-salt diet    Bowel:   continent occasional diarrhea  Bladder: continent   no greenberg  General:  Patient is well developed,   adequately nourished, and    well kempt. HEENT:    Pupils equal, hearing intact to loud voice, external inspection of ear and nose benign. Inspection of lips, tongue and gums benign    Musculoskeletal: No significant change in strength or tone. All joints stable. Inspection and palpation of digits and nails show no clubbing, cyanosis or inflammatory conditions. Neuro/Psychiatric: Affect: flat but pleasant. Alert and oriented to person, place and situation with min cues. No significant change in deep tendon reflexes or sensation  Lungs:  Diminished, CTA-B. Respiration effort is   normal at rest.     Heart:   S1 = S2,   RRR. Abdomen:  Ascites,  Soft,  mild-tender,    Extremities:   Mild-mod lower extremity edema    Skin:   Intact to general survey,      Rehabilitation:  Physical Therapy:   Bed mobility:  Bed mobility  Bridging: Modified independent  (09/16/22 0953)  Rolling to Left: Stand by assistance (09/16/22 0953)  Rolling to Right: Stand by assistance (09/16/22 0953)  Supine to Sit: Supervision (09/17/22 0912)  Sit to Supine: Supervision (09/16/22 1118)  Bed Mobility Comments: HOB slightly elevated (09/17/22 0912)  Bed Mobility  Overall Assistance Level: Independent (09/19/22 1421)  Overall Assistance Level:  Independent (09/19/22 1421)  Sit to Supine  Assistance Level: Modified independent (09/19/22 1421)  Supine to Sit  Assistance Level: Modified independent (09/19/22 1421)  Scooting  Assistance Level: Modified independent (09/19/22 1421)  Transfers:  Transfers  Sit to Stand: Stand by assistance (09/17/22 1334)  Stand to sit: Stand by assistance (09/17/22 1334)  Bed to Chair: Stand by assistance;Contact guard assistance (Contact cues required due to mild lat LOB) (09/16/22 0955)  Comment: Good sequencing and safety. (09/17/22 0912)  Transfers  Surface: From bed; Wheelchair; To bed (09/19/22 1421)  Additional Factors: Verbal cues (09/19/22 1421)  Device:  (no device) (09/19/22 1421)  Sit to Stand  Assistance Level: Supervision;Modified independent (09/19/22 1421)  Skilled Clinical Factors: Pt needs increased time to complete d/t abdominal pain (09/19/22 1421)  Stand to Sit  Assistance Level: Supervision;Modified independent (09/19/22 1421)  Skilled Clinical Factors: Pt needs increased time to complete d/t abdominal pain (09/19/22 1421)  Bed To/From Chair  Assistance Level: Supervision;Modified independent (09/19/22 1421)  Car Transfer  Assistance Level: Modified independent (09/19/22 1022)  Gait:   Ambulation  Surface: carpet (09/17/22 1341)  Device: No Device (09/17/22 1341)  Assistance: Stand by assistance (09/17/22 1341)  Quality of Gait: Improved arm swing and less guarding d/t reduced pain. (09/17/22 1341)  Gait Deviations: Slow Kamila;Decreased step length (09/17/22 1341)  Distance: 150ft with turns (09/17/22 1341)  Comments: Pt fatigued following OT session for shower and unable to ambulate further distance. Lat sway results in intermittent LOB requiring recovery assistance intermittently (09/16/22 1013)  Ambulation  Surface: Level surface; Uneven surface (09/19/22 1544)  Device:  (no device) (09/19/22 1544)  Distance: 250 ft throughout different intervals in various environments (09/19/22 1544)  Activity: Within Room; Within Unit (09/19/22 1544)  Assistance Level: Modified independent;Supervision (09/19/22 1544)  Gait Deviations: Decreased arm swing bilateral;Unsteady gait; Wide base of support (09/19/22 1544)  Skilled Clinical Factors: Pt with slight lateral path deviations throughout activities but able to recover quickly without need for assistance. Pt demos good balance reactions throughout all activities requiring gait. (09/19/22 1544)  Stairs:  Stairs/Curb  Stairs?: Yes (09/17/22 0917)  Stairs  # Steps : 12 (09/17/22 0917)  Stairs Height: 6\" (09/17/22 0917)  Rails: Bilateral (Ligth touch) (09/17/22 0917)  Device: No Device (09/17/22 0917)  Assistance: Stand by assistance (09/17/22 5513)  Comment: Recip pattern with safe technique ascending and descending. (09/17/22 0917)  Stairs  Stair Height: 6'' (09/19/22 1030)  Device: Bilateral handrails; One handrail (09/19/22 1030)  Number of Stairs: 12 (09/19/22 1030)  Assistance Level: Stand by assist (09/19/22 1030)  Skilled Clinical Factors: reciprocal ascend, non- reciprocal descend - requires use of UE for support (09/19/22 1030)  W/C mobility:  Wheelchair Activities  Propulsion: Yes (09/16/22 1013)  Propulsion 1  Propulsion: Manual (09/16/22 1013)  Level: Level Tile (09/16/22 1013)  Method: Avtar Yee (09/16/22 1013)  Level of Assistance: Modified independent (09/16/22 1013)  Distance: 50 feet - limited by time (09/16/22 1013)    Occupational Therapy:      ADL  Feeding: Independent (09/16/22 0953)  Grooming: Setup (09/16/22 0953)  Grooming Skilled Clinical Factors: Grooming standing at the sink (09/16/22 0953)  UE Bathing: Setup (09/16/22 0953)  LE Bathing: Minimal assistance (09/16/22 0953)  LE Bathing Skilled Clinical Factors: Required assistance to wash feet (09/16/22 0953)  UE Dressing: Setup (09/16/22 0953)  LE Dressing: Minimal assistance (09/16/22 0953)  LE Dressing Skilled Clinical Factors: Pt able to don/doff B hosptial socks but required min assist to don/pull up pants (09/16/22 5567)  Toileting: Unable to assess(comment) (09/16/22 0953)  Toileting Skilled Clinical Factors: Pt declined need to toliet (09/16/22 0953)             Speech Therapy:      Comprehension: Within Functional Limits  Verbal Expression: Within functional limits  Diet/Swallow:        Dysphagia Outcome Severity Scale: Level 7: Normal in all situations  Compensatory Swallowing Strategies : Alternate solids and liquids, Upright as possible for all oral intake, Remain upright for 30-45 minutes after meals          Lab/X-ray studies reviewed, analyzed and discussed with patient and staff:   Recent Results (from the past 24 hour(s))   Basic Metabolic Panel    Collection Time: 09/20/22  5:30 AM   Result Value Ref Range    Sodium 134 (L) 135 - 144 mEq/L    Potassium 3.8 3.4 - 4.9 mEq/L    Chloride 102 95 - 107 mEq/L    CO2 25 20 - 31 mEq/L    Anion Gap 7 (L) 9 - 15 mEq/L    Glucose 90 70 - 99 mg/dL    BUN 7 6 - 20 mg/dL    Creatinine 0.55 0.50 - 0.90 mg/dL    GFR Non-African American >60.0 >60    GFR  >60.0 >60    Calcium 8.1 (L) 8.5 - 9.9 mg/dL   Ammonia    Collection Time: 09/20/22  5:38 AM   Result Value Ref Range    Ammonia 64 (H) 11 - 51 umol/L       CT ABDOMEN PELVIS  9/4/2022     ENLARGED FATTY LIVER. SMALL VOLUME ASCITES AND MILD ILL-DEFINED MESENTERIC AND RETROPERITONEAL INFLAMMATION, LIKELY EDEMA AND/OR PANCREATITIS. MILD PROBABLY REACTIVE WALL THICKENING OF THE COLON. IR US GUIDED PARACENTESIS  : Survey of the abdomen showed large amount of ascites fluid. After obtaining informed consent, the patient was positioned supine on the sonography table. Using ultrasound, the skin over the left hemiabdomen was locally anesthetized with 1% lidocaine. Following that, a Yueh needle was advanced into the fluid pocket using ultrasound visualization. 1510cc, of clear yellow fluid were aspirated and sent for cytology, and pathology. The needle was removed, and hemostasis was obtained with pressure.   A Band-Aid was placed. Post procedure images did not demonstrate hemorrhage at the target site. The patient tolerated the procedure well. The patient left the department in good condition. A radiology nurse was in presence monitoring vital signs, assisting throughout the procedure. Previous extensive, complex labs, notes and diagnostics reviewed and analyzed. ALLERGIES:    Allergies as of 09/15/2022 - Fully Reviewed 09/15/2022   Allergen Reaction Noted    Oxycodone-acetaminophen Itching 01/11/2016      (please also verify by checking MAR)      Recently, I evaluated this patient for periodic reassessment of medical and functional status. The patient was discussed in detail at the treatment team meeting focusing on current medical issues, progress in therapies, social issues, psychological issues, barriers to progress and strategies to address these barriers, and discharge planning. See the hand written addendum to rehab progress note. The patient continues to be high risk for future disability and their medical and rehabilitation prognosis continue to be good and therefore, we will continue the patient's rehabilitation course as planned. The patient's tentative discharge date was set. Patient and family education was discussed. The patient was made aware of the team discussion regarding their progress. Discharge plans were discussed along with barriers to progress and strategies to address these barriers, patient encouraged to continue to discuss discharge plans with . Complex Physical Medicine & Rehab Issues Assess & Plan:   Severe abnormality of gait and mobility and impaired self-care and ADL's secondary to progressive  Hepatitc Encephalopathy .   Functional and medical status reassessed regarding patients ability to participate in therapies and patient found to be able to participate in acute intensive comprehensive inpatient rehabilitation program including PT/OT to improve balance, ambulation, ADLs, and to improve the P/AROM. Therapeutic modifications regarding activities in therapies, place, amount of time per day and intensity of therapy made daily. In bed therapies or bedside therapies prn. Bowel and Bladder dysfunction  , Neurogenic bowel and bladder:  frequent toileting, ambulate to bathroom with assistance, check post void residuals. Check for C.difficile x1 if >2 loose stools in 24 hours, continue bowel & bladder program.  Monitor bowel and bladder function. Lactinex 2 PO every AC. MOM prn, Brown Bomb prn, Glycerin suppository prn, enema prn. Encourage therapy and nursing to co-treat and problem solve re continence. Severe mid and low back pain as well as generalized OA pain: reassess pain every shift and prior to and after each therapy session, give prn lowest effective dose of Norco Tylenol and consider scheduled Tylenol, modalities prn in therapy, masage, Lidoderm, K-pad prn. Consider scheduled AM pain meds. Skin healing  and breakdown risk:  continue pressure relief program.  Daily skin exams and reports from nursing. Fatigue due to nutritional and hydration deficiency: Add and titrate vitamin B12 vitamin D and CoQ10 continue to monitor I&Os, calorie counts prn, dietary consult prn. Add healthy snack at night. Acute episodic insomnia with situational adjustment disorder:  prn Ambien, monitor for day time sedation. Falls risk elevated:  patient to use call light to get nursing assistance to get up, bed and chair alarm. Elevated DVT risk: progressive activities in PT, continue prophylaxis DIVINE hose, elevation . Complex discharge planning: Discharge 9/25/2020 to home with significant other. We are advising a 12-step program however patient has recently been in at least a month of alcohol rehab over this past summer and went back to drinking within a week.   SP weekly team meeting every Monday to re-assess progress towards goals, discuss and address social, psychological and medical comorbidities and to address difficulties they may be having progressing in therapy. Patient and family education is in progress. The patient is to follow-up with their family physician after discharge. Complex Active General Medical Issues that complicate care Assess & Plan:      Alcoholic hepatitis with ascites-avoid toxic medications discontinue Tylenol and limited use of Norco for severe pain lower dose and minimize reliance on Norco let  GERD,   GI bleeding-add Tums add PPI-Elevate head of bed after meals, monitor stools for blood, lowest effective dose of PPI, consider Tums. Acute pancreatitis and abdominal infection-IV Rocephin add lactobacillus  ETOHism-transition to top 12-step program add CIWA protocol  Depression-emotional support provided daily, vitamin B12, encourage participation in rehabilitation support group and recreational therapy, adjust/add medications (titration of benzodiazepines consider SSRI)  COPD-Acute rehab for endurance traing with Pulse Ox to monitoring oxygen saturation and heart rate with O2 titration to lowest effective dose. Pulse oximeter checks to shift and at HS to dose and titrate oxygen and aerosol treatments monitor for nocturnal hypoxemia, monitor vital signs, oxygen prn. Focus on energy conservation. Vit B12 deficiency-Add high-dose vitamin D, recheck vitamin D level out after discharge,    Generalized pain, Myalgia, unspecified site    Toe deformity, and active chronic foot pain-topical agents, probably proper footwear avoid opiates if possible    Acute alcoholic hepatitis-avoid toxic medications  Impulsivity-and encephalopathy with impaired cognition-focus on balance and safety add speech-language pathology        Focus on coordinating dc plans with patient family and care givers and order necessary equipment.         Electronically signed by Sona Rodriguez DO on 9/19/22 at 8:21 AM SANDEEP Clark D.O.,

## 2022-09-20 NOTE — PROGRESS NOTES
Assessment done, pt jaundice, abd distended. pt ambulating in her room, education given on lactulose, SCD's on,. Call light within reach. Pt does complain of left quad belly pain, relieved with norco. Pt notes gas pain and bloating to be a concern as well. Floronex and protonix given as ordered. Plan to get ammonia level, pt did reheat and eat her dinner late.

## 2022-09-20 NOTE — PROGRESS NOTES
Physical Therapy Missed Treatment   Facility/Department: [facility] MED SURG [id number]    NAME: Chanelle Knight    : 1985 (43 y.o.)  MRN: [id number]    Account: [de-identified]  Gender: female    Chart reviewed, attempted PT at 36. Patient unavailable 2° to:    [] Hold per nsg request    [] Pt declined     [] Pt. . off floor for test/procedure. [x] Pt. Unavailable - Attempted to see pt for make-up therapy time. Pt sleeping soundly upon arrival and missed scheduled time earlier in the afternoon d/t feeling sick. Pt needing continued rest at this time. Will attempt PT treatment again at earliest convenience.       Electronically signed by Emmett Amin PTA on 22 at 3:12 PM EDT

## 2022-09-20 NOTE — PROGRESS NOTES
[facility]  Facility/Department: [facility]  Speech Language Pathology   Treatment Note          Barbaratie Shown  1985  R251/R251-01  [x]   confirmed    Date: 2022    Rehab Diagnosis:  Impaired mobility and ADL's due to alcoholic encephalopathy      Restrictions/Precautions: Seizure, Fall Risk    Weight: 187 lb 4 oz (84.9 kg)     ADULT DIET; Regular; Low Sodium (2 gm)  ADULT ORAL NUTRITION SUPPLEMENT; Dinner; Standard High Calorie/High Protein Oral Supplement    SpO2: 99 % (22 0716)  No active isolations      Subjective:  Alert, Cooperative, Pleasant, and Motivated        Interventions used this date:  Cognitive Skill Development    Objective/Assessment:  Patient progressing towards goals:  Goal 1: To increase safety awareness and judgment for safe completion of ADLs secondary to pt's cognitive deficits, pt will complete abstract reasoning tasks (i.e. Word deduction, convergent and divergent naming, similarities/differences) with 80% accuracy and min cues. Pt completed deductive reasoning puzzle with stand by cues with 100% accuracy x 2 puzzles. Pt did verbal sequencing of 3-4 words heard aloud with 100% accuracy. Goal 2: To increase independence for functional activities for home and community, pt will complete mid level executive functioning tasks (i.e. Path finding, scheduling appointments, prioritizing tasks) with min cues. Goal 3: Within 1-5 days of implementing the ST POC, pt's functional reading/writing skills will be assessed in relation to executive functioning (e.g. bill paying, reading rx labels, completing personal information), with additional goals added to pt's POC as deemed necesary by treating SLP. Treatment/Activity Tolerance:  Patient tolerated treatment well    Plan:  Discharge  Pt met goals. Pt in agreement with d/c from speech. Pain Assessment:  Patient does not c/o pain. Pain Re-assessment:  Patient does not c/o pain.     Patient/Caregiver Education:  Patient educated on session and progression towards goals. Patient stated verbal understanding of directions. Safety Devices: All fall risk precautions in place      Speech Therapy Level of Assistance Scale    AUDITORY COMPREHENSION  Rating:  Independent    VERBAL EXPRESSION  Rating:  Independent    MOTOR SPEECH  Rating: Independent    PROBLEM SOLVING  Rating: Independent    MEMORY  Rating:  Independent          Therapy Time  SLP Individual Minutes  Time In: 0930  Time Out: 1607  Minutes: 25              Signature: Electronically signed by Tono Gusman.  CARLOS Lane on 9/20/2022 at 9:59 AM

## 2022-09-20 NOTE — PROGRESS NOTES
[facility]   Facility/Department: [facility]  Speech Language Pathology  Discharge Report        Patient: Naomy Perez  : 1985    Date: 2022    Initial Status:  Diet:   Regular diet with thin liquids  Dysphagia Outcome Severity Scale:  Ratin    Speech Therapy Level of Assistance Scale: Auditory Comprehension:  Rating: Independent  Verbal Expression:  Rating:Independent  Motor Speech:  Rating: Independent-Modified Independent  Problem Solving:  Rating: Minimal Assistance-Moderate Assistance  Memory:  Rating: Minimal Assistance-Moderate Assistance      Long Term Goals:  Long-term Goals  Timeframe for Long-term Goals: 1-2 weeks  Goal 1: Pt will improve his Cognition from mod assist to standby for adequate functional recall and safety awareness for home and community. Long-term Goals  Goal 1: N/A        Patient's Response to Therapy:  Pt made significant gains since initial evaluation. Pt has met goals. Discharge Status:  Diet:   ADULT DIET; Regular; Low Sodium (2 gm)  ADULT ORAL NUTRITION SUPPLEMENT; Dinner; Standard High Calorie/High Protein Oral Supplement  Compensatory Swallowing Strategies : Alternate solids and liquids, Upright as possible for all oral intake, Remain upright for 30-45 minutes after meals  Dysphagia Outcome Severity Scale:  Ratin    Speech Therapy Level of Assistance Scale: Auditory Comprehension:  Rating: Independent  Verbal Expression:  Rating:Independent  Motor Speech:  Rating: Independent  Problem Solving:  Rating: Independent  Memory:  Rating: Independent        Functional Status at time of Discharge:    Cognition: Patient demonstrates no cognitive deficits. Language: Patient demonstrates no language deficits. Motor Speech: Patient demonstrates no motor speech deficits. Swallow: Patient demonstrates no dysphagia.                                            [] Recommend continued speech therapy   [x] Speech Therapy is no longer warranted      Signature:

## 2022-09-21 PROCEDURE — 6360000002 HC RX W HCPCS: Performed by: INTERNAL MEDICINE

## 2022-09-21 PROCEDURE — 6370000000 HC RX 637 (ALT 250 FOR IP): Performed by: INTERNAL MEDICINE

## 2022-09-21 PROCEDURE — 6370000000 HC RX 637 (ALT 250 FOR IP): Performed by: PHYSICAL MEDICINE & REHABILITATION

## 2022-09-21 PROCEDURE — 1180000000 HC REHAB R&B

## 2022-09-21 PROCEDURE — 99232 SBSQ HOSP IP/OBS MODERATE 35: CPT | Performed by: NURSE PRACTITIONER

## 2022-09-21 PROCEDURE — 6370000000 HC RX 637 (ALT 250 FOR IP): Performed by: NURSE PRACTITIONER

## 2022-09-21 PROCEDURE — 99232 SBSQ HOSP IP/OBS MODERATE 35: CPT | Performed by: PHYSICAL MEDICINE & REHABILITATION

## 2022-09-21 PROCEDURE — 97535 SELF CARE MNGMENT TRAINING: CPT

## 2022-09-21 PROCEDURE — 97110 THERAPEUTIC EXERCISES: CPT

## 2022-09-21 PROCEDURE — 2580000003 HC RX 258: Performed by: INTERNAL MEDICINE

## 2022-09-21 PROCEDURE — 97530 THERAPEUTIC ACTIVITIES: CPT

## 2022-09-21 PROCEDURE — 97116 GAIT TRAINING THERAPY: CPT

## 2022-09-21 PROCEDURE — 97112 NEUROMUSCULAR REEDUCATION: CPT

## 2022-09-21 RX ADMIN — Medication 100 MG: at 08:02

## 2022-09-21 RX ADMIN — Medication 400 MG: at 08:02

## 2022-09-21 RX ADMIN — Medication 2000 UNITS: at 16:13

## 2022-09-21 RX ADMIN — THERA TABS 1 TABLET: TAB at 08:02

## 2022-09-21 RX ADMIN — LACTOBACILLUS TAB 2 TABLET: TAB at 16:13

## 2022-09-21 RX ADMIN — HYDROCODONE BITARTRATE AND ACETAMINOPHEN 1 TABLET: 5; 325 TABLET ORAL at 13:54

## 2022-09-21 RX ADMIN — LACTOBACILLUS TAB 2 TABLET: TAB at 06:16

## 2022-09-21 RX ADMIN — HYDROCODONE BITARTRATE AND ACETAMINOPHEN 1 TABLET: 5; 325 TABLET ORAL at 21:46

## 2022-09-21 RX ADMIN — FUROSEMIDE 40 MG: 40 TABLET ORAL at 08:02

## 2022-09-21 RX ADMIN — CEFTRIAXONE SODIUM 1000 MG: 1 INJECTION, POWDER, FOR SOLUTION INTRAMUSCULAR; INTRAVENOUS at 11:56

## 2022-09-21 RX ADMIN — SPIRONOLACTONE 100 MG: 25 TABLET ORAL at 08:02

## 2022-09-21 RX ADMIN — ANTACID TABLETS 500 MG: 500 TABLET, CHEWABLE ORAL at 08:02

## 2022-09-21 RX ADMIN — LACTOBACILLUS TAB 2 TABLET: TAB at 11:49

## 2022-09-21 RX ADMIN — PANTOPRAZOLE SODIUM 40 MG: 40 TABLET, DELAYED RELEASE ORAL at 06:16

## 2022-09-21 RX ADMIN — LACTULOSE 20 G: 20 SOLUTION ORAL at 21:46

## 2022-09-21 ASSESSMENT — PAIN DESCRIPTION - LOCATION
LOCATION: ABDOMEN
LOCATION: ABDOMEN

## 2022-09-21 ASSESSMENT — PAIN SCALES - GENERAL
PAINLEVEL_OUTOF10: 10
PAINLEVEL_OUTOF10: 9

## 2022-09-21 ASSESSMENT — PAIN DESCRIPTION - DESCRIPTORS: DESCRIPTORS: STABBING
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
## 2022-09-21 NOTE — PROGRESS NOTES
Physical Therapy Rehab Treatment Note  Facility/Department: [facility]  Room: R251/R251-01       NAME: Chio Gallardo  : 1985 (43 y.o.)  MRN: [id number]  CODE STATUS: Full Code    Date of Service: 2022       Restrictions:  Restrictions/Precautions: Seizure, Fall Risk       SUBJECTIVE:   Subjective: I can do the stairs    Pain  Pain: 7/10 abdomen pain pre/post session      OBJECTIVE:                  Transfers  Surface: Wheelchair  Device:  (no device)  Sit to Stand  Assistance Level: Modified independent  Stand to Sit  Assistance Level: Modified independent    Ambulation  Surface: Level surface; Uneven surface  Device:  (no device)  Distance: 300+ ft throughout different intervals in various environments. Activity: Within Unit  Activity Comments: no AD, decreased fluidity  Assistance Level: Modified independent  Skilled Clinical Factors: Pt able to perform long distance gait this session with minimal deviations and no instability noted. Pt demos good safety without an AD and no balance concerns noted throughout    Stairs  Stair Height: 6''  Device:  (no handrails)  Number of Stairs: 8  Additional Factors: Reciprocal going down;Reciprocal going up  Assistance Level: Modified independent  Skilled Clinical Factors: Pt able to complete stairs this session without the use of hand rails this session twice with reciprocal ascending and descending. Pt with good safety and technique without, however is more stable with use of handrail, which pt reports she will do at home. PT Exercises  Dynamic Standing Balance Exercises: standing without UE support stepping over bolster x 10 BLE with focus on maintaining balance , dynamic gait activities while playing catch with ball ~ 100 feet- pt with good ability to control ball and bend over to  when lost control without losing balance. Pt demos good righting and stepping reactions during activity.                         ASSESSMENT/PROGRESS TOWARDS GOALS: Assessment  Assessment: Pt demos good progress and meeting of goals. Pt reports feeling comfortable with return to home, however states concern with her abdomen pain. Pt able to perform stairs without use of handrails this session with good safety and technique throughout. Goals:  Long Term Goals  Long term goal 1: indep and effecient bed mobility  Long term goal 2: indep and effecient bed, chair and car transfers  Long term goal 3: indep gait with no device 50 feet in familiar environment and supervision for 150 feet in open or unpredictable environments  Long term goal 4: indep flight of stairs with one rail  Long term goal 5: [place] to be completed at 50/56 level  Patient Goals   Patient goals : to be able to return to home indep with no devices for walking    PLAN OF CARE/Safety:   Safety Devices  Type of Devices: All fall risk precautions in place; Chair alarm in place; Left in chair      Therapy Time:   Individual   Time In 1430   Time Out 1500   Minutes 30     Minutes:  Transfer/Bed mobility trainin  Gait training: 15  Neuro re education: 0  Therapeutic ex: Rashmi Kim, MARICHUY, 22 at 4:21 PM

## 2022-09-21 NOTE — PROGRESS NOTES
Subjective: The patient complains of severe acute on chronic progressive confusion, fatigue and gait ataxia and progressive ascites partially relieved by rest, medications, PT,  OT,   SLP and rest and exacerbated by recent illness -pancreatitis and toxic encephalopathy with gait ataxia-PMH of alcohol abuse and tobacco abuse who presented to ED with alcoholic hepatitis. Patient stated she knew her liver was \"gone\" so she had been trying to cut back on drinking, but still drinks 4-5 small bottles of fireball a day. Her last drink was on 9/4/2022 (day of admission) at 4am after waking up. She later developed sharp RUQ pain. CT of abdomen showed enlarged fatty liver, small volume ascites and mild ill-defined mesenteric and retroperitoneal inflammation, likely edema and/or pancreatitis. I am concerned about patients medical complexities and barriers to advancing in rehab goals including low back pain and refusal to stop drinking. I reviewed current care and plans for further care with other rehab providers including nursing and case management. According to recent nursing note, \"VSS. Medicated with prn norco for 8/10 abdominal pain. Patient reports improvement since having bm. LBM 9/20. Continues IV rocephin for intra-abdominal infection (cx taken from paracentesis fluid (+)). Stop date 9/23. No distress noted. Independent in room. Call light within reach. \".     I reiterated the patient needs to stop drinking in order to prevent further deterioration in her health and liver function. Her swelling and abdominal bloating is somewhat improved status post diarrhea for lactulose. She also got a dose of Aldactone. She is for her last dose of IV Rocephin tomorrow prior to her discharge. ROS x10: The patient also complains of severely impaired mobility and activities of daily living.   Otherwise no new problems with vision, hearing, nose, mouth, throat, dermal, cardiovascular, GI, , pulmonary, musculoskeletal, psychiatric or neurological. See also Acute Rehab PM&R H&P. Vital signs:  /66   Pulse (!) 102   Temp 98.4 °F (36.9 °C) (Oral)   Resp 18   Ht 5' 5\" (1.651 m)   Wt 189 lb 6.4 oz (85.9 kg)   LMP  (LMP Unknown) Comment: tubal ligation  SpO2 99%   BMI 31.52 kg/m²   I/O:   PO/Intake:  fair PO intake,   regular low-salt diet    Bowel:   continent occasional diarrhea-due to lactulose  Bladder: continent   no greenberg  General:  Patient is well developed,   adequately nourished, and    well kempt. HEENT:    Pupils equal, hearing intact to loud voice, external inspection of ear and nose benign. Inspection of lips, tongue and gums benign    Musculoskeletal: No significant change in strength or tone. All joints stable. Inspection and palpation of digits and nails show no clubbing, cyanosis or inflammatory conditions. Neuro/Psychiatric: Affect: flat but pleasant. Alert and oriented to person, place and situation with min cues. No significant change in deep tendon reflexes or sensation  Lungs:  Diminished, CTA-B. Respiration effort is   normal at rest.     Heart:   S1 = S2,   RRR. Abdomen:  Ascites,  Soft,  mild-tender,    Extremities:   Mild-mod lower extremity edema    Skin:   Intact to general survey,      Rehabilitation:  Physical Therapy:   Bed mobility:  Bed mobility  Bridging: Modified independent  (09/16/22 0953)  Rolling to Left: Independent (09/20/22 0916)  Rolling to Right: Independent (09/20/22 0916)  Supine to Sit: Independent (09/20/22 0916)  Sit to Supine: Supervision (09/16/22 1118)  Bed Mobility Comments: HOB slightly elevated (09/17/22 0912)  Bed Mobility  Overall Assistance Level: Independent (09/19/22 1421)  Overall Assistance Level:  Independent (09/19/22 1421)  Sit to Supine  Assistance Level: Modified independent (09/19/22 1421)  Supine to Sit  Assistance Level: Modified independent (09/19/22 1421)  Scooting  Assistance Level: Modified independent (09/19/22 1421)  Transfers:  Transfers  Sit to Stand: Modified independent (09/20/22 0916)  Stand to sit: Modified independent (09/20/22 0916)  Bed to Chair: Modified independent (09/20/22 0916)  Comment: Good sequencing and safety. (09/17/22 0912)  Transfers  Surface: From bed; Wheelchair; To bed (09/19/22 1421)  Additional Factors: Verbal cues (09/19/22 1421)  Device:  (no device) (09/19/22 1421)  Sit to Stand  Assistance Level: Supervision;Modified independent (09/19/22 1421)  Skilled Clinical Factors: Pt needs increased time to complete d/t abdominal pain (09/19/22 1421)  Stand to Sit  Assistance Level: Supervision;Modified independent (09/19/22 1421)  Skilled Clinical Factors: Pt needs increased time to complete d/t abdominal pain (09/19/22 1421)  Bed To/From Chair  Assistance Level: Supervision;Modified independent (09/19/22 1421)  Car Transfer  Assistance Level: Modified independent (09/19/22 1022)  Gait:   Ambulation  Surface: level tile;carpet (09/20/22 0916)  Device: No Device (09/20/22 0916)  Assistance: Stand by assistance (09/17/22 1341)  Quality of Gait: Improved arm swing and less guarding d/t reduced pain. (09/17/22 1341)  Gait Deviations: Slow Kamila;Decreased step length (09/17/22 1341)  Distance: 300ft (09/20/22 0916)  Comments: Pt ambulated multiple short distances arounf room and therapy retreiving and transporting items indep. (09/20/22 0916)  Ambulation  Surface: Level surface; Uneven surface (09/19/22 1544)  Device:  (no device) (09/19/22 1544)  Distance: 250 ft throughout different intervals in various environments (09/19/22 1544)  Activity: Within Room; Within Unit (09/19/22 1544)  Assistance Level: Modified independent;Supervision (09/19/22 1544)  Gait Deviations: Decreased arm swing bilateral;Unsteady gait; Wide base of support (09/19/22 1544)  Skilled Clinical Factors: Pt with slight lateral path deviations throughout activities but able to recover quickly without need for assistance.   Pt demos good balance reactions throughout all activities requiring gait. (09/19/22 1544)  Stairs:  Stairs/Curb  Stairs?: Yes (09/20/22 0916)  Stairs  # Steps : 12 (09/20/22 0916)  Stairs Height: 6\" (09/20/22 0916)  Rails: Bilateral (09/20/22 0916)  Device: No Device (09/20/22 0916)  Assistance: Modified independent  (09/20/22 0916)  Comment: Recip pattern with safe technique ascending and descending. (09/17/22 0917)  Stairs  Stair Height: 6'' (09/19/22 1030)  Device: Bilateral handrails; One handrail (09/19/22 1030)  Number of Stairs: 12 (09/19/22 1030)  Assistance Level: Stand by assist (09/19/22 1030)  Skilled Clinical Factors: reciprocal ascend, non- reciprocal descend - requires use of UE for support (09/19/22 1030)  W/C mobility:  Wheelchair Activities  Propulsion: Yes (09/16/22 1013)  Propulsion 1  Propulsion: Manual (09/16/22 1013)  Level: Level Tile (09/16/22 1013)  Method: [place] (09/16/22 1013)  Level of Assistance: Modified independent (09/16/22 1013)  Distance: 50 feet - limited by time (09/16/22 1013)    Occupational Therapy:      ADL  Feeding: Independent (09/16/22 0953)  Grooming: Setup (09/16/22 0953)  Grooming Skilled Clinical Factors: Grooming standing at the sink (09/16/22 0953)  UE Bathing: Setup (09/16/22 0953)  LE Bathing: Minimal assistance (09/16/22 0953)  LE Bathing Skilled Clinical Factors: Required assistance to wash feet (09/16/22 0953)  UE Dressing: Setup (09/16/22 0953)  LE Dressing: Minimal assistance (09/16/22 0953)  LE Dressing Skilled Clinical Factors: Pt able to don/doff B hosptial socks but required min assist to don/pull up pants (09/16/22 0953)  Toileting: Unable to assess(comment) (09/16/22 0953)  Toileting Skilled Clinical Factors: Pt declined need to toliet (09/16/22 0953)             Speech Therapy:      Comprehension: Within Functional Limits  Verbal Expression: Within functional limits  Diet/Swallow:        Dysphagia Outcome Severity Scale: Level 7: Normal in all and narcotics. However her reliance on alcohol is certainly a concern. Complex Physical Medicine & Rehab Issues Assess & Plan:   Severe abnormality of gait and mobility and impaired self-care and ADL's secondary to progressive  Hepatitc Encephalopathy . Functional and medical status reassessed regarding patients ability to participate in therapies and patient found to be able to participate in acute intensive comprehensive inpatient rehabilitation program including PT/OT to improve balance, ambulation, ADLs, and to improve the P/AROM. Therapeutic modifications regarding activities in therapies, place, amount of time per day and intensity of therapy made daily. In bed therapies or bedside therapies prn. Bowel and Bladder dysfunction  , Neurogenic bowel and bladder:  frequent toileting, ambulate to bathroom with assistance, check post void residuals. Check for C.difficile x1 if >2 loose stools in 24 hours, continue bowel & bladder program.  Monitor bowel and bladder function. Lactinex 2 PO every AC. MOM prn, Brown Bomb prn, Glycerin suppository prn, enema prn. Encourage therapy and nursing to co-treat and problem solve re continence. Severe mid and low back pain as well as generalized OA pain: reassess pain every shift and prior to and after each therapy session, give prn lowest effective dose of Norco Tylenol and consider scheduled Tylenol, modalities prn in therapy, masage, Lidoderm, K-pad prn. Consider scheduled AM pain meds. Skin healing  and breakdown risk:  continue pressure relief program.  Daily skin exams and reports from nursing. Fatigue due to nutritional and hydration deficiency: Add and titrate vitamin B12 vitamin D and CoQ10 continue to monitor I&Os, calorie counts prn, dietary consult prn. Add healthy snack at night. Acute episodic insomnia with situational adjustment disorder:  prn Ambien, monitor for day time sedation.   Falls risk elevated:  patient to use call light to get nursing assistance to get up, bed and chair alarm. Elevated DVT risk: progressive activities in PT, continue prophylaxis DIVINE hose, elevation . Complex discharge planning:  Begin medication reconciliation, patient and family education, and medication simplification coordinating follow-up care with case management and social work as we progressed toward patient's plan pending discharge 9/25/2020 to home with significant other. We are advising a 12-step program however patient has recently been in at least a month of alcohol rehab over this past summer and went back to drinking within a week. SP weekly team meeting every Monday to re-assess progress towards goals, discuss and address social, psychological and medical comorbidities and to address difficulties they may be having progressing in therapy. Patient and family education is in progress. The patient is to follow-up with their family physician after discharge. Complex Active General Medical Issues that complicate care Assess & Plan:      Alcoholic hepatitis with ascites-avoid toxic medications discontinue Tylenol and limited use of Norco for severe pain lower dose and minimize reliance on Norco let  GERD,   GI bleeding-add Tums add PPI-Elevate head of bed after meals, monitor stools for blood, lowest effective dose of PPI, consider Tums. Acute pancreatitis and abdominal infection-IV Rocephin add lactobacillus  ETOHism-transition to top 12-step program add CIWA protocol  Depression-emotional support provided daily, vitamin B12, encourage participation in rehabilitation support group and recreational therapy, adjust/add medications (titration of benzodiazepines consider SSRI)  COPD-Acute rehab for endurance traing with Pulse Ox to monitoring oxygen saturation and heart rate with O2 titration to lowest effective dose.  Pulse oximeter checks to shift and at HS to dose and titrate oxygen and aerosol treatments monitor for nocturnal hypoxemia, monitor

## 2022-09-21 NOTE — PLAN OF CARE
Problem: Discharge Planning  Goal: Discharge to home or other facility with appropriate resources  9/21/2022 0809 by Danny Busch RN  Outcome: Progressing  9/21/2022 0157 by Yany Bishop RN  Outcome: Progressing     Problem: Safety - Adult  Goal: Free from fall injury  9/21/2022 0809 by Danny Busch RN  Outcome: Progressing  9/21/2022 0157 by Yany Bishop RN  Outcome: Progressing     Problem: Pain  Goal: Verbalizes/displays adequate comfort level or baseline comfort level  9/21/2022 0809 by Danny Busch RN  Outcome: Progressing  9/21/2022 0157 by Yany Bishop RN  Outcome: Progressing     Problem: Nutrition Deficit:  Goal: Optimize nutritional status  9/21/2022 0809 by Danny Busch RN  Outcome: Progressing  9/21/2022 0157 by Yany Bishop RN  Outcome: Progressing

## 2022-09-21 NOTE — PROGRESS NOTES
Patient reports less stomach cramping/tenderness today. Abdomen remains distended but soft. Has not needed any prn pain medication offered thus far. Stated \" I want to see how I do without it\". Held this mornings dose of Lactulose d/t diarrhea. Did have small BM today. Scheduled for discharge tomorrow. One dose IV Rocephin remaining. Patient did report intermittent burning while IV flushed. Offered to change IV site, patient refused. No redness/edema noted. Resting in  bed at this time.  Electronically signed by Jerry Carter RN on 9/21/22 at 1:19 PM EDT

## 2022-09-21 NOTE — PROGRESS NOTES
Assessment completed. VSS. Medicated with prn norco for 8/10 abdominal pain. Patient reports improvement since having bm. LBM 9/20. Continues IV rocephin for intra-abdominal infection (cx taken from paracentesis fluid (+)). Stop date 9/23. No distress noted. Independent in room. Call light within reach.   Electronically signed by Jt Taylor RN on 9/21/2022 at 1:00 AM
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
## 2022-09-21 NOTE — PROGRESS NOTES
OCCUPATIONAL THERAPY  INPATIENT REHAB TREATMENT NOTE  [facility]      NAME: Deysi Ramirez  : 1985 (43 y.o.)  MRN: [id number]  CODE STATUS: Full Code  [facility]    Date of Service: 2022    Referring Physician: Michael Ramos  Rehab Diagnosis: Impaired mobility and ADLs due to alcoholic encephalopathy    Restrictions  Restrictions/Precautions  Restrictions/Precautions: Seizure, Fall Risk     Patient's date of birth confirmed: Yes    SAFETY:  Safety Devices  Safety Devices in place: Yes  Type of devices: All fall risk precautions in place    SUBJECTIVE:  Subjective: \" The doctor told me that I can't have Tylenol or Ibuprofen because my liver is trying to heal.\"     Pain at start of treatment:  Yes 6/10    Pain at end of treatment:   Yes 6/10    Pain Location: L lateral abdomen  Pain Descriptors: It just hurts  Nursing notified: no  Intervention: None    COGNITION:  Orientation  Overall Orientation Status: Within Functional Limits  Cognition  Overall Cognitive Status: WFL    OBJECTIVE:    Tetris:  Patient engaged in therapeutic activity to increase B FM coordination, B UE ROM/strengthening, new learning and problem solving for ADL's, IADL's and transfers. Patient donned B 1 # wrist weights. Patient completed Tetris activity following verbal instructions and demonstration. Patient able to  die with 0 difficulty. Patient with 0 difficulty rolling die. Patient with MIN FM difficulty picking up pieces from tabletop. Patient with 0 difficulty reaching in forward planes. Patient with 0 difficulty reaching in lateral planes. Patient with 0 errors sorting 80 pieces by shape. Patient required repeated verbal cues to sort pieces by shape and not color. Therapist noted patient stacking pieces once she began sorting them by shape. Patient with 0 difficulty reaching in vertical planes to place pieces in vertical board.    Patient with MIN difficulty with in hand manipulation turning pieces into proper position for placement into board. Patient required repeated verbal cues that the pieces placed in the board did not need to match in color in each column. Therapist noted that patient continued to place pieces in board by matching color also. Patient required MIN verbal cues throughout activity. Patient able to fill 20 X 10 vertical Tetris board with various shaped Tetris pieces leaving 12 of 200 spaces empty. Rest breaks as needed. ASSESSMENT: Patient pleasant throughout session. Activity Tolerance: Patient tolerated treatment well      PLAN OF CARE:  Strengthening, Functional mobility training, Endurance training, Pain management, Safety education & training, Patient/Caregiver education & training, Equipment evaluation, education, & procurement, Self-Care / ADL, Home management training, Coordination training    Plan is for discharge to home on 9/22/22       Long Term Goal 1: Improve endurance for self care  Long Term Goal 2:  Increase self care independence  Long Term Goal 3: Improve bilateral upper extremity strength for I/ADL performance        Therapy Time:   Individual Group Co-Treat   Time In 1500       Time Out 1545         Minutes 45                   Therapeutic activities: 45 minutes     Electronically signed by:    CHAPINCITO Ham,   9/21/2022, 4:02 PM

## 2022-09-21 NOTE — PROGRESS NOTES
Gastroenterology Progress Note    Pati Colon is a 43 y.o. female patient. Hospitalization Day:6    Chief C/O: Cirrhosis, recurrent ascites, role of diuretics    SUBJECTIVE: Seen and examined on recent have unit. Patient reports having diarrhea with single dose of lactulose this morning. She reports improvement in her abdominal bloating/swelling along with slight improvement in her lower extremity swelling. States she feels like she is getting up to use the restroom more often. She continues to receive Rocephin 1 g IV daily. Currently receiving Lasix 40 mg daily and Aldactone 100 mg daily. ROS:  Gastrointestinal ROS: no black or bloody stools    Physical    VITALS:  /66   Pulse (!) 102   Temp 98.4 °F (36.9 °C) (Oral)   Resp 18   Ht 5' 5\" (1.651 m)   Wt 189 lb 6.4 oz (85.9 kg)   LMP  (LMP Unknown) Comment: tubal ligation  SpO2 99%   BMI 31.52 kg/m²   TEMPERATURE:  Current - Temp: 98.4 °F (36.9 °C); Max - Temp  Av.4 °F (36.9 °C)  Min: 98.4 °F (36.9 °C)  Max: 98.4 °F (36.9 °C)    General -alert, oriented, sitting up in wheelchair  Eyes -+ icterus, no pallor  Cardiovascular - RRR  Lungs -clear to auscultation bilaterally  Abdomen -+ distended (grade 2),  non tender, no organomegaly, Bowel sounds present  Extremities -2-3+ BLE edema  Skin -warm/dry, + jaundice  Neuro: Without focal deficit, moves all extremities, no asterixis     Data    Data Review:    No results for input(s): WBC, HGB, HCT, MCV, PLT in the last 72 hours. Recent Labs     22  0530   *   K 3.8      CO2 25   BUN 7   CREATININE 0.55     No results for input(s): AST, ALT, ALB, BILIDIR, BILITOT, ALKPHOS in the last 72 hours.     Component      Latest Ref Rng & Units 2022           3:19 PM   Body Fluid Culture, Sterile          Organism       Staphylococcus epidermidis (A)     Component      Latest Ref Rng & Units 2022           3:19 PM   Body Fluid Culture, Sterile       Direct Exam:  RARE NEUTROPHILS (A) . . .   Organism       Streptococcus viridans group (A)       No results for input(s): LIPASE, AMYLASE in the last 72 hours. No results for input(s): PROTIME, INR in the last 72 hours. Radiology Review:      CT abdomen pelvis without contrast 9/4/2022: Enlarged fatty liver. Small volume ascites and mild ill-defined mesenteric and retroperitoneal inflammation, likely edema and/or pancreatitis. Mild probably reactive wall thickening of the colon. IR performed ultrasound-guided paracentesis on 9/14/2022 with 1510 cc of clear yellow fluid aspirated. Previous endoscopic history:  EGD 9/9/2022: Web in the upper third of the esophagus. Z-line regular, 36 cm from the incisors. Erythematous, granular and congested mucosa in the cardia, gastric fundus, gastric body and antrum. Normal examined duodenum. LA Grade A reflux esophagitis with no bleeding. No specimens collected. ASSESSMENT:  43 y.o. female admitted to inpatient rehab 9/16/2022 after hospital stay from 9/4/2022 - 9/15/2022 for acute alcoholic hepatitis/pancreatitis, EtOH withdrawal, mild WILLIE, macrocytic anemia, thrombocytopenia. Given IV fluids and pain control, s/p paracentesis with approximately 1500 cc removed, fluid analysis not suggestive of SBP, however fluid culture growing GPC in chains, started on Rocephin, EGD revealing esophagitis-no varices, and UA positive for E. coli UTI. GI consulted for cirrhosis, recurrent ascites, role of diuretics. Pt reports increased abdominal girth and bilateral lower extremity edema worsening since admission to rehab.    On 9/4/2022, sodium 134, glucose 127, BUN less than 2, creatinine 0.69, Albumin 2.3, total bilirubin 7.2, alkaline phosphatase 524, ALT 98, , WBC 6.3, Hgb 11.1, .4, platelets 50, CRP 25, lipase 118, amylase 60, INR 1.4, lactic acid 12.1, procalcitonin 0.69, acute hepatitis panel was negative, blood cultures negative, urine culture positive for E. coli, ascitic fluid culture positive for Streptococcus viridans and staphylococcus epidermidis. On 9/17/2022, WBCs 7.8, Hgb 9.5, .9, platelets 083, BUN 8, creatinine 0.72, albumin 1.4, total bilirubin 5.8, alk phos 283, ALT 35, AST 15,     41-year-old female with cirrhosis, likely related to alcohol use. MELD score as of 9/4/2022=20 with 19.6% estimated 3-month mortality. PLAN :  -Medical management per primary/inpatient rehab team.  - Ascites (grade 2) and Lower extremity edema: Increase spironolactone to 100mg mg daily, and Lasix to 40mg daily. Check BMP on 9/22/22. Large volume paracentesis not currently needed as abdomen is not tense, may consider on PRN basis. Limit sodium to < 2gm /day. Add simethicone for gas/bloating.  - Hx of SBP: recent tap with cultures growing gram-positive cocci in chains, final cultures positive for positive for Streptococcus viridans and staphylococcus epidermidis. Continue Rocephin 1 g IV every 24 hours till discharge  - EGD for Variceal screening: Completed 9/9/2022 revealing esophageal web, erythematous, granular and congested mucosa in the cardia, gastric fundus, gastric body and antrum consistent with portal hypertensive gastropathy, normal duodenum, LA grade a esophagitis without bleeding.  - HCC screening/surveillance: Recommend every 6 months. Last imaging: CT scan without liver mass/lesion  - Possible early/mild hepatic encephalopathy: Continue Lactulose - titrate to 2-3 soft bm's per day. Continue at discharge. - Advised to limit NSAID use   -Discussed with patient at length importance of complete alcohol abstinence. Reviewed criteria for transplantation, need for alcohol cessation. Thank you for allowing me to participate in the care of your patient. Please feel free to contact me with any concerns.     Antoni Martinez, APRN - CNP

## 2022-09-21 NOTE — PLAN OF CARE
Problem: Discharge Planning  Goal: Discharge to home or other facility with appropriate resources  Outcome: Progressing     Problem: Safety - Adult  Goal: Free from fall injury  Outcome: Progressing     Problem: Pain  Goal: Verbalizes/displays adequate comfort level or baseline comfort level  Outcome: Progressing     Problem: Nutrition Deficit:  Goal: Optimize nutritional status  Outcome: Progressing

## 2022-09-21 NOTE — PROGRESS NOTES
Physical Therapy Rehab Treatment Note  Facility[facility]Department: [facility]  Room: [place] [address]-       NAME: Kimberly Martinez  : 1985 (43 y.o.)  MRN: [id number]  CODE STATUS: Full Code    Date of Service: 2022       Restrictions:  Restrictions/Precautions: Seizure, Fall Risk       SUBJECTIVE:   Subjective: Pt states she has a BM and feels much better. Pain  Pain: 8/10 abdomen Pt declined intervention      OBJECTIVE:      Bed mobility  Rolling to Left: Independent  Rolling to Right: Independent  Supine to Sit: Independent  Sit to Supine: Independent    Transfers  Sit to Stand: Independent  Stand to sit: Independent  Bed to Chair: Independent  Car Transfer: Independent    Ambulation  Surface: level tile;carpet;uneven  Device: No Device  Assistance: Modified Independent  Gait Deviations: Slow Kamila  Distance: 300ft  Comments: Pt ambulated multiple short distances arounf room and therapy retreiving and transporting items indep. Stairs  # Steps : 12  Stairs Height: 6\"  Rails: Right ascending  Assistance: Modified independent      Balance  Comments: Pt picked up objects from floor while standing indep with c/o increased pain. Pt also utilized reacher indep with decreased pain. Pt has reacher for home. PT Exercises  Standing Open/Closed Kinetic Chain Exercises: sink ex x10 ea         Education Provided: Home Exercise Program  Education  Education Given To: Patient  Education Provided: Home Exercise Program  Education Provided Comments: sink ex for HEP. Education Method: Demonstration;Verbal  Education Outcome: Verbalized understanding;Demonstrated understanding    ASSESSMENT/PROGRESS TOWARDS GOALS:   Assessment: Pt has met goals 1-4. Will assess goal 5 this PM.  Pt indep with HEP.   Goals:  Long Term Goals  Long term goal 1: indep and effecient bed mobility  Long term goal 2: indep and effecient bed, chair and car transfers  Long term goal 3: indep gait with no device 50 feet in familiar environment and supervision for 150 feet in open or unpredictable environments  Long term goal 4: indep flight of stairs with one rail  Long term goal 5: Quiñones to be completed at 50/56 level    PLAN OF CARE/Safety:   Plan Comment: Pt scheduled to D/C tomorrow .   Assess Quiñones and DGI this PM.    Therapy Time:   Individual   Time In 0900   Time Out 0930   Minutes 30     Minutes:  Transfer/Bed mobility trainin  Gait training:15  Neuro re education:0  Therapeutic ex:Silas Davis PTA, 22 at 10:19 AM

## 2022-09-21 NOTE — PROGRESS NOTES
OCCUPATIONAL THERAPY  INPATIENT REHAB TREATMENT NOTE  [facility]      NAME: Fam Bruner  : 1985 (43 y.o.)  MRN: [id number]  CODE STATUS: Full Code  Room: 51/R251-01    Date of Service: 2022    Referring Physician: Shamika Cabezas  Rehab Diagnosis: Impaired mobility and ADLs due to alcoholic encephalopathy    Restrictions  Restrictions/Precautions  Restrictions/Precautions: Seizure, Fall Risk              Patient's date of birth confirmed: Yes    SAFETY:  Safety Devices  Safety Devices in place: Not Applicable (Patient has been deemed independent in her room by interdisciplinary team)    SUBJECTIVE:  Subjective: \"Just the swelling still\" Patient reported that is her concern for discharge. She reported that she has a large group of people to support her including her parents and her boyfriend. Pain at start of treatment: Yes: 6/10    Pain at end of treatment: Yes: 6/10    Location: belly and legs because of the swelling  Nursing notified: Declined  RN: N/A  Intervention: Other: Patient reported that she received pain medication this am and that she is limited in what she can take. She describes it more as discomfort. COGNITION:  Orientation  Overall Orientation Status: Within Functional Limits  Orientation Level: Oriented X4  Cognition  Overall Cognitive Status: WFL      Pt's current cognitive status is:  Comprehension: Independent  Expression: Independent  Social Interaction: Mod I  Problem Solving: Mod I  Memory: Mod I    OBJECTIVE:     Patient was seen for sponge bath ADL    Feeding  Assistance Level: Independent  Grooming/Oral Hygiene  Assistance Level: Independent  Upper Extremity Bathing  Assistance Level: Independent  Lower Extremity Bathing  Assistance Level: Independent  Upper Extremity Dressing  Assistance Level: Independent  Lower Extremity Dressing  Assistance Level:  Independent  Putting On/Taking Off Footwear  Assistance Level: Modified independent  [address] [place] [address] Level: Independent  Toilet Transfers  Equipment: Standard toilet  Assistance Level: Modified independent  Tub/Shower Transfers  Skilled Clinical Factors: Patient requested to not shower on this date and completed a sponge bath         Functional Mobility  Activity: Retrieve items; To/From bathroom; To/From therapy gym  Assistance Level: Modified independent  Sit to Stand  Assistance Level: Independent  Stand to Sit  Assistance Level: Independent     Patient was able to remove items from the refrigerator and remove pan from the oven. Patient was able to carry the pan with 2 hands and have a cup of water on the pan without the water spilling. Patient was issued fine motor coordination HEP to complete post D/C. Patient read over suggestions and reported an understanding. Therapist phone number was provided for HEP questions post D/C      Education:  Education  Education Given To: Patient  Education Provided: Home Exercise Program  Education Method: Verbal;Printed Information/Hand-outs  Barriers to Learning: None  Education Outcome: Demonstrated understanding    Equipment recommendations:  OT Equipment Recommendations  Other: tub chair and hip kit      ASSESSMENT:  Assessment: Patient completed tasks to ready herself for discharge on 9/22/22. Patient currently independent with taking care of herself  Activity Tolerance: Patient tolerated treatment well      PLAN OF CARE:  Strengthening, Functional mobility training, Endurance training, Pain management, Safety education & training, Patient/Caregiver education & training, Equipment evaluation, education, & procurement, Self-Care / ADL, Home management training, Coordination training  Plan is for discharge to home on 9/22/22       Long Term Goal 1: Improve endurance for self care  Long Term Goal 2:  Increase self care independence  Long Term Goal 3: Improve bilateral upper extremity strength for I/ADL performance        Therapy Time:   Individual Group Co-Treat   Time In 0930       Time Out 1030         Minutes 60                   ADL/IADL trainin minutes  Therapeutic activities: 10 minutes     Electronically signed by:    GIOVANY Miller/JHOANA,   2022, 10:24 AM

## 2022-09-21 NOTE — PROGRESS NOTES
[facility]  Rehabilitation  MUSIC THERAPY      Date:  9/21/2022        Patient Name: Alex Fuentes       MRN: [id number]        YOB: 1985 (43 y.o.)       Gender: female  Diagnosis: Impaired mobility and ADLs due to alcoholic encephalopathy  Referring Practitioner: August Fuel    RESTRICTIONS/PRECAUTIONS:  Restrictions/Precautions: Seizure, Fall Risk     Hearing: Within functional limits      TIME OF SESSION: 10:50am - 11:25am     SUBJECTIVE:  \"Sure, I love music. \"     OBJECTIVE:        [x] To Improve Mood     [x] To Increase Social Well-Being  [] To Increase Focus   [x] To Increase Emotional Well-Being  [] To Increase Eye Contact    [x] To Increase Spiritual Well-Being   [] To Decrease Anxiety   [x] To Increase Relaxation   [] To Decrease Pain    [] To Increase Communication  [] To Increase Movement to Music     MUSIC INTERVENTION PROVIDED:     [x] Live Music on Voice  [] Recorded Music   [x] Live Music on Guitar  [x] Discussion Related to Music   [] Live Music on Q-chord  [x] Discussion Related to Pt Experience   [] Live Music on Percussion      PARTICIPATION LEVEL OF PATIENT:     [x] Active with discussion   [] Passive with discussion   [] Active with singing    [x] Passive with singing   [] Active with instrument playing  [] Passive with instrument playing   [x] Actively listening to music   [] Passively listening to music.      OUTCOMES OBSERVED:      [x] Improved Mood   [x] Increased Social Well-Being  [] Increased Focus   [x] Increased Emotional Well-Being  [] Increased Eye Contact    [x] Increased Spiritual Well-Being   [] Decreased Anxiety   [x] Increased Relaxation   [] Decreased Pain    [x] Increased Communication   [] Increased Movement to Music       ASSESSMENT/OBSERVATIONS:     Patient's music interests and/or background: all types, 80s r&b    Patient tolerated todays treatment session:      [x] Good          [] Fair          [] Poor         Comment(s): Patient found

## 2022-09-22 ENCOUNTER — TELEPHONE (OUTPATIENT)
Dept: GASTROENTEROLOGY | Age: 37
End: 2022-09-22

## 2022-09-22 VITALS
OXYGEN SATURATION: 94 % | HEART RATE: 98 BPM | RESPIRATION RATE: 16 BRPM | HEIGHT: 65 IN | TEMPERATURE: 99.1 F | SYSTOLIC BLOOD PRESSURE: 103 MMHG | DIASTOLIC BLOOD PRESSURE: 63 MMHG | BODY MASS INDEX: 31.07 KG/M2 | WEIGHT: 186.5 LBS

## 2022-09-22 DIAGNOSIS — K70.10 ACUTE ALCOHOLIC HEPATITIS: Primary | ICD-10-CM

## 2022-09-22 LAB
ANION GAP SERPL CALCULATED.3IONS-SCNC: 7 MEQ/L (ref 9–15)
BUN BLDV-MCNC: 7 MG/DL (ref 6–20)
CALCIUM SERPL-MCNC: 7.9 MG/DL (ref 8.5–9.9)
CHLORIDE BLD-SCNC: 105 MEQ/L (ref 95–107)
CO2: 25 MEQ/L (ref 20–31)
CREAT SERPL-MCNC: 0.52 MG/DL (ref 0.5–0.9)
GFR AFRICAN AMERICAN: >60
GFR NON-AFRICAN AMERICAN: >60
GLUCOSE BLD-MCNC: 107 MG/DL (ref 70–99)
POTASSIUM SERPL-SCNC: 4.1 MEQ/L (ref 3.4–4.9)
SODIUM BLD-SCNC: 137 MEQ/L (ref 135–144)

## 2022-09-22 PROCEDURE — 97535 SELF CARE MNGMENT TRAINING: CPT

## 2022-09-22 PROCEDURE — 6370000000 HC RX 637 (ALT 250 FOR IP): Performed by: NURSE PRACTITIONER

## 2022-09-22 PROCEDURE — 6370000000 HC RX 637 (ALT 250 FOR IP): Performed by: INTERNAL MEDICINE

## 2022-09-22 PROCEDURE — 97116 GAIT TRAINING THERAPY: CPT

## 2022-09-22 PROCEDURE — 6370000000 HC RX 637 (ALT 250 FOR IP): Performed by: PHYSICAL MEDICINE & REHABILITATION

## 2022-09-22 PROCEDURE — 99232 SBSQ HOSP IP/OBS MODERATE 35: CPT | Performed by: NURSE PRACTITIONER

## 2022-09-22 PROCEDURE — 2580000003 HC RX 258: Performed by: INTERNAL MEDICINE

## 2022-09-22 PROCEDURE — 99239 HOSP IP/OBS DSCHRG MGMT >30: CPT | Performed by: PHYSICAL MEDICINE & REHABILITATION

## 2022-09-22 PROCEDURE — 97112 NEUROMUSCULAR REEDUCATION: CPT

## 2022-09-22 PROCEDURE — 36415 COLL VENOUS BLD VENIPUNCTURE: CPT

## 2022-09-22 PROCEDURE — 6360000002 HC RX W HCPCS: Performed by: INTERNAL MEDICINE

## 2022-09-22 PROCEDURE — 80048 BASIC METABOLIC PNL TOTAL CA: CPT

## 2022-09-22 RX ORDER — HYDROCODONE BITARTRATE AND ACETAMINOPHEN 5; 325 MG/1; MG/1
1 TABLET ORAL
Qty: 6 TABLET | Refills: 0 | Status: SHIPPED | OUTPATIENT
Start: 2022-09-22 | End: 2022-09-25

## 2022-09-22 RX ORDER — NICOTINE 21 MG/24HR
1 PATCH, TRANSDERMAL 24 HOURS TRANSDERMAL DAILY
Qty: 30 PATCH | Refills: 3 | Status: SHIPPED | OUTPATIENT
Start: 2022-09-23

## 2022-09-22 RX ORDER — SPIRONOLACTONE 100 MG/1
100 TABLET, FILM COATED ORAL DAILY
Qty: 30 TABLET | Refills: 0 | Status: SHIPPED | OUTPATIENT
Start: 2022-09-23

## 2022-09-22 RX ORDER — FUROSEMIDE 40 MG/1
40 TABLET ORAL DAILY
Qty: 30 TABLET | Refills: 0 | Status: SHIPPED | OUTPATIENT
Start: 2022-09-23

## 2022-09-22 RX ORDER — LANOLIN ALCOHOL/MO/W.PET/CERES
400 CREAM (GRAM) TOPICAL DAILY
Qty: 30 TABLET | Refills: 0 | Status: SHIPPED | OUTPATIENT
Start: 2022-09-23

## 2022-09-22 RX ORDER — PANTOPRAZOLE SODIUM 40 MG/1
40 TABLET, DELAYED RELEASE ORAL
Qty: 30 TABLET | Refills: 0 | Status: SHIPPED | OUTPATIENT
Start: 2022-09-23

## 2022-09-22 RX ORDER — SIMETHICONE 80 MG
80 TABLET,CHEWABLE ORAL EVERY 6 HOURS PRN
Qty: 30 TABLET | Refills: 0 | Status: SHIPPED | OUTPATIENT
Start: 2022-09-22

## 2022-09-22 RX ORDER — LANOLIN ALCOHOL/MO/W.PET/CERES
100 CREAM (GRAM) TOPICAL DAILY
Qty: 30 TABLET | Refills: 0 | Status: SHIPPED | OUTPATIENT
Start: 2022-09-23

## 2022-09-22 RX ORDER — LACTULOSE 10 G/15ML
20 SOLUTION ORAL 3 TIMES DAILY
Qty: 1200 ML | Refills: 1 | Status: SHIPPED | OUTPATIENT
Start: 2022-09-22

## 2022-09-22 RX ADMIN — HYDROCODONE BITARTRATE AND ACETAMINOPHEN 1 TABLET: 5; 325 TABLET ORAL at 06:18

## 2022-09-22 RX ADMIN — SPIRONOLACTONE 100 MG: 25 TABLET ORAL at 08:24

## 2022-09-22 RX ADMIN — CEFTRIAXONE SODIUM 1000 MG: 1 INJECTION, POWDER, FOR SOLUTION INTRAMUSCULAR; INTRAVENOUS at 12:40

## 2022-09-22 RX ADMIN — PANTOPRAZOLE SODIUM 40 MG: 40 TABLET, DELAYED RELEASE ORAL at 06:18

## 2022-09-22 RX ADMIN — Medication 100 MG: at 08:24

## 2022-09-22 RX ADMIN — Medication 400 MG: at 08:24

## 2022-09-22 RX ADMIN — FUROSEMIDE 40 MG: 40 TABLET ORAL at 08:24

## 2022-09-22 RX ADMIN — THERA TABS 1 TABLET: TAB at 08:24

## 2022-09-22 RX ADMIN — ANTACID TABLETS 500 MG: 500 TABLET, CHEWABLE ORAL at 08:25

## 2022-09-22 RX ADMIN — LACTOBACILLUS TAB 2 TABLET: TAB at 06:18

## 2022-09-22 RX ADMIN — LACTOBACILLUS TAB 2 TABLET: TAB at 11:39

## 2022-09-22 RX ADMIN — HYDROCODONE BITARTRATE AND ACETAMINOPHEN 1 TABLET: 5; 325 TABLET ORAL at 14:20

## 2022-09-22 ASSESSMENT — PAIN DESCRIPTION - LOCATION
LOCATION: GENERALIZED
LOCATION: ABDOMEN;LEG

## 2022-09-22 ASSESSMENT — PAIN SCALES - GENERAL
PAINLEVEL_OUTOF10: 9
PAINLEVEL_OUTOF10: 8

## 2022-09-22 ASSESSMENT — PAIN DESCRIPTION - DESCRIPTORS: DESCRIPTORS: PRESSURE;SORE

## 2022-09-22 NOTE — PROGRESS NOTES
Discharge order received. Hospitalist was in to see pt, medications are reconciled. Pt's mother to pick her up this afternoon. Pt would like a PRN Norco when available prior to d/c.     IV Rocephin infusing, Dr. Romana Rao in to see pt. Electronically signed by Tavo Plata RN on 9/22/2022 at 12:43 PM    Pt's mother here to drive her home. Discharge instructions reviewed and signed. Electronically signed by Tavo Plata RN on 9/22/2022 at 3:15 PM    Pt taken via w/c to hospital exit by transporter with all personal belongings.  Electronically signed by Tavo Plata RN on 9/22/2022 at 3:24 PM

## 2022-09-22 NOTE — PROGRESS NOTES
INDIVIDUALIZED OVERALL REHAB PLAN OF CARE  ADDENDUM TO REHAB PROGRESS NOTE-for audit purposes must also refer to this day's clinical note and combine the information      Date: 2022  Patient Name: Chanelle Knight   Room: K963/J973-86    MRN: [id number]    : 1985  (43 y.o.)  Gender: female       Today 2022 during weekly team meeting, I reviewed the patient Chanelle Knight in detail with the therapists and nurses involved in patient's care gathering complex physiatric data regarding current medical issues, progress in therapies, factors limiting progress, social issues, psychological issues, ongoing therapeutic plans and discharge planning. Legend:  I= independent Im =Modified independent  S=Supervised SB=stand by MOREIRA=set up CG=contact akil Min= minimal Mod=Moderate Max=maximal Max of 2 =maximal assist of 2 people      CURRENT FUNCTIONAL STATUS:    Barriers to progress and discharge complex medical conditions      NURSING ISSUES:  She wants to stay longer dt abd pain. However functionally she is good to go. The medical doctor does not need to keep her any longer we will recheck with them to make sure otherwise she is cleared for discharge from a functional standpoint. Pt on call light C/O pain on L side  of abd. Pt not due for pain med till 0550-offered k-pad pt declined- states bad pain to go to Br to have BM-she states had x4 \"rabbit Turds\". Nursing will continue to focus on bowel and bladder continence transitioning toward independence by time of discharge. Monitoring post void residuals monitoring for severe constipation and bowel obstruction.     Bowel function- constipation  Plans to address- laxative     Bladder function-  continent    Plans to address- schedule voids before bed and therapy     Skin deficits- dressing changes   Plans to address- pressure relief     Hydration/Nutritional deficits- monitoring for dysphagia  Plans to address-Push PO, assist with feeds as needed     Low BP- poor PO intake  Plans to address- check ortho BPs before therapies-and use abdominal binder and TEDs as needed    Pt and Family training goals-  teach home technology options like Essie use and home assistive devices      Focus on achieving ADL goals with co-treating with OT when possible. Focus on cognition and co treat with SLP when possible. PHYSICAL THERAPY  Bed mobility:  Bed mobility  Bridging: Modified independent  (09/16/22 0953)  Rolling to Left: Independent (09/21/22 0918)  Rolling to Right: Independent (09/21/22 0918)  Supine to Sit: Independent (09/21/22 0918)  Sit to Supine: Independent (09/21/22 0918)  Bed Mobility Comments: HOB slightly elevated (09/17/22 0912)  Bed Mobility  Overall Assistance Level: Independent (09/19/22 1421)  Overall Assistance Level: Independent (09/19/22 1421)  Sit to Supine  Assistance Level: Modified independent (09/19/22 1421)  Supine to Sit  Assistance Level: Modified independent (09/19/22 1421)  Scooting  Assistance Level: Modified independent (09/19/22 1421)  Transfers:  Transfers  Sit to Stand: Independent (09/21/22 0301)  Stand to sit: Independent (09/21/22 1029)  Bed to Chair: Independent (09/21/22 9129)  Car Transfer: Independent (09/21/22 4812)  Comment: Good sequencing and safety. (09/17/22 0912)  Transfers  Surface: Wheelchair (09/21/22 1432)  Additional Factors: Verbal cues (09/19/22 1421)  Device:  (no device) (09/21/22 1432)  Sit to Stand  Assistance Level: Independent (09/21/22 1542)  Skilled Clinical Factors: Good safety (09/21/22 1542)  Stand to Sit  Assistance Level: Independent (09/21/22 1542)  Skilled Clinical Factors: Pt needs increased time to complete d/t abdominal pain (09/19/22 1421)  Bed To/From Chair  Technique: Stand step (09/21/22 1542)  Assistance Level:  Independent (09/21/22 1542)  Car Transfer  Assistance Level: Modified independent (09/19/22 1022)  Gait:   Ambulation  Surface: level tile;carpet;uneven (09/21/22 9720)  Device: No Device (09/21/22 7040)  Assistance: Modified Independent (09/21/22 1767)  Quality of Gait: Improved arm swing and less guarding d/t reduced pain. (09/17/22 1341)  Gait Deviations: Slow Kamila (09/21/22 0918)  Distance: 300ft (09/21/22 3997)  Comments: Pt ambulated multiple short distances arounf room and therapy retreiving and transporting items indep. (09/21/22 5002)  Ambulation  Surface: Carpet (09/21/22 1543)  Device:  (No device) (09/21/22 1543)  Distance: Distance not the focus of this session good safety with all gait (09/21/22 1543)  Activity: Within Unit (09/21/22 1543)  Activity Comments: no AD, decreased fluidity (09/21/22 1436)  Assistance Level: Independent (09/21/22 1543)  Gait Deviations: Decreased arm swing bilateral;Unsteady gait; Wide base of support (09/21/22 1543)  Skilled Clinical Factors: Pt able to perform long distance gait this session with minimal deviations and no instability noted. Pt demos good safety without an AD and no balance concerns noted throughout (09/21/22 1436)  Stairs:  Stairs/Curb  Stairs?: Yes (09/20/22 0916)  Stairs  # Steps : 12 (09/21/22 0918)  Stairs Height: 6\" (09/21/22 0918)  Rails: Right ascending (09/21/22 0918)  Device: No Device (09/20/22 0916)  Assistance: Modified independent  (09/21/22 7704)  Comment: Recip pattern with safe technique ascending and descending. (09/17/22 0917)  Stairs  Stair Height: 6'' (09/21/22 1436)  Device:  (no handrails) (09/21/22 1436)  Number of Stairs: 8 (09/21/22 1436)  Additional Factors: Reciprocal going down;Reciprocal going up (09/21/22 1436)  Assistance Level: Modified independent (09/21/22 1436)  Skilled Clinical Factors: Pt able to complete stairs this session without the use of hand rails this session twice with reciprocal ascending and descending.  Pt with good safety and technique without, however is more stable with use of handrail, which pt reports she will do at home. (09/21/22 2597)  W/C mobility:  Wheelchair Activities  Propulsion: Yes (09/16/22 1013)  Propulsion 1  Propulsion: Manual (09/16/22 1013)  Level: Level Tile (09/16/22 1013)  Method: Ary Sinning (09/16/22 1013)  Level of Assistance: Modified independent (09/16/22 1013)  Distance: 50 feet - limited by time (09/16/22 1013)        Pt and Family training goals-  Focus on energy conservation and consistency of function        OCCUPATIONAL THERAPY  Feeding  Assistance Level: Independent (09/21/22 1016)  Grooming/Oral Hygiene  Assistance Level: Independent (09/21/22 1016)  Skilled Clinical Factors: in standing (09/20/22 1248)  Upper Extremity Bathing  Assistance Level: Independent (09/21/22 1016)  Skilled Clinical Factors: Pt complete UE bathing seated in shower chair. (09/20/22 1248)  Lower Extremity Bathing  Assistance Level: Independent (09/21/22 1016)  Skilled Clinical Factors: Pt requires assistance reaching lower parts of LEs and feet due to limited ROM/functional reach at this time. (09/17/22 0934)  Upper Extremity Dressing  Assistance Level: Independent (09/21/22 1016)  Skilled Clinical Factors: setup for bra, MI for shirt (09/20/22 1248)  Lower Extremity Dressing  Assistance Level: Independent (09/21/22 1016)  Skilled Clinical Factors: cued pt to complete in sitting due to fatigue and limited balance (09/20/22 1248)  Putting On/Taking Off Footwear  Assistance Level: Modified independent (09/21/22 1016)  Skilled Clinical Factors: assist to doff socks (09/20/22 1248)  Toileting  Assistance Level: Independent (09/21/22 1016)  Skilled Clinical Factors: NT (09/20/22 1248)  Toilet Transfers  Technique: Stand step (09/21/22 1408)  Equipment: Standard toilet (09/21/22 1408)  Additional Factors: With handrails (09/21/22 1408)  Assistance Level: Modified independent (09/21/22 1408)  Skilled Clinical Factors: ambulated (09/20/22 1549)  Tub/Shower Transfers  Type: Shower (09/20/22 1248)  Transfer From: Bed (09/17/22 7422)  Transfer To:  Shower chair with back (09/20/22 1248)  Additional Factors: Set-up; Verbal cues;Cues for hand placement; Increased time to complete; With handrails (09/17/22 0934)  Assistance Level: Supervision (09/20/22 1248)  Skilled Clinical Factors: Patient requested to not shower on this date and completed a sponge bath (09/21/22 1016)  ADL  Feeding: Independent (09/21/22 1325)  Grooming: Independent (09/21/22 1325)  Grooming Skilled Clinical Factors: Grooming standing at the sink (09/16/22 0953)  UE Bathing: Independent (09/21/22 1325)  LE Bathing: Independent (09/21/22 1325)  LE Bathing Skilled Clinical Factors: Required assistance to wash feet (09/16/22 0953)  UE Dressing: Independent (09/21/22 1325)  LE Dressing: Independent (09/21/22 1325)  LE Dressing Skilled Clinical Factors: Pt able to don/doff B hosptial socks but required min assist to don/pull up pants (09/16/22 0953)  Toileting: Independent (09/21/22 1325)  Toileting Skilled Clinical Factors: Pt declined need to toliet (09/16/22 4048)  Toilet Transfers  Toilet - Technique: Ambulating (09/21/22 1325)  Toilet Transfer: Independent (09/21/22 1325)    Plans for addressing ADL deficits affecting: Focus on sequencing. Skin deficits- no problems noted   Plans to address- continue to monitor                SPEECH THERAPY/SLP     Comprehension: Within Functional Limits  Verbal Expression: Within functional limits    Plans for cognitive and communication issues affecting addressing:   Pt and Family training goals-  teach patient family memory strategies      Diet/Swallow:        Dysphagia Outcome Severity Scale: Level 7: Normal in all situations    Compensatory Swallowing Strategies :  Alternate solids and liquids, Upright as possible for all oral intake, Remain upright for 30-45 minutes after meals             COGNITION  OT: Cognition Comment: Patient requires increased time for processing and problem solving @FLOWTIME(304  SP:        Social History     Socioeconomic History    Marital status: Single     Spouse name: Not on file help from   SO            And: Home Health Care:     [x]  PT    [x]  OT    []  ST   [x]  Aide   [x]  SW    [x]  RN               Or preferably     Outpatient Therapy:  []  PT    []  OT    []  ST   []  Rehab     Psych                 Equipment:  [employer]      At D/C their function is goaled at:   PT:Long term goal 1: indep and effecient bed mobility  Long term goal 2: indep and effecient bed, chair and car transfers  Long term goal 3: indep gait with no device 50 feet in familiar environment and supervision for 150 feet in open or unpredictable environments  Long term goal 4: indep flight of stairs with one rail  Long term goal 5: Qu[place]ones to be completed at 50/56 level  OT:Eating  Assistance Needed: Independent  CARE Score: 6  Discharge Goal: Independent, Oral Hygiene  Assistance Needed: Independent  CARE Score: 6  Discharge Goal: Independent, [address] needed: Independent  Reason if not Attempted: Not attempted due to environmental limitations  CARE Score: 6  Discharge Goal: Independent, Shower/Bathe Self  Assistance Needed: Independent  CARE Score: 6  Discharge Goal: Independent  Upper Body Dressing  Assistance Needed: Independent  CARE Score: 6  Discharge Goal: Independent, Lower Body Dressing  Assistance Needed: Independent  CARE Score: 6  Discharge Goal: Independent, Putting On/Taking Off Footwear  Assistance Needed: Independent  CARE Score: 6  Discharge Goal: Independent, Toilet Transfer  Assistance needed: Independent  CARE Score: 6  Discharge Goal: Independent  SP:Long-term Goals  Timeframe for Long-term Goals: 1-2 weeks  Goal 1: Pt will improve his Cognition from mod assist to standby for adequate functional recall and safety awareness for home and community.   Long-term Goals  Goal 1: N/A           From a cognitive standpoint they will need:        24 hr   supervision  --progress to occasional             Significant problems/ barriers to functional progress include: Pt is at a high risk for

## 2022-09-22 NOTE — DISCHARGE INSTRUCTIONS
AM hospitalist PN     Subjective:   Doing well. More alert.  Denies pain at this time. had a BM this morning.    Inpatient Medications:  • digoxin  125 mcg Intravenous Daily   • triamcinolone   Topical BID   • heparin (porcine)  5,000 Units Subcutaneous 3 times per day   • fat emulsion  250 mL Intravenous Q24H   • micafungin (MYCAMINE) IVPB  100 mg Intravenous Daily   • pantoprazole  40 mg Intravenous 2 times per day   • tigecycline (TYGACIL) IVPB  50 mg Intravenous 2 times per day      melatonin, guaiFENesin-DM), HYDROmorphone, dextrose, ondansetron, sodium chloride, acetaminophen, diphenhydrAMINE   Medications Prior to Admission   Medication Sig Dispense Refill   • acetaminophen (TYLENOL) 325 MG tablet Take 2 tablets by mouth every 4 hours as needed for Pain.     • mirtazapine (REMERON) 7.5 MG tablet Take 1 tablet by mouth nightly. 30 tablet 0   • dilTIAZem (CARDIZEM) 60 MG tablet Take 1 tablet by mouth every 6 hours. 120 tablet 0   • enoxaparin (LOVENOX) 40 MG/0.4ML injectable solution Inject 0.4 mLs into the skin every evening. 12 mL 0   • fat emulsion 20 % injection Inject 250 mLs into the vein every 48 hours. Do not start before May 2, 2020. 250 mL 0   • ondansetron (ZOFRAN) 4 MG/2ML injectable solution Inject 2 mLs into the vein every 8 hours as needed (nausea). 60 mL 0   • digoxin (LANOXIN) 0.25 MG/ML injection Inject 0.5 mLs into the vein daily. Do not start before May 2, 2020. 30 mL 0   • clindamycin (CLEOCIN) 300 MG/50ML in dextrose 5% premix IVPB Inject 50 mLs into the vein every 8 hours for 10 days. 1500 mL 0   • diphenhydrAMINE (BENADRYL) 50 MG/ML injectable solution Inject 0.5 mLs into the vein every 4 hours as needed for Itching.     • acetaminophen (TYLENOL) 650 MG suppository Place 1 suppository rectally every 6 hours as needed for Fever or Pain.     • pantoprazole (PROTONIX) 40 MG tablet Take 1 tablet by mouth every 12 hours. 60 tablet 0   • furosemide (LASIX) 20 MG tablet Take 1 tablet by mouth  Do not drink alcohol or smoke. Take all medications as prescribed. Follow up with your physicians as scheduled. Activity per therapy guidelines. daily. Do not start before May 2, 2020. 30 tablet 0        Objective:  Temp:  [97.3 °F (36.3 °C)-98.1 °F (36.7 °C)] 97.3 °F (36.3 °C)  Heart Rate:  [76-88] 83  Resp:  [16-18] 16  BP: (129-180)/(77-84) 129/77         Intake/Output Summary (Last 24 hours) at 5/10/2020 1220  Last data filed at 5/10/2020 1206  Gross per 24 hour   Intake 2820.17 ml   Output 1881 ml   Net 939.17 ml     Physical Exam   Constitutional: No distress.   Ill in appearance, cachectic, malnourished   HENT: R IJ CVP, L cervical esophagoscotomy scar, no drainage/redness  Head: Normocephalic and atraumatic.   Eyes: Pupils are equal, round, and reactive to light. Conjunctivae and EOM are normal. Right eye exhibits no discharge. Left eye exhibits no discharge. No scleral icterus.   Cardiovascular: Normal rate.   Pulmonary/Chest: She has no wheezes. She has no rales.   Significantly decreased bibasilar breath sounds   Abdominal: Soft. She exhibits no distension. + J tube placement, no drainge/bleeding/erythema. Hypoactive bowel sounds  Musculoskeletal:         General: Edema present. No deformity.   She is alert.   A&Ox2   Skin: Skin is warm and dry. Rash noted. She is not diaphoretic. There is pallor.     Imaging:    .XR CHEST PA OR AP 1 VIEW  Narrative: Exam: Portable chest x-ray.    CLINICAL HISTORY: Cough.  Esophageal surgery.    TECHNIQUE: A single portable frontal semiupright view of the chest was performed.    COMPARISON: Chest x-ray from 05/07/2020.    FINDINGS:    Left-sided PICC line is similarly positioned.  Right-sided chest tube and tube overlying the left cardiac silhouette appears stable.  Small bilateral pleural effusions with basilar atelectasis or infiltrates are unchanged.  No sizable pneumothorax is   evident.  Impression: No significant interval change.    Electronically Signed by: RUBEN HART M.D.   Signed on: 5/9/2020 6:17 AM         Admission EKG:   Encounter Date: 05/01/20   Electrocardiogram 12-Lead   Result Value     Ventricular Rate EKG/Min (BPM) 107    Atrial Rate (BPM) 108    NV-Interval (MSEC) 139    QRS-Interval (MSEC) 85    QT-Interval (MSEC) 306    QTc 409    P Axis (Degrees) 40    R Axis (Degrees) -41    T Axis (Degrees) 177    REPORT TEXT      Sinus tachycardia  Multiple premature complexes, vent & supraven  Left anterior fascicular block  LVH with secondary repolarization abnormality  Confirmed by ROSY LEZAMA MD ([id number]) on 5/9/2020 9:54:34 AM         Cultures:  Blood Cx x 2: NGTD 4/29    -Repeat BLC x2: NGTD 5/2  Pleural Fluid: L Chest tube: NGTD 5/3  R Chest tube culture: NGTD 5/3     Primary Care Physician  Deloris Nair MD     Assessment and Plan:      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 fib with recent RVR, now with bradycardia  Anemia, likely multifactorial due to chronic malnutrition/chronic disease  Diffuse drug rash with persistent eosinophilia   Moderate protein calorie malnutrition  Gastric outlet obstruction  Deconditioning    Plan:  Cultures repeated per ID given uptrending leukocytosis  Still pending for today  As patient had BM, may be able to increase tube feedings to goal? Will discuss with surgical team. If so, may be able to discontinue TPN perhaps tomorrow  Continue oxygen supplementation  Cont tele  Restart digoxin, heart rate now mostly in high 90s  Encourage I-S/deep breathing  Continue bilateral chest tubes  Renew TPN  Cont on micafungin/tigecycline per ID. Monitor leukocytosis closely   GI evaluated, no recommendation for further endoscopic procedures  Status post PRBC, hemoglobin stable  Transfuse hemoglobin goal above 7  Pt/ot  Scd, resumed heparin     Code: FULL code, decisional, Spouse is  surrogate    D/w RN   MD Savanna Gibson MD [facility] Hospitalist

## 2022-09-22 NOTE — TELEPHONE ENCOUNTER
Prakash Rider, please call patient and have her follow up in the GI clinic in 2-4 weeks. Have her complete BMP in 1 week (ordered). Thank you.

## 2022-09-22 NOTE — PROGRESS NOTES
Hospitalist Consult/Progress Note  9/22/2022 10:11 AM    Assessment and Plan:   Acute alcoholic hepatitis / pancreatitis   - treated with IVFs, pain control  - Maddrey score < 32, no indication for prednisolone per GI  - s/p paracentesis with drainage of 1500 ml per IR  - slowly improving  - fluid analysis was not suggestive of SBP  - fluid culture is growing GPC in chains  - started on Rocephin, follow final culture result  - GI managing     Esophagitis   - continue protonix     E. Coli UTI  - on IV Rocephin    Severe alcohol use disorder with risk for acute alcohol withdrawal  - treated with librium, gabapentin, clonidine   - on Lorazepam per CIWA protocol  - continue thiamine, multivitamin     Hypokalemia, hypomagnesemia, hypophosphatemia  - lstable    Mild WILLIE  - improved  - resolved     Macrocytic anemia  - in the setting of alcohol use  - stable     Thrombocytopenia  - mild, likely related to alcohol use and liver disease, improving  - stable     Generalized weakness , Gait instability and Decreased, Functional Status  - Fall precautions  - PT OT to evaluate  - Maximize nutrition status  - Assessing if needs DME at home  -  on board  - discharge today  - [facility] rec updated    Bowel Regimen and GI Ppx  - stool softners PRN     Diet:  ADULT DIET; Regular; Low Sodium (2 gm)  ADULT ORAL NUTRITION SUPPLEMENT; Dinner; Standard High Calorie/High Protein Oral Supplement    Advance Directive:  Full Code     Nutrition status  - Supplemental Vitamins ordered  - Dietitian assessment    Vaccinations  - Immunization records reviewed    DVT prophylaxis  - DIVINE hose    Discharge planning  - SW on board. High Risk Readmission Screening Tool Score Noted.      Additionally, the following hospital problems were addressed:  Principal Problem:    Impaired mobility and activities of daily living dt encephalopathy and gait ataxia  Active Problems:    Alcoholic hepatitis with ascites    Generalized pain    Myalgia, unspecified site    Toe deformity, acquired    Acute alcoholic hepatitis    Impaired mobility and ADLs    GI bleeding  Resolved Problems:    * No resolved hospital problems. *      ** Total time spent reviewing medical records, evaluating patient, speaking with RN's and consultants where I was focused exclusively on this patient: 35 minutes. This time is excluding time spent performing procedures or significant events occurring earlier or later in the day requiring my attention and focus. Subjective:   Admit Date: 9/15/2022  PCP: Ailyn Iglesias MD  42 y/o female with history of alcohol and tobacco use disorder who is transferred to acute rehab for further therapies after inpatient admission for acute alcoholic hepatitis and pancreatitis. Underwent paracentesis. Cultures growing GPC in chains and she is on rocephin. Once medically optimized she was transferred to acute rehab for further treatment and therapies. No acute events overnight. Afebrile. No new complaints. Pt denies chest pain, SOB, N/V, fevers or chills. Objective:     Vitals:    09/21/22 0226 09/21/22 0800 09/21/22 1617 09/21/22 1939   BP:  110/66  103/63   Pulse:  (!) 102  98   Resp:  18  18   Temp:  98.4 °F (36.9 °C)  99.1 °F (37.3 °C)   TempSrc:  Oral  Oral   SpO2:    94%   Weight: 189 lb 6.4 oz (85.9 kg)  186 lb 8 oz (84.6 kg)    Height:         General appearance: No acute distress,  No conversational dyspnea noted. Dentition intact. Answers questions appropriately  Neurological: Alert, awake, and oriented x3. Motor and sensory grossly intact. No focal deficits. GCS of 15. Lungs: CTAB, no exp wheezes, No rales No retractions; No use of accessory muscles  Heart:  S1, S2 normal, RRR, no MRG appreciated  Abdomen: (+) BS, round, distended. Active bowel sounds  Extremities:  no cyanosis, 2+ edema bilat lower exts, no calf tenderness bilaterally.  Dry skin noted       Medications:      dextrose        spironolactone  100 mg Oral Daily    furosemide  40 mg Oral Daily    lactulose  20 g Oral TID    thiamine  100 mg Oral Daily    Vitamin D  2,000 Units Oral Dinner    cyanocobalamin  1,000 mcg IntraMUSCular Weekly    coenzyme Q10  100 mg Oral Daily    lidocaine  3 patch TransDERmal Daily    calcium carbonate  500 mg Oral BID    lactobacillus acidophilus  2 tablet Oral TID AC    cefTRIAXone (ROCEPHIN) IV  1,000 mg IntraVENous Q24H    magnesium oxide  400 mg Oral Daily    multivitamin  1 tablet Oral Daily    nicotine  1 patch TransDERmal Daily    pantoprazole  40 mg Oral QAM AC       LABS Reviewed    IMAGING Reviewed    SANTA Vidal NP      Additional work up or/and treatment plan may be added today or then after based on clinical progression. I am managing a portion of pt care. Some medical issues are handled by other specialists and Primary Rehabilitation provider. Additional work up and treatment should be done in out pt setting by pt PCP and other out pt providers. I personally obtained the key and critical portions of the history and physical exam and made additions where appropriate in the documentation.  I reviewed the mid level documentation and agree with assessment and plan that we come up with together    Estela Whatley,   Internal Medicine

## 2022-09-22 NOTE — DISCHARGE INSTR - COC
Signs: /63   Pulse 98   Temp 99.1 °F (37.3 °C) (Oral)   Resp 18   Ht 5' 5\" (1.651 m)   Wt 186 lb 8 oz (84.6 kg)   LMP  (LMP Unknown) Comment: tubal ligation  SpO2 94%   BMI 31.04 kg/m²     Last documented pain score (0-10 scale): Pain Level: 9  Last Weight:   Wt Readings from Last 1 Encounters:   09/21/22 186 lb 8 oz (84.6 kg)     Mental Status:  oriented, alert, thought processes intact, and able to concentrate and follow conversation    IV Access:  - None    Nursing Mobility/ADLs:  Walking   Independent  Transfer  Independent  Bathing  Independent  Dressing  Independent  [address] Delivery   whole    Wound Care Documentation and Therapy:        Elimination:  Continence: Bowel: Yes  Bladder: Yes  Urinary Catheter: None   Colostomy/Ileostomy/Ileal Conduit: No       Date of Last BM: 9/22/22  No intake or output data in the 24 hours ending 09/22/22 1408  No intake/output data recorded. Safety Concerns:     None    Impairments/Disabilities:      None    Nutrition Therapy:  Current Nutrition Therapy:   - Oral Diet:  Low Sodium (2gm) and avoid deep fried foods    Routes of Feeding: Oral  Liquids: Thin Liquids  Daily Fluid Restriction: no  Last Modified Barium Swallow with Video (Video Swallowing Test): not done    Treatments at the Time of Hospital Discharge:   Respiratory Treatments: N/A  Oxygen Therapy:  is not on home oxygen therapy. Ventilator:    - No ventilator support    Rehab Therapies: Physical Therapy and Occupational Therapy  Weight Bearing Status/Restrictions: No weight bearing restrictions  Other Medical Equipment (for information only, NOT a DME order: Shower chair, grab bar  Other Treatments: N/A    Patient's personal belongings (please select all that are sent with patient): All personal belongings sent home with patient.     RN SIGNATURE:  Electronically signed by Lida Shaikh RN on 9/22/22 at 2:13 PM EDT    CASE MANAGEMENT/SOCIAL WORK SECTION    Inpatient Status Date: ***    Readmission Risk Assessment Score:  Readmission Risk              Risk of Unplanned Readmission:  25           Discharging to Facility/ Agency   Name:   Address:  Phone:  Fax:    Dialysis Facility (if applicable)   Name:  Address:  Dialysis Schedule:  Phone:  Fax:    / signature: {Esignature:[id number]}    PHYSICIAN SECTION    Prognosis: {Prognosis:5753133092}    Condition at Discharge: [facility] Patient Condition:706397352}    Rehab Potential (if transferring to Rehab): {Prognosis:0756344766}    Recommended Labs or Other Treatments After Discharge: ***    Physician Certification: I certify the above information and transfer of Andrew Lomas  is necessary for the continuing treatment of the diagnosis listed and that she requires {Admit to Appropriate Level of Care:29060} for {GREATER/LESS:999428609} 30 days.      Update Admission H&P: {CHP DME Changes in KWTUA:203898211}    PHYSICIAN SIGNATURE:  {Esignature:[id number]}

## 2022-09-22 NOTE — PROGRESS NOTES
CLINICAL PHARMACY NOTE: MEDS TO BEDS    Total # of Prescriptions Filled: 7   The following medications were delivered to the patient:  Pantoprazole 40mg tab  Magnesium Oxide 400mg tab  Thiamine 100mg tab  Lactulose 10gm/15ml sol   Gas Relief 80mg chew  Spironolactone 100mg tab  Furosemide 40mg tab    Additional Documentation:

## 2022-09-22 NOTE — DISCHARGE SUMMARY
Subjective: The patient complains of severe acute on chronic progressive confusion, fatigue and gait ataxia and progressive ascites partially relieved by rest, medications, PT,  OT,   SLP and rest and exacerbated by recent illness -pancreatitis and toxic encephalopathy with gait ataxia-PMH of alcohol abuse and tobacco abuse who presented to ED with alcoholic hepatitis. Patient stated she knew her liver was \"gone\" so she had been trying to cut back on drinking, but still drinks 4-5 small bottles of fireball a day. Her last drink was on 9/4/2022 (day of admission) at 4am after waking up. She later developed sharp RUQ pain. CT of abdomen showed enlarged fatty liver, small volume ascites and mild ill-defined mesenteric and retroperitoneal inflammation, likely edema and/or pancreatitis. I have been concerned about patients medical complexities and barriers to advancing in rehab goals including low back pain and refusal to stop drinking. She however has progressed well functionally she continues to have problems with distended abdomen and will likely require follow-up and possible repeat paracentesis. I reviewed current care and plans for further care with other rehab providers including nursing and case management. According to recent nursing note, \" Pt on call light C/O pain on L side  of abd. Pt not due for pain med till 0550-offered k-pad pt declined- states bad pain to go to Br to have BM-she states had x4 \"rabbit Turds\". [address] Course: The patient was admitted to the Rehabilitation Unit to address ADL and mobility deficits-as detailed above and below. The patient was enrolled in acute PT, OT program.  Weekly team meetings were held to assess functional progress toward their goals-and modify the therapy program.  The patient's medical, emotional, psychosocial and functional issues were addressed. The patient progressed in the rehab program and is now ready for discharge home. Refer to functional assessments summary report for detailed functional status. Refer to the medical problem list below to see the medical issues addressed. The social and DC complexities are detailed in the DC planning section below. Greater than  35 minutes was spent on coordinating patients discharge including follow-up care, medications and patient/family education. Extended time needed because of the potential use of controlled medications are high risk medications and a high risk population individual.  Patient and family were instructed to use lowest effective dose of these medications and slowly titrate off over the next 2 to 4 weeks. They are not to combine opiates with sedatives. I reviewed her [facility] prescription monitoring service data sheets in hopes of eliminating polypharmacy and weaning to the lowest effective dose of pain medications and eliminating the concomitant use of benzodiazepines. I see   no medications of concern. I see   no habits of combining sedatives and narcotics. I again reiterated the patient needs to stop drinking in order to prevent further deterioration in her health and liver function. Her swelling and abdominal bloating is somewhat improved status post diarrhea for lactulose. She also got a dose of Aldactone. She is for her last dose of IV Rocephin tomorrow prior to her discharge. ROS x10: The patient also complains of severely impaired mobility and activities of daily living. Otherwise no new problems with vision, hearing, nose, mouth, throat, dermal, cardiovascular, GI, , pulmonary, musculoskeletal, psychiatric or neurological. See also Acute Rehab PM&R H&P.        Vital signs:  /63   Pulse 98   Temp 99.1 °F (37.3 °C) (Oral)   Resp 18   Ht 5' 5\" (1.651 m)   Wt 186 lb 8 oz (84.6 kg)   LMP  (LMP Unknown) Comment: tubal ligation  SpO2 94%   BMI 31.04 kg/m²   I/O:   PO/Intake:  fair PO intake,   regular low-salt diet    Bowel:   continent occasional diarrhea-due to lactulose  Bladder: continent   no greenberg  General:  Patient is well developed,   adequately nourished, and    well kempt. HEENT:    Pupils equal, hearing intact to loud voice, external inspection of ear and nose benign. Inspection of lips, tongue and gums benign    Musculoskeletal: No significant change in strength or tone. All joints stable. Inspection and palpation of digits and nails show no clubbing, cyanosis or inflammatory conditions. Neuro/Psychiatric: Affect: flat but pleasant. Alert and oriented to person, place and situation with min cues. No significant change in deep tendon reflexes or sensation  Lungs:  Diminished, CTA-B. Respiration effort is   normal at rest.     Heart:   S1 = S2,   RRR. Abdomen:  Ascites,  Soft,  mild-tender,    Extremities:   Mild-mod lower extremity edema    Skin:   Intact to general survey,      Rehabilitation:  Physical Therapy:   Bed mobility:  Bed mobility  Bridging: Modified independent  (09/16/22 0953)  Rolling to Left: Independent (09/21/22 0918)  Rolling to Right: Independent (09/21/22 0918)  Supine to Sit: Independent (09/21/22 0918)  Sit to Supine: Independent (09/21/22 0918)  Bed Mobility Comments: HOB slightly elevated (09/17/22 0912)  Bed Mobility  Overall Assistance Level: Independent (09/19/22 1421)  Overall Assistance Level: Independent (09/19/22 1421)  Sit to Supine  Assistance Level: Modified independent (09/19/22 1421)  Supine to Sit  Assistance Level: Modified independent (09/19/22 1421)  Scooting  Assistance Level: Modified independent (09/19/22 1421)  Transfers:  Transfers  Sit to Stand: Independent (09/21/22 6667)  Stand to sit:  Independent (09/21/22 0276)  Bed to Chair: Independent (09/21/22 7307)  Car Transfer: Independent (09/21/22 0702)  Comment: Good sequencing and safety. (09/17/22 0912)  Transfers  Surface: Wheelchair (09/21/22 1432)  Additional Factors: Verbal cues (09/19/22 1421)  Device:  (no device) Remain upright for 30-45 minutes after meals          Lab/X-ray studies reviewed, analyzed and discussed with patient and staff:   Recent Results (from the past 24 hour(s))   Basic Metabolic Panel    Collection Time: 09/22/22  4:56 AM   Result Value Ref Range    Sodium 137 135 - 144 mEq/L    Potassium 4.1 3.4 - 4.9 mEq/L    Chloride 105 95 - 107 mEq/L    CO2 25 20 - 31 mEq/L    Anion Gap 7 (L) 9 - 15 mEq/L    Glucose 107 (H) 70 - 99 mg/dL    BUN 7 6 - 20 mg/dL    Creatinine 0.52 0.50 - 0.90 mg/dL    GFR Non-African American >60.0 >60    GFR  >60.0 >60    Calcium 7.9 (L) 8.5 - 9.9 mg/dL         CT ABDOMEN PELVIS  9/4/2022     ENLARGED FATTY LIVER. SMALL VOLUME ASCITES AND MILD ILL-DEFINED MESENTERIC AND RETROPERITONEAL INFLAMMATION, LIKELY EDEMA AND/OR PANCREATITIS. MILD PROBABLY REACTIVE WALL THICKENING OF THE COLON. IR US GUIDED PARACENTESIS  : Survey of the abdomen showed large amount of ascites fluid. After obtaining informed consent, the patient was positioned supine on the sonography table. Using ultrasound, the skin over the left hemiabdomen was locally anesthetized with 1% lidocaine. Following that, a Yueh needle was advanced into the fluid pocket using ultrasound visualization. 1510cc, of clear yellow fluid were aspirated and sent for cytology, and pathology. The needle was removed, and hemostasis was obtained with pressure. A Band-Aid was placed. Post procedure images did not demonstrate hemorrhage at the target site. The patient tolerated the procedure well. The patient left the department in good condition. A radiology nurse was in presence monitoring vital signs, assisting throughout the procedure. Previous extensive, complex labs, notes and diagnostics reviewed and analyzed.      ALLERGIES:    Allergies as of 09/15/2022 - Fully Reviewed 09/15/2022   Allergen Reaction Noted    Oxycodone-acetaminophen Itching 01/11/2016      (please also verify by checking MAR)      Today I evaluated this patient for periodic reassessment of medical and functional status. The patient was discussed in detail at the treatment team meeting focusing on current medical issues, progress in therapies, social issues, psychological issues, barriers to progress and strategies to address these barriers, and discharge planning. See the addendum to rehab progress note-as a second progress note in the chart. The patient continues to be high risk for future disability and their medical and rehabilitation prognosis continue to be good and therefore, we will continue the patient's rehabilitation course as planned. The patient's tentative discharge date was set. Patient and family education was discussed. The patient was made aware of the team discussion regarding their progress. Complex Physical Medicine & Rehab Issues Addressed during rehab:   Severe abnormality of gait and mobility and impaired self-care and ADL's secondary to progressive  Hepatitc Encephalopathy . Functional and medical status reassessed regarding patients ability to participate in therapies and patient found to be able to participate in acute intensive comprehensive inpatient rehabilitation program including PT/OT to improve balance, ambulation, ADLs, and to improve the P/AROM. Therapeutic modifications regarding activities in therapies, place, amount of time per day and intensity of therapy made daily. In bed therapies or bedside therapies prn. Bowel and Bladder dysfunction  , Neurogenic bowel and bladder:  frequent toileting, ambulate to bathroom with assistance, check post void residuals. Check for C.difficile x1 if >2 loose stools in 24 hours, continue bowel & bladder program.  Monitor bowel and bladder function. Lactinex 2 PO every AC. MOM prn, Brown Bomb prn, Glycerin suppository prn, enema prn. Encourage therapy and nursing to co-treat and problem solve re continence.     Severe Abd pain and mid and low back pain as well as generalized OA pain: reassess pain every shift and prior to and after each therapy session, give prn lowest effective dose of Norco   modalities prn in therapy, masage, Lidoderm, K-pad prn. Consider scheduled AM pain meds. Skin healing  and breakdown risk:  continue pressure relief program.  Daily skin exams and reports from nursing. Fatigue due to nutritional and hydration deficiency: Add and titrate vitamin B12 vitamin D and CoQ10 continue to monitor I&Os, calorie counts prn, dietary consult prn. Add healthy snack at night. Acute episodic insomnia with situational adjustment disorder:  prn Ambien, monitor for day time sedation. Falls risk elevated:  patient to use call light to get nursing assistance to get up, bed and chair alarm. Elevated DVT risk: progressive activities in PT, continue prophylaxis DIVINE hose, elevation . Complex discharge planning:   SP medication reconciliation, patient and family education, and medication simplification coordinating follow-up care with case management and social work as we progressed toward patient's plan pending discharge 9/22/2020 to home with significant other. We are advising a 12-step program however patient has recently been in at least a month of alcohol rehab over this past summer and went back to drinking within a week. SP weekly team meeting every Monday to re-assess progress towards goals, discuss and address social, psychological and medical comorbidities and to address difficulties they may be having progressing in therapy. Patient and family education is in progress. The patient is to follow-up with their family physician after discharge.         Complex Active General Medical Issues that complicated care:      Alcoholic hepatitis with ascites-avoid toxic medications discontinue Tylenol and limited use of Norco for severe pain lower dose and minimize reliance on Norco let  GERD,   GI bleeding-add Tums add PPI-Elevate head of bed after meals, monitor stools for blood, lowest effective dose of PPI, consider Tums. Acute pancreatitis and abdominal infection-IV Rocephin add lactobacillus  ETOHism-transition to top 12-step program add CIWA protocol  Depression-emotional support provided daily, vitamin B12, encourage participation in rehabilitation support group and recreational therapy, adjust/add medications (titration of benzodiazepines consider SSRI)  COPD-Acute rehab for endurance traing with Pulse Ox to monitoring oxygen saturation and heart rate with O2 titration to lowest effective dose. Pulse oximeter checks to shift and at HS to dose and titrate oxygen and aerosol treatments monitor for nocturnal hypoxemia, monitor vital signs, oxygen prn. Focus on energy conservation. Vit B12 deficiency-Add high-dose vitamin D, recheck vitamin D level out after discharge,    Generalized pain, Myalgia, unspecified site    Toe deformity, and active chronic foot pain-topical agents, probably proper footwear avoid opiates if possible    Acute alcoholic hepatitis-avoid toxic medications-DC tylenol--limit doses of Norco.  Impulsivity-and encephalopathy with impaired cognition-focus on balance and safety add speech-language pathology        Focus on reassessing rehab goals and coordinating therapy and medical self care issues.           Electronically signed by Francisco Villagomez DO on 9/19/22 at 8:21 AM EDT       Roberta Mckay D.O., PM&R     Attending    [address]

## 2022-09-22 NOTE — PROGRESS NOTES
Gastroenterology Progress Note    Kimberly Martinez is a 43 y.o. female patient. Hospitalization Day:7    Chief C/O: Cirrhosis, recurrent ascites, role of diuretics    SUBJECTIVE: Seen and examined on rehab unit. She reports improvement in her abdominal bloating/swelling along with slight improvement in her lower extremity swelling. States she feels like she is getting up to use the restroom more often. She continues to receive Rocephin 1 g IV daily. Currently receiving Lasix 40 mg daily and Aldactone 100 mg daily. Plan is to discharge home later today. ROS:  Gastrointestinal ROS: no black or bloody stools    Physical    VITALS:  /63   Pulse 98   Temp 99.1 °F (37.3 °C) (Oral)   Resp 18   Ht 5' 5\" (1.651 m)   Wt 186 lb 8 oz (84.6 kg)   LMP  (LMP Unknown) Comment: tubal ligation  SpO2 94%   BMI 31.04 kg/m²   TEMPERATURE:  Current - Temp: 99.1 °F (37.3 °C); Max - Temp  Av.1 °F (37.3 °C)  Min: 99.1 °F (37.3 °C)  Max: 99.1 °F (37.3 °C)    General -alert, oriented, sitting up in wheelchair  Eyes -+ icterus, no pallor  Cardiovascular - RRR  Lungs -clear to auscultation bilaterally  Abdomen -+ distended (grade 2),  non tender, no organomegaly, Bowel sounds present  Extremities -2-3+ BLE edema  Skin -warm/dry, + jaundice  Neuro: Without focal deficit, moves all extremities, no asterixis     Data    Data Review:    No results for input(s): WBC, HGB, HCT, MCV, PLT in the last 72 hours. Recent Labs     22  0530 22  0456   * 137   K 3.8 4.1    105   CO2 25 25   BUN 7 7   CREATININE 0.55 0.52     No results for input(s): AST, ALT, ALB, BILIDIR, BILITOT, ALKPHOS in the last 72 hours. Component      Latest Ref Rng & Units 2022           3:19 PM   Body Fluid Culture, Sterile          Organism       Staphylococcus epidermidis (A)     Component      Latest Ref Rng & Units 2022           3:19 PM   Body Fluid Culture, Sterile       Direct Exam:  RARE NEUTROPHILS (A) . . . Organism       Streptococcus viridans group (A)       No results for input(s): LIPASE, AMYLASE in the last 72 hours. No results for input(s): PROTIME, INR in the last 72 hours. Radiology Review:      CT abdomen pelvis without contrast 9/4/2022: Enlarged fatty liver. Small volume ascites and mild ill-defined mesenteric and retroperitoneal inflammation, likely edema and/or pancreatitis. Mild probably reactive wall thickening of the colon. IR performed ultrasound-guided paracentesis on 9/14/2022 with 1510 cc of clear yellow fluid aspirated. Previous endoscopic history:  EGD 9/9/2022: Web in the upper third of the esophagus. Z-line regular, 36 cm from the incisors. Erythematous, granular and congested mucosa in the cardia, gastric fundus, gastric body and antrum. Normal examined duodenum. LA Grade A reflux esophagitis with no bleeding. No specimens collected. ASSESSMENT:  43 y.o. female admitted to inpatient rehab 9/16/2022 after hospital stay from 9/4/2022 - 9/15/2022 for acute alcoholic hepatitis/pancreatitis, EtOH withdrawal, mild WILLIE, macrocytic anemia, thrombocytopenia. Given IV fluids and pain control, s/p paracentesis with approximately 1500 cc removed, fluid analysis not suggestive of SBP, however fluid culture growing GPC in chains, started on Rocephin, EGD revealing esophagitis-no varices, and UA positive for E. coli UTI. GI consulted for cirrhosis, recurrent ascites, role of diuretics. Pt reports increased abdominal girth and bilateral lower extremity edema worsening since admission to rehab.    On 9/4/2022, sodium 134, glucose 127, BUN less than 2, creatinine 0.69, Albumin 2.3, total bilirubin 7.2, alkaline phosphatase 524, ALT 98, , WBC 6.3, Hgb 11.1, .4, platelets 50, CRP 25, lipase 118, amylase 60, INR 1.4, lactic acid 12.1, procalcitonin 0.69, acute hepatitis panel was negative, blood cultures negative, urine culture positive for E. coli, ascitic fluid culture positive for Streptococcus viridans and staphylococcus epidermidis. On 9/17/2022, WBCs 7.8, Hgb 9.5, .9, platelets 089, BUN 8, creatinine 0.72, albumin 1.4, total bilirubin 5.8, alk phos 283, ALT 35, AST 15,     26-year-old female with cirrhosis, likely related to alcohol use. MELD score as of 9/4/2022=20 with 19.6% estimated 3-month mortality. PLAN :  -Medical management per primary/inpatient rehab team.  - Ascites (grade 2) and Lower extremity edema: Continue spironolactone to 100mg mg daily and Lasix to 40mg daily at discharge. Check BMP in 1 week. Large volume paracentesis not currently needed as abdomen is not tense, may consider on PRN basis. Limit sodium to < 2gm /day. Add simethicone for gas/bloating.  - Hx of SBP: recent tap with cultures growing gram-positive cocci in chains, final cultures positive for positive for Streptococcus viridans and staphylococcus epidermidis. Continue Rocephin 1 g IV every 24 hours till discharge  - EGD for Variceal screening: Completed 9/9/2022 revealing esophageal web, erythematous, granular and congested mucosa in the cardia, gastric fundus, gastric body and antrum consistent with portal hypertensive gastropathy, normal duodenum, LA grade a esophagitis without bleeding.  - HCC screening/surveillance: Recommend every 6 months. Last imaging: CT scan without liver mass/lesion  - Possible early/mild hepatic encephalopathy: Continue Lactulose - titrate to 2-3 soft bm's per day. Continue at discharge. - Advised to limit NSAID use   -Discussed with patient at length importance of complete alcohol abstinence. Reviewed criteria for transplantation, need for alcohol cessation.   -Have patient follow up in the GI clinic in 2-4 weeks. Have her complete BMP in 1 week. -GI to sign off    Thank you for allowing me to participate in the care of your patient. Please feel free to contact me with any concerns.     Claudetta Anger, APRN - CNP

## 2022-09-22 NOTE — PLAN OF CARE
Problem: Discharge Planning  Goal: Discharge to home or other facility with appropriate resources  9/22/2022 1052 by Maryellen Rivas RN  Outcome: Adequate for Discharge  9/21/2022 2333 by Anna Saha RN  Outcome: Progressing     Problem: Safety - Adult  Goal: Free from fall injury  9/22/2022 1052 by Maryellen Rivas RN  Outcome: Adequate for Discharge  9/21/2022 2333 by Anna Saha RN  Outcome: Progressing     Problem: Pain  Goal: Verbalizes/displays adequate comfort level or baseline comfort level  9/22/2022 1052 by Maryellen Rivas RN  Outcome: Adequate for Discharge  9/21/2022 2333 by Anna Saha RN  Outcome: Progressing     Problem: Nutrition Deficit:  Goal: Optimize nutritional status  9/22/2022 1052 by Maryellen Rivas RN  Outcome: Adequate for Discharge  9/21/2022 2333 by Anna Saha RN  Outcome: Progressing

## 2022-09-22 NOTE — PROGRESS NOTES
OCCUPATIONAL THERAPY  INPATIENT REHAB TREATMENT NOTE  [facility]      NAME: Noelle Romo  : 1985 (43 y.o.)  MRN: [id number]  CODE STATUS: Full Code  Room: R251/R251-01    Date of Service: 2022    Referring Physician: Sara Mccauley  Rehab Diagnosis: Impaired mobility and ADLs due to alcoholic encephalopathy    Restrictions  Restrictions/Precautions  Restrictions/Precautions: Seizure     Patient's date of birth confirmed: Yes    SAFETY:  Safety Devices  Safety Devices in place: Not Applicable (Pt deemed Independent in the room)    SUBJECTIVE:    Pain at start of treatment: Yes. Pt does not numerically rate pain. Describes it as \"discomfort\"    Pain at end of treatment: Yes      Location: Abdomen/Legs from \"bloating\"  Nursing notified: Declined  Intervention: None  Pt reports that she had medication recently. COGNITION:  Comprehension: Independent  Expression: Independent  Social Interaction: Independent  Problem Solving: Independent  Memory: Independent    OBJECTIVE: Pt requests to take shower this morning. ADLs completed as follows. Upper Extremity Bathing  Assistance Level: Independent  Lower Extremity Bathing  Assistance Level: Independent  Lower Extremity Dressing  Assistance Level: Independent  Skilled Clinical Factors: To don underwear  Putting On/Taking Off Footwear  Assistance Level: Modified independent  Skilled Clinical Factors: To doff bilateral socks    Bed Mobility  Supine to Sit  Assistance Level: Modified independent    Transfers  Sit to Stand  Assistance Level: Independent  Stand to Sit  Assistance Level: Independent  Tub/Shower Transfers  Type: Shower  Transfer From: Xeros To: Shower chair with back  Additional Factors:  With handrails  Assistance Level: Modified independent    Functional Mobility  Device:  (No device)  Activity: To/From bathroom;Transport items  Assistance Level: Modified independent  Skilled Clinical Factors: Pt gathered clothes prior to shower    Pt still completing dressing at end of session. OT Tech present at end of session per pt request to provide Supervision for safety and in the event that pt requires assistance. ASSESSMENT:  Activity Tolerance: Patient tolerated treatment well    PLAN OF CARE:  Strengthening, Functional mobility training, Endurance training, Pain management, Safety education & training, Patient/Caregiver education & training, Equipment evaluation, education, & procurement, Self-Care / ADL, Home management training, Coordination training  Plan is for discharge to home on 22    Long Term Goal 1: Improve endurance for self care  Long Term Goal 2:  Increase self care independence  Long Term Goal 3: Improve bilateral upper extremity strength for I/ADL performance    Therapy Time:   Individual Group Co-Treat   Time In 0830       Time Out 0900         Minutes 30         ADL/IADL trainin minutes     Electronically signed by:    CHAPINCITO Louis,   2022, 11:00 AM

## 2022-09-22 NOTE — CARE COORDINATION
This CM and th LSW spoke with the patient post team-conference and discussed her DC plan with her. The therapists said I team that she has met her goals of independence and there was not a need to extend her stay longer here in rehab. The patient Discharge is planned for today. She has a shower chair and grab bar ordered to have equipment for DC. The patient was offered assist again for ETOH/Dependancy treatment and help. She stated she has a peer from [employer] already and will call her if needed (she has her business card at home). Offered to call a current referral to them for her and she declined. She also has the paperwork we have given to her with the information to refer to if needed she said. Her equipment is coming from [address] to her house. Discussed OP therapy and home-care with the patient and she would like OP therapy. She was offered freedom of choice and the patient chose [facility] for OP therapy. The patient has concerns about her abdomen swelling after DC. She will follow up with the NP from gastroenterology. I explained she would need to follow up with her PCP as well if having more pain other than acute pain to discuss with her PCP as she is limited with her liver to what medications she can have.    Electronically signed by Eris Looney RN on 9/22/22 at 11:51 AM SANDEEP

## 2022-09-22 NOTE — PROGRESS NOTES
Pt on call light C/O pain on L side  of abd. Pt not due for pain med till 0550-offered k-pad pt declined- states bad pain to go to [facility] to have BM-she states had x4 \"rabbit Turds\".

## 2022-09-22 NOTE — PROGRESS NOTES
Physical Therapy Rehab Treatment Note  [facility]Department[facility]  [facility]       NAME: Ryan Monahan  : 1985 (43 y.o.)  MRN: [id number]  CODE STATUS: Full Code    Date of Service: 2022       Restrictions: up ad sally          SUBJECTIVE:   Subjective: \"I just want to get better and live\"    Pain  Pain: 7-8/10 bloating pain      OBJECTIVE:   Orientation  Overall Orientation Status: Within Functional Limits  Cognition  Overall Cognitive Status: WFL         Bed Mobility Training  Bed Mobility Training: Yes  Overall Level of Assistance: Independent  Interventions: Verbal cues  Rolling: Independent  Supine to Sit: Independent  Sit to Supine: Independent    Transfer Training  Transfer Training: Yes  Overall Level of Assistance: Independent    Gait Training: Yes  Overall Level of Assistance: Independent  Distance (ft): 300 Feet  Assistive Device:  (no assistive device)  Interventions: Verbal cues  Speed/Kamila: Fluctuations  Gait Abnormalities: Path deviations;Trunk sway increased  Rail Use: Left  Right Side Weight Bearing: As tolerated  Left Side Weight Bearing: As tolerated    Stairs - Level of Assistance: Independent  Number of Stairs Trained: 12                                       ASSESSMENT/PROGRESS TOWARDS GOALS: pt has met all of her goals       Goals:  Long Term Goals  Long term goal 1: indep and effecient bed mobility  Long term goal 2: indep and effecient bed, chair and car transfers  Long term goal 3: indep gait with no device 50 feet in familiar environment and supervision for 150 feet in open or unpredictable environments  Long term goal 4: indep flight of stairs with one rail  Long term goal 5: Quiñones to be completed at 50/56 level    PLAN OF CARE/Safety: ongoing         Therapy Time:   Individual   Time In 930   Time Out 1000   Minutes 30     Minutes:  Transfer/Bed mobility training:15  Gait training:15  Neuro re education:0  Therapeutic ex:0      Castro Malloy PTA, 22 at 12:33 PM

## 2022-09-22 NOTE — PROGRESS NOTES
Pt assessment documented at 2330 but actually completed at 2135. Pt C/O pain at \"9\" in abd. Pt was medicated at 2147. Pt also voiced concern of home going pain med that she would be able to take that won't hurt liver. Will discuss with with Dr. Fariba Marshall pt stated.

## 2022-09-22 NOTE — PROGRESS NOTES
Physical Therapy  Facility/Department: [facility]  Rehabilitation Discharge note    NAME: Kris Cuellar  : 1985  MRN: [id number]    Date of discharge: 22      Past Medical History:   Diagnosis Date    Alcohol abuse     Tobacco dependence      Past Surgical History:   Procedure Laterality Date    APPENDECTOMY      FOOT SURGERY      reports 6 sx on L foot and one on right foot    PARACENTESIS Left 2022    1510 ml removed per Dr Herbert Severin  specimen obtained    TUBAL LIGATION      UPPER GASTROINTESTINAL ENDOSCOPY N/A 2022    EGD DIAGNOSTIC ONLY performed by Henrique Palm MD at [facility]       Restrictions  Restrictions/Precautions  Restrictions/Precautions: Seizure    Objective  Bed Mobility Training  Bed Mobility Training: Yes  Overall Level of Assistance: Independent  Interventions: Verbal cues  Rolling: Independent  Supine to Sit: Independent  Sit to Supine: Independent     Transfer Training  Transfer Training: Yes  Overall Level of Assistance: Independent     Gait Training: Yes  Overall Level of Assistance: Independent  Distance (ft): 300 Feet  Assistive Device:  (no assistive device)  Interventions: Verbal cues  Speed/Kamila: Fluctuations  Gait Abnormalities: Path deviations;Trunk sway increased  Rail Use: Left  Right Side Weight Bearing: As tolerated  Left Side Weight Bearing: As tolerated     Stairs - Level of Assistance: Independent  Number of Stairs Trained: 12               Outcomes Measures:  Quiñones Balance Score: 52  Dynamic Gait Total Score: 22       Pt/ family education/training: Pt was educated throughout her stay in safety and techniques for adjusting to new mobility needs. She demonstrates good follow thru and is indep at this time    Assessment:Pt has performed excellently and made great gains.  She has met all goals      LTG established:  Long term goal 1: indep and effecient bed mobility  Long term goal 2: indep and effecient bed, chair and car transfers  Long term goal 3: indep gait with no device 50 feet in familiar environment and supervision for 150 feet in open or unpredictable environments  Long term goal 4: indep flight of stairs with one rail  Long term goal 5: Quiñones to be completed at 50/56 level    Discharge Plan:  D/c to home with follow up PT recommended to ensure continued improvement towards normalized mobility      Electronically signed by Ketty Thomson PT on 9/22/2022 at 4:28 PM

## 2022-09-27 DIAGNOSIS — K70.10 ACUTE ALCOHOLIC HEPATITIS: ICD-10-CM

## 2022-09-27 LAB
ANION GAP SERPL CALCULATED.3IONS-SCNC: 11 MEQ/L (ref 9–15)
BUN BLDV-MCNC: 5 MG/DL (ref 6–20)
CALCIUM SERPL-MCNC: 8.3 MG/DL (ref 8.5–9.9)
CHLORIDE BLD-SCNC: 100 MEQ/L (ref 95–107)
CO2: 27 MEQ/L (ref 20–31)
CREAT SERPL-MCNC: 0.71 MG/DL (ref 0.5–0.9)
GFR AFRICAN AMERICAN: >60
GFR NON-AFRICAN AMERICAN: >60
GLUCOSE BLD-MCNC: 107 MG/DL (ref 70–99)
POTASSIUM SERPL-SCNC: 3.3 MEQ/L (ref 3.4–4.9)
SODIUM BLD-SCNC: 138 MEQ/L (ref 135–144)

## 2022-10-05 ENCOUNTER — OFFICE VISIT (OUTPATIENT)
Dept: GASTROENTEROLOGY | Age: 37
End: 2022-10-05
Payer: COMMERCIAL

## 2022-10-05 VITALS
OXYGEN SATURATION: 100 % | BODY MASS INDEX: 30.16 KG/M2 | SYSTOLIC BLOOD PRESSURE: 108 MMHG | WEIGHT: 181 LBS | HEIGHT: 65 IN | HEART RATE: 68 BPM | DIASTOLIC BLOOD PRESSURE: 60 MMHG

## 2022-10-05 DIAGNOSIS — K70.10 ACUTE ALCOHOLIC HEPATITIS: ICD-10-CM

## 2022-10-05 DIAGNOSIS — K70.31 ALCOHOLIC CIRRHOSIS OF LIVER WITH ASCITES (HCC): Primary | ICD-10-CM

## 2022-10-05 LAB
ALBUMIN SERPL-MCNC: 2.2 G/DL (ref 3.5–4.6)
ALP BLD-CCNC: 191 U/L (ref 40–130)
ALT SERPL-CCNC: 46 U/L (ref 0–33)
ANION GAP SERPL CALCULATED.3IONS-SCNC: 10 MEQ/L (ref 9–15)
AST SERPL-CCNC: 217 U/L (ref 0–35)
BILIRUB SERPL-MCNC: 3.5 MG/DL (ref 0.2–0.7)
BUN BLDV-MCNC: 7 MG/DL (ref 6–20)
CALCIUM SERPL-MCNC: 8.5 MG/DL (ref 8.5–9.9)
CHLORIDE BLD-SCNC: 103 MEQ/L (ref 95–107)
CO2: 26 MEQ/L (ref 20–31)
CREAT SERPL-MCNC: 0.62 MG/DL (ref 0.5–0.9)
GFR AFRICAN AMERICAN: >60
GFR NON-AFRICAN AMERICAN: >60
GLOBULIN: 4.9 G/DL (ref 2.3–3.5)
GLUCOSE BLD-MCNC: 90 MG/DL (ref 70–99)
POTASSIUM SERPL-SCNC: 3.9 MEQ/L (ref 3.4–4.9)
SODIUM BLD-SCNC: 139 MEQ/L (ref 135–144)
TOTAL PROTEIN: 7.1 G/DL (ref 6.3–8)

## 2022-10-05 PROCEDURE — 4004F PT TOBACCO SCREEN RCVD TLK: CPT | Performed by: NURSE PRACTITIONER

## 2022-10-05 PROCEDURE — 99214 OFFICE O/P EST MOD 30 MIN: CPT | Performed by: NURSE PRACTITIONER

## 2022-10-05 PROCEDURE — G8427 DOCREV CUR MEDS BY ELIG CLIN: HCPCS | Performed by: NURSE PRACTITIONER

## 2022-10-05 PROCEDURE — G8484 FLU IMMUNIZE NO ADMIN: HCPCS | Performed by: NURSE PRACTITIONER

## 2022-10-05 PROCEDURE — 1111F DSCHRG MED/CURRENT MED MERGE: CPT | Performed by: NURSE PRACTITIONER

## 2022-10-05 PROCEDURE — G8417 CALC BMI ABV UP PARAM F/U: HCPCS | Performed by: NURSE PRACTITIONER

## 2022-10-05 NOTE — PROGRESS NOTES
Gastroenterology Clinic Follow up Visit    Leelee Maria  [id number]  Chief Complaint   Patient presents with    Follow-up     HPI: 43 y.o. female following up after inpatient hospitalization 9/4/2022 - 4/73/4638 with alcoholic hepatitis with ascites, pancreatitis, EtOH withdrawal, mild WILLIE, macrocytic anemia and thrombocytopenia. She is s/p paracentesis with drainage of 1500 mL per IR, fluid analysis not suggestive of SBP, however fluid cultures positive for Streptococcus viridans. She was admitted to inpatient rehab on 9/16/2022-9/22/22. Interval change: Patient reports doing well since discharge from inpatient rehab. States she has had a good appetite since she has returned home, also has Ensure nutritional supplements on hand if needed. She denies any increased abdominal girth or lower extremity edema. States she is taking Lasix 40 mg and Aldactone 100 mg daily as prescribed with good urine output. She denies GERD symptoms with taking omeprazole daily. She denies nausea/vomiting, hematemesis, dysphagia, hematochezia, or melena. She does report significant weight loss of approximately 20 pounds as her abdominal and lower extremity swelling have significantly improved with diuretic therapy. She denies any overt signs/symptoms of encephalopathy including increased confusion, fatigue or reversal of sleep schedule. States she was taking lactulose once a day upon discharge from the hospital, however this caused her to have diarrhea with associated fecal incontinence while sleeping. States she will take lactulose as needed if she does not have a bowel movement, however currently having a bowel movement 1-2 times per day. Last took lactulose approximately 2 days ago. She does report lower abdominal tenderness associated with distention, however denies any fever/chills.   Smoking status: 1-2 cigarettes per day  Alcohol use: States she has not touched alcohol since admission to the hospital, last drink of ETOH 3/7/8763  Illicit drug use: smokes marijuana for anxiety, around once per day  NSAID use: naproxen on med list, last had months ago. Assessment from recent last inpatient rehab progress note on 9/22/22 below:  43 y.o. female admitted to inpatient rehab 9/16/2022 after hospital stay from 9/4/2022 - 9/15/2022 for acute alcoholic hepatitis/pancreatitis, EtOH withdrawal, mild WILLIE, macrocytic anemia, thrombocytopenia. Given IV fluids and pain control, s/p paracentesis with approximately 1500 cc removed, fluid analysis not suggestive of SBP, however fluid culture growing GPC in chains, started on Rocephin, EGD revealing esophagitis-no varices, and UA positive for E. coli UTI. GI consulted for cirrhosis, recurrent ascites, role of diuretics. Pt reports increased abdominal girth and bilateral lower extremity edema worsening since admission to rehab. On 9/4/2022, sodium 134, glucose 127, BUN less than 2, creatinine 0.69, Albumin 2.3, total bilirubin 7.2, alkaline phosphatase 524, ALT 98, , WBC 6.3, Hgb 11.1, .4, platelets 50, CRP 25, lipase 118, amylase 60, INR 1.4, lactic acid 12.1, procalcitonin 0.69, acute hepatitis panel was negative, blood cultures negative, urine culture positive for E. coli, ascitic fluid culture positive for Streptococcus viridans and staphylococcus epidermidis. On 9/17/2022, WBCs 7.8, Hgb 9.5, .9, platelets 761, BUN 8, creatinine 0.72, albumin 1.4, total bilirubin 5.8, alk phos 283, ALT 35,     Previous GI work up/Endoscopic investigations:  EGD w/Dr. 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 Z-line regular, 36 cm from the incisors. Erythematous, granular and congested mucosa in the cardia, gastric fundus, gastric body and antrum. Normal examined duodenum. LA Grade A reflux esophagitis with no bleeding. No specimens collected. Review of Systems   All other systems reviewed and are negative.      Past medical history, past surgical history, medication list, social and familyhistory reviewed    Blood pressure 108/60, pulse 68, height 5' 5\" (1.651 m), weight 181 lb (82.1 kg), SpO2 100 %. Physical Exam  Constitutional:       General: She is not in acute distress. Appearance: Normal appearance. She is normal weight. She is not ill-appearing. HENT:      Head: Normocephalic and atraumatic. Eyes:      General: Scleral icterus (mild) present. Cardiovascular:      Rate and Rhythm: Normal rate and regular rhythm. Pulses: Normal pulses. Pulmonary:      Effort: Pulmonary effort is normal. No respiratory distress. Breath sounds: Normal breath sounds. Abdominal:      General: Bowel sounds are normal. There is distension (grade 1-2 ascites). Palpations: Abdomen is soft. There is no mass. Tenderness: There is no abdominal tenderness. There is no guarding or rebound. Musculoskeletal:         General: Normal range of motion. Lymphadenopathy:      Cervical: No cervical adenopathy. Skin:     General: Skin is warm and dry. Coloration: Skin is jaundiced (mild). Neurological:      Mental Status: She is alert and oriented to person, place, and time. Psychiatric:         Mood and Affect: Mood normal.         Behavior: Behavior normal.         Thought Content:  Thought content normal.         Judgment: Judgment normal.     Laboratory, Pathology, Radiology reviewed in detail with relevantimportant investigations summarized below:    Recent Labs     09/17/22  0538 09/15/22  0454 09/14/22  0530   WBC 7.8 7.0 7.1   HGB 9.5* 9.6* 9.6*   HCT 29.4* 29.2* 29.2*   .9* 110.4* 110.3*   * 124* 126*     Lab Results   Component Value Date    WBC 7.8 09/17/2022    HGB 9.5 (L) 09/17/2022    HCT 29.4 (L) 09/17/2022    .9 (H) 09/17/2022     (L) 09/17/2022     Lab Results   Component Value Date    ALT 35 (H) 09/17/2022     (H) 09/17/2022    ALKPHOS 283 (H) 09/17/2022    BILITOT 5.8 (H) 09/17/2022     CT abdomen pelvis without contrast 9/4/2022: Enlarged fatty liver. Small volume ascites and mild ill-defined mesenteric and retroperitoneal inflammation, likely edema and/or pancreatitis. Mild probably reactive wall thickening of the colon. IR performed ultrasound-guided paracentesis on 9/14/2022 with 1510 cc of clear yellow fluid aspirated. Assessment and Plan:  Tee Patel 43 y.o. female following up after inpatient hospitalization 9/4/2022 - 9/15/2022 with cirrhosis, alcoholic hepatitis with ascites, pancreatitis, EtOH withdrawal, mild WILLIE, macrocytic anemia and thrombocytopenia. She is s/p paracentesis with drainage of 1500 mL per IR, fluid analysis not suggestive of SBP, however fluid cultures positive for Streptococcus viridans. She was admitted to inpatient rehab on 9/16/2022-9/22/22. MELD score as of 9/4/2022=20 with 19.6% estimated 3-month mortality. 1. Alcoholic cirrhosis of liver with ascites ([address]  2. Acute alcoholic hepatitis  - Ascites (grade 1-2) and history of lower extremity edema, significant improvement since discharge: Noted to have mild hyperkalemia on repeat BMP. Continue spironolactone to 100mg mg daily and Lasix to 40mg daily at discharge. Recheck CMP this week. Large volume paracentesis not currently needed as abdomen is not tense. Limit sodium to < 2gm /day. Continue simethicone for gas/bloating as needed. - Hx of SBP: recent tap in the hospital with cultures growing gram-positive cocci in chains, final cultures positive for positive for Streptococcus viridans and staphylococcus epidermidis. Received prolonged course of IV Rocephin while hospitalized. Currently doing well outside the hospital with no increased abdominal tenderness/fevers or chills.   - EGD for Variceal screening: Completed 9/9/2022 revealing esophageal web, erythematous, granular and congested mucosa in the cardia, gastric fundus, gastric body and antrum consistent with portal hypertensive gastropathy, normal duodenum, LA grade a esophagitis without bleeding.  - HCC screening/surveillance: Recommend every 6 months. Last imaging: CT scan without liver mass/lesion  - Possible early/mild hepatic encephalopathy while hospitalized, currently no overt signs or symptoms: Continue Lactulose - titrate to 2-3 soft bm's per day. - Advised to decrease NSAID use   -Discussed with patient at length importance of complete alcohol abstinence. Reviewed criteria for transplantation, need for alcohol cessation. Return in about 2 months (around 12/5/2022). With Dr. Hazel Christine and SAVANNAH Quinonez (per patient request). SANTA Ayala - CNP   Staff Gastroenterology Nurse Practitioner  [facility]    Please note this report has been partially produced using speech recognition software and contain errors related to that system including grammar, punctuation and spelling as well as words and phrases that may seem inappropriate. If there are questions or concerns please feel free to contact me to clarify.

## 2022-10-12 NOTE — GROUP NOTE
Group Therapy Note    Date: 6/11/2022    Group Start Time: 1630  Group End Time: 1700  Group Topic: Healthy Living/Wellness    [facility]W I    Stephanie Ward        Group Therapy Note    Attendees: 12/14         Patient's Goal:  To learn about coping skills. Notes:  Patient participated in group discussion.      Status After Intervention:  Improved    Participation Level: Interactive    Participation Quality: Appropriate and Attentive      Speech:  normal      Thought Process/Content: Logical      Affective Functioning: Congruent      Mood: euthymic      Level of consciousness:  Alert and Attentive      Response to Learning: Able to verbalize current knowledge/experience      Endings: None Reported    Modes of Intervention: Education      Discipline Responsible: [address] [facility] [address] Tech      Signature:  Stephanie Ward <--- Click to Launch ICDx for PreOp, PostOp and Procedure

## 2022-11-05 ENCOUNTER — HOSPITAL ENCOUNTER (EMERGENCY)
Age: 37
Discharge: HOME | End: 2022-11-05
Attending: EMERGENCY MEDICINE
Payer: COMMERCIAL

## 2022-11-05 VITALS
DIASTOLIC BLOOD PRESSURE: 87 MMHG | SYSTOLIC BLOOD PRESSURE: 109 MMHG | HEART RATE: 99 BPM | WEIGHT: 160 LBS | RESPIRATION RATE: 18 BRPM | OXYGEN SATURATION: 99 % | HEIGHT: 65 IN | BODY MASS INDEX: 26.66 KG/M2

## 2022-11-05 DIAGNOSIS — F10.920 ACUTE ALCOHOLIC INTOXICATION WITHOUT COMPLICATION (HCC): Primary | ICD-10-CM

## 2022-11-05 LAB
ALBUMIN SERPL-MCNC: 3.3 G/DL (ref 3.5–4.6)
ALP BLD-CCNC: 164 U/L (ref 40–130)
ALT SERPL-CCNC: 61 U/L (ref 0–33)
ANION GAP SERPL CALCULATED.3IONS-SCNC: 22 MEQ/L (ref 9–15)
AST SERPL-CCNC: 197 U/L (ref 0–35)
BASOPHILS ABSOLUTE: 0 K/UL (ref 0–0.2)
BASOPHILS RELATIVE PERCENT: 0.3 %
BILIRUB SERPL-MCNC: 2.2 MG/DL (ref 0.2–0.7)
BUN BLDV-MCNC: <2 MG/DL (ref 6–20)
CALCIUM SERPL-MCNC: 8.2 MG/DL (ref 8.5–9.9)
CHLORIDE BLD-SCNC: 97 MEQ/L (ref 95–107)
CO2: 21 MEQ/L (ref 20–31)
CREAT SERPL-MCNC: 0.59 MG/DL (ref 0.5–0.9)
EOSINOPHILS ABSOLUTE: 0 K/UL (ref 0–0.7)
EOSINOPHILS RELATIVE PERCENT: 0.1 %
ETHANOL PERCENT: 0.18 G/DL
ETHANOL: 204 MG/DL (ref 0–0.08)
GFR SERPL CREATININE-BSD FRML MDRD: >60 ML/MIN/{1.73_M2}
GLOBULIN: 4.1 G/DL (ref 2.3–3.5)
GLUCOSE BLD-MCNC: 116 MG/DL (ref 70–99)
HCT VFR BLD CALC: 37.3 % (ref 37–47)
HEMOGLOBIN: 12.3 G/DL (ref 12–16)
LYMPHOCYTES ABSOLUTE: 1.4 K/UL (ref 1–4.8)
LYMPHOCYTES RELATIVE PERCENT: 20.2 %
MAGNESIUM: 1.5 MG/DL (ref 1.7–2.4)
MCH RBC QN AUTO: 30.8 PG (ref 27–31.3)
MCHC RBC AUTO-ENTMCNC: 32.9 % (ref 33–37)
MCV RBC AUTO: 93.8 FL (ref 79.4–94.8)
MONOCYTES ABSOLUTE: 0.3 K/UL (ref 0.2–0.8)
MONOCYTES RELATIVE PERCENT: 4.7 %
NEUTROPHILS ABSOLUTE: 5.2 K/UL (ref 1.4–6.5)
NEUTROPHILS RELATIVE PERCENT: 74.7 %
PDW BLD-RTO: 15.4 % (ref 11.5–14.5)
PLATELET # BLD: 124 K/UL (ref 130–400)
POTASSIUM SERPL-SCNC: 3.2 MEQ/L (ref 3.4–4.9)
RBC # BLD: 3.98 M/UL (ref 4.2–5.4)
SODIUM BLD-SCNC: 140 MEQ/L (ref 135–144)
TOTAL PROTEIN: 7.4 G/DL (ref 6.3–8)
TROPONIN: <0.01 NG/ML (ref 0–0.01)
WBC # BLD: 7 K/UL (ref 4.8–10.8)

## 2022-11-05 PROCEDURE — 85025 COMPLETE CBC W/AUTO DIFF WBC: CPT

## 2022-11-05 PROCEDURE — 84484 ASSAY OF TROPONIN QUANT: CPT

## 2022-11-05 PROCEDURE — 96365 THER/PROPH/DIAG IV INF INIT: CPT

## 2022-11-05 PROCEDURE — 99284 EMERGENCY DEPT VISIT MOD MDM: CPT

## 2022-11-05 PROCEDURE — 36415 COLL VENOUS BLD VENIPUNCTURE: CPT

## 2022-11-05 PROCEDURE — A4216 STERILE WATER/SALINE, 10 ML: HCPCS | Performed by: EMERGENCY MEDICINE

## 2022-11-05 PROCEDURE — 96375 TX/PRO/DX INJ NEW DRUG ADDON: CPT

## 2022-11-05 PROCEDURE — 96374 THER/PROPH/DIAG INJ IV PUSH: CPT

## 2022-11-05 PROCEDURE — 82077 ASSAY SPEC XCP UR&BREATH IA: CPT

## 2022-11-05 PROCEDURE — 6360000002 HC RX W HCPCS: Performed by: EMERGENCY MEDICINE

## 2022-11-05 PROCEDURE — 2580000003 HC RX 258: Performed by: EMERGENCY MEDICINE

## 2022-11-05 PROCEDURE — 2500000003 HC RX 250 WO HCPCS: Performed by: EMERGENCY MEDICINE

## 2022-11-05 PROCEDURE — 83735 ASSAY OF MAGNESIUM: CPT

## 2022-11-05 PROCEDURE — 80053 COMPREHEN METABOLIC PANEL: CPT

## 2022-11-05 RX ORDER — METOCLOPRAMIDE HYDROCHLORIDE 5 MG/ML
10 INJECTION INTRAMUSCULAR; INTRAVENOUS ONCE
Status: COMPLETED | OUTPATIENT
Start: 2022-11-05 | End: 2022-11-05

## 2022-11-05 RX ORDER — LORAZEPAM 2 MG/ML
1 INJECTION INTRAMUSCULAR ONCE
Status: COMPLETED | OUTPATIENT
Start: 2022-11-05 | End: 2022-11-05

## 2022-11-05 RX ORDER — DIPHENHYDRAMINE HYDROCHLORIDE 50 MG/ML
25 INJECTION INTRAMUSCULAR; INTRAVENOUS ONCE
Status: COMPLETED | OUTPATIENT
Start: 2022-11-05 | End: 2022-11-05

## 2022-11-05 RX ADMIN — FOLIC ACID: 5 INJECTION, SOLUTION INTRAMUSCULAR; INTRAVENOUS; SUBCUTANEOUS at 15:05

## 2022-11-05 RX ADMIN — LORAZEPAM 1 MG: 2 INJECTION INTRAMUSCULAR; INTRAVENOUS at 14:07

## 2022-11-05 RX ADMIN — FAMOTIDINE 20 MG: 10 INJECTION, SOLUTION INTRAVENOUS at 14:11

## 2022-11-05 RX ADMIN — METOCLOPRAMIDE 10 MG: 5 INJECTION, SOLUTION INTRAMUSCULAR; INTRAVENOUS at 14:12

## 2022-11-05 RX ADMIN — DIPHENHYDRAMINE HYDROCHLORIDE 25 MG: 50 INJECTION, SOLUTION INTRAMUSCULAR; INTRAVENOUS at 14:11

## 2022-11-05 ASSESSMENT — ENCOUNTER SYMPTOMS
BACK PAIN: 0
SHORTNESS OF BREATH: 0
VOMITING: 1
DIARRHEA: 0
ABDOMINAL PAIN: 0
NAUSEA: 1
SORE THROAT: 0
COUGH: 0

## 2022-11-05 NOTE — ED PROVIDER NOTES
[address] [facility] ED  eMERGENCYdEPARTMENT eNCOUnter      Pt Name: Regan Forrester  MRN: [id number]  Lizzygfclarisse 1985  Date of evaluation: 11/5/2022  Janette Espinal MD    CHIEF COMPLAINT           HPI  Regan Forrester is a 43 y.o. female per chart review has a h/o alcohol dependence presents to the ED with tremors, request for alcohol detox. Pt drinks 10 drinks per day. Last drink this am.  Pt notes gradual onset, moderate, constant, diffuse tremors. +N/v.  Pt denies fever, n/v, cp, sob, ab pain, dysuria, diarrhea. ROS  Review of Systems   Constitutional:  Negative for activity change, chills and fever. HENT:  Negative for ear pain and sore throat. Eyes:  Negative for visual disturbance. Respiratory:  Negative for cough and shortness of breath. Cardiovascular:  Negative for chest pain, palpitations and leg swelling. Gastrointestinal:  Positive for nausea and vomiting. Negative for abdominal pain and diarrhea. Genitourinary:  Negative for dysuria. Musculoskeletal:  Negative for back pain. Skin:  Negative for rash. Neurological:  Positive for tremors. Negative for dizziness and weakness. Except as noted above the remainder of the review of systems was reviewed and negative.        PAST MEDICAL HISTORY     Past Medical History:   Diagnosis Date    Alcohol abuse     Tobacco dependence          SURGICAL HISTORY       Past Surgical History:   Procedure Laterality Date    APPENDECTOMY      FOOT SURGERY      reports 6 sx on L foot and one on right foot    PARACENTESIS Left 09/13/2022    1510 ml removed per Dr Iker [place]  specimen obtained    TUBAL LIGATION      UPPER GASTROINTESTINAL ENDOSCOPY N/A 09/09/2022    EGD DIAGNOSTIC ONLY performed by Irene Aleman MD at [address]       Previous Medications    FUROSEMIDE (LASIX) 40 MG TABLET    Take 1 tablet by mouth daily    LACTULOSE (CHRONULAC) 10 GM/15ML SOLUTION    Take 30 mLs by mouth 3 times daily    MAGNESIUM OXIDE (MAG-OX) 400 (240 MG) MG TABLET    Take 1 tablet by mouth daily    MIRTAZAPINE (REMERON) 15 MG TABLET    Take 1 tablet by mouth nightly    MULTIPLE VITAMIN (MULTIVITAMIN) TABS TABLET    Take 1 tablet by mouth daily    NICOTINE (NICODERM CQ) 14 MG/24HR    Place 1 patch onto the skin daily    PANTOPRAZOLE (PROTONIX) 40 MG TABLET    Take 1 tablet by mouth every morning (before breakfast)    SIMETHICONE (MYLICON) 80 MG CHEWABLE TABLET    Take 1 tablet by mouth every 6 hours as needed for Flatulence    SPIRONOLACTONE (ALDACTONE) 100 MG TABLET    Take 1 tablet by mouth daily    THIAMINE 100 MG TABLET    Take 1 tablet by mouth daily       ALLERGIES     Oxycodone-acetaminophen    FAMILY HISTORY       Family History   Problem Relation Age of Onset    High Cholesterol Mother     Hypertension Mother     Melanoma Father     High Cholesterol Father     Hypertension Father     Colon Cancer Neg Hx           SOCIAL HISTORY       Social History     Socioeconomic History    Marital status: Single     Spouse name: None    Number of children: 2    Years of education: None    Highest education level: None   Tobacco Use    Smoking status: Every Day     Packs/day: 1.00     Types: Cigarettes    Smokeless tobacco: Never   Vaping Use    Vaping Use: Never used   Substance and Sexual Activity    Alcohol use: Yes     Comment: 6/9/22: completed 30 day rehab 2 weeks ago, relapse one week ago; unable to quantify EtOH consumption    Drug use: Yes     Types: Marijuana Seabron Barbbaudilio)   Social History Narrative    Lives With: 10 yo daughter, SO sometimes stays with pt, he works odd jobs    Older 24 yo dtr lives with a boyfriend    Type of Home: House in [facility]    Home Layout: Two level, Laundry in basement (flights to and from basement and second floor with HR)    Home Access: Stairs to enter with rails- Number of Steps: 5- Rails: Both    Bathroom Shower/Tub: Tub/Shower unit    Home Equipment:  (no AD)    ADL Assistance: Independent Homemaking Assistance: Independent    Ambulation Assistance: Independent (no AD)    Transfer Assistance: Independent    Active : Yes    Type of Occupation: unemployed    GED--then some college. PHYSICAL EXAM       ED Triage Vitals   BP Temp Temp src Pulse Resp SpO2 Height Weight   -- -- -- -- -- -- -- --       Physical Exam  Vitals and nursing note reviewed. Constitutional:       Appearance: She is well-developed. HENT:      Head: Normocephalic. Right Ear: External ear normal.      Left Ear: External ear normal.   Eyes:      Conjunctiva/sclera: Conjunctivae normal.      Pupils: Pupils are equal, round, and reactive to light. Cardiovascular:      Rate and Rhythm: Normal rate and regular rhythm. Heart sounds: Normal heart sounds. Pulmonary:      Effort: Pulmonary effort is normal.      Breath sounds: Normal breath sounds. Abdominal:      General: Bowel sounds are normal. There is no distension. Palpations: Abdomen is soft. Tenderness: There is no abdominal tenderness. Musculoskeletal:         General: Normal range of motion. Cervical back: Normal range of motion and neck supple. Skin:     General: Skin is warm and dry. Neurological:      Mental Status: She is alert and oriented to person, place, and time. Psychiatric:         Mood and Affect: Mood normal.         MDM  44 yo female presents to the ED with tremors and request for detox. Pt is afebrile, hemodynamically stable. Pt given 1 L NS, IV ativan in the ED. Labs remarkable for ethanol 204. Pt reassessed and feels much better. [facility] called who recommended discharge to rehab. Pt discharged home in stable condition. FINAL IMPRESSION      1.  Acute alcoholic intoxication without complication [facility])          DISPOSITION/PLAN   DISPOSITION Decision To Discharge 11/05/2022 03:12:31 PM        DISCHARGE MEDICATIONS:  [unfilled]         Chris Araujo MD(electronically signed)  Attending Emergency Physician           Elaine Tanner MD  11/05/22 [id number]

## 2022-11-05 NOTE — ED TRIAGE NOTES
Pt arrives via EMS c/o problems with alcohol. Pt states until today she has been 30 days sober. Pt reports consuming approx. 10 \"shots\" of FireBall over the last approx. 12 hours. When asked pt states she would like to speak to [facility]. Pt A&Ox4, ABCs intact, GCS15, skin, pink, warm, and dry.

## 2022-11-05 NOTE — ED NOTES
Pt being discharged with [facility] to go to rehab. Given pants. Denies further needs.       Skinny Ahmadi RN  11/05/22 [id number]

## 2022-12-13 ENCOUNTER — OFFICE VISIT (OUTPATIENT)
Dept: GASTROENTEROLOGY | Age: 37
End: 2022-12-13
Payer: COMMERCIAL

## 2022-12-13 VITALS — SYSTOLIC BLOOD PRESSURE: 124 MMHG | OXYGEN SATURATION: 98 % | HEART RATE: 118 BPM | DIASTOLIC BLOOD PRESSURE: 80 MMHG

## 2022-12-13 DIAGNOSIS — K74.60 CIRRHOSIS OF LIVER WITHOUT ASCITES, UNSPECIFIED HEPATIC CIRRHOSIS TYPE (HCC): ICD-10-CM

## 2022-12-13 DIAGNOSIS — K74.60 CIRRHOSIS OF LIVER WITHOUT ASCITES, UNSPECIFIED HEPATIC CIRRHOSIS TYPE (HCC): Primary | ICD-10-CM

## 2022-12-13 DIAGNOSIS — R18.8 OTHER ASCITES: ICD-10-CM

## 2022-12-13 LAB
ALBUMIN SERPL-MCNC: 2.9 G/DL (ref 3.5–4.6)
ALP BLD-CCNC: 399 U/L (ref 40–130)
ALT SERPL-CCNC: 64 U/L (ref 0–33)
ANION GAP SERPL CALCULATED.3IONS-SCNC: 15 MEQ/L (ref 9–15)
AST SERPL-CCNC: 285 U/L (ref 0–35)
BILIRUB SERPL-MCNC: 2.1 MG/DL (ref 0.2–0.7)
BUN BLDV-MCNC: 5 MG/DL (ref 6–20)
CALCIUM SERPL-MCNC: 8.7 MG/DL (ref 8.5–9.9)
CHLORIDE BLD-SCNC: 103 MEQ/L (ref 95–107)
CO2: 22 MEQ/L (ref 20–31)
CREAT SERPL-MCNC: 0.48 MG/DL (ref 0.5–0.9)
GFR SERPL CREATININE-BSD FRML MDRD: >60 ML/MIN/{1.73_M2}
GLOBULIN: 5.2 G/DL (ref 2.3–3.5)
GLUCOSE BLD-MCNC: 95 MG/DL (ref 70–99)
HCT VFR BLD CALC: 38.1 % (ref 37–47)
HEMOGLOBIN: 12.7 G/DL (ref 12–16)
INR BLD: 1.4
MCH RBC QN AUTO: 30.3 PG (ref 27–31.3)
MCHC RBC AUTO-ENTMCNC: 33.2 % (ref 33–37)
MCV RBC AUTO: 91.4 FL (ref 79.4–94.8)
PDW BLD-RTO: 18.1 % (ref 11.5–14.5)
PLATELET # BLD: 119 K/UL (ref 130–400)
POTASSIUM SERPL-SCNC: 3.5 MEQ/L (ref 3.4–4.9)
PROTHROMBIN TIME: 17.3 SEC (ref 12.3–14.9)
RBC # BLD: 4.17 M/UL (ref 4.2–5.4)
SODIUM BLD-SCNC: 140 MEQ/L (ref 135–144)
TOTAL PROTEIN: 8.1 G/DL (ref 6.3–8)
WBC # BLD: 6.9 K/UL (ref 4.8–10.8)

## 2022-12-13 PROCEDURE — 4004F PT TOBACCO SCREEN RCVD TLK: CPT | Performed by: INTERNAL MEDICINE

## 2022-12-13 PROCEDURE — G8427 DOCREV CUR MEDS BY ELIG CLIN: HCPCS | Performed by: INTERNAL MEDICINE

## 2022-12-13 PROCEDURE — G8417 CALC BMI ABV UP PARAM F/U: HCPCS | Performed by: INTERNAL MEDICINE

## 2022-12-13 PROCEDURE — 99214 OFFICE O/P EST MOD 30 MIN: CPT | Performed by: INTERNAL MEDICINE

## 2022-12-13 PROCEDURE — G8484 FLU IMMUNIZE NO ADMIN: HCPCS | Performed by: INTERNAL MEDICINE

## 2022-12-13 RX ORDER — ATENOLOL 25 MG/1
25 TABLET ORAL DAILY
COMMUNITY

## 2022-12-13 RX ORDER — DIAZEPAM 5 MG/1
5 TABLET ORAL EVERY 6 HOURS PRN
COMMUNITY

## 2022-12-13 RX ORDER — FUROSEMIDE 20 MG/1
20 TABLET ORAL DAILY
Qty: 60 TABLET | Refills: 3 | Status: SHIPPED | OUTPATIENT
Start: 2022-12-13

## 2022-12-13 RX ORDER — METRONIDAZOLE 500 MG/1
500 TABLET ORAL 3 TIMES DAILY
COMMUNITY

## 2022-12-13 RX ORDER — SPIRONOLACTONE 50 MG/1
50 TABLET, FILM COATED ORAL DAILY
Qty: 30 TABLET | Refills: 3 | Status: SHIPPED | OUTPATIENT
Start: 2022-12-13

## 2022-12-13 ASSESSMENT — ENCOUNTER SYMPTOMS
EYE PAIN: 0
ABDOMINAL DISTENTION: 1
TROUBLE SWALLOWING: 0
VOMITING: 0
RECTAL PAIN: 0
CHEST TIGHTNESS: 0
VOICE CHANGE: 0
COLOR CHANGE: 0
PHOTOPHOBIA: 0
EYE REDNESS: 0
ABDOMINAL PAIN: 0
DIARRHEA: 0
WHEEZING: 0
BLOOD IN STOOL: 0
NAUSEA: 0
SHORTNESS OF BREATH: 0
CONSTIPATION: 0

## 2022-12-13 NOTE — PROGRESS NOTES
Subjective:      Patient ID: Genevieve Pineda is a 42 y.o. female who presents today for:  Chief Complaint   Patient presents with    Follow-up    Cirrhosis       HPI  This very pleasant 58-year-old came in today for the evaluation management of cirrhosis. Patient came in today as recently discharged from the hospital.  Patient does endorse occasional GERD. No hematemesis melena hematochezia. Patient is not on diuretics. Patient continues to drink alcohol. Denies confusion. No recent hospitalization. Patient came in today for follow-up  HPI: 43 y.o. female following up after inpatient hospitalization 9/4/2022 - 1/50/9156 with alcoholic hepatitis with ascites, pancreatitis, EtOH withdrawal, mild WILLIE, macrocytic anemia and thrombocytopenia. She is s/p paracentesis with drainage of 1500 mL per IR, fluid analysis not suggestive of SBP, however fluid cultures positive for Streptococcus viridans. She was admitted to inpatient rehab on 9/16/2022-9/22/22. Interval change: Patient reports doing well since discharge from inpatient rehab. States she has had a good appetite since she has returned home, also has Ensure nutritional supplements on hand if needed. She denies any increased abdominal girth or lower extremity edema. States she is taking Lasix 40 mg and Aldactone 100 mg daily as prescribed with good urine output. She denies GERD symptoms with taking omeprazole daily. She denies nausea/vomiting, hematemesis, dysphagia, hematochezia, or melena. She does report significant weight loss of approximately 20 pounds as her abdominal and lower extremity swelling have significantly improved with diuretic therapy. She denies any overt signs/symptoms of encephalopathy including increased confusion, fatigue or reversal of sleep schedule. States she was taking lactulose once a day upon discharge from the hospital, however this caused her to have diarrhea with associated fecal incontinence while sleeping.   States she will take lactulose as needed if she does not have a bowel movement, however currently having a bowel movement 1-2 times per day. Last took lactulose approximately 2 days ago. She does report lower abdominal tenderness associated with distention, however denies any fever/chills. Smoking status: 1-2 cigarettes per day  Alcohol use: States she has not touched alcohol since admission to the hospital, last drink of ETOH 3/2/7711  Illicit drug use: smokes marijuana for anxiety, around once per day  NSAID use: naproxen on med list, last had months ago. Assessment from recent last inpatient rehab progress note on 9/22/22 below:  43 y.o. female admitted to inpatient rehab 9/16/2022 after hospital stay from 9/4/2022 - 9/15/2022 for acute alcoholic hepatitis/pancreatitis, EtOH withdrawal, mild WILLIE, macrocytic anemia, thrombocytopenia. Given IV fluids and pain control, s/p paracentesis with approximately 1500 cc removed, fluid analysis not suggestive of SBP, however fluid culture growing GPC in chains, started on Rocephin, EGD revealing esophagitis-no varices, and UA positive for E. coli UTI. GI consulted for cirrhosis, recurrent ascites, role of diuretics. Pt reports increased abdominal girth and bilateral lower extremity edema worsening since admission to rehab. On 9/4/2022, sodium 134, glucose 127, BUN less than 2, creatinine 0.69, Albumin 2.3, total bilirubin 7.2, alkaline phosphatase 524, ALT 98, , WBC 6.3, Hgb 11.1, .4, platelets 50, CRP 25, lipase 118, amylase 60, INR 1.4, lactic acid 12.1, procalcitonin 0.69, acute hepatitis panel was negative, blood cultures negative, urine culture positive for E. coli, ascitic fluid culture positive for Streptococcus viridans and staphylococcus epidermidis.   On 9/17/2022, WBCs 7.8, Hgb 9.5, .9, platelets 716, BUN 8, creatinine 0.72, albumin 1.4, total bilirubin 5.8, alk phos 283, ALT 35,      Past Medical History:   Diagnosis Date    Alcohol abuse     Tobacco dependence      Past Surgical History:   Procedure Laterality Date    APPENDECTOMY      FOOT SURGERY      reports 6 sx on L foot and one on right foot    PARACENTESIS Left 09/13/2022    1510 ml removed per Dr Fish Salt  specimen obtained    TUBAL LIGATION      UPPER GASTROINTESTINAL ENDOSCOPY N/A 09/09/2022    EGD DIAGNOSTIC ONLY performed by Stefano Thompson MD at [address] [facility] History    Marital status: Single     Spouse name: Not on file    Number of children: 2    Years of education: Not on file    Highest education level: Not on file   Occupational History    Not on file   Tobacco Use    Smoking status: Every Day     Packs/day: 1.00     Types: Cigarettes    Smokeless tobacco: Never   Vaping Use    Vaping Use: Never used   Substance and Sexual Activity    Alcohol use: Yes     Comment: 6/9/22: completed 30 day rehab 2 weeks ago, relapse one week ago; unable to quantify EtOH consumption    Drug use: Yes     Types: Marijuana Jolanta Cotton)    Sexual activity: Not on file   Other Topics Concern    Not on file   Social History Narrative    Lives With: 10 yo daughter, SO sometimes stays with pt, he works odd jobs    Older 24 yo dtr lives with a boyfriend    Type of Home: [address] [place] [address] [facility]: Two level, Laundry in basement (flights to and from basement and second floor with HR)    Home Access: Stairs to enter with rails- Number of Steps: 5- Rails: Both    Bathroom Shower/Tub: Tub/Shower unit    Home Equipment:  (no AD)    ADL Assistance: Independent    Homemaking Assistance: Independent    Ambulation Assistance: Independent (no AD)    Transfer Assistance: Independent    Active : Yes    Type of Occupation: unemployed    GED--then some college.          Social Determinants of Health     Financial Resource Strain: Not on file   Food Insecurity: Not on file   Transportation Needs: Not on file   Physical Activity: Not on file   Stress: Not on file Social Connections: Not on file   Intimate Partner Violence: Not on file   Housing Stability: Not on file     Family History   Problem Relation Age of Onset    High Cholesterol Mother     Hypertension Mother     Melanoma Father     High Cholesterol Father     Hypertension Father     Colon Cancer Neg Hx      Allergies   Allergen Reactions    Oxycodone-Acetaminophen Itching     Itching and nausea           Review of Systems   Constitutional:  Negative for appetite change, chills, fatigue, fever and unexpected weight change. HENT:  Negative for nosebleeds, tinnitus, trouble swallowing and voice change. Eyes:  Negative for photophobia, pain and redness. Respiratory:  Negative for chest tightness, shortness of breath and wheezing. Cardiovascular:  Negative for chest pain, palpitations and leg swelling. Gastrointestinal:  Positive for abdominal distention. Negative for abdominal pain, blood in stool, constipation, diarrhea, nausea, rectal pain and vomiting. Endocrine: Negative for polydipsia, polyphagia and polyuria. Genitourinary:  Negative for difficulty urinating and hematuria. Skin:  Negative for color change, pallor and rash. Neurological:  Negative for dizziness, speech difficulty and headaches. Psychiatric/Behavioral:  Negative for confusion and suicidal ideas. Objective:   /80 (Site: Right Upper Arm, Position: Sitting, Cuff Size: Small Adult)   Pulse (!) 118   SpO2 98%     Physical Exam  Constitutional:       General: She is not in acute distress. Appearance: She is well-developed. HENT:      Head: Normocephalic and atraumatic. Eyes:      Conjunctiva/sclera: Conjunctivae normal.      Pupils: Pupils are equal, round, and reactive to light. Cardiovascular:      Rate and Rhythm: Normal rate and regular rhythm. Heart sounds: Normal heart sounds. Pulmonary:      Effort: Pulmonary effort is normal. No respiratory distress. Breath sounds: Normal breath sounds.  No AM       No results found. No results found for: IRON, TIBC, FERRITIN  Lab Results   Component Value Date/Time    INR 1.4 09/15/2022 05:54 AM    INR 1.5 09/14/2022 05:30 AM    INR 1.5 09/13/2022 06:22 AM    INR 1.5 09/09/2022 01:49 PM    INR 1.4 09/05/2022 05:00 AM     No components found for: ACUTEHEPATITISSCREEN  No components found for: CELIACPANEL  No components found for: STOOLCULTURE, C.DIFF, STOOLOVAPARASITE, STOOLLEUCOCYTE        Assessment:    1-  Decompensated Cirrhosis secondary to ETOH  :  Continues to drink alcohol intermittently. Discussed at length with patient regarding complete alcohol abstinence  Update lab data including CBC, CMP and INR  2 -   Ascites:   Noted increased abdominal girth. Patient has been off diuretics. We will initiate Lasix and Aldactone. Obtain CMP and electrolytes for further evaluation  As mentioned discussed at length regarding complete alcohol abstinence  Obtain therapeutic paracentesis  Previous SBP during hospital stay noted  3- EGD for Variceal surveillance:    Had EGD that shows portal hypertensive gastropathy with no esophageal varices noted  4 - HCC screening:   Recent imaging Negative for liver mass  5-  Overt Hepatic encephalopathy:  None at this time    6- Preventive measure:   Patient to continue follow-up with primary care physician regarding age-appropriate screening assessment. As mentioned discussed at length regarding alcohol abstinence      Return in about 4 weeks (around 1/10/2023) for Post procedure results discussion, further management.       Gerald Titus MD

## 2022-12-13 NOTE — PROGRESS NOTES
Subjective:      Patient ID: Jacob Fan is a 42 y.o. female who presents today for:  Chief Complaint   Patient presents with    Follow-up    Cirrhosis       HPI    Past Medical History:   Diagnosis Date    Alcohol abuse     Tobacco dependence      Past Surgical History:   Procedure Laterality Date    APPENDECTOMY      FOOT SURGERY      reports 6 sx on L foot and one on right foot    PARACENTESIS Left 09/13/2022    1510 ml removed per Dr Loulou Villarreal  specimen obtained    TUBAL LIGATION      UPPER GASTROINTESTINAL ENDOSCOPY N/A 09/09/2022    EGD DIAGNOSTIC ONLY performed by Beckey Gaucher, MD at [address] [place] [address] History    Marital status: Single     Spouse name: Not on file    Number of children: 2    Years of education: Not on file    Highest education level: Not on file   Occupational History    Not on file   Tobacco Use    Smoking status: Every Day     Packs/day: 1.00     Types: Cigarettes    Smokeless tobacco: Never   Vaping Use    Vaping Use: Never used   Substance and Sexual Activity    Alcohol use: Yes     Comment: 6/9/22: completed 30 day rehab 2 weeks ago, relapse one week ago; unable to quantify EtOH consumption    Drug use: Yes     Types: Marijuana Loren Forte)    Sexual activity: Not on file   Other Topics Concern    Not on file   Social History Narrative    Lives With: 10 yo daughter, SO sometimes stays with pt, he works odd jobs    Older 22 yo dtr lives with a boyfriend    Type of Home: House in [facility]: Two level, Laundry in basement (flights to and from basement and second floor with HR)    Home Access: Stairs to enter with rails- Number of Steps: 5- Rails: Both    Bathroom Shower/Tub: Tub/Shower unit    Home Equipment:  (no AD)    ADL Assistance: Independent    Homemaking Assistance: Independent    Ambulation Assistance: Independent (no AD)    Transfer Assistance: Independent    Active : Yes    Type of Occupation: unemployed    GED--then some college.          Social Determinants of Health     Financial Resource Strain: Not on file   Food Insecurity: Not on file   Transportation Needs: Not on file   Physical Activity: Not on file   Stress: Not on file   Social Connections: Not on file   Intimate Partner Violence: Not on file   Housing Stability: Not on file     Family History   Problem Relation Age of Onset    High Cholesterol Mother     Hypertension Mother     Melanoma Father     High Cholesterol Father     Hypertension Father     Colon Cancer Neg Hx      Allergies   Allergen Reactions    Oxycodone-Acetaminophen Itching     Itching and nausea           Review of Systems    Objective:   /80 (Site: Right Upper Arm, Position: Sitting, Cuff Size: Small Adult)   Pulse (!) 118   SpO2 98%     Physical Exam    Laboratory, Pathology, Radiology reviewed in detail with relevantimportant investigations summarized below:  Lab Results   Component Value Date/Time    WBC 7.0 11/05/2022 02:00 PM    WBC 7.8 09/17/2022 05:38 AM    WBC 7.0 09/15/2022 04:54 AM    WBC 7.1 09/14/2022 05:30 AM    WBC 8.0 09/13/2022 06:22 AM    HGB 12.3 11/05/2022 02:00 PM    HGB 9.5 09/17/2022 05:38 AM    HGB 9.6 09/15/2022 04:54 AM    HGB 9.6 09/14/2022 05:30 AM    HGB 9.7 09/13/2022 06:22 AM    HCT 37.3 11/05/2022 02:00 PM    HCT 29.4 09/17/2022 05:38 AM    HCT 29.2 09/15/2022 04:54 AM    HCT 29.2 09/14/2022 05:30 AM    HCT 29.7 09/13/2022 06:22 AM    MCV 93.8 11/05/2022 02:00 PM    .9 09/17/2022 05:38 AM    .4 09/15/2022 04:54 AM    .3 09/14/2022 05:30 AM    .1 09/13/2022 06:22 AM     11/05/2022 02:00 PM     09/17/2022 05:38 AM     09/15/2022 04:54 AM     09/14/2022 05:30 AM     09/13/2022 06:22 AM    .  Lab Results   Component Value Date/Time    ALT 61 11/05/2022 02:00 PM    ALT 46 10/05/2022 04:52 PM    ALT 35 09/17/2022 05:38 AM     11/05/2022 02:00 PM     10/05/2022 04:52 PM     09/17/2022 05:38 AM    ALKPHOS 164 11/05/2022 02:00 PM    ALKPHOS 191 10/05/2022 04:52 PM    ALKPHOS 283 09/17/2022 05:38 AM    BILITOT 2.2 11/05/2022 02:00 PM    BILITOT 3.5 10/05/2022 04:52 PM    BILITOT 5.8 09/17/2022 05:38 AM       No results found. No results found for: IRON, TIBC, FERRITIN  Lab Results   Component Value Date/Time    INR 1.4 09/15/2022 05:54 AM    INR 1.5 09/14/2022 05:30 AM    INR 1.5 09/13/2022 06:22 AM    INR 1.5 09/09/2022 01:49 PM    INR 1.4 09/05/2022 05:00 AM     No components found for: ACUTEHEPATITISSCREEN  No components found for: CELIACPANEL  No components found for: STOOLCULTURE, C.DIFF, STOOLOVAPARASITE, STOOLLEUCOCYTE        Assessment:       Diagnosis Orders   1. Cirrhosis of liver without ascites, unspecified hepatic cirrhosis type (HCC)  Comprehensive Metabolic Panel    Protime-INR    CBC    IR US GUIDED PARACENTESIS      2. Other ascites  IR US GUIDED PARACENTESIS            Plan:      Orders Placed This Encounter   Procedures    IR US GUIDED PARACENTESIS     Standing Status:   Future     Standing Expiration Date:   12/13/2023    Comprehensive Metabolic Panel     Standing Status:   Future     Standing Expiration Date:   12/13/2023    Protime-INR     Standing Status:   Future     Standing Expiration Date:   12/13/2023     Order Specific Question:   Daily Coumadin Dose? Answer:   no    CBC     Standing Status:   Future     Standing Expiration Date:   12/13/2023     Orders Placed This Encounter   Medications    spironolactone (ALDACTONE) 50 MG tablet     Sig: Take 1 tablet by mouth daily     Dispense:  30 tablet     Refill:  3    furosemide (LASIX) 20 MG tablet     Sig: Take 1 tablet by mouth daily     Dispense:  60 tablet     Refill:  3       No follow-ups on file.       Tawana Alston MD

## 2023-01-01 ENCOUNTER — APPOINTMENT (OUTPATIENT)
Dept: GENERAL RADIOLOGY | Age: 38
DRG: 720 | End: 2023-01-01
Payer: COMMERCIAL

## 2023-01-01 ENCOUNTER — PREP FOR PROCEDURE (OUTPATIENT)
Dept: GASTROENTEROLOGY | Age: 38
End: 2023-01-01

## 2023-01-01 ENCOUNTER — HOSPITAL ENCOUNTER (INPATIENT)
Age: 38
LOS: 4 days | DRG: 720 | End: 2023-12-01
Attending: EMERGENCY MEDICINE | Admitting: FAMILY MEDICINE
Payer: COMMERCIAL

## 2023-01-01 ENCOUNTER — APPOINTMENT (OUTPATIENT)
Dept: CT IMAGING | Age: 38
DRG: 720 | End: 2023-01-01
Payer: COMMERCIAL

## 2023-01-01 ENCOUNTER — HOSPITAL ENCOUNTER (INPATIENT)
Dept: INTERVENTIONAL RADIOLOGY/VASCULAR | Age: 38
Discharge: HOME | DRG: 720 | End: 2023-12-02
Payer: COMMERCIAL

## 2023-01-01 VITALS
DIASTOLIC BLOOD PRESSURE: 42 MMHG | TEMPERATURE: 99.9 F | WEIGHT: 151.01 LBS | OXYGEN SATURATION: 70 % | BODY MASS INDEX: 25.16 KG/M2 | SYSTOLIC BLOOD PRESSURE: 108 MMHG | HEART RATE: 34 BPM | HEIGHT: 65 IN

## 2023-01-01 VITALS
OXYGEN SATURATION: 100 % | DIASTOLIC BLOOD PRESSURE: 54 MMHG | SYSTOLIC BLOOD PRESSURE: 116 MMHG | RESPIRATION RATE: 18 BRPM | HEART RATE: 94 BPM

## 2023-01-01 DIAGNOSIS — R41.82 ALTERED MENTAL STATUS, UNSPECIFIED ALTERED MENTAL STATUS TYPE: Primary | ICD-10-CM

## 2023-01-01 DIAGNOSIS — K76.82 HEPATIC ENCEPHALOPATHY (HCC): ICD-10-CM

## 2023-01-01 DIAGNOSIS — J96.01 ACUTE HYPOXIC RESPIRATORY FAILURE (HCC): ICD-10-CM

## 2023-01-01 LAB
A BAUMANNII DNA BLD POS QL NAA+NON-PROBE: NOT DETECTED
ABO + RH BLD: NORMAL
ACANTHOCYTES BLD QL SMEAR: ABNORMAL
ALBUMIN FLUID: 0.5 G/DL
ALBUMIN SERPL-MCNC: 2.3 G/DL (ref 3.5–4.6)
ALBUMIN SERPL-MCNC: 2.4 G/DL (ref 3.5–4.6)
ALBUMIN SERPL-MCNC: 2.4 G/DL (ref 3.5–4.6)
ALBUMIN SERPL-MCNC: 2.6 G/DL (ref 3.5–4.6)
ALBUMIN SERPL-MCNC: 3.2 G/DL (ref 3.5–4.6)
ALP SERPL-CCNC: 108 U/L (ref 40–130)
ALP SERPL-CCNC: 119 U/L (ref 40–130)
ALP SERPL-CCNC: 140 U/L (ref 40–130)
ALP SERPL-CCNC: 190 U/L (ref 40–130)
ALP SERPL-CCNC: 195 U/L (ref 40–130)
ALT SERPL-CCNC: 21 U/L (ref 0–33)
ALT SERPL-CCNC: 22 U/L (ref 0–33)
ALT SERPL-CCNC: 27 U/L (ref 0–33)
ALT SERPL-CCNC: 30 U/L (ref 0–33)
ALT SERPL-CCNC: 35 U/L (ref 0–33)
AMMONIA PLAS-SCNC: 117 UMOL/L (ref 11–51)
AMMONIA PLAS-SCNC: 258 UMOL/L (ref 11–51)
AMPHET UR QL SCN: ABNORMAL
AMYLASE FLUID: 25 U/L
ANION GAP SERPL CALCULATED.3IONS-SCNC: 10 MEQ/L (ref 9–15)
ANION GAP SERPL CALCULATED.3IONS-SCNC: 13 MEQ/L (ref 9–15)
ANION GAP SERPL CALCULATED.3IONS-SCNC: 14 MEQ/L (ref 9–15)
ANION GAP SERPL CALCULATED.3IONS-SCNC: 16 MEQ/L (ref 9–15)
ANION GAP SERPL CALCULATED.3IONS-SCNC: 24 MEQ/L (ref 9–15)
ANISOCYTOSIS BLD QL SMEAR: ABNORMAL
APPEARANCE FLUID: CLEAR
AST SERPL-CCNC: 110 U/L (ref 0–35)
AST SERPL-CCNC: 61 U/L (ref 0–35)
AST SERPL-CCNC: 65 U/L (ref 0–35)
AST SERPL-CCNC: 81 U/L (ref 0–35)
AST SERPL-CCNC: 84 U/L (ref 0–35)
BACTERIA BLD CULT ORG #2: ABNORMAL
BACTERIA BLD CULT: ABNORMAL
BARBITURATES UR QL SCN: ABNORMAL
BASE EXCESS ARTERIAL: -7 (ref -3–3)
BASE EXCESS ARTERIAL: -7 (ref -3–3)
BASE EXCESS ARTERIAL: -8 (ref -3–3)
BASE EXCESS ARTERIAL: 3 (ref -3–3)
BASE EXCESS VENOUS: 1 (ref -3–3)
BASO FLUID: 0 %
BASOPHILS # BLD: 0 K/UL (ref 0–0.2)
BASOPHILS # BLD: 0.1 K/UL (ref 0–0.2)
BASOPHILS NFR BLD: 0.2 %
BASOPHILS NFR BLD: 0.3 %
BASOPHILS NFR BLD: 1 %
BENZODIAZ UR QL SCN: ABNORMAL
BILIRUB SERPL-MCNC: 10.3 MG/DL (ref 0.2–0.7)
BILIRUB SERPL-MCNC: 10.8 MG/DL (ref 0.2–0.7)
BILIRUB SERPL-MCNC: 11.3 MG/DL (ref 0.2–0.7)
BILIRUB SERPL-MCNC: 9 MG/DL (ref 0.2–0.7)
BILIRUB SERPL-MCNC: 9.1 MG/DL (ref 0.2–0.7)
BLACTX-M ISLT/SPM QL: NOT DETECTED
BLAIMP ISLT/SPM QL: NOT DETECTED
BLAKPC ISLT/SPM QL: NOT DETECTED
BLAVIM ISLT/SPM QL: NOT DETECTED
BLD GP AB SCN SERPL QL: NORMAL
BLOOD BANK DISPENSE STATUS: NORMAL
BLOOD BANK PRODUCT CODE: NORMAL
BPU ID: NORMAL
BUN SERPL-MCNC: 48 MG/DL (ref 6–20)
BUN SERPL-MCNC: 50 MG/DL (ref 6–20)
BUN SERPL-MCNC: 51 MG/DL (ref 6–20)
BUN SERPL-MCNC: 52 MG/DL (ref 6–20)
BUN SERPL-MCNC: 53 MG/DL (ref 6–20)
BURR CELLS: ABNORMAL
C ALBICANS DNA BLD POS QL NAA+NON-PROBE: NOT DETECTED
C AURIS DNA BLD POS QL NAA+PROBE: NOT DETECTED
C GLABRATA DNA BLD POS QL NAA+NON-PROBE: NOT DETECTED
C KRUSEI DNA BLD POS QL NAA+NON-PROBE: NOT DETECTED
C PARAP DNA BLD POS QL NAA+NON-PROBE: NOT DETECTED
C TROPICLS DNA BLD POS QL NAA+NON-PROBE: NOT DETECTED
CALCIUM IONIZED: 0.73 MMOL/L (ref 1.12–1.32)
CALCIUM IONIZED: 0.84 MMOL/L (ref 1.12–1.32)
CALCIUM IONIZED: 1.03 MMOL/L (ref 1.12–1.32)
CALCIUM IONIZED: 1.16 MMOL/L (ref 1.12–1.32)
CALCIUM IONIZED: 1.24 MMOL/L (ref 1.12–1.32)
CALCIUM SERPL-MCNC: 7.8 MG/DL (ref 8.5–9.9)
CALCIUM SERPL-MCNC: 8 MG/DL (ref 8.5–9.9)
CALCIUM SERPL-MCNC: 8.1 MG/DL (ref 8.5–9.9)
CALCIUM SERPL-MCNC: 8.2 MG/DL (ref 8.5–9.9)
CALCIUM SERPL-MCNC: 8.4 MG/DL (ref 8.5–9.9)
CANNABINOIDS UR QL SCN: POSITIVE
CARBAPENEM RESISTANCE NDM GENE BY PCR: NOT DETECTED
CARBAPENEM RESISTANCE OXA-48 GENE BY PCR: NOT DETECTED
CELL COUNT FLUID TYPE: NORMAL
CHLORIDE SERPL-SCNC: 103 MEQ/L (ref 95–107)
CHLORIDE SERPL-SCNC: 81 MEQ/L (ref 95–107)
CHLORIDE SERPL-SCNC: 89 MEQ/L (ref 95–107)
CHLORIDE SERPL-SCNC: 92 MEQ/L (ref 95–107)
CHLORIDE SERPL-SCNC: 99 MEQ/L (ref 95–107)
CK SERPL-CCNC: 62 U/L (ref 0–170)
CLOT EVALUATION: NORMAL
CO2 SERPL-SCNC: 17 MEQ/L (ref 20–31)
CO2 SERPL-SCNC: 19 MEQ/L (ref 20–31)
CO2 SERPL-SCNC: 20 MEQ/L (ref 20–31)
CO2 SERPL-SCNC: 20 MEQ/L (ref 20–31)
CO2 SERPL-SCNC: 22 MEQ/L (ref 20–31)
COCAINE UR QL SCN: ABNORMAL
COLISTIN RES MCR-1 ISLT/SPM QL: NOT DETECTED
COLOR FLUID: YELLOW
CREAT SERPL-MCNC: 4.16 MG/DL (ref 0.5–0.9)
CREAT SERPL-MCNC: 4.26 MG/DL (ref 0.5–0.9)
CREAT SERPL-MCNC: 4.39 MG/DL (ref 0.5–0.9)
CREAT SERPL-MCNC: 4.63 MG/DL (ref 0.5–0.9)
CREAT SERPL-MCNC: 4.63 MG/DL (ref 0.5–0.9)
CRYPTOCOCCUS NEOFORMANS/GATTII BY PCR: NOT DETECTED
CULTURE, BLOOD ID SENSITIVITY: ABNORMAL
DESCRIPTION BLOOD BANK: NORMAL
DRUG SCREEN COMMENT UR-IMP: ABNORMAL
E CLOAC COMP DNA BLD POS NAA+NON-PROBE: NOT DETECTED
E COLI DNA BLD POS QL NAA+NON-PROBE: NOT DETECTED
E FAECALIS DNA BLD POS QL NAA+PROBE: NOT DETECTED
E FAECIUM DNA BLD POS QL NAA+PROBE: NOT DETECTED
ENTEROBACT DNA BLD POS QL NAA+NON-PROBE: DETECTED
ENTEROCOC DNA BLD POS QL NAA+NON-PROBE: DETECTED
EOSINOPHIL # BLD: 0 K/UL (ref 0–0.7)
EOSINOPHIL # BLD: 0.1 K/UL (ref 0–0.7)
EOSINOPHIL FLUID: 1 %
EOSINOPHIL NFR BLD: 0.1 %
EOSINOPHIL NFR BLD: 0.4 %
EOSINOPHIL NFR BLD: 0.6 %
EOSINOPHIL NFR BLD: 0.7 %
EOSINOPHIL NFR BLD: 1 %
ERYTHROCYTE [DISTWIDTH] IN BLOOD BY AUTOMATED COUNT: 17.7 % (ref 11.5–14.5)
ERYTHROCYTE [DISTWIDTH] IN BLOOD BY AUTOMATED COUNT: 18 % (ref 11.5–14.5)
ERYTHROCYTE [DISTWIDTH] IN BLOOD BY AUTOMATED COUNT: 20.5 % (ref 11.5–14.5)
ERYTHROCYTE [DISTWIDTH] IN BLOOD BY AUTOMATED COUNT: 21 % (ref 11.5–14.5)
ERYTHROCYTE [DISTWIDTH] IN BLOOD BY AUTOMATED COUNT: 21.1 % (ref 11.5–14.5)
ETHANOL PERCENT: NORMAL G/DL
ETHANOLAMINE SERPL-MCNC: <10 MG/DL (ref 0–0.08)
FENTANYL SCREEN, URINE: ABNORMAL
FLUID TYPE: NORMAL
GLOBULIN SER CALC-MCNC: 2.3 G/DL (ref 2.3–3.5)
GLOBULIN SER CALC-MCNC: 2.5 G/DL (ref 2.3–3.5)
GLOBULIN SER CALC-MCNC: 3.1 G/DL (ref 2.3–3.5)
GLOBULIN SER CALC-MCNC: 3.8 G/DL (ref 2.3–3.5)
GLOBULIN SER CALC-MCNC: 3.9 G/DL (ref 2.3–3.5)
GLUCOSE BLD-MCNC: 108 MG/DL (ref 70–99)
GLUCOSE BLD-MCNC: 112 MG/DL (ref 70–99)
GLUCOSE BLD-MCNC: 113 MG/DL (ref 70–99)
GLUCOSE BLD-MCNC: 116 MG/DL (ref 70–99)
GLUCOSE BLD-MCNC: 116 MG/DL (ref 70–99)
GLUCOSE BLD-MCNC: 117 MG/DL (ref 70–99)
GLUCOSE BLD-MCNC: 124 MG/DL (ref 70–99)
GLUCOSE BLD-MCNC: 125 MG/DL (ref 70–99)
GLUCOSE BLD-MCNC: 129 MG/DL (ref 70–99)
GLUCOSE BLD-MCNC: 133 MG/DL (ref 70–99)
GLUCOSE BLD-MCNC: 133 MG/DL (ref 70–99)
GLUCOSE BLD-MCNC: 142 MG/DL (ref 70–99)
GLUCOSE BLD-MCNC: 147 MG/DL (ref 70–99)
GLUCOSE BLD-MCNC: 152 MG/DL (ref 70–99)
GLUCOSE BLD-MCNC: 156 MG/DL (ref 70–99)
GLUCOSE BLD-MCNC: 160 MG/DL (ref 70–99)
GLUCOSE BLD-MCNC: 162 MG/DL (ref 70–99)
GLUCOSE BLD-MCNC: 165 MG/DL (ref 70–99)
GLUCOSE BLD-MCNC: 167 MG/DL (ref 70–99)
GLUCOSE BLD-MCNC: 171 MG/DL (ref 70–99)
GLUCOSE BLD-MCNC: 172 MG/DL (ref 70–99)
GLUCOSE BLD-MCNC: 66 MG/DL (ref 70–99)
GLUCOSE BLD-MCNC: 74 MG/DL (ref 70–99)
GLUCOSE BLD-MCNC: 85 MG/DL (ref 70–99)
GLUCOSE FLD-MCNC: 146.3 MG/DL
GLUCOSE SERPL-MCNC: 127 MG/DL (ref 70–99)
GLUCOSE SERPL-MCNC: 151 MG/DL (ref 70–99)
GLUCOSE SERPL-MCNC: 163 MG/DL (ref 70–99)
GLUCOSE SERPL-MCNC: 87 MG/DL (ref 70–99)
GLUCOSE SERPL-MCNC: 92 MG/DL (ref 70–99)
GN BLD CULTURE PNL BLD POS NAA+PROBE: NOT DETECTED
GP B STREP DNA BLD POS QL NAA+NON-PROBE: NOT DETECTED
HCO3 ARTERIAL: 17.9 MMOL/L (ref 21–29)
HCO3 ARTERIAL: 19.1 MMOL/L (ref 21–29)
HCO3 ARTERIAL: 21.8 MMOL/L (ref 21–29)
HCO3 ARTERIAL: 24.9 MMOL/L (ref 21–29)
HCO3 VENOUS: 23.2 MMOL/L (ref 23–29)
HCT VFR BLD AUTO: 19.6 % (ref 37–47)
HCT VFR BLD AUTO: 20 % (ref 36–48)
HCT VFR BLD AUTO: 20 % (ref 36–48)
HCT VFR BLD AUTO: 21 % (ref 36–48)
HCT VFR BLD AUTO: 21.2 % (ref 37–47)
HCT VFR BLD AUTO: 21.3 % (ref 37–47)
HCT VFR BLD AUTO: 21.7 % (ref 37–47)
HCT VFR BLD AUTO: 22 % (ref 36–48)
HCT VFR BLD AUTO: 22.4 % (ref 37–47)
HCT VFR BLD AUTO: 22.5 % (ref 37–47)
HCT VFR BLD AUTO: 22.5 % (ref 37–47)
HCT VFR BLD AUTO: 22.6 % (ref 37–47)
HCT VFR BLD AUTO: 23.1 % (ref 37–47)
HCT VFR BLD AUTO: 23.2 % (ref 37–47)
HCT VFR BLD AUTO: 23.4 % (ref 37–47)
HCT VFR BLD AUTO: 23.7 % (ref 37–47)
HCT VFR BLD AUTO: 24 % (ref 36–48)
HCT VFR BLD AUTO: 24.2 % (ref 37–47)
HGB BLD CALC-MCNC: 6.8 GM/DL (ref 12–16)
HGB BLD CALC-MCNC: 7 GM/DL (ref 12–16)
HGB BLD CALC-MCNC: 7.1 GM/DL (ref 12–16)
HGB BLD CALC-MCNC: 7.4 GM/DL (ref 12–16)
HGB BLD CALC-MCNC: 8 GM/DL (ref 12–16)
HGB BLD-MCNC: 7 G/DL (ref 12–16)
HGB BLD-MCNC: 7.1 G/DL (ref 12–16)
HGB BLD-MCNC: 7.4 G/DL (ref 12–16)
HGB BLD-MCNC: 7.5 G/DL (ref 12–16)
HGB BLD-MCNC: 7.5 G/DL (ref 12–16)
HGB BLD-MCNC: 7.6 G/DL (ref 12–16)
HGB BLD-MCNC: 7.6 G/DL (ref 12–16)
HGB BLD-MCNC: 7.7 G/DL (ref 12–16)
HGB BLD-MCNC: 7.8 G/DL (ref 12–16)
HGB BLD-MCNC: 8.1 G/DL (ref 12–16)
HGB BLD-MCNC: 8.4 G/DL (ref 12–16)
HYPOCHROMIA BLD QL SMEAR: ABNORMAL
INR PPP: 2.1
INR PPP: 2.4
INR PPP: 2.5
INR PPP: 3.1
INR PPP: 3.6
K OXYTOCA DNA BLD POS QL NAA+NON-PROBE: NOT DETECTED
K PNEUMON DNA SPEC QL NAA+PROBE: NOT DETECTED
K. AEROGENES DNA SPEC QL NAA+PROBE: NOT DETECTED
L MONOCYTOG DNA BLD POS QL NAA+NON-PROBE: NOT DETECTED
LACTATE BLDV-SCNC: 1.6 MMOL/L (ref 0.5–2.2)
LACTATE BLDV-SCNC: 11.1 MMOL/L (ref 0.5–2.2)
LACTATE BLDV-SCNC: 3 MMOL/L (ref 0.5–2.2)
LACTATE DEHYDROGENASE, FLUID: 90 U/L
LACTATE: 0.71 MMOL/L (ref 0.4–2)
LACTATE: 1.1 MMOL/L (ref 0.4–2)
LACTATE: 1.18 MMOL/L (ref 0.4–2)
LACTATE: 11.07 MMOL/L (ref 0.4–2)
LACTATE: 3.62 MMOL/L (ref 0.4–2)
LIPASE SERPL-CCNC: 76 U/L (ref 12–95)
LYMPHOCYTES # BLD: 0 K/UL (ref 1–4.8)
LYMPHOCYTES # BLD: 1.2 K/UL (ref 1–4.8)
LYMPHOCYTES # BLD: 1.3 K/UL (ref 1–4.8)
LYMPHOCYTES # BLD: 1.7 K/UL (ref 1–4.8)
LYMPHOCYTES # BLD: 2 K/UL (ref 1–4.8)
LYMPHOCYTES NFR BLD: 10.9 %
LYMPHOCYTES NFR BLD: 12.6 %
LYMPHOCYTES NFR BLD: 14.6 %
LYMPHOCYTES NFR BLD: 14.9 %
LYMPHOCYTES NFR BLD: 4.3 %
LYMPHOCYTES, BODY FLUID: 14 %
MACROCYTES BLD QL SMEAR: ABNORMAL
MAGNESIUM SERPL-MCNC: 1.2 MG/DL (ref 1.7–2.4)
MAGNESIUM SERPL-MCNC: 1.9 MG/DL (ref 1.7–2.4)
MAGNESIUM SERPL-MCNC: 2.1 MG/DL (ref 1.7–2.4)
MAGNESIUM SERPL-MCNC: 2.2 MG/DL (ref 1.7–2.4)
MCH RBC QN AUTO: 31.5 PG (ref 27–31.3)
MCH RBC QN AUTO: 31.6 PG (ref 27–31.3)
MCH RBC QN AUTO: 31.9 PG (ref 27–31.3)
MCH RBC QN AUTO: 32.1 PG (ref 27–31.3)
MCH RBC QN AUTO: 32.4 PG (ref 27–31.3)
MCHC RBC AUTO-ENTMCNC: 32.3 % (ref 33–37)
MCHC RBC AUTO-ENTMCNC: 33 % (ref 33–37)
MCHC RBC AUTO-ENTMCNC: 33.5 % (ref 33–37)
MCHC RBC AUTO-ENTMCNC: 35 % (ref 33–37)
MCHC RBC AUTO-ENTMCNC: 35.7 % (ref 33–37)
MCV RBC AUTO: 90 FL (ref 79.4–94.8)
MCV RBC AUTO: 90.7 FL (ref 79.4–94.8)
MCV RBC AUTO: 95.3 FL (ref 79.4–94.8)
MCV RBC AUTO: 95.7 FL (ref 79.4–94.8)
MCV RBC AUTO: 99.1 FL (ref 79.4–94.8)
METHADONE UR QL SCN: ABNORMAL
MONOCYTE, FLUID: 9 %
MONOCYTES # BLD: 0.9 K/UL (ref 0.2–0.8)
MONOCYTES # BLD: 0.9 K/UL (ref 0.2–0.8)
MONOCYTES # BLD: 1 K/UL (ref 0.2–0.8)
MONOCYTES # BLD: 1 K/UL (ref 0.2–0.8)
MONOCYTES # BLD: 1.2 K/UL (ref 0.2–0.8)
MONOCYTES NFR BLD: 10.5 %
MONOCYTES NFR BLD: 6.7 %
MONOCYTES NFR BLD: 7.2 %
MONOCYTES NFR BLD: 7.5 %
MONOCYTES NFR BLD: 8.5 %
N MEN DNA BLD POS QL NAA+NON-PROBE: NOT DETECTED
NEUTROPHIL, FLUID: 76 %
NEUTROPHILS # BLD: 10.1 K/UL (ref 1.4–6.5)
NEUTROPHILS # BLD: 12.9 K/UL (ref 1.4–6.5)
NEUTROPHILS # BLD: 7.9 K/UL (ref 1.4–6.5)
NEUTROPHILS # BLD: 8.4 K/UL (ref 1.4–6.5)
NEUTROPHILS # BLD: 8.5 K/UL (ref 1.4–6.5)
NEUTS SEG NFR BLD: 74.3 %
NEUTS SEG NFR BLD: 75.4 %
NEUTS SEG NFR BLD: 75.8 %
NEUTS SEG NFR BLD: 75.9 %
NEUTS SEG NFR BLD: 91 %
NEUTS VAC BLD QL SMEAR: ABNORMAL
NUCLEATED CELLS FLUID: 652 /CUMM
NUMBER OF CELLS COUNTED FLUID: 100
O2 SAT, ARTERIAL: 100 % (ref 93–100)
O2 SAT, ARTERIAL: 94 % (ref 93–100)
O2 SAT, ARTERIAL: 94 % (ref 93–100)
O2 SAT, ARTERIAL: 96 % (ref 93–100)
O2 SAT, VEN: 81 %
OPIATES UR QL SCN: ABNORMAL
ORGANISM: ABNORMAL
ORGANISM: ABNORMAL
OXYCODONE UR QL SCN: ABNORMAL
P AERUGINOSA DNA BLD POS NAA+NON-PROBE: NOT DETECTED
PCO2 ARTERIAL: 28 MM HG (ref 35–45)
PCO2 ARTERIAL: 34 MM HG (ref 35–45)
PCO2 ARTERIAL: 35 MM HG (ref 35–45)
PCO2 ARTERIAL: 60 MM HG (ref 35–45)
PCO2, VEN: 25.6 MM HG (ref 40–50)
PCP UR QL SCN: ABNORMAL
PERFORMED ON: ABNORMAL
PH ARTERIAL: 7.17 (ref 7.35–7.45)
PH ARTERIAL: 7.33 (ref 7.35–7.45)
PH ARTERIAL: 7.35 (ref 7.35–7.45)
PH ARTERIAL: 7.56 (ref 7.35–7.45)
PH VENOUS: 7.56 (ref 7.32–7.42)
PLATELET # BLD AUTO: 42 K/UL (ref 130–400)
PLATELET # BLD AUTO: 47 K/UL (ref 130–400)
PLATELET # BLD AUTO: 54 K/UL (ref 130–400)
PLATELET # BLD AUTO: 65 K/UL (ref 130–400)
PLATELET # BLD AUTO: 67 K/UL (ref 130–400)
PLATELET BLD QL SMEAR: ABNORMAL
PO2 ARTERIAL: 293 MM HG (ref 75–108)
PO2 ARTERIAL: 77 MM HG (ref 75–108)
PO2 ARTERIAL: 88 MM HG (ref 75–108)
PO2 ARTERIAL: 89 MM HG (ref 75–108)
PO2, VEN: 37 MM HG
POC CHLORIDE: 107 MEQ/L (ref 99–110)
POC CHLORIDE: 107 MEQ/L (ref 99–110)
POC CHLORIDE: 111 MEQ/L (ref 99–110)
POC CHLORIDE: 88 MEQ/L (ref 99–110)
POC CHLORIDE: 93 MEQ/L (ref 99–110)
POC CREATININE: 4.6 MG/DL (ref 0.6–1.2)
POC CREATININE: 4.7 MG/DL (ref 0.6–1.2)
POC CREATININE: 4.7 MG/DL (ref 0.6–1.2)
POC CREATININE: 4.9 MG/DL (ref 0.6–1.2)
POC CREATININE: 5.1 MG/DL (ref 0.6–1.2)
POC FIO2: 40
POC FIO2: 80
POC SAMPLE TYPE: ABNORMAL
POIKILOCYTOSIS BLD QL SMEAR: ABNORMAL
POLYCHROMASIA BLD QL SMEAR: ABNORMAL
POTASSIUM SERPL-SCNC: 2.8 MEQ/L (ref 3.4–4.9)
POTASSIUM SERPL-SCNC: 2.9 MEQ/L (ref 3.5–5.1)
POTASSIUM SERPL-SCNC: 3.1 MEQ/L (ref 3.5–5.1)
POTASSIUM SERPL-SCNC: 3.3 MEQ/L (ref 3.5–5.1)
POTASSIUM SERPL-SCNC: 3.4 MEQ/L (ref 3.4–4.9)
POTASSIUM SERPL-SCNC: 3.5 MEQ/L (ref 3.4–4.9)
POTASSIUM SERPL-SCNC: 3.5 MEQ/L (ref 3.5–5.1)
POTASSIUM SERPL-SCNC: 3.8 MEQ/L (ref 3.4–4.9)
POTASSIUM SERPL-SCNC: 4.3 MEQ/L (ref 3.4–4.9)
POTASSIUM SERPL-SCNC: 4.9 MEQ/L (ref 3.5–5.1)
PROCALCITONIN SERPL IA-MCNC: 2.06 NG/ML (ref 0–0.15)
PROLACTIN SERPL-MCNC: 22.3 NG/ML
PROMYELOCYTES NFR BLD MANUAL: 2 %
PROPOXYPH UR QL SCN: ABNORMAL
PROT FLD-MCNC: 0.9 G/DL
PROT SERPL-MCNC: 4.7 G/DL (ref 6.3–8)
PROT SERPL-MCNC: 5.7 G/DL (ref 6.3–8)
PROT SERPL-MCNC: 5.7 G/DL (ref 6.3–8)
PROT SERPL-MCNC: 6.1 G/DL (ref 6.3–8)
PROT SERPL-MCNC: 6.3 G/DL (ref 6.3–8)
PROTEUS SP DNA BLD POS QL NAA+NON-PROBE: NOT DETECTED
PROTHROMBIN TIME: 24.8 SEC (ref 12.3–14.9)
PROTHROMBIN TIME: 27.2 SEC (ref 12.3–14.9)
PROTHROMBIN TIME: 28.3 SEC (ref 12.3–14.9)
PROTHROMBIN TIME: 33.3 SEC (ref 12.3–14.9)
PROTHROMBIN TIME: 37.2 SEC (ref 12.3–14.9)
RBC # BLD AUTO: 2.16 M/UL (ref 4.2–5.4)
RBC # BLD AUTO: 2.34 M/UL (ref 4.2–5.4)
RBC # BLD AUTO: 2.34 M/UL (ref 4.2–5.4)
RBC # BLD AUTO: 2.41 M/UL (ref 4.2–5.4)
RBC # BLD AUTO: 2.54 M/UL (ref 4.2–5.4)
RBC FLUID: <2000 /CUMM
S AUREUS DNA BLD POS QL NAA+NON-PROBE: NOT DETECTED
S AUREUS+CONS DNA BLD POS NAA+NON-PROBE: NOT DETECTED
S EPIDERMIDIS DNA BLD POS QL NAA+PROBE: NOT DETECTED
S LUGDUNENSIS DNA BLD POS QL NAA+PROBE: NOT DETECTED
S MALTOPH DNA BLD POS QL NAA+PROBE: NOT DETECTED
S MARCESCENS DNA BLD POS NAA+NON-PROBE: NOT DETECTED
S PNEUM DNA BLD POS QL NAA+NON-PROBE: NOT DETECTED
S PYO DNA BLD POS QL NAA+NON-PROBE: NOT DETECTED
SALMONELLA DNA BLD POS QL NAA+PROBE: NOT DETECTED
SLIDE REVIEW: ABNORMAL
SMUDGE CELLS BLD QL SMEAR: 5.7
SODIUM BLD-SCNC: 123 MEQ/L (ref 136–145)
SODIUM BLD-SCNC: 126 MEQ/L (ref 136–145)
SODIUM BLD-SCNC: 133 MEQ/L (ref 136–145)
SODIUM BLD-SCNC: 133 MEQ/L (ref 136–145)
SODIUM BLD-SCNC: 136 MEQ/L (ref 136–145)
SODIUM SERPL-SCNC: 122 MEQ/L (ref 135–144)
SODIUM SERPL-SCNC: 125 MEQ/L (ref 135–144)
SODIUM SERPL-SCNC: 127 MEQ/L (ref 135–144)
SODIUM SERPL-SCNC: 132 MEQ/L (ref 135–144)
SODIUM SERPL-SCNC: 133 MEQ/L (ref 135–144)
SPECIMEN TYPE: NORMAL
STREPTOCOCCUS DNA BLD POS NAA+NON-PROBE: NOT DETECTED
TARGETS BLD QL SMEAR: ABNORMAL
TCO2 ARTERIAL: 19 MMOL/L (ref 21–32)
TCO2 ARTERIAL: 20 MMOL/L (ref 21–32)
TCO2 ARTERIAL: 24 MMOL/L (ref 21–32)
TCO2 ARTERIAL: 26 MMOL/L (ref 21–32)
TCO2 CALC VENOUS: 24 MMOL/L
TOXIC GRANULATION: ABNORMAL
WBC # BLD AUTO: 10.4 K/UL (ref 4.8–10.8)
WBC # BLD AUTO: 11.2 K/UL (ref 4.8–10.8)
WBC # BLD AUTO: 11.5 K/UL (ref 4.8–10.8)
WBC # BLD AUTO: 13.4 K/UL (ref 4.8–10.8)
WBC # BLD AUTO: 13.9 K/UL (ref 4.8–10.8)

## 2023-01-01 PROCEDURE — 82330 ASSAY OF CALCIUM: CPT

## 2023-01-01 PROCEDURE — 36592 COLLECT BLOOD FROM PICC: CPT

## 2023-01-01 PROCEDURE — 99255 IP/OBS CONSLTJ NEW/EST HI 80: CPT | Performed by: NURSE PRACTITIONER

## 2023-01-01 PROCEDURE — 2580000003 HC RX 258: Performed by: INTERNAL MEDICINE

## 2023-01-01 PROCEDURE — 89051 BODY FLUID CELL COUNT: CPT

## 2023-01-01 PROCEDURE — 80053 COMPREHEN METABOLIC PANEL: CPT

## 2023-01-01 PROCEDURE — 2500000003 HC RX 250 WO HCPCS: Performed by: NURSE PRACTITIONER

## 2023-01-01 PROCEDURE — 2580000003 HC RX 258: Performed by: NURSE PRACTITIONER

## 2023-01-01 PROCEDURE — C9113 INJ PANTOPRAZOLE SODIUM, VIA: HCPCS | Performed by: INTERNAL MEDICINE

## 2023-01-01 PROCEDURE — 99232 SBSQ HOSP IP/OBS MODERATE 35: CPT | Performed by: INTERNAL MEDICINE

## 2023-01-01 PROCEDURE — 84295 ASSAY OF SERUM SODIUM: CPT

## 2023-01-01 PROCEDURE — 6360000002 HC RX W HCPCS: Performed by: INTERNAL MEDICINE

## 2023-01-01 PROCEDURE — 31500 INSERT EMERGENCY AIRWAY: CPT

## 2023-01-01 PROCEDURE — 2500000003 HC RX 250 WO HCPCS: Performed by: INTERNAL MEDICINE

## 2023-01-01 PROCEDURE — 83605 ASSAY OF LACTIC ACID: CPT

## 2023-01-01 PROCEDURE — 94002 VENT MGMT INPAT INIT DAY: CPT

## 2023-01-01 PROCEDURE — 2580000003 HC RX 258: Performed by: EMERGENCY MEDICINE

## 2023-01-01 PROCEDURE — 99233 SBSQ HOSP IP/OBS HIGH 50: CPT | Performed by: NURSE PRACTITIONER

## 2023-01-01 PROCEDURE — 86900 BLOOD TYPING SEROLOGIC ABO: CPT

## 2023-01-01 PROCEDURE — 85025 COMPLETE CBC W/AUTO DIFF WBC: CPT

## 2023-01-01 PROCEDURE — 89220 SPUTUM SPECIMEN COLLECTION: CPT

## 2023-01-01 PROCEDURE — 85610 PROTHROMBIN TIME: CPT

## 2023-01-01 PROCEDURE — 96375 TX/PRO/DX INJ NEW DRUG ADDON: CPT

## 2023-01-01 PROCEDURE — 2000000000 HC ICU R&B

## 2023-01-01 PROCEDURE — 82803 BLOOD GASES ANY COMBINATION: CPT

## 2023-01-01 PROCEDURE — P9016 RBC LEUKOCYTES REDUCED: HCPCS

## 2023-01-01 PROCEDURE — 6370000000 HC RX 637 (ALT 250 FOR IP): Performed by: FAMILY MEDICINE

## 2023-01-01 PROCEDURE — 82150 ASSAY OF AMYLASE: CPT

## 2023-01-01 PROCEDURE — 94003 VENT MGMT INPAT SUBQ DAY: CPT

## 2023-01-01 PROCEDURE — 84132 ASSAY OF SERUM POTASSIUM: CPT

## 2023-01-01 PROCEDURE — 6360000002 HC RX W HCPCS

## 2023-01-01 PROCEDURE — 82565 ASSAY OF CREATININE: CPT

## 2023-01-01 PROCEDURE — 82435 ASSAY OF BLOOD CHLORIDE: CPT

## 2023-01-01 PROCEDURE — 87186 SC STD MICRODIL/AGAR DIL: CPT

## 2023-01-01 PROCEDURE — 84146 ASSAY OF PROLACTIN: CPT

## 2023-01-01 PROCEDURE — 82550 ASSAY OF CK (CPK): CPT

## 2023-01-01 PROCEDURE — 2709999900 HC NON-CHARGEABLE SUPPLY: Performed by: INTERNAL MEDICINE

## 2023-01-01 PROCEDURE — 36600 WITHDRAWAL OF ARTERIAL BLOOD: CPT

## 2023-01-01 PROCEDURE — 85018 HEMOGLOBIN: CPT

## 2023-01-01 PROCEDURE — 82948 REAGENT STRIP/BLOOD GLUCOSE: CPT

## 2023-01-01 PROCEDURE — 99291 CRITICAL CARE FIRST HOUR: CPT | Performed by: INTERNAL MEDICINE

## 2023-01-01 PROCEDURE — 87077 CULTURE AEROBIC IDENTIFY: CPT

## 2023-01-01 PROCEDURE — 3609017100 HC EGD: Performed by: INTERNAL MEDICINE

## 2023-01-01 PROCEDURE — 6360000002 HC RX W HCPCS: Performed by: NURSE PRACTITIONER

## 2023-01-01 PROCEDURE — 2580000003 HC RX 258: Performed by: FAMILY MEDICINE

## 2023-01-01 PROCEDURE — C1729 CATH, DRAINAGE: HCPCS

## 2023-01-01 PROCEDURE — 99223 1ST HOSP IP/OBS HIGH 75: CPT | Performed by: NURSE PRACTITIONER

## 2023-01-01 PROCEDURE — 2700000000 HC OXYGEN THERAPY PER DAY

## 2023-01-01 PROCEDURE — 85014 HEMATOCRIT: CPT

## 2023-01-01 PROCEDURE — 83735 ASSAY OF MAGNESIUM: CPT

## 2023-01-01 PROCEDURE — 86850 RBC ANTIBODY SCREEN: CPT

## 2023-01-01 PROCEDURE — 6360000002 HC RX W HCPCS: Performed by: FAMILY MEDICINE

## 2023-01-01 PROCEDURE — 99232 SBSQ HOSP IP/OBS MODERATE 35: CPT | Performed by: NURSE PRACTITIONER

## 2023-01-01 PROCEDURE — 70450 CT HEAD/BRAIN W/O DYE: CPT

## 2023-01-01 PROCEDURE — 5A1945Z RESPIRATORY VENTILATION, 24-96 CONSECUTIVE HOURS: ICD-10-PCS | Performed by: INTERNAL MEDICINE

## 2023-01-01 PROCEDURE — 02HV33Z INSERTION OF INFUSION DEVICE INTO SUPERIOR VENA CAVA, PERCUTANEOUS APPROACH: ICD-10-PCS | Performed by: INTERNAL MEDICINE

## 2023-01-01 PROCEDURE — 83615 LACTATE (LD) (LDH) ENZYME: CPT

## 2023-01-01 PROCEDURE — 86923 COMPATIBILITY TEST ELECTRIC: CPT

## 2023-01-01 PROCEDURE — 80307 DRUG TEST PRSMV CHEM ANLYZR: CPT

## 2023-01-01 PROCEDURE — 0W3P8ZZ CONTROL BLEEDING IN GASTROINTESTINAL TRACT, VIA NATURAL OR ARTIFICIAL OPENING ENDOSCOPIC: ICD-10-PCS | Performed by: INTERNAL MEDICINE

## 2023-01-01 PROCEDURE — 51701 INSERT BLADDER CATHETER: CPT

## 2023-01-01 PROCEDURE — 0BH17EZ INSERTION OF ENDOTRACHEAL AIRWAY INTO TRACHEA, VIA NATURAL OR ARTIFICIAL OPENING: ICD-10-PCS | Performed by: INTERNAL MEDICINE

## 2023-01-01 PROCEDURE — 71045 X-RAY EXAM CHEST 1 VIEW: CPT

## 2023-01-01 PROCEDURE — 86901 BLOOD TYPING SEROLOGIC RH(D): CPT

## 2023-01-01 PROCEDURE — 6370000000 HC RX 637 (ALT 250 FOR IP): Performed by: NURSE PRACTITIONER

## 2023-01-01 PROCEDURE — P9059 PLASMA, FRZ BETWEEN 8-24HOUR: HCPCS

## 2023-01-01 PROCEDURE — 87040 BLOOD CULTURE FOR BACTERIA: CPT

## 2023-01-01 PROCEDURE — 2580000003 HC RX 258

## 2023-01-01 PROCEDURE — 87205 SMEAR GRAM STAIN: CPT

## 2023-01-01 PROCEDURE — 72125 CT NECK SPINE W/O DYE: CPT

## 2023-01-01 PROCEDURE — 82140 ASSAY OF AMMONIA: CPT

## 2023-01-01 PROCEDURE — 88342 IMHCHEM/IMCYTCHM 1ST ANTB: CPT

## 2023-01-01 PROCEDURE — 0W9G3ZZ DRAINAGE OF PERITONEAL CAVITY, PERCUTANEOUS APPROACH: ICD-10-PCS | Performed by: RADIOLOGY

## 2023-01-01 PROCEDURE — 99285 EMERGENCY DEPT VISIT HI MDM: CPT

## 2023-01-01 PROCEDURE — 83690 ASSAY OF LIPASE: CPT

## 2023-01-01 PROCEDURE — 36415 COLL VENOUS BLD VENIPUNCTURE: CPT

## 2023-01-01 PROCEDURE — P9047 ALBUMIN (HUMAN), 25%, 50ML: HCPCS | Performed by: INTERNAL MEDICINE

## 2023-01-01 PROCEDURE — 95816 EEG AWAKE AND DROWSY: CPT

## 2023-01-01 PROCEDURE — 82042 OTHER SOURCE ALBUMIN QUAN EA: CPT

## 2023-01-01 PROCEDURE — 2500000003 HC RX 250 WO HCPCS: Performed by: EMERGENCY MEDICINE

## 2023-01-01 PROCEDURE — 88341 IMHCHEM/IMCYTCHM EA ADD ANTB: CPT

## 2023-01-01 PROCEDURE — 96365 THER/PROPH/DIAG IV INF INIT: CPT

## 2023-01-01 PROCEDURE — 84145 PROCALCITONIN (PCT): CPT

## 2023-01-01 PROCEDURE — 30233N1 TRANSFUSION OF NONAUTOLOGOUS RED BLOOD CELLS INTO PERIPHERAL VEIN, PERCUTANEOUS APPROACH: ICD-10-PCS | Performed by: FAMILY MEDICINE

## 2023-01-01 PROCEDURE — 51702 INSERT TEMP BLADDER CATH: CPT

## 2023-01-01 PROCEDURE — 6370000000 HC RX 637 (ALT 250 FOR IP): Performed by: INTERNAL MEDICINE

## 2023-01-01 PROCEDURE — 43255 EGD CONTROL BLEEDING ANY: CPT | Performed by: INTERNAL MEDICINE

## 2023-01-01 PROCEDURE — 49083 ABD PARACENTESIS W/IMAGING: CPT

## 2023-01-01 PROCEDURE — 99254 IP/OBS CNSLTJ NEW/EST MOD 60: CPT | Performed by: INTERNAL MEDICINE

## 2023-01-01 PROCEDURE — 87070 CULTURE OTHR SPECIMN AEROBIC: CPT

## 2023-01-01 PROCEDURE — 51798 US URINE CAPACITY MEASURE: CPT

## 2023-01-01 PROCEDURE — 87150 DNA/RNA AMPLIFIED PROBE: CPT

## 2023-01-01 PROCEDURE — 74176 CT ABD & PELVIS W/O CONTRAST: CPT

## 2023-01-01 PROCEDURE — 36430 TRANSFUSION BLD/BLD COMPNT: CPT

## 2023-01-01 PROCEDURE — 6360000002 HC RX W HCPCS: Performed by: EMERGENCY MEDICINE

## 2023-01-01 PROCEDURE — 82945 GLUCOSE OTHER FLUID: CPT

## 2023-01-01 PROCEDURE — 84157 ASSAY OF PROTEIN OTHER: CPT

## 2023-01-01 PROCEDURE — 99222 1ST HOSP IP/OBS MODERATE 55: CPT | Performed by: NURSE PRACTITIONER

## 2023-01-01 PROCEDURE — 82077 ASSAY SPEC XCP UR&BREATH IA: CPT

## 2023-01-01 PROCEDURE — 88112 CYTOPATH CELL ENHANCE TECH: CPT

## 2023-01-01 PROCEDURE — 88305 TISSUE EXAM BY PATHOLOGIST: CPT

## 2023-01-01 PROCEDURE — 36556 INSERT NON-TUNNEL CV CATH: CPT | Performed by: INTERNAL MEDICINE

## 2023-01-01 PROCEDURE — 2500000003 HC RX 250 WO HCPCS: Performed by: RADIOLOGY

## 2023-01-01 PROCEDURE — 30233L1 TRANSFUSION OF NONAUTOLOGOUS FRESH PLASMA INTO PERIPHERAL VEIN, PERCUTANEOUS APPROACH: ICD-10-PCS | Performed by: FAMILY MEDICINE

## 2023-01-01 DEVICE — INSTINCT PLUS ENDOSCOPIC CLIPPING DEVICE
Type: IMPLANTABLE DEVICE | Site: STOMACH | Status: FUNCTIONAL
Brand: INSTINCT

## 2023-01-01 RX ORDER — 0.9 % SODIUM CHLORIDE 0.9 %
1000 INTRAVENOUS SOLUTION INTRAVENOUS ONCE
Status: COMPLETED | OUTPATIENT
Start: 2023-01-01 | End: 2023-01-01

## 2023-01-01 RX ORDER — SODIUM CHLORIDE 9 MG/ML
INJECTION, SOLUTION INTRAVENOUS PRN
Status: DISCONTINUED | OUTPATIENT
Start: 2023-01-01 | End: 2023-12-02 | Stop reason: HOSPADM

## 2023-01-01 RX ORDER — FENTANYL CITRATE 0.05 MG/ML
INJECTION, SOLUTION INTRAMUSCULAR; INTRAVENOUS
Status: COMPLETED | OUTPATIENT
Start: 2023-01-01 | End: 2023-01-01

## 2023-01-01 RX ORDER — ALBUMIN (HUMAN) 12.5 G/50ML
50 SOLUTION INTRAVENOUS ONCE
Status: COMPLETED | OUTPATIENT
Start: 2023-01-01 | End: 2023-01-01

## 2023-01-01 RX ORDER — ONDANSETRON 2 MG/ML
4 INJECTION INTRAMUSCULAR; INTRAVENOUS EVERY 6 HOURS PRN
Status: DISCONTINUED | OUTPATIENT
Start: 2023-01-01 | End: 2023-12-02 | Stop reason: HOSPADM

## 2023-01-01 RX ORDER — SUCCINYLCHOLINE CHLORIDE 20 MG/ML
INJECTION INTRAMUSCULAR; INTRAVENOUS
Status: COMPLETED | OUTPATIENT
Start: 2023-01-01 | End: 2023-01-01

## 2023-01-01 RX ORDER — INSULIN LISPRO 100 [IU]/ML
0-8 INJECTION, SOLUTION INTRAVENOUS; SUBCUTANEOUS EVERY 4 HOURS
Status: DISCONTINUED | OUTPATIENT
Start: 2023-01-01 | End: 2023-12-02 | Stop reason: HOSPADM

## 2023-01-01 RX ORDER — POTASSIUM CHLORIDE 29.8 MG/ML
20 INJECTION INTRAVENOUS PRN
Status: DISCONTINUED | OUTPATIENT
Start: 2023-01-01 | End: 2023-12-02 | Stop reason: HOSPADM

## 2023-01-01 RX ORDER — FENTANYL CITRATE 50 UG/ML
INJECTION, SOLUTION INTRAMUSCULAR; INTRAVENOUS
Status: COMPLETED
Start: 2023-01-01 | End: 2023-01-01

## 2023-01-01 RX ORDER — LORAZEPAM 2 MG/ML
0.5 INJECTION INTRAMUSCULAR
Status: DISCONTINUED | OUTPATIENT
Start: 2023-01-01 | End: 2023-12-02 | Stop reason: HOSPADM

## 2023-01-01 RX ORDER — SODIUM CHLORIDE 0.9 % (FLUSH) 0.9 %
5-40 SYRINGE (ML) INJECTION PRN
Status: DISCONTINUED | OUTPATIENT
Start: 2023-01-01 | End: 2023-01-01 | Stop reason: HOSPADM

## 2023-01-01 RX ORDER — SODIUM CHLORIDE 0.9 % (FLUSH) 0.9 %
5-40 SYRINGE (ML) INJECTION PRN
Status: DISCONTINUED | OUTPATIENT
Start: 2023-01-01 | End: 2023-12-02 | Stop reason: HOSPADM

## 2023-01-01 RX ORDER — NOREPINEPHRINE BITARTRATE 0.06 MG/ML
1-100 INJECTION, SOLUTION INTRAVENOUS CONTINUOUS
Status: DISCONTINUED | OUTPATIENT
Start: 2023-01-01 | End: 2023-12-02 | Stop reason: HOSPADM

## 2023-01-01 RX ORDER — SODIUM CHLORIDE 0.9 % (FLUSH) 0.9 %
5-40 SYRINGE (ML) INJECTION EVERY 12 HOURS SCHEDULED
Status: DISCONTINUED | OUTPATIENT
Start: 2023-01-01 | End: 2023-12-02 | Stop reason: HOSPADM

## 2023-01-01 RX ORDER — ACETAMINOPHEN 325 MG/1
650 TABLET ORAL EVERY 6 HOURS PRN
Status: DISCONTINUED | OUTPATIENT
Start: 2023-01-01 | End: 2023-01-01

## 2023-01-01 RX ORDER — LIDOCAINE HYDROCHLORIDE 20 MG/ML
INJECTION, SOLUTION INFILTRATION; PERINEURAL PRN
Status: COMPLETED | OUTPATIENT
Start: 2023-01-01 | End: 2023-01-01

## 2023-01-01 RX ORDER — LORAZEPAM 2 MG/ML
2 INJECTION INTRAMUSCULAR ONCE
Status: COMPLETED | OUTPATIENT
Start: 2023-01-01 | End: 2023-01-01

## 2023-01-01 RX ORDER — ETOMIDATE 2 MG/ML
INJECTION INTRAVENOUS
Status: COMPLETED | OUTPATIENT
Start: 2023-01-01 | End: 2023-01-01

## 2023-01-01 RX ORDER — LACTULOSE 10 G/15ML
20 SOLUTION ORAL
Status: DISCONTINUED | OUTPATIENT
Start: 2023-01-01 | End: 2023-12-02 | Stop reason: HOSPADM

## 2023-01-01 RX ORDER — ALBUMIN (HUMAN) 12.5 G/50ML
25 SOLUTION INTRAVENOUS EVERY 8 HOURS
Status: COMPLETED | OUTPATIENT
Start: 2023-01-01 | End: 2023-01-01

## 2023-01-01 RX ORDER — ACETAMINOPHEN 650 MG/1
650 SUPPOSITORY RECTAL EVERY 6 HOURS PRN
Status: DISCONTINUED | OUTPATIENT
Start: 2023-01-01 | End: 2023-01-01

## 2023-01-01 RX ORDER — MAGNESIUM SULFATE IN WATER 40 MG/ML
4000 INJECTION, SOLUTION INTRAVENOUS ONCE
Status: COMPLETED | OUTPATIENT
Start: 2023-01-01 | End: 2023-01-01

## 2023-01-01 RX ORDER — SUCCINYLCHOLINE/SOD CL,ISO/PF 100 MG/5ML
SYRINGE (ML) INTRAVENOUS
Status: DISPENSED
Start: 2023-01-01 | End: 2023-01-01

## 2023-01-01 RX ORDER — SODIUM CHLORIDE 9 MG/ML
25 INJECTION, SOLUTION INTRAVENOUS PRN
Status: DISCONTINUED | OUTPATIENT
Start: 2023-01-01 | End: 2023-01-01 | Stop reason: HOSPADM

## 2023-01-01 RX ORDER — GLYCOPYRROLATE 0.2 MG/ML
0.2 INJECTION INTRAMUSCULAR; INTRAVENOUS EVERY 4 HOURS PRN
Status: DISCONTINUED | OUTPATIENT
Start: 2023-01-01 | End: 2023-12-02 | Stop reason: HOSPADM

## 2023-01-01 RX ORDER — POLYETHYLENE GLYCOL 3350 17 G/17G
17 POWDER, FOR SOLUTION ORAL DAILY PRN
Status: DISCONTINUED | OUTPATIENT
Start: 2023-01-01 | End: 2023-12-02 | Stop reason: HOSPADM

## 2023-01-01 RX ORDER — MORPHINE SULFATE 4 MG/ML
4 INJECTION, SOLUTION INTRAMUSCULAR; INTRAVENOUS ONCE
Status: COMPLETED | OUTPATIENT
Start: 2023-01-01 | End: 2023-01-01

## 2023-01-01 RX ORDER — SUCCINYLCHOLINE/SOD CL,ISO/PF 100 MG/5ML
50 SYRINGE (ML) INTRAVENOUS ONCE
Status: COMPLETED | OUTPATIENT
Start: 2023-01-01 | End: 2023-01-01

## 2023-01-01 RX ORDER — MAGNESIUM SULFATE IN WATER 40 MG/ML
2000 INJECTION, SOLUTION INTRAVENOUS PRN
Status: DISCONTINUED | OUTPATIENT
Start: 2023-01-01 | End: 2023-12-02 | Stop reason: HOSPADM

## 2023-01-01 RX ORDER — SODIUM CHLORIDE 9 MG/ML
INJECTION, SOLUTION INTRAVENOUS
Status: COMPLETED
Start: 2023-01-01 | End: 2023-01-01

## 2023-01-01 RX ORDER — MAGNESIUM HYDROXIDE 1200 MG/15ML
LIQUID ORAL PRN
Status: DISCONTINUED | OUTPATIENT
Start: 2023-01-01 | End: 2023-01-01 | Stop reason: ALTCHOICE

## 2023-01-01 RX ORDER — DEXTROSE AND SODIUM CHLORIDE 5; .9 G/100ML; G/100ML
INJECTION, SOLUTION INTRAVENOUS CONTINUOUS
Status: DISCONTINUED | OUTPATIENT
Start: 2023-01-01 | End: 2023-12-02 | Stop reason: HOSPADM

## 2023-01-01 RX ORDER — ONDANSETRON 4 MG/1
4 TABLET, ORALLY DISINTEGRATING ORAL EVERY 8 HOURS PRN
Status: DISCONTINUED | OUTPATIENT
Start: 2023-01-01 | End: 2023-12-02 | Stop reason: HOSPADM

## 2023-01-01 RX ORDER — SIMETHICONE 20 MG/.3ML
EMULSION ORAL PRN
Status: DISCONTINUED | OUTPATIENT
Start: 2023-01-01 | End: 2023-01-01 | Stop reason: ALTCHOICE

## 2023-01-01 RX ORDER — POTASSIUM CHLORIDE 7.45 MG/ML
10 INJECTION INTRAVENOUS PRN
Status: DISCONTINUED | OUTPATIENT
Start: 2023-01-01 | End: 2023-12-02 | Stop reason: HOSPADM

## 2023-01-01 RX ORDER — SODIUM CHLORIDE 0.9 % (FLUSH) 0.9 %
5-40 SYRINGE (ML) INJECTION EVERY 12 HOURS SCHEDULED
Status: DISCONTINUED | OUTPATIENT
Start: 2023-01-01 | End: 2023-01-01 | Stop reason: HOSPADM

## 2023-01-01 RX ORDER — OXYMETAZOLINE HYDROCHLORIDE 0.05 G/100ML
1 SPRAY NASAL ONCE
Status: COMPLETED | OUTPATIENT
Start: 2023-01-01 | End: 2023-01-01

## 2023-01-01 RX ORDER — PROPOFOL 10 MG/ML
5-50 INJECTION, EMULSION INTRAVENOUS CONTINUOUS
Status: DISCONTINUED | OUTPATIENT
Start: 2023-01-01 | End: 2023-12-02 | Stop reason: HOSPADM

## 2023-01-01 RX ORDER — MORPHINE SULFATE 2 MG/ML
2 INJECTION, SOLUTION INTRAMUSCULAR; INTRAVENOUS
Status: DISCONTINUED | OUTPATIENT
Start: 2023-01-01 | End: 2023-12-02 | Stop reason: HOSPADM

## 2023-01-01 RX ORDER — DEXTROSE MONOHYDRATE 100 MG/ML
INJECTION, SOLUTION INTRAVENOUS CONTINUOUS PRN
Status: DISCONTINUED | OUTPATIENT
Start: 2023-01-01 | End: 2023-12-02 | Stop reason: HOSPADM

## 2023-01-01 RX ORDER — CHLORHEXIDINE GLUCONATE ORAL RINSE 1.2 MG/ML
15 SOLUTION DENTAL 2 TIMES DAILY
Status: DISCONTINUED | OUTPATIENT
Start: 2023-01-01 | End: 2023-12-02 | Stop reason: HOSPADM

## 2023-01-01 RX ADMIN — MORPHINE SULFATE 2 MG: 2 INJECTION, SOLUTION INTRAMUSCULAR; INTRAVENOUS at 15:10

## 2023-01-01 RX ADMIN — PROPOFOL 50 MCG/KG/MIN: 10 INJECTION, EMULSION INTRAVENOUS at 09:42

## 2023-01-01 RX ADMIN — CHLORHEXIDINE GLUCONATE 15 ML: 1.2 RINSE ORAL at 20:57

## 2023-01-01 RX ADMIN — POTASSIUM CHLORIDE 20 MEQ: 29.8 INJECTION, SOLUTION INTRAVENOUS at 06:10

## 2023-01-01 RX ADMIN — FENTANYL CITRATE 100 MCG/HR: 50 INJECTION INTRAVENOUS at 16:54

## 2023-01-01 RX ADMIN — PROPOFOL 40 MCG/KG/MIN: 10 INJECTION, EMULSION INTRAVENOUS at 20:15

## 2023-01-01 RX ADMIN — LACTULOSE 20 G: 20 SOLUTION ORAL at 13:01

## 2023-01-01 RX ADMIN — ETOMIDATE 20 MG: 2 INJECTION, SOLUTION INTRAVENOUS at 03:29

## 2023-01-01 RX ADMIN — LACTULOSE 20 G: 20 SOLUTION ORAL at 12:18

## 2023-01-01 RX ADMIN — DEXTROSE MONOHYDRATE 125 ML: 100 INJECTION, SOLUTION INTRAVENOUS at 14:16

## 2023-01-01 RX ADMIN — PROPOFOL 45 MCG/KG/MIN: 10 INJECTION, EMULSION INTRAVENOUS at 13:18

## 2023-01-01 RX ADMIN — LACTULOSE 20 G: 20 SOLUTION ORAL at 17:04

## 2023-01-01 RX ADMIN — OCTREOTIDE ACETATE 50 MCG/HR: 500 INJECTION, SOLUTION INTRAVENOUS; SUBCUTANEOUS at 13:36

## 2023-01-01 RX ADMIN — LACTULOSE 20 G: 20 SOLUTION ORAL at 00:30

## 2023-01-01 RX ADMIN — LACTULOSE 20 G: 20 SOLUTION ORAL at 08:54

## 2023-01-01 RX ADMIN — DEXTROSE AND SODIUM CHLORIDE: 5; 900 INJECTION, SOLUTION INTRAVENOUS at 13:39

## 2023-01-01 RX ADMIN — SODIUM CHLORIDE, PRESERVATIVE FREE 10 ML: 5 INJECTION INTRAVENOUS at 20:23

## 2023-01-01 RX ADMIN — FENTANYL CITRATE 200 MCG/HR: 50 INJECTION INTRAVENOUS at 07:19

## 2023-01-01 RX ADMIN — LACTULOSE 20 G: 20 SOLUTION ORAL at 08:15

## 2023-01-01 RX ADMIN — Medication 30 MCG/MIN: at 02:02

## 2023-01-01 RX ADMIN — PIPERACILLIN AND TAZOBACTAM 3375 MG: 3; .375 INJECTION, POWDER, LYOPHILIZED, FOR SOLUTION INTRAVENOUS at 10:11

## 2023-01-01 RX ADMIN — PANTOPRAZOLE SODIUM 8 MG/HR: 40 INJECTION, POWDER, FOR SOLUTION INTRAVENOUS at 23:03

## 2023-01-01 RX ADMIN — Medication 25 MCG/MIN: at 08:31

## 2023-01-01 RX ADMIN — NASAL DECONGESTANT 1 SPRAY: 0.05 SPRAY NASAL at 05:19

## 2023-01-01 RX ADMIN — SODIUM CHLORIDE 1000 ML: 9 INJECTION, SOLUTION INTRAVENOUS at 17:33

## 2023-01-01 RX ADMIN — SODIUM CHLORIDE 1000 ML: 9 INJECTION, SOLUTION INTRAVENOUS at 09:56

## 2023-01-01 RX ADMIN — FENTANYL CITRATE 100 MCG: 50 INJECTION INTRAMUSCULAR; INTRAVENOUS at 03:30

## 2023-01-01 RX ADMIN — MORPHINE SULFATE 2 MG: 2 INJECTION, SOLUTION INTRAMUSCULAR; INTRAVENOUS at 15:26

## 2023-01-01 RX ADMIN — OCTREOTIDE ACETATE 50 MCG/HR: 500 INJECTION, SOLUTION INTRAVENOUS; SUBCUTANEOUS at 00:28

## 2023-01-01 RX ADMIN — LORAZEPAM 2 MG: 2 INJECTION INTRAMUSCULAR; INTRAVENOUS at 15:51

## 2023-01-01 RX ADMIN — Medication 22 MCG/MIN: at 04:29

## 2023-01-01 RX ADMIN — POTASSIUM CHLORIDE 20 MEQ: 29.8 INJECTION, SOLUTION INTRAVENOUS at 06:44

## 2023-01-01 RX ADMIN — LACTULOSE 20 G: 20 SOLUTION ORAL at 22:23

## 2023-01-01 RX ADMIN — LACTULOSE 20 G: 20 SOLUTION ORAL at 20:25

## 2023-01-01 RX ADMIN — OCTREOTIDE ACETATE 50 MCG/HR: 500 INJECTION, SOLUTION INTRAVENOUS; SUBCUTANEOUS at 05:26

## 2023-01-01 RX ADMIN — Medication 40 MCG/MIN: at 10:22

## 2023-01-01 RX ADMIN — MORPHINE SULFATE 4 MG: 4 INJECTION, SOLUTION INTRAMUSCULAR; INTRAVENOUS at 15:48

## 2023-01-01 RX ADMIN — LACTULOSE 20 G: 20 SOLUTION ORAL at 20:57

## 2023-01-01 RX ADMIN — CHLORHEXIDINE GLUCONATE 15 ML: 1.2 RINSE ORAL at 08:15

## 2023-01-01 RX ADMIN — LIDOCAINE HYDROCHLORIDE 9 ML: 20 INJECTION, SOLUTION INFILTRATION; PERINEURAL at 10:55

## 2023-01-01 RX ADMIN — FENTANYL CITRATE 200 MCG/HR: 50 INJECTION INTRAVENOUS at 15:11

## 2023-01-01 RX ADMIN — DEXTROSE AND SODIUM CHLORIDE: 5; 900 INJECTION, SOLUTION INTRAVENOUS at 17:11

## 2023-01-01 RX ADMIN — GLYCOPYRROLATE 0.2 MG: 0.2 INJECTION INTRAMUSCULAR; INTRAVENOUS at 15:50

## 2023-01-01 RX ADMIN — DEXTROSE AND SODIUM CHLORIDE: 5; 900 INJECTION, SOLUTION INTRAVENOUS at 07:20

## 2023-01-01 RX ADMIN — MORPHINE SULFATE 2 MG: 2 INJECTION, SOLUTION INTRAMUSCULAR; INTRAVENOUS at 16:17

## 2023-01-01 RX ADMIN — PANTOPRAZOLE SODIUM 8 MG/HR: 40 INJECTION, POWDER, FOR SOLUTION INTRAVENOUS at 05:23

## 2023-01-01 RX ADMIN — Medication 1000 ML: at 12:22

## 2023-01-01 RX ADMIN — PIPERACILLIN AND TAZOBACTAM 3375 MG: 3; .375 INJECTION, POWDER, LYOPHILIZED, FOR SOLUTION INTRAVENOUS at 21:02

## 2023-01-01 RX ADMIN — PIPERACILLIN AND TAZOBACTAM 3375 MG: 3; .375 INJECTION, POWDER, LYOPHILIZED, FOR SOLUTION INTRAVENOUS at 21:39

## 2023-01-01 RX ADMIN — PROPOFOL 50 MCG/KG/MIN: 10 INJECTION, EMULSION INTRAVENOUS at 04:45

## 2023-01-01 RX ADMIN — FENTANYL CITRATE 150 MCG/HR: 50 INJECTION INTRAVENOUS at 11:50

## 2023-01-01 RX ADMIN — PANTOPRAZOLE SODIUM 8 MG/HR: 40 INJECTION, POWDER, FOR SOLUTION INTRAVENOUS at 01:31

## 2023-01-01 RX ADMIN — Medication 20 MCG/MIN: at 16:40

## 2023-01-01 RX ADMIN — LACTULOSE 20 G: 20 SOLUTION ORAL at 01:38

## 2023-01-01 RX ADMIN — CHLORHEXIDINE GLUCONATE 15 ML: 1.2 RINSE ORAL at 09:59

## 2023-01-01 RX ADMIN — LACTULOSE 20 G: 20 SOLUTION ORAL at 00:56

## 2023-01-01 RX ADMIN — DEXTROSE AND SODIUM CHLORIDE: 5; 900 INJECTION, SOLUTION INTRAVENOUS at 23:05

## 2023-01-01 RX ADMIN — ALBUMIN (HUMAN) 50 G: 0.25 INJECTION, SOLUTION INTRAVENOUS at 10:06

## 2023-01-01 RX ADMIN — LACTULOSE 20 G: 20 SOLUTION ORAL at 16:00

## 2023-01-01 RX ADMIN — PANTOPRAZOLE SODIUM 8 MG/HR: 40 INJECTION, POWDER, FOR SOLUTION INTRAVENOUS at 16:41

## 2023-01-01 RX ADMIN — LORAZEPAM 0.5 MG: 2 INJECTION INTRAMUSCULAR; INTRAVENOUS at 16:17

## 2023-01-01 RX ADMIN — LACTULOSE 20 G: 20 SOLUTION ORAL at 23:34

## 2023-01-01 RX ADMIN — CHLORHEXIDINE GLUCONATE 15 ML: 1.2 RINSE ORAL at 20:25

## 2023-01-01 RX ADMIN — FENTANYL CITRATE 200 MCG/HR: 50 INJECTION INTRAVENOUS at 02:07

## 2023-01-01 RX ADMIN — CEFTRIAXONE SODIUM 2000 MG: 2 INJECTION, POWDER, FOR SOLUTION INTRAMUSCULAR; INTRAVENOUS at 22:38

## 2023-01-01 RX ADMIN — DEXTROSE AND SODIUM CHLORIDE: 5; 900 INJECTION, SOLUTION INTRAVENOUS at 00:58

## 2023-01-01 RX ADMIN — PIPERACILLIN AND TAZOBACTAM 3375 MG: 3; .375 INJECTION, POWDER, LYOPHILIZED, FOR SOLUTION INTRAVENOUS at 20:24

## 2023-01-01 RX ADMIN — Medication 20 MCG/MIN: at 13:35

## 2023-01-01 RX ADMIN — Medication 5 MCG/MIN: at 22:36

## 2023-01-01 RX ADMIN — LORAZEPAM 0.5 MG: 2 INJECTION INTRAMUSCULAR; INTRAVENOUS at 15:11

## 2023-01-01 RX ADMIN — LACTULOSE 20 G: 20 SOLUTION ORAL at 05:31

## 2023-01-01 RX ADMIN — Medication 40 MCG/MIN: at 17:39

## 2023-01-01 RX ADMIN — PROPOFOL 20 MCG/KG/MIN: 10 INJECTION, EMULSION INTRAVENOUS at 04:35

## 2023-01-01 RX ADMIN — PIPERACILLIN AND TAZOBACTAM 3375 MG: 3; .375 INJECTION, POWDER, LYOPHILIZED, FOR SOLUTION INTRAVENOUS at 08:54

## 2023-01-01 RX ADMIN — LACTULOSE 20 G: 20 SOLUTION ORAL at 04:45

## 2023-01-01 RX ADMIN — CHLORHEXIDINE GLUCONATE 15 ML: 1.2 RINSE ORAL at 10:02

## 2023-01-01 RX ADMIN — PANTOPRAZOLE SODIUM 8 MG/HR: 40 INJECTION, POWDER, FOR SOLUTION INTRAVENOUS at 06:49

## 2023-01-01 RX ADMIN — FENTANYL CITRATE 50 MCG/HR: 50 INJECTION INTRAVENOUS at 03:52

## 2023-01-01 RX ADMIN — MAGNESIUM SULFATE HEPTAHYDRATE 4000 MG: 4 INJECTION, SOLUTION INTRAVENOUS at 18:47

## 2023-01-01 RX ADMIN — FENTANYL CITRATE: 50 INJECTION INTRAMUSCULAR; INTRAVENOUS at 03:30

## 2023-01-01 RX ADMIN — Medication 1000 ML: at 09:56

## 2023-01-01 RX ADMIN — FENTANYL CITRATE 200 MCG/HR: 50 INJECTION INTRAVENOUS at 17:34

## 2023-01-01 RX ADMIN — Medication 45 MCG/MIN: at 04:21

## 2023-01-01 RX ADMIN — ALBUMIN (HUMAN) 25 G: 0.25 INJECTION, SOLUTION INTRAVENOUS at 00:33

## 2023-01-01 RX ADMIN — PIPERACILLIN AND TAZOBACTAM 3375 MG: 3; .375 INJECTION, POWDER, LYOPHILIZED, FOR SOLUTION INTRAVENOUS at 18:12

## 2023-01-01 RX ADMIN — OCTREOTIDE ACETATE 50 MCG/HR: 500 INJECTION, SOLUTION INTRAVENOUS; SUBCUTANEOUS at 14:18

## 2023-01-01 RX ADMIN — PIPERACILLIN AND TAZOBACTAM 3375 MG: 3; .375 INJECTION, POWDER, LYOPHILIZED, FOR SOLUTION INTRAVENOUS at 09:58

## 2023-01-01 RX ADMIN — FENTANYL CITRATE 100 MCG/HR: 50 INJECTION INTRAVENOUS at 02:32

## 2023-01-01 RX ADMIN — ALBUMIN (HUMAN) 25 G: 0.25 INJECTION, SOLUTION INTRAVENOUS at 10:03

## 2023-01-01 RX ADMIN — FENTANYL CITRATE: 50 INJECTION, SOLUTION INTRAMUSCULAR; INTRAVENOUS at 03:30

## 2023-01-01 RX ADMIN — SODIUM CHLORIDE 1000 ML: 9 INJECTION, SOLUTION INTRAVENOUS at 12:22

## 2023-01-01 RX ADMIN — PROPOFOL 15 MCG/KG/MIN: 10 INJECTION, EMULSION INTRAVENOUS at 04:28

## 2023-01-01 RX ADMIN — OCTREOTIDE ACETATE 50 MCG/HR: 500 INJECTION, SOLUTION INTRAVENOUS; SUBCUTANEOUS at 01:30

## 2023-01-01 RX ADMIN — FENTANYL CITRATE 200 MCG/HR: 50 INJECTION INTRAVENOUS at 20:32

## 2023-01-01 RX ADMIN — OCTREOTIDE ACETATE 50 MCG/HR: 500 INJECTION, SOLUTION INTRAVENOUS; SUBCUTANEOUS at 23:57

## 2023-01-01 RX ADMIN — PROPOFOL 50 MCG/KG/MIN: 10 INJECTION, EMULSION INTRAVENOUS at 16:42

## 2023-01-01 RX ADMIN — Medication 50 MG: at 05:02

## 2023-01-01 RX ADMIN — LACTULOSE 20 G: 20 SOLUTION ORAL at 12:05

## 2023-01-01 RX ADMIN — PROPOFOL 50 MCG/KG/MIN: 10 INJECTION, EMULSION INTRAVENOUS at 21:59

## 2023-01-01 RX ADMIN — PANTOPRAZOLE SODIUM 8 MG/HR: 40 INJECTION, POWDER, FOR SOLUTION INTRAVENOUS at 11:57

## 2023-01-01 RX ADMIN — LACTULOSE 20 G: 20 SOLUTION ORAL at 09:59

## 2023-01-01 RX ADMIN — VASOPRESSIN 0.03 UNITS/MIN: 20 INJECTION INTRAVENOUS at 19:40

## 2023-01-01 RX ADMIN — CHLORHEXIDINE GLUCONATE 15 ML: 1.2 RINSE ORAL at 20:23

## 2023-01-01 RX ADMIN — LACTULOSE 20 G: 20 SOLUTION ORAL at 20:23

## 2023-01-01 RX ADMIN — VANCOMYCIN HYDROCHLORIDE 1000 MG: 1 INJECTION, POWDER, LYOPHILIZED, FOR SOLUTION INTRAVENOUS at 18:39

## 2023-01-01 RX ADMIN — SODIUM CHLORIDE, PRESERVATIVE FREE 10 ML: 5 INJECTION INTRAVENOUS at 20:26

## 2023-01-01 RX ADMIN — FENTANYL CITRATE 200 MCG/HR: 50 INJECTION INTRAVENOUS at 12:22

## 2023-01-01 RX ADMIN — ALBUMIN (HUMAN) 25 G: 0.25 INJECTION, SOLUTION INTRAVENOUS at 17:10

## 2023-01-01 RX ADMIN — THIAMINE HYDROCHLORIDE: 100 INJECTION, SOLUTION INTRAMUSCULAR; INTRAVENOUS at 16:45

## 2023-01-01 RX ADMIN — FENTANYL CITRATE 200 MCG/HR: 50 INJECTION INTRAVENOUS at 10:15

## 2023-01-01 RX ADMIN — SODIUM CHLORIDE, PRESERVATIVE FREE 10 ML: 5 INJECTION INTRAVENOUS at 21:15

## 2023-01-01 RX ADMIN — SODIUM CHLORIDE, PRESERVATIVE FREE 10 ML: 5 INJECTION INTRAVENOUS at 08:19

## 2023-01-01 RX ADMIN — SODIUM CHLORIDE, PRESERVATIVE FREE 10 ML: 5 INJECTION INTRAVENOUS at 21:05

## 2023-01-01 RX ADMIN — FENTANYL CITRATE 200 MCG/HR: 50 INJECTION INTRAVENOUS at 23:02

## 2023-01-01 RX ADMIN — VASOPRESSIN 0.03 UNITS/MIN: 20 INJECTION INTRAVENOUS at 20:20

## 2023-01-01 RX ADMIN — PIPERACILLIN AND TAZOBACTAM 3375 MG: 3; .375 INJECTION, POWDER, LYOPHILIZED, FOR SOLUTION INTRAVENOUS at 12:05

## 2023-01-01 RX ADMIN — VASOPRESSIN 0.03 UNITS/MIN: 20 INJECTION INTRAVENOUS at 09:52

## 2023-01-01 RX ADMIN — FENTANYL CITRATE 200 MCG/HR: 50 INJECTION INTRAVENOUS at 04:54

## 2023-01-01 RX ADMIN — OCTREOTIDE ACETATE 50 MCG/HR: 500 INJECTION, SOLUTION INTRAVENOUS; SUBCUTANEOUS at 12:59

## 2023-01-01 RX ADMIN — SUCCINYLCHOLINE CHLORIDE 100 MG: 20 INJECTION, SOLUTION INTRAMUSCULAR; INTRAVENOUS at 03:29

## 2023-01-01 RX ADMIN — CHLORHEXIDINE GLUCONATE 15 ML: 1.2 RINSE ORAL at 08:05

## 2023-01-01 RX ADMIN — SODIUM CHLORIDE 1000 ML: 9 INJECTION, SOLUTION INTRAVENOUS at 16:12

## 2023-01-01 RX ADMIN — PROPOFOL 15 MCG/KG/MIN: 10 INJECTION, EMULSION INTRAVENOUS at 13:38

## 2023-01-01 RX ADMIN — PROPOFOL 35 MCG/KG/MIN: 10 INJECTION, EMULSION INTRAVENOUS at 03:42

## 2023-01-01 RX ADMIN — VASOPRESSIN 0.03 UNITS/MIN: 20 INJECTION INTRAVENOUS at 08:02

## 2023-01-01 RX ADMIN — Medication: at 13:21

## 2023-01-01 ASSESSMENT — PULMONARY FUNCTION TESTS
PIF_VALUE: 20
PIF_VALUE: 19
PIF_VALUE: 20
PIF_VALUE: 5.9
PIF_VALUE: 5.4
PIF_VALUE: 20
PIF_VALUE: 19
PIF_VALUE: 21
PIF_VALUE: 13
PIF_VALUE: 22
PIF_VALUE: 21
PIF_VALUE: 6.9
PIF_VALUE: 22
PIF_VALUE: 19
PIF_VALUE: 19
PIF_VALUE: 13
PIF_VALUE: 20
PIF_VALUE: 16
PIF_VALUE: 10
PIF_VALUE: 18
PIF_VALUE: 23
PIF_VALUE: 21
PIF_VALUE: 22
PIF_VALUE: 22
PIF_VALUE: 19
PIF_VALUE: 21
PIF_VALUE: 19
PIF_VALUE: 30
PIF_VALUE: 21
PIF_VALUE: 18
PIF_VALUE: 19
PIF_VALUE: 6.7
PIF_VALUE: 18
PIF_VALUE: 8.3
PIF_VALUE: 22
PIF_VALUE: 21
PIF_VALUE: 23
PIF_VALUE: 19
PIF_VALUE: 19
PIF_VALUE: 23
PIF_VALUE: 17
PIF_VALUE: 5.9
PIF_VALUE: 20
PIF_VALUE: 7.5
PIF_VALUE: 19
PIF_VALUE: 24
PIF_VALUE: 20
PIF_VALUE: 22
PIF_VALUE: 18
PIF_VALUE: 19
PIF_VALUE: 21
PIF_VALUE: 21
PIF_VALUE: 22
PIF_VALUE: 19
PIF_VALUE: 22
PIF_VALUE: 17
PIF_VALUE: 19
PIF_VALUE: 22
PIF_VALUE: 20
PIF_VALUE: 5.8
PIF_VALUE: 19
PIF_VALUE: 21
PIF_VALUE: 19
PIF_VALUE: 7.7
PIF_VALUE: 19
PIF_VALUE: 22
PIF_VALUE: 18
PIF_VALUE: 23
PIF_VALUE: 19
PIF_VALUE: 7.8
PIF_VALUE: 20
PIF_VALUE: 19
PIF_VALUE: 22
PIF_VALUE: 8.5
PIF_VALUE: 17
PIF_VALUE: 5.7
PIF_VALUE: 22
PIF_VALUE: 20
PIF_VALUE: 24
PIF_VALUE: 21
PIF_VALUE: 18
PIF_VALUE: 24
PIF_VALUE: 11
PIF_VALUE: 21
PIF_VALUE: 7.8
PIF_VALUE: 23
PIF_VALUE: 15
PIF_VALUE: 18
PIF_VALUE: 20
PIF_VALUE: 24
PIF_VALUE: 22
PIF_VALUE: 21
PIF_VALUE: 18
PIF_VALUE: 19
PIF_VALUE: 19
PIF_VALUE: 23
PIF_VALUE: 13
PIF_VALUE: 24
PIF_VALUE: 20
PIF_VALUE: 19
PIF_VALUE: 21
PIF_VALUE: 22
PIF_VALUE: 20
PIF_VALUE: 19
PIF_VALUE: 21
PIF_VALUE: 19
PIF_VALUE: 35
PIF_VALUE: 26
PIF_VALUE: 22
PIF_VALUE: 18
PIF_VALUE: 20
PIF_VALUE: 21
PIF_VALUE: 21
PIF_VALUE: 16
PIF_VALUE: 20
PIF_VALUE: 19
PIF_VALUE: 6.3
PIF_VALUE: 20
PIF_VALUE: 19
PIF_VALUE: 18
PIF_VALUE: 20
PIF_VALUE: 18
PIF_VALUE: 19
PIF_VALUE: 21
PIF_VALUE: 24
PIF_VALUE: 19
PIF_VALUE: 25
PIF_VALUE: 26
PIF_VALUE: 23
PIF_VALUE: 19
PIF_VALUE: 21
PIF_VALUE: 24
PIF_VALUE: 20
PIF_VALUE: 22
PIF_VALUE: 19
PIF_VALUE: 21
PIF_VALUE: 21
PIF_VALUE: 22
PIF_VALUE: 16
PIF_VALUE: 21
PIF_VALUE: 22
PIF_VALUE: 19
PIF_VALUE: 7.7
PIF_VALUE: 20
PIF_VALUE: 18
PIF_VALUE: 8.69
PIF_VALUE: 19
PIF_VALUE: 19
PIF_VALUE: 21
PIF_VALUE: 5.6
PIF_VALUE: 19
PIF_VALUE: 19
PIF_VALUE: 23
PIF_VALUE: 20
PIF_VALUE: 21
PIF_VALUE: 19
PIF_VALUE: 20
PIF_VALUE: 20
PIF_VALUE: 23
PIF_VALUE: 20
PIF_VALUE: 19
PIF_VALUE: 30
PIF_VALUE: 20
PIF_VALUE: 18
PIF_VALUE: 19
PIF_VALUE: 19
PIF_VALUE: 8.4
PIF_VALUE: 19
PIF_VALUE: 8.3
PIF_VALUE: 20
PIF_VALUE: 20
PIF_VALUE: 18
PIF_VALUE: 22
PIF_VALUE: 19
PIF_VALUE: 6
PIF_VALUE: 20
PIF_VALUE: 19
PIF_VALUE: 18
PIF_VALUE: 18
PIF_VALUE: 19
PIF_VALUE: 20
PIF_VALUE: 18
PIF_VALUE: 21
PIF_VALUE: 18
PIF_VALUE: 18
PIF_VALUE: 20
PIF_VALUE: 20
PIF_VALUE: 18
PIF_VALUE: 21
PIF_VALUE: 20
PIF_VALUE: 20
PIF_VALUE: 18
PIF_VALUE: 6
PIF_VALUE: 12
PIF_VALUE: 20
PIF_VALUE: 21
PIF_VALUE: 24
PIF_VALUE: 23
PIF_VALUE: 23
PIF_VALUE: 18
PIF_VALUE: 22
PIF_VALUE: 20
PIF_VALUE: 22
PIF_VALUE: 20
PIF_VALUE: 17
PIF_VALUE: 22
PIF_VALUE: 5.8
PIF_VALUE: 20
PIF_VALUE: 23
PIF_VALUE: 5.4
PIF_VALUE: 23
PIF_VALUE: 23
PIF_VALUE: 20
PIF_VALUE: 19
PIF_VALUE: 18
PIF_VALUE: 22
PIF_VALUE: 19
PIF_VALUE: 19
PIF_VALUE: 24
PIF_VALUE: 21
PIF_VALUE: 19
PIF_VALUE: 24
PIF_VALUE: 19
PIF_VALUE: 24
PIF_VALUE: 18
PIF_VALUE: 26
PIF_VALUE: 20

## 2023-01-01 ASSESSMENT — PAIN SCALES - GENERAL
PAINLEVEL_OUTOF10: 0

## 2023-01-01 ASSESSMENT — ENCOUNTER SYMPTOMS
TROUBLE SWALLOWING: 1
VOMITING: 0
COUGH: 0
COLOR CHANGE: 0

## 2023-01-10 ENCOUNTER — APPOINTMENT (OUTPATIENT)
Dept: ULTRASOUND IMAGING | Age: 38
DRG: 253 | End: 2023-01-10
Payer: COMMERCIAL

## 2023-01-10 ENCOUNTER — TELEPHONE (OUTPATIENT)
Dept: GASTROENTEROLOGY | Age: 38
End: 2023-01-10

## 2023-01-10 ENCOUNTER — APPOINTMENT (OUTPATIENT)
Dept: GENERAL RADIOLOGY | Age: 38
DRG: 253 | End: 2023-01-10
Payer: COMMERCIAL

## 2023-01-10 ENCOUNTER — APPOINTMENT (OUTPATIENT)
Dept: CT IMAGING | Age: 38
DRG: 253 | End: 2023-01-10
Payer: COMMERCIAL

## 2023-01-10 ENCOUNTER — HOSPITAL ENCOUNTER (INPATIENT)
Age: 38
LOS: 7 days | Discharge: HOME | DRG: 253 | End: 2023-01-17
Attending: EMERGENCY MEDICINE | Admitting: INTERNAL MEDICINE
Payer: COMMERCIAL

## 2023-01-10 ENCOUNTER — OFFICE VISIT (OUTPATIENT)
Dept: GASTROENTEROLOGY | Age: 38
End: 2023-01-10
Payer: COMMERCIAL

## 2023-01-10 VITALS
DIASTOLIC BLOOD PRESSURE: 60 MMHG | BODY MASS INDEX: 28.96 KG/M2 | HEART RATE: 108 BPM | WEIGHT: 174 LBS | OXYGEN SATURATION: 98 % | SYSTOLIC BLOOD PRESSURE: 112 MMHG

## 2023-01-10 DIAGNOSIS — K70.31 ALCOHOLIC CIRRHOSIS OF LIVER WITH ASCITES (HCC): ICD-10-CM

## 2023-01-10 DIAGNOSIS — K70.31 ALCOHOLIC CIRRHOSIS OF LIVER WITH ASCITES (HCC): Primary | ICD-10-CM

## 2023-01-10 DIAGNOSIS — N17.9 AKI (ACUTE KIDNEY INJURY) (HCC): Primary | ICD-10-CM

## 2023-01-10 DIAGNOSIS — N30.00 ACUTE CYSTITIS WITHOUT HEMATURIA: ICD-10-CM

## 2023-01-10 DIAGNOSIS — K70.11 ALCOHOLIC HEPATITIS WITH ASCITES: ICD-10-CM

## 2023-01-10 DIAGNOSIS — E87.1 HYPONATREMIA: ICD-10-CM

## 2023-01-10 DIAGNOSIS — E87.6 HYPOKALEMIA: ICD-10-CM

## 2023-01-10 DIAGNOSIS — K92.2 GASTROINTESTINAL HEMORRHAGE, UNSPECIFIED GASTROINTESTINAL HEMORRHAGE TYPE: ICD-10-CM

## 2023-01-10 LAB
ABO/RH: NORMAL
ALBUMIN SERPL-MCNC: 2.2 G/DL (ref 3.5–4.6)
ALBUMIN SERPL-MCNC: 2.3 G/DL (ref 3.5–4.6)
ALP BLD-CCNC: 244 U/L (ref 40–130)
ALP BLD-CCNC: 296 U/L (ref 40–130)
ALT SERPL-CCNC: 50 U/L (ref 0–33)
ALT SERPL-CCNC: 57 U/L (ref 0–33)
AMMONIA: 51 UMOL/L (ref 11–51)
ANION GAP SERPL CALCULATED.3IONS-SCNC: 12 MEQ/L (ref 9–15)
ANION GAP SERPL CALCULATED.3IONS-SCNC: 19 MEQ/L (ref 9–15)
ANISOCYTOSIS: ABNORMAL
ANTIBODY SCREEN: NORMAL
APTT: 48.9 SEC (ref 24.4–36.8)
AST SERPL-CCNC: 174 U/L (ref 0–35)
AST SERPL-CCNC: 201 U/L (ref 0–35)
BACTERIA: NEGATIVE /HPF
BASOPHILS ABSOLUTE: 0 K/UL (ref 0–0.2)
BASOPHILS RELATIVE PERCENT: 0.4 %
BILIRUB SERPL-MCNC: 21.2 MG/DL (ref 0.2–0.7)
BILIRUB SERPL-MCNC: 23.6 MG/DL (ref 0.2–0.7)
BILIRUBIN URINE: ABNORMAL
BLOOD, URINE: NEGATIVE
BUN BLDV-MCNC: 31 MG/DL (ref 6–20)
BUN BLDV-MCNC: 32 MG/DL (ref 6–20)
CALCIUM SERPL-MCNC: 8 MG/DL (ref 8.5–9.9)
CALCIUM SERPL-MCNC: 8.4 MG/DL (ref 8.5–9.9)
CHLORIDE BLD-SCNC: 86 MEQ/L (ref 95–107)
CHLORIDE BLD-SCNC: 86 MEQ/L (ref 95–107)
CLARITY: ABNORMAL
CO2: 25 MEQ/L (ref 20–31)
CO2: 28 MEQ/L (ref 20–31)
COARSE CASTS, UA: ABNORMAL /LPF (ref 0–5)
COLOR: ABNORMAL
CREAT SERPL-MCNC: 2.13 MG/DL (ref 0.5–0.9)
CREAT SERPL-MCNC: 2.17 MG/DL (ref 0.5–0.9)
EOSINOPHILS ABSOLUTE: 0 K/UL (ref 0–0.7)
EOSINOPHILS RELATIVE PERCENT: 0.5 %
EPITHELIAL CELLS, UA: ABNORMAL /HPF (ref 0–5)
GFR SERPL CREATININE-BSD FRML MDRD: 29 ML/MIN/{1.73_M2}
GFR SERPL CREATININE-BSD FRML MDRD: 29.6 ML/MIN/{1.73_M2}
GLOBULIN: 3.8 G/DL (ref 2.3–3.5)
GLOBULIN: 4.6 G/DL (ref 2.3–3.5)
GLUCOSE BLD-MCNC: 107 MG/DL (ref 70–99)
GLUCOSE BLD-MCNC: 112 MG/DL (ref 70–99)
GLUCOSE URINE: NEGATIVE MG/DL
HCT VFR BLD CALC: 30.3 % (ref 37–47)
HCT VFR BLD CALC: 34.2 % (ref 37–47)
HEMOGLOBIN: 10.4 G/DL (ref 12–16)
HEMOGLOBIN: 11.8 G/DL (ref 12–16)
HYALINE CASTS: ABNORMAL /HPF (ref 0–5)
HYPOCHROMIA: ABNORMAL
INR BLD: 1.8
INR BLD: 2
KETONES, URINE: NEGATIVE MG/DL
LEUKOCYTE ESTERASE, URINE: ABNORMAL
LIPASE: 60 U/L (ref 12–95)
LYMPHOCYTES ABSOLUTE: 0 K/UL (ref 1–4.8)
LYMPHOCYTES RELATIVE PERCENT: 11.1 %
MACROCYTES: ABNORMAL
MAGNESIUM: 1.9 MG/DL (ref 1.7–2.4)
MCH RBC QN AUTO: 33.9 PG (ref 27–31.3)
MCH RBC QN AUTO: 34.2 PG (ref 27–31.3)
MCHC RBC AUTO-ENTMCNC: 34.2 % (ref 33–37)
MCHC RBC AUTO-ENTMCNC: 34.4 % (ref 33–37)
MCV RBC AUTO: 98.4 FL (ref 79.4–94.8)
MCV RBC AUTO: 99.9 FL (ref 79.4–94.8)
MONOCYTES ABSOLUTE: 0.2 K/UL (ref 0.2–0.8)
MONOCYTES RELATIVE PERCENT: 1.9 %
NEUTROPHILS ABSOLUTE: 11.9 K/UL (ref 1.4–6.5)
NEUTROPHILS RELATIVE PERCENT: 98 %
NITRITE, URINE: POSITIVE
NUCLEATED RED BLOOD CELLS: 1 /100 WBC
PDW BLD-RTO: 30 % (ref 11.5–14.5)
PDW BLD-RTO: 30.1 % (ref 11.5–14.5)
PH UA: 5.5 (ref 5–9)
PLATELET # BLD: 144 K/UL (ref 130–400)
PLATELET # BLD: 172 K/UL (ref 130–400)
PLATELET SLIDE REVIEW: NORMAL
POC CREATININE WHOLE BLOOD: 3.3
POLYCHROMASIA: ABNORMAL
POTASSIUM SERPL-SCNC: 2.6 MEQ/L (ref 3.4–4.9)
POTASSIUM SERPL-SCNC: 3.1 MEQ/L (ref 3.4–4.9)
PROTEIN UA: ABNORMAL MG/DL
PROTHROMBIN TIME: 21.2 SEC (ref 12.3–14.9)
PROTHROMBIN TIME: 23 SEC (ref 12.3–14.9)
RBC # BLD: 3.03 M/UL (ref 4.2–5.4)
RBC # BLD: 3.48 M/UL (ref 4.2–5.4)
RBC UA: ABNORMAL /HPF (ref 0–2)
SODIUM BLD-SCNC: 126 MEQ/L (ref 135–144)
SODIUM BLD-SCNC: 130 MEQ/L (ref 135–144)
SPECIFIC GRAVITY UA: 1.01 (ref 1–1.03)
TARGET CELLS: ABNORMAL
TOTAL PROTEIN: 6 G/DL (ref 6.3–8)
TOTAL PROTEIN: 6.9 G/DL (ref 6.3–8)
URINE REFLEX TO CULTURE: ABNORMAL
UROBILINOGEN, URINE: 1 E.U./DL
WBC # BLD: 12.1 K/UL (ref 4.8–10.8)
WBC # BLD: 13.5 K/UL (ref 4.8–10.8)
WBC UA: ABNORMAL /HPF (ref 0–5)

## 2023-01-10 PROCEDURE — 87086 URINE CULTURE/COLONY COUNT: CPT

## 2023-01-10 PROCEDURE — 83735 ASSAY OF MAGNESIUM: CPT

## 2023-01-10 PROCEDURE — 36415 COLL VENOUS BLD VENIPUNCTURE: CPT

## 2023-01-10 PROCEDURE — 51701 INSERT BLADDER CATHETER: CPT

## 2023-01-10 PROCEDURE — 80053 COMPREHEN METABOLIC PANEL: CPT

## 2023-01-10 PROCEDURE — 93005 ELECTROCARDIOGRAM TRACING: CPT | Performed by: EMERGENCY MEDICINE

## 2023-01-10 PROCEDURE — P9059 PLASMA, FRZ BETWEEN 8-24HOUR: HCPCS

## 2023-01-10 PROCEDURE — A4216 STERILE WATER/SALINE, 10 ML: HCPCS | Performed by: EMERGENCY MEDICINE

## 2023-01-10 PROCEDURE — 87040 BLOOD CULTURE FOR BACTERIA: CPT

## 2023-01-10 PROCEDURE — G8417 CALC BMI ABV UP PARAM F/U: HCPCS | Performed by: NURSE PRACTITIONER

## 2023-01-10 PROCEDURE — 96361 HYDRATE IV INFUSION ADD-ON: CPT

## 2023-01-10 PROCEDURE — 1210000000 HC MED SURG R&B

## 2023-01-10 PROCEDURE — 83690 ASSAY OF LIPASE: CPT

## 2023-01-10 PROCEDURE — 85730 THROMBOPLASTIN TIME PARTIAL: CPT

## 2023-01-10 PROCEDURE — 76770 US EXAM ABDO BACK WALL COMP: CPT

## 2023-01-10 PROCEDURE — 85025 COMPLETE CBC W/AUTO DIFF WBC: CPT

## 2023-01-10 PROCEDURE — 74176 CT ABD & PELVIS W/O CONTRAST: CPT

## 2023-01-10 PROCEDURE — 2580000003 HC RX 258: Performed by: EMERGENCY MEDICINE

## 2023-01-10 PROCEDURE — 86901 BLOOD TYPING SEROLOGIC RH(D): CPT

## 2023-01-10 PROCEDURE — 6360000002 HC RX W HCPCS: Performed by: EMERGENCY MEDICINE

## 2023-01-10 PROCEDURE — 86900 BLOOD TYPING SEROLOGIC ABO: CPT

## 2023-01-10 PROCEDURE — C9113 INJ PANTOPRAZOLE SODIUM, VIA: HCPCS | Performed by: EMERGENCY MEDICINE

## 2023-01-10 PROCEDURE — 71045 X-RAY EXAM CHEST 1 VIEW: CPT

## 2023-01-10 PROCEDURE — 4004F PT TOBACCO SCREEN RCVD TLK: CPT | Performed by: NURSE PRACTITIONER

## 2023-01-10 PROCEDURE — 99214 OFFICE O/P EST MOD 30 MIN: CPT | Performed by: NURSE PRACTITIONER

## 2023-01-10 PROCEDURE — G8484 FLU IMMUNIZE NO ADMIN: HCPCS | Performed by: NURSE PRACTITIONER

## 2023-01-10 PROCEDURE — G8427 DOCREV CUR MEDS BY ELIG CLIN: HCPCS | Performed by: NURSE PRACTITIONER

## 2023-01-10 PROCEDURE — 86850 RBC ANTIBODY SCREEN: CPT

## 2023-01-10 PROCEDURE — 81001 URINALYSIS AUTO W/SCOPE: CPT

## 2023-01-10 PROCEDURE — 99285 EMERGENCY DEPT VISIT HI MDM: CPT

## 2023-01-10 PROCEDURE — 96374 THER/PROPH/DIAG INJ IV PUSH: CPT

## 2023-01-10 PROCEDURE — 85610 PROTHROMBIN TIME: CPT

## 2023-01-10 PROCEDURE — 82140 ASSAY OF AMMONIA: CPT

## 2023-01-10 RX ORDER — LIDOCAINE 4 G/G
1 PATCH TOPICAL DAILY
Status: DISCONTINUED | OUTPATIENT
Start: 2023-01-11 | End: 2023-01-17 | Stop reason: HOSPADM

## 2023-01-10 RX ORDER — PANTOPRAZOLE SODIUM 40 MG/1
40 TABLET, DELAYED RELEASE ORAL
Qty: 30 TABLET | Refills: 0 | Status: ON HOLD | OUTPATIENT
Start: 2023-01-10

## 2023-01-10 RX ORDER — SODIUM CHLORIDE 0.9 % (FLUSH) 0.9 %
5-40 SYRINGE (ML) INJECTION EVERY 12 HOURS SCHEDULED
Status: DISCONTINUED | OUTPATIENT
Start: 2023-01-11 | End: 2023-01-17 | Stop reason: HOSPADM

## 2023-01-10 RX ORDER — POTASSIUM CHLORIDE 7.45 MG/ML
10 INJECTION INTRAVENOUS ONCE
Status: COMPLETED | OUTPATIENT
Start: 2023-01-10 | End: 2023-01-10

## 2023-01-10 RX ORDER — POTASSIUM CHLORIDE 7.45 MG/ML
10 INJECTION INTRAVENOUS ONCE
Status: DISCONTINUED | OUTPATIENT
Start: 2023-01-10 | End: 2023-01-10

## 2023-01-10 RX ORDER — SODIUM CHLORIDE 9 MG/ML
25 INJECTION, SOLUTION INTRAVENOUS PRN
Status: DISCONTINUED | OUTPATIENT
Start: 2023-01-10 | End: 2023-01-17 | Stop reason: HOSPADM

## 2023-01-10 RX ORDER — POTASSIUM CHLORIDE 7.45 MG/ML
10 INJECTION INTRAVENOUS
Status: COMPLETED | OUTPATIENT
Start: 2023-01-11 | End: 2023-01-11

## 2023-01-10 RX ORDER — 0.9 % SODIUM CHLORIDE 0.9 %
1000 INTRAVENOUS SOLUTION INTRAVENOUS ONCE
Status: COMPLETED | OUTPATIENT
Start: 2023-01-10 | End: 2023-01-10

## 2023-01-10 RX ORDER — ONDANSETRON 4 MG/1
4 TABLET, ORALLY DISINTEGRATING ORAL EVERY 8 HOURS PRN
Status: DISCONTINUED | OUTPATIENT
Start: 2023-01-10 | End: 2023-01-17 | Stop reason: HOSPADM

## 2023-01-10 RX ORDER — POLYETHYLENE GLYCOL 3350 17 G/17G
17 POWDER, FOR SOLUTION ORAL DAILY PRN
Status: DISCONTINUED | OUTPATIENT
Start: 2023-01-10 | End: 2023-01-17 | Stop reason: HOSPADM

## 2023-01-10 RX ORDER — MIDODRINE HYDROCHLORIDE 5 MG/1
7.5 TABLET ORAL ONCE
Status: DISCONTINUED | OUTPATIENT
Start: 2023-01-10 | End: 2023-01-16

## 2023-01-10 RX ORDER — ONDANSETRON 2 MG/ML
4 INJECTION INTRAMUSCULAR; INTRAVENOUS EVERY 6 HOURS PRN
Status: DISCONTINUED | OUTPATIENT
Start: 2023-01-10 | End: 2023-01-17 | Stop reason: HOSPADM

## 2023-01-10 RX ORDER — LACTULOSE 10 G/15ML
20 SOLUTION ORAL 3 TIMES DAILY
Status: DISCONTINUED | OUTPATIENT
Start: 2023-01-11 | End: 2023-01-11

## 2023-01-10 RX ORDER — SODIUM CHLORIDE 0.9 % (FLUSH) 0.9 %
5-40 SYRINGE (ML) INJECTION PRN
Status: DISCONTINUED | OUTPATIENT
Start: 2023-01-10 | End: 2023-01-17 | Stop reason: HOSPADM

## 2023-01-10 RX ADMIN — SODIUM CHLORIDE 80 MG: 9 INJECTION, SOLUTION INTRAMUSCULAR; INTRAVENOUS; SUBCUTANEOUS at 21:09

## 2023-01-10 RX ADMIN — CEFTRIAXONE SODIUM 1000 MG: 1 INJECTION, POWDER, FOR SOLUTION INTRAMUSCULAR; INTRAVENOUS at 22:05

## 2023-01-10 RX ADMIN — SODIUM CHLORIDE 1000 ML: 9 INJECTION, SOLUTION INTRAVENOUS at 21:04

## 2023-01-10 RX ADMIN — POTASSIUM CHLORIDE 10 MEQ: 7.46 INJECTION, SOLUTION INTRAVENOUS at 22:04

## 2023-01-10 RX ADMIN — OCTREOTIDE ACETATE 50 MCG/HR: 500 INJECTION, SOLUTION INTRAVENOUS; SUBCUTANEOUS at 22:46

## 2023-01-10 ASSESSMENT — ENCOUNTER SYMPTOMS
CHEST TIGHTNESS: 0
VOMITING: 0
DIARRHEA: 0
SHORTNESS OF BREATH: 0
PHOTOPHOBIA: 0
VOICE CHANGE: 0
ABDOMINAL PAIN: 1
BLOOD IN STOOL: 1
BLOOD IN STOOL: 1
ABDOMINAL DISTENTION: 1
SHORTNESS OF BREATH: 0
RECTAL PAIN: 0
PHOTOPHOBIA: 0
TROUBLE SWALLOWING: 0
DIARRHEA: 0
ABDOMINAL PAIN: 1
NAUSEA: 0
VOMITING: 0
EYE PAIN: 0
ABDOMINAL DISTENTION: 1
EYE REDNESS: 0
CONSTIPATION: 0
WHEEZING: 0
COLOR CHANGE: 1
ANAL BLEEDING: 0

## 2023-01-10 ASSESSMENT — PAIN DESCRIPTION - FREQUENCY: FREQUENCY: INTERMITTENT

## 2023-01-10 ASSESSMENT — LIFESTYLE VARIABLES
HOW MANY STANDARD DRINKS CONTAINING ALCOHOL DO YOU HAVE ON A TYPICAL DAY: 3 OR 4
HOW OFTEN DO YOU HAVE A DRINK CONTAINING ALCOHOL: 4 OR MORE TIMES A WEEK

## 2023-01-10 ASSESSMENT — PAIN DESCRIPTION - PAIN TYPE: TYPE: ACUTE PAIN

## 2023-01-10 ASSESSMENT — PAIN SCALES - GENERAL: PAINLEVEL_OUTOF10: 9

## 2023-01-10 ASSESSMENT — PAIN DESCRIPTION - LOCATION: LOCATION: ABDOMEN

## 2023-01-10 ASSESSMENT — PAIN - FUNCTIONAL ASSESSMENT: PAIN_FUNCTIONAL_ASSESSMENT: 0-10

## 2023-01-10 ASSESSMENT — PAIN DESCRIPTION - DESCRIPTORS: DESCRIPTORS: STABBING

## 2023-01-10 NOTE — PROGRESS NOTES
Subjective:      Patient ID: Jeffery Elias is a 42 y.o. female who presents today for:  Chief Complaint   Patient presents with    Follow-up    Cirrhosis       HPI  Patient came in as follow-up to alcoholic cirrhosis, noted extended hospitalization in September for alcoholic hepatitis, hepatic encephalopathy, pancreatitis, and WILLIE. Was seen in the office 1 month prior, was initiated on spironolactone 50 mg and furosemide 20 mg. Has complaints today of increased jaundice, abdominal swelling, c/o generalized abdominal pain. Denies N/V, no melena, reports isolated episode or BRBPR yesterday with BM. Noted history of paracentesis while hospitalized with fluid cultures positive for Streptococcus viridans. Pt has been abstinent from alcohol since 12/24/22. OV 12/13/22  This very pleasant 24-year-old came in today for the evaluation management of cirrhosis. Patient came in today as recently discharged from the hospital.  Patient does endorse occasional GERD. No hematemesis melena hematochezia. Patient is not on diuretics. Patient continues to drink alcohol. Denies confusion. No recent hospitalization. Patient came in today for follow-up  HPI: 43 y.o. female following up after inpatient hospitalization 9/4/2022 - 7/50/0438 with alcoholic hepatitis with ascites, pancreatitis, EtOH withdrawal, mild WILLIE, macrocytic anemia and thrombocytopenia. She is s/p paracentesis with drainage of 1500 mL per IR, fluid analysis not suggestive of SBP, however fluid cultures positive for Streptococcus viridans. She was admitted to inpatient rehab on 9/16/2022-9/22/22. Interval change: Patient reports doing well since discharge from inpatient rehab. States she has had a good appetite since she has returned home, also has Ensure nutritional supplements on hand if needed. She denies any increased abdominal girth or lower extremity edema.   States she is taking Lasix 40 mg and Aldactone 100 mg daily as prescribed with good urine output. She denies GERD symptoms with taking omeprazole daily. She denies nausea/vomiting, hematemesis, dysphagia, hematochezia, or melena. She does report significant weight loss of approximately 20 pounds as her abdominal and lower extremity swelling have significantly improved with diuretic therapy. She denies any overt signs/symptoms of encephalopathy including increased confusion, fatigue or reversal of sleep schedule. States she was taking lactulose once a day upon discharge from the hospital, however this caused her to have diarrhea with associated fecal incontinence while sleeping. States she will take lactulose as needed if she does not have a bowel movement, however currently having a bowel movement 1-2 times per day. Last took lactulose approximately 2 days ago. She does report lower abdominal tenderness associated with distention, however denies any fever/chills. Smoking status: 1-2 cigarettes per day  Alcohol use: States she has not touched alcohol since admission to the hospital, last drink of ETOH 2/6/9688  Illicit drug use: smokes marijuana for anxiety, around once per day  NSAID use: naproxen on med list, last had months ago. Assessment from recent last inpatient rehab progress note on 9/22/22 below:  43 y.o. female admitted to inpatient rehab 9/16/2022 after hospital stay from 9/4/2022 - 9/15/2022 for acute alcoholic hepatitis/pancreatitis, EtOH withdrawal, mild WILLIE, macrocytic anemia, thrombocytopenia. Given IV fluids and pain control, s/p paracentesis with approximately 1500 cc removed, fluid analysis not suggestive of SBP, however fluid culture growing GPC in chains, started on Rocephin, EGD revealing esophagitis-no varices, and UA positive for E. coli UTI. GI consulted for cirrhosis, recurrent ascites, role of diuretics. Pt reports increased abdominal girth and bilateral lower extremity edema worsening since admission to rehab.    On 9/4/2022, sodium 134, glucose 127, BUN less than 2, creatinine 0.69, Albumin 2.3, total bilirubin 7.2, alkaline phosphatase 524, ALT 98, , WBC 6.3, Hgb 11.1, .4, platelets 50, CRP 25, lipase 118, amylase 60, INR 1.4, lactic acid 12.1, procalcitonin 0.69, acute hepatitis panel was negative, blood cultures negative, urine culture positive for E. coli, ascitic fluid culture positive for Streptococcus viridans and staphylococcus epidermidis.   On 9/17/2022, WBCs 7.8, Hgb 9.5, .9, platelets 765, BUN 8, creatinine 0.72, albumin 1.4, total bilirubin 5.8, alk phos 283, ALT 35,     Past Medical History:   Diagnosis Date    Alcohol abuse     Tobacco dependence      Past Surgical History:   Procedure Laterality Date    APPENDECTOMY      FOOT SURGERY      reports 6 sx on L foot and one on right foot    PARACENTESIS Left 09/13/2022    1510 ml removed per Dr Fan Dela Cruz  specimen obtained    TUBAL LIGATION      UPPER GASTROINTESTINAL ENDOSCOPY N/A 09/09/2022    EGD DIAGNOSTIC ONLY performed by Derrick Duran MD at [facility] History    Marital status: Single     Spouse name: Not on file    Number of children: 2    Years of education: Not on file    Highest education level: Not on file   Occupational History    Not on file   Tobacco Use    Smoking status: Every Day     Packs/day: 1.00     Types: Cigarettes    Smokeless tobacco: Never   Vaping Use    Vaping Use: Never used   Substance and Sexual Activity    Alcohol use: Yes     Comment: 6/9/22: completed 30 day rehab 2 weeks ago, relapse one week ago; unable to quantify EtOH consumption    Drug use: Yes     Types: Marijuana Daril Tavon)    Sexual activity: Not on file   Other Topics Concern    Not on file   Social History Narrative    Lives With: 10 yo daughter, SO sometimes stays with pt, he works odd jobs    Older 24 yo dtr lives with a boyfriend    Type of Home: House in [address]: Two level, Laundry in basement (flights to and from basement and second floor with HR)    Home Access: Stairs to enter with rails- Number of Steps: 5- Rails: Both    Bathroom Shower/Tub: Tub/Shower unit    Home Equipment:  (no AD)    ADL Assistance: Independent    Homemaking Assistance: Independent    Ambulation Assistance: Independent (no AD)    Transfer Assistance: Independent    Active : Yes    Type of Occupation: unemployed    GED--then some college. Social Determinants of Health     Financial Resource Strain: Not on file   Food Insecurity: Not on file   Transportation Needs: Not on file   Physical Activity: Not on file   Stress: Not on file   Social Connections: Not on file   Intimate Partner Violence: Not on file   Housing Stability: Not on file     Family History   Problem Relation Age of Onset    High Cholesterol Mother     Hypertension Mother     Melanoma Father     High Cholesterol Father     Hypertension Father     Colon Cancer Neg Hx      Allergies   Allergen Reactions    Oxycodone-Acetaminophen Itching     Itching and nausea           Review of Systems   Constitutional:  Positive for fatigue. Negative for appetite change, chills, fever and unexpected weight change. HENT:  Negative for nosebleeds, tinnitus, trouble swallowing and voice change. Eyes:  Negative for photophobia, pain and redness. Respiratory:  Negative for chest tightness, shortness of breath and wheezing. Cardiovascular:  Negative for chest pain, palpitations and leg swelling. Gastrointestinal:  Positive for abdominal distention, abdominal pain and blood in stool. Negative for anal bleeding, constipation, diarrhea, nausea, rectal pain and vomiting. Endocrine: Negative for polydipsia, polyphagia and polyuria. Genitourinary:  Negative for difficulty urinating and hematuria. Skin:  Positive for color change. Negative for pallor and rash. Neurological:  Negative for dizziness, speech difficulty and headaches.    Psychiatric/Behavioral:  Negative for confusion and suicidal ideas. Objective:   /60 (Site: Right Upper Arm, Position: Sitting, Cuff Size: Small Adult)   Pulse (!) 108   Wt 174 lb (78.9 kg)   SpO2 98%   BMI 28.96 kg/m²     Physical Exam  Vitals reviewed. Constitutional:       General: She is not in acute distress. Appearance: Normal appearance. She is well-developed and well-groomed. She is ill-appearing. HENT:      Head: Normocephalic and atraumatic. Nose: Nose normal.   Eyes:      General: Scleral icterus present. Extraocular Movements: Extraocular movements intact. Conjunctiva/sclera: Conjunctivae normal.      Pupils: Pupils are equal, round, and reactive to light. Cardiovascular:      Rate and Rhythm: Normal rate and regular rhythm. Pulses: Normal pulses. Heart sounds: Normal heart sounds. Pulmonary:      Effort: Pulmonary effort is normal. No respiratory distress. Breath sounds: Normal breath sounds. No wheezing or rales. Abdominal:      General: Abdomen is flat. Bowel sounds are normal. There is distension. Palpations: Abdomen is soft. There is no hepatomegaly, splenomegaly or mass. Tenderness: There is abdominal tenderness. There is no guarding or rebound. Musculoskeletal:         General: No tenderness or deformity. Normal range of motion. Cervical back: Neck supple. Right lower leg: No edema. Left lower leg: No edema. Skin:     General: Skin is warm and dry. Capillary Refill: Capillary refill takes less than 2 seconds. Coloration: Skin is not jaundiced. Findings: No erythema or rash. Neurological:      General: No focal deficit present. Mental Status: She is alert and oriented to person, place, and time.    Psychiatric:         Mood and Affect: Mood normal.         Behavior: Behavior normal.       Laboratory, Pathology, Radiology reviewed in detail with relevantimportant investigations summarized below:  Lab Results   Component Value Date/Time    WBC 6.9 12/13/2022 04:37 PM    WBC 7.0 11/05/2022 02:00 PM    WBC 7.8 09/17/2022 05:38 AM    WBC 7.0 09/15/2022 04:54 AM    WBC 7.1 09/14/2022 05:30 AM    HGB 12.7 12/13/2022 04:37 PM    HGB 12.3 11/05/2022 02:00 PM    HGB 9.5 09/17/2022 05:38 AM    HGB 9.6 09/15/2022 04:54 AM    HGB 9.6 09/14/2022 05:30 AM    HCT 38.1 12/13/2022 04:37 PM    HCT 37.3 11/05/2022 02:00 PM    HCT 29.4 09/17/2022 05:38 AM    HCT 29.2 09/15/2022 04:54 AM    HCT 29.2 09/14/2022 05:30 AM    MCV 91.4 12/13/2022 04:37 PM    MCV 93.8 11/05/2022 02:00 PM    .9 09/17/2022 05:38 AM    .4 09/15/2022 04:54 AM    .3 09/14/2022 05:30 AM     12/13/2022 04:37 PM     11/05/2022 02:00 PM     09/17/2022 05:38 AM     09/15/2022 04:54 AM     09/14/2022 05:30 AM    .  Lab Results   Component Value Date/Time    ALT 64 12/13/2022 04:37 PM    ALT 61 11/05/2022 02:00 PM    ALT 46 10/05/2022 04:52 PM     12/13/2022 04:37 PM     11/05/2022 02:00 PM     10/05/2022 04:52 PM    ALKPHOS 399 12/13/2022 04:37 PM    ALKPHOS 164 11/05/2022 02:00 PM    ALKPHOS 191 10/05/2022 04:52 PM    BILITOT 2.1 12/13/2022 04:37 PM    BILITOT 2.2 11/05/2022 02:00 PM    BILITOT 3.5 10/05/2022 04:52 PM       No results found. No results found for: IRON, TIBC, FERRITIN  Lab Results   Component Value Date/Time    INR 1.4 12/13/2022 04:37 PM    INR 1.4 09/15/2022 05:54 AM    INR 1.5 09/14/2022 05:30 AM    INR 1.5 09/13/2022 06:22 AM    INR 1.5 09/09/2022 01:49 PM     No components found for: ACUTEHEPATITISSCREEN  No components found for: CELIACPANEL  No components found for: STOOLCULTURE, C.DIFF, STOOLOVAPARASITE, STOOLLEUCOCYTE    Endoscopic hx:  EGD Dr Urszula Olivier 9/9/22  Web in the upper third of the esophagus. Z-line regular, 36 cm from the incisors. Erythematous, granular and congested mucosa in the cardia, gastric fundus, gastric body and antrum. Normal examined duodenum.   LA Grade A reflux esophagitis with no bleeding. No specimens collected. Assessment:       Diagnosis Orders   1. Alcoholic cirrhosis of liver with ascites (HCC)  Comprehensive Metabolic Panel    CBC    Protime-INR    pantoprazole (PROTONIX) 40 MG tablet            Plan:      1-  Decompensated Cirrhosis secondary to ETOH  :  MELD 13, 6.0% 90 day mortality  Continues to drink alcohol intermittently. Last ETOH 12/24/22, discussed continued alcohol abstinence  Pt was recommended to follow up in the ED today given her increased jaundice, tense ascites, and abdominal pain, at this time she declined, she is agreeable to obtain blood work today including CBC, CMP and INR, understanding that she may need to proceed to the ED today for further evaluation. 2 -   Tense Ascites, abdominal pain:   Noted increased abdominal girth.  was initiated on  Lasix and Aldactone. Obtain CMP and electrolytes for further evaluation  Obtain therapeutic paracentesis as needed  Previous SBP during hospital stay noted  Noted hx of pancreatitis  3- EGD for Variceal surveillance:    Had EGD that shows portal hypertensive gastropathy with no esophageal varices noted  4 - HCC screening:   Recent imaging Negative for liver mass  5-  Overt Hepatic encephalopathy:  None at this time    6- Preventive measure:   Patient to continue follow-up with primary care physician regarding age-appropriate screening assessment. As mentioned discussed at length regarding alcohol abstinence       Return in about 2 weeks (around 1/24/2023), or if symptoms worsen or fail to improve.       SANTA Galloway - CNP

## 2023-01-10 NOTE — TELEPHONE ENCOUNTER
Called patient discussed blood work, directed to go to the ED for admission today, called and d/w ED attending.    Ardyce Cooks

## 2023-01-10 NOTE — ED TRIAGE NOTES
Pt to ed via triage with reports of rectal bleeding and abdominal pain  PT reports that she had a GI appt today, labs drawn. PT advised to report to ED for further evaluation has hx of liver cirrhosis. Pt reports history of ETOH abuse, denies use for \"about a week\". Pt abdomen grossly enlarged and taut. Skin and sclera yellow. Pt calm and cooperative. Respirations even and unlabored.  NO s/s of acute distress at this time

## 2023-01-11 ENCOUNTER — APPOINTMENT (OUTPATIENT)
Dept: INTERVENTIONAL RADIOLOGY/VASCULAR | Age: 38
DRG: 253 | End: 2023-01-11
Payer: COMMERCIAL

## 2023-01-11 PROBLEM — N17.9 AKI (ACUTE KIDNEY INJURY) (HCC): Status: ACTIVE | Noted: 2023-01-11

## 2023-01-11 LAB
ALBUMIN SERPL-MCNC: 1.9 G/DL (ref 3.5–4.6)
ALP BLD-CCNC: 236 U/L (ref 40–130)
ALT SERPL-CCNC: 49 U/L (ref 0–33)
ANION GAP SERPL CALCULATED.3IONS-SCNC: 13 MEQ/L (ref 9–15)
ANISOCYTOSIS: ABNORMAL
APPEARANCE FLUID: CLEAR
AST SERPL-CCNC: 179 U/L (ref 0–35)
BANDED NEUTROPHILS RELATIVE PERCENT: 1 % (ref 5–11)
BASOPHILS ABSOLUTE: 0 K/UL (ref 0–0.2)
BASOPHILS RELATIVE PERCENT: 0.3 %
BILIRUB SERPL-MCNC: 20 MG/DL (ref 0.2–0.7)
BUN BLDV-MCNC: 32 MG/DL (ref 6–20)
CALCIUM SERPL-MCNC: 8 MG/DL (ref 8.5–9.9)
CELL COUNT FLUID TYPE: NORMAL
CHLORIDE BLD-SCNC: 92 MEQ/L (ref 95–107)
CLOT EVALUATION: NORMAL
CO2: 26 MEQ/L (ref 20–31)
COLOR FLUID: YELLOW
CREAT SERPL-MCNC: 2.32 MG/DL (ref 0.5–0.9)
EKG ATRIAL RATE: 105 BPM
EKG P AXIS: 67 DEGREES
EKG P-R INTERVAL: 158 MS
EKG Q-T INTERVAL: 336 MS
EKG QRS DURATION: 66 MS
EKG QTC CALCULATION (BAZETT): 444 MS
EKG R AXIS: 84 DEGREES
EKG T AXIS: 79 DEGREES
EKG VENTRICULAR RATE: 105 BPM
EOSINOPHILS ABSOLUTE: 0.1 K/UL (ref 0–0.7)
EOSINOPHILS RELATIVE PERCENT: 1 %
FLUID PATH CONSULT: YES
FLUID TYPE: NORMAL
GFR SERPL CREATININE-BSD FRML MDRD: 26.8 ML/MIN/{1.73_M2}
GLOBULIN: 3.9 G/DL (ref 2.3–3.5)
GLUCOSE BLD-MCNC: 77 MG/DL (ref 70–99)
GLUCOSE, FLUID: 101.1 MG/DL
HCT VFR BLD CALC: 25.9 % (ref 37–47)
HCT VFR BLD CALC: 27.2 % (ref 37–47)
HCT VFR BLD CALC: 30.9 % (ref 37–47)
HEMOGLOBIN: 10.6 G/DL (ref 12–16)
HEMOGLOBIN: 8.8 G/DL (ref 12–16)
HEMOGLOBIN: 9.3 G/DL (ref 12–16)
HYPOCHROMIA: ABNORMAL
LYMPHOCYTES ABSOLUTE: 0.3 K/UL (ref 1–4.8)
LYMPHOCYTES RELATIVE PERCENT: 2 %
LYMPHOCYTES, BODY FLUID: 61 %
MACROCYTES: ABNORMAL
MCH RBC QN AUTO: 33.9 PG (ref 27–31.3)
MCHC RBC AUTO-ENTMCNC: 34.3 % (ref 33–37)
MCV RBC AUTO: 98.9 FL (ref 79.4–94.8)
MONOCYTE, FLUID: 28 %
MONOCYTES ABSOLUTE: 0.6 K/UL (ref 0.2–0.8)
MONOCYTES RELATIVE PERCENT: 4.9 %
NEUTROPHIL, FLUID: 11 %
NEUTROPHILS ABSOLUTE: 11.7 K/UL (ref 1.4–6.5)
NEUTROPHILS RELATIVE PERCENT: 91 %
NUCLEATED CELLS FLUID: 123 /CUMM
NUMBER OF CELLS COUNTED FLUID: 100
PDW BLD-RTO: 29.9 % (ref 11.5–14.5)
PLATELET # BLD: 149 K/UL (ref 130–400)
PLATELET SLIDE REVIEW: NORMAL
POLYCHROMASIA: ABNORMAL
POTASSIUM REFLEX MAGNESIUM: 4.2 MEQ/L (ref 3.4–4.9)
PROCALCITONIN: 1.15 NG/ML (ref 0–0.15)
PROTEIN FLUID: 0.7 G/DL
RBC # BLD: 3.13 M/UL (ref 4.2–5.4)
RBC FLUID: 31 /CUMM
SODIUM BLD-SCNC: 131 MEQ/L (ref 135–144)
TARGET CELLS: ABNORMAL
TOTAL PROTEIN: 5.8 G/DL (ref 6.3–8)
WBC # BLD: 12.7 K/UL (ref 4.8–10.8)

## 2023-01-11 PROCEDURE — 6360000002 HC RX W HCPCS: Performed by: EMERGENCY MEDICINE

## 2023-01-11 PROCEDURE — 89051 BODY FLUID CELL COUNT: CPT

## 2023-01-11 PROCEDURE — 85018 HEMOGLOBIN: CPT

## 2023-01-11 PROCEDURE — 36415 COLL VENOUS BLD VENIPUNCTURE: CPT

## 2023-01-11 PROCEDURE — 2580000003 HC RX 258: Performed by: INTERNAL MEDICINE

## 2023-01-11 PROCEDURE — 83540 ASSAY OF IRON: CPT

## 2023-01-11 PROCEDURE — 88305 TISSUE EXAM BY PATHOLOGIST: CPT

## 2023-01-11 PROCEDURE — 82150 ASSAY OF AMYLASE: CPT

## 2023-01-11 PROCEDURE — 84145 PROCALCITONIN (PCT): CPT

## 2023-01-11 PROCEDURE — C9113 INJ PANTOPRAZOLE SODIUM, VIA: HCPCS | Performed by: EMERGENCY MEDICINE

## 2023-01-11 PROCEDURE — 85014 HEMATOCRIT: CPT

## 2023-01-11 PROCEDURE — 49083 ABD PARACENTESIS W/IMAGING: CPT | Performed by: RADIOLOGY

## 2023-01-11 PROCEDURE — 0W9G3ZZ DRAINAGE OF PERITONEAL CAVITY, PERCUTANEOUS APPROACH: ICD-10-PCS | Performed by: RADIOLOGY

## 2023-01-11 PROCEDURE — 1210000000 HC MED SURG R&B

## 2023-01-11 PROCEDURE — 2500000003 HC RX 250 WO HCPCS: Performed by: RADIOLOGY

## 2023-01-11 PROCEDURE — 85025 COMPLETE CBC W/AUTO DIFF WBC: CPT

## 2023-01-11 PROCEDURE — 80053 COMPREHEN METABOLIC PANEL: CPT

## 2023-01-11 PROCEDURE — 88112 CYTOPATH CELL ENHANCE TECH: CPT

## 2023-01-11 PROCEDURE — A4216 STERILE WATER/SALINE, 10 ML: HCPCS | Performed by: EMERGENCY MEDICINE

## 2023-01-11 PROCEDURE — 87205 SMEAR GRAM STAIN: CPT

## 2023-01-11 PROCEDURE — 6370000000 HC RX 637 (ALT 250 FOR IP): Performed by: INTERNAL MEDICINE

## 2023-01-11 PROCEDURE — 99223 1ST HOSP IP/OBS HIGH 75: CPT | Performed by: RADIOLOGY

## 2023-01-11 PROCEDURE — 83615 LACTATE (LD) (LDH) ENZYME: CPT

## 2023-01-11 PROCEDURE — 2580000003 HC RX 258: Performed by: EMERGENCY MEDICINE

## 2023-01-11 PROCEDURE — 93010 ELECTROCARDIOGRAM REPORT: CPT | Performed by: INTERNAL MEDICINE

## 2023-01-11 PROCEDURE — 2709999900 IR US GUIDED PARACENTESIS

## 2023-01-11 PROCEDURE — 82945 GLUCOSE OTHER FLUID: CPT

## 2023-01-11 PROCEDURE — P9046 ALBUMIN (HUMAN), 25%, 20 ML: HCPCS | Performed by: INTERNAL MEDICINE

## 2023-01-11 PROCEDURE — 6360000002 HC RX W HCPCS: Performed by: INTERNAL MEDICINE

## 2023-01-11 PROCEDURE — 83550 IRON BINDING TEST: CPT

## 2023-01-11 PROCEDURE — 84157 ASSAY OF PROTEIN OTHER: CPT

## 2023-01-11 PROCEDURE — 87070 CULTURE OTHR SPECIMN AEROBIC: CPT

## 2023-01-11 PROCEDURE — 49083 ABD PARACENTESIS W/IMAGING: CPT

## 2023-01-11 PROCEDURE — 82042 OTHER SOURCE ALBUMIN QUAN EA: CPT

## 2023-01-11 RX ORDER — LIDOCAINE HYDROCHLORIDE 20 MG/ML
INJECTION, SOLUTION INFILTRATION; PERINEURAL
Status: COMPLETED | OUTPATIENT
Start: 2023-01-11 | End: 2023-01-11

## 2023-01-11 RX ORDER — LACTULOSE 10 G/15ML
20 SOLUTION ORAL 2 TIMES DAILY
Status: DISCONTINUED | OUTPATIENT
Start: 2023-01-11 | End: 2023-01-17 | Stop reason: HOSPADM

## 2023-01-11 RX ORDER — LORAZEPAM 1 MG/1
3 TABLET ORAL
Status: DISCONTINUED | OUTPATIENT
Start: 2023-01-11 | End: 2023-01-17 | Stop reason: HOSPADM

## 2023-01-11 RX ORDER — LORAZEPAM 1 MG/1
4 TABLET ORAL
Status: DISCONTINUED | OUTPATIENT
Start: 2023-01-11 | End: 2023-01-17 | Stop reason: HOSPADM

## 2023-01-11 RX ORDER — SODIUM CHLORIDE 9 MG/ML
INJECTION, SOLUTION INTRAVENOUS PRN
Status: DISCONTINUED | OUTPATIENT
Start: 2023-01-11 | End: 2023-01-17 | Stop reason: HOSPADM

## 2023-01-11 RX ORDER — MIDODRINE HYDROCHLORIDE 5 MG/1
5 TABLET ORAL
Status: DISCONTINUED | OUTPATIENT
Start: 2023-01-11 | End: 2023-01-12

## 2023-01-11 RX ORDER — LORAZEPAM 2 MG/ML
2 INJECTION INTRAMUSCULAR
Status: DISCONTINUED | OUTPATIENT
Start: 2023-01-11 | End: 2023-01-17 | Stop reason: HOSPADM

## 2023-01-11 RX ORDER — LORAZEPAM 1 MG/1
2 TABLET ORAL
Status: DISCONTINUED | OUTPATIENT
Start: 2023-01-11 | End: 2023-01-17 | Stop reason: HOSPADM

## 2023-01-11 RX ORDER — LORAZEPAM 2 MG/ML
3 INJECTION INTRAMUSCULAR
Status: DISCONTINUED | OUTPATIENT
Start: 2023-01-11 | End: 2023-01-17 | Stop reason: HOSPADM

## 2023-01-11 RX ORDER — SODIUM CHLORIDE 0.9 % (FLUSH) 0.9 %
5-40 SYRINGE (ML) INJECTION PRN
Status: DISCONTINUED | OUTPATIENT
Start: 2023-01-11 | End: 2023-01-17 | Stop reason: HOSPADM

## 2023-01-11 RX ORDER — LORAZEPAM 2 MG/ML
1 INJECTION INTRAMUSCULAR
Status: DISCONTINUED | OUTPATIENT
Start: 2023-01-11 | End: 2023-01-17 | Stop reason: HOSPADM

## 2023-01-11 RX ORDER — LORAZEPAM 2 MG/ML
4 INJECTION INTRAMUSCULAR
Status: DISCONTINUED | OUTPATIENT
Start: 2023-01-11 | End: 2023-01-17 | Stop reason: HOSPADM

## 2023-01-11 RX ORDER — LANOLIN ALCOHOL/MO/W.PET/CERES
100 CREAM (GRAM) TOPICAL DAILY
Status: DISCONTINUED | OUTPATIENT
Start: 2023-01-11 | End: 2023-01-17 | Stop reason: HOSPADM

## 2023-01-11 RX ORDER — LORAZEPAM 1 MG/1
1 TABLET ORAL
Status: DISCONTINUED | OUTPATIENT
Start: 2023-01-11 | End: 2023-01-17 | Stop reason: HOSPADM

## 2023-01-11 RX ORDER — SODIUM CHLORIDE 0.9 % (FLUSH) 0.9 %
5-40 SYRINGE (ML) INJECTION EVERY 12 HOURS SCHEDULED
Status: DISCONTINUED | OUTPATIENT
Start: 2023-01-11 | End: 2023-01-17 | Stop reason: HOSPADM

## 2023-01-11 RX ADMIN — LACTULOSE 20 G: 20 SOLUTION ORAL at 13:54

## 2023-01-11 RX ADMIN — LACTULOSE 20 G: 20 SOLUTION ORAL at 08:35

## 2023-01-11 RX ADMIN — POTASSIUM CHLORIDE 10 MEQ: 7.46 INJECTION, SOLUTION INTRAVENOUS at 03:33

## 2023-01-11 RX ADMIN — POTASSIUM CHLORIDE 10 MEQ: 7.46 INJECTION, SOLUTION INTRAVENOUS at 08:34

## 2023-01-11 RX ADMIN — POTASSIUM CHLORIDE 10 MEQ: 7.46 INJECTION, SOLUTION INTRAVENOUS at 02:32

## 2023-01-11 RX ADMIN — LIDOCAINE HYDROCHLORIDE 10 ML: 20 INJECTION, SOLUTION INFILTRATION; PERINEURAL at 11:34

## 2023-01-11 RX ADMIN — POTASSIUM CHLORIDE 10 MEQ: 7.46 INJECTION, SOLUTION INTRAVENOUS at 09:44

## 2023-01-11 RX ADMIN — MIDODRINE HYDROCHLORIDE 5 MG: 5 TABLET ORAL at 17:09

## 2023-01-11 RX ADMIN — SODIUM CHLORIDE 80 MG: 9 INJECTION, SOLUTION INTRAMUSCULAR; INTRAVENOUS; SUBCUTANEOUS at 21:14

## 2023-01-11 RX ADMIN — POTASSIUM CHLORIDE 10 MEQ: 7.46 INJECTION, SOLUTION INTRAVENOUS at 05:35

## 2023-01-11 RX ADMIN — Medication 10 ML: at 21:06

## 2023-01-11 RX ADMIN — ALBUMIN HUMAN 50 G: 0.25 SOLUTION INTRAVENOUS at 13:54

## 2023-01-11 RX ADMIN — Medication 100 MG: at 08:35

## 2023-01-11 RX ADMIN — POTASSIUM CHLORIDE 10 MEQ: 7.46 INJECTION, SOLUTION INTRAVENOUS at 07:29

## 2023-01-11 RX ADMIN — Medication 10 ML: at 21:05

## 2023-01-11 RX ADMIN — POTASSIUM CHLORIDE 10 MEQ: 7.46 INJECTION, SOLUTION INTRAVENOUS at 10:39

## 2023-01-11 RX ADMIN — Medication 10 ML: at 21:04

## 2023-01-11 RX ADMIN — OCTREOTIDE ACETATE 50 MCG/HR: 500 INJECTION, SOLUTION INTRAVENOUS; SUBCUTANEOUS at 21:02

## 2023-01-11 RX ADMIN — POTASSIUM CHLORIDE 10 MEQ: 7.46 INJECTION, SOLUTION INTRAVENOUS at 01:18

## 2023-01-11 RX ADMIN — POTASSIUM CHLORIDE 10 MEQ: 7.46 INJECTION, SOLUTION INTRAVENOUS at 04:39

## 2023-01-11 RX ADMIN — POTASSIUM CHLORIDE 10 MEQ: 7.46 INJECTION, SOLUTION INTRAVENOUS at 06:30

## 2023-01-11 RX ADMIN — OCTREOTIDE ACETATE 50 MCG/HR: 500 INJECTION, SOLUTION INTRAVENOUS; SUBCUTANEOUS at 10:38

## 2023-01-11 ASSESSMENT — ENCOUNTER SYMPTOMS
ABDOMINAL DISTENTION: 1
ANAL BLEEDING: 1
COLOR CHANGE: 1
ABDOMINAL PAIN: 1

## 2023-01-11 ASSESSMENT — PAIN SCALES - GENERAL: PAINLEVEL_OUTOF10: 2

## 2023-01-11 NOTE — ED NOTES
Pt to floor via stretcher, no change in status, no distress noted     Suhas Gauthier RN  01/10/23 0046

## 2023-01-11 NOTE — CONSULTS
Renal consult dictated    WILLIE  (nl GFR 6 months ago)  watch for HRS  GIB possible varices  Hypotension  Alcohol abuse  Low albumin     Plan Maintain fluid balance  follow  BMP daily  greenberg  colonoscopy/EGD   octreotide on board  spot urine Na+  Albumin to helpBP also add midrinone for vasular issues with splanchnic flow  r/o UTI and watch for peritonitis signs

## 2023-01-11 NOTE — CONSULTS
CC:   Chief Complaint   Patient presents with    Rectal Bleeding     Pt c/o rectal bleeding x3 days - bright red blood, denies anticoagulation; states that she was advised to come to ED by GI    Abnormal Lab     Had labs done today, abnormal renal fx    Cirrhosis        HPI:  The patient is a pleasant 64-year-old woman who was admitted to the hospital due to GI bleed. Patient has a history of alcohol abuse and alcoholic liver cirrhosis. For the past 3 days patient had been having lower GI bleeding with blood per rectum. She is also complaining of abdominal pain and abdominal distention. Last paracentesis she had was in October and has been becoming distended since. She has also having difficulty urinating. She has also noticed increased jaundice. Most recent visit to gastroenterology and hepatology demonstrated a total bilirubin of 23 with acute kidney injury. She was sent to the emergency room by hepatology service. Interventional radiology was consulted for diagnostic and therapeutic ultrasound-guided paracentesis.      Family History   Problem Relation Age of Onset    High Cholesterol Mother     Hypertension Mother     Melanoma Father     High Cholesterol Father     Hypertension Father     Colon Cancer Neg Hx        Past Surgical History:   Procedure Laterality Date    APPENDECTOMY      FOOT SURGERY      reports 6 sx on L foot and one on right foot    PARACENTESIS Left 09/13/2022    1510 ml removed per Dr Priya Saeed  specimen obtained    PARACENTESIS Left 01/11/2023    4720 ml removed by Dr. Priya Saeed - therapeutic & diagnostic    TUBAL LIGATION      UPPER GASTROINTESTINAL ENDOSCOPY N/A 09/09/2022    EGD DIAGNOSTIC ONLY performed by Morro Briceño MD at [facility]        Past Medical History:   Diagnosis Date    Alcohol abuse     Tobacco dependence        Social History     Socioeconomic History    Marital status: Single     Spouse name: None    Number of children: 2    Years of education: None    Highest education level: None   Tobacco Use    Smoking status: Every Day     Packs/day: 1.00     Types: Cigarettes    Smokeless tobacco: Never   Vaping Use    Vaping Use: Never used   Substance and Sexual Activity    Alcohol use: Yes     Comment: 6/9/22: completed 30 day rehab 2 weeks ago, relapse one week ago; unable to quantify EtOH consumption    Drug use: Yes     Types: Marijuana Zheng Rowell)   Social History Narrative    Lives With: 10 yo daughter, SO sometimes stays with pt, he works odd jobs    Older 22 yo dtr lives with a boyfriend    Type of Home: [place] [facility] [facility]: Two level, Laundry in basement (flights to and from basement and second floor with HR)    Home Access: Stairs to enter with rails- Number of Steps: 5- Rails: Both    Bathroom Shower/Tub: Tub/Shower unit    Home Equipment:  (no AD)    ADL Assistance: Independent    Homemaking Assistance: Independent    Ambulation Assistance: Independent (no AD)    Transfer Assistance: Independent    Active : Yes    Type of Occupation: unemployed    GED--then some college. Allergies   Allergen Reactions    Oxycodone-Acetaminophen Itching     Itching and nausea         No current facility-administered medications on file prior to encounter. Current Outpatient Medications on File Prior to Encounter   Medication Sig Dispense Refill    pantoprazole (PROTONIX) 40 MG tablet Take 1 tablet by mouth every morning (before breakfast) 30 tablet 0    sertraline (ZOLOFT) 50 MG tablet Take 50 mg by mouth daily (Patient not taking: Reported on 1/10/2023)      diazePAM (VALIUM) 5 MG tablet Take 5 mg by mouth every 6 hours as needed for Anxiety.  (Patient not taking: Reported on 1/10/2023)      metroNIDAZOLE (FLAGYL) 500 MG tablet Take 500 mg by mouth 3 times daily (Patient not taking: Reported on 1/10/2023)      atenolol (TENORMIN) 25 MG tablet Take 25 mg by mouth daily (Patient not taking: Reported on 1/10/2023)      spironolactone (ALDACTONE) 50 MG tablet Take 1 tablet by mouth daily 30 tablet 3    furosemide (LASIX) 20 MG tablet Take 1 tablet by mouth daily 60 tablet 3    nicotine (NICODERM CQ) 14 MG/24HR Place 1 patch onto the skin daily (Patient not taking: Reported on 1/10/2023) 30 patch 3    lactulose (CHRONULAC) 10 GM/15ML solution Take 30 mLs by mouth 3 times daily (Patient not taking: No sig reported) 1200 mL 1    magnesium oxide (MAG-OX) 400 (240 Mg) MG tablet Take 1 tablet by mouth daily (Patient not taking: Reported on 1/10/2023) 30 tablet 0    simethicone (MYLICON) 80 MG chewable tablet Take 1 tablet by mouth every 6 hours as needed for Flatulence (Patient not taking: Reported on 1/10/2023) 30 tablet 0    thiamine 100 MG tablet Take 1 tablet by mouth daily (Patient not taking: No sig reported) 30 tablet 0    mirtazapine (REMERON) 15 MG tablet Take 1 tablet by mouth nightly (Patient not taking: Reported on 1/10/2023) 15 tablet 3    Multiple Vitamin (MULTIVITAMIN) TABS tablet Take 1 tablet by mouth daily (Patient not taking: No sig reported) 30 tablet 1       Review of Systems   Constitutional:  Positive for fatigue. Gastrointestinal:  Positive for abdominal distention, abdominal pain and anal bleeding. Genitourinary:  Positive for enuresis. Skin:  Positive for color change and pallor. OBJECTIVE:  BP (!) 104/58   Pulse (!) 101   Temp 98.1 °F (36.7 °C) (Oral)   Resp 16   Ht 5' 5\" (1.651 m)   Wt 174 lb (78.9 kg)   SpO2 97%   BMI 28.96 kg/m²     Physical Exam  Constitutional:       General: She is not in acute distress. Appearance: She is well-developed. She is ill-appearing. She is not diaphoretic. HENT:      Head: Normocephalic and atraumatic. Nose: Nose normal.      Mouth/Throat:      Pharynx: No oropharyngeal exudate. Eyes:      General: Scleral icterus present. Right eye: No discharge. Left eye: No discharge. Conjunctiva/sclera: Conjunctivae normal.   Neck:      Thyroid: No thyromegaly. Vascular: No JVD. Trachea: No tracheal deviation. Cardiovascular:      Rate and Rhythm: Normal rate and regular rhythm. Heart sounds: Normal heart sounds. No murmur heard. No friction rub. No gallop. Pulmonary:      Effort: No respiratory distress. Breath sounds: No stridor. No wheezing or rales. Chest:      Chest wall: No tenderness. Abdominal:      General: Bowel sounds are normal. There is distension. Palpations: Abdomen is soft. There is no mass. Tenderness: There is no abdominal tenderness. There is no guarding or rebound. Hernia: No hernia is present. Musculoskeletal:         General: Swelling present. No tenderness or deformity. Cervical back: Neck supple. Skin:     General: Skin is dry. Coloration: Skin is jaundiced. Skin is not pale. Findings: No erythema or rash. Neurological:      Mental Status: She is alert and oriented to person, place, and time. Cranial Nerves: No cranial nerve deficit. Psychiatric:         Behavior: Behavior normal.         Thought Content: Thought content normal.         Judgment: Judgment normal.       Lab Results   Component Value Date/Time    WBC 12.7 01/11/2023 06:05 AM    HGB 9.3 01/11/2023 01:41 PM    HCT 27.2 01/11/2023 01:41 PM     01/11/2023 06:05 AM    MCV 98.9 01/11/2023 06:05 AM       CT ABDOMEN PELVIS WO CONTRAST Additional Contrast? None    Result Date: 1/11/2023  EXAMINATION: CT OF THE ABDOMEN AND PELVIS WITHOUT CONTRAST 1/10/2023 8:23 pm TECHNIQUE: CT of the abdomen and pelvis was performed without the administration of intravenous contrast. Multiplanar reformatted images are provided for review. Automated exposure control, iterative reconstruction, and/or weight based adjustment of the mA/kV was utilized to reduce the radiation dose to as low as reasonably achievable. COMPARISON: None.  HISTORY: ORDERING SYSTEM PROVIDED HISTORY: paion, distention, ascites, gi bleed TECHNOLOGIST PROVIDED HISTORY: Reason for exam:->paion, distention, ascites, gi bleed Additional Contrast?->None Decision Support Exception - unselect if not a suspected or confirmed emergency medical condition->Emergency Medical Condition (MA) What reading provider will be dictating this exam?->CRC FINDINGS: Lower Chest: Small left pleural effusion. Some increased markings seen at the lung bases bilaterally suggesting possible atelectatic change. Infiltrate cannot be completely excluded. Organs: Diffuse fatty infiltration identified the liver. Cirrhotic morphology identified of the liver. Enlargement identified of the spleen. Pancreas is grossly unremarkable in appearance. Both adrenal glands are within normal limits. The kidneys are unremarkable. The gallbladder is without evidence of stones. No intrahepatic or extrahepatic biliary ductal dilatation. GI/Bowel: The stomach is unremarkable in appearance. No wall thickening. The small bowel is within normal limits. No mucosal abnormality. Some stool seen scattered diffusely throughout the colon. Diverticulosis with no evidence of diverticulitis. No evidence of obvious obstruction. Pelvis: The bladder is mildly distended with no wall thickening. The uterus is unremarkable in appearance. Peritoneum/Retroperitoneum: There is abdominal and pelvic ascites. There is some stranding identified of the mesentery likely related to the ascites. There is no abdominal retroperitoneal lymphadenopathy. No extraluminal air. No pelvic adenopathy. No significant atherosclerotic disease within the abdominal aorta or iliac vessels. Bones/Soft Tissues: Bony structures reveal degenerative changes seen within the spine and pelvis. No ventral abdominal wall mass with small umbilical hernia containing fat only. Minimal anasarca seen within the soft tissues.      Large volume ascites throughout the abdomen and pelvis with cirrhotic morphology of the liver and mild splenomegaly with diffuse fatty infiltration. There is no definite gastric hemorrhage however this is not a multiphase CT bleeding study. Evaluation is limited. Small left pleural effusion with atelectatic change seen in the lung bases bilaterally. XR CHEST PORTABLE    Result Date: 1/10/2023  EXAMINATION: ONE XRAY VIEW OF THE CHEST 1/10/2023 8:53 pm COMPARISON: Chest series from February 5, 2022 HISTORY: ORDERING SYSTEM PROVIDED HISTORY: weakness, fatigue, r/o PNA TECHNOLOGIST PROVIDED HISTORY: Reason for exam:->weakness, fatigue, r/o PNA Is the patient pregnant?->No What reading provider will be dictating this exam?->CRC FINDINGS: Adequate and symmetric aeration of the lungs. Subsegmental opacity in the right lower lung. There are no additional formed consolidations. Subtle blunting of the left costophrenic angle may reflect the presence of a small effusion. No pneumothorax. Trachea and central mainstem bronchi appear clear. The cardiomediastinal silhouette and pulmonary vascularity appear within normal limits. Osseous and thoracic soft tissue structures demonstrate no acute findings. 1.  Subsegmental opacity in the right lower lung. There appears to be blunting the left costophrenic angle which may represent a small effusion. 2.  Normal appearance of the cardiomediastinal silhouette. Normal pulmonary vascularity. RECOMMENDATION: (Recommend upright PA and lateral chest radiographs 10-12 weeks after resolution of patient's symptoms to ensure complete resolution of radiographic findings.)     US RETROPERITONEAL COMPLETE    Result Date: 1/10/2023  EXAMINATION: RETROPERITONEAL ULTRASOUND OF THE KIDNEYS AND URINARY BLADDER 1/10/2023 COMPARISON: CT abdomen and pelvis from September 4, 2022 HISTORY: ORDERING SYSTEM PROVIDED HISTORY: renla failure TECHNOLOGIST PROVIDED HISTORY: Reason for exam:->renla failure What reading provider will be dictating this exam?->CRC FINDINGS: Kidneys:  The right kidney measures 12.6 cm in length and the left kidney measures 12.0 cm in length. Right kidney: The corticomedullary differentiation and cortical thickness is maintained. No renal mass, renal cysts, nor intrarenal calcification. No hydronephrosis. Left kidney: Corticomedullary differentiation and cortical thickness is maintained. There is a 6 mm intrarenal calcification on the left. No suspicious renal mass or renal cyst.  No hydronephrosis. Moderate to large volume simple appearing intra-abdominal ascites. Bladder: Bladder volume was 95 mL. No intrinsic mass, intrinsic calcification, nor wall thickening. 1.  Preserved renal cortical thickness bilaterally. There appears to be in intrarenal calcification on the left. No hydronephrosis seen. 2.  Moderate to large volume simple appearing intra-ascites. 3.  The bladder appears normal. RECOMMENDATIONS: Unavailable        ASSESSMENT ANDPLAN:    ASSESSMENT: Patient with alcoholic liver cirrhosis, now with decompensation, and acute kidney injury. Patient has situation of ascites. PLAN: Therapeutic and diagnostic ultrasound-guided paracentesis. Patient understands the risk of bleeding, infection, and damage to adjacent bowel loops. Patient agrees to proceed.     Ayesha Gamboa MD, Cristy Gomez

## 2023-01-11 NOTE — CARE COORDINATION
[facility] [place] Case Management Initial Discharge Assessment    Met with Patient to discuss discharge plan. PCP: Korey Macedo MD                                Date of Last Visit: \"couple months\"    [facility] Patient: No        [facility] Notified: no    If no PCP, list provided? N/A    Discharge Planning    Living Arrangements: independently at home    Who do you live with? ALONE    Who helps you with your care:  self    If lives at home:     Do you have any barriers navigating in your home? no    Patient can perform ADL? Yes    Current Services (outpatient and in home) :  None    Dialysis: No    Is transportation available to get to your appointments? Yes    DME Equipment:  no    Respiratory equipment: None    Respiratory provider:  no     Pharmacy:  yes [address] with Medication Assistance Program?  No      [place]able [place] [address]? Declined    Patient agreeable to SNF/Rehab? N/A    Other discharge needs identified? N/A    Does Patient Have a High-Risk for Readmission Diagnosis (CHF, PN, MI, COPD)? No    Initial Discharge Plan? (Note: please see concurrent daily documentation for any updates after initial note). HOME, DENIES NEEDS.     Readmission Risk              Risk of Unplanned Readmission:  27         Electronically signed by Erinn Gordillo RN on 1/11/2023 at 8:28 AM

## 2023-01-11 NOTE — OR NURSING
NO SEDATION      Pt arrived to [facility] via stretcher from [facility]. Pt A&Ox4, respirations even and unlabored, abdomen distended and slightly firm, jaundice. U/S image obtained. Pt offered reassurance and VSS. Pt denies pain at this time. Pt informed briefly what to expect during procedure. Dr. Jennifer Arthur notified pt ready. Pt c/o of IV infusing 10meq potassium chloride \"hurting\". Site assessed and f/p; therefore, rate decreased and pt states \"feels better\". Dr. Jennifer Arthur arrived and spoke to pt explaining procedure/risks. Consent obtained. Timeout completed for Ultrasound guided paracentesis. Verbal order from Dr. Jennifer Arthur for diagnostics. Using U/S, Dr. Jennifer Arthur marked LLQ and area cleansed with large tinted chloraprep. Once dry, using sterile technique, Dr. Jennifer Arthur prepped and draped area. Using U/S guidance, Dr. Jennifer Arthur numbed site with 2% lidocaine, see eMar.     Using U/S guidance, access obtained with [id number] centesis 5F catheter with return of fluid that appears clear yellow. ~ 60 ml aspirated for diagnostics. Catheter tubing connected to Kevin machine with suction at 200 mmHG and draining well. Pt tolerating well. VSS. Specimens taken to lab. Pt continues to drain without difficulty. Albumin on MAR ordered by Dr. Pablo Govea for 4WT to administer at 1130, but pt in procedure and pt's two IV's are infusing (one infusing potassium chloride and the other octreotide). Drainage complete. Total 4720 ml removed. Centesis catheter removed and digital pressure held to site for ~3 minutes. LLQ site soft, no drainage, large bandaid applied. VSS. Pt tolerated all well and assessment unchanged except abdomen soft and no longer distended. Pt back in for transport via cart to [facility]. Report called to [address] [place] [address] Marissa Zengia x [phone] on 4WT. Electronically signed by Hiral Walden RN on 1/11/2023 at 12:13 PM

## 2023-01-11 NOTE — PROGRESS NOTES
8034: Assessment complete. Alert and oriented, calm and cooperative. Up with standby. Spoke with specials-- pt to go for paracentesis today. Safety and seizure precautions in place. 1115: Pt off floor to paracentesis. 1230: Pt back from paracentesis-- see note from Mirian in IR. VSS. Bandaid to Q CDI. 1530: Hat in toilet for urine sample needed. Pt aware. 1830: Pt tolerating regular low na diet.      Electronically signed by Karan Ramírez RN on 1/11/2023 at 9:52 AM

## 2023-01-11 NOTE — PROGRESS NOTES
Patient resting in bed. Home meds reconciled. Medicated per MAR. CIWA started. No further needs. Call light in reach. 0600 Patient had a bowel movement. Stool was brown with bright red blood in toilet. Patient states that when she wiped it all came from the rectum.

## 2023-01-11 NOTE — CONSULTS
[address]                      [address], [address] [facility]                                  CONSULTATION    PATIENT NAME: Priscilla Kwon                         :        1985  MED REC NO:   [id number]                            ROOM:       [id number]  ACCOUNT NO:   [de-identified]                           ADMIT DATE: 01/10/2023  PROVIDER:     Erica Cortez DO    CONSULT DATE:  2023    RENAL CONSULTATION    HISTORY OF PRESENT ILLNESS:  A 49-year-old jaundiced female admitted to  the hospital with known alcohol abuse and cirrhosis. She has been  having GI bleeding from the rectum for the past three days of bright  colored stool and no clots. She is uncertain of melena. She has had  previous paracentesis in the past due to ascites. The patient denies  any use of nonsteroidal anti-inflammatory medication or opioids. She  has been on Lasix and spironolactone as an outpatient. Six months ago,  the patient's renal function was normal.  On admission to the hospital,  the patient had a creatinine of 2.2 and GFR of 29 mL per minute. She  was hypokalemia, which has been corrected. She has hyponatremia with a  sodium 131. Her hemoglobin has been stable and today is 10.6 gm. Platelet count is normal.  The patient denies urinary tract infections  chronic NSAID use_____ or kidney stone disease. PAST MEDICAL HISTORY:  Alcohol abuse, cirrhosis. PAST SURGICAL HISTORY:  Paracentesis, tubal ligation, appendectomy. FAMILY HISTORY:  Unable to be obtained from her because of uncertainty. HABITS:  Significant alcohol abuse. MEDICATIONS:  At the time of her admission; Protonix, Zoloft, Flagyl,  Tenormin, Lasix, Chronulac, Remeron, thiamine. ALLERGIES TO MEDICATIONS:  OXYCODONE. REVIEW OF SYSTEMS:  Weakness and rectal discomfort from chronic  diarrhea. PHYSICAL EXAMINATION:  VITAL SIGNS:  Height 5 feet 5 inches, 174 pounds.   Blood pressure  presently is 105/60, heart rate 100, respirations 14, afebrile. HEENT:  Normocephalic. The patient obviously jaundiced. Sclerae are  muddy in color. Throat, no exudates. NECK:  Supple. No JVD or adenopathy. CHEST:  Lungs are relatively clear. No wheezing, rales, or rhonchi. No  accessory muscle use. ABDOMEN:  Tender, distended, suspected ascites. No ecchymosis. EXTREMITIES:  Show the patient to have 1 to 2+ edema of the ankles. Skin is warm. There is no cyanosis. IMPRESSION:  1. WILLIE (normal GFR six months ago), watch for HRS. 2.  GI bleed, possible varices. 3.  Hypotension. 4.  Alcohol abuse. 5.  Low albumin. PLAN:  Maintain fluid balance. Follow BMPs daily. Rodgers catheter to be  placed. R/o UTI and spont peritonitis Colonoscopy and EGD per GI, when required. Octreotide onboard. Spot sodium, urine and albumin. Supplement IV.   Albumin and add 320 Mirian Hess DO    D: 01/11/2023 11:13:03       T: 01/11/2023 11:17:28     [email or web address]  Job#: [id number]     Doc#: [id number]    CC:

## 2023-01-11 NOTE — ED PROVIDER NOTES
[address] [facility] ED  EMERGENCY DEPARTMENT ENCOUNTER      Pt Name: Magda Dacosta  MRN: [id number]  [place] 1985  Date of evaluation: 1/10/2023  Provider: Alena Hoskins MD    CHIEF COMPLAINT       Chief Complaint   Patient presents with    Rectal Bleeding     Pt c/o rectal bleeding x3 days - bright red blood, denies anticoagulation; states that she was advised to come to ED by GI    Abnormal Lab     Had labs done today, abnormal renal fx    Cirrhosis         HISTORY OF PRESENT ILLNESS   (Location/Symptom, Timing/Onset, Context/Setting, Quality, Duration, Modifying Factors, Severity)  Note limiting factors. Magda Dacosta is a 42 y.o. female who presents to the emergency department for evaluation of rectal bleeding. History of depression, alcoholic hepatitis with cirrhosis and ascites and paracentesis on spironolactone and Lasix. History of appendectomy, tubal ligation. patient reports that over the past couple of days she has had increased jaundice, abdominal swelling and general abdominal discomfort. Reports over the past 3 days she had multiple bowel movements with bright red blood per rectum. No fever, chest pain or difficulty breathing, nausea or vomiting, back pain, melena or hematemesis, numbness or focal weakness. Has not taken anything for relief. No anticoagulation use. HPI  Endoscopy 09/22 per chart review showing hypertensive gastropathy, gastritis with no varices or active bleeding  Patient had labs done showing acute kidney injury and chronic elevation of liver enzymes-patient was told to come for admission for GI evaluation  Nursing Notes were reviewed. REVIEW OF SYSTEMS    (2-9 systems for level 4, 10 or more for level 5)     Review of Systems   Constitutional:  Positive for fatigue. Negative for fever. Jaundice   Eyes:  Negative for photophobia and visual disturbance. Respiratory:  Negative for shortness of breath. Cardiovascular:  Negative for chest pain. Gastrointestinal:  Positive for abdominal distention, abdominal pain and blood in stool. Negative for diarrhea and vomiting. Genitourinary:  Negative for dysuria and flank pain. Musculoskeletal:  Negative for neck stiffness. Skin:  Negative for rash. Neurological:  Negative for syncope, weakness, numbness and headaches. Psychiatric/Behavioral:  Negative for confusion. Except as noted above the remainder of the review of systems was reviewed and negative. PAST MEDICAL HISTORY     Past Medical History:   Diagnosis Date    Alcohol abuse     Tobacco dependence          SURGICAL HISTORY       Past Surgical History:   Procedure Laterality Date    APPENDECTOMY      FOOT SURGERY      reports 6 sx on L foot and one on right foot    PARACENTESIS Left 09/13/2022    1510 ml removed per Dr Chavez Horse  specimen obtained    TUBAL LIGATION      UPPER GASTROINTESTINAL ENDOSCOPY N/A 09/09/2022    EGD DIAGNOSTIC ONLY performed by Larry Garcia MD at [address] [place] [address]       Previous Medications    ATENOLOL (TENORMIN) 25 MG TABLET    Take 25 mg by mouth daily    DIAZEPAM (VALIUM) 5 MG TABLET    Take 5 mg by mouth every 6 hours as needed for Anxiety.     FUROSEMIDE (LASIX) 20 MG TABLET    Take 1 tablet by mouth daily    LACTULOSE (CHRONULAC) 10 GM/15ML SOLUTION    Take 30 mLs by mouth 3 times daily    MAGNESIUM OXIDE (MAG-OX) 400 (240 MG) MG TABLET    Take 1 tablet by mouth daily    METRONIDAZOLE (FLAGYL) 500 MG TABLET    Take 500 mg by mouth 3 times daily    MIRTAZAPINE (REMERON) 15 MG TABLET    Take 1 tablet by mouth nightly    MULTIPLE VITAMIN (MULTIVITAMIN) TABS TABLET    Take 1 tablet by mouth daily    NICOTINE (NICODERM CQ) 14 MG/24HR    Place 1 patch onto the skin daily    PANTOPRAZOLE (PROTONIX) 40 MG TABLET    Take 1 tablet by mouth every morning (before breakfast)    SERTRALINE (ZOLOFT) 50 MG TABLET    Take 50 mg by mouth daily    SIMETHICONE (MYLICON) 80 MG CHEWABLE TABLET Take 1 tablet by mouth every 6 hours as needed for Flatulence    SPIRONOLACTONE (ALDACTONE) 50 MG TABLET    Take 1 tablet by mouth daily    THIAMINE 100 MG TABLET    Take 1 tablet by mouth daily       ALLERGIES     Oxycodone-acetaminophen    FAMILY HISTORY       Family History   Problem Relation Age of Onset    High Cholesterol Mother     Hypertension Mother     Melanoma Father     High Cholesterol Father     Hypertension Father     Colon Cancer Neg Hx           SOCIAL HISTORY       Social History     Socioeconomic History    Marital status: Single     Spouse name: None    Number of children: 2    Years of education: None    Highest education level: None   Tobacco Use    Smoking status: Every Day     Packs/day: 1.00     Types: Cigarettes    Smokeless tobacco: Never   Vaping Use    Vaping Use: Never used   Substance and Sexual Activity    Alcohol use: Yes     Comment: 6/9/22: completed 30 day rehab 2 weeks ago, relapse one week ago; unable to quantify EtOH consumption    Drug use: Yes     Types: Marijuana Isabellaashley Kellogg)   Social History Narrative    Lives With: 10 yo daughter, SO sometimes stays with pt, he works odd jobs    Older 24 yo dtr lives with a boyfriend    Type of Home: [place] [facility] [place] [facility] [facility]: Two level, Laundry in basement (flights to and from basement and second floor with HR)    Home Access: Stairs to enter with rails- Number of Steps: 5- Rails: Both    Bathroom Shower/Tub: Tub/Shower unit    Home Equipment:  (no AD)    ADL Assistance: Independent    Homemaking Assistance: Independent    Ambulation Assistance: Independent (no AD)    Transfer Assistance: Independent    Active : Yes    Type of Occupation: unemployed    GED--then some college.            SCREENINGS         Viry Coma Scale  Eye Opening: Spontaneous  Best Verbal Response: Oriented  Best Motor Response: Obeys commands  [place] Coma Scale Score: 15                     [place] Assessment  BP: 99/62  Heart Rate: (!) 106 PHYSICAL EXAM    (up to 7 for level 4, 8 or more for level 5)     ED Triage Vitals [01/10/23 1814]   BP Temp Temp Source Heart Rate Resp SpO2 Height Weight   120/81 98.6 °F (37 °C) Oral (!) 109 20 99 % 5' 5\" (1.651 m) 174 lb (78.9 kg)       Physical Exam  Exam conducted with a chaperone present. Constitutional:       Appearance: She is obese. She is ill-appearing (chronically). She is not toxic-appearing or diaphoretic. HENT:      Head: Normocephalic and atraumatic. Nose: Nose normal.      Mouth/Throat:      Mouth: Mucous membranes are dry. Pharynx: Oropharynx is clear. Eyes:      General: Scleral icterus present. Extraocular Movements: Extraocular movements intact. Pupils: Pupils are equal, round, and reactive to light. Cardiovascular:      Rate and Rhythm: Regular rhythm. Tachycardia present. Pulses: Normal pulses. Heart sounds: No friction rub. Pulmonary:      Effort: Pulmonary effort is normal. No respiratory distress. Breath sounds: Normal breath sounds. No wheezing. Abdominal:      General: There is distension. Tenderness: There is abdominal tenderness (generalized). There is no right CVA tenderness, left CVA tenderness or guarding. Comments: No rigidity   Genitourinary:     Comments: Nonthrombosed external hemorrhoid. No other masses noted. A few specks of red blood noted in stool  Musculoskeletal:         General: No deformity. Cervical back: No rigidity or tenderness. Right lower leg: No edema. Left lower leg: No edema. Skin:     Capillary Refill: Capillary refill takes less than 2 seconds. Coloration: Skin is jaundiced. Findings: No rash. Neurological:      General: No focal deficit present. Mental Status: She is alert and oriented to person, place, and time. Cranial Nerves: No cranial nerve deficit. Motor: No weakness.    Psychiatric:         Mood and Affect: Mood normal.       DIAGNOSTIC RESULTS     EKG: All EKG's are interpreted by the Emergency Department Physician, ME, who either signs or Co-signs this chart in the absence of a cardiologist.    Sinus tachycardia, rate 105, normal axis, , low voltage QRS, poor R wave progression. Baseline artifact present. No STEMI. RADIOLOGY:   Non-plain film images such as CT, Ultrasound and MRI are read by the radiologist. Plain radiographic images are visualized and preliminarily interpreted by the emergency physician with the below findings:    Plain film of the chest with no pneumothorax or infradiaphragmatic free air-my visualization/interpretation    Hepatic cirrhosis and ascites noted. No bowel obstruction or free air. Interpretation per the Radiologist below, if available at the time of this note:    XR CHEST PORTABLE   Final Result   1. Subsegmental opacity in the right lower lung. There appears to be   blunting the left costophrenic angle which may represent a small effusion. 2.  Normal appearance of the cardiomediastinal silhouette. Normal pulmonary   vascularity. RECOMMENDATION:   (Recommend upright PA and lateral chest radiographs 10-12 weeks after   resolution of patient's symptoms to ensure complete resolution of   radiographic findings.)         US RETROPERITONEAL COMPLETE   Final Result   1. Preserved renal cortical thickness bilaterally. There appears to be in   intrarenal calcification on the left. No hydronephrosis seen. 2.  Moderate to large volume simple appearing intra-ascites.       3.  The bladder appears normal.      RECOMMENDATIONS:   Unavailable         CT ABDOMEN PELVIS WO CONTRAST Additional Contrast? None    (Results Pending)         ED BEDSIDE ULTRASOUND:   Performed by ED Physician - none    LABS:  Labs Reviewed   CBC WITH AUTO DIFFERENTIAL - Abnormal; Notable for the following components:       Result Value    WBC 12.1 (*)     RBC 3.03 (*)     Hemoglobin 10.4 (*)     Hematocrit 30.3 (*) MCV 99.9 (*)     MCH 34.2 (*)     RDW 30.0 (*)     Neutrophils Absolute 11.9 (*)     Lymphocytes Absolute 0.0 (*)     All other components within normal limits   COMPREHENSIVE METABOLIC PANEL - Abnormal; Notable for the following components:    Sodium 126 (*)     Potassium 2.6 (*)     Chloride 86 (*)     Glucose 112 (*)     BUN 32 (*)     Creatinine 2.13 (*)     Est, Glom Filt Rate 29.6 (*)     Calcium 8.0 (*)     Total Protein 6.0 (*)     Albumin 2.2 (*)     Total Bilirubin 21.2 (*)     Alkaline Phosphatase 244 (*)     ALT 50 (*)      (*)     Globulin 3.8 (*)     All other components within normal limits    Narrative:     Yelena Juanito tel. [phone],  Chemistry results called to and read back by dr Yolanda Fletcher, 01/10/2023 21:12, by  Dahiana Ahmadi - Abnormal; Notable for the following components:    Protime 23.0 (*)     All other components within normal limits   APTT - Abnormal; Notable for the following components:    aPTT 48.9 (*)     All other components within normal limits   URINALYSIS WITH REFLEX TO CULTURE - Abnormal; Notable for the following components:    Color, UA DARK YELLOW (*)     Clarity, UA CLOUDY (*)     Bilirubin Urine LARGE (*)     Protein, UA TRACE (*)     Nitrite, Urine POSITIVE (*)     Leukocyte Esterase, Urine SMALL (*)     All other components within normal limits   POCT CREATININE - Normal   LIPASE    Narrative:     Yelena Juanito tel. [phone],  Chemistry results called to and read back by dr Yolanda Fletcher, 01/10/2023 21:12, by  Sujata Sanz    Narrative:     Yelena Solomon tel. [phone],  Chemistry results called to and read back by dr Yolanda Fletcher, 01/10/2023 21:12, by  Brittney [facility]       All other labs were within normal range or not returned as of this dictation.     EMERGENCY DEPARTMENT COURSE and DIFFERENTIAL DIAGNOSIS/MDM:   Vitals:    Vitals:    01/10/23 1814 01/10/23 2100   BP: 120/81 99/62 Pulse: (!) 109 (!) 106   Resp: 20 16   Temp: 98.6 °F (37 °C)    TempSrc: Oral    SpO2: 99% 96%   Weight: 174 lb (78.9 kg)    Height: 5' 5\" (1.651 m)          Nonishemic EKG- doubt ACS  Patient's hemoglobin has dropped 1.4 points since her labs done earlier today. Electrolyte derangement, repleted. Chronically elevated liver enzymes consistent with alcoholic hepatitis and cirrhosis. Medical Decision Making  Amount and/or Complexity of Data Reviewed  Labs: ordered. Radiology: ordered. ECG/medicine tests: ordered. Risk  Prescription drug management. Decision regarding hospitalization. Patient presents to the emergency department with known history of cirrhosis, outpatient labs showing acute kidney injury. She was sent here for admission. Repeat labs sent. CT ordered to rule out intra-abdominal surgical pathology. Patient given fluid bolus for tachycardia, Protonix for GI bleed. She has had a relatively recent endoscopy that showed gastritis, I think that is more likely than esophageal varices-no coffee-ground emesis. Spoke with dr Librado Ayon, made aware of pt presentation, requests admission, he will consult for endoscopy, initiate treatment for possible hepatorenal syndrome with octreotide/midodrine    REASSESSMENT      Patient resting comfortably on her phone. Dr Ngozi Jacques accepts admission    CRITICAL CARE TIME   Total Critical Care time was 30 minutes, excluding separately reportable procedures. There was a high probability of clinically significant/life threatening deterioration in the patient's condition which required my urgent intervention. GI bleed requiring intravenous infusion, hydrocodone clinical deterioration, frequent rechecks, subspecialty consultation    CONSULTS:  None    PROCEDURES:  Unless otherwise noted below, none     Procedures        FINAL IMPRESSION      1. WILLIE (acute kidney injury) ([facility] [address]    2. Gastrointestinal hemorrhage, unspecified gastrointestinal hemorrhage type    3. Hypokalemia    4. Hyponatremia    5. Alcoholic hepatitis with ascites    6. Acute cystitis without hematuria          DISPOSITION/PLAN   DISPOSITION Decision To Admit 01/10/2023 07:14:12 PM      PATIENT REFERRED TO:  No follow-up provider specified. DISCHARGE MEDICATIONS:  New Prescriptions    No medications on file     Controlled Substances Monitoring:     RX Monitoring 9/12/2022   Periodic Controlled Substance Monitoring Possible medication side effects, risk of tolerance/dependence & alternative treatments discussed. ;No signs of potential drug abuse or diversion identified. ;Assessed functional status. ;Obtaining appropriate analgesic effect of treatment.        (Please note that portions of this note were completed with a voice recognition program.  Efforts were made to edit the dictations but occasionally words are mis-transcribed.)    Domi Sarah MD (electronically signed)  Attending Emergency Physician            Domi Sarah MD  01/10/23 2137       Amada Jay MD  01/10/23 [id number]

## 2023-01-11 NOTE — CONSULTS
Consults    Patient Name: Louisa Metz Date: 1/10/2023  6:17 PM  MR #: [id number]  : 1985    Attending Physician: Denver Diver, MD  Reason for consult: Cirrhosis with jaundice and ascites    History of Presenting Illness:      Tammy Rivera is a 42 y.o. female on hospital day 1 with a history of cirrhosis admitted with increasing ascites and jaundice and generalized weakness, and has been drinking alcohol and the last alcoholic beverage was about 3 days ago.,  Patient had SBP in the past which was treated with antibiotics, she reports intermittent rectal bleeding. 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 .,  Patient was recently started on diuretics because of ascites.   History Obtained From:  patient      History:      Past Medical History:   Diagnosis Date    Alcohol abuse     Tobacco dependence      Past Surgical History:   Procedure Laterality Date    APPENDECTOMY      FOOT SURGERY      reports 6 sx on L foot and one on right foot    PARACENTESIS Left 2022    1510 ml removed per Dr Oxana Riddle  specimen obtained    PARACENTESIS Left 2023    4720 ml removed by Dr. Oxana Riddle - therapeutic & diagnostic    TUBAL LIGATION      UPPER GASTROINTESTINAL ENDOSCOPY N/A 2022    EGD DIAGNOSTIC ONLY performed by Td Lynch MD at [facility]       Family History  Family History   Problem Relation Age of Onset    High Cholesterol Mother     Hypertension Mother     Melanoma Father     High Cholesterol Father     Hypertension Father     Colon Cancer Neg Hx      [] Unable to obtain due to ventilated and/ or neurologic status    Social History     Socioeconomic History    Marital status: Single     Spouse name: Not on file    Number of children: 2    Years of education: Not on file    Highest education level: Not on file   Occupational History    Not on file   Tobacco Use    Smoking status: Every Day     Packs/day: 1.00 Types: Cigarettes    Smokeless tobacco: Never   Vaping Use    Vaping Use: Never used   Substance and Sexual Activity    Alcohol use: Yes     Comment: 6/9/22: completed 30 day rehab 2 weeks ago, relapse one week ago; unable to quantify EtOH consumption    Drug use: Yes     Types: Marijuana [place])    Sexual activity: Not on file   Other Topics Concern    Not on file   Social History Narrative    Lives With: 10 yo daughter, SO sometimes stays with pt, he works odd jobs    Older 24 yo dtr lives with a boyfriend    Type of Home: House [place] [facility] [facility]: Two level, Laundry in basement (flights to and from basement and second floor with HR)    Home Access: Stairs to enter with rails- Number of Steps: 5- Rails: Both    Bathroom Shower/Tub: Tub/Shower unit    Home Equipment:  (no AD)    ADL Assistance: Independent    Homemaking Assistance: Independent    Ambulation Assistance: Independent (no AD)    Transfer Assistance: Independent    Active : Yes    Type of Occupation: unemployed    GED--then some college. Social Determinants of Health     Financial Resource Strain: Not on file   Food Insecurity: Not on file   Transportation Needs: Not on file   Physical Activity: Not on file   Stress: Not on file   Social Connections: Not on file   Intimate Partner Violence: Not on file   Housing Stability: Not on file      [] Unable to obtain due to ventilated and/ or neurologic status      Home Medications:      Medications Prior to Admission: pantoprazole (PROTONIX) 40 MG tablet, Take 1 tablet by mouth every morning (before breakfast)  sertraline (ZOLOFT) 50 MG tablet, Take 50 mg by mouth daily (Patient not taking: Reported on 1/10/2023)  diazePAM (VALIUM) 5 MG tablet, Take 5 mg by mouth every 6 hours as needed for Anxiety.  (Patient not taking: Reported on 1/10/2023)  metroNIDAZOLE (FLAGYL) 500 MG tablet, Take 500 mg by mouth 3 times daily (Patient not taking: Reported on 1/10/2023)  atenolol (TENORMIN) 25 MG tablet, Take 25 mg by mouth daily (Patient not taking: Reported on 1/10/2023)  spironolactone (ALDACTONE) 50 MG tablet, Take 1 tablet by mouth daily  furosemide (LASIX) 20 MG tablet, Take 1 tablet by mouth daily  nicotine (NICODERM CQ) 14 MG/24HR, Place 1 patch onto the skin daily (Patient not taking: Reported on 1/10/2023)  lactulose (CHRONULAC) 10 GM/15ML solution, Take 30 mLs by mouth 3 times daily (Patient not taking: No sig reported)  magnesium oxide (MAG-OX) 400 (240 Mg) MG tablet, Take 1 tablet by mouth daily (Patient not taking: Reported on 1/10/2023)  simethicone (MYLICON) 80 MG chewable tablet, Take 1 tablet by mouth every 6 hours as needed for Flatulence (Patient not taking: Reported on 1/10/2023)  thiamine 100 MG tablet, Take 1 tablet by mouth daily (Patient not taking: No sig reported)  mirtazapine (REMERON) 15 MG tablet, Take 1 tablet by mouth nightly (Patient not taking: Reported on 1/10/2023)  Multiple Vitamin (MULTIVITAMIN) TABS tablet, Take 1 tablet by mouth daily (Patient not taking: No sig reported)    Current Hospital Medications:     Scheduled Meds:   sodium chloride flush  5-40 mL IntraVENous 2 times per day    thiamine  100 mg Oral Daily    midodrine  5 mg Oral TID WC    lactulose  20 g Oral BID    midodrine  7.5 mg Oral Once    pantoprazole (PROTONIX) 40 mg injection  80 mg IntraVENous Daily    sodium chloride flush  5-40 mL IntraVENous 2 times per day    lidocaine  1 patch TransDERmal Daily     Continuous Infusions:   sodium chloride      octreotide (SandoSTATIN) infusion 50 mcg/hr (01/11/23 1038)    sodium chloride       PRN Meds:.sodium chloride flush, sodium chloride, LORazepam **OR** LORazepam **OR** LORazepam **OR** LORazepam **OR** LORazepam **OR** LORazepam **OR** LORazepam **OR** LORazepam, sodium chloride flush, sodium chloride, ondansetron **OR** ondansetron, polyethylene glycol  .   sodium chloride      octreotide (SandoSTATIN) infusion 50 mcg/hr (01/11/23 1038) sodium chloride          Allergies: Allergies   Allergen Reactions    Oxycodone-Acetaminophen Itching     Itching and nausea          Review of Systems:       [] CV, Resp, Neuro, , and all other systems reviewed and negative other than listed in HPI. [] Unable to obtain due to ventilated and/ or neurologic status      Objective Findings:     Vitals:   Vitals:    01/11/23 1121 01/11/23 1137 01/11/23 1212 01/11/23 1233   BP: 114/60 (!) 112/55 (!) 100/56 (!) 104/58   Pulse: (!) 104 (!) 102 99 (!) 101   Resp: 18 16 16 16   Temp:    98.1 °F (36.7 °C)   TempSrc:    Oral   SpO2: 97% 97% 97% 97%   Weight:       Height:            Physical Examination:  General: In mild abdominal discomfort  HEENT: Normocephalic, scleral icterus deeply jaundiced  Neck: No jugular venous distention. Heart: Regular, no murmur, no rub/gallop. No right ventricular heave. Lungs: Clear to ascultation, no rales/wheezing/rhonchi. Good chest wall excursion. Abdomen: Distended, mild diffuse tenderness noted no palpable mass bowel sounds are hypoactive, patient had paracentesis today. Extremities: No clubbing/cyanosis, no edema.    Skin: Multiple spider angiomatous  Neuro: Mild asterixis  Psych: Normal affect    Results/ Medications reviewed 1/11/2023, 5:21 PM     Laboratory, Microbiology, Pathology, Radiology, Cardiology, Medications and Transcriptions reviewed  Scheduled Meds:   sodium chloride flush  5-40 mL IntraVENous 2 times per day    thiamine  100 mg Oral Daily    midodrine  5 mg Oral TID WC    lactulose  20 g Oral BID    midodrine  7.5 mg Oral Once    pantoprazole (PROTONIX) 40 mg injection  80 mg IntraVENous Daily    sodium chloride flush  5-40 mL IntraVENous 2 times per day    lidocaine  1 patch TransDERmal Daily     Continuous Infusions:   sodium chloride      octreotide (SandoSTATIN) infusion 50 mcg/hr (01/11/23 1038)    sodium chloride         Recent Labs     01/10/23  1127 01/10/23  1930 01/11/23  0605 01/11/23  1341 WBC 13.5* 12.1* 12.7*  --    HGB 11.8* 10.4* 10.6* 9.3*   HCT 34.2* 30.3* 30.9* 27.2*   MCV 98.4* 99.9* 98.9*  --     144 149  --      Recent Labs     01/10/23  1127 01/10/23  1930 01/11/23  0605   * 126* 131*   K 3.1* 2.6* 4.2   CL 86* 86* 92*   CO2 25 28 26   BUN 31* 32* 32*   CREATININE 2.17* 2.13* 2.32*     Recent Labs     01/10/23  1127 01/10/23  1930 01/11/23  0605   * 174* 179*   ALT 57* 50* 49*   BILITOT 23.6* 21.2* 20.0*   ALKPHOS 296* 244* 236*     Recent Labs     01/10/23  1930   LIPASE 60     Recent Labs     01/10/23  1127 01/10/23  1930 01/11/23  0605   PROT 6.9 6.0* 5.8*   INR 1.8 2.0  --      CT ABDOMEN PELVIS WO CONTRAST Additional Contrast? None    Result Date: 1/11/2023  EXAMINATION: CT OF THE ABDOMEN AND PELVIS WITHOUT CONTRAST 1/10/2023 8:23 pm TECHNIQUE: CT of the abdomen and pelvis was performed without the administration of intravenous contrast. Multiplanar reformatted images are provided for review. Automated exposure control, iterative reconstruction, and/or weight based adjustment of the mA/kV was utilized to reduce the radiation dose to as low as reasonably achievable. COMPARISON: None. HISTORY: ORDERING SYSTEM PROVIDED HISTORY: paion, distention, ascites, gi bleed TECHNOLOGIST PROVIDED HISTORY: Reason for exam:->paion, distention, ascites, gi bleed Additional Contrast?->None Decision Support Exception - unselect if not a suspected or confirmed emergency medical condition->Emergency Medical Condition (MA) What reading provider will be dictating this exam?->CRC FINDINGS: Lower Chest: Small left pleural effusion. Some increased markings seen at the lung bases bilaterally suggesting possible atelectatic change. Infiltrate cannot be completely excluded. Organs: Diffuse fatty infiltration identified the liver. Cirrhotic morphology identified of the liver. Enlargement identified of the spleen. Pancreas is grossly unremarkable in appearance.   Both adrenal glands are within normal limits. The kidneys are unremarkable. The gallbladder is without evidence of stones. No intrahepatic or extrahepatic biliary ductal dilatation. GI/Bowel: The stomach is unremarkable in appearance. No wall thickening. The small bowel is within normal limits. No mucosal abnormality. Some stool seen scattered diffusely throughout the colon. Diverticulosis with no evidence of diverticulitis. No evidence of obvious obstruction. Pelvis: The bladder is mildly distended with no wall thickening. The uterus is unremarkable in appearance. Peritoneum/Retroperitoneum: There is abdominal and pelvic ascites. There is some stranding identified of the mesentery likely related to the ascites. There is no abdominal retroperitoneal lymphadenopathy. No extraluminal air. No pelvic adenopathy. No significant atherosclerotic disease within the abdominal aorta or iliac vessels. Bones/Soft Tissues: Bony structures reveal degenerative changes seen within the spine and pelvis. No ventral abdominal wall mass with small umbilical hernia containing fat only. Minimal anasarca seen within the soft tissues. Large volume ascites throughout the abdomen and pelvis with cirrhotic morphology of the liver and mild splenomegaly with diffuse fatty infiltration. There is no definite gastric hemorrhage however this is not a multiphase CT bleeding study. Evaluation is limited. Small left pleural effusion with atelectatic change seen in the lung bases bilaterally. XR CHEST PORTABLE    Result Date: 1/10/2023  EXAMINATION: ONE XRAY VIEW OF THE CHEST 1/10/2023 8:53 pm COMPARISON: Chest series from February 5, 2022 HISTORY: ORDERING SYSTEM PROVIDED HISTORY: weakness, fatigue, r/o PNA TECHNOLOGIST PROVIDED HISTORY: Reason for exam:->weakness, fatigue, r/o PNA Is the patient pregnant?->No What reading provider will be dictating this exam?->CRC FINDINGS: Adequate and symmetric aeration of the lungs.   Subsegmental opacity in the right lower lung. There are no additional formed consolidations. Subtle blunting of the left costophrenic angle may reflect the presence of a small effusion. No pneumothorax. Trachea and central mainstem bronchi appear clear. The cardiomediastinal silhouette and pulmonary vascularity appear within normal limits. Osseous and thoracic soft tissue structures demonstrate no acute findings. 1.  Subsegmental opacity in the right lower lung. There appears to be blunting the left costophrenic angle which may represent a small effusion. 2.  Normal appearance of the cardiomediastinal silhouette. Normal pulmonary vascularity. RECOMMENDATION: (Recommend upright PA and lateral chest radiographs 10-12 weeks after resolution of patient's symptoms to ensure complete resolution of radiographic findings.)     IR US GUIDED PARACENTESIS    1. Status post technically successful ultrasound-guided paracentesis. Vida Kirkland is a Female of 42 years age, referred for Ultrasound Guided Paracentesis. PROCEDURE: Survey of the abdomen showed large amount of ascites fluid. After obtaining informed consent, the patient was positioned supine on the sonography table. Using ultrasound, the skin over the left hemiabdomen was locally anesthetized with 1% lidocaine. Following that, a Yueh needle was advanced into the fluid pocket using ultrasound visualization. 4720cc, of clear yellow fluid were aspirated and sent for cytology, and pathology. The needle was removed, and hemostasis was obtained with pressure. A Band-Aid was placed. Post procedure images did not demonstrate hemorrhage at the target site. The patient tolerated the procedure well. The patient left the department in good condition. A radiology nurse was in presence monitoring vital signs, assisting throughout the procedure.      US RETROPERITONEAL COMPLETE    Result Date: 1/10/2023  EXAMINATION: RETROPERITONEAL ULTRASOUND OF THE KIDNEYS AND URINARY BLADDER 1/10/2023 COMPARISON: CT abdomen and pelvis from September 4, 2022 HISTORY: ORDERING SYSTEM PROVIDED HISTORY: renla failure TECHNOLOGIST PROVIDED HISTORY: Reason for exam:->renla failure What reading provider will be dictating this exam?->CRC FINDINGS: Kidneys: The right kidney measures 12.6 cm in length and the left kidney measures 12.0 cm in length. Right kidney: The corticomedullary differentiation and cortical thickness is maintained. No renal mass, renal cysts, nor intrarenal calcification. No hydronephrosis. Left kidney: Corticomedullary differentiation and cortical thickness is maintained. There is a 6 mm intrarenal calcification on the left. No suspicious renal mass or renal cyst.  No hydronephrosis. Moderate to large volume simple appearing intra-abdominal ascites. Bladder: Bladder volume was 95 mL. No intrinsic mass, intrinsic calcification, nor wall thickening. 1.  Preserved renal cortical thickness bilaterally. There appears to be in intrarenal calcification on the left. No hydronephrosis seen. 2.  Moderate to large volume simple appearing intra-ascites. 3.  The bladder appears normal. RECOMMENDATIONS: Unavailable        Impression:   75-year-old female admitted with increasing ascites jaundice and intermittent rectal bleeding. Patient has history of cirrhosis secondary to alcohol abuse. Patient has been drinking until few days ago she also has possibly hepatorenal syndrome. .  It seems patient has alcoholic hepatitis superimposed on cirrhosis. Her MELD score is 35 and Madrey's discriminant function is 57.3, patient had SBP in the past.  Plan:   WILL wait for peritoneal fluid studies, continue lactulose and titrate the dose to 2-3 bowel movements a day, because of renal insufficiency will hold off diuretics, patient would need  colonoscopy when she is more stable. We will put the patient on a low-sodium diet. Patient is on IV albumin and octreotide, and midodrine  Comments:      Thank you for allowing us to participate in the care of this patient. Will continue to follow. Please call if questions or concerns arise.     Electronically signed by Marisel Hilliard MD on 1/11/2023 at 5:21 PM

## 2023-01-11 NOTE — ED NOTES
Report called to [facility] RN, pt notified of room number, voices no needs at this time     Suhas Gauthier, [facility]  01/10/23 1488

## 2023-01-11 NOTE — ED NOTES
Report received, [facility] in CT at this time [facility]  01/10/23 2034

## 2023-01-11 NOTE — PLAN OF CARE
Problem: Discharge Planning  Goal: Discharge to home or other facility with appropriate resources  Outcome: Progressing  Flowsheets (Taken 1/10/2023 0037 by Lashell Howe RN)  Discharge to home or other facility with appropriate resources:   Identify barriers to discharge with patient and caregiver   Identify discharge learning needs (meds, wound care, etc)   Refer to discharge planning if patient needs post-hospital services based on physician order or complex needs related to functional status, cognitive ability or social support system   Arrange for needed discharge resources and transportation as appropriate     Problem: Pain  Goal: Verbalizes/displays adequate comfort level or baseline comfort level  Outcome: Progressing     Problem: Safety - Adult  Goal: Free from fall injury  Outcome: Progressing

## 2023-01-11 NOTE — H&P
Hospitalist Group   History and Physical      CHIEF COMPLAINT: GI bleed, abdominal distention and abnormal labs    History of Present Illness:  42 y.o. female with a history of alcohol abuse, alcoholic cirrhosis presents with GI bleed, abdominal distention, abnormal labs. For the past 3 days patient has been having lower GI bleed. She described as bright color red with no clots. She also has been complaining of abdominal pain and distention. She said that she had paracentesis done in October and her abdomen has been getting more distended since. She has been having difficulty urinating but she is on Lasix and spironolactone. Patient has been getting more jaundiced. She had lab work done and showed total bilirubin over 23 and WILLIE. Therefore, she was sent to the ER. Patient denies shortness of breath, cough, fever. No nausea or vomiting. She is complaining of epigastric burning sensation. Patient said that she slow down the amount of alcohol that she drinks but she had a drink couple of days ago. She said that she is drinking vodka. REVIEW OF SYSTEMS:  no fevers, chills, cp, sob, n/v, ha, vision/hearing changes, wt changes, hot/cold flashes, other open skin lesions, diarrhea, constipation, dysuria/hematuria unless noted in HPI. Complete ROS performed with the patient and is otherwise negative. PMH:  Past Medical History:   Diagnosis Date    Alcohol abuse     Tobacco dependence        Surgical History:  Past Surgical History:   Procedure Laterality Date    APPENDECTOMY      FOOT SURGERY      reports 6 sx on L foot and one on right foot    PARACENTESIS Left 09/13/2022    1510 ml removed per Dr Yonatan Pena  specimen obtained    TUBAL LIGATION      UPPER GASTROINTESTINAL ENDOSCOPY N/A 09/09/2022    EGD DIAGNOSTIC ONLY performed by Vida Kirkland MD at [facility]       Medications Prior to Admission:    Prior to Admission medications    Medication Sig Start Date End Date Taking?  Authorizing Provider   pantoprazole (PROTONIX) 40 MG tablet Take 1 tablet by mouth every morning (before breakfast) 1/10/23   SANTA Vu CNP   sertraline (ZOLOFT) 50 MG tablet Take 50 mg by mouth daily  Patient not taking: Reported on 1/10/2023    Historical Provider, MD   diazePAM (VALIUM) 5 MG tablet Take 5 mg by mouth every 6 hours as needed for Anxiety.   Patient not taking: Reported on 1/10/2023    Historical Provider, MD   metroNIDAZOLE (FLAGYL) 500 MG tablet Take 500 mg by mouth 3 times daily  Patient not taking: Reported on 1/10/2023    Historical Provider, MD   atenolol (TENORMIN) 25 MG tablet Take 25 mg by mouth daily  Patient not taking: Reported on 1/10/2023    Historical Provider, MD   spironolactone (ALDACTONE) 50 MG tablet Take 1 tablet by mouth daily 12/13/22   Ross Mustafa MD   furosemide (LASIX) 20 MG tablet Take 1 tablet by mouth daily 12/13/22   Ross Mustafa MD   nicotine (NICODERM CQ) 14 MG/24HR Place 1 patch onto the skin daily  Patient not taking: Reported on 1/10/2023 9/23/22   Lucrecia Torres DO   lactulose (CHRONULAC) 10 GM/15ML solution Take 30 mLs by mouth 3 times daily  Patient not taking: No sig reported 9/22/22   SANTA Panda NP   magnesium oxide (MAG-OX) 400 (240 Mg) MG tablet Take 1 tablet by mouth daily  Patient not taking: Reported on 1/10/2023 9/23/22   SANTA Panda NP   simethicone (MYLICON) 80 MG chewable tablet Take 1 tablet by mouth every 6 hours as needed for Flatulence  Patient not taking: Reported on 1/10/2023 9/22/22   SANTA Panda NP   thiamine 100 MG tablet Take 1 tablet by mouth daily  Patient not taking: No sig reported 9/23/22   SANTA Panda NP   mirtazapine (REMERON) 15 MG tablet Take 1 tablet by mouth nightly  Patient not taking: Reported on 1/10/2023 6/13/22   Marta Jefferson MD   Multiple Vitamin (MULTIVITAMIN) TABS tablet Take 1 tablet by mouth daily  Patient not taking: No sig reported 6/14/22   Lulu Avila Rosales Cook MD       Allergies:    Oxycodone-acetaminophen    Social History:    reports that she has been smoking cigarettes. She has been smoking an average of 1 pack per day. She has never used smokeless tobacco. She reports current alcohol use. She reports current drug use. Drug: Marijuana [employer]).     Family History:        Problem Relation Age of Onset    High Cholesterol Mother     Hypertension Mother     Melanoma Father     High Cholesterol Father     Hypertension Father     Colon Cancer Neg Hx        PHYSICAL EXAM:  Vitals:  /71   Pulse (!) 107   Temp 98.4 °F (36.9 °C) (Oral)   Resp 20   Ht 5' 5\" (1.651 m)   Wt 174 lb (78.9 kg)   SpO2 96%   BMI 28.96 kg/m²   General Appearance: alert and oriented to person, place and time, well developed and well- nourished, in no acute distress  Skin: warm and dry, jaundice  Head: normocephalic and atraumatic  Eyes: pupils equal, round, and reactive to light, extraocular eye movements intact, icterus  ENT: tympanic membrane, external ear and ear canal normal bilaterally, nose without deformity, nasal mucosa and turbinates normal without polyps  Neck: supple and non-tender without mass, no thyromegaly or thyroid nodules, no cervical lymphadenopathy  Pulmonary/Chest: clear to auscultation bilaterally- no wheezes   Cardiovascular: normal rate, regular rhythm, normal S1 and S2, no murmurs, 1+ pitting edema of the lower extremities bilaterally  Abdomen: soft, generalized tenderness, abdominal distention noted, normal bowel sounds, no masses or organomegaly  Extremities: no cyanosis, clubbing   Musculoskeletal: normal range of motion, no joint swelling, deformity or tenderness  Neurologic: reflexes normal and symmetric, no cranial nerve deficit     LABS:  Recent Labs     01/10/23  1127 01/10/23  1930   * 126*   K 3.1* 2.6*   CL 86* 86*   CO2 25 28   BUN 31* 32*   CREATININE 2.17* 2.13*   GLUCOSE 107* 112*   CALCIUM 8.4* 8.0*       Recent Labs     01/10/23  1127 01/10/23  1930   WBC 13.5* 12.1*   RBC 3.48* 3.03*   HGB 11.8* 10.4*   HCT 34.2* 30.3*   MCV 98.4* 99.9*   MCH 33.9* 34.2*   MCHC 34.4 34.2   RDW 30.1* 30.0*    144       No results for input(s): POCGLU in the last 72 hours.     CBC with Differential:    Lab Results   Component Value Date/Time    WBC 12.1 01/10/2023 07:30 PM    RBC 3.03 01/10/2023 07:30 PM    HGB 10.4 01/10/2023 07:30 PM    HCT 30.3 01/10/2023 07:30 PM     01/10/2023 07:30 PM    MCV 99.9 01/10/2023 07:30 PM    MCH 34.2 01/10/2023 07:30 PM    MCHC 34.2 01/10/2023 07:30 PM    RDW 30.0 01/10/2023 07:30 PM    NRBC 1 01/10/2023 07:30 PM    BANDSPCT 1 09/15/2022 04:54 AM    METASPCT 1 09/09/2022 05:42 AM    LYMPHOPCT 11.1 01/10/2023 07:30 PM    MONOPCT 1.9 01/10/2023 07:30 PM    MYELOPCT 1 09/09/2022 05:42 AM    BASOPCT 0.4 01/10/2023 07:30 PM    MONOSABS 0.2 01/10/2023 07:30 PM    LYMPHSABS 0.0 01/10/2023 07:30 PM    EOSABS 0.0 01/10/2023 07:30 PM    BASOSABS 0.0 01/10/2023 07:30 PM     CMP:    Lab Results   Component Value Date/Time     01/10/2023 07:30 PM    K 2.6 01/10/2023 07:30 PM    K 3.7 09/17/2022 05:38 AM    CL 86 01/10/2023 07:30 PM    CO2 28 01/10/2023 07:30 PM    BUN 32 01/10/2023 07:30 PM    CREATININE 2.13 01/10/2023 07:30 PM    GFRAA >60.0 10/05/2022 04:52 PM    LABGLOM 29.6 01/10/2023 07:30 PM    GLUCOSE 112 01/10/2023 07:30 PM    PROT 6.0 01/10/2023 07:30 PM    LABALBU 2.2 01/10/2023 07:30 PM    CALCIUM 8.0 01/10/2023 07:30 PM    BILITOT 21.2 01/10/2023 07:30 PM    ALKPHOS 244 01/10/2023 07:30 PM     01/10/2023 07:30 PM    ALT 50 01/10/2023 07:30 PM       Radiology: XR CHEST PORTABLE    Result Date: 1/10/2023  EXAMINATION: ONE XRAY VIEW OF THE CHEST 1/10/2023 8:53 pm COMPARISON: Chest series from February 5, 2022 HISTORY: ORDERING SYSTEM PROVIDED HISTORY: weakness, fatigue, r/o PNA TECHNOLOGIST PROVIDED HISTORY: Reason for exam:->weakness, fatigue, r/o PNA Is the patient pregnant?->No What reading provider will be dictating this exam?->CRC FINDINGS: Adequate and symmetric aeration of the lungs. Subsegmental opacity in the right lower lung. There are no additional formed consolidations. Subtle blunting of the left costophrenic angle may reflect the presence of a small effusion. No pneumothorax. Trachea and central mainstem bronchi appear clear. The cardiomediastinal silhouette and pulmonary vascularity appear within normal limits. Osseous and thoracic soft tissue structures demonstrate no acute findings. 1.  Subsegmental opacity in the right lower lung. There appears to be blunting the left costophrenic angle which may represent a small effusion. 2.  Normal appearance of the cardiomediastinal silhouette. Normal pulmonary vascularity. RECOMMENDATION: (Recommend upright PA and lateral chest radiographs 10-12 weeks after resolution of patient's symptoms to ensure complete resolution of radiographic findings.)     US RETROPERITONEAL COMPLETE    Result Date: 1/10/2023  EXAMINATION: RETROPERITONEAL ULTRASOUND OF THE KIDNEYS AND URINARY BLADDER 1/10/2023 COMPARISON: CT abdomen and pelvis from September 4, 2022 HISTORY: ORDERING SYSTEM PROVIDED HISTORY: renla failure TECHNOLOGIST PROVIDED HISTORY: Reason for exam:->renla failure What reading provider will be dictating this exam?->CRC FINDINGS: Kidneys: The right kidney measures 12.6 cm in length and the left kidney measures 12.0 cm in length. Right kidney: The corticomedullary differentiation and cortical thickness is maintained. No renal mass, renal cysts, nor intrarenal calcification. No hydronephrosis. Left kidney: Corticomedullary differentiation and cortical thickness is maintained. There is a 6 mm intrarenal calcification on the left. No suspicious renal mass or renal cyst.  No hydronephrosis. Moderate to large volume simple appearing intra-abdominal ascites. Bladder: Bladder volume was 95 mL. No intrinsic mass, intrinsic calcification, nor wall thickening. 1.  Preserved renal cortical thickness bilaterally. There appears to be in intrarenal calcification on the left. No hydronephrosis seen. 2.  Moderate to large volume simple appearing intra-ascites. 3.  The bladder appears normal. RECOMMENDATIONS: Unavailable       ASSESSMENT/ PLAN[de-identified]      Principal Problem:    GI bleed  Resolved Problems:    * No resolved hospital problems. *      GI bleed   Multiple episodes of lower GI bleed for the past 3 days   Previous EGD did not show varices but showed gastritis   Fresh blood no clots per patient-likely lower GI bleed   Hemoglobin 10.4 which is down from 11.8 earlier in the day   Continue H&H monitoring   Transfuse if needed   Protonix, octreotide   GI consult  WILLIE   Creatinine 2.13 with normal baseline   She has been on Lasix and spironolactone   Likely due to medication versus significant ascites versus hepatorenal   Hold nephrotoxic medication   Plan for paracentesis   Monitor vitals and consult nephrology  Abdominal ascites   Due to alcoholic cirrhosis   Large amount of ascites seen in the ultrasound   Consult IR for paracentesis  Hyperbilirubinemia   Bilirubin 23.6 and 21.2 on repeat   Will check direct and indirect   GI consulted-follow their plan  Alcohol abuse   Denied risk of withdrawal but patient still drinking alcohol    Keep on CIWA protocol  Hypokalemia   Replace and monitor  Hyponatremia   Fluid restriction and monitor closely   Nephrology consulted    Code Status: Full  DVT prophylaxis: PCD    Electronically signed by Aleksandra Cano MD on 1/10/2023 at 11:52 PM      NOTE: This report was transcribed using voice recognition software. Every effort was made to ensure accuracy; however, inadvertent computerized transcription errors may be present.

## 2023-01-12 LAB
ALBUMIN FLUID: 0.4 G/DL
ALBUMIN SERPL-MCNC: 2.2 G/DL (ref 3.5–4.6)
ALP BLD-CCNC: 166 U/L (ref 40–130)
ALT SERPL-CCNC: 34 U/L (ref 0–33)
AMYLASE FLUID: 19 U/L
ANION GAP SERPL CALCULATED.3IONS-SCNC: 11 MEQ/L (ref 9–15)
ANISOCYTOSIS: ABNORMAL
AST SERPL-CCNC: 123 U/L (ref 0–35)
BASOPHILS ABSOLUTE: 0.1 K/UL (ref 0–0.2)
BASOPHILS RELATIVE PERCENT: 0.7 %
BILIRUB SERPL-MCNC: 20.6 MG/DL (ref 0.2–0.7)
BUN BLDV-MCNC: 35 MG/DL (ref 6–20)
CALCIUM SERPL-MCNC: 7.9 MG/DL (ref 8.5–9.9)
CHLORIDE BLD-SCNC: 96 MEQ/L (ref 95–107)
CO2: 24 MEQ/L (ref 20–31)
CREAT SERPL-MCNC: 2.59 MG/DL (ref 0.5–0.9)
EOSINOPHILS ABSOLUTE: 0.1 K/UL (ref 0–0.7)
EOSINOPHILS RELATIVE PERCENT: 1 %
GFR SERPL CREATININE-BSD FRML MDRD: 23.4 ML/MIN/{1.73_M2}
GLOBULIN: 2.8 G/DL (ref 2.3–3.5)
GLUCOSE BLD-MCNC: 147 MG/DL (ref 70–99)
HCT VFR BLD CALC: 24.4 % (ref 37–47)
HCT VFR BLD CALC: 24.8 % (ref 37–47)
HCT VFR BLD CALC: 25.8 % (ref 37–47)
HCT VFR BLD CALC: 27.1 % (ref 37–47)
HEMOGLOBIN: 8.4 G/DL (ref 12–16)
HEMOGLOBIN: 8.6 G/DL (ref 12–16)
HEMOGLOBIN: 8.7 G/DL (ref 12–16)
HEMOGLOBIN: 9.3 G/DL (ref 12–16)
HYPOCHROMIA: ABNORMAL
IRON SATURATION: ABNORMAL % (ref 20–55)
IRON: 81 UG/DL (ref 37–145)
LACTATE DEHYDROGENASE, FLUID: 60 U/L
LYMPHOCYTES ABSOLUTE: 1.8 K/UL (ref 1–4.8)
LYMPHOCYTES RELATIVE PERCENT: 17.1 %
MACROCYTES: ABNORMAL
MCH RBC QN AUTO: 34.8 PG (ref 27–31.3)
MCHC RBC AUTO-ENTMCNC: 34.5 % (ref 33–37)
MCV RBC AUTO: 101.1 FL (ref 79.4–94.8)
MONOCYTES ABSOLUTE: 0.9 K/UL (ref 0.2–0.8)
MONOCYTES RELATIVE PERCENT: 8.1 %
NEUTROPHILS ABSOLUTE: 7.8 K/UL (ref 1.4–6.5)
NEUTROPHILS RELATIVE PERCENT: 73.1 %
PATH CONSULT FLUID: NORMAL
PDW BLD-RTO: 29.6 % (ref 11.5–14.5)
PLATELET # BLD: 118 K/UL (ref 130–400)
PLATELET SLIDE REVIEW: ABNORMAL
POLYCHROMASIA: ABNORMAL
POTASSIUM REFLEX MAGNESIUM: 3.9 MEQ/L (ref 3.4–4.9)
RBC # BLD: 2.42 M/UL (ref 4.2–5.4)
SODIUM BLD-SCNC: 131 MEQ/L (ref 135–144)
SPECIMEN TYPE: NORMAL
TARGET CELLS: ABNORMAL
TOTAL IRON BINDING CAPACITY: ABNORMAL UG/DL (ref 250–450)
TOTAL PROTEIN: 5 G/DL (ref 6.3–8)
UNSATURATED IRON BINDING CAPACITY: <17 UG/DL (ref 112–347)
WBC # BLD: 10.6 K/UL (ref 4.8–10.8)

## 2023-01-12 PROCEDURE — 36415 COLL VENOUS BLD VENIPUNCTURE: CPT

## 2023-01-12 PROCEDURE — 6360000002 HC RX W HCPCS: Performed by: EMERGENCY MEDICINE

## 2023-01-12 PROCEDURE — 80053 COMPREHEN METABOLIC PANEL: CPT

## 2023-01-12 PROCEDURE — 6370000000 HC RX 637 (ALT 250 FOR IP): Performed by: SPECIALIST

## 2023-01-12 PROCEDURE — 6370000000 HC RX 637 (ALT 250 FOR IP): Performed by: INTERNAL MEDICINE

## 2023-01-12 PROCEDURE — 2580000003 HC RX 258: Performed by: EMERGENCY MEDICINE

## 2023-01-12 PROCEDURE — C9113 INJ PANTOPRAZOLE SODIUM, VIA: HCPCS | Performed by: EMERGENCY MEDICINE

## 2023-01-12 PROCEDURE — A4216 STERILE WATER/SALINE, 10 ML: HCPCS | Performed by: EMERGENCY MEDICINE

## 2023-01-12 PROCEDURE — 85025 COMPLETE CBC W/AUTO DIFF WBC: CPT

## 2023-01-12 PROCEDURE — 85018 HEMOGLOBIN: CPT

## 2023-01-12 PROCEDURE — 85014 HEMATOCRIT: CPT

## 2023-01-12 PROCEDURE — 1210000000 HC MED SURG R&B

## 2023-01-12 PROCEDURE — 2580000003 HC RX 258: Performed by: INTERNAL MEDICINE

## 2023-01-12 RX ORDER — MIDODRINE HYDROCHLORIDE 5 MG/1
10 TABLET ORAL
Status: DISCONTINUED | OUTPATIENT
Start: 2023-01-12 | End: 2023-01-12

## 2023-01-12 RX ORDER — SODIUM CHLORIDE 9 MG/ML
INJECTION, SOLUTION INTRAVENOUS CONTINUOUS
Status: DISCONTINUED | OUTPATIENT
Start: 2023-01-12 | End: 2023-01-14

## 2023-01-12 RX ORDER — MORPHINE SULFATE 2 MG/ML
1 INJECTION, SOLUTION INTRAMUSCULAR; INTRAVENOUS ONCE
Status: COMPLETED | OUTPATIENT
Start: 2023-01-12 | End: 2023-01-13

## 2023-01-12 RX ORDER — MIDODRINE HYDROCHLORIDE 5 MG/1
10 TABLET ORAL
Status: DISCONTINUED | OUTPATIENT
Start: 2023-01-12 | End: 2023-01-17 | Stop reason: HOSPADM

## 2023-01-12 RX ADMIN — MIDODRINE HYDROCHLORIDE 10 MG: 5 TABLET ORAL at 17:39

## 2023-01-12 RX ADMIN — SODIUM CHLORIDE: 9 INJECTION, SOLUTION INTRAVENOUS at 11:28

## 2023-01-12 RX ADMIN — Medication 100 MG: at 09:14

## 2023-01-12 RX ADMIN — OCTREOTIDE ACETATE 50 MCG/HR: 500 INJECTION, SOLUTION INTRAVENOUS; SUBCUTANEOUS at 21:43

## 2023-01-12 RX ADMIN — POLYETHYLENE GLYCOL 3350, SODIUM SULFATE ANHYDROUS, SODIUM BICARBONATE, SODIUM CHLORIDE, POTASSIUM CHLORIDE 4000 ML: 236; 22.74; 6.74; 5.86; 2.97 POWDER, FOR SOLUTION ORAL at 17:40

## 2023-01-12 RX ADMIN — SODIUM CHLORIDE: 9 INJECTION, SOLUTION INTRAVENOUS at 21:42

## 2023-01-12 RX ADMIN — OCTREOTIDE ACETATE 50 MCG/HR: 500 INJECTION, SOLUTION INTRAVENOUS; SUBCUTANEOUS at 11:28

## 2023-01-12 RX ADMIN — LACTULOSE 20 G: 20 SOLUTION ORAL at 09:14

## 2023-01-12 RX ADMIN — MIDODRINE HYDROCHLORIDE 10 MG: 5 TABLET ORAL at 09:14

## 2023-01-12 RX ADMIN — SODIUM CHLORIDE 80 MG: 9 INJECTION, SOLUTION INTRAMUSCULAR; INTRAVENOUS; SUBCUTANEOUS at 21:57

## 2023-01-12 ASSESSMENT — PAIN SCALES - GENERAL
PAINLEVEL_OUTOF10: 0
PAINLEVEL_OUTOF10: 4

## 2023-01-12 ASSESSMENT — PAIN DESCRIPTION - LOCATION: LOCATION: ABDOMEN

## 2023-01-12 ASSESSMENT — PAIN DESCRIPTION - DESCRIPTORS: DESCRIPTORS: STABBING

## 2023-01-12 NOTE — PROGRESS NOTES
Internal Medicine   Hospitalist   Progress Note    2023   11:14 AM    Name:  Cass Parks  MRN:    [id number]      Day: 2     Admit Date: 1/10/2023  6:17 PM  PCP: Venita Snell MD    Code Status:  Full Code    Assessment and Plan: Active Problems/ diagnosis:     GI bleeding  Acute posthemorrhagic anemia  WILLIE  Alcoholic cirrhosis with ascites  Alcohol abuse with risk of withdrawal  Hyponatremia in the setting of alcohol abuse    Plan  Continue to monitor H&H, monitor vitals closely. Patient consented to blood transfusion if needed. Transfuse for hemoglobin below 7  Monitor renal function, hold nephrotoxic meds. Monitor sodium level. Nephrologist is on  status post [id number] labs  Continue CIWA protocol  Discussed plan of care with patient  Keep n.p.o. for now given bleeding last night. We will start IV fluids  DVT PPx-SCDs    7 pm- 7 am, please contact on call Hospitalist for any needs     Subjective:      no new events. Reports episodes of bloody stool last night. Physical Examination:      Vitals:  BP (!) 93/47   Pulse 96   Temp 98.2 °F (36.8 °C) (Oral)   Resp 18   Ht 5' 5\" (1.651 m)   Wt 174 lb (78.9 kg)   SpO2 97%   BMI 28.96 kg/m²   Temp (24hrs), Av.2 °F (36.8 °C), Min:98.1 °F (36.7 °C), Max:98.2 °F (36.8 °C)      General appearance: alert, cooperative and no distress  Mental Status: oriented to person, place and time and normal affect  Lungs: clear to auscultation bilaterally, normal effort  Heart: regular rate and rhythm, no murmur  Abdomen: soft, nontender, nondistended, bowel sounds present, no masses  Extremities: no edema, redness, tenderness in the calves  Skin: no gross lesions, rashes    Data:     Labs:  Recent Labs     23  0605 23  1341 23  0101 23  0608   WBC 12.7*  --   --  10.6   HGB 10.6*   < > 8.7* 8.4*     --   --  118*    < > = values in this interval not displayed.      Recent Labs     23  0605 23  0608   NA 131* 131*   K 4.2 3.9   CL 92* 96   CO2 26 24   BUN 32* 35*   CREATININE 2.32* 2.59*   GLUCOSE 77 147*     Recent Labs     01/11/23  0605 01/12/23  0608   * 123*   ALT 49* 34*   BILITOT 20.0* 20.6*   ALKPHOS 236* 166*       Current Facility-Administered Medications   Medication Dose Route Frequency Provider Last Rate Last Admin    midodrine (PROAMATINE) tablet 10 mg  10 mg Oral TID  Buddy Shazia, DO   10 mg at 01/12/23 0914    0.9 % sodium chloride infusion   IntraVENous Continuous Yazid R Agus, DO        sodium chloride flush 0.9 % injection 5-40 mL  5-40 mL IntraVENous 2 times per day Jackie Bronson MD   10 mL at 01/11/23 2105    sodium chloride flush 0.9 % injection 5-40 mL  5-40 mL IntraVENous PRANNE Bronson MD        0.9 % sodium chloride infusion   IntraVENous PRN Jackie Bronson MD        thiamine tablet 100 mg  100 mg Oral Daily Jackie Bronson MD   100 mg at 01/12/23 0914    LORazepam (ATIVAN) tablet 1 mg  1 mg Oral Q1H PRANNE Bronson MD        Or    LORazepam (ATIVAN) injection 1 mg  1 mg IntraVENous Q1H PRANNE Bronson MD        Or    LORazepam (ATIVAN) tablet 2 mg  2 mg Oral Q1H PRANNE Bronson MD        Or    LORazepam (ATIVAN) injection 2 mg  2 mg IntraVENous Q1H PRANNE Bronson MD        Or    LORazepam (ATIVAN) tablet 3 mg  3 mg Oral Q1H PRANNE Bronson MD        Or    LORazepam (ATIVAN) injection 3 mg  3 mg IntraVENous Q1H PRANNE Bronson MD        Or    LORazepam (ATIVAN) tablet 4 mg  4 mg Oral Q1H PRANNE Bronson MD        Or    LORazepam (ATIVAN) injection 4 mg  4 mg IntraVENous Q1H PRANNE Bronson MD        lactulose (CHRONULAC) 10 GM/15ML solution 20 g  20 g Oral BID Poornima Ramos MD   20 g at 01/12/23 0914    octreotide (SANDOSTATIN) 500 mcg in sodium chloride 0.9 % 100 mL infusion  50 mcg/hr IntraVENous Continuous Amada Jay MD 10 mL/hr at 01/11/23 2102 50 mcg/hr at 01/11/23 2102    midodrine (PROAMATINE) tablet 7.5 mg  7.5 mg Oral Once Amada Purple' Chano Rivas MD        pantoprazole (PROTONIX) 80 mg in sodium chloride (PF) 0.9 % 20 mL injection  80 mg IntraVENous Daily Gely Weir' Chano Rivas MD   80 mg at 01/11/23 2114    sodium chloride flush 0.9 % injection 5-40 mL  5-40 mL IntraVENous 2 times per day Yifan Grover MD   10 mL at 01/11/23 2106    sodium chloride flush 0.9 % injection 5-40 mL  5-40 mL IntraVENous PRN Yifan Grover MD        0.9 % sodium chloride infusion  25 mL IntraVENous PRN Yifan Grover MD        ondansetron (ZOFRAN-ODT) disintegrating tablet 4 mg  4 mg Oral Q8H PRN Yifan Grover MD        Or    ondansetron [facility]) injection 4 mg  4 mg IntraVENous Q6H PRN Yifan Grover MD        polyethylene glycol (GLYCOLAX) packet 17 g  17 g Oral Daily PRN Yifan Grover MD        lidocaine 4 % external patch 1 patch  1 patch TransDERmal Daily Yifan Grover MD   1 patch at 01/11/23 5628       Additional work up or/and treatment plan may be added today or then after based on clinical progression. I am managing a portion of pt care. Some medical issues are handled by other specialists. Additional work up and treatment should be done in out pt setting by pt PCP and other out pt providers. In addition to examining and evaluating pt, I spent additional time explaining care, normaland abnormal findings, and treatment plan. All of pt questions were answered. Counseling, diet and education were provided. Case will be discussed with nursing staff when appropriate. Family will be updated if and when appropriate.        Electronically signed by Christiano Smiley DO on 1/12/2023 at 11:14 AM

## 2023-01-12 NOTE — PROGRESS NOTES
Hospitalist Daily Progress Note  Name: Yanet Mcqueen  Age: 42 y.o. Gender: female  CodeStatus: Full Code  Allergies: Oxycodone-Acetaminophen    Chief Complaint:Rectal Bleeding (Pt c/o rectal bleeding x3 days - bright red blood, denies anticoagulation; states that she was advised to come to ED by GI), Abnormal Lab (Had labs done today, abnormal renal fx), and Cirrhosis      Primary Care Provider: Rikki Johnson MD    InpatientTreatment Team: Treatment Team: Attending Provider: April Smith MD; Consulting Physician: Rikki Soares MD; Consulting Physician: Jacob Gonzalez MD; Consulting Physician: Brianne Max MD; Registered Nurse: Claritza Munoz, RN; Registered Nurse: Allyssa Austin RN    Admission Date: 1/10/2023      Subjective: No chest pain, sob, nausea. Weak, resting in bed. Physical Exam  Vitals and nursing note reviewed. Constitutional:       Appearance: Normal appearance. Cardiovascular:      Rate and Rhythm: Normal rate and regular rhythm. Pulmonary:      Effort: Pulmonary effort is normal.      Breath sounds: Normal breath sounds. Abdominal:      General: Bowel sounds are normal.      Palpations: Abdomen is soft. Musculoskeletal:         General: Normal range of motion. Skin:     General: Skin is warm and dry. Neurological:      Mental Status: She is alert and oriented to person, place, and time. Mental status is at baseline.        Medications:  Reviewed    Infusion Medications:    sodium chloride      octreotide (SandoSTATIN) infusion 50 mcg/hr (01/11/23 2102)    sodium chloride       Scheduled Medications:    sodium chloride flush  5-40 mL IntraVENous 2 times per day    thiamine  100 mg Oral Daily    midodrine  5 mg Oral TID WC    lactulose  20 g Oral BID    midodrine  7.5 mg Oral Once    pantoprazole (PROTONIX) 40 mg injection  80 mg IntraVENous Daily    sodium chloride flush  5-40 mL IntraVENous 2 times per day    lidocaine  1 patch TransDERmal Daily     PRN Meds: sodium chloride flush, sodium chloride, LORazepam **OR** LORazepam **OR** LORazepam **OR** LORazepam **OR** LORazepam **OR** LORazepam **OR** LORazepam **OR** LORazepam, sodium chloride flush, sodium chloride, ondansetron **OR** ondansetron, polyethylene glycol    Labs:   Recent Labs     01/10/23  1127 01/10/23  1930 01/11/23  0605 01/11/23  1341 01/11/23  1917 01/12/23  0101   WBC 13.5* 12.1* 12.7*  --   --   --    HGB 11.8* 10.4* 10.6* 9.3* 8.8* 8.7*   HCT 34.2* 30.3* 30.9* 27.2* 25.9* 25.8*    144 149  --   --   --      Recent Labs     01/10/23  1127 01/10/23  1930 01/11/23  0605   * 126* 131*   K 3.1* 2.6* 4.2   CL 86* 86* 92*   CO2 25 28 26   BUN 31* 32* 32*   CREATININE 2.17* 2.13* 2.32*   CALCIUM 8.4* 8.0* 8.0*     Recent Labs     01/10/23  1127 01/10/23  1930 01/11/23  0605   * 174* 179*   ALT 57* 50* 49*   BILITOT 23.6* 21.2* 20.0*   ALKPHOS 296* 244* 236*     Recent Labs     01/10/23  1127 01/10/23  1930   INR 1.8 2.0     No results for input(s): Camelia Eaton in the last 72 hours. Urinalysis:   Lab Results   Component Value Date/Time    NITRU POSITIVE 01/10/2023 07:30 PM    WBCUA 0-2 01/10/2023 07:30 PM    BACTERIA Negative 01/10/2023 07:30 PM    RBCUA 0-2 01/10/2023 07:30 PM    BLOODU Negative 01/10/2023 07:30 PM    SPECGRAV 1.014 01/10/2023 07:30 PM    GLUCOSEU Negative 01/10/2023 07:30 PM       Radiology:   Most recent    Chest CT      WITH CONTRAST:No results found for this or any previous visit. WITHOUT CONTRAST: No results found for this or any previous visit. CXR      2-view: No results found for this or any previous visit.        Portable: Results for orders placed during the hospital encounter of 01/10/23    XR CHEST PORTABLE    Narrative  EXAMINATION:  ONE XRAY VIEW OF THE CHEST    1/10/2023 8:53 pm    COMPARISON:  Chest series from February 5, 2022    HISTORY:  ORDERING SYSTEM PROVIDED HISTORY: weakness, fatigue, r/o PNA  TECHNOLOGIST PROVIDED HISTORY:  Reason for exam:->weakness, fatigue, r/o PNA  Is the patient pregnant?->No  What reading provider will be dictating this exam?->CRC    FINDINGS:  Adequate and symmetric aeration of the lungs. Subsegmental opacity in the  right lower lung. There are no additional formed consolidations. Subtle  blunting of the left costophrenic angle may reflect the presence of a small  effusion. No pneumothorax. Trachea and central mainstem bronchi appear  clear. The cardiomediastinal silhouette and pulmonary vascularity appear  within normal limits. Osseous and thoracic soft tissue structures demonstrate  no acute findings. Impression  1. Subsegmental opacity in the right lower lung. There appears to be  blunting the left costophrenic angle which may represent a small effusion. 2.  Normal appearance of the cardiomediastinal silhouette. Normal pulmonary  vascularity. RECOMMENDATION:  (Recommend upright PA and lateral chest radiographs 10-12 weeks after  resolution of patient's symptoms to ensure complete resolution of  radiographic findings.)      Echo No results found for this or any previous visit.             Assessment/Plan:    Active Hospital Problems    Diagnosis Date Noted    WILLIE (acute kidney injury) ([facility] [address] [N17.9] 01/11/2023     Priority: Medium    GI bleed [K92.2] 01/10/2023     Priority: Medium        GI bleed              Multiple episodes of lower GI bleed for the past 3 days              Previous EGD did not show varices but showed gastritis              Fresh blood no clots per patient-likely lower GI bleed              Hemoglobin 10.4 which is down from 11.8 earlier in the day              Continue H&H monitoring              Transfuse if needed              Protonix, octreotide              GI consult   1/11 - probable colonoscopy when able  WILLIE              Creatinine 2.13 with normal baseline              She has been on Lasix and spironolactone              Likely due to medication versus significant ascites versus hepatorenal              Hold nephrotoxic medication              Plan for paracentesis              Monitor vitals and consult nephrology   1/11 - albumin, midodrine, monitor renal status  Abdominal ascites              Due to alcoholic cirrhosis              Large amount of ascites seen in the ultrasound              Consult IR for paracentesis   1/11 - s/p paracentesis, studies pending  Hyperbilirubinemia              Bilirubin 23.6 and 21.2 on repeat              Will check direct and indirect              GI consulted-follow their plan  Alcohol abuse              Denied risk of withdrawal but patient still drinking alcohol                   Keep on CIWA protocol  Hypokalemia              Replace and monitor  Hyponatremia              Fluid restriction and monitor closely              Nephrology consulted     Code Status: Full  DVT prophylaxis: PCD    Electronically signed by Bambi Gordon MD on 1/12/2023 at 3:00 AM

## 2023-01-12 NOTE — PROGRESS NOTES
Pt assessment and vitals complete. Midodrine given per emar. Paracentesis site clean dry and intact. Patient given zinc cream r/t c/o pain with wiping when she has a bowel movement. Patient has multiple bright red bloody bowel movements. Dr. Nanette Ellis and Dr. Meseret Bradshaw made aware. Denies needs at this time. Does not score for ciwa, tolerating diet. Awaiting patient's new bag of sandostatin from pharmacy.

## 2023-01-12 NOTE — CARE COORDINATION
Met with patient, states plan is to return home with bf and her mother helps out daily as well. May benefit from Palliative care if not current.

## 2023-01-12 NOTE — PROGRESS NOTES
Nephrology Progress Note    Assessment:  WILLIE  Pre renal  watch for HRS  no hydro  Cirrhosis ETOH  ASCITES  Jaundice cirrhosis   Depression  Anemia      Plan:increased procalcitonin   Increase proamitine  continue octreotide BP LOW increase     Blood cultures and PD no bacteria  floor never sent for urine culture  Patient Active Problem List:     Major depressive disorder, severe ([facility] [address]     Alcoholic hepatitis with ascites     GI bleeding     Impaired mobility and activities of daily living dt encephalopathy and gait ataxia     Bursitis of foot     Generalized pain     Myalgia, unspecified site     Pain in left foot     Toe deformity, acquired     Acute alcoholic hepatitis     Impaired mobility and ADLs     GI bleed     WILLIE (acute kidney injury) ([facility] [address]      Subjective:  Admit Date: 1/10/2023    Interval History: no abdominal pains    Medications:  Scheduled Meds:   sodium chloride flush  5-40 mL IntraVENous 2 times per day    thiamine  100 mg Oral Daily    midodrine  5 mg Oral TID WC    lactulose  20 g Oral BID    midodrine  7.5 mg Oral Once    pantoprazole (PROTONIX) 40 mg injection  80 mg IntraVENous Daily    sodium chloride flush  5-40 mL IntraVENous 2 times per day    lidocaine  1 patch TransDERmal Daily     Continuous Infusions:   sodium chloride      octreotide (SandoSTATIN) infusion 50 mcg/hr (01/11/23 2102)    sodium chloride         CBC:   Recent Labs     01/11/23  0605 01/11/23  1341 01/12/23  0101 01/12/23  0608   WBC 12.7*  --   --  10.6   HGB 10.6*   < > 8.7* 8.4*     --   --  118*    < > = values in this interval not displayed. CMP:    Recent Labs     01/10/23  1930 01/11/23  0605 01/12/23  0608   * 131* 131*   K 2.6* 4.2 3.9   CL 86* 92* 96   CO2 28 26 24   BUN 32* 32* 35*   CREATININE 2.13* 2.32* 2.59*   GLUCOSE 112* 77 147*   CALCIUM 8.0* 8.0* 7.9*   LABGLOM 29.6* 26.8* 23.4*     Troponin: No results for input(s): TROPONINI in the last 72 hours.   BNP: No results for input(s): BNP in the last 72 hours. INR:   Recent Labs     01/10/23  1930   INR 2.0     Lipids:   Recent Labs     01/10/23  1930   LIPASE 60     Liver:   Recent Labs     01/12/23  0608   *   ALT 34*   ALKPHOS 166*   PROT 5.0*   LABALBU 2.2*   BILITOT 20.6*     Iron:  No results for input(s): IRONS, FERRITIN in the last 72 hours. Invalid input(s): LABIRONS  Urinalysis: No results for input(s): UA in the last 72 hours.     Objective:  Vitals: BP (!) 93/47   Pulse 96   Temp 98.2 °F (36.8 °C) (Oral)   Resp 18   Ht 5' 5\" (1.651 m)   Wt 174 lb (78.9 kg)   SpO2 97%   BMI 28.96 kg/m²    Wt Readings from Last 3 Encounters:   01/10/23 174 lb (78.9 kg)   01/10/23 174 lb (78.9 kg)   11/05/22 160 lb (72.6 kg)      24HR INTAKE/OUTPUT:    Intake/Output Summary (Last 24 hours) at 1/12/2023 1080  Last data filed at 1/11/2023 1839  Gross per 24 hour   Intake 520 ml   Output --   Net 520 ml       General: alert, in no apparent distress  HEENT: normocephalic, atraumatic, anicteric  Neck: supple, no mass  Lungs: non-labored respirations, clear to auscultation bilaterally  Heart: regular rate and rhythm, no murmurs or rubs  Abdomen: soft, non-tender, non-distended  Ext: no cyanosis, no peripheral edema  Neuro: alert and oriented, no gross abnormalities  Psych: normal mood and affect  Skin: no rash      Electronically signed by Hyacinth Rinne, DO, MD

## 2023-01-13 ENCOUNTER — ANESTHESIA EVENT (OUTPATIENT)
Dept: ENDOSCOPY | Age: 38
End: 2023-01-13
Payer: COMMERCIAL

## 2023-01-13 ENCOUNTER — ANESTHESIA (OUTPATIENT)
Dept: ENDOSCOPY | Age: 38
End: 2023-01-13
Payer: COMMERCIAL

## 2023-01-13 PROBLEM — R14.0 ABDOMINAL DISTENTION: Status: ACTIVE | Noted: 2023-01-13

## 2023-01-13 LAB
ALBUMIN SERPL-MCNC: 2.1 G/DL (ref 3.5–4.6)
ALP BLD-CCNC: 160 U/L (ref 40–130)
ALT SERPL-CCNC: 34 U/L (ref 0–33)
ANION GAP SERPL CALCULATED.3IONS-SCNC: 10 MEQ/L (ref 9–15)
ANISOCYTOSIS: ABNORMAL
AST SERPL-CCNC: 126 U/L (ref 0–35)
BASOPHILS ABSOLUTE: 0 K/UL (ref 0–0.2)
BASOPHILS RELATIVE PERCENT: 0.5 %
BILIRUB SERPL-MCNC: 19.9 MG/DL (ref 0.2–0.7)
BUN BLDV-MCNC: 29 MG/DL (ref 6–20)
CALCIUM SERPL-MCNC: 7.4 MG/DL (ref 8.5–9.9)
CHLORIDE BLD-SCNC: 96 MEQ/L (ref 95–107)
CO2: 25 MEQ/L (ref 20–31)
CREAT SERPL-MCNC: 1.83 MG/DL (ref 0.5–0.9)
EOSINOPHILS ABSOLUTE: 0 K/UL (ref 0–0.7)
EOSINOPHILS RELATIVE PERCENT: 0.8 %
GFR SERPL CREATININE-BSD FRML MDRD: 35.6 ML/MIN/{1.73_M2}
GLOBULIN: 2.5 G/DL (ref 2.3–3.5)
GLUCOSE BLD-MCNC: 120 MG/DL (ref 70–99)
HCG, URINE, POC: NEGATIVE
HCT VFR BLD CALC: 23.5 % (ref 37–47)
HCT VFR BLD CALC: 23.9 % (ref 37–47)
HCT VFR BLD CALC: 24.2 % (ref 37–47)
HCT VFR BLD CALC: 25.3 % (ref 37–47)
HEMOGLOBIN: 8 G/DL (ref 12–16)
HEMOGLOBIN: 8.3 G/DL (ref 12–16)
HEMOGLOBIN: 8.4 G/DL (ref 12–16)
HEMOGLOBIN: 8.7 G/DL (ref 12–16)
HYPOCHROMIA: ABNORMAL
LYMPHOCYTES ABSOLUTE: 0.8 K/UL (ref 1–4.8)
LYMPHOCYTES RELATIVE PERCENT: 7 %
Lab: NORMAL
MACROCYTES: ABNORMAL
MCH RBC QN AUTO: 34.9 PG (ref 27–31.3)
MCHC RBC AUTO-ENTMCNC: 34 % (ref 33–37)
MCV RBC AUTO: 102.7 FL (ref 79.4–94.8)
MONOCYTES ABSOLUTE: 0 K/UL (ref 0.2–0.8)
MONOCYTES RELATIVE PERCENT: 8.8 %
NEGATIVE QC PASS/FAIL: NORMAL
NEUTROPHILS ABSOLUTE: 10.2 K/UL (ref 1.4–6.5)
NEUTROPHILS RELATIVE PERCENT: 93 %
PDW BLD-RTO: 29.1 % (ref 11.5–14.5)
PLATELET # BLD: 122 K/UL (ref 130–400)
PLATELET SLIDE REVIEW: ABNORMAL
POSITIVE QC PASS/FAIL: NORMAL
POTASSIUM REFLEX MAGNESIUM: 3.7 MEQ/L (ref 3.4–4.9)
RBC # BLD: 2.29 M/UL (ref 4.2–5.4)
SODIUM BLD-SCNC: 131 MEQ/L (ref 135–144)
TARGET CELLS: ABNORMAL
TOTAL PROTEIN: 4.6 G/DL (ref 6.3–8)
URINE CULTURE, ROUTINE: NORMAL
WBC # BLD: 11 K/UL (ref 4.8–10.8)

## 2023-01-13 PROCEDURE — 6360000002 HC RX W HCPCS: Performed by: EMERGENCY MEDICINE

## 2023-01-13 PROCEDURE — 6360000002 HC RX W HCPCS: Performed by: INTERNAL MEDICINE

## 2023-01-13 PROCEDURE — 36415 COLL VENOUS BLD VENIPUNCTURE: CPT

## 2023-01-13 PROCEDURE — 6370000000 HC RX 637 (ALT 250 FOR IP): Performed by: INTERNAL MEDICINE

## 2023-01-13 PROCEDURE — 2580000003 HC RX 258: Performed by: INTERNAL MEDICINE

## 2023-01-13 PROCEDURE — 85018 HEMOGLOBIN: CPT

## 2023-01-13 PROCEDURE — 7100000010 HC PHASE II RECOVERY - FIRST 15 MIN: Performed by: SPECIALIST

## 2023-01-13 PROCEDURE — 85025 COMPLETE CBC W/AUTO DIFF WBC: CPT

## 2023-01-13 PROCEDURE — 6360000002 HC RX W HCPCS: Performed by: NURSE ANESTHETIST, CERTIFIED REGISTERED

## 2023-01-13 PROCEDURE — 2580000003 HC RX 258: Performed by: SPECIALIST

## 2023-01-13 PROCEDURE — 3609027000 HC COLONOSCOPY: Performed by: SPECIALIST

## 2023-01-13 PROCEDURE — 3700000000 HC ANESTHESIA ATTENDED CARE: Performed by: SPECIALIST

## 2023-01-13 PROCEDURE — 1210000000 HC MED SURG R&B

## 2023-01-13 PROCEDURE — C9113 INJ PANTOPRAZOLE SODIUM, VIA: HCPCS | Performed by: EMERGENCY MEDICINE

## 2023-01-13 PROCEDURE — 6370000000 HC RX 637 (ALT 250 FOR IP): Performed by: SPECIALIST

## 2023-01-13 PROCEDURE — 7100000011 HC PHASE II RECOVERY - ADDTL 15 MIN: Performed by: SPECIALIST

## 2023-01-13 PROCEDURE — 2500000003 HC RX 250 WO HCPCS: Performed by: NURSE ANESTHETIST, CERTIFIED REGISTERED

## 2023-01-13 PROCEDURE — 99232 SBSQ HOSP IP/OBS MODERATE 35: CPT | Performed by: NURSE PRACTITIONER

## 2023-01-13 PROCEDURE — A4216 STERILE WATER/SALINE, 10 ML: HCPCS | Performed by: EMERGENCY MEDICINE

## 2023-01-13 PROCEDURE — 2709999900 HC NON-CHARGEABLE SUPPLY: Performed by: SPECIALIST

## 2023-01-13 PROCEDURE — 85014 HEMATOCRIT: CPT

## 2023-01-13 PROCEDURE — 2580000003 HC RX 258: Performed by: EMERGENCY MEDICINE

## 2023-01-13 PROCEDURE — 0DJD8ZZ INSPECTION OF LOWER INTESTINAL TRACT, VIA NATURAL OR ARTIFICIAL OPENING ENDOSCOPIC: ICD-10-PCS | Performed by: SPECIALIST

## 2023-01-13 PROCEDURE — 36430 TRANSFUSION BLD/BLD COMPNT: CPT

## 2023-01-13 PROCEDURE — 80053 COMPREHEN METABOLIC PANEL: CPT

## 2023-01-13 RX ORDER — SIMETHICONE 20 MG/.3ML
EMULSION ORAL PRN
Status: DISCONTINUED | OUTPATIENT
Start: 2023-01-13 | End: 2023-01-13 | Stop reason: ALTCHOICE

## 2023-01-13 RX ORDER — MAGNESIUM HYDROXIDE 1200 MG/15ML
LIQUID ORAL PRN
Status: DISCONTINUED | OUTPATIENT
Start: 2023-01-13 | End: 2023-01-13 | Stop reason: ALTCHOICE

## 2023-01-13 RX ORDER — PREDNISONE 20 MG/1
20 TABLET ORAL DAILY
Status: DISCONTINUED | OUTPATIENT
Start: 2023-01-23 | End: 2023-01-14

## 2023-01-13 RX ORDER — LIDOCAINE HYDROCHLORIDE 20 MG/ML
INJECTION, SOLUTION INFILTRATION; PERINEURAL PRN
Status: DISCONTINUED | OUTPATIENT
Start: 2023-01-13 | End: 2023-01-13 | Stop reason: SDUPTHER

## 2023-01-13 RX ORDER — SODIUM CHLORIDE 9 MG/ML
INJECTION, SOLUTION INTRAVENOUS PRN
Status: DISCONTINUED | OUTPATIENT
Start: 2023-01-13 | End: 2023-01-17 | Stop reason: HOSPADM

## 2023-01-13 RX ORDER — PROPOFOL 10 MG/ML
INJECTION, EMULSION INTRAVENOUS PRN
Status: DISCONTINUED | OUTPATIENT
Start: 2023-01-13 | End: 2023-01-13 | Stop reason: SDUPTHER

## 2023-01-13 RX ORDER — PREDNISONE 10 MG/1
10 TABLET ORAL DAILY
Status: DISCONTINUED | OUTPATIENT
Start: 2023-02-02 | End: 2023-01-14

## 2023-01-13 RX ORDER — SPIRONOLACTONE 25 MG/1
25 TABLET ORAL DAILY
Status: DISCONTINUED | OUTPATIENT
Start: 2023-01-13 | End: 2023-01-17 | Stop reason: HOSPADM

## 2023-01-13 RX ADMIN — LACTULOSE 20 G: 20 SOLUTION ORAL at 21:24

## 2023-01-13 RX ADMIN — Medication 10 ML: at 22:11

## 2023-01-13 RX ADMIN — OCTREOTIDE ACETATE 50 MCG/HR: 500 INJECTION, SOLUTION INTRAVENOUS; SUBCUTANEOUS at 15:50

## 2023-01-13 RX ADMIN — SODIUM CHLORIDE: 9 INJECTION, SOLUTION INTRAVENOUS at 22:06

## 2023-01-13 RX ADMIN — HYDROMORPHONE HYDROCHLORIDE 0.5 MG: 1 INJECTION, SOLUTION INTRAMUSCULAR; INTRAVENOUS; SUBCUTANEOUS at 21:57

## 2023-01-13 RX ADMIN — PROPOFOL 100 MG: 10 INJECTION, EMULSION INTRAVENOUS at 14:16

## 2023-01-13 RX ADMIN — HYDROMORPHONE HYDROCHLORIDE 0.5 MG: 1 INJECTION, SOLUTION INTRAMUSCULAR; INTRAVENOUS; SUBCUTANEOUS at 18:08

## 2023-01-13 RX ADMIN — PREDNISONE 30 MG: 20 TABLET ORAL at 21:56

## 2023-01-13 RX ADMIN — Medication 10 ML: at 22:10

## 2023-01-13 RX ADMIN — PROPOFOL 100 MG: 10 INJECTION, EMULSION INTRAVENOUS at 14:21

## 2023-01-13 RX ADMIN — LIDOCAINE HYDROCHLORIDE 40 MG: 20 INJECTION, SOLUTION INFILTRATION; PERINEURAL at 14:16

## 2023-01-13 RX ADMIN — MORPHINE SULFATE 1 MG: 2 INJECTION, SOLUTION INTRAMUSCULAR; INTRAVENOUS at 01:15

## 2023-01-13 RX ADMIN — SODIUM CHLORIDE 80 MG: 9 INJECTION, SOLUTION INTRAMUSCULAR; INTRAVENOUS; SUBCUTANEOUS at 21:25

## 2023-01-13 RX ADMIN — MIDODRINE HYDROCHLORIDE 10 MG: 5 TABLET ORAL at 17:18

## 2023-01-13 RX ADMIN — PROPOFOL 50 MG: 10 INJECTION, EMULSION INTRAVENOUS at 14:25

## 2023-01-13 ASSESSMENT — PAIN DESCRIPTION - LOCATION
LOCATION: ABDOMEN;BACK
LOCATION: ABDOMEN
LOCATION: ABDOMEN

## 2023-01-13 ASSESSMENT — PAIN SCALES - GENERAL
PAINLEVEL_OUTOF10: 7
PAINLEVEL_OUTOF10: 8
PAINLEVEL_OUTOF10: 8

## 2023-01-13 ASSESSMENT — ENCOUNTER SYMPTOMS
WHEEZING: 0
RESPIRATORY NEGATIVE: 1
NAUSEA: 0
COUGH: 0
COLOR CHANGE: 0
TROUBLE SWALLOWING: 0
SHORTNESS OF BREATH: 0
ABDOMINAL PAIN: 0
SORE THROAT: 0
DIARRHEA: 0
VOMITING: 0
EYES NEGATIVE: 1
BACK PAIN: 0
GASTROINTESTINAL NEGATIVE: 1

## 2023-01-13 ASSESSMENT — LIFESTYLE VARIABLES: SMOKING_STATUS: 1

## 2023-01-13 ASSESSMENT — PAIN - FUNCTIONAL ASSESSMENT: PAIN_FUNCTIONAL_ASSESSMENT: 0-10

## 2023-01-13 ASSESSMENT — PAIN DESCRIPTION - ORIENTATION: ORIENTATION: LOWER

## 2023-01-13 NOTE — PROGRESS NOTES
Dr. Radha King made aware that Pt going to GI center with only 1 unit of FFP being given and Dr. Debra Jimenez asked for 1 unit yesterday via verbal order. Okay to d/c the second unit of FFP. Call to blood bank to make them aware, per [place] in blood bank, 'it will be available until midnight if you end up needing to give her a second unit'.

## 2023-01-13 NOTE — PROGRESS NOTES
Internal Medicine   Hospitalist   Progress Note    2023   12:59 PM    Name:  Charisse Barlow  MRN:    [id number]     IP Day: 3     Admit Date: 1/10/2023  6:17 PM  PCP: Corazon Georges MD    Code Status:  Full Code    Assessment and Plan: Active Problems/ diagnosis:     GI bleeding  Acute posthemorrhagic anemia  WILLIE  Alcoholic cirrhosis with ascites  Alcohol abuse with risk of withdrawal  Hyponatremia in the setting of alcohol abuse    Plan  Continue to monitor H&H, monitor vitals closely. Patient consented to blood transfusion if needed. Transfuse for hemoglobin below 7  Getting FFP prior to her procedure today. Monitor renal function, hold nephrotoxic meds. Monitor sodium level. Nephrologist is on  status post paracentesis-follow-not consistent with SBP  Continue CIWA protocol  Discussed plan of care with patient  Currently n.p.o., resume IV fluids  DVT PPx-SCDs    7 pm- 7 am, please contact on call Hospitalist for any needs     Dispo-discharge next 24 to 48 hours pending final GI recommendations   Subjective:     continues to have bloody bowel movements. Plan for colonoscopy today. Physical Examination:      Vitals:  BP (!) 101/57   Pulse 91   Temp 98.1 °F (36.7 °C) (Oral)   Resp 20   Ht 5' 5\" (1.651 m)   Wt 174 lb (78.9 kg)   SpO2 96%   BMI 28.96 kg/m²   Temp (24hrs), Av.3 °F (36.8 °C), Min:97.9 °F (36.6 °C), Max:98.6 °F (37 °C)      General appearance: alert, cooperative and no distress  Mental Status: oriented to person, place and time and normal affect  Lungs: clear to auscultation bilaterally, normal effort  Heart: regular rate and rhythm, no murmur  Abdomen: soft, nontender, nondistended, bowel sounds present, no masses  Extremities: no edema, redness, tenderness in the calves  Skin: Jaundiced.   No gross lesions, rashes    Data:     Labs:  Recent Labs     23  0608 23  1133 23  0318 23  1104   WBC 10.6  --  11.0*  --    HGB 8.4*   < > 8.0*  8.4* 8.7*   * --  122*  --     < > = values in this interval not displayed.      Recent Labs     01/12/23  0608 01/13/23  0318   * 131*   K 3.9 3.7   CL 96 96   CO2 24 25   BUN 35* 29*   CREATININE 2.59* 1.83*   GLUCOSE 147* 120*     Recent Labs     01/12/23  0608 01/13/23  0318   * 126*   ALT 34* 34*   BILITOT 20.6* 19.9*   ALKPHOS 166* 160*       Current Facility-Administered Medications   Medication Dose Route Frequency Provider Last Rate Last Admin    spironolactone (ALDACTONE) tablet 25 mg  25 mg Oral Daily Maxineefraín Doe Bescak, DO        0.9 % sodium chloride infusion   IntraVENous PRN [place] Agus, DO        midodrine (PROAMATINE) tablet 10 mg  10 mg Oral TID  Jewell [place], DO   10 mg at 01/12/23 1739    0.9 % sodium chloride infusion   IntraVENous Continuous Tony Coupe,  mL/hr at 01/12/23 2142 New Bag at 01/12/23 2142    sodium chloride flush 0.9 % injection 5-40 mL  5-40 mL IntraVENous 2 times per day William Yao MD   10 mL at 01/11/23 2105    sodium chloride flush 0.9 % injection 5-40 mL  5-40 mL IntraVENous PRN William Yao MD        0.9 % sodium chloride infusion   IntraVENous PRN William Yao MD        thiamine tablet 100 mg  100 mg Oral Daily William Yao MD   100 mg at 01/12/23 0914    LORazepam (ATIVAN) tablet 1 mg  1 mg Oral Q1H PRN William Yao MD        Or    LORazepam (ATIVAN) injection 1 mg  1 mg IntraVENous Q1H PRN William Yao MD        Or    LORazepam (ATIVAN) tablet 2 mg  2 mg Oral Q1H PRN William Yao MD        Or    LORazepam (ATIVAN) injection 2 mg  2 mg IntraVENous Q1H PRN William Yao MD        Or    LORazepam (ATIVAN) tablet 3 mg  3 mg Oral Q1H PRN William Yao MD        Or    LORazepam (ATIVAN) injection 3 mg  3 mg IntraVENous Q1H PRN William Yao MD        Or    LORazepam (ATIVAN) tablet 4 mg  4 mg Oral Q1H PRN William Yao MD        Or    LORazepam (ATIVAN) injection 4 mg  4 mg IntraVENous Q1H PRN William Yao MD        lactulose ([employer]) 10 GM/15ML solution 20 g  20 g Oral BID Regine Glasgow MD   20 g at 01/12/23 0914    octreotide (SANDOSTATIN) 500 mcg in sodium chloride 0.9 % 100 mL infusion  50 mcg/hr IntraVENous Continuous Claudia Cordero MD 10 mL/hr at 01/12/23 2143 50 mcg/hr at 01/12/23 2143    midodrine (PROAMATINE) tablet 7.5 mg  7.5 mg Oral Once Claudia Bowie MD        pantoprazole (PROTONIX) 80 mg in sodium chloride (PF) 0.9 % 20 mL injection  80 mg IntraVENous Daily Claudia Brown MD   80 mg at 01/12/23 2157    sodium chloride flush 0.9 % injection 5-40 mL  5-40 mL IntraVENous 2 times per day Mandi Lovett MD   10 mL at 01/11/23 2106    sodium chloride flush 0.9 % injection 5-40 mL  5-40 mL IntraVENous PRN Mandi Lovett MD        0.9 % sodium chloride infusion  25 mL IntraVENous PRN Mandi Lovett MD        ondansetron (ZOFRAN-ODT) disintegrating tablet 4 mg  4 mg Oral Q8H PRN Mandi Lovett MD        Or    ondansetron [facility]) injection 4 mg  4 mg IntraVENous Q6H PRN Mandi Lovett MD        polyethylene glycol (GLYCOLAX) packet 17 g  17 g Oral Daily PRN Mandi Lovett MD        lidocaine 4 % external patch 1 patch  1 patch TransDERmal Daily Mandi Lovett MD   1 patch at 01/11/23 0156       Additional work up or/and treatment plan may be added today or then after based on clinical progression. I am managing a portion of pt care. Some medical issues are handled by other specialists. Additional work up and treatment should be done in out pt setting by pt PCP and other out pt providers. In addition to examining and evaluating pt, I spent additional time explaining care, normaland abnormal findings, and treatment plan. All of pt questions were answered. Counseling, diet and education were provided. Case will be discussed with nursing staff when appropriate. Family will be updated if and when appropriate.        Electronically signed by Ne Sweeney DO on 1/13/2023 at 12:59 PM

## 2023-01-13 NOTE — CARE COORDINATION
I spoke to pt regarding her plans for discharge. She said that she lives with her boyfriend who is supportive and that her parents live close. She plans on returning home with her boyfriend and denies needing anything when she returns.

## 2023-01-13 NOTE — PROGRESS NOTES
Nephrology Progress Note    Assessment:  Improving WILLIE  Hyponatremia d/t cirrhosis  Ascites   Anemia        Plan: restrict fluids  continue proamitine   aldactone    Patient Active Problem List:     Major depressive disorder, severe ([facility] [address]     Alcoholic hepatitis with ascites     GI bleeding     Impaired mobility and activities of daily living dt encephalopathy and gait ataxia     Bursitis of foot     Generalized pain     Myalgia, unspecified site     Pain in left foot     Toe deformity, acquired     Acute alcoholic hepatitis     Impaired mobility and ADLs     GI bleed     WILLIE (acute kidney injury) ([facility] [address]      Subjective:  Admit Date: 1/10/2023    Interval History: stable    Medications:  Scheduled Meds:   midodrine  10 mg Oral TID WC    sodium chloride flush  5-40 mL IntraVENous 2 times per day    thiamine  100 mg Oral Daily    lactulose  20 g Oral BID    midodrine  7.5 mg Oral Once    pantoprazole (PROTONIX) 40 mg injection  80 mg IntraVENous Daily    sodium chloride flush  5-40 mL IntraVENous 2 times per day    lidocaine  1 patch TransDERmal Daily     Continuous Infusions:   sodium chloride 100 mL/hr at 01/12/23 2142    sodium chloride      octreotide (SandoSTATIN) infusion 50 mcg/hr (01/12/23 2143)    sodium chloride         CBC:   Recent Labs     01/12/23  0608 01/12/23  1133 01/12/23  1918 01/13/23 0318   WBC 10.6  --   --  11.0*   HGB 8.4*   < > 9.3* 8.0*  8.4*   *  --   --  122*    < > = values in this interval not displayed. CMP:    Recent Labs     01/11/23  0605 01/12/23  0608 01/13/23  0318   * 131* 131*   K 4.2 3.9 3.7   CL 92* 96 96   CO2 26 24 25   BUN 32* 35* 29*   CREATININE 2.32* 2.59* 1.83*   GLUCOSE 77 147* 120*   CALCIUM 8.0* 7.9* 7.4*   LABGLOM 26.8* 23.4* 35.6*     Troponin: No results for input(s): TROPONINI in the last 72 hours. BNP: No results for input(s): BNP in the last 72 hours.   INR:   Recent Labs     01/10/23  1930   INR 2.0     Lipids:   Recent Labs 01/10/23  1930   LIPASE 60     Liver:   Recent Labs     01/13/23  0318   *   ALT 34*   ALKPHOS 160*   PROT 4.6*   LABALBU 2.1*   BILITOT 19.9*     Iron:  No results for input(s): IRONS, FERRITIN in the last 72 hours. Invalid input(s): LABIRONS  Urinalysis: No results for input(s): UA in the last 72 hours.     Objective:  Vitals: /61   Pulse 84   Temp 97.9 °F (36.6 °C) (Oral)   Resp 18   Ht 5' 5\" (1.651 m)   Wt 174 lb (78.9 kg)   SpO2 92%   BMI 28.96 kg/m²    Wt Readings from Last 3 Encounters:   01/10/23 174 lb (78.9 kg)   01/10/23 174 lb (78.9 kg)   11/05/22 160 lb (72.6 kg)      24HR INTAKE/OUTPUT:  No intake or output data in the 24 hours ending 01/13/23 0850    General: alert, in no apparent distress  HEENT: normocephalic, atraumatic, anicteric  Neck: supple, no mass  Lungs: non-labored respirations, clear to auscultation bilaterally  Heart: regular rate and rhythm, no murmurs or rubs  Abdomen: soft, non-tender, non-distended  Ext: no cyanosis, no peripheral edema  Neuro: alert and oriented, no gross abnormalities  Psych: normal mood and affect  Skin: no rash      Electronically signed by Luis Antonio Calvo DO, MD

## 2023-01-13 NOTE — PROGRESS NOTES
Pt having bright red bloody/yellow stools. There is no form or particles noted in the stools. Pt resting quietly at this time. Colytely was finished at 0100.

## 2023-01-13 NOTE — PROGRESS NOTES
Ascites fluid shows no evidence of SBP and her Madrey's discriminant score is about 52, renal functions are improving, we will put the patient on prednisolone 30 mg a day.

## 2023-01-13 NOTE — ANESTHESIA PRE PROCEDURE
Department of Anesthesiology  Preprocedure Note       Name:  Derrick Mohamud   Age:  42 y.o.  :  1985                                          MRN:  [id number]         Date:  2023      Surgeon: Horacio Grant):  Kayla Cortez MD    Procedure: Procedure(s):  COLONOSCOPY DIAGNOSTIC    Medications prior to admission:   Prior to Admission medications    Medication Sig Start Date End Date Taking? Authorizing Provider   pantoprazole (PROTONIX) 40 MG tablet Take 1 tablet by mouth every morning (before breakfast) 1/10/23   SANTA Guevara CNP   sertraline (ZOLOFT) 50 MG tablet Take 50 mg by mouth daily  Patient not taking: Reported on 1/10/2023    Historical Provider, MD   diazePAM (VALIUM) 5 MG tablet Take 5 mg by mouth every 6 hours as needed for Anxiety.   Patient not taking: Reported on 1/10/2023    Historical Provider, MD   metroNIDAZOLE (FLAGYL) 500 MG tablet Take 500 mg by mouth 3 times daily  Patient not taking: Reported on 1/10/2023    Historical Provider, MD   atenolol (TENORMIN) 25 MG tablet Take 25 mg by mouth daily  Patient not taking: Reported on 1/10/2023    Historical Provider, MD   spironolactone (ALDACTONE) 50 MG tablet Take 1 tablet by mouth daily 22   Mague Romero MD   furosemide (LASIX) 20 MG tablet Take 1 tablet by mouth daily 22   Mague Romero MD   nicotine (NICODERM CQ) 14 MG/24HR Place 1 patch onto the skin daily  Patient not taking: Reported on 1/10/2023 9/23/22   Lucrecia Torres DO   lactulose (CHRONULAC) 10 GM/15ML solution Take 30 mLs by mouth 3 times daily  Patient not taking: No sig reported 22   SANTA Caceres NP   magnesium oxide (MAG-OX) 400 (240 Mg) MG tablet Take 1 tablet by mouth daily  Patient not taking: Reported on 1/10/2023 9/23/22   SANTA Caceres NP   simethicone (MYLICON) 80 MG chewable tablet Take 1 tablet by mouth every 6 hours as needed for Flatulence  Patient not taking: Reported on 1/10/2023 9/22/22   Adonay Salazar APRN - NP   thiamine 100 MG tablet Take 1 tablet by mouth daily  Patient not taking: No sig reported 9/23/22   Rodrick SANTA Pike NP   mirtazapine (REMERON) 15 MG tablet Take 1 tablet by mouth nightly  Patient not taking: Reported on 1/10/2023 6/13/22   Freddy Dutton MD   Multiple Vitamin (MULTIVITAMIN) TABS tablet Take 1 tablet by mouth daily  Patient not taking: No sig reported 6/14/22   Freddy Dutton MD       Current medications:    Current Facility-Administered Medications   Medication Dose Route Frequency Provider Last Rate Last Admin    spironolactone (ALDACTONE) tablet 25 mg  25 mg Oral Daily [facility], DO        0.9 % sodium chloride infusion   IntraVENous PRN Pitney Zahra, DO        midodrine (PROAMATINE) tablet 10 mg  10 mg Oral TID WC [facility] B[facility], DO   10 mg at 01/12/23 1739    0.9 % sodium chloride infusion   IntraVENous Continuous Pitney Zahra,  mL/hr at 01/12/23 2142 New Bag at 01/12/23 2142    sodium chloride flush 0.9 % injection 5-40 mL  5-40 mL IntraVENous 2 times per day Bryson Hdz MD   10 mL at 01/11/23 2105    sodium chloride flush 0.9 % injection 5-40 mL  5-40 mL IntraVENous PRN Bryson Hdz MD        0.9 % sodium chloride infusion   IntraVENous PRN Bryson Hdz MD        thiamine tablet 100 mg  100 mg Oral Daily Bryson Hdz MD   100 mg at 01/12/23 0914    LORazepam (ATIVAN) tablet 1 mg  1 mg Oral Q1H PRN Bryson Hdz MD        Or    LORazepam (ATIVAN) injection 1 mg  1 mg IntraVENous Q1H PRANNE Hdz MD        Or    LORazepam (ATIVAN) tablet 2 mg  2 mg Oral Q1H PRN Bryson Hdz MD        Or    LORazepam (ATIVAN) injection 2 mg  2 mg IntraVENous Q1H PRANNE Hdz MD        Or    LORazepam (ATIVAN) tablet 3 mg  3 mg Oral Q1H PRN Bryson Hdz MD        Or    LORazepam (ATIVAN) injection 3 mg  3 mg IntraVENous Q1H PRANNE Hdz MD        Or    LORazepam (ATIVAN) tablet 4 mg  4 mg Oral Q1H PRN Bryson Hdz MD Or    LORazepam (ATIVAN) injection 4 mg  4 mg IntraVENous Q1H PRN Tiffany Hu MD        lactulose (CHRONULAC) 10 GM/15ML solution 20 g  20 g Oral BID Lorena Costa MD   20 g at 01/12/23 0914    octreotide (SANDOSTATIN) 500 mcg in sodium chloride 0.9 % 100 mL infusion  50 mcg/hr IntraVENous Continuous Ashely Briceño MD 10 mL/hr at 01/12/23 2143 50 mcg/hr at 01/12/23 2143    midodrine (PROAMATINE) tablet 7.5 mg  7.5 mg Oral Once Ashely Bowie MD        pantoprazole (PROTONIX) 80 mg in sodium chloride (PF) 0.9 % 20 mL injection  80 mg IntraVENous Daily Ashely Velez MD   80 mg at 01/12/23 2157    sodium chloride flush 0.9 % injection 5-40 mL  5-40 mL IntraVENous 2 times per day Tiffany Hu MD   10 mL at 01/11/23 2106    sodium chloride flush 0.9 % injection 5-40 mL  5-40 mL IntraVENous PRN Tiffany Hu MD        0.9 % sodium chloride infusion  25 mL IntraVENous PRN Tiffany Hu MD        ondansetron (ZOFRAN-ODT) disintegrating tablet 4 mg  4 mg Oral Q8H PRN Tiffany Hu MD        Or    ondansetron [facility]) injection 4 mg  4 mg IntraVENous Q6H PRN Tiffany Hu MD        polyethylene glycol (GLYCOLAX) packet 17 g  17 g Oral Daily PRN Tiffany Hu MD        lidocaine 4 % external patch 1 patch  1 patch TransDERmal Daily Tiffany Hu MD   1 patch at 01/11/23 0835       Allergies:     Allergies   Allergen Reactions    Oxycodone-Acetaminophen Itching     Itching and nausea         Problem List:    Patient Active Problem List   Diagnosis Code    Major depressive disorder, severe ([facility] [address] C97.0    Alcoholic hepatitis with ascites K70.11    GI bleeding K92.2    Impaired mobility and activities of daily living dt encephalopathy and gait ataxia Z74.09, Z78.9    Bursitis of foot M77.50    Generalized pain R52    Myalgia, unspecified site M79.10    Pain in left foot M79.672    Toe deformity, acquired M20.60    Acute alcoholic hepatitis S18.31    Impaired mobility and ADLs Z74.09, Z78.9  GI bleed K92.2    WILLIE (acute kidney injury) ([facility] [address] N17.9       Past Medical History:        Diagnosis Date    Alcohol abuse     Tobacco dependence        Past Surgical History:        Procedure Laterality Date    APPENDECTOMY      FOOT SURGERY      reports 6 sx on L foot and one on right foot    PARACENTESIS Left 09/13/2022    1510 ml removed per Dr Meri Monahan  specimen obtained   [place] Left 01/11/2023    4720 ml removed by Dr. Meri Monahan - therapeutic & diagnostic   [facility] ENDOSCOPY N/A 09/09/2022    EGD DIAGNOSTIC ONLY performed by Juanito Sanchez MD at [address] History:    Social History     Tobacco Use    Smoking status: Every Day     Packs/day: 1.00     Types: Cigarettes    Smokeless tobacco: Never   Substance Use Topics    Alcohol use: Yes     Comment: 6/9/22: completed 30 day rehab 2 weeks ago, relapse one week ago; unable to quantify EtOH consumption                                Ready to quit: Not Answered  Counseling given: Not Answered      Vital Signs (Current):   Vitals:    01/13/23 0710 01/13/23 1143 01/13/23 1230 01/13/23 1234   BP: 107/61 (!) 101/57 (!) 99/55 (!) 99/55   Pulse: 84 91 94 94   Resp: 18 20 20    Temp: 36.6 °C (97.9 °F) 36.7 °C (98.1 °F) 36.6 °C (97.9 °F) 36.6 °C (97.9 °F)   TempSrc: Oral Oral  Oral   SpO2: 92% 96% 99% 99%   Weight:       Height:                                                  BP Readings from Last 3 Encounters:   01/13/23 (!) 99/55   01/10/23 112/60   12/13/22 124/80       NPO Status:                                                                                 BMI:   Wt Readings from Last 3 Encounters:   01/10/23 174 lb (78.9 kg)   01/10/23 174 lb (78.9 kg)   11/05/22 160 lb (72.6 kg)     Body mass index is 28.96 kg/m².     CBC:   Lab Results   Component Value Date/Time    WBC 11.0 01/13/2023 03:18 AM    RBC 2.29 01/13/2023 03:18 AM    HGB 8.7 01/13/2023 11:04 AM    HCT 25.3 01/13/2023 11:04 AM .7 01/13/2023 03:18 AM    RDW 29.1 01/13/2023 03:18 AM     01/13/2023 03:18 AM       CMP:   Lab Results   Component Value Date/Time     01/13/2023 03:18 AM    K 3.7 01/13/2023 03:18 AM    CL 96 01/13/2023 03:18 AM    CO2 25 01/13/2023 03:18 AM    BUN 29 01/13/2023 03:18 AM    CREATININE 1.83 01/13/2023 03:18 AM    GFRAA >60.0 10/05/2022 04:52 PM    LABGLOM 35.6 01/13/2023 03:18 AM    GLUCOSE 120 01/13/2023 03:18 AM    PROT 4.6 01/13/2023 03:18 AM    CALCIUM 7.4 01/13/2023 03:18 AM    BILITOT 19.9 01/13/2023 03:18 AM    ALKPHOS 160 01/13/2023 03:18 AM     01/13/2023 03:18 AM    ALT 34 01/13/2023 03:18 AM       POC Tests: No results for input(s): POCGLU, POCNA, POCK, POCCL, POCBUN, POCHEMO, POCHCT in the last 72 hours. Coags:   Lab Results   Component Value Date/Time    PROTIME 23.0 01/10/2023 07:30 PM    INR 2.0 01/10/2023 07:30 PM    APTT 48.9 01/10/2023 07:30 PM       HCG (If Applicable):   Lab Results   Component Value Date    PREGTESTUR Negative 08/26/2022        ABGs: No results found for: PHART, PO2ART, JFQ2WQY, UJW2UOF, BEART, L0RFQXZY     Type & Screen (If Applicable):  No results found for: LABABO, LABRH    Drug/Infectious Status (If Applicable):  Lab Results   Component Value Date/Time    HEPCAB NONREACTIVE 09/05/2022 05:00 AM       COVID-19 Screening (If Applicable):   Lab Results   Component Value Date/Time    COVID19 Not Detected 09/15/2022 04:02 PM           Anesthesia Evaluation  Patient summary reviewed and Nursing notes reviewed  Airway: Mallampati: II          Dental:          Pulmonary:   (+) decreased breath sounds current smoker                           Cardiovascular:Negative CV ROS                      Neuro/Psych:   (+) depression/anxiety             GI/Hepatic/Renal:   (+) liver disease:,           Endo/Other: Negative Endo/Other ROS                    Abdominal:             Vascular:           Other Findings:           Anesthesia Plan      MAC     ASA 3 Induction: intravenous. Anesthetic plan and risks discussed with patient.       Plan discussed with surgical team.                    SANTA Rivas - CAMILO   1/13/2023

## 2023-01-13 NOTE — PROGRESS NOTES
Vascular Medicine and Interventional Radiology:    PROGRESS NOTE:       Janine Vazquez  : 1985  MR #: [id number]       PCP:  Melissa Wallace MD     Attending Physician: Avinash Powers DO     Date of Admission: 1/10/2023  6:17 PM     Chief Complaint:   Chief Complaint   Patient presents with    Rectal Bleeding     Pt c/o rectal bleeding x3 days - bright red blood, denies anticoagulation; states that she was advised to come to ED by GI    Abnormal Lab     Had labs done today, abnormal renal fx    Cirrhosis        SUBJECTIVE:   Janine Vazquez seen and examined. S/P paracentesis done 2023 drained for 4720 cc. Denies any further abdominal distention or discomfort. Past Medical History:   has a past medical history of Alcohol abuse and Tobacco dependence. Past SurgicalHistory:   has a past surgical history that includes Appendectomy; Foot surgery; Tubal ligation; Upper gastrointestinal endoscopy (N/A, 2022); Paracentesis (Left, 2022); and Paracentesis (Left, 2023). Allergies:Oxycodone-acetaminophen    Home Medications:   Prior to Admission medications    Medication Sig Start Date End Date Taking? Authorizing Provider   pantoprazole (PROTONIX) 40 MG tablet Take 1 tablet by mouth every morning (before breakfast) 1/10/23   Glenna Martel APRN - CNP   sertraline (ZOLOFT) 50 MG tablet Take 50 mg by mouth daily  Patient not taking: Reported on 1/10/2023    Historical Provider, MD   diazePAM (VALIUM) 5 MG tablet Take 5 mg by mouth every 6 hours as needed for Anxiety.   Patient not taking: Reported on 1/10/2023    Historical Provider, MD   metroNIDAZOLE (FLAGYL) 500 MG tablet Take 500 mg by mouth 3 times daily  Patient not taking: Reported on 1/10/2023    Historical Provider, MD   atenolol (TENORMIN) 25 MG tablet Take 25 mg by mouth daily  Patient not taking: Reported on 1/10/2023    Historical Provider, MD   spironolactone (ALDACTONE) 50 MG tablet Take 1 tablet by mouth daily 22 Jeremiah Calix MD   furosemide (LASIX) 20 MG tablet Take 1 tablet by mouth daily 12/13/22   Jeremiah Calix MD   nicotine (Pagan Grammes) 14 MG/24HR Place 1 patch onto the skin daily  Patient not taking: Reported on 1/10/2023 9/23/22   Lucrecia Torres DO   lactulose ([employer]) 10 GM/15ML solution Take 30 mLs by mouth 3 times daily  Patient not taking: No sig reported 9/22/22   SANTA Cornejo NP   magnesium oxide (MAG-OX) 400 (240 Mg) MG tablet Take 1 tablet by mouth daily  Patient not taking: Reported on 1/10/2023 9/23/22   SANTA Cornejo NP   simethicone (MYLICON) 80 MG chewable tablet Take 1 tablet by mouth every 6 hours as needed for Flatulence  Patient not taking: Reported on 1/10/2023 9/22/22   SANTA Cornejo NP   thiamine 100 MG tablet Take 1 tablet by mouth daily  Patient not taking: No sig reported 9/23/22   SANTA Cornejo NP   mirtazapine (REMERON) 15 MG tablet Take 1 tablet by mouth nightly  Patient not taking: Reported on 1/10/2023 6/13/22   Osvaldo Scales MD   Multiple Vitamin (MULTIVITAMIN) TABS tablet Take 1 tablet by mouth daily  Patient not taking: No sig reported 6/14/22   Osvaldo Scales MD        Family History:   Family History   Problem Relation Age of Onset    High Cholesterol Mother     Hypertension Mother     Melanoma Father     High Cholesterol Father     Hypertension Father     Colon Cancer Neg Hx       SocialHistory:    Social History     Socioeconomic History    Marital status: Single     Spouse name: Not on file    Number of children: 2    Years of education: Not on file    Highest education level: Not on file   Occupational History    Not on file   Tobacco Use    Smoking status: Every Day     Packs/day: 1.00     Types: Cigarettes    Smokeless tobacco: Never   Vaping Use    Vaping Use: Never used   Substance and Sexual Activity    Alcohol use: Yes     Comment: 6/9/22: completed 30 day rehab 2 weeks ago, relapse one week ago; unable to quantify EtOH consumption    Drug use: Yes     Types: Marijuana Jet Hdez)    Sexual activity: Not on file   Other Topics Concern    Not on file   Social History Narrative    Lives With: 10 yo daughter, SO sometimes stays with pt, he works odd jobs    Older 22 yo dtr lives with a boyfriend    Type of Home: House in [place]-[address] [facility]: Two level, Laundry in basement (flights to and from basement and second floor with HR)    Home Access: Stairs to enter with rails- Number of Steps: 5- Rails: Both    Bathroom Shower/Tub: Tub/Shower unit    Home Equipment:  (no AD)    ADL Assistance: Independent    Homemaking Assistance: Independent    Ambulation Assistance: Independent (no AD)    Transfer Assistance: Independent    Active : Yes    Type of Occupation: unemployed    GED--then some college. Social Determinants of Health     Financial Resource Strain: Not on file   Food Insecurity: Not on file   Transportation Needs: Not on file   Physical Activity: Not on file   Stress: Not on file   Social Connections: Not on file   Intimate Partner Violence: Not on file   Housing Stability: Not on file        ROS:   Review of Systems   Constitutional: Negative. Negative for chills, fatigue and fever. HENT: Negative. Negative for congestion, ear pain, sore throat and trouble swallowing. Eyes: Negative. Negative for visual disturbance. Respiratory: Negative. Negative for cough, shortness of breath and wheezing. Cardiovascular: Negative. Negative for chest pain, palpitations and leg swelling. Gastrointestinal: Negative. Negative for abdominal pain, diarrhea, nausea and vomiting. Endocrine: Negative. Genitourinary: Negative. Negative for difficulty urinating, dysuria and hematuria. Musculoskeletal: Negative. Negative for back pain. Skin: Negative. Negative for color change, rash and wound. Neurological: Negative. Negative for dizziness, weakness, light-headedness, numbness and headaches. Hematological: Negative. Does not bruise/bleed easily. Psychiatric/Behavioral: Negative. The patient is not nervous/anxious. All other systems reviewed and are negative. Objective:   Vitals: /61   Pulse 84   Temp 97.9 °F (36.6 °C) (Oral)   Resp 18   Ht 5' 5\" (1.651 m)   Wt 174 lb (78.9 kg)   SpO2 92%   BMI 28.96 kg/m²      Physical Examination:      Physical Exam  Constitutional:       General: She is not in acute distress. Appearance: Normal appearance. She is not ill-appearing. HENT:      Head: Normocephalic. Nose: No congestion. Cardiovascular:      Rate and Rhythm: Normal rate. Heart sounds: Normal heart sounds. Pulmonary:      Effort: Pulmonary effort is normal.   Abdominal:      General: Bowel sounds are normal. There is no distension. Palpations: Abdomen is soft. Tenderness: There is no abdominal tenderness. Musculoskeletal:         General: Normal range of motion. Cervical back: Normal range of motion. Right lower leg: No edema. Left lower leg: No edema. Skin:     General: Skin is warm and dry. Neurological:      Mental Status: She is alert and oriented to person, place, and time. Psychiatric:         Mood and Affect: Mood normal.         Behavior: Behavior normal.         1/11/2023  Impression   1. Status post technically successful ultrasound-guided paracentesis. Jacob Gonzalez is a Female of 42 years age, referred for Ultrasound Guided Paracentesis. PROCEDURE: Survey of the abdomen showed large amount of ascites fluid. After obtaining informed consent, the patient was positioned supine on the sonography table. Using ultrasound, the skin over the left hemiabdomen was locally anesthetized with 1% lidocaine. Following that, a Yueh needle was advanced into the fluid    pocket using ultrasound visualization. 4720cc, of clear yellow fluid were aspirated and sent for cytology, and pathology.   The needle was removed, and hemostasis was obtained with pressure. A Band-Aid was placed. Post procedure images did not demonstrate hemorrhage at the target site. The patient tolerated the procedure well. The patient left the department in good condition. A radiology nurse was in presence monitoring vital signs, assisting throughout the procedure. ASSESSMENT:  Alcoholic cirrhosis of liver with Ascites: S/P paracentesis done 1/11/2023 drained for 4720 cc. PLAN:   No further paracentesis needed. No further follow up care with IR. IR to sign off services.      Electronically signed by SANTA Briscoe CNP on 1/13/23 at 3:58 PM EST

## 2023-01-13 NOTE — PROGRESS NOTES
Assumed care of patient. She just returned from gastro center. Diet orders received. Patient denies pain. Give small snack and drink. Denies other needs at this time. Call light in reach.

## 2023-01-13 NOTE — PROGRESS NOTES
Gregory Kauffman is a 42 y.o. female patient.     Current Facility-Administered Medications   Medication Dose Route Frequency Provider Last Rate Last Admin    midodrine (PROAMATINE) tablet 10 mg  10 mg Oral TID KATYA Vazquez,    10 mg at 01/12/23 1739    0.9 % sodium chloride infusion   IntraVENous Continuous Tony Coupe,  mL/hr at 01/12/23 1128 New Bag at 01/12/23 1128    sodium chloride flush 0.9 % injection 5-40 mL  5-40 mL IntraVENous 2 times per day William Yao MD   10 mL at 01/11/23 2105    sodium chloride flush 0.9 % injection 5-40 mL  5-40 mL IntraVENous PRN William Yao MD        0.9 % sodium chloride infusion   IntraVENous PRN William Yao MD        thiamine tablet 100 mg  100 mg Oral Daily William Yao MD   100 mg at 01/12/23 0914    LORazepam (ATIVAN) tablet 1 mg  1 mg Oral Q1H PRN William Yao MD        Or    LORazepam (ATIVAN) injection 1 mg  1 mg IntraVENous Q1H PRN William Yao MD        Or    LORazepam (ATIVAN) tablet 2 mg  2 mg Oral Q1H PRN William Yao MD        Or    LORazepam (ATIVAN) injection 2 mg  2 mg IntraVENous Q1H PRN William Yao MD        Or    LORazepam (ATIVAN) tablet 3 mg  3 mg Oral Q1H PRN William Yao MD        Or    LORazepam (ATIVAN) injection 3 mg  3 mg IntraVENous Q1H PRN William Yao MD        Or    LORazepam (ATIVAN) tablet 4 mg  4 mg Oral Q1H PRN William Yao MD        Or    LORazepam (ATIVAN) injection 4 mg  4 mg IntraVENous Q1H PRN William Yao MD        lactulose (CHRONULAC) 10 GM/15ML solution 20 g  20 g Oral BID Sekou Davis MD   20 g at 01/12/23 0914    octreotide (SANDOSTATIN) 500 mcg in sodium chloride 0.9 % 100 mL infusion  50 mcg/hr IntraVENous Continuous Zaida Jody Willis MD 10 mL/hr at 01/12/23 1128 50 mcg/hr at 01/12/23 1128    midodrine (PROAMATINE) tablet 7.5 mg  7.5 mg Oral Once Boaz Pavon MD        pantoprazole (PROTONIX) 80 mg in sodium chloride (PF) 0.9 % 20 mL injection  80 mg IntraVENous Daily Zaida Willis MD   29 mg at 01/11/23 2114    sodium chloride flush 0.9 % injection 5-40 mL  5-40 mL IntraVENous 2 times per day Amador Singer MD   10 mL at 01/11/23 2106    sodium chloride flush 0.9 % injection 5-40 mL  5-40 mL IntraVENous PRN Amador Singer MD        0.9 % sodium chloride infusion  25 mL IntraVENous PRN Amador Singer MD        ondansetron (ZOFRAN-ODT) disintegrating tablet 4 mg  4 mg Oral Q8H PRN Amador Singer MD        Or    ondansetron [facility]) injection 4 mg  4 mg IntraVENous Q6H PRN Amador Singer MD        polyethylene glycol (GLYCOLAX) packet 17 g  17 g Oral Daily PRN Amador Singer MD        lidocaine 4 % external patch 1 patch  1 patch TransDERmal Daily Amador Singer MD   1 patch at 01/11/23 0835     Allergies   Allergen Reactions    Oxycodone-Acetaminophen Itching     Itching and nausea       Principal Problem:    GI bleed  Active Problems:    WILLIE (acute kidney injury) ([facility] [address]  Resolved Problems:    * No resolved hospital problems. *    Blood pressure (!) 102/52, pulse 92, temperature 98.6 °F (37 °C), temperature source Oral, resp. rate 18, height 5' 5\" (1.651 m), weight 174 lb (78.9 kg), SpO2 95 %. Subjective patient had multiple episodes of rectal bleed  Objective paracentesis and ascitic fluid study showed no pleocytosis  Assessment & Plan for colonoscopy oliver Parks MD  1/12/2023

## 2023-01-13 NOTE — PROGRESS NOTES
Pt assessment and vitals complete and stable. Patient up to commode frequently with bloody output. Patient denies needs at this time. Will get plasma transfusions per orders. Patient Jaundice.

## 2023-01-14 PROBLEM — K70.31 ALCOHOLIC CIRRHOSIS OF LIVER WITH ASCITES (HCC): Status: ACTIVE | Noted: 2023-01-14

## 2023-01-14 LAB
ALBUMIN SERPL-MCNC: 2.1 G/DL (ref 3.5–4.6)
ALP BLD-CCNC: 155 U/L (ref 40–130)
ALT SERPL-CCNC: 36 U/L (ref 0–33)
ANION GAP SERPL CALCULATED.3IONS-SCNC: 10 MEQ/L (ref 9–15)
ANISOCYTOSIS: ABNORMAL
AST SERPL-CCNC: 134 U/L (ref 0–35)
BASOPHILS ABSOLUTE: 0 K/UL (ref 0–0.2)
BASOPHILS RELATIVE PERCENT: 0.3 %
BILIRUB SERPL-MCNC: 19.6 MG/DL (ref 0.2–0.7)
BLOOD BANK DISPENSE STATUS: NORMAL
BLOOD BANK DISPENSE STATUS: NORMAL
BLOOD BANK PRODUCT CODE: NORMAL
BLOOD BANK PRODUCT CODE: NORMAL
BPU ID: NORMAL
BPU ID: NORMAL
BUN BLDV-MCNC: 27 MG/DL (ref 6–20)
CALCIUM SERPL-MCNC: 7.9 MG/DL (ref 8.5–9.9)
CHLORIDE BLD-SCNC: 102 MEQ/L (ref 95–107)
CO2: 22 MEQ/L (ref 20–31)
CREAT SERPL-MCNC: 1.66 MG/DL (ref 0.5–0.9)
DESCRIPTION BLOOD BANK: NORMAL
DESCRIPTION BLOOD BANK: NORMAL
EOSINOPHILS ABSOLUTE: 0 K/UL (ref 0–0.7)
EOSINOPHILS RELATIVE PERCENT: 0.2 %
GFR SERPL CREATININE-BSD FRML MDRD: 40 ML/MIN/{1.73_M2}
GLOBULIN: 2.8 G/DL (ref 2.3–3.5)
GLUCOSE BLD-MCNC: 157 MG/DL (ref 70–99)
GLUCOSE BLD-MCNC: 178 MG/DL (ref 70–99)
GLUCOSE BLD-MCNC: 202 MG/DL (ref 70–99)
GLUCOSE BLD-MCNC: 228 MG/DL (ref 70–99)
HCT VFR BLD CALC: 22.2 % (ref 37–47)
HCT VFR BLD CALC: 24 % (ref 37–47)
HCT VFR BLD CALC: 25.3 % (ref 37–47)
HEMATOLOGY PATH CONSULT: YES
HEMOGLOBIN: 7.8 G/DL (ref 12–16)
HEMOGLOBIN: 8.3 G/DL (ref 12–16)
HEMOGLOBIN: 8.7 G/DL (ref 12–16)
HYPOCHROMIA: ABNORMAL
LYMPHOCYTES ABSOLUTE: 0.6 K/UL (ref 1–4.8)
LYMPHOCYTES RELATIVE PERCENT: 7.7 %
MACROCYTES: ABNORMAL
MCH RBC QN AUTO: 36.3 PG (ref 27–31.3)
MCHC RBC AUTO-ENTMCNC: 35.2 % (ref 33–37)
MCV RBC AUTO: 103.2 FL (ref 79.4–94.8)
MONOCYTES ABSOLUTE: 0.4 K/UL (ref 0.2–0.8)
MONOCYTES RELATIVE PERCENT: 4.4 %
NEUTROPHILS ABSOLUTE: 7.2 K/UL (ref 1.4–6.5)
NEUTROPHILS RELATIVE PERCENT: 87.4 %
PDW BLD-RTO: 28 % (ref 11.5–14.5)
PERFORMED ON: ABNORMAL
PLATELET # BLD: 108 K/UL (ref 130–400)
PLATELET SLIDE REVIEW: ABNORMAL
POIKILOCYTES: ABNORMAL
POLYCHROMASIA: ABNORMAL
POTASSIUM REFLEX MAGNESIUM: 4.3 MEQ/L (ref 3.4–4.9)
RBC # BLD: 2.15 M/UL (ref 4.2–5.4)
SCHISTOCYTES: ABNORMAL
SODIUM BLD-SCNC: 134 MEQ/L (ref 135–144)
TARGET CELLS: ABNORMAL
TOTAL PROTEIN: 4.9 G/DL (ref 6.3–8)
WBC # BLD: 8.3 K/UL (ref 4.8–10.8)

## 2023-01-14 PROCEDURE — 6360000002 HC RX W HCPCS: Performed by: INTERNAL MEDICINE

## 2023-01-14 PROCEDURE — 6370000000 HC RX 637 (ALT 250 FOR IP): Performed by: INTERNAL MEDICINE

## 2023-01-14 PROCEDURE — 6360000002 HC RX W HCPCS: Performed by: NURSE PRACTITIONER

## 2023-01-14 PROCEDURE — 99232 SBSQ HOSP IP/OBS MODERATE 35: CPT | Performed by: NURSE PRACTITIONER

## 2023-01-14 PROCEDURE — 36415 COLL VENOUS BLD VENIPUNCTURE: CPT

## 2023-01-14 PROCEDURE — 6360000002 HC RX W HCPCS: Performed by: EMERGENCY MEDICINE

## 2023-01-14 PROCEDURE — 2580000003 HC RX 258: Performed by: EMERGENCY MEDICINE

## 2023-01-14 PROCEDURE — 80053 COMPREHEN METABOLIC PANEL: CPT

## 2023-01-14 PROCEDURE — 85018 HEMOGLOBIN: CPT

## 2023-01-14 PROCEDURE — 1210000000 HC MED SURG R&B

## 2023-01-14 PROCEDURE — 85025 COMPLETE CBC W/AUTO DIFF WBC: CPT

## 2023-01-14 PROCEDURE — 6370000000 HC RX 637 (ALT 250 FOR IP): Performed by: SPECIALIST

## 2023-01-14 PROCEDURE — A4216 STERILE WATER/SALINE, 10 ML: HCPCS | Performed by: NURSE PRACTITIONER

## 2023-01-14 PROCEDURE — 99254 IP/OBS CNSLTJ NEW/EST MOD 60: CPT | Performed by: SURGERY

## 2023-01-14 PROCEDURE — 85014 HEMATOCRIT: CPT

## 2023-01-14 PROCEDURE — 2580000003 HC RX 258: Performed by: NURSE PRACTITIONER

## 2023-01-14 PROCEDURE — C9113 INJ PANTOPRAZOLE SODIUM, VIA: HCPCS | Performed by: NURSE PRACTITIONER

## 2023-01-14 PROCEDURE — 2580000003 HC RX 258: Performed by: INTERNAL MEDICINE

## 2023-01-14 RX ADMIN — LACTULOSE 20 G: 20 SOLUTION ORAL at 08:59

## 2023-01-14 RX ADMIN — Medication 100 MG: at 08:59

## 2023-01-14 RX ADMIN — OCTREOTIDE ACETATE 50 MCG/HR: 500 INJECTION, SOLUTION INTRAVENOUS; SUBCUTANEOUS at 23:18

## 2023-01-14 RX ADMIN — MIDODRINE HYDROCHLORIDE 10 MG: 5 TABLET ORAL at 08:59

## 2023-01-14 RX ADMIN — PREDNISONE 30 MG: 20 TABLET ORAL at 08:59

## 2023-01-14 RX ADMIN — Medication 10 ML: at 21:58

## 2023-01-14 RX ADMIN — Medication 10 ML: at 22:01

## 2023-01-14 RX ADMIN — LACTULOSE 20 G: 20 SOLUTION ORAL at 21:57

## 2023-01-14 RX ADMIN — SPIRONOLACTONE 25 MG: 25 TABLET ORAL at 09:00

## 2023-01-14 RX ADMIN — SODIUM CHLORIDE 40 MG: 9 INJECTION, SOLUTION INTRAMUSCULAR; INTRAVENOUS; SUBCUTANEOUS at 21:57

## 2023-01-14 RX ADMIN — OCTREOTIDE ACETATE 50 MCG/HR: 500 INJECTION, SOLUTION INTRAVENOUS; SUBCUTANEOUS at 11:50

## 2023-01-14 RX ADMIN — OCTREOTIDE ACETATE 50 MCG/HR: 500 INJECTION, SOLUTION INTRAVENOUS; SUBCUTANEOUS at 01:12

## 2023-01-14 RX ADMIN — MIDODRINE HYDROCHLORIDE 10 MG: 5 TABLET ORAL at 17:43

## 2023-01-14 RX ADMIN — Medication 10 ML: at 09:01

## 2023-01-14 RX ADMIN — HYDROMORPHONE HYDROCHLORIDE 0.5 MG: 1 INJECTION, SOLUTION INTRAMUSCULAR; INTRAVENOUS; SUBCUTANEOUS at 23:17

## 2023-01-14 RX ADMIN — MIDODRINE HYDROCHLORIDE 10 MG: 5 TABLET ORAL at 12:35

## 2023-01-14 RX ADMIN — HYDROMORPHONE HYDROCHLORIDE 0.5 MG: 1 INJECTION, SOLUTION INTRAMUSCULAR; INTRAVENOUS; SUBCUTANEOUS at 05:47

## 2023-01-14 RX ADMIN — Medication 10 ML: at 09:02

## 2023-01-14 ASSESSMENT — PAIN DESCRIPTION - LOCATION
LOCATION: BACK
LOCATION: ABDOMEN

## 2023-01-14 ASSESSMENT — PAIN DESCRIPTION - DESCRIPTORS
DESCRIPTORS: PRESSURE
DESCRIPTORS: DISCOMFORT

## 2023-01-14 ASSESSMENT — PAIN SCALES - GENERAL
PAINLEVEL_OUTOF10: 8
PAINLEVEL_OUTOF10: 8

## 2023-01-14 ASSESSMENT — PAIN DESCRIPTION - ORIENTATION
ORIENTATION: LOWER;UPPER
ORIENTATION: RIGHT;LEFT

## 2023-01-14 NOTE — PROGRESS NOTES
Internal Medicine   Hospitalist   Progress Note    2023   2:54 PM    Name:  German Jorge  MRN:    [id number]     IP Day: 4     Admit Date: 1/10/2023  6:17 PM  PCP: Lorin Ryan MD    Code Status:  Full Code    Assessment and Plan: Active Problems/ diagnosis:     GI bleeding  Acute posthemorrhagic anemia  WILLIE  Alcoholic cirrhosis with ascites  Alcohol abuse with risk of withdrawal  Hyponatremia in the setting of alcohol abuse    Plan  Continue to monitor H&H, monitor vitals  Transfuse for Hb <7.0  Abdomen more distended, DC IV fluid, resume Aldactone, renal function is improving. Encourage p.o. intake. Nephrologist is following. Monitor sodium. Hold nephrotoxic meds. status post paracentesis-follow-not consistent with SBP, started on steroids by GI for alcoholic hepatitis. Educated on etoh cessation and need for transplant hepatology follow up, will be arranged by GI  Continue CIWA protocol  Discussed plan of care with patient  DVT PPx-SCDs    7 pm- 7 am, please contact on call Hospitalist for any needs     Dispo-may need another para prior to dc     Subjective:     No new symptoms. Physical Examination:      Vitals:  /73   Pulse (!) 109   Temp 98.2 °F (36.8 °C) (Oral)   Resp 18   Ht 5' 5\" (1.651 m)   Wt 174 lb (78.9 kg)   SpO2 97%   BMI 28.96 kg/m²   Temp (24hrs), Av.2 °F (36.8 °C), Min:98.2 °F (36.8 °C), Max:98.2 °F (36.8 °C)      General appearance: alert, cooperative and no distress  Mental Status: oriented to person, place and time and normal affect  Lungs: clear to auscultation bilaterally, normal effort  Heart: regular rate and rhythm, no murmur  Abdomen: soft, nontender, +distended, bowel sounds present, no masses  Extremities: no edema, redness, tenderness in the calves  Skin: Jaundiced.   No gross lesions, rashes    Data:     Labs:  Recent Labs     23  0318 23  1104 23  0331 23  1108   WBC 11.0*  --  8.3  --    HGB 8.0*  8.4*   < > 7.8* 8.7*   PLT 122*  --  108*  --     < > = values in this interval not displayed.      Recent Labs     01/13/23  0318 01/14/23  0331   * 134*   K 3.7 4.3   CL 96 102   CO2 25 22   BUN 29* 27*   CREATININE 1.83* 1.66*   GLUCOSE 120* 157*     Recent Labs     01/13/23  0318 01/14/23  0331   * 134*   ALT 34* 36*   BILITOT 19.9* 19.6*   ALKPHOS 160* 155*       Current Facility-Administered Medications   Medication Dose Route Frequency Provider Last Rate Last Admin    pantoprazole (PROTONIX) 40 mg in sodium chloride (PF) 0.9 % 10 mL injection  40 mg IntraVENous Daily Tanner Cunningham APRN - CNP        spironolactone (ALDACTONE) tablet 25 mg  25 mg Oral Daily Hermon Poplin Bescak, DO   25 mg at 01/14/23 0900    0.9 % sodium chloride infusion   IntraVENous PRN [address], DO        HYDROmorphone (DILAUDID) injection 0.5 mg  0.5 mg IntraVENous Q4H PRN [address], DO   0.5 mg at 01/14/23 0547    midodrine (PROAMATINE) tablet 10 mg  10 mg Oral TID WC Hermon Poplin Bescak, DO   10 mg at 01/14/23 1235    0.9 % sodium chloride infusion   IntraVENous Continuous [address],  mL/hr at 01/13/23 2206 New Bag at 01/13/23 2206    sodium chloride flush 0.9 % injection 5-40 mL  5-40 mL IntraVENous 2 times per day Juliana Judge MD   10 mL at 01/14/23 0901    sodium chloride flush 0.9 % injection 5-40 mL  5-40 mL IntraVENous PRN Juliana Judge MD        0.9 % sodium chloride infusion   IntraVENous PRN Juliana Judge MD        thiamine tablet 100 mg  100 mg Oral Daily Juliana Judge MD   100 mg at 01/14/23 0859    LORazepam (ATIVAN) tablet 1 mg  1 mg Oral Q1H PRN Juliana Judge MD        Or    LORazepam (ATIVAN) injection 1 mg  1 mg IntraVENous Q1H PRN Juliana Judge MD        Or    LORazepam (ATIVAN) tablet 2 mg  2 mg Oral Q1H PRN Juliana Judge MD        Or    LORazepam (ATIVAN) injection 2 mg  2 mg IntraVENous Q1H PRN Juliana Judge MD        Or    LORazepam (ATIVAN) tablet 3 mg  3 mg Oral Q1H PRN Juliana Judge MD        Or LORazepam (ATIVAN) injection 3 mg  3 mg IntraVENous Q1H PRN Tiffanie Valero MD        Or    LORazepam (ATIVAN) tablet 4 mg  4 mg Oral Q1H PRN Tiffanie Valero MD        Or    LORazepam (ATIVAN) injection 4 mg  4 mg IntraVENous Q1H PRN Tiffanie Valero MD        lactulose (CHRONULAC) 10 GM/15ML solution 20 g  20 g Oral BID Giancarlo Mckee MD   20 g at 01/14/23 0859    octreotide (SANDOSTATIN) 500 mcg in sodium chloride 0.9 % 100 mL infusion  50 mcg/hr IntraVENous Continuous Star Mitchell MD 10 mL/hr at 01/14/23 1150 50 mcg/hr at 01/14/23 1150    midodrine (PROAMATINE) tablet 7.5 mg  7.5 mg Oral Once Star Bowie MD        sodium chloride flush 0.9 % injection 5-40 mL  5-40 mL IntraVENous 2 times per day Tiffanie Valero MD   10 mL at 01/14/23 0902    sodium chloride flush 0.9 % injection 5-40 mL  5-40 mL IntraVENous PRN Tiffanie Valero MD        0.9 % sodium chloride infusion  25 mL IntraVENous PRN Tiffanie Valero MD        ondansetron (ZOFRAN-ODT) disintegrating tablet 4 mg  4 mg Oral Q8H PRN Tiffanie Valero MD        Or    ondansetron [facility]) injection 4 mg  4 mg IntraVENous Q6H PRN Tiffanie Valero MD        polyethylene glycol (GLYCOLAX) packet 17 g  17 g Oral Daily PRN Tiffanie Valero MD        lidocaine 4 % external patch 1 patch  1 patch TransDERmal Daily Tiffanie Valero MD   1 patch at 01/13/23 2124       Additional work up or/and treatment plan may be added today or then after based on clinical progression. I am managing a portion of pt care. Some medical issues are handled by other specialists. Additional work up and treatment should be done in out pt setting by pt PCP and other out pt providers. In addition to examining and evaluating pt, I spent additional time explaining care, normaland abnormal findings, and treatment plan. All of pt questions were answered. Counseling, diet and education were provided. Case will be discussed with nursing staff when appropriate.  Family will be updated if and when appropriate.        Electronically signed by Tami Meza DO on 1/14/2023 at 2:54 PM

## 2023-01-14 NOTE — PROGRESS NOTES
Gastroenterology Progress Note    Mikala Cavazos is a 42 y.o. female patient. Hospitalization Day:4    Chief C/O: alcoholic cirrhosis    SUBJECTIVE: Seen and examined, no acute events overnight, no nausea or vomiting, tolerating diet, no abdominal pain    ROS:  Gastrointestinal ROS: no abdominal pain, change in bowel habits, or black or bloody stools    Physical    VITALS:  /73   Pulse (!) 109   Temp 98.2 °F (36.8 °C) (Oral)   Resp 18   Ht 5' 5\" (1.651 m)   Wt 174 lb (78.9 kg)   SpO2 97%   BMI 28.96 kg/m²   TEMPERATURE:  Current - Temp: 98.2 °F (36.8 °C); Max - Temp  Av.2 °F (36.8 °C)  Min: 98.2 °F (36.8 °C)  Max: 98.2 °F (36.8 °C)    General:  Alert and oriented,  No apparent distress  Skin- with jaundice  Eyes: icteric sclera  Cardiac: RRR, Nl s1s2, without murmurs  Lungs CTA Bilaterally, normal effort  Abdomen soft,distended, NT, no HSM, Bowel sounds normal  Ext: with edema  Neuro: no asterixis     Data    Data Review:    Recent Labs     23  0608 23  1133 23  0318 23  1104 23  1922 23  0331 23  1108   WBC 10.6  --  11.0*  --   --  8.3  --    HGB 8.4*   < > 8.0*  8.4*   < > 8.3* 7.8* 8.7*   HCT 24.4*   < > 23.5*  23.9*   < > 24.2* 22.2* 25.3*   .1*  --  102.7*  --   --  103.2*  --    *  --  122*  --   --  108*  --     < > = values in this interval not displayed. Recent Labs     23  0608 23  033   * 131* 134*   K 3.9 3.7 4.3   CL 96 96 102   CO2 24 25 22   BUN 35* 29* 27*   CREATININE 2.59* 1.83* 1.66*     Recent Labs     23  0608 23  0318 23  0331   * 126* 134*   ALT 34* 34* 36*   BILITOT 20.6* 19.9* 19.6*   ALKPHOS 166* 160* 155*     No results for input(s): LIPASE, AMYLASE in the last 72 hours. No results for input(s): PROTIME, INR in the last 72 hours.         ASSESSMENT:  33-year-old female admitted with decompensated alcoholic cirrhosis, has been abstinent from alcohol since 12/24/22. Had rectal bleeding since arrival with colonoscopy notable for hemorrhoids otherwise normal.  Had EGD in September 2022 notable for LA grade a esophagitis, no esophageal varices. Noted history of hospitalization in September for hepatic encephalopathy, SBP, WILLIE, & pancreatitis. Noted some clinical improvement since arrival, improved hyponatremia improved WILLIE, no further overt bleeding. S/P colonoscopy 1/13/22 notable for hemorrhoids  PLAN :  1-  Decompensated Cirrhosis secondary to ETOH  :  MELD 31, 52.6% 90 day mortality  Continues to drink alcohol intermittently. Last ETOH 12/24/22, discussed continued alcohol abstinence  2 - Grade II/III Ascites  Noted increased abdominal girth. Ascitic fluid negative for SBP, Obtain therapeutic paracentesis as needed  Previous SBP during hospital stay noted  Noted hx of pancreatitis  3- EGD for Variceal surveillance:    Had EGD that shows portal hypertensive gastropathy with no esophageal varices noted  4 - HCC screening:   Recent imaging Negative for liver mass  5-  Overt Hepatic encephalopathy:  None at this time      Thank you for allowing me to participate in the care of your patient. Please feel free to contact me with any concerns.     Selene Baxter, SANTA - CNP

## 2023-01-14 NOTE — CONSULTS
GENERAL SURGERY  CONSULT NOTE    Pt Name: Yanet Mcqueen  MRN: [id number]  Date: 1/14/2023    Consulting Physician: Dr. Bree Wilkins    Reason for consult: Hemorrhoids      SUBJECTIVE:     History of Chief Complaint:    Matthew Hernandez is a 42 y.o. female with a PMH of EtOH cirrhosis (c/b ascites requiring paracentesis) admitted with abdominal pain and distension in the setting of a GIB with WILLIE. She had BRBPR for 1 day prior to admission (1/10), no clots. Colonoscopy performed yesterday noting diverticula in ascending/ sigmoid colon and grade II internal hemorrhoids for which general surgery is consulted for. Had 1 unit FFP transfusion yesterday. Upon presentation, pt reports she had been straining to defecate for the past few weeks with constipation as her abdominal distension worsened. She has been tolerating a diet and denies feeling dizzy/ light headed, nausea/ vomiting. Had diarrhea this morning some minimal blood- improving over the past few days per pt. Hemodynamically stable with stable Hb of 8.7 today. Past Medical History:   Diagnosis Date    Alcohol abuse     Tobacco dependence      Past Surgical History:   Procedure Laterality Date    APPENDECTOMY      COLONOSCOPY N/A 1/13/2023    COLONOSCOPY DIAGNOSTIC performed by Jacob Gonzalez MD at [address]      reports 6 sx on L foot and one on right foot    PARACENTESIS Left 09/13/2022    1510 ml removed per Dr Kiki Albarado  specimen obtained    PARACENTESIS Left 01/11/2023    4720 ml removed by Dr. Kiki Albarado - therapeutic & diagnostic    [facility] ENDOSCOPY N/A 09/09/2022    EGD DIAGNOSTIC ONLY performed by Jacob Gonzalez MD at [facility]     Prior to Admission medications    Medication Sig Start Date End Date Taking?  Authorizing Provider   pantoprazole (PROTONIX) 40 MG tablet Take 1 tablet by mouth every morning (before breakfast) 1/10/23   SANTA Magana - CNP   sertraline (ZOLOFT) 50 MG tablet Take 50 mg by mouth daily  Patient not taking: Reported on 1/10/2023    Historical Provider, MD   diazePAM (VALIUM) 5 MG tablet Take 5 mg by mouth every 6 hours as needed for Anxiety.   Patient not taking: Reported on 1/10/2023    Historical Provider, MD   metroNIDAZOLE (FLAGYL) 500 MG tablet Take 500 mg by mouth 3 times daily  Patient not taking: Reported on 1/10/2023    Historical Provider, MD   atenolol (TENORMIN) 25 MG tablet Take 25 mg by mouth daily  Patient not taking: Reported on 1/10/2023    Historical Provider, MD   spironolactone (ALDACTONE) 50 MG tablet Take 1 tablet by mouth daily 12/13/22   Darrick Conrad MD   furosemide (LASIX) 20 MG tablet Take 1 tablet by mouth daily 12/13/22   Darrick Conrad MD   nicotine (NICODERM CQ) 14 MG/24HR Place 1 patch onto the skin daily  Patient not taking: Reported on 1/10/2023 9/23/22   Lucrecia Torres DO   lactulose (CHRONULAC) 10 GM/15ML solution Take 30 mLs by mouth 3 times daily  Patient not taking: No sig reported 9/22/22   Sheryle Rua, APRN - NP   magnesium oxide (MAG-OX) 400 (240 Mg) MG tablet Take 1 tablet by mouth daily  Patient not taking: Reported on 1/10/2023 9/23/22   Sheryle Rua, APRN - NP   simethicone (MYLICON) 80 MG chewable tablet Take 1 tablet by mouth every 6 hours as needed for Flatulence  Patient not taking: Reported on 1/10/2023 9/22/22   Sheryle Rua, APRN - NP   thiamine 100 MG tablet Take 1 tablet by mouth daily  Patient not taking: No sig reported 9/23/22   Sheryle Rua, APRN - NP   mirtazapine (REMERON) 15 MG tablet Take 1 tablet by mouth nightly  Patient not taking: Reported on 1/10/2023 6/13/22   Vinetta Goodpasture, MD   Multiple Vitamin (MULTIVITAMIN) TABS tablet Take 1 tablet by mouth daily  Patient not taking: No sig reported 6/14/22   Vinetta Goodpasture, MD     Allergies   Allergen Reactions    Oxycodone-Acetaminophen Itching     Itching and nausea       Family History   Problem Relation Age of Onset    High Cholesterol Mother Hypertension Mother     Melanoma Father     High Cholesterol Father     Hypertension Father     Colon Cancer Neg Hx      Social History     Tobacco Use    Smoking status: Every Day     Packs/day: 0.25     Types: Cigarettes    Smokeless tobacco: Never    Tobacco comments:     1/13/23 states 3-4 cigs per day tram   Vaping Use    Vaping Use: Never used   Substance Use Topics    Alcohol use: Yes     Comment: 1/13/23: last drink 1/7/23 Vodka    Drug use: Yes     Types: Marijuana (Weed)     OBJECTIVE:   CURRENT VITALS: /73   Pulse (!) 109   Temp 98.2 °F (36.8 °C) (Oral)   Resp 16   Ht 5' 5\" (1.651 m)   Wt 174 lb (78.9 kg)   SpO2 97%   BMI 28.96 kg/m²      GEN: Alert and oriented x3, no acute distress, cooperative, tearful at times  SKIN: Juandiced  HEENT: Head is normocephalic, atraumatic. EOMI with icteric sclera  NECK: Supple, symmetrical, trachea midline, skin normal  PULM: Chest symmetric, no increased work of breathing or accessory muscle use  CV: Heart regular rate   ABD: Soft, moderately distended, +fluid shift, diffusely mildly tender to palpation, no guarding, non peritonitic   CHINA: external hemorrhoids with small palpable internal hemorrhoids, no residual blood on seen on finger after CHINA  EXTREMITIES: Warm, dry    LABS:     Recent Labs     01/12/23  0608 01/12/23  1133 01/13/23  0318 01/13/23  1104 01/13/23  1922 01/14/23  0331   WBC 10.6  --  11.0*  --   --  8.3   HGB 8.4*   < > 8.0*  8.4* 8.7* 8.3* 7.8*   HCT 24.4*   < > 23.5*  23.9* 25.3* 24.2* 22.2*   *  --  122*  --   --  108*   *  --  131*  --   --  134*   K 3.9  --  3.7  --   --  4.3   CL 96  --  96  --   --  102   CO2 24  --  25  --   --  22   BUN 35*  --  29*  --   --  27*   CREATININE 2.59*  --  1.83*  --   --  1.66*   CALCIUM 7.9*  --  7.4*  --   --  7.9*   *  --  126*  --   --  134*   ALT 34*  --  34*  --   --  36*   BILITOT 20.6*  --  19.9*  --   --  19.6*    < > = values in this interval not displayed. RADIOLOGY:   I have personally reviewed the following films:    CT ABDOMEN PELVIS WO CONTRAST  1/11/2023  EXAMINATION: CT OF THE ABDOMEN AND PELVIS WITHOUT CONTRAST 1/10/2023 8:23 pm TECHNIQUE: CT of the abdomen and pelvis was performed without the administration of intravenous contrast. Multiplanar reformatted images are provided for review. Automated exposure control, iterative reconstruction, and/or weight based adjustment of the mA/kV was utilized to reduce the radiation dose to as low as reasonably achievable. COMPARISON: None. HISTORY: ORDERING SYSTEM PROVIDED HISTORY: paion, distention, ascites, gi bleed TECHNOLOGIST PROVIDED HISTORY: Reason for exam:->paion, distention, ascites, gi bleed Additional Contrast?->None Decision Support Exception - unselect if not a suspected or confirmed emergency medical condition->Emergency Medical Condition (MA) What reading provider will be dictating this exam?->CRC FINDINGS: Lower Chest: Small left pleural effusion. Some increased markings seen at the lung bases bilaterally suggesting possible atelectatic change. Infiltrate cannot be completely excluded. Organs: Diffuse fatty infiltration identified the liver. Cirrhotic morphology identified of the liver. Enlargement identified of the spleen. Pancreas is grossly unremarkable in appearance. Both adrenal glands are within normal limits. The kidneys are unremarkable. The gallbladder is without evidence of stones. No intrahepatic or extrahepatic biliary ductal dilatation. GI/Bowel: The stomach is unremarkable in appearance. No wall thickening. The small bowel is within normal limits. No mucosal abnormality. Some stool seen scattered diffusely throughout the colon. Diverticulosis with no evidence of diverticulitis. No evidence of obvious obstruction. Pelvis: The bladder is mildly distended with no wall thickening. The uterus is unremarkable in appearance.  Peritoneum/Retroperitoneum: There is abdominal and pelvic ascites. There is some stranding identified of the mesentery likely related to the ascites. There is no abdominal retroperitoneal lymphadenopathy. No extraluminal air. No pelvic adenopathy. No significant atherosclerotic disease within the abdominal aorta or iliac vessels. Bones/Soft Tissues: Bony structures reveal degenerative changes seen within the spine and pelvis. No ventral abdominal wall mass with small umbilical hernia containing fat only. Minimal anasarca seen within the soft tissues. Large volume ascites throughout the abdomen and pelvis with cirrhotic morphology of the liver and mild splenomegaly with diffuse fatty infiltration. There is no definite gastric hemorrhage however this is not a multiphase CT bleeding study. Evaluation is limited. Small left pleural effusion with atelectatic change seen in the lung bases bilaterally. Colonoscopy  1/13/2023  Site: [address] [place] [address] Patient Name: Adilene Webb MRN: [id number] YOB: 1985 Account: [de-identified] Gender: Female Age: 42 Years Procedure: Colonoscopy Procedure Date: 1/13/2023 Attending MD: Adrien Contreras Indications:        -  Rectal bleeding Medications:        -  See the Anesthesia note for documentation of the administered medications Complications:        -  No immediate complications. Estimated Blood Loss:        -  Estimated blood loss: none. Procedure:        - The Colonoscope was introduced through the anus and advanced to the cecum,           identified by appendiceal orifice and ileocecal valve. -  The colonoscopy was performed without difficulty. -  The patient tolerated the procedure well. -  The quality of the bowel preparation was good. -  The ileocecal valve, appendiceal orifice, and rectum were photographed. Findings:        -  A few medium-mouthed diverticula were found in the sigmoid colon and           ascending colon.   There was no evidence of diverticular bleeding.        - Internal hemorrhoids were found during retroflexion. The hemorrhoids were           Grade II (internal hemorrhoids that prolapse but reduce spontaneously). Impression:        -  Mild diverticulosis in the sigmoid colon and in the ascending colon. There           was no evidence of diverticular bleeding.        -  Internal hemorrhoids.        -  No specimens collected. Recommendation:        -  Continue present medications. - if bleeding recurs ,consider surgical consult for hemorrhoidectomy. Procedure Code(s):        - [id number], Colonoscopy, flexible; diagnostic, including collection of           specimen(s) by brushing or washing, when performed (separate procedure) Diagnosis Code(s):        - K62.5, Hemorrhage of anus and rectum        - K64.1, Second degree hemorrhoids        - K57.30, Diverticulosis of large intestine without perforation or abscess           without bleeding       CPT(R) - 2022 copyright [employer]. All Rights Reserved. The CPT codes, CCI edits and ICD codes generated are intended as suggestions       and were generated based on input data. These codes are preliminary and upon        review may be revised to meet current compliance and payer requirements. The provider is responsible for the final determination of appropriate codes,       and modifiers. Scope Withdrawal Time:       00:06:44 Signature Name: Vanessa Hargrove MD Signature Statement: This document has been electronically signed. Note Initiated On:1/13/2023 Signature Date:1/13/2023 2:28 PM      ASSESSMENT AND PLAN:   37F with PMH of EtOH cirrhosis (c/b ascites requiring paracentesis) admitted with abdominal pain and distension in the setting of a GIB with WILLIE. Colonoscopy yesterday revealed diverticula in ascending/ sigmoid colon and grade II internal hemorrhoids for which general surgery is consulted for. Remains HD stable with stable Hb and improving BPR per pt.     No surgical intervention at this time  Continue supportive care, avoid constipation and straining to defecate. Pt on lactulose.  Explained to pt who expressed understanding  Daily CBC      Melissa Cuevas MD   General surgeon    Electronically signed by Connor Amezquita MD, on 1/14/2023

## 2023-01-14 NOTE — PROGRESS NOTES
Nephrology Progress Note    Assessment:  WILLIE improved  GIB  Anemia  Hgb down 7.8 Gm  Alcohol abuse  Cirrhosis  Hyponatremia better      Plan:    Patient Active Problem List:     Major depressive disorder, severe (HCC)     Alcoholic hepatitis with ascites     GI bleeding     Impaired mobility and activities of daily living dt encephalopathy and gait ataxia     Bursitis of foot     Generalized pain     Myalgia, unspecified site     Pain in left foot     Toe deformity, acquired     Acute alcoholic hepatitis     Impaired mobility and ADLs     GI bleed     WILLIE (acute kidney injury) ([address]     Abdominal distention      Subjective:  Admit Date: 1/10/2023    Interval History: still bleeding rectally    Medications:  Scheduled Meds:   spironolactone  25 mg Oral Daily    predniSONE  30 mg Oral Daily    Followed by    Carlos Sheriff ON 1/23/2023] predniSONE  20 mg Oral Daily    Followed by    Carlos Sheriff ON 2/2/2023] predniSONE  10 mg Oral Daily    midodrine  10 mg Oral TID WC    sodium chloride flush  5-40 mL IntraVENous 2 times per day    thiamine  100 mg Oral Daily    lactulose  20 g Oral BID    midodrine  7.5 mg Oral Once    pantoprazole (PROTONIX) 40 mg injection  80 mg IntraVENous Daily    sodium chloride flush  5-40 mL IntraVENous 2 times per day    lidocaine  1 patch TransDERmal Daily     Continuous Infusions:   sodium chloride      sodium chloride 100 mL/hr at 01/13/23 2206    sodium chloride      octreotide (SandoSTATIN) infusion 50 mcg/hr (01/14/23 0112)    sodium chloride         CBC:   Recent Labs     01/13/23  0318 01/13/23  1104 01/13/23  1922 01/14/23  0331   WBC 11.0*  --   --  8.3   HGB 8.0*  8.4*   < > 8.3* 7.8*   *  --   --  108*    < > = values in this interval not displayed.      CMP:    Recent Labs     01/12/23  0608 01/13/23  0318 01/14/23  0331   * 131* 134*   K 3.9 3.7 4.3   CL 96 96 102   CO2 24 25 22   BUN 35* 29* 27*   CREATININE 2.59* 1.83* 1.66*   GLUCOSE 147* 120* 157*   CALCIUM 7.9* 7.4* 7.9* LABGLOM 23.4* 35.6* 40.0*     Troponin: No results for input(s): TROPONINI in the last 72 hours. BNP: No results for input(s): BNP in the last 72 hours. INR: No results for input(s): INR in the last 72 hours. Lipids: No results for input(s): CHOL, LDLDIRECT, TRIG, HDL, AMYLASE, LIPASE in the last 72 hours. Liver:   Recent Labs     01/14/23  0331   *   ALT 36*   ALKPHOS 155*   PROT 4.9*   LABALBU 2.1*   BILITOT 19.6*     Iron:  No results for input(s): IRONS, FERRITIN in the last 72 hours. Invalid input(s): LABIRONS  Urinalysis: No results for input(s): UA in the last 72 hours.     Objective:  Vitals: /73   Pulse (!) 109   Temp 98.2 °F (36.8 °C) (Oral)   Resp 16   Ht 5' 5\" (1.651 m)   Wt 174 lb (78.9 kg)   SpO2 97%   BMI 28.96 kg/m²    Wt Readings from Last 3 Encounters:   01/13/23 174 lb (78.9 kg)   01/10/23 174 lb (78.9 kg)   11/05/22 160 lb (72.6 kg)      24HR INTAKE/OUTPUT:    Intake/Output Summary (Last 24 hours) at 1/14/2023 0944  Last data filed at 1/13/2023 1330  Gross per 24 hour   Intake 290 ml   Output --   Net 290 ml       General: alert, in no apparent distress  HEENT: normocephalic, atraumatic, anicteric  Neck: supple, no mass  Lungs: non-labored respirations, clear to auscultation bilaterally  Heart: regular rate and rhythm, no murmurs or rubs  Abdomen: soft, non-tender, non-distended  Ext: no cyanosis, no peripheral edema  Neuro: alert and oriented, no gross abnormalities  Psych: normal mood and affect  Skin: no rash      Electronically signed by Luis Antonio Calvo DO, MD

## 2023-01-15 LAB
ALBUMIN SERPL-MCNC: 2.2 G/DL (ref 3.5–4.6)
ALP BLD-CCNC: 176 U/L (ref 40–130)
ALT SERPL-CCNC: 42 U/L (ref 0–33)
ANION GAP SERPL CALCULATED.3IONS-SCNC: 12 MEQ/L (ref 9–15)
AST SERPL-CCNC: 138 U/L (ref 0–35)
BASOPHILS ABSOLUTE: 0 K/UL (ref 0–0.2)
BASOPHILS RELATIVE PERCENT: 0.1 %
BILIRUB SERPL-MCNC: 18.9 MG/DL (ref 0.2–0.7)
BUN BLDV-MCNC: 26 MG/DL (ref 6–20)
CALCIUM SERPL-MCNC: 8.3 MG/DL (ref 8.5–9.9)
CHLORIDE BLD-SCNC: 102 MEQ/L (ref 95–107)
CO2: 21 MEQ/L (ref 20–31)
CREAT SERPL-MCNC: 1.44 MG/DL (ref 0.5–0.9)
EOSINOPHILS ABSOLUTE: 0 K/UL (ref 0–0.7)
EOSINOPHILS RELATIVE PERCENT: 0.1 %
GFR SERPL CREATININE-BSD FRML MDRD: 47.4 ML/MIN/{1.73_M2}
GLOBULIN: 3.1 G/DL (ref 2.3–3.5)
GLUCOSE BLD-MCNC: 134 MG/DL (ref 70–99)
GLUCOSE BLD-MCNC: 160 MG/DL (ref 70–99)
HCT VFR BLD CALC: 23.6 % (ref 37–47)
HCT VFR BLD CALC: 26.8 % (ref 37–47)
HEMOGLOBIN: 8.2 G/DL (ref 12–16)
HEMOGLOBIN: 9.3 G/DL (ref 12–16)
LYMPHOCYTES ABSOLUTE: 1 K/UL (ref 1–4.8)
LYMPHOCYTES RELATIVE PERCENT: 7.3 %
MCH RBC QN AUTO: 36 PG (ref 27–31.3)
MCHC RBC AUTO-ENTMCNC: 34.7 % (ref 33–37)
MCV RBC AUTO: 103.6 FL (ref 79.4–94.8)
MONOCYTES ABSOLUTE: 1 K/UL (ref 0.2–0.8)
MONOCYTES RELATIVE PERCENT: 7.4 %
NEUTROPHILS ABSOLUTE: 11.7 K/UL (ref 1.4–6.5)
NEUTROPHILS RELATIVE PERCENT: 85.1 %
PDW BLD-RTO: 28.4 % (ref 11.5–14.5)
PERFORMED ON: ABNORMAL
PLATELET # BLD: 135 K/UL (ref 130–400)
PLATELET SLIDE REVIEW: ADEQUATE
POTASSIUM REFLEX MAGNESIUM: 4.2 MEQ/L (ref 3.4–4.9)
RBC # BLD: 2.28 M/UL (ref 4.2–5.4)
REJECTED TEST: NORMAL
SODIUM BLD-SCNC: 135 MEQ/L (ref 135–144)
TOTAL PROTEIN: 5.3 G/DL (ref 6.3–8)
WBC # BLD: 13.8 K/UL (ref 4.8–10.8)

## 2023-01-15 PROCEDURE — 36415 COLL VENOUS BLD VENIPUNCTURE: CPT

## 2023-01-15 PROCEDURE — 6370000000 HC RX 637 (ALT 250 FOR IP): Performed by: INTERNAL MEDICINE

## 2023-01-15 PROCEDURE — 85025 COMPLETE CBC W/AUTO DIFF WBC: CPT

## 2023-01-15 PROCEDURE — 99232 SBSQ HOSP IP/OBS MODERATE 35: CPT | Performed by: NURSE PRACTITIONER

## 2023-01-15 PROCEDURE — 85018 HEMOGLOBIN: CPT

## 2023-01-15 PROCEDURE — 1210000000 HC MED SURG R&B

## 2023-01-15 PROCEDURE — 6370000000 HC RX 637 (ALT 250 FOR IP): Performed by: SPECIALIST

## 2023-01-15 PROCEDURE — 6360000002 HC RX W HCPCS: Performed by: INTERNAL MEDICINE

## 2023-01-15 PROCEDURE — 2580000003 HC RX 258: Performed by: INTERNAL MEDICINE

## 2023-01-15 PROCEDURE — 80053 COMPREHEN METABOLIC PANEL: CPT

## 2023-01-15 PROCEDURE — 85014 HEMATOCRIT: CPT

## 2023-01-15 RX ORDER — FUROSEMIDE 20 MG/1
20 TABLET ORAL DAILY
Status: DISCONTINUED | OUTPATIENT
Start: 2023-01-15 | End: 2023-01-17 | Stop reason: HOSPADM

## 2023-01-15 RX ORDER — PANTOPRAZOLE SODIUM 40 MG/1
40 TABLET, DELAYED RELEASE ORAL
Status: DISCONTINUED | OUTPATIENT
Start: 2023-01-15 | End: 2023-01-17 | Stop reason: HOSPADM

## 2023-01-15 RX ADMIN — Medication 100 MG: at 10:28

## 2023-01-15 RX ADMIN — LACTULOSE 20 G: 20 SOLUTION ORAL at 10:26

## 2023-01-15 RX ADMIN — FUROSEMIDE 20 MG: 20 TABLET ORAL at 15:31

## 2023-01-15 RX ADMIN — MIDODRINE HYDROCHLORIDE 10 MG: 5 TABLET ORAL at 18:05

## 2023-01-15 RX ADMIN — Medication 10 ML: at 20:45

## 2023-01-15 RX ADMIN — LORAZEPAM 1 MG: 1 TABLET ORAL at 22:01

## 2023-01-15 RX ADMIN — HYDROMORPHONE HYDROCHLORIDE 0.5 MG: 1 INJECTION, SOLUTION INTRAMUSCULAR; INTRAVENOUS; SUBCUTANEOUS at 20:44

## 2023-01-15 RX ADMIN — MIDODRINE HYDROCHLORIDE 10 MG: 5 TABLET ORAL at 10:32

## 2023-01-15 RX ADMIN — LACTULOSE 20 G: 20 SOLUTION ORAL at 20:45

## 2023-01-15 RX ADMIN — Medication 5 ML: at 10:28

## 2023-01-15 RX ADMIN — Medication 10 ML: at 20:46

## 2023-01-15 RX ADMIN — LORAZEPAM 1 MG: 2 INJECTION INTRAMUSCULAR; INTRAVENOUS at 01:36

## 2023-01-15 RX ADMIN — PANTOPRAZOLE SODIUM 40 MG: 40 TABLET, DELAYED RELEASE ORAL at 15:31

## 2023-01-15 RX ADMIN — SPIRONOLACTONE 25 MG: 25 TABLET ORAL at 10:28

## 2023-01-15 RX ADMIN — MIDODRINE HYDROCHLORIDE 10 MG: 5 TABLET ORAL at 12:59

## 2023-01-15 ASSESSMENT — PAIN - FUNCTIONAL ASSESSMENT: PAIN_FUNCTIONAL_ASSESSMENT: ACTIVITIES ARE NOT PREVENTED

## 2023-01-15 ASSESSMENT — PAIN DESCRIPTION - LOCATION: LOCATION: ABDOMEN

## 2023-01-15 ASSESSMENT — PAIN SCALES - GENERAL: PAINLEVEL_OUTOF10: 8

## 2023-01-15 ASSESSMENT — PAIN DESCRIPTION - ORIENTATION: ORIENTATION: MID

## 2023-01-15 ASSESSMENT — PAIN DESCRIPTION - DESCRIPTORS: DESCRIPTORS: PRESSURE;DISCOMFORT

## 2023-01-15 NOTE — PROGRESS NOTES
Nephrology Progress Note    Assessment:  WILLIE resolving  Cirrhosis jaundice ETOH  Anemia  Bilirubin-19      Plan:  maintain present course  candidate liver transplant? Patient Active Problem List:     Major depressive disorder, severe (HCC)     Alcoholic hepatitis with ascites     GI bleeding     Impaired mobility and activities of daily living dt encephalopathy and gait ataxia     Bursitis of foot     Generalized pain     Myalgia, unspecified site     Pain in left foot     Toe deformity, acquired     Acute alcoholic hepatitis     Impaired mobility and ADLs     GI bleed     WILLIE (acute kidney injury) (HCC)     Abdominal distention     Alcoholic cirrhosis of liver with ascites (HCC)      Subjective:  Admit Date: 1/10/2023    Interval History: alert oriented no abdominal pains    Medications:  Scheduled Meds:   pantoprazole (PROTONIX) 40 mg injection  40 mg IntraVENous Daily    spironolactone  25 mg Oral Daily    midodrine  10 mg Oral TID WC    sodium chloride flush  5-40 mL IntraVENous 2 times per day    thiamine  100 mg Oral Daily    lactulose  20 g Oral BID    midodrine  7.5 mg Oral Once    sodium chloride flush  5-40 mL IntraVENous 2 times per day    lidocaine  1 patch TransDERmal Daily     Continuous Infusions:   sodium chloride      sodium chloride      octreotide (SandoSTATIN) infusion 50 mcg/hr (01/14/23 2318)    sodium chloride         CBC:   Recent Labs     01/14/23  0331 01/14/23  1108 01/14/23  2003 01/15/23  0243   WBC 8.3  --   --  13.8*   HGB 7.8*   < > 8.3* 8.2*   *  --   --  135    < > = values in this interval not displayed. CMP:    Recent Labs     01/13/23  0318 01/14/23  0331 01/15/23  0243   * 134* 135   K 3.7 4.3 4.2   CL 96 102 102   CO2 25 22 21   BUN 29* 27* 26*   CREATININE 1.83* 1.66* 1.44*   GLUCOSE 120* 157* 160*   CALCIUM 7.4* 7.9* 8.3*   LABGLOM 35.6* 40.0* 47.4*     Troponin: No results for input(s): TROPONINI in the last 72 hours.   BNP: No results for input(s): BNP in the last 72 hours. INR: No results for input(s): INR in the last 72 hours. Lipids: No results for input(s): CHOL, LDLDIRECT, TRIG, HDL, AMYLASE, LIPASE in the last 72 hours. Liver:   Recent Labs     01/15/23  0243   *   ALT 42*   ALKPHOS 176*   PROT 5.3*   LABALBU 2.2*   BILITOT 18.9*     Iron:  No results for input(s): IRONS, FERRITIN in the last 72 hours. Invalid input(s): LABIRONS  Urinalysis: No results for input(s): UA in the last 72 hours.     Objective:  Vitals: /67   Pulse 72   Temp 97.3 °F (36.3 °C) (Oral)   Resp 18   Ht 5' 5\" (1.651 m)   Wt 174 lb (78.9 kg)   SpO2 98%   BMI 28.96 kg/m²    Wt Readings from Last 3 Encounters:   01/13/23 174 lb (78.9 kg)   01/10/23 174 lb (78.9 kg)   11/05/22 160 lb (72.6 kg)      24HR INTAKE/OUTPUT:    Intake/Output Summary (Last 24 hours) at 1/15/2023 0908  Last data filed at 1/15/2023 0540  Gross per 24 hour   Intake 188.34 ml   Output --   Net 188.34 ml       General: alert, in no apparent distress  HEENT: normocephalic, atraumatic, anicteric  Neck: supple, no mass  Lungs: non-labored respirations, clear to auscultation bilaterally  Heart: regular rate and rhythm, no murmurs or rubs  Abdomen: soft, non-tender, non-distended  Ext: no cyanosis, no peripheral edema  Neuro: alert and oriented, no gross abnormalities  Psych: normal mood and affect  Skin: no rash  jaundice      Electronically signed by Luis Eduardo Uriarte DO, MD

## 2023-01-15 NOTE — PROGRESS NOTES
Internal Medicine   Hospitalist   Progress Note    1/15/2023   1:25 PM    Name:  Gibran Ross  MRN:    [id number]     IP Day: 5     Admit Date: 1/10/2023  6:17 PM  PCP: Alva Arevalo MD    Code Status:  Full Code    Assessment and Plan: Active Problems/ diagnosis:     GI bleeding  Acute posthemorrhagic anemia  WILLIE  Alcoholic cirrhosis with ascites  Alcohol abuse with risk of withdrawal  Hyponatremia in the setting of alcohol abuse    Plan  Continue to monitor H&H, monitor vitals  Transfuse for Hb <7.0  Off of IV fluid, resume Aldactone, start lasix 20 daily, renal function is improving. Encourage p.o. intake. Nephrologist is following. Monitor sodium. Hold nephrotoxic meds. status post paracentesis-follow-not consistent with SBP, started on steroids by GI for alcoholic hepatitis. Educated on etoh cessation and need for transplant hepatology follow up, will be arranged by GI  Stop octreotide and switch ppi to po   Continue CIWA protocol  Discussed plan of care with patient  DVT PPx-SCDs    7 pm- 7 am, please contact on call Hospitalist for any needs     Dispo-ir consult for repeat para prior on mon    Subjective:     No new symptoms. Physical Examination:      Vitals:  /67   Pulse 72   Temp 97.3 °F (36.3 °C) (Oral)   Resp 18   Ht 5' 5\" (1.651 m)   Wt 174 lb (78.9 kg)   SpO2 98%   BMI 28.96 kg/m²   Temp (24hrs), Av.3 °F (36.3 °C), Min:97.3 °F (36.3 °C), Max:97.3 °F (36.3 °C)      General appearance: alert, cooperative and no distress  Mental Status: oriented to person, place and time and normal affect  Lungs: clear to auscultation bilaterally, normal effort  Heart: regular rate and rhythm, no murmur  Abdomen: soft, nontender, +distended, bowel sounds present, no masses  Extremities: no edema, redness, tenderness in the calves  Skin: Jaundiced.   No gross lesions, rashes    Data:     Labs:  Recent Labs     23  0331 23  1108 01/14/23  2003 01/15/23  0243   WBC 8.3  --   --  13.8* HGB 7.8*   < > 8.3* 8.2*   *  --   --  135    < > = values in this interval not displayed.      Recent Labs     01/14/23  0331 01/15/23  0243   * 135   K 4.3 4.2    102   CO2 22 21   BUN 27* 26*   CREATININE 1.66* 1.44*   GLUCOSE 157* 160*     Recent Labs     01/14/23  0331 01/15/23  0243   * 138*   ALT 36* 42*   BILITOT 19.6* 18.9*   ALKPHOS 155* 176*       Current Facility-Administered Medications   Medication Dose Route Frequency Provider Last Rate Last Admin    pantoprazole (PROTONIX) tablet 40 mg  40 mg Oral BID AC Yazid R Agus, DO        spironolactone (ALDACTONE) tablet 25 mg  25 mg Oral Daily Brenda Marie, DO   25 mg at 01/15/23 1028    0.9 % sodium chloride infusion   IntraVENous PRN Simi Tejada DO        HYDROmorphone (DILAUDID) injection 0.5 mg  0.5 mg IntraVENous Q4H PRN Simi Tejada DO   0.5 mg at 01/14/23 2317    midodrine (PROAMATINE) tablet 10 mg  10 mg Oral TID  Brenda Marie, DO   10 mg at 01/15/23 1259    sodium chloride flush 0.9 % injection 5-40 mL  5-40 mL IntraVENous 2 times per day Adwoa Damian MD   10 mL at 01/14/23 2158    sodium chloride flush 0.9 % injection 5-40 mL  5-40 mL IntraVENous PRN Adwoa Damian MD        0.9 % sodium chloride infusion   IntraVENous PRN Adwoa Damian MD        thiamine tablet 100 mg  100 mg Oral Daily Adwoa Damian MD   100 mg at 01/15/23 1028    LORazepam (ATIVAN) tablet 1 mg  1 mg Oral Q1H PRN Adwoa Damian MD        Or    LORazepam (ATIVAN) injection 1 mg  1 mg IntraVENous Q1H PRANNE Damian MD   1 mg at 01/15/23 0136    Or    LORazepam (ATIVAN) tablet 2 mg  2 mg Oral Q1H PRANNE Damian MD        Or    LORazepam (ATIVAN) injection 2 mg  2 mg IntraVENous Q1H PRANNE Damian MD        Or    LORazepam (ATIVAN) tablet 3 mg  3 mg Oral Q1H PRN Adwoa Damian MD        Or    LORazepam (ATIVAN) injection 3 mg  3 mg IntraVENous Q1H PRN Adwoa Damian MD        Or    LORazepam (ATIVAN) tablet 4 mg  4 mg Oral Q1H PRN Brien Castillo MD        Or    LORazepam (ATIVAN) injection 4 mg  4 mg IntraVENous Q1H PRN Brien Castillo MD        lactulose (CHRONULAC) 10 GM/15ML solution 20 g  20 g Oral BID Tanya Coon MD   20 g at 01/15/23 1026    midodrine (PROAMATINE) tablet 7.5 mg  7.5 mg Oral Once Pattie Frye MD        sodium chloride flush 0.9 % injection 5-40 mL  5-40 mL IntraVENous 2 times per day Brien Castillo MD   5 mL at 01/15/23 1028    sodium chloride flush 0.9 % injection 5-40 mL  5-40 mL IntraVENous PRN Brien Castillo MD        0.9 % sodium chloride infusion  25 mL IntraVENous PRN Brien Castillo MD        ondansetron (ZOFRAN-ODT) disintegrating tablet 4 mg  4 mg Oral Q8H PRN Brien Castillo MD        Or    ondansetron [facility]) injection 4 mg  4 mg IntraVENous Q6H PRN Brien Castillo MD        polyethylene glycol (GLYCOLAX) packet 17 g  17 g Oral Daily PRN Brien Castillo MD        lidocaine 4 % external patch 1 patch  1 patch TransDERmal Daily Brien Castillo MD   1 patch at 01/14/23 6252       Additional work up or/and treatment plan may be added today or then after based on clinical progression. I am managing a portion of pt care. Some medical issues are handled by other specialists. Additional work up and treatment should be done in out pt setting by pt PCP and other out pt providers. In addition to examining and evaluating pt, I spent additional time explaining care, normaland abnormal findings, and treatment plan. All of pt questions were answered. Counseling, diet and education were provided. Case will be discussed with nursing staff when appropriate. Family will be updated if and when appropriate.        Electronically signed by [facility] on 1/15/2023 at 1:25 PM

## 2023-01-15 NOTE — PROGRESS NOTES
Gastroenterology Progress Note    Paco Singh is a 42 y.o. female patient. Hospitalization Day:5    Chief C/O: alcoholic cirrhosis    SUBJECTIVE: Seen and examined, no acute events overnight, no nausea or vomiting, tolerating diet, no abdominal pain    ROS:  Gastrointestinal ROS: no abdominal pain, change in bowel habits, or black or bloody stools    Physical    VITALS:  /67   Pulse 72   Temp 97.3 °F (36.3 °C) (Oral)   Resp 18   Ht 5' 5\" (1.651 m)   Wt 174 lb (78.9 kg)   SpO2 98%   BMI 28.96 kg/m²   TEMPERATURE:  Current - Temp: 97.3 °F (36.3 °C); Max - Temp  Av.3 °F (36.3 °C)  Min: 97.3 °F (36.3 °C)  Max: 97.3 °F (36.3 °C)    General:  Alert and oriented,  No apparent distress  Skin- with jaundice  Eyes: icteric sclera  Cardiac: RRR, Nl s1s2, without murmurs  Lungs CTA Bilaterally, normal effort  Abdomen soft, ND, NT, no HSM, Bowel sounds normal  Ext: without edema  Neuro: no asterixis     Data    Data Review:    Recent Labs     01/13/23  0318 01/13/23  1104 01/14/23  0331 01/14/23  1108 01/14/23  2003 01/15/23  0243   WBC 11.0*  --  8.3  --   --  13.8*   HGB 8.0*  8.4*   < > 7.8* 8.7* 8.3* 8.2*   HCT 23.5*  23.9*   < > 22.2* 25.3* 24.0* 23.6*   .7*  --  103.2*  --   --  103.6*   *  --  108*  --   --  135    < > = values in this interval not displayed. Recent Labs     01/13/23  0318 01/14/23  0331 01/15/23  0243   * 134* 135   K 3.7 4.3 4.2   CL 96 102 102   CO2 25 22 21   BUN 29* 27* 26*   CREATININE 1.83* 1.66* 1.44*     Recent Labs     01/13/23  0318 01/14/23  0331 01/15/23  0243   * 134* 138*   ALT 34* 36* 42*   BILITOT 19.9* 19.6* 18.9*   ALKPHOS 160* 155* 176*     No results for input(s): LIPASE, AMYLASE in the last 72 hours. No results for input(s): PROTIME, INR in the last 72 hours. ASSESSMENT:  41-year-old female admitted with decompensated alcoholic cirrhosis, has been abstinent from alcohol since 22.   Had rectal bleeding since arrival with colonoscopy notable for hemorrhoids otherwise normal.  Had EGD in September 2022 notable for LA grade a esophagitis, no esophageal varices. Noted history of hospitalization in September for hepatic encephalopathy, SBP, WILLIE, & pancreatitis. Noted some clinical improvement since arrival, improved hyponatremia improved WILLIE, no further overt bleeding. S/P colonoscopy 1/13/22 notable for hemorrhoids  1/15/22 no acute events overnight, no fever, tolerating diet, no abdominal pain, has ascites. PLAN :  1-  Decompensated Cirrhosis secondary to ETOH  :  MELD 31, 52.6% 90 day mortality  Continues to drink alcohol intermittently. Last ETOH 12/24/22, discussed continued alcohol abstinence  Will discuss liver transplant options as an OP in follow up, needs alcohol abstinence  2 - Grade II/III Ascites  Noted increased abdominal girth. Ascitic fluid negative for SBP, Obtain therapeutic paracentesis as needed  Previous SBP during hospital stay noted  Noted hx of pancreatitis  3- EGD for Variceal surveillance:    Had EGD that shows portal hypertensive gastropathy with no esophageal varices noted  4 - HCC screening:   Recent imaging Negative for liver mass  5-  Overt Hepatic encephalopathy:  None at this time         Thank you for allowing me to participate in the care of your patient. Please feel free to contact me with any concerns.     Angelito Chase, APRN - CNP

## 2023-01-15 NOTE — PROGRESS NOTES
GENERAL SURGERY  PROGRESS NOTE    Pt Name: Naomy Barrios  MRN: [id number]  Date: 1/15/2023    Subjective  NANCY overnight. Afebrile, VSS, satting well on RA. Pt doing ok, continues to have mild bleeding with loose Bms. Hb stable. Vitals  /67   Pulse 72   Temp 97.3 °F (36.3 °C) (Oral)   Resp 18   Ht 5' 5\" (1.651 m)   Wt 174 lb (78.9 kg)   SpO2 98%   BMI 28.96 kg/m²      Physical Exam  GEN: Alert and oriented x3, no acute distress, cooperative, tearful at times  SKIN: Juandiced  HEENT: Head is normocephalic, atraumatic.  EOMI with icteric sclera  NECK: Supple, symmetrical, trachea midline, skin normal  PULM: Chest symmetric, no increased work of breathing or accessory muscle use  CV: Heart regular rate   ABD: Soft, moderately distended, +fluid shift, mildly TTP at periumbilical area, no guarding, non peritonitic   EXTREMITIES: Warm, dry    Intake/ Output    Intake/Output Summary (Last 24 hours) at 1/15/2023 1325  Last data filed at 1/15/2023 0540  Gross per 24 hour   Intake 188.34 ml   Output --   Net 188.34 ml     Date 01/15/23 0000 - 01/15/23 2359   Shift 5829-6976 5583-4973 3928-1441 24 Hour Total   INTAKE   I.V.(mL/kg) 63.7(0.8)   63.7(0.8)   Shift Total(mL/kg) 63.7(0.8)   63.7(0.8)   OUTPUT   Shift Total(mL/kg)       Weight (kg) 78.9 78.9 78.9 78.9       Labs  Recent Labs     01/13/23  0318 01/13/23  1104 01/14/23  0331 01/14/23  1108 01/14/23  2003 01/15/23  0243   WBC 11.0*  --  8.3  --   --  13.8*   HGB 8.0*  8.4*   < > 7.8* 8.7* 8.3* 8.2*   HCT 23.5*  23.9*   < > 22.2* 25.3* 24.0* 23.6*   *  --  108*  --   --  135   *  --  134*  --   --  135   K 3.7  --  4.3  --   --  4.2   CL 96  --  102  --   --  102   CO2 25  --  22  --   --  21   BUN 29*  --  27*  --   --  26*   CREATININE 1.83*  --  1.66*  --   --  1.44*   CALCIUM 7.4*  --  7.9*  --   --  8.3*   *  --  134*  --   --  138*   ALT 34*  --  36*  --   --  42*   BILITOT 19.9*  --  19.6*  --   --  18.9*    < > = values in this interval not displayed. Assessment/ Plan  37F with PMH of EtOH cirrhosis (c/b ascites requiring paracentesis) admitted with abdominal pain and distension in the setting of a GIB with WILLIE. Colonoscopy yesterday revealed diverticula in ascending/ sigmoid colon and grade II internal hemorrhoids for which general surgery is consulted for. Remains HD stable with stable Hb. Continue conservative management  Continue supportive care, avoid constipation and straining to defecate. Pt on lactulose. Explained to pt who expressed understanding  Trend Hb, transfuse prn  Will sign off.  Please call with questions/ concerns      Sandee Jenkins MD   General surgeon  1/15/2023

## 2023-01-15 NOTE — PROGRESS NOTES
Shift assessment completed. VSS. Pt A&Ox4. Pt complaining of pain 8/10, medicated per MAR. Pt denies any N/V. Pt had 1 loose, brown with scant red streaks BM. Pt jaundice. Bandaid over previous paracentesis puncture C/D/I. Pt would like to shower but NP would not like her to at this moment due to low hgb and risk for syncope. Pt denies any other needs at this time. Call light within reach. Will continue to monitor.  Electronically signed by Sydni Trimble RN on 1/14/23 at 11:42 PM EST

## 2023-01-16 ENCOUNTER — APPOINTMENT (OUTPATIENT)
Dept: INTERVENTIONAL RADIOLOGY/VASCULAR | Age: 38
DRG: 253 | End: 2023-01-16
Payer: COMMERCIAL

## 2023-01-16 LAB
ALBUMIN SERPL-MCNC: 2.1 G/DL (ref 3.5–4.6)
ALP BLD-CCNC: 167 U/L (ref 40–130)
ALT SERPL-CCNC: 44 U/L (ref 0–33)
ANION GAP SERPL CALCULATED.3IONS-SCNC: 11 MEQ/L (ref 9–15)
AST SERPL-CCNC: 148 U/L (ref 0–35)
BASOPHILS ABSOLUTE: 0 K/UL (ref 0–0.2)
BASOPHILS RELATIVE PERCENT: 0.4 %
BILIRUB SERPL-MCNC: 13.7 MG/DL (ref 0.2–0.7)
BLOOD CULTURE, ROUTINE: NORMAL
BLOOD CULTURE, ROUTINE: NORMAL
BUN BLDV-MCNC: 26 MG/DL (ref 6–20)
CALCIUM SERPL-MCNC: 8.1 MG/DL (ref 8.5–9.9)
CHLORIDE BLD-SCNC: 106 MEQ/L (ref 95–107)
CO2: 22 MEQ/L (ref 20–31)
CREAT SERPL-MCNC: 1.11 MG/DL (ref 0.5–0.9)
EOSINOPHILS ABSOLUTE: 0.1 K/UL (ref 0–0.7)
EOSINOPHILS RELATIVE PERCENT: 0.9 %
GFR SERPL CREATININE-BSD FRML MDRD: >60 ML/MIN/{1.73_M2}
GLOBULIN: 2.8 G/DL (ref 2.3–3.5)
GLUCOSE BLD-MCNC: 131 MG/DL (ref 70–99)
HCT VFR BLD CALC: 21.9 % (ref 37–47)
HCT VFR BLD CALC: 23.6 % (ref 37–47)
HCT VFR BLD CALC: 24.7 % (ref 37–47)
HEMATOLOGY PATH CONSULT: NORMAL
HEMOGLOBIN: 7.5 G/DL (ref 12–16)
HEMOGLOBIN: 8.2 G/DL (ref 12–16)
HEMOGLOBIN: 8.3 G/DL (ref 12–16)
LYMPHOCYTES ABSOLUTE: 1.5 K/UL (ref 1–4.8)
LYMPHOCYTES RELATIVE PERCENT: 15.9 %
MCH RBC QN AUTO: 35.7 PG (ref 27–31.3)
MCHC RBC AUTO-ENTMCNC: 34.2 % (ref 33–37)
MCV RBC AUTO: 104.3 FL (ref 79.4–94.8)
MONOCYTES ABSOLUTE: 1 K/UL (ref 0.2–0.8)
MONOCYTES RELATIVE PERCENT: 9.9 %
NEUTROPHILS ABSOLUTE: 7 K/UL (ref 1.4–6.5)
NEUTROPHILS RELATIVE PERCENT: 72.9 %
PDW BLD-RTO: 27.8 % (ref 11.5–14.5)
PLATELET # BLD: 133 K/UL (ref 130–400)
POTASSIUM SERPL-SCNC: 4.1 MEQ/L (ref 3.4–4.9)
RBC # BLD: 2.1 M/UL (ref 4.2–5.4)
SODIUM BLD-SCNC: 139 MEQ/L (ref 135–144)
TOTAL PROTEIN: 4.9 G/DL (ref 6.3–8)
WBC # BLD: 9.6 K/UL (ref 4.8–10.8)

## 2023-01-16 PROCEDURE — 6370000000 HC RX 637 (ALT 250 FOR IP): Performed by: INTERNAL MEDICINE

## 2023-01-16 PROCEDURE — 49083 ABD PARACENTESIS W/IMAGING: CPT

## 2023-01-16 PROCEDURE — C1729 CATH, DRAINAGE: HCPCS

## 2023-01-16 PROCEDURE — 36415 COLL VENOUS BLD VENIPUNCTURE: CPT

## 2023-01-16 PROCEDURE — 99232 SBSQ HOSP IP/OBS MODERATE 35: CPT | Performed by: NURSE PRACTITIONER

## 2023-01-16 PROCEDURE — 2500000003 HC RX 250 WO HCPCS: Performed by: RADIOLOGY

## 2023-01-16 PROCEDURE — 1210000000 HC MED SURG R&B

## 2023-01-16 PROCEDURE — 85025 COMPLETE CBC W/AUTO DIFF WBC: CPT

## 2023-01-16 PROCEDURE — 85018 HEMOGLOBIN: CPT

## 2023-01-16 PROCEDURE — 6360000002 HC RX W HCPCS: Performed by: INTERNAL MEDICINE

## 2023-01-16 PROCEDURE — 6370000000 HC RX 637 (ALT 250 FOR IP): Performed by: SPECIALIST

## 2023-01-16 PROCEDURE — 80053 COMPREHEN METABOLIC PANEL: CPT

## 2023-01-16 PROCEDURE — 0W9G3ZZ DRAINAGE OF PERITONEAL CAVITY, PERCUTANEOUS APPROACH: ICD-10-PCS | Performed by: RADIOLOGY

## 2023-01-16 PROCEDURE — 2580000003 HC RX 258: Performed by: INTERNAL MEDICINE

## 2023-01-16 PROCEDURE — 85014 HEMATOCRIT: CPT

## 2023-01-16 RX ORDER — LIDOCAINE HYDROCHLORIDE 20 MG/ML
INJECTION, SOLUTION INFILTRATION; PERINEURAL
Status: COMPLETED | OUTPATIENT
Start: 2023-01-16 | End: 2023-01-16

## 2023-01-16 RX ADMIN — PANTOPRAZOLE SODIUM 40 MG: 40 TABLET, DELAYED RELEASE ORAL at 05:29

## 2023-01-16 RX ADMIN — LACTULOSE 20 G: 20 SOLUTION ORAL at 10:15

## 2023-01-16 RX ADMIN — LACTULOSE 20 G: 20 SOLUTION ORAL at 21:40

## 2023-01-16 RX ADMIN — Medication 5 ML: at 21:50

## 2023-01-16 RX ADMIN — HYDROMORPHONE HYDROCHLORIDE 0.5 MG: 1 INJECTION, SOLUTION INTRAMUSCULAR; INTRAVENOUS; SUBCUTANEOUS at 21:40

## 2023-01-16 RX ADMIN — Medication 100 MG: at 10:15

## 2023-01-16 RX ADMIN — MIDODRINE HYDROCHLORIDE 10 MG: 5 TABLET ORAL at 16:39

## 2023-01-16 RX ADMIN — PANTOPRAZOLE SODIUM 40 MG: 40 TABLET, DELAYED RELEASE ORAL at 16:39

## 2023-01-16 RX ADMIN — Medication 10 ML: at 10:19

## 2023-01-16 RX ADMIN — HYDROMORPHONE HYDROCHLORIDE 0.5 MG: 1 INJECTION, SOLUTION INTRAMUSCULAR; INTRAVENOUS; SUBCUTANEOUS at 15:38

## 2023-01-16 RX ADMIN — MIDODRINE HYDROCHLORIDE 10 MG: 5 TABLET ORAL at 10:14

## 2023-01-16 RX ADMIN — MIDODRINE HYDROCHLORIDE 10 MG: 5 TABLET ORAL at 12:39

## 2023-01-16 RX ADMIN — SPIRONOLACTONE 25 MG: 25 TABLET ORAL at 10:15

## 2023-01-16 RX ADMIN — Medication 10 ML: at 21:41

## 2023-01-16 RX ADMIN — LIDOCAINE HYDROCHLORIDE 10 ML: 20 INJECTION, SOLUTION INFILTRATION; PERINEURAL at 14:08

## 2023-01-16 RX ADMIN — FUROSEMIDE 20 MG: 20 TABLET ORAL at 15:24

## 2023-01-16 ASSESSMENT — PAIN SCALES - GENERAL
PAINLEVEL_OUTOF10: 9

## 2023-01-16 ASSESSMENT — PAIN DESCRIPTION - LOCATION
LOCATION: ABDOMEN;BACK
LOCATION: ABDOMEN
LOCATION: ABDOMEN

## 2023-01-16 ASSESSMENT — PAIN DESCRIPTION - ORIENTATION: ORIENTATION: MID

## 2023-01-16 ASSESSMENT — PAIN DESCRIPTION - DESCRIPTORS
DESCRIPTORS: SHARP;STABBING
DESCRIPTORS: SHARP;STABBING

## 2023-01-16 NOTE — CARE COORDINATION
Met with patient , freedom of choice offered. States plan is to return home with BF and mother will help as needed.

## 2023-01-16 NOTE — PROGRESS NOTES
Nephrology Progress Note    Assessment:  Resolved willie /  Hyponatremia  Anemia persists  Cirrhosis  Jaundice        Plan:  stable will sign off case maintain proamitine  aldactone low salt diet     Patient Active Problem List:     Major depressive disorder, severe (HCC)     Alcoholic hepatitis with ascites     GI bleeding     Impaired mobility and activities of daily living dt encephalopathy and gait ataxia     Bursitis of foot     Generalized pain     Myalgia, unspecified site     Pain in left foot     Toe deformity, acquired     Acute alcoholic hepatitis     Impaired mobility and ADLs     GI bleed     WILLIE (acute kidney injury) ([facility] [address]     Abdominal distention     Alcoholic cirrhosis of liver with ascites (HCC)      Subjective:  Admit Date: 1/10/2023    Interval History: no issues    Medications:  Scheduled Meds:   pantoprazole  40 mg Oral BID AC    furosemide  20 mg Oral Daily    spironolactone  25 mg Oral Daily    midodrine  10 mg Oral TID WC    sodium chloride flush  5-40 mL IntraVENous 2 times per day    thiamine  100 mg Oral Daily    lactulose  20 g Oral BID    midodrine  7.5 mg Oral Once    sodium chloride flush  5-40 mL IntraVENous 2 times per day    lidocaine  1 patch TransDERmal Daily     Continuous Infusions:   sodium chloride      sodium chloride      sodium chloride         CBC:   Recent Labs     01/15/23  0243 01/15/23  2148 01/16/23  0459   WBC 13.8*  --  9.6   HGB 8.2* 9.3* 7.5*     --  133     CMP:    Recent Labs     01/14/23  0331 01/15/23  0243 01/16/23  0459   * 135 139   K 4.3 4.2 4.1    102 106   CO2 22 21 22   BUN 27* 26* 26*   CREATININE 1.66* 1.44* 1.11*   GLUCOSE 157* 160* 131*   CALCIUM 7.9* 8.3* 8.1*   LABGLOM 40.0* 47.4* >60.0     Troponin: No results for input(s): TROPONINI in the last 72 hours. BNP: No results for input(s): BNP in the last 72 hours. INR: No results for input(s): INR in the last 72 hours.   Lipids: No results for input(s): CHOL, LDLDIRECT, TRIG, HDL, AMYLASE, LIPASE in the last 72 hours. Liver:   Recent Labs     01/16/23  0459   *   ALT 44*   ALKPHOS 167*   PROT 4.9*   LABALBU 2.1*   BILITOT 13.7*     Iron:  No results for input(s): IRONS, FERRITIN in the last 72 hours. Invalid input(s): LABIRONS  Urinalysis: No results for input(s): UA in the last 72 hours.     Objective:  Vitals: /73   Pulse 78   Temp 97.9 °F (36.6 °C) (Oral)   Resp 18   Ht 5' 5\" (1.651 m)   Wt 174 lb (78.9 kg)   SpO2 99%   BMI 28.96 kg/m²    Wt Readings from Last 3 Encounters:   01/13/23 174 lb (78.9 kg)   01/10/23 174 lb (78.9 kg)   11/05/22 160 lb (72.6 kg)      24HR INTAKE/OUTPUT:  No intake or output data in the 24 hours ending 01/16/23 0820    General: alert, in no apparent distress  HEENT: normocephalic, atraumatic, anicteric  Neck: supple, no mass  Lungs: non-labored respirations, clear to auscultation bilaterally  Heart: regular rate and rhythm, no murmurs or rubs  Abdomen: soft, non-tender, non-distended  Ext: no cyanosis, no peripheral edema  Neuro: alert and oriented, no gross abnormalities  Psych: normal mood and affect  Skin: no rash  jaundice      Electronically signed by Skinny High DO, MD

## 2023-01-16 NOTE — PROGRESS NOTES
Gastroenterology Progress Note    Paco Singh is a 42 y.o. female patient. Hospitalization Day:6    Chief C/O: Cirrhosis    SUBJECTIVE: Seen and examined, no acute events overnight, has mild abdominal discomfort and distention, for paracentesis today, tolerating diet, no overt bleeding, hemoglobin 7.5.    ROS:  Gastrointestinal ROS: no abdominal pain, change in bowel habits, or black or bloody stools    Physical    VITALS:  /73   Pulse 78   Temp 97.9 °F (36.6 °C) (Oral)   Resp 18   Ht 5' 5\" (1.651 m)   Wt 174 lb (78.9 kg)   SpO2 99%   BMI 28.96 kg/m²   TEMPERATURE:  Current - Temp: 97.9 °F (36.6 °C); Max - Temp  Av.9 °F (36.6 °C)  Min: 97.9 °F (36.6 °C)  Max: 97.9 °F (36.6 °C)    General:  Alert and oriented,  No apparent distress  Skin- without jaundice  Eyes: anicteric sclera  Cardiac: RRR, Nl s1s2, without murmurs  Lungs CTA Bilaterally, normal effort  Abdomen soft, ND, NT, no HSM, Bowel sounds normal  Ext: without edema  Neuro: no asterixis     Data    Data Review:    Recent Labs     23  0331 23  1108 01/15/23  0243 01/15/23  2148 01/16/23  0459   WBC 8.3  --  13.8*  --  9.6   HGB 7.8*   < > 8.2* 9.3* 7.5*   HCT 22.2*   < > 23.6* 26.8* 21.9*   .2*  --  103.6*  --  104.3*   *  --  135  --  133    < > = values in this interval not displayed. Recent Labs     23  0331 01/15/23  0243 01/16/23  0459   * 135 139   K 4.3 4.2 4.1    102 106   CO2 22 21 22   BUN 27* 26* 26*   CREATININE 1.66* 1.44* 1.11*     Recent Labs     23  0331 01/15/23  0243 01/16/23  0459   * 138* 148*   ALT 36* 42* 44*   BILITOT 19.6* 18.9* 13.7*   ALKPHOS 155* 176* 167*     No results for input(s): LIPASE, AMYLASE in the last 72 hours. No results for input(s): PROTIME, INR in the last 72 hours. ASSESSMENT:  28-year-old female admitted with decompensated alcoholic cirrhosis, has been abstinent from alcohol since 22.   Had rectal bleeding since arrival with colonoscopy notable for hemorrhoids otherwise normal.  Had EGD in September 2022 notable for LA grade a esophagitis, no esophageal varices. Noted history of hospitalization in September for hepatic encephalopathy, SBP, WILLIE, & pancreatitis. Noted some clinical improvement since arrival, improved hyponatremia improved WILLIE, no further overt bleeding. S/P colonoscopy 1/13/22 notable for hemorrhoids  1/15/23 no acute events overnight, no fever, tolerating diet, no abdominal pain, has ascites. 1/16/23 no acute events overnight, hemoglobin 7.5, no overt bleeding, no fever, tolerating diet, mild abdominal discomfort, has ascites, for paracentesis today. Likely discharge home tomorrow    PLAN :   1-  Decompensated Cirrhosis secondary to ETOH  :  MELD 31, 52.6% 90 day mortality  Continues to drink alcohol intermittently. Last ETOH 12/24/22, discussed continued alcohol abstinence  Will discuss liver transplant options as an OP in follow up, needs alcohol abstinence  2 - Grade II/III Ascites  Noted increased abdominal girth. Ascitic fluid negative for SBP, Obtain therapeutic paracentesis as needed  Previous SBP during hospital stay noted  Noted hx of pancreatitis  3- EGD for Variceal surveillance:    Had EGD that shows portal hypertensive gastropathy with no esophageal varices noted  4 - HCC screening:   Recent imaging Negative for liver mass  5-  Overt Hepatic encephalopathy:  None at this time         Thank you for allowing me to participate in the care of your patient. Please feel free to contact me with any concerns.     Preeti Childers, APRN - CNP

## 2023-01-16 NOTE — PROGRESS NOTES
Patient is A&Ox4. Patient stated she had discomfort this morning but had pain of 9 after her paracentesis, dilated was given. VS, assessment completed and required med's given. CIWA was completed and ativan was not required. Lasix was given late due to patient receiving paracentesis and would be unable to use the bathroom during the procedure. Patient denied any further needs and I will continue to monitor. Call light is within and bed is in lowest position.  Electronically signed by Mariah Cosme RN on 1/16/2023 at 6:03 PM

## 2023-01-16 NOTE — PROGRESS NOTES
Shift assessments completed and documented, see flowsheets. A&OX4, but slightly anxious on initial assessment. Medications administered per MAR, including PRN pain medication. Call light within reach. No further needs verbalized at this time by pt.     2201: Pt c/o anxiety and shakiness, CIWA score 10. PRN ativan given per MAR.

## 2023-01-16 NOTE — PROGRESS NOTES
Internal Medicine   Hospitalist   Progress Note    2023   2:44 PM    Name:  Tonya Jessica  MRN:    [id number]     IP Day: 6     Admit Date: 1/10/2023  6:17 PM  PCP: Candido Berrios MD    Code Status:  Full Code    Assessment and Plan: Active Problems/ diagnosis:     GI bleeding  Acute posthemorrhagic anemia  WILLIE  Alcoholic cirrhosis with ascites  Alcohol abuse with risk of withdrawal  Hyponatremia in the setting of alcohol abuse    Plan  Paracentesis this morning for therapeutic reason. Continue to monitor H&H, monitor vitals  Transfuse for Hb <7.0  Off of IV fluid, resume Aldactone, resume  lasix 20 daily, renal function is improving. Encourage p.o. intake. Nephrologist is following. Monitor sodium. Hold nephrotoxic meds. status post paracentesis-follow-not consistent with SBP, started on steroids by GI for alcoholic hepatitis. Educated on etoh cessation and need for transplant hepatology follow up, will be arranged by GI  Stop octreotide and switch ppi to po   Continue CIWA protocol  Discussed plan of care with patient  DVT PPx-SCDs    7 pm- 7 am, please contact on call Hospitalist for any needs     Dispo-another 24 hr of monitoring due to lower Hb     Subjective:     No new symptoms. Physical Examination:      Vitals:  BP (!) 111/58   Pulse 85   Temp 97.9 °F (36.6 °C) (Oral)   Resp 18   Ht 5' 5\" (1.651 m)   Wt 174 lb (78.9 kg)   SpO2 97%   BMI 28.96 kg/m²   Temp (24hrs), Av.9 °F (36.6 °C), Min:97.9 °F (36.6 °C), Max:97.9 °F (36.6 °C)      General appearance: alert, cooperative and no distress  Mental Status: oriented to person, place and time and normal affect  Lungs: clear to auscultation bilaterally, normal effort  Heart: regular rate and rhythm, no murmur  Abdomen: soft, nontender, +distended, bowel sounds present, no masses  Extremities: no edema, redness, tenderness in the calves  Skin: Jaundiced.   No gross lesions, rashes    Data:     Labs:  Recent Labs     01/15/23  0243 01/15/23  2148 01/16/23  0459 01/16/23  0955   WBC 13.8*  --  9.6  --    HGB 8.2*   < > 7.5* 8.2*     --  133  --     < > = values in this interval not displayed.      Recent Labs     01/15/23  0243 01/16/23  0459    139   K 4.2 4.1    106   CO2 21 22   BUN 26* 26*   CREATININE 1.44* 1.11*   GLUCOSE 160* 131*     Recent Labs     01/15/23  0243 01/16/23  0459   * 148*   ALT 42* 44*   BILITOT 18.9* 13.7*   ALKPHOS 176* 167*       Current Facility-Administered Medications   Medication Dose Route Frequency Provider Last Rate Last Admin    pantoprazole (PROTONIX) tablet 40 mg  40 mg Oral BID AC Yazid R Agus, DO   40 mg at 01/16/23 0529    furosemide (LASIX) tablet 20 mg  20 mg Oral Daily Douglas Garcia, DO   20 mg at 01/15/23 1531    spironolactone (ALDACTONE) tablet 25 mg  25 mg Oral Daily Nadeem Marie, DO   25 mg at 01/16/23 1015    0.9 % sodium chloride infusion   IntraVENous PRN Douglas Garcia, DO        HYDROmorphone (DILAUDID) injection 0.5 mg  0.5 mg IntraVENous Q4H PRN Douglas Garcia, DO   0.5 mg at 01/15/23 2044    midodrine (PROAMATINE) tablet 10 mg  10 mg Oral TID  Nadeem Marie, DO   10 mg at 01/16/23 1239    sodium chloride flush 0.9 % injection 5-40 mL  5-40 mL IntraVENous 2 times per day Donte Abdul MD   10 mL at 01/16/23 1019    sodium chloride flush 0.9 % injection 5-40 mL  5-40 mL IntraVENous PRN Donte Abdul MD        0.9 % sodium chloride infusion   IntraVENous PRN Donte Abdul MD        thiamine tablet 100 mg  100 mg Oral Daily Donte Abdul MD   100 mg at 01/16/23 1015    LORazepam (ATIVAN) tablet 1 mg  1 mg Oral Q1H PRN Donte Abdul MD   1 mg at 01/15/23 2201    Or    LORazepam (ATIVAN) injection 1 mg  1 mg IntraVENous Q1H PRN Donte Abdul MD   1 mg at 01/15/23 0136    Or    LORazepam (ATIVAN) tablet 2 mg  2 mg Oral Q1H PRN Donte Abdul MD        Or    LORazepam (ATIVAN) injection 2 mg  2 mg IntraVENous Q1H PRN Donte Abdul MD        Or LORazepam (ATIVAN) tablet 3 mg  3 mg Oral Q1H PRN Adelaide Wall MD        Or    LORazepam (ATIVAN) injection 3 mg  3 mg IntraVENous Q1H PRN Adelaide Wall MD        Or    LORazepam (ATIVAN) tablet 4 mg  4 mg Oral Q1H PRN Adelaide Wall MD        Or    LORazepam (ATIVAN) injection 4 mg  4 mg IntraVENous Q1H PRN Adelaide Wall MD        lactulose (CHRONULAC) 10 GM/15ML solution 20 g  20 g Oral BID Bianca Rodríguez MD   20 g at 01/16/23 1015    sodium chloride flush 0.9 % injection 5-40 mL  5-40 mL IntraVENous 2 times per day Adelaide Wall MD   10 mL at 01/16/23 1019    sodium chloride flush 0.9 % injection 5-40 mL  5-40 mL IntraVENous PRN Adelaide Wall MD        0.9 % sodium chloride infusion  25 mL IntraVENous PRN Adelaide Wall MD        ondansetron (ZOFRAN-ODT) disintegrating tablet 4 mg  4 mg Oral Q8H PRN Adelaide Wall MD        Or    ondansetron [facility]) injection 4 mg  4 mg IntraVENous Q6H PRN Adelaide Wall MD        polyethylene glycol (GLYCOLAX) packet 17 g  17 g Oral Daily PRN Adelaide Wall MD        lidocaine 4 % external patch 1 patch  1 patch TransDERmal Daily Adelaide Wall MD   1 patch at 01/15/23 2045       Additional work up or/and treatment plan may be added today or then after based on clinical progression. I am managing a portion of pt care. Some medical issues are handled by other specialists. Additional work up and treatment should be done in out pt setting by pt PCP and other out pt providers. In addition to examining and evaluating pt, I spent additional time explaining care, normaland abnormal findings, and treatment plan. All of pt questions were answered. Counseling, diet and education were provided. Case will be discussed with nursing staff when appropriate. Family will be updated if and when appropriate.        Electronically signed by Modesto Oneil DO on 1/16/2023 at 2:44 PM

## 2023-01-16 NOTE — OR NURSING
NO SEDATION    1357- Pt arrived to xray room 6 via cart. Hx, allergies, medications reviewed. U/S images obtained. Pt offered reassurance and VSS. Pt denies pain at this time. Consent obtained. 1403- Timeout completed. 1404- Using U/S, Dr. Kimberly Bell marked LLQ and area cleansed with large tinted chloraprep. Once dry, using sterile technique, Dr. Kimberly Bell prepped and draped area. 1408-Using U/S guidance, Dr. Kimberly Bell numbed site with 2% lidocaine, see eMar. Using U/S guidance, access obtained with [id number] centesis 5F catheter with return of fluid that appears clear, yellow. Catheter tubing connected to Kevin machine with suction at 280 mmHG and draining well. Pt tolerating well. VSS. 1440- Drainage complete. Total 4400ml removed. Centesis catheter removed and digital pressure held to site for 2 minutes. LLQ site soft, no drainage, large bandaid applied.  Report called to patient's nurse on [facility].

## 2023-01-17 ENCOUNTER — APPOINTMENT (OUTPATIENT)
Dept: GENERAL RADIOLOGY | Age: 38
DRG: 253 | End: 2023-01-17
Payer: COMMERCIAL

## 2023-01-17 VITALS
HEART RATE: 90 BPM | HEIGHT: 65 IN | BODY MASS INDEX: 28.99 KG/M2 | WEIGHT: 174 LBS | DIASTOLIC BLOOD PRESSURE: 71 MMHG | OXYGEN SATURATION: 98 % | RESPIRATION RATE: 18 BRPM | SYSTOLIC BLOOD PRESSURE: 117 MMHG | TEMPERATURE: 98.1 F

## 2023-01-17 LAB
ALBUMIN SERPL-MCNC: 2.1 G/DL (ref 3.5–4.6)
ALP BLD-CCNC: 158 U/L (ref 40–130)
ALT SERPL-CCNC: 48 U/L (ref 0–33)
ANION GAP SERPL CALCULATED.3IONS-SCNC: 11 MEQ/L (ref 9–15)
ANISOCYTOSIS: ABNORMAL
AST SERPL-CCNC: 164 U/L (ref 0–35)
BASOPHILS ABSOLUTE: 0 K/UL (ref 0–0.2)
BASOPHILS RELATIVE PERCENT: 1.1 %
BILIRUB SERPL-MCNC: 12.6 MG/DL (ref 0.2–0.7)
BUN BLDV-MCNC: 25 MG/DL (ref 6–20)
BURR CELLS: ABNORMAL
CALCIUM SERPL-MCNC: 8.2 MG/DL (ref 8.5–9.9)
CHLORIDE BLD-SCNC: 105 MEQ/L (ref 95–107)
CO2: 22 MEQ/L (ref 20–31)
CREAT SERPL-MCNC: 1.12 MG/DL (ref 0.5–0.9)
EOSINOPHILS ABSOLUTE: 0.1 K/UL (ref 0–0.7)
EOSINOPHILS RELATIVE PERCENT: 1 %
GFR SERPL CREATININE-BSD FRML MDRD: >60 ML/MIN/{1.73_M2}
GLOBULIN: 2.7 G/DL (ref 2.3–3.5)
GLUCOSE BLD-MCNC: 107 MG/DL (ref 70–99)
HCT VFR BLD CALC: 24.6 % (ref 37–47)
HCT VFR BLD CALC: 25.3 % (ref 37–47)
HEMOGLOBIN: 8.2 G/DL (ref 12–16)
HEMOGLOBIN: 8.5 G/DL (ref 12–16)
HYPOCHROMIA: ABNORMAL
LYMPHOCYTES ABSOLUTE: 0.8 K/UL (ref 1–4.8)
LYMPHOCYTES RELATIVE PERCENT: 8 %
MACROCYTES: ABNORMAL
MCH RBC QN AUTO: 35.4 PG (ref 27–31.3)
MCHC RBC AUTO-ENTMCNC: 33.2 % (ref 33–37)
MCV RBC AUTO: 106.6 FL (ref 79.4–94.8)
MONOCYTES ABSOLUTE: 0.6 K/UL (ref 0.2–0.8)
MONOCYTES RELATIVE PERCENT: 5.7 %
MYELOCYTE PERCENT: 1 %
NEUTROPHILS ABSOLUTE: 9.1 K/UL (ref 1.4–6.5)
NEUTROPHILS RELATIVE PERCENT: 85 %
PDW BLD-RTO: 27.3 % (ref 11.5–14.5)
PLATELET # BLD: 133 K/UL (ref 130–400)
PLATELET SLIDE REVIEW: NORMAL
POIKILOCYTES: ABNORMAL
POLYCHROMASIA: ABNORMAL
POTASSIUM SERPL-SCNC: 4.1 MEQ/L (ref 3.4–4.9)
RBC # BLD: 2.31 M/UL (ref 4.2–5.4)
SODIUM BLD-SCNC: 138 MEQ/L (ref 135–144)
TARGET CELLS: ABNORMAL
TOTAL PROTEIN: 4.8 G/DL (ref 6.3–8)
WBC # BLD: 10.6 K/UL (ref 4.8–10.8)

## 2023-01-17 PROCEDURE — 6360000002 HC RX W HCPCS: Performed by: INTERNAL MEDICINE

## 2023-01-17 PROCEDURE — 80053 COMPREHEN METABOLIC PANEL: CPT

## 2023-01-17 PROCEDURE — 74018 RADEX ABDOMEN 1 VIEW: CPT

## 2023-01-17 PROCEDURE — 6370000000 HC RX 637 (ALT 250 FOR IP): Performed by: INTERNAL MEDICINE

## 2023-01-17 PROCEDURE — 85018 HEMOGLOBIN: CPT

## 2023-01-17 PROCEDURE — 85014 HEMATOCRIT: CPT

## 2023-01-17 PROCEDURE — 2580000003 HC RX 258: Performed by: INTERNAL MEDICINE

## 2023-01-17 PROCEDURE — 85025 COMPLETE CBC W/AUTO DIFF WBC: CPT

## 2023-01-17 PROCEDURE — 36415 COLL VENOUS BLD VENIPUNCTURE: CPT

## 2023-01-17 PROCEDURE — 99232 SBSQ HOSP IP/OBS MODERATE 35: CPT | Performed by: NURSE PRACTITIONER

## 2023-01-17 PROCEDURE — 6370000000 HC RX 637 (ALT 250 FOR IP): Performed by: SPECIALIST

## 2023-01-17 RX ORDER — PANTOPRAZOLE SODIUM 40 MG/1
40 TABLET, DELAYED RELEASE ORAL
Qty: 30 TABLET | Refills: 1 | Status: SHIPPED | OUTPATIENT
Start: 2023-01-17

## 2023-01-17 RX ORDER — LANOLIN ALCOHOL/MO/W.PET/CERES
100 CREAM (GRAM) TOPICAL DAILY
Qty: 30 TABLET | Refills: 1 | Status: SHIPPED | OUTPATIENT
Start: 2023-01-17

## 2023-01-17 RX ORDER — FUROSEMIDE 20 MG/1
20 TABLET ORAL DAILY
Qty: 60 TABLET | Refills: 0 | Status: SHIPPED | OUTPATIENT
Start: 2023-01-17

## 2023-01-17 RX ORDER — OXYCODONE HYDROCHLORIDE 5 MG/1
5 TABLET ORAL ONCE
Status: COMPLETED | OUTPATIENT
Start: 2023-01-17 | End: 2023-01-17

## 2023-01-17 RX ORDER — MIDODRINE HYDROCHLORIDE 10 MG/1
10 TABLET ORAL
Qty: 90 TABLET | Refills: 3 | Status: SHIPPED | OUTPATIENT
Start: 2023-01-17

## 2023-01-17 RX ORDER — SPIRONOLACTONE 25 MG/1
25 TABLET ORAL DAILY
Qty: 30 TABLET | Refills: 0 | Status: SHIPPED | OUTPATIENT
Start: 2023-01-17

## 2023-01-17 RX ORDER — LACTULOSE 10 G/15ML
20 SOLUTION ORAL 2 TIMES DAILY
Qty: 1800 ML | Refills: 2 | Status: SHIPPED | OUTPATIENT
Start: 2023-01-17 | End: 2023-02-16

## 2023-01-17 RX ADMIN — PANTOPRAZOLE SODIUM 40 MG: 40 TABLET, DELAYED RELEASE ORAL at 05:50

## 2023-01-17 RX ADMIN — Medication 10 ML: at 09:52

## 2023-01-17 RX ADMIN — ONDANSETRON 4 MG: 2 INJECTION INTRAMUSCULAR; INTRAVENOUS at 16:31

## 2023-01-17 RX ADMIN — OXYCODONE HYDROCHLORIDE 5 MG: 5 TABLET ORAL at 16:22

## 2023-01-17 RX ADMIN — MIDODRINE HYDROCHLORIDE 10 MG: 5 TABLET ORAL at 13:36

## 2023-01-17 RX ADMIN — HYDROMORPHONE HYDROCHLORIDE 0.5 MG: 1 INJECTION, SOLUTION INTRAMUSCULAR; INTRAVENOUS; SUBCUTANEOUS at 03:14

## 2023-01-17 RX ADMIN — PANTOPRAZOLE SODIUM 40 MG: 40 TABLET, DELAYED RELEASE ORAL at 16:23

## 2023-01-17 RX ADMIN — Medication 10 ML: at 09:51

## 2023-01-17 RX ADMIN — SPIRONOLACTONE 25 MG: 25 TABLET ORAL at 09:47

## 2023-01-17 RX ADMIN — MIDODRINE HYDROCHLORIDE 10 MG: 5 TABLET ORAL at 16:22

## 2023-01-17 RX ADMIN — MIDODRINE HYDROCHLORIDE 10 MG: 5 TABLET ORAL at 09:47

## 2023-01-17 RX ADMIN — Medication 100 MG: at 11:00

## 2023-01-17 RX ADMIN — LACTULOSE 20 G: 20 SOLUTION ORAL at 09:47

## 2023-01-17 RX ADMIN — FUROSEMIDE 20 MG: 20 TABLET ORAL at 09:47

## 2023-01-17 ASSESSMENT — PAIN - FUNCTIONAL ASSESSMENT: PAIN_FUNCTIONAL_ASSESSMENT: ACTIVITIES ARE NOT PREVENTED

## 2023-01-17 ASSESSMENT — PAIN DESCRIPTION - DESCRIPTORS
DESCRIPTORS: SHARP;STABBING
DESCRIPTORS: STABBING;SHARP

## 2023-01-17 ASSESSMENT — PAIN DESCRIPTION - LOCATION
LOCATION: ABDOMEN
LOCATION: ABDOMEN
LOCATION: ABDOMEN;BUTTOCKS

## 2023-01-17 ASSESSMENT — PAIN SCALES - GENERAL
PAINLEVEL_OUTOF10: 9
PAINLEVEL_OUTOF10: 9
PAINLEVEL_OUTOF10: 4

## 2023-01-17 ASSESSMENT — PAIN DESCRIPTION - ORIENTATION: ORIENTATION: MID

## 2023-01-17 ASSESSMENT — PAIN DESCRIPTION - PAIN TYPE: TYPE: ACUTE PAIN

## 2023-01-17 NOTE — PROGRESS NOTES
Shift assessments completed. A&OX 4. Pt complains of mid abdominal pain at a level 8-9. PT stated Pain is stabbing and sharp, and that she was not in this much pain after the prior paracentesis's. Medications given per MAR. Call light within reach. No other needs stated by pt at this time.

## 2023-01-17 NOTE — PROGRESS NOTES
Patient is A&Ox4. Patient stated she had pain of 4. VS, assessment completed and required med's given. Patient denied any further needs and I will continue to monitor. Call light is within reach and bed is in lowest position.  Electronically signed by Ssuan Pena RN on 1/17/2023 at 3:14 PM

## 2023-01-17 NOTE — DISCHARGE INSTRUCTIONS
Follow up with primary care physician in the next 7 days or sooner if needed. If you do not have a Primary care physician, please schedule an appointment with one. Please ask prior to discharge about a list of local providers. Follow up with hepatology, Dr Kylee Quinonez and [facility] liver transplant team.     Please return to ER or call 911 if you develop any significant signs or symptoms. I may not have addressed all of your medical illnesses or the abnormal blood work or imaging therefore please ask your PCP to obtain [facility] record to follow up on all of the abnormal labs, imaging and findings that I have and have not addressed during your hospitalization. Discharging you from the hospital does not mean that your medical care ends here and now. You may still need additional work up, investigation, monitoring, and treatment to be handled from this point on by outside providers including your PCP, Specialists and other healthcare providers. For medication questions, contact your retail pharmacy and your PCP. Your medical team at [facility] appreciates the opportunity to work with you to get well!     Audrey Olivas, DO  12:59 PM

## 2023-01-17 NOTE — PROGRESS NOTES
Gastroenterology Progress Note    Roque Matt is a 42 y.o. female patient. Hospitalization Day:7    Chief C/O: cirrhosis    SUBJECTIVE: Seen and examined, had paracentesis yesterday for 4,700 ml ascitic fluid, has generalized mild abdominal pain-ascitic fluid was negative for SBP, had bowel movement today, tolerating diet, hemoglobin 8.3    ROS:  Gastrointestinal ROS: mild abdominal pain, change in bowel habits, or black or bloody stools    Physical    VITALS:  /71   Pulse 96   Temp 98.1 °F (36.7 °C) (Oral)   Resp 18   Ht 5' 5\" (1.651 m)   Wt 174 lb (78.9 kg)   SpO2 98%   BMI 28.96 kg/m²   TEMPERATURE:  Current - Temp: 98.1 °F (36.7 °C); Max - Temp  Av.9 °F (36.6 °C)  Min: 97.7 °F (36.5 °C)  Max: 98.1 °F (36.7 °C)    General:  Alert and oriented  Skin- with jaundice  Eyes: icteric sclera  Cardiac: RRR, Nl s1s2, without murmurs  Lungs CTA Bilaterally, normal effort  Abdomen soft, ND, NT, no HSM, Bowel sounds normal  Ext: without edema  Neuro: no asterixis     Data    Data Review:    Recent Labs     01/15/23  0243 01/15/23  2148 01/16/23  0459 01/16/23  0955 23  21323   WBC 13.8*  --  9.6  --   --  10.6   HGB 8.2*   < > 7.5* 8.2* 8.3* 8.2*   HCT 23.6*   < > 21.9* 23.6* 24.7* 24.6*   .6*  --  104.3*  --   --  106.6*     --  133  --   --  133    < > = values in this interval not displayed. Recent Labs     01/15/23  0243 01/16/23  0459 01/17/23  0452    139 138   K 4.2 4.1 4.1    106 105   CO2 21 22 22   BUN 26* 26* 25*   CREATININE 1.44* 1.11* 1.12*     Recent Labs     01/15/23  0243 01/16/23  0459 23  0452   * 148* 164*   ALT 42* 44* 48*   BILITOT 18.9* 13.7* 12.6*   ALKPHOS 176* 167* 158*     No results for input(s): LIPASE, AMYLASE in the last 72 hours. No results for input(s): PROTIME, INR in the last 72 hours.         ASSESSMENT:  68-year-old female admitted with decompensated alcoholic cirrhosis, has been abstinent from alcohol since 12/24/22. Had rectal bleeding since arrival with colonoscopy notable for hemorrhoids otherwise normal.  Had EGD in September 2022 notable for LA grade a esophagitis, no esophageal varices. Noted history of hospitalization in September for hepatic encephalopathy, SBP, WILLIE, & pancreatitis. Noted some clinical improvement since arrival, improved hyponatremia improved WILLIE, no further overt bleeding. S/P colonoscopy 1/13/22 notable for hemorrhoids  1/15/23 no acute events overnight, no fever, tolerating diet, no abdominal pain, has ascites. 1/16/23 no acute events overnight, hemoglobin 7.5, no overt bleeding, no fever, tolerating diet, mild abdominal discomfort, has ascites, for paracentesis today. Likely discharge home tomorrow  1/17/23 had paracentesis yesterday, no fever, has mild generalized abdominal pain, tolerating diet, had bowel movement today. Hemoglobin 8.3, no overt bleeding. PLAN :  1-  Decompensated Cirrhosis secondary to ETOH  :  MELD 31, 52.6% 90 day mortality  Continues to drink alcohol intermittently. Last ETOH 12/24/22, discussed continued alcohol abstinence  Will discuss liver transplant options as an OP in follow up, needs alcohol abstinence  2 - Grade II Ascites  Noted increased abdominal girth. S/P paracentesis 4,700 ml  Ascitic fluid negative for SBP  Previous SBP during hospital stay noted  Noted hx of pancreatitis  3- EGD for Variceal surveillance:    Had EGD that shows portal hypertensive gastropathy with no esophageal varices noted  4 - HCC screening:   Recent imaging Negative for liver mass  5-  Overt Hepatic encephalopathy:  None at this time      Thank you for allowing me to participate in the care of your patient. Please feel free to contact me with any concerns.     Gwen Fernandez, APRN - CNP

## 2023-01-17 NOTE — DISCHARGE SUMMARY
Hospital Medicine Discharge Summary    Kimberly Martinez  :  1985  MRN:  [id number]    Admit date:  1/10/2023  Discharge date:  2023    Admitting Physician: Indra Live MD  Primary Care Physician: Reid Garcia MD      Discharge Diagnoses:      GI bleeding  Acute posthemorrhagic anemia  WILLIE  Alcoholic cirrhosis with ascites  Alcohol abuse with risk of withdrawal  Hyponatremia in the setting of alcohol abuse    Chief Complaint   Patient presents with    Rectal Bleeding     Pt c/o rectal bleeding x3 days - bright red blood, denies anticoagulation; states that she was advised to come to ED by GI    Abnormal Lab     Had labs done today, abnormal renal fx    Cirrhosis     Hospital Course:     Patient is a 64-year-old female who was admitted to the hospital with rectal bleeding and abnormal labs. She has history of alcoholic liver cirrhosis. Patient had EGD and colonoscopy. She was monitored on medical floor. GI followed the patient closely. Patient had alcoholic hepatitis. Please see report for EGD and colonoscopy. Patient will need to follow-up with hematology as outpatient. She was also started on PPI, lactulose and midodrine. She was asked to continue Lasix and Aldactone. She is also educated extensively about the sign or symptom of bleeding. She was asked to come back to the hospital or call 911 if she has further bleeding. She understands that she needs to stop alcohol. She is interested in liver transplant. She reports to me that she is not going to drink any alcohol. Exam on discharge:   /71   Pulse 90   Temp 98.1 °F (36.7 °C) (Oral)   Resp 18   Ht 5' 5\" (1.651 m)   Wt 174 lb (78.9 kg)   SpO2 98%   BMI 28.96 kg/m²   General appearance: No apparent distress, appears stated age and cooperative. Neck: Supple, with full range of motion. No jugular venous distention. Trachea midline. Respiratory:  Normal respiratory effort.  Clear to auscultation, bilaterally without Rales/Wheezes/Rhonchi. Cardiovascular: Regular rate and rhythm with normal S1/S2 without murmurs, rubs or gallops. Abdomen: Soft, non-tender, non-distended with normal bowel sounds. Musculoskeletal: No clubbing, cyanosis or edema bilaterally. Full range of motion without deformity. Skin: Jaundice otherwise no issue. Neuro: Non Focal. Symetrical motor and tone. Nl Comprehension, Alert,awake and oriented. NL CN. Symetrical tone and reflexes. Psychiatric: Alert and oriented, thought content appropriate, normal insight  Capillary Refill: Brisk,< 3 seconds   Peripheral Pulses: +2 palpable, equal bilaterally     Patient was seen by the following consultants   Consults:  IP CONSULT TO SOCIAL WORK  IP CONSULT TO GI  IP CONSULT TO NEPHROLOGY  IP CONSULT TO SOCIAL WORK  IP CONSULT TO INTERVENTIONAL RADIOLOGY  IP CONSULT TO GENERAL SURGERY  IP CONSULT TO INTERVENTIONAL RADIOLOGY    Significant Diagnostic Studies:    Refer to chart     Please refer to chart if no studies are shown here    CT ABDOMEN PELVIS WO CONTRAST Additional Contrast? None    Result Date: 1/11/2023  EXAMINATION: CT OF THE ABDOMEN AND PELVIS WITHOUT CONTRAST 1/10/2023 8:23 pm TECHNIQUE: CT of the abdomen and pelvis was performed without the administration of intravenous contrast. Multiplanar reformatted images are provided for review. Automated exposure control, iterative reconstruction, and/or weight based adjustment of the mA/kV was utilized to reduce the radiation dose to as low as reasonably achievable. COMPARISON: None. HISTORY: ORDERING SYSTEM PROVIDED HISTORY: paion, distention, ascites, gi bleed TECHNOLOGIST PROVIDED HISTORY: Reason for exam:->paion, distention, ascites, gi bleed Additional Contrast?->None Decision Support Exception - unselect if not a suspected or confirmed emergency medical condition->Emergency Medical Condition (MA) What reading provider will be dictating this exam?->CRC FINDINGS: Lower Chest: Small left pleural effusion. Some increased markings seen at the lung bases bilaterally suggesting possible atelectatic change. Infiltrate cannot be completely excluded. Organs: Diffuse fatty infiltration identified the liver. Cirrhotic morphology identified of the liver. Enlargement identified of the spleen. Pancreas is grossly unremarkable in appearance. Both adrenal glands are within normal limits. The kidneys are unremarkable. The gallbladder is without evidence of stones. No intrahepatic or extrahepatic biliary ductal dilatation. GI/Bowel: The stomach is unremarkable in appearance. No wall thickening. The small bowel is within normal limits. No mucosal abnormality. Some stool seen scattered diffusely throughout the colon. Diverticulosis with no evidence of diverticulitis. No evidence of obvious obstruction. Pelvis: The bladder is mildly distended with no wall thickening. The uterus is unremarkable in appearance. Peritoneum/Retroperitoneum: There is abdominal and pelvic ascites. There is some stranding identified of the mesentery likely related to the ascites. There is no abdominal retroperitoneal lymphadenopathy. No extraluminal air. No pelvic adenopathy. No significant atherosclerotic disease within the abdominal aorta or iliac vessels. Bones/Soft Tissues: Bony structures reveal degenerative changes seen within the spine and pelvis. No ventral abdominal wall mass with small umbilical hernia containing fat only. Minimal anasarca seen within the soft tissues. Large volume ascites throughout the abdomen and pelvis with cirrhotic morphology of the liver and mild splenomegaly with diffuse fatty infiltration. There is no definite gastric hemorrhage however this is not a multiphase CT bleeding study. Evaluation is limited. Small left pleural effusion with atelectatic change seen in the lung bases bilaterally.      XR CHEST PORTABLE    Result Date: 1/10/2023  EXAMINATION: ONE XRAY VIEW OF THE CHEST 1/10/2023 8:53 pm COMPARISON: Chest series from February 5, 2022 HISTORY: ORDERING SYSTEM PROVIDED HISTORY: weakness, fatigue, r/o PNA TECHNOLOGIST PROVIDED HISTORY: Reason for exam:->weakness, fatigue, r/o PNA Is the patient pregnant?->No What reading provider will be dictating this exam?->CRC FINDINGS: Adequate and symmetric aeration of the lungs. Subsegmental opacity in the right lower lung. There are no additional formed consolidations. Subtle blunting of the left costophrenic angle may reflect the presence of a small effusion. No pneumothorax. Trachea and central mainstem bronchi appear clear. The cardiomediastinal silhouette and pulmonary vascularity appear within normal limits. Osseous and thoracic soft tissue structures demonstrate no acute findings. 1.  Subsegmental opacity in the right lower lung. There appears to be blunting the left costophrenic angle which may represent a small effusion. 2.  Normal appearance of the cardiomediastinal silhouette. Normal pulmonary vascularity. RECOMMENDATION: (Recommend upright PA and lateral chest radiographs 10-12 weeks after resolution of patient's symptoms to ensure complete resolution of radiographic findings.)     IR US GUIDED PARACENTESIS    1. Status post technically successful ultrasound-guided paracentesis. Elpidio Gooden is a Female of 42 years age, referred for Ultrasound Guided Paracentesis. PROCEDURE: Survey of the abdomen showed large amount of ascites fluid. After obtaining informed consent, the patient was positioned supine on the sonography table. Using ultrasound, the skin over the left hemiabdomen was locally anesthetized with 1% lidocaine. Following that, a Yueh needle was advanced into the fluid pocket using ultrasound visualization. 4720cc, of clear yellow fluid were aspirated and sent for cytology, and pathology. The needle was removed, and hemostasis was obtained with pressure. A Band-Aid was placed.   Post procedure images did not demonstrate hemorrhage at the target site. The patient tolerated the procedure well. The patient left the department in good condition. A radiology nurse was in presence monitoring vital signs, assisting throughout the procedure. US RETROPERITONEAL COMPLETE    Result Date: 1/10/2023  EXAMINATION: RETROPERITONEAL ULTRASOUND OF THE KIDNEYS AND URINARY BLADDER 1/10/2023 COMPARISON: CT abdomen and pelvis from September 4, 2022 HISTORY: ORDERING SYSTEM PROVIDED HISTORY: renla failure TECHNOLOGIST PROVIDED HISTORY: Reason for exam:->renla failure What reading provider will be dictating this exam?->CRC FINDINGS: Kidneys: The right kidney measures 12.6 cm in length and the left kidney measures 12.0 cm in length. Right kidney: The corticomedullary differentiation and cortical thickness is maintained. No renal mass, renal cysts, nor intrarenal calcification. No hydronephrosis. Left kidney: Corticomedullary differentiation and cortical thickness is maintained. There is a 6 mm intrarenal calcification on the left. No suspicious renal mass or renal cyst.  No hydronephrosis. Moderate to large volume simple appearing intra-abdominal ascites. Bladder: Bladder volume was 95 mL. No intrinsic mass, intrinsic calcification, nor wall thickening. 1.  Preserved renal cortical thickness bilaterally. There appears to be in intrarenal calcification on the left. No hydronephrosis seen. 2.  Moderate to large volume simple appearing intra-ascites.  3.  The bladder appears normal. RECOMMENDATIONS: Unavailable       Discharge Medications:         Medication List        START taking these medications      midodrine 10 MG tablet  Commonly known as: PROAMATINE  Take 1 tablet by mouth 3 times daily (with meals)            CHANGE how you take these medications      lactulose 10 GM/15ML solution  Commonly known as: CHRONULAC  Take 30 mLs by mouth 2 times daily  What changed: when to take this     pantoprazole 40 MG tablet  Commonly known as: PROTONIX  Take 1 tablet by mouth 2 times daily (before meals)  What changed: when to take this     spironolactone 25 MG tablet  Commonly known as: ALDACTONE  Take 1 tablet by mouth daily  What changed:   medication strength  how much to take            CONTINUE taking these medications      furosemide 20 MG tablet  Commonly known as: Lasix  Take 1 tablet by mouth daily     thiamine 100 MG tablet  Take 1 tablet by mouth daily            STOP taking these medications      atenolol 25 MG tablet  Commonly known as: TENORMIN     diazePAM 5 MG tablet  Commonly known as: VALIUM     magnesium oxide 400 (240 Mg) MG tablet  Commonly known as: MAG-OX     metroNIDAZOLE 500 MG tablet  Commonly known as: FLAGYL     mirtazapine 15 MG tablet  Commonly known as: REMERON     multivitamin Tabs tablet     nicotine 14 MG/24HR  Commonly known as: NICODERM CQ     sertraline 50 MG tablet  Commonly known as: ZOLOFT     simethicone 80 MG chewable tablet  Commonly known as: MYLICON               Where to Get Your Medications        These medications were sent to [address] [place] [address] [phone] [place] [phone]   [address] [phone]      Phone: [phone]   furosemide 20 MG tablet  lactulose 10 GM/15ML solution  midodrine 10 MG tablet  pantoprazole 40 MG tablet  spironolactone 25 MG tablet  thiamine 100 MG tablet         Disposition:   If discharged to Home, Any [place] [address] needs that were indicated and/or required as been addressed and set up by Social Work. Condition at discharge: good     Activity: activity as tolerated    Total time taken for discharging this patient: 40 minutes. Greater than 70% of time was spent focused exclusively on this patient. Time was taken to review chart, discuss plans with consultants, reconciling medications, discussing plan answering questions with patient.      Elias Braxton DO  1/17/2023, 1:00 PM  ----------------------------------------------------------------------------------------------------------------------    Savannah Griffith,

## 2023-01-19 LAB — BODY FLUID CULTURE, STERILE: NORMAL

## 2023-01-19 NOTE — PROGRESS NOTES
Physician Progress Note      PATIENTKaela BREEN #:                  [id number]  :                       1985  ADMIT DATE:       1/10/2023 6:17 PM  DISCH DATE:        2023 7:03 PM  RESPONDING  PROVIDER #:        Sudhir Fonseca CNP          QUERY TEXT:    Patient admitted with rectal bleeding. Colonoscopy revealed mild   diverticulosis and internal hemorrhoids. If possible, please document in   progress notes and discharge summary the cause of the GI bleeding: The medical record reflects the following:  Risk Factors: mild diverticulosis, internal hemorrhoids, alcoholic cirrhosis  Clinical Indicators:  Colonoscopy Impression: Mild diverticulosis in the   sigmoid colon and in the ascending colon. There was no evidence of   diverticular bleeding. Internal hemorrhoids. Hgb 11.8-7.5. Treatment: Octreotide, Protonix, IVF, GI consult, colonoscopy    Thank you, Thomas Ambrocio RN BSN [facility]  [phone]  Options provided:  -- GI bleeding likely due to diverticulosis  -- GI bleeding likely due to hemorrhoids  -- Other - I will add my own diagnosis  -- Disagree - Not applicable / Not valid  -- Disagree - Clinically unable to determine / Unknown  -- Refer to Clinical Documentation Reviewer    PROVIDER RESPONSE TEXT:    This patient has GI bleeding likely due to hemorrhoids.     Query created by: Tex Conner on 2023 7:56 AM      Electronically signed by:  Sudhir Fonseca CNP 2023 10:02 AM

## 2023-01-23 NOTE — PROGRESS NOTES
Physician Progress Note      Spring [place]  CSN #:                  [id number]  :                       1985  ADMIT DATE:       1/10/2023 6:17 PM  [address] [place] DATE:        2023 7:03 PM  RESPONDING  PROVIDER #:        Saskia Goldsmith DO          QUERY TEXT:    Patient admitted with rectal bleeding. Colonoscopy revealed mild   diverticulosis and internal hemorrhoids.  GI query response \"This patient   has GI bleeding likely due to hemorrhoids\". If possible, please document in   progress notes and discharge summary the cause of the GI bleeding: The medical record reflects the following:  Risk Factors: mild diverticulosis, internal hemorrhoids, alcoholic cirrhosis  Clinical Indicators:  Colonoscopy Impression: Mild diverticulosis in the   sigmoid colon and in the ascending colon. There was no evidence of   diverticular bleeding. Internal hemorrhoids. Hgb 11.8-7.5. Treatment: Octreotide, Protonix, IVF, GI consult, colonoscopy    Thank you, Dominique Epstein RN BSN [facility]  [phone]  Options provided:  -- Defer to gastroenterology consultant documentation regarding this patient   has GI bleeding likely due to hemorrhoids  -- GI bleeding likely due to hemorrhoids  -- Other - I will add my own diagnosis  -- Disagree - Not applicable / Not valid  -- Disagree - Clinically unable to determine / Unknown  -- Refer to Clinical Documentation Reviewer    PROVIDER RESPONSE TEXT:    I defer to gastroenterology consultant documentation regarding this patient   has GI bleeding likely due to hemorrhoids.     Query created by: Alirio Turpin on 2023 1:26 PM      Electronically signed by:  Romaine Gonzalez DO 2023 1:32 PM

## 2023-01-24 ENCOUNTER — OFFICE VISIT (OUTPATIENT)
Dept: GASTROENTEROLOGY | Age: 38
End: 2023-01-24
Payer: COMMERCIAL

## 2023-01-24 VITALS
WEIGHT: 171 LBS | BODY MASS INDEX: 28.46 KG/M2 | OXYGEN SATURATION: 98 % | HEART RATE: 77 BPM | DIASTOLIC BLOOD PRESSURE: 72 MMHG | SYSTOLIC BLOOD PRESSURE: 118 MMHG

## 2023-01-24 DIAGNOSIS — R14.0 ABDOMINAL DISTENTION: ICD-10-CM

## 2023-01-24 DIAGNOSIS — K70.11 ALCOHOLIC HEPATITIS WITH ASCITES: Primary | ICD-10-CM

## 2023-01-24 DIAGNOSIS — K70.31 ALCOHOLIC CIRRHOSIS OF LIVER WITH ASCITES (HCC): Primary | ICD-10-CM

## 2023-01-24 DIAGNOSIS — K70.31 ALCOHOLIC CIRRHOSIS OF LIVER WITH ASCITES (HCC): ICD-10-CM

## 2023-01-24 LAB
ALBUMIN SERPL-MCNC: 2.3 G/DL (ref 3.5–4.6)
ALP BLD-CCNC: 174 U/L (ref 40–130)
ALT SERPL-CCNC: 41 U/L (ref 0–33)
ANION GAP SERPL CALCULATED.3IONS-SCNC: 9 MEQ/L (ref 9–15)
AST SERPL-CCNC: 152 U/L (ref 0–35)
BILIRUB SERPL-MCNC: 10.5 MG/DL (ref 0.2–0.7)
BUN BLDV-MCNC: 19 MG/DL (ref 6–20)
CALCIUM SERPL-MCNC: 7.9 MG/DL (ref 8.5–9.9)
CHLORIDE BLD-SCNC: 100 MEQ/L (ref 95–107)
CO2: 23 MEQ/L (ref 20–31)
CREAT SERPL-MCNC: 1.38 MG/DL (ref 0.5–0.9)
GFR SERPL CREATININE-BSD FRML MDRD: 49.9 ML/MIN/{1.73_M2}
GLOBULIN: 3.7 G/DL (ref 2.3–3.5)
GLUCOSE BLD-MCNC: 123 MG/DL (ref 70–99)
HCT VFR BLD CALC: 25.9 % (ref 37–47)
HEMOGLOBIN: 8.5 G/DL (ref 12–16)
INR BLD: 1.6
MCH RBC QN AUTO: 36.1 PG (ref 27–31.3)
MCHC RBC AUTO-ENTMCNC: 32.7 % (ref 33–37)
MCV RBC AUTO: 110.2 FL (ref 79.4–94.8)
PDW BLD-RTO: 18.9 % (ref 11.5–14.5)
PLATELET # BLD: 120 K/UL (ref 130–400)
POTASSIUM SERPL-SCNC: 3.9 MEQ/L (ref 3.4–4.9)
PROTHROMBIN TIME: 19.6 SEC (ref 12.3–14.9)
RBC # BLD: 2.35 M/UL (ref 4.2–5.4)
SODIUM BLD-SCNC: 132 MEQ/L (ref 135–144)
TOTAL PROTEIN: 6 G/DL (ref 6.3–8)
WBC # BLD: 12.1 K/UL (ref 4.8–10.8)

## 2023-01-24 PROCEDURE — 99214 OFFICE O/P EST MOD 30 MIN: CPT | Performed by: NURSE PRACTITIONER

## 2023-01-24 PROCEDURE — G8417 CALC BMI ABV UP PARAM F/U: HCPCS | Performed by: NURSE PRACTITIONER

## 2023-01-24 PROCEDURE — 4004F PT TOBACCO SCREEN RCVD TLK: CPT | Performed by: NURSE PRACTITIONER

## 2023-01-24 PROCEDURE — G8427 DOCREV CUR MEDS BY ELIG CLIN: HCPCS | Performed by: NURSE PRACTITIONER

## 2023-01-24 PROCEDURE — 1111F DSCHRG MED/CURRENT MED MERGE: CPT | Performed by: NURSE PRACTITIONER

## 2023-01-24 PROCEDURE — G8484 FLU IMMUNIZE NO ADMIN: HCPCS | Performed by: NURSE PRACTITIONER

## 2023-01-24 RX ORDER — ONDANSETRON 4 MG/1
TABLET, ORALLY DISINTEGRATING ORAL
COMMUNITY
Start: 2023-01-23

## 2023-01-24 RX ORDER — DIAZEPAM 2 MG/1
TABLET ORAL
COMMUNITY
Start: 2023-01-23

## 2023-01-24 RX ORDER — LIDOCAINE 50 MG/G
1 PATCH TOPICAL DAILY
COMMUNITY

## 2023-01-24 ASSESSMENT — ENCOUNTER SYMPTOMS
TROUBLE SWALLOWING: 0
SHORTNESS OF BREATH: 0
BLOOD IN STOOL: 0
PHOTOPHOBIA: 0
COLOR CHANGE: 0
DIARRHEA: 0
RECTAL PAIN: 0
VOMITING: 0
ABDOMINAL PAIN: 0
CONSTIPATION: 0
CHEST TIGHTNESS: 0
ANAL BLEEDING: 0
EYE REDNESS: 0
ABDOMINAL DISTENTION: 1
WHEEZING: 0
NAUSEA: 0
EYE PAIN: 0
VOICE CHANGE: 0

## 2023-01-24 NOTE — PROGRESS NOTES
Subjective:      Patient ID: Ryan Monahan is a 42 y.o. female who presents today for:  Chief Complaint   Patient presents with    Cirrhosis       HPI  Patient came in as hospital follow-up, has recall was hospitalized for decompensated cirrhosis, WILLIE, and severe electrolyte abnormalities. Carries a diagnosis of decompensated alcoholic cirrhosis has been abstinent from alcohol since 12/22. Had been maintained on dual diuretics, and lactulose. Had paracentesis while hospitalized that was negative for SBP. Otherwise has no new complaints today. OV 1/10/22  Patient came in as follow-up to alcoholic cirrhosis, noted extended hospitalization in September for alcoholic hepatitis, hepatic encephalopathy, pancreatitis, and WILLIE. Was seen in the office 1 month prior, was initiated on spironolactone 50 mg and furosemide 20 mg. Has complaints today of increased jaundice, abdominal swelling, c/o generalized abdominal pain. Denies N/V, no melena, reports isolated episode or BRBPR yesterday with BM. Noted history of paracentesis while hospitalized with fluid cultures positive for Streptococcus viridans. Pt has been abstinent from alcohol since 12/24/22. OV 12/13/22  This very pleasant 68-year-old came in today for the evaluation management of cirrhosis. Patient came in today as recently discharged from the hospital.  Patient does endorse occasional GERD. No hematemesis melena hematochezia. Patient is not on diuretics. Patient continues to drink alcohol. Denies confusion. No recent hospitalization. Patient came in today for follow-up  HPI: 43 y.o. female following up after inpatient hospitalization 9/4/2022 - 8/16/0762 with alcoholic hepatitis with ascites, pancreatitis, EtOH withdrawal, mild WILLIE, macrocytic anemia and thrombocytopenia. She is s/p paracentesis with drainage of 1500 mL per IR, fluid analysis not suggestive of SBP, however fluid cultures positive for Streptococcus viridans.   She was admitted to inpatient rehab on 9/16/2022-9/22/22. Interval change: Patient reports doing well since discharge from inpatient rehab. States she has had a good appetite since she has returned home, also has Ensure nutritional supplements on hand if needed. She denies any increased abdominal girth or lower extremity edema. States she is taking Lasix 40 mg and Aldactone 100 mg daily as prescribed with good urine output. She denies GERD symptoms with taking omeprazole daily. She denies nausea/vomiting, hematemesis, dysphagia, hematochezia, or melena. She does report significant weight loss of approximately 20 pounds as her abdominal and lower extremity swelling have significantly improved with diuretic therapy. She denies any overt signs/symptoms of encephalopathy including increased confusion, fatigue or reversal of sleep schedule. States she was taking lactulose once a day upon discharge from the hospital, however this caused her to have diarrhea with associated fecal incontinence while sleeping. States she will take lactulose as needed if she does not have a bowel movement, however currently having a bowel movement 1-2 times per day. Last took lactulose approximately 2 days ago. She does report lower abdominal tenderness associated with distention, however denies any fever/chills. Smoking status: 1-2 cigarettes per day  Alcohol use: States she has not touched alcohol since admission to the hospital, last drink of ETOH 5/2/8192  Illicit drug use: smokes marijuana for anxiety, around once per day  NSAID use: naproxen on med list, last had months ago.     Past Medical History:   Diagnosis Date    Alcohol abuse     Tobacco dependence      Past Surgical History:   Procedure Laterality Date    APPENDECTOMY      COLONOSCOPY N/A 01/13/2023    COLONOSCOPY DIAGNOSTIC performed by Octaviano De Los Santos MD at [address]      reports 6 sx on L foot and one on right foot    PARACENTESIS Left 09/13/2022    1510 ml removed per Dr Edilia Lombardo  specimen obtained    PARACENTESIS Left 01/11/2023    4720 ml removed by Dr. Edilia Lombardo - therapeutic & diagnostic    PARACENTESIS Left 01/16/2023    4400ml removed by Dr. Andres Lindquist 09/09/2022    EGD DIAGNOSTIC ONLY performed by Sofía Guillory MD at [facility] History    Marital status: Single     Spouse name: Not on file    Number of children: 2    Years of education: Not on file    Highest education level: Not on file   Occupational History    Not on file   Tobacco Use    Smoking status: Every Day     Packs/day: 0.25     Types: Cigarettes    Smokeless tobacco: Never    Tobacco comments:     1/13/23 states 3-4 cigs per day tram   Vaping Use    Vaping Use: Never used   Substance and Sexual Activity    Alcohol use: Yes     Comment: 1/13/23: last drink 1/7/23 Vodka    Drug use: Yes     Types: Marijuana Estil Catena)    Sexual activity: Not on file   Other Topics Concern    Not on file   Social History Narrative    Lives With: 10 yo daughter, SO sometimes stays with pt, he works odd jobs    Older 24 yo dtr lives with a boyfriend    Type of Home: [place] [facility] Two level, Laundry in basement (flights to and from basement and second floor with HR)    Home Access: Stairs to enter with rails- Number of Steps: 5- Rails: Both    Bathroom Shower/Tub: Tub/Shower unit    Home Equipment:  (no AD)    ADL Assistance: Independent    Homemaking Assistance: Independent    Ambulation Assistance: Independent (no AD)    Transfer Assistance: Independent    Active : Yes    Type of Occupation: unemployed    GED--then some college.          Social Determinants of Health     Financial Resource Strain: Not on file   Food Insecurity: Not on file   Transportation Needs: Not on file   Physical Activity: Not on file   Stress: Not on file   Social Connections: Not on file   Intimate Partner Violence: Not on file   Housing Stability: Not on file     Family History   Problem Relation Age of Onset    High Cholesterol Mother     Hypertension Mother     Melanoma Father     High Cholesterol Father     Hypertension Father     Colon Cancer Neg Hx      Allergies   Allergen Reactions    Oxycodone-Acetaminophen Itching     Itching and nausea           Review of Systems   Constitutional:  Positive for fatigue. Negative for appetite change, chills, fever and unexpected weight change. HENT:  Negative for nosebleeds, tinnitus, trouble swallowing and voice change. Eyes:  Negative for photophobia, pain and redness. Respiratory:  Negative for chest tightness, shortness of breath and wheezing. Cardiovascular:  Negative for chest pain, palpitations and leg swelling. Gastrointestinal:  Positive for abdominal distention. Negative for abdominal pain, anal bleeding, blood in stool, constipation, diarrhea, nausea, rectal pain and vomiting. Endocrine: Negative for polydipsia, polyphagia and polyuria. Genitourinary:  Negative for difficulty urinating and hematuria. Skin:  Negative for color change, pallor and rash. Neurological:  Positive for weakness. Negative for dizziness, speech difficulty and headaches. Psychiatric/Behavioral:  Negative for confusion and suicidal ideas. Objective:   /72 (Site: Right Upper Arm, Position: Sitting, Cuff Size: Small Adult)   Pulse 77   Wt 171 lb (77.6 kg)   SpO2 98%   BMI 28.46 kg/m²     Physical Exam  Vitals reviewed. Constitutional:       General: She is not in acute distress. Appearance: Normal appearance. She is well-developed and well-groomed. HENT:      Head: Normocephalic and atraumatic. Nose: Nose normal.   Eyes:      General: Scleral icterus present. Extraocular Movements: Extraocular movements intact. Conjunctiva/sclera: Conjunctivae normal.      Pupils: Pupils are equal, round, and reactive to light.    Cardiovascular:      Rate and Rhythm: Normal rate and regular rhythm. Pulses: Normal pulses. Heart sounds: Normal heart sounds. Pulmonary:      Effort: Pulmonary effort is normal. No respiratory distress. Breath sounds: Normal breath sounds. No wheezing or rales. Abdominal:      General: Abdomen is flat. Bowel sounds are normal. There is distension. Palpations: Abdomen is soft. There is no hepatomegaly, splenomegaly or mass. Tenderness: There is no abdominal tenderness. There is no guarding or rebound. Musculoskeletal:         General: No tenderness or deformity. Normal range of motion. Cervical back: Neck supple. Right lower leg: Edema present. Left lower leg: Edema present. Skin:     General: Skin is warm and dry. Capillary Refill: Capillary refill takes less than 2 seconds. Coloration: Skin is jaundiced. Findings: No erythema or rash. Neurological:      General: No focal deficit present. Mental Status: She is alert and oriented to person, place, and time.    Psychiatric:         Mood and Affect: Mood normal.         Behavior: Behavior normal.       Laboratory, Pathology, Radiology reviewed in detail with relevantimportant investigations summarized below:  Lab Results   Component Value Date/Time    WBC 10.6 01/17/2023 04:52 AM    WBC 9.6 01/16/2023 04:59 AM    WBC 13.8 01/15/2023 02:43 AM    WBC 8.3 01/14/2023 03:31 AM    WBC 11.0 01/13/2023 03:18 AM    HGB 8.5 01/17/2023 09:27 AM    HGB 8.2 01/17/2023 04:52 AM    HGB 8.3 01/16/2023 09:31 PM    HGB 8.2 01/16/2023 09:55 AM    HGB 7.5 01/16/2023 04:59 AM    HCT 25.3 01/17/2023 09:27 AM    HCT 24.6 01/17/2023 04:52 AM    HCT 24.7 01/16/2023 09:31 PM    HCT 23.6 01/16/2023 09:55 AM    HCT 21.9 01/16/2023 04:59 AM    .6 01/17/2023 04:52 AM    .3 01/16/2023 04:59 AM    .6 01/15/2023 02:43 AM    .2 01/14/2023 03:31 AM    .7 01/13/2023 03:18 AM     01/17/2023 04:52 AM     01/16/2023 04:59 AM     01/15/2023 02:43 AM     01/14/2023 03:31 AM     01/13/2023 03:18 AM    .  Lab Results   Component Value Date/Time    ALT 48 01/17/2023 04:52 AM    ALT 44 01/16/2023 04:59 AM    ALT 42 01/15/2023 02:43 AM     01/17/2023 04:52 AM     01/16/2023 04:59 AM     01/15/2023 02:43 AM    ALKPHOS 158 01/17/2023 04:52 AM    ALKPHOS 167 01/16/2023 04:59 AM    ALKPHOS 176 01/15/2023 02:43 AM    BILITOT 12.6 01/17/2023 04:52 AM    BILITOT 13.7 01/16/2023 04:59 AM    BILITOT 18.9 01/15/2023 02:43 AM       CT ABDOMEN PELVIS WO CONTRAST Additional Contrast? None    Result Date: 1/11/2023  EXAMINATION: CT OF THE ABDOMEN AND PELVIS WITHOUT CONTRAST 1/10/2023 8:23 pm TECHNIQUE: CT of the abdomen and pelvis was performed without the administration of intravenous contrast. Multiplanar reformatted images are provided for review. Automated exposure control, iterative reconstruction, and/or weight based adjustment of the mA/kV was utilized to reduce the radiation dose to as low as reasonably achievable. COMPARISON: None. HISTORY: ORDERING SYSTEM PROVIDED HISTORY: paion, distention, ascites, gi bleed TECHNOLOGIST PROVIDED HISTORY: Reason for exam:->paion, distention, ascites, gi bleed Additional Contrast?->None Decision Support Exception - unselect if not a suspected or confirmed emergency medical condition->Emergency Medical Condition (MA) What reading provider will be dictating this exam?->CRC FINDINGS: Lower Chest: Small left pleural effusion. Some increased markings seen at the lung bases bilaterally suggesting possible atelectatic change. Infiltrate cannot be completely excluded. Organs: Diffuse fatty infiltration identified the liver. Cirrhotic morphology identified of the liver. Enlargement identified of the spleen. Pancreas is grossly unremarkable in appearance. Both adrenal glands are within normal limits. The kidneys are unremarkable.   The gallbladder is without evidence of stones. No intrahepatic or extrahepatic biliary ductal dilatation. GI/Bowel: The stomach is unremarkable in appearance. No wall thickening. The small bowel is within normal limits. No mucosal abnormality. Some stool seen scattered diffusely throughout the colon. Diverticulosis with no evidence of diverticulitis. No evidence of obvious obstruction. Pelvis: The bladder is mildly distended with no wall thickening. The uterus is unremarkable in appearance. Peritoneum/Retroperitoneum: There is abdominal and pelvic ascites. There is some stranding identified of the mesentery likely related to the ascites. There is no abdominal retroperitoneal lymphadenopathy. No extraluminal air. No pelvic adenopathy. No significant atherosclerotic disease within the abdominal aorta or iliac vessels. Bones/Soft Tissues: Bony structures reveal degenerative changes seen within the spine and pelvis. No ventral abdominal wall mass with small umbilical hernia containing fat only. Minimal anasarca seen within the soft tissues. Large volume ascites throughout the abdomen and pelvis with cirrhotic morphology of the liver and mild splenomegaly with diffuse fatty infiltration. There is no definite gastric hemorrhage however this is not a multiphase CT bleeding study. Evaluation is limited. Small left pleural effusion with atelectatic change seen in the lung bases bilaterally. XR ABDOMEN (KUB) (SINGLE AP VIEW)    Result Date: 1/17/2023  EXAMINATION: ONE SUPINE XRAY VIEW(S) OF THE ABDOMEN 1/17/2023 12:51 pm COMPARISON: None. HISTORY: ORDERING SYSTEM PROVIDED HISTORY: discomfort TECHNOLOGIST PROVIDED HISTORY: Reason for exam:->discomfort Is the patient pregnant?->No What reading provider will be dictating this exam?->CRC FINDINGS: Nonspecific bowel gas pattern without evidence of obstruction. No abnormal calcifications. No acute osseous abnormality. No evidence of bowel obstruction.      XR CHEST PORTABLE    Result Date: 1/10/2023  EXAMINATION: ONE XRAY VIEW OF THE CHEST 1/10/2023 8:53 pm COMPARISON: Chest series from February 5, 2022 HISTORY: ORDERING SYSTEM PROVIDED HISTORY: weakness, fatigue, r/o PNA TECHNOLOGIST PROVIDED HISTORY: Reason for exam:->weakness, fatigue, r/o PNA Is the patient pregnant?->No What reading provider will be dictating this exam?->CRC FINDINGS: Adequate and symmetric aeration of the lungs. Subsegmental opacity in the right lower lung. There are no additional formed consolidations. Subtle blunting of the left costophrenic angle may reflect the presence of a small effusion. No pneumothorax. Trachea and central mainstem bronchi appear clear. The cardiomediastinal silhouette and pulmonary vascularity appear within normal limits. Osseous and thoracic soft tissue structures demonstrate no acute findings. 1.  Subsegmental opacity in the right lower lung. There appears to be blunting the left costophrenic angle which may represent a small effusion. 2.  Normal appearance of the cardiomediastinal silhouette. Normal pulmonary vascularity. RECOMMENDATION: (Recommend upright PA and lateral chest radiographs 10-12 weeks after resolution of patient's symptoms to ensure complete resolution of radiographic findings.)     IR US GUIDED PARACENTESIS    Result Date: 1/16/2023  PROCEDURE: ULTRASOUND-GUIDED THERAPEUTIC PARACENTESIS 1/16/2023 HISTORY: ORDERING SYSTEM PROVIDED HISTORY: ascites TECHNOLOGIST PROVIDED HISTORY: Reason for exam:->ascites TECHNIQUE: Informed consent was obtained after a detailed explanation of the procedure including risks, benefits, and alternatives. Universal protocol was followed. A limited ultrasound of the abdomen was performed. The left lower quadrant of the abdomen was prepped and draped in sterile fashion and local anesthesia was achieved with lidocaine. A 5 [place] Juli needle-sheath was advanced into ascites and paracentesis was performed with ultrasound guidance.   The patient tolerated the procedure well. FINDINGS: Limited ultrasound of the abdomen demonstrates ascites. A total of 4,720 mL of clear straw-colored peritoneal fluid was removed. Ultrasound-guided therapeutic paracentesis. IR US GUIDED PARACENTESIS    1. Status post technically successful ultrasound-guided paracentesis. Asa Ormond is a Female of 42 years age, referred for Ultrasound Guided Paracentesis. PROCEDURE: Survey of the abdomen showed large amount of ascites fluid. After obtaining informed consent, the patient was positioned supine on the sonography table. Using ultrasound, the skin over the left hemiabdomen was locally anesthetized with 1% lidocaine. Following that, a [place] needle was advanced into the fluid pocket using ultrasound visualization. 4720cc, of clear yellow fluid were aspirated and sent for cytology, and pathology. The needle was removed, and hemostasis was obtained with pressure. A Band-Aid was placed. Post procedure images did not demonstrate hemorrhage at the target site. The patient tolerated the procedure well. The patient left the department in good condition. A radiology nurse was in presence monitoring vital signs, assisting throughout the procedure. US RETROPERITONEAL COMPLETE    Result Date: 1/10/2023  EXAMINATION: RETROPERITONEAL ULTRASOUND OF THE KIDNEYS AND URINARY BLADDER 1/10/2023 COMPARISON: CT abdomen and pelvis from September 4, 2022 HISTORY: ORDERING SYSTEM PROVIDED HISTORY: renla failure TECHNOLOGIST PROVIDED HISTORY: Reason for exam:->renla failure What reading provider will be dictating this exam?->CRC FINDINGS: Kidneys: The right kidney measures 12.6 cm in length and the left kidney measures 12.0 cm in length. Right kidney: The corticomedullary differentiation and cortical thickness is maintained. No renal mass, renal cysts, nor intrarenal calcification. No hydronephrosis.  Left kidney: Corticomedullary differentiation and cortical thickness is maintained. There is a 6 mm intrarenal calcification on the left. No suspicious renal mass or renal cyst.  No hydronephrosis. Moderate to large volume simple appearing intra-abdominal ascites. Bladder: Bladder volume was 95 mL. No intrinsic mass, intrinsic calcification, nor wall thickening. 1.  Preserved renal cortical thickness bilaterally. There appears to be in intrarenal calcification on the left. No hydronephrosis seen. 2.  Moderate to large volume simple appearing intra-ascites. 3.  The bladder appears normal. RECOMMENDATIONS: Unavailable     Colonoscopy    Result Date: 1/13/2023  Site: [address] [place] [address] Patient Name: Afsaneh Vidal MRN: [id number] YOB: 1985 Account: [de-identified] Gender: Female Age: 42 Years Procedure: Colonoscopy Procedure Date: 1/13/2023 Attending MD: Emily Marley Indications:        -  Rectal bleeding Medications:        -  See the Anesthesia note for documentation of the administered medications Complications:        -  No immediate complications. Estimated Blood Loss:        -  Estimated blood loss: none. Procedure:        - The Colonoscope was introduced through the anus and advanced to the cecum,           identified by appendiceal orifice and ileocecal valve. -  The colonoscopy was performed without difficulty. -  The patient tolerated the procedure well. -  The quality of the bowel preparation was good. -  The ileocecal valve, appendiceal orifice, and rectum were photographed. Findings:        -  A few medium-mouthed diverticula were found in the sigmoid colon and           ascending colon. There was no evidence of diverticular bleeding.        -  Internal hemorrhoids were found during retroflexion. The hemorrhoids were           Grade II (internal hemorrhoids that prolapse but reduce spontaneously). Impression:        -  Mild diverticulosis in the sigmoid colon and in the ascending colon.   There           was no evidence of diverticular bleeding.        -  Internal hemorrhoids.        -  No specimens collected. Recommendation:        -  Continue present medications. - if bleeding recurs ,consider surgical consult for hemorrhoidectomy. Procedure Code(s):        - [id number], Colonoscopy, flexible; diagnostic, including collection of           specimen(s) by brushing or washing, when performed (separate procedure) Diagnosis Code(s):        - K62.5, Hemorrhage of anus and rectum        - K64.1, Second degree hemorrhoids        - K57.30, Diverticulosis of large intestine without perforation or abscess           without bleeding       CPT(R) - 2022 copyright [employer] Association. All Rights Reserved. The CPT codes, CCI edits and ICD codes generated are intended as suggestions       and were generated based on input data. These codes are preliminary and upon        review may be revised to meet current compliance and payer requirements. The provider is responsible for the final determination of appropriate codes,       and modifiers. Scope Withdrawal Time:       00:06:44 Signature Name: Juan Pablo Houston MD Signature Statement: This document has been electronically signed. Note Initiated On:1/13/2023 Signature Date:1/13/2023 2:28 PM    Lab Results   Component Value Date/Time    IRON 81 01/11/2023 01:41 PM    TIBC SEE BELOW 01/11/2023 01:41 PM     Lab Results   Component Value Date/Time    INR 2.0 01/10/2023 07:30 PM    INR 1.8 01/10/2023 11:27 AM    INR 1.4 12/13/2022 04:37 PM    INR 1.4 09/15/2022 05:54 AM    INR 1.5 09/14/2022 05:30 AM     No components found for: ACUTEHEPATITISSCREEN  No components found for: CELIACPANEL  No components found for: STOOLCULTURE, C.DIFF, STOOLOVAPARASITE, STOOLLEUCOCYTE    Endoscopic hx:  EGD Dr Andrea Mcdaniel 9/9/22  Web in the upper third of the esophagus. Z-line regular, 36 cm from the incisors.   Erythematous, granular and congested mucosa in the cardia, gastric fundus, gastric body and antrum. Normal examined duodenum. LA Grade A reflux esophagitis with no bleeding. No specimens collected. Assessment:       Diagnosis Orders   1. Alcoholic cirrhosis of liver with ascites ([address]  CBC    Comprehensive Metabolic Panel    Protime-INR            Plan:      1-  Decompensated Cirrhosis secondary to ETOH  :  Obtain blood work and calculate MELD  Last ETOH 12/24/22, continue complete alcohol abstinence  2 -  Grade 2 Ascites, abdominal pain:   Noted increased abdominal girth, on  Lasix and Aldactone. Recent paracentesis negative for SBP, noted history of SBP in 9/22  -Obtain CMP and electrolytes for further evaluation  -Obtain therapeutic paracentesis as needed  3- EGD for Variceal surveillance:    Had EGD 9/22 that showed portal hypertensive gastropathy with no esophageal varices   4 - HCC screening:   Recent imaging Negative for liver mass  5-  Overt Hepatic encephalopathy:  None at this time    6- Preventive measure:   Patient to continue follow-up with primary care physician regarding age-appropriate screening assessment. As mentioned discussed at length regarding alcohol abstinence       Return in about 4 weeks (around 2/21/2023), or if symptoms worsen or fail to improve.       Nelida Conner, APRN - CNP

## 2023-01-25 ENCOUNTER — TELEPHONE (OUTPATIENT)
Dept: INTERVENTIONAL RADIOLOGY/VASCULAR | Age: 38
End: 2023-01-25

## 2023-01-26 ENCOUNTER — TELEPHONE (OUTPATIENT)
Dept: INTERVENTIONAL RADIOLOGY/VASCULAR | Age: 38
End: 2023-01-26

## 2023-01-26 NOTE — TELEPHONE ENCOUNTER
Patient was given this information via phone conversation - voiced understanding  2. Spoke to [employer] in [address]: 1/26/2023 @ 11:00 am  >  You will need to arrive at 10:30 am from home and check in at the Diagnostic Imaging Check In desk.

## 2023-02-01 ENCOUNTER — HOSPITAL ENCOUNTER (OUTPATIENT)
Dept: INTERVENTIONAL RADIOLOGY/VASCULAR | Age: 38
Discharge: HOME | End: 2023-02-03
Payer: COMMERCIAL

## 2023-02-01 VITALS
RESPIRATION RATE: 18 BRPM | DIASTOLIC BLOOD PRESSURE: 60 MMHG | SYSTOLIC BLOOD PRESSURE: 118 MMHG | OXYGEN SATURATION: 100 % | HEART RATE: 99 BPM

## 2023-02-01 DIAGNOSIS — R14.0 ABDOMINAL DISTENTION: ICD-10-CM

## 2023-02-01 DIAGNOSIS — K70.11 ALCOHOLIC HEPATITIS WITH ASCITES: ICD-10-CM

## 2023-02-01 PROCEDURE — 6360000002 HC RX W HCPCS: Performed by: RADIOLOGY

## 2023-02-01 PROCEDURE — 49083 ABD PARACENTESIS W/IMAGING: CPT

## 2023-02-01 PROCEDURE — P9047 ALBUMIN (HUMAN), 25%, 50ML: HCPCS | Performed by: RADIOLOGY

## 2023-02-01 PROCEDURE — 49083 ABD PARACENTESIS W/IMAGING: CPT | Performed by: RADIOLOGY

## 2023-02-01 PROCEDURE — 2709999900 IR US GUIDED PARACENTESIS

## 2023-02-01 PROCEDURE — 2500000003 HC RX 250 WO HCPCS: Performed by: RADIOLOGY

## 2023-02-01 RX ORDER — LIDOCAINE HYDROCHLORIDE 20 MG/ML
INJECTION, SOLUTION INFILTRATION; PERINEURAL
Status: COMPLETED | OUTPATIENT
Start: 2023-02-01 | End: 2023-02-01

## 2023-02-01 RX ORDER — ALBUMIN (HUMAN) 12.5 G/50ML
25 SOLUTION INTRAVENOUS ONCE
Status: COMPLETED | OUTPATIENT
Start: 2023-02-01 | End: 2023-02-01

## 2023-02-01 RX ADMIN — ALBUMIN (HUMAN) 25 G: 0.25 INJECTION, SOLUTION INTRAVENOUS at 14:17

## 2023-02-01 RX ADMIN — LIDOCAINE HYDROCHLORIDE 10 ML: 20 INJECTION, SOLUTION INFILTRATION; PERINEURAL at 13:28

## 2023-02-01 NOTE — DISCHARGE INSTRUCTIONS
Ultrasound Guided Paracentesis Discharge Instructions     Rest today. No heavy lifting, bending, stretching or pulling. Some tenderness will be normal at the procedure site for a few days. You may remove the bandaid in 48 hours. If you experience any drainage or bleeding at the site, hold pressure for 30 minutes and lay on side opposite of the procedure site (move fluid away from the area of leakage). If drainage or bleeding does not stop and seems excessive, call your physician or proceed to the ER. Watch for signs of infection such as fever, chills, drainage or redness at the site. If you experience any of these symptoms, call your primary doctor or go to the emergency room. No aspirin containing products for 24 hours. Please keep all follow-up appointments with your Hepatologist. Schedule follow-up appointment for repeat paracentesis if necessary. If you have any concerns please contact the [address] [place] [address] Radiology Department at [phone]. Ultrasound Guided Paracentesis Discharge Instructions     Rest today. No heavy lifting, bending, stretching or pulling. Some tenderness will be normal at the procedure site for a few days. You may remove the bandaid in 48 hours. If you experience any drainage or bleeding at the site, hold pressure for 30 minutes and lay on side opposite of the procedure site (move fluid away from the area of leakage). If drainage or bleeding does not stop and seems excessive, call your physician or proceed to the ER. Watch for signs of infection such as fever, chills, drainage or redness at the site. If you experience any of these symptoms, call your primary doctor or go to the emergency room. No aspirin containing products for 24 hours. Please keep all follow-up appointments with your Hepatologist. Schedule follow-up appointment for repeat paracentesis if necessary.      If you have any concerns please contact the [address] [place], [place] [address]son [facility] Radiology Department at [phone].

## 2023-02-01 NOTE — OR NURSING
NO SEDATION    1250 pt brought to specials room 6 for procedure. Gait slow and steady. U/S tech obtained images prior to start of procedure using U/S. Pt  VSS. History, allergies and medications reviewed. 1255  Procedure explained, consent signed. Dr Romario Sousa made aware pt ready for procedure. 12 Timeout completed for Ultrasound Guided Paracentesis. Using U/S, Dr. Romario Sousa marked, prepped LLQ with Chloraprep and draped with sterile drape and towels. 1328 Site numbed with lidocaine. Tae1tban Centesis 5F catheter placed using Ultrasound guidance. Fluid appears clear and yellow. Catheter tubing connected to Nuenz machine to remove fluid. 1418 Pt tolerated well. Total 5600 ml removed. Catheter removed, pressure held and bandaid applied. Verbal and tactile reassurance given throughout. Iv established. Albumin infusing. 1440  Albumin continues to infuse. Pt taken to ct holding for remainder of infusion.

## 2023-02-01 NOTE — FLOWSHEET NOTE
1459 Pt to ct holding room for reminder of albumin infusion. Pt talking with nursing staff and tolerating well. 1545 Pt to restroom. Pt tolerated well. Discharge instructions reviewed with patient. Pt has no questions or concerns at this time. 1559 Albumin infusion complete. Iv removed and bandage applied. Pt walked to discharge area. Pt mother to drive her home.

## 2023-02-02 ENCOUNTER — POST-OP TELEPHONE (OUTPATIENT)
Dept: INTERVENTIONAL RADIOLOGY/VASCULAR | Age: 38
End: 2023-02-02

## 2023-02-16 ENCOUNTER — OFFICE VISIT (OUTPATIENT)
Dept: GASTROENTEROLOGY | Age: 38
End: 2023-02-16
Payer: COMMERCIAL

## 2023-02-16 VITALS
OXYGEN SATURATION: 98 % | WEIGHT: 156 LBS | BODY MASS INDEX: 25.96 KG/M2 | DIASTOLIC BLOOD PRESSURE: 60 MMHG | HEART RATE: 116 BPM | SYSTOLIC BLOOD PRESSURE: 114 MMHG

## 2023-02-16 DIAGNOSIS — K70.31 ALCOHOLIC CIRRHOSIS OF LIVER WITH ASCITES (HCC): ICD-10-CM

## 2023-02-16 DIAGNOSIS — K70.31 ALCOHOLIC CIRRHOSIS OF LIVER WITH ASCITES (HCC): Primary | ICD-10-CM

## 2023-02-16 LAB
ALBUMIN SERPL-MCNC: 2.6 G/DL (ref 3.5–4.6)
ALP BLD-CCNC: 158 U/L (ref 40–130)
ALT SERPL-CCNC: 24 U/L (ref 0–33)
ANION GAP SERPL CALCULATED.3IONS-SCNC: 13 MEQ/L (ref 9–15)
AST SERPL-CCNC: 96 U/L (ref 0–35)
BILIRUB SERPL-MCNC: 4.8 MG/DL (ref 0.2–0.7)
BUN BLDV-MCNC: 11 MG/DL (ref 6–20)
CALCIUM SERPL-MCNC: 8.6 MG/DL (ref 8.5–9.9)
CHLORIDE BLD-SCNC: 100 MEQ/L (ref 95–107)
CO2: 21 MEQ/L (ref 20–31)
CREAT SERPL-MCNC: 1.2 MG/DL (ref 0.5–0.9)
GFR SERPL CREATININE-BSD FRML MDRD: 59 ML/MIN/{1.73_M2}
GLOBULIN: 4.3 G/DL (ref 2.3–3.5)
GLUCOSE BLD-MCNC: 120 MG/DL (ref 70–99)
HCT VFR BLD CALC: 29.7 % (ref 37–47)
HEMOGLOBIN: 9.9 G/DL (ref 12–16)
INR BLD: 1.7
MCH RBC QN AUTO: 34.4 PG (ref 27–31.3)
MCHC RBC AUTO-ENTMCNC: 33.4 % (ref 33–37)
MCV RBC AUTO: 103 FL (ref 79.4–94.8)
PDW BLD-RTO: 14.7 % (ref 11.5–14.5)
PLATELET # BLD: 155 K/UL (ref 130–400)
POTASSIUM SERPL-SCNC: 4 MEQ/L (ref 3.4–4.9)
PROTHROMBIN TIME: 20 SEC (ref 12.3–14.9)
RBC # BLD: 2.88 M/UL (ref 4.2–5.4)
SODIUM BLD-SCNC: 134 MEQ/L (ref 135–144)
TOTAL PROTEIN: 6.9 G/DL (ref 6.3–8)
WBC # BLD: 7.9 K/UL (ref 4.8–10.8)

## 2023-02-16 PROCEDURE — 4004F PT TOBACCO SCREEN RCVD TLK: CPT | Performed by: NURSE PRACTITIONER

## 2023-02-16 PROCEDURE — G8417 CALC BMI ABV UP PARAM F/U: HCPCS | Performed by: NURSE PRACTITIONER

## 2023-02-16 PROCEDURE — G8427 DOCREV CUR MEDS BY ELIG CLIN: HCPCS | Performed by: NURSE PRACTITIONER

## 2023-02-16 PROCEDURE — 1111F DSCHRG MED/CURRENT MED MERGE: CPT | Performed by: NURSE PRACTITIONER

## 2023-02-16 PROCEDURE — G8484 FLU IMMUNIZE NO ADMIN: HCPCS | Performed by: NURSE PRACTITIONER

## 2023-02-16 PROCEDURE — 99214 OFFICE O/P EST MOD 30 MIN: CPT | Performed by: NURSE PRACTITIONER

## 2023-02-16 ASSESSMENT — ENCOUNTER SYMPTOMS
CHEST TIGHTNESS: 0
VOMITING: 0
PHOTOPHOBIA: 0
SHORTNESS OF BREATH: 0
CONSTIPATION: 0
ABDOMINAL PAIN: 0
BLOOD IN STOOL: 0
DIARRHEA: 0
EYE REDNESS: 0
NAUSEA: 0
WHEEZING: 0
EYE PAIN: 0
RECTAL PAIN: 0
ANAL BLEEDING: 0
ABDOMINAL DISTENTION: 1
COLOR CHANGE: 0
TROUBLE SWALLOWING: 0
VOICE CHANGE: 0

## 2023-02-16 NOTE — PROGRESS NOTES
Subjective:      Patient ID: Sandra Oneil is a 42 y.o. female who presents today for:  Chief Complaint   Patient presents with    Cirrhosis       HPI  Patient came in as follow-up and further evaluation and management of cirrhosis as recall patient carries a diagnosis of decompensated cirrhosis from alcohol use has been abstinent since December 2022. Is maintained on dual diuretics requires large-volume paracentesis as needed, most recent ascitic fluid was negative for SBP, has a history of SBP, has a history of esophageal varices. OV 1/24/23  Patient came in as hospital follow-up, has recall was hospitalized for decompensated cirrhosis, WILLIE, and severe electrolyte abnormalities. Carries a diagnosis of decompensated alcoholic cirrhosis has been abstinent from alcohol since 12/22. Had been maintained on dual diuretics, and lactulose. Had paracentesis while hospitalized that was negative for SBP. Otherwise has no new complaints today. OV 1/10/22  Patient came in as follow-up to alcoholic cirrhosis, noted extended hospitalization in September for alcoholic hepatitis, hepatic encephalopathy, pancreatitis, and WILLIE. Was seen in the office 1 month prior, was initiated on spironolactone 50 mg and furosemide 20 mg. Has complaints today of increased jaundice, abdominal swelling, c/o generalized abdominal pain. Denies N/V, no melena, reports isolated episode or BRBPR yesterday with BM. Noted history of paracentesis while hospitalized with fluid cultures positive for Streptococcus viridans. Pt has been abstinent from alcohol since 12/24/22. OV 12/13/22  This very pleasant 38-year-old came in today for the evaluation management of cirrhosis. Patient came in today as recently discharged from the hospital.  Patient does endorse occasional GERD. No hematemesis melena hematochezia. Patient is not on diuretics. Patient continues to drink alcohol. Denies confusion. No recent hospitalization.   Patient came in today for follow-up  HPI: 43 y.o. female following up after inpatient hospitalization 9/4/2022 - 2/73/0180 with alcoholic hepatitis with ascites, pancreatitis, EtOH withdrawal, mild WILLIE, macrocytic anemia and thrombocytopenia. She is s/p paracentesis with drainage of 1500 mL per IR, fluid analysis not suggestive of SBP, however fluid cultures positive for Streptococcus viridans. She was admitted to inpatient rehab on 9/16/2022-9/22/22. Interval change: Patient reports doing well since discharge from inpatient rehab. States she has had a good appetite since she has returned home, also has Ensure nutritional supplements on hand if needed. She denies any increased abdominal girth or lower extremity edema. States she is taking Lasix 40 mg and Aldactone 100 mg daily as prescribed with good urine output. She denies GERD symptoms with taking omeprazole daily. She denies nausea/vomiting, hematemesis, dysphagia, hematochezia, or melena. She does report significant weight loss of approximately 20 pounds as her abdominal and lower extremity swelling have significantly improved with diuretic therapy. She denies any overt signs/symptoms of encephalopathy including increased confusion, fatigue or reversal of sleep schedule. States she was taking lactulose once a day upon discharge from the hospital, however this caused her to have diarrhea with associated fecal incontinence while sleeping. States she will take lactulose as needed if she does not have a bowel movement, however currently having a bowel movement 1-2 times per day. Last took lactulose approximately 2 days ago. She does report lower abdominal tenderness associated with distention, however denies any fever/chills.   Smoking status: 1-2 cigarettes per day  Alcohol use: States she has not touched alcohol since admission to the hospital, last drink of ETOH 6/4/0732  Illicit drug use: smokes marijuana for anxiety, around once per day  NSAID use: naproxen on med list, last had months ago.     Past Medical History:   Diagnosis Date    Alcohol abuse     Tobacco dependence      Past Surgical History:   Procedure Laterality Date    APPENDECTOMY      COLONOSCOPY N/A 01/13/2023    COLONOSCOPY DIAGNOSTIC performed by Kyra Mclaughlin MD at [address]      reports 6 sx on L foot and one on right foot    PARACENTESIS Left 09/13/2022    1510 ml removed per Dr Malave  specimen obtained    PARACENTESIS Left 01/11/2023    4720 ml removed by Dr. Malave - therapeutic & diagnostic    PARACENTESIS Left 01/16/2023    4400ml removed by Dr. Caitlin Garcia Left 02/01/2023    5600 ml removed by Dr. Melissa Olivier 09/09/2022    EGD DIAGNOSTIC ONLY performed by Kyra Mclaughlin MD at [address] [place] [address] History    Marital status: Single     Spouse name: Not on file    Number of children: 2    Years of education: Not on file    Highest education level: Not on file   Occupational History    Not on file   Tobacco Use    Smoking status: Every Day     Packs/day: 0.25     Types: Cigarettes    Smokeless tobacco: Never    Tobacco comments:     1/13/23 states 3-4 cigs per day tram   Vaping Use    Vaping Use: Never used   Substance and Sexual Activity    Alcohol use: Yes     Comment: 1/13/23: last drink 1/7/23 Vodka    Drug use: Yes     Types: Marijuana Dylan Blow)    Sexual activity: Not on file   Other Topics Concern    Not on file   Social History Narrative    Lives With: 10 yo daughter, SO sometimes stays with pt, he works odd jobs    Older 22 yo dtr lives with a boyfriend    Type of Home: [place] [address] [address]: Two level, Laundry in basement (flights to and from basement and second floor with HR)    Home Access: Stairs to enter with rails- Number of Steps: 5- Rails: Both    Bathroom Shower/Tub: Tub/Shower unit    Home Equipment:  (no AD)    ADL Assistance: Independent Homemaking Assistance: Independent    Ambulation Assistance: Independent (no AD)    Transfer Assistance: Independent    Active : Yes    Type of Occupation: unemployed    GED--then some college. Social Determinants of Health     Financial Resource Strain: Not on file   Food Insecurity: Not on file   Transportation Needs: Not on file   Physical Activity: Not on file   Stress: Not on file   Social Connections: Not on file   Intimate Partner Violence: Not on file   Housing Stability: Not on file     Family History   Problem Relation Age of Onset    High Cholesterol Mother     Hypertension Mother     Melanoma Father     High Cholesterol Father     Hypertension Father     Colon Cancer Neg Hx      Allergies   Allergen Reactions    Oxycodone-Acetaminophen Itching     Itching and nausea           Review of Systems   Constitutional:  Positive for fatigue. Negative for appetite change, chills, fever and unexpected weight change. HENT:  Negative for nosebleeds, tinnitus, trouble swallowing and voice change. Eyes:  Negative for photophobia, pain and redness. Respiratory:  Negative for chest tightness, shortness of breath and wheezing. Cardiovascular:  Negative for chest pain, palpitations and leg swelling. Gastrointestinal:  Positive for abdominal distention. Negative for abdominal pain, anal bleeding, blood in stool, constipation, diarrhea, nausea, rectal pain and vomiting. Endocrine: Negative for polydipsia, polyphagia and polyuria. Genitourinary:  Negative for difficulty urinating and hematuria. Skin:  Negative for color change, pallor and rash. Neurological:  Negative for dizziness, speech difficulty and headaches. Psychiatric/Behavioral:  Negative for confusion and suicidal ideas. Objective:   /60 (Site: Right Upper Arm, Position: Sitting, Cuff Size: Small Adult)   Pulse (!) 116   Wt 156 lb (70.8 kg)   SpO2 98%   BMI 25.96 kg/m²     Physical Exam  Vitals reviewed. Constitutional:       General: She is not in acute distress. Appearance: Normal appearance. She is well-developed and well-groomed. HENT:      Head: Normocephalic and atraumatic. Nose: Nose normal.   Eyes:      General: Scleral icterus present. Extraocular Movements: Extraocular movements intact. Conjunctiva/sclera: Conjunctivae normal.      Pupils: Pupils are equal, round, and reactive to light. Cardiovascular:      Rate and Rhythm: Normal rate and regular rhythm. Pulses: Normal pulses. Heart sounds: Normal heart sounds. Pulmonary:      Effort: Pulmonary effort is normal. No respiratory distress. Breath sounds: Normal breath sounds. No wheezing or rales. Abdominal:      General: Abdomen is flat. Bowel sounds are normal. There is distension. Palpations: Abdomen is soft. There is no hepatomegaly, splenomegaly or mass. Tenderness: There is no abdominal tenderness. There is no guarding or rebound. Musculoskeletal:         General: No tenderness or deformity. Normal range of motion. Cervical back: Neck supple. Right lower leg: No edema. Left lower leg: No edema. Skin:     General: Skin is warm and dry. Capillary Refill: Capillary refill takes less than 2 seconds. Coloration: Skin is not jaundiced. Findings: No erythema or rash. Neurological:      General: No focal deficit present. Mental Status: She is alert and oriented to person, place, and time. Motor: Weakness present.    Psychiatric:         Mood and Affect: Mood normal.         Behavior: Behavior normal.       Laboratory, Pathology, Radiology reviewed in detail with relevantimportant investigations summarized below:  Lab Results   Component Value Date/Time    WBC 12.1 01/24/2023 12:34 PM    WBC 10.6 01/17/2023 04:52 AM    WBC 9.6 01/16/2023 04:59 AM    WBC 13.8 01/15/2023 02:43 AM    WBC 8.3 01/14/2023 03:31 AM    HGB 8.5 01/24/2023 12:34 PM    HGB 8.5 01/17/2023 09:27 AM    HGB 8.2 01/17/2023 04:52 AM    HGB 8.3 01/16/2023 09:31 PM    HGB 8.2 01/16/2023 09:55 AM    HCT 25.9 01/24/2023 12:34 PM    HCT 25.3 01/17/2023 09:27 AM    HCT 24.6 01/17/2023 04:52 AM    HCT 24.7 01/16/2023 09:31 PM    HCT 23.6 01/16/2023 09:55 AM    .2 01/24/2023 12:34 PM    .6 01/17/2023 04:52 AM    .3 01/16/2023 04:59 AM    .6 01/15/2023 02:43 AM    .2 01/14/2023 03:31 AM     01/24/2023 12:34 PM     01/17/2023 04:52 AM     01/16/2023 04:59 AM     01/15/2023 02:43 AM     01/14/2023 03:31 AM    .  Lab Results   Component Value Date/Time    ALT 41 01/24/2023 12:34 PM    ALT 48 01/17/2023 04:52 AM    ALT 44 01/16/2023 04:59 AM     01/24/2023 12:34 PM     01/17/2023 04:52 AM     01/16/2023 04:59 AM    ALKPHOS 174 01/24/2023 12:34 PM    ALKPHOS 158 01/17/2023 04:52 AM    ALKPHOS 167 01/16/2023 04:59 AM    BILITOT 10.5 01/24/2023 12:34 PM    BILITOT 12.6 01/17/2023 04:52 AM    BILITOT 13.7 01/16/2023 04:59 AM       XR ABDOMEN (KUB) (SINGLE AP VIEW)    Result Date: 1/17/2023  EXAMINATION: ONE SUPINE XRAY VIEW(S) OF THE ABDOMEN 1/17/2023 12:51 pm COMPARISON: None. HISTORY: ORDERING SYSTEM PROVIDED HISTORY: discomfort TECHNOLOGIST PROVIDED HISTORY: Reason for exam:->discomfort Is the patient pregnant?->No What reading provider will be dictating this exam?->CRC FINDINGS: Nonspecific bowel gas pattern without evidence of obstruction. No abnormal calcifications. No acute osseous abnormality. No evidence of bowel obstruction. IR US GUIDED PARACENTESIS    1. Status post technically successful ultrasound-guided paracentesis. Adi Chávez is a Female of 42 years age, referred for Ultrasound Guided Paracentesis. PROCEDURE: Survey of the abdomen showed large amount of ascites fluid. After obtaining informed consent, the patient was positioned supine on the sonography table.  Using ultrasound, the skin over the left hemiabdomen was locally anesthetized with 1% lidocaine. Following that, a Yueh needle was advanced into the fluid pocket using ultrasound visualization. 5600cc, of clear yellow fluid were aspirated and sent for cytology, and pathology. The needle was removed, and hemostasis was obtained with pressure. A Band-Aid was placed. Post procedure images did not demonstrate hemorrhage at the target site. The patient tolerated the procedure well. The patient left the department in good condition. A radiology nurse was in presence monitoring vital signs, assisting throughout the procedure. Lab Results   Component Value Date/Time    IRON 81 01/11/2023 01:41 PM    TIBC SEE BELOW 01/11/2023 01:41 PM     Lab Results   Component Value Date/Time    INR 1.6 01/24/2023 12:34 PM    INR 2.0 01/10/2023 07:30 PM    INR 1.8 01/10/2023 11:27 AM    INR 1.4 12/13/2022 04:37 PM    INR 1.4 09/15/2022 05:54 AM     No components found for: ACUTEHEPATITISSCREEN  No components found for: CELIACPANEL  No components found for: STOOLCULTURE, C.DIFF, STOOLOVAPARASITE, STOOLLEUCOCYTE    Endoscopic hx:  EGD Dr Stephanie Varma 9/9/22  Web in the upper third of the esophagus. Z-line regular, 36 cm from the incisors. Erythematous, granular and congested mucosa in the cardia, gastric fundus, gastric body and antrum. Normal examined duodenum. LA Grade A reflux esophagitis with no bleeding. No specimens collected. Assessment:       Diagnosis Orders   1. Alcoholic cirrhosis of liver with ascites ([facility] [address]  CBC    Comprehensive Metabolic Panel    Protime-INR            Plan:      1-  Decompensated Cirrhosis secondary to ETOH  :  MELD 27 , 19.6 % 90 day mortality  Last ETOH 12/24/22, continue complete alcohol abstinence  -obtain serial blood work   2 -  Grade 2 Ascites, abdominal pain:   Noted increased abdominal girth, on  Lasix and Aldactone.     Recent paracentesis negative for SBP, noted history of SBP in 9/22  -Obtain CMP and electrolytes for further evaluation and adjustment of diuretics  -Obtain therapeutic paracentesis as needed  3- EGD for Variceal surveillance:    Had EGD 9/22 that showed portal hypertensive gastropathy with no esophageal varices   4 - HCC screening:   Recent imaging Negative for liver mass  5-  Overt Hepatic encephalopathy:  None at this time, continue lactulose titrate for 2-3 soft BMs daily   6- Preventive measure:   Patient to continue follow-up with primary care physician regarding age-appropriate screening assessment. As mentioned discussed at length regarding alcohol abstinence      Return in about 4 weeks (around 3/16/2023), or if symptoms worsen or fail to improve.       Joel Stanford, APRN - CNP

## 2023-03-11 NOTE — PROGRESS NOTES
Arsh Bullock is a 43 y.o. female patient.     Current Facility-Administered Medications   Medication Dose Route Frequency Provider Last Rate Last Admin    0.9 % sodium chloride infusion   IntraVENous Continuous Larissa Toledo,  mL/hr at 09/06/22 0944 New Bag at 09/06/22 0944    gabapentin (NEURONTIN) capsule 200 mg  200 mg Oral TID Larissa Foreman, DO   200 mg at 09/06/22 1639    potassium phosphate 30 mmol in dextrose 5 % 250 mL IVPB  30 mmol IntraVENous Once Larissa Toledo, DO        Followed by    potassium phosphate 30 mmol in dextrose 5 % 250 mL IVPB  30 mmol IntraVENous Once Sigifredo Justin, DO        glucose chewable tablet 16 g  4 tablet Oral PRN Keiko Blacksmit, DO        dextrose bolus 10% 125 mL  125 mL IntraVENous PRN Keiko Blacksmith, DO   Stopped at 09/05/22 0848    Or    dextrose bolus 10% 250 mL  250 mL IntraVENous PRN Keiko Blacksmith, DO        glucagon (rDNA) injection 1 mg  1 mg SubCUTAneous PRN Keiko Blacksmith, DO        dextrose 10 % infusion   IntraVENous Continuous PRN Keiko Blacksmith, DO        chlordiazePOXIDE (LIBRIUM) capsule 10 mg  10 mg Oral TID [facility] Toledo, DO   10 mg at 09/06/22 1639    oxyCODONE (ROXICODONE) immediate release tablet 5 mg  5 mg Oral Q4H PRN [facility], [facility]        morphine (PF) injection 2 mg  2 mg IntraVENous Q4H PRN [facility], DO        cloNIDine (CATAPRES) tablet 0.1 mg  0.1 mg Oral BID [facility] Toledo, DO   0.1 mg at 09/05/22 1440    albuterol (PROVENTIL) nebulizer solution 2.5 mg  2.5 mg Nebulization Q4H PRN [facility] Toledo, DO        multivitamin 1 tablet  1 tablet Oral Daily Larissa Toledo, DO   1 tablet at 09/06/22 0928    ondansetron (ZOFRAN-ODT) disintegrating tablet 4 mg  4 mg Oral Q8H PRN Raymond Ball MD   4 mg at 09/04/22 2044    Or    ondansetron (ZOFRAN) injection 4 mg  4 mg IntraVENous Q6H PRN Raymond Ball MD        polyethylene glycol (GLYCOLAX) packet 17 g  17 g Oral Daily PRN [place] Ball MD        thiamine (B-1) injection 100 mg 100 mg IntraVENous Daily Darnell Guy MD   100 mg at 09/06/22 0012    nicotine (NICODERM CQ) 14 MG/24HR 1 patch  1 patch TransDERmal Daily Darnell Guy MD        LORazepam (ATIVAN) tablet 1 mg  1 mg Oral Q1H PRN Darnell Guy MD        Or    LORazepam (ATIVAN) injection 1 mg  1 mg IntraVENous Q1H PRN Darnell Guy MD        Or    LORazepam (ATIVAN) tablet 2 mg  2 mg Oral Q1H PRN Darnell Guy MD   2 mg at 09/05/22 0443    Or    LORazepam (ATIVAN) injection 2 mg  2 mg IntraVENous Q1H PRN Darnell Guy MD   2 mg at 09/05/22 3793    Or    LORazepam (ATIVAN) tablet 3 mg  3 mg Oral Q1H PRN Darnell Guy MD        Or    LORazepam (ATIVAN) injection 3 mg  3 mg IntraVENous Q1H PRN Darnell Guy MD        Or    LORazepam (ATIVAN) tablet 4 mg  4 mg Oral Q1H PRN Darnell Guy MD        Or    LORazepam (ATIVAN) injection 4 mg  4 mg IntraVENous Q1H PRN Darnell Guy MD         Allergies   Allergen Reactions    Oxycodone-Acetaminophen Itching     Itching and nausea       Principal Problem:    Alcoholic hepatitis with ascites  Resolved Problems:    * No resolved hospital problems. *    Blood pressure (!) 99/56, pulse (!) 118, temperature 99.7 °F (37.6 °C), temperature source Oral, resp. rate 18, height 5' 5\" (1.651 m), weight 140 lb (63.5 kg), SpO2 97 %. Subjective no emesis. Stool was brown as per staff nurse, has vague upper abdominal discomfort. Objective serum bilirubin today is 8.8, patient has hypokalemia and hypomagnesemia which is being corrected, hemoglobin 8.7. Assessment & Plan alcoholic hepatitis. Normocytic anemia and had dark stool earlier but no signs of any active GI bleeding, will consider EGD once electrolyte abnormalities are corrected and her current Madrey score is 20.3.     Aquiles Garrison MD  9/6/2022 No

## 2023-05-09 ENCOUNTER — HOSPITAL ENCOUNTER (INPATIENT)
Age: 38
DRG: 254 | End: 2023-05-09
Attending: INTERNAL MEDICINE | Admitting: INTERNAL MEDICINE
Payer: COMMERCIAL

## 2023-05-09 ENCOUNTER — APPOINTMENT (OUTPATIENT)
Dept: GENERAL RADIOLOGY | Age: 38
DRG: 254 | End: 2023-05-09
Payer: COMMERCIAL

## 2023-05-09 DIAGNOSIS — R79.1 ELEVATED INR: ICD-10-CM

## 2023-05-09 DIAGNOSIS — K92.0 HEMATEMESIS WITH NAUSEA: ICD-10-CM

## 2023-05-09 DIAGNOSIS — K92.2 UPPER GI BLEED: Primary | ICD-10-CM

## 2023-05-09 DIAGNOSIS — K92.1 MELENA: ICD-10-CM

## 2023-05-09 DIAGNOSIS — F10.10 ALCOHOL ABUSE: ICD-10-CM

## 2023-05-09 DIAGNOSIS — R00.0 TACHYCARDIA: ICD-10-CM

## 2023-05-09 DIAGNOSIS — E83.42 HYPOMAGNESEMIA: ICD-10-CM

## 2023-05-09 LAB
ALBUMIN SERPL-MCNC: 2.8 G/DL (ref 3.5–4.6)
ALP SERPL-CCNC: 214 U/L (ref 40–130)
ALT SERPL-CCNC: 37 U/L (ref 0–33)
AMMONIA PLAS-SCNC: 44 UMOL/L (ref 11–51)
ANION GAP SERPL CALCULATED.3IONS-SCNC: 17 MEQ/L (ref 9–15)
ANISOCYTOSIS BLD QL SMEAR: ABNORMAL
APTT PPP: 43.3 SEC (ref 24.4–36.8)
AST SERPL-CCNC: 164 U/L (ref 0–35)
BASOPHILS # BLD: 0 K/UL (ref 0–0.2)
BASOPHILS NFR BLD: 0.1 %
BILIRUB SERPL-MCNC: 6.5 MG/DL (ref 0.2–0.7)
BNP BLD-MCNC: 97 PG/ML
BUN SERPL-MCNC: 11 MG/DL (ref 6–20)
CALCIUM SERPL-MCNC: 8.3 MG/DL (ref 8.5–9.9)
CHLORIDE SERPL-SCNC: 94 MEQ/L (ref 95–107)
CO2 SERPL-SCNC: 23 MEQ/L (ref 20–31)
CREAT SERPL-MCNC: 0.69 MG/DL (ref 0.5–0.9)
EOSINOPHIL # BLD: 0 K/UL (ref 0–0.7)
EOSINOPHIL NFR BLD: 0 %
ERYTHROCYTE [DISTWIDTH] IN BLOOD BY AUTOMATED COUNT: 26.7 % (ref 11.5–14.5)
ETHANOL PERCENT: 0.1 G/DL
ETHANOLAMINE SERPL-MCNC: 109 MG/DL (ref 0–0.08)
GLOBULIN SER CALC-MCNC: 4.1 G/DL (ref 2.3–3.5)
GLUCOSE SERPL-MCNC: 137 MG/DL (ref 70–99)
HCT VFR BLD AUTO: 25.1 % (ref 37–47)
HGB BLD-MCNC: 8.6 G/DL (ref 12–16)
INR PPP: 2.9
LIPASE SERPL-CCNC: 30 U/L (ref 12–95)
LYMPHOCYTES # BLD: 0.8 K/UL (ref 1–4.8)
LYMPHOCYTES NFR BLD: 8.1 %
MAGNESIUM SERPL-MCNC: 1.4 MG/DL (ref 1.7–2.4)
MCH RBC QN AUTO: 31.1 PG (ref 27–31.3)
MCHC RBC AUTO-ENTMCNC: 34.1 % (ref 33–37)
MCV RBC AUTO: 91.3 FL (ref 79.4–94.8)
MONOCYTES # BLD: 0.5 K/UL (ref 0.2–0.8)
MONOCYTES NFR BLD: 4.5 %
NEUTROPHILS # BLD: 8.8 K/UL (ref 1.4–6.5)
NEUTS SEG NFR BLD: 87.3 %
PLATELET # BLD AUTO: 122 K/UL (ref 130–400)
PLATELET BLD QL SMEAR: ABNORMAL
POIKILOCYTOSIS BLD QL SMEAR: ABNORMAL
POLYCHROMASIA BLD QL SMEAR: ABNORMAL
POTASSIUM SERPL-SCNC: 4.3 MEQ/L (ref 3.4–4.9)
PROT SERPL-MCNC: 6.9 G/DL (ref 6.3–8)
PROTHROMBIN TIME: 30.3 SEC (ref 12.3–14.9)
RBC # BLD AUTO: 2.75 M/UL (ref 4.2–5.4)
SODIUM SERPL-SCNC: 134 MEQ/L (ref 135–144)
TARGETS BLD QL SMEAR: ABNORMAL
TROPONIN T SERPL-MCNC: <0.01 NG/ML (ref 0–0.01)
TSH REFLEX: 1.61 UIU/ML (ref 0.44–3.86)
WBC # BLD AUTO: 10 K/UL (ref 4.8–10.8)

## 2023-05-09 PROCEDURE — 83880 ASSAY OF NATRIURETIC PEPTIDE: CPT

## 2023-05-09 PROCEDURE — 80053 COMPREHEN METABOLIC PANEL: CPT

## 2023-05-09 PROCEDURE — P9017 PLASMA 1 DONOR FRZ W/IN 8 HR: HCPCS

## 2023-05-09 PROCEDURE — 82077 ASSAY SPEC XCP UR&BREATH IA: CPT

## 2023-05-09 PROCEDURE — 86900 BLOOD TYPING SEROLOGIC ABO: CPT

## 2023-05-09 PROCEDURE — P9059 PLASMA, FRZ BETWEEN 8-24HOUR: HCPCS

## 2023-05-09 PROCEDURE — 71045 X-RAY EXAM CHEST 1 VIEW: CPT

## 2023-05-09 PROCEDURE — 84443 ASSAY THYROID STIM HORMONE: CPT

## 2023-05-09 PROCEDURE — 83690 ASSAY OF LIPASE: CPT

## 2023-05-09 PROCEDURE — 85610 PROTHROMBIN TIME: CPT

## 2023-05-09 PROCEDURE — 83735 ASSAY OF MAGNESIUM: CPT

## 2023-05-09 PROCEDURE — 6360000002 HC RX W HCPCS: Performed by: PERSONAL EMERGENCY RESPONSE ATTENDANT

## 2023-05-09 PROCEDURE — 2580000003 HC RX 258: Performed by: PERSONAL EMERGENCY RESPONSE ATTENDANT

## 2023-05-09 PROCEDURE — 93005 ELECTROCARDIOGRAM TRACING: CPT | Performed by: PERSONAL EMERGENCY RESPONSE ATTENDANT

## 2023-05-09 PROCEDURE — 2500000003 HC RX 250 WO HCPCS: Performed by: PERSONAL EMERGENCY RESPONSE ATTENDANT

## 2023-05-09 PROCEDURE — 86880 COOMBS TEST DIRECT: CPT

## 2023-05-09 PROCEDURE — 96361 HYDRATE IV INFUSION ADD-ON: CPT

## 2023-05-09 PROCEDURE — 36415 COLL VENOUS BLD VENIPUNCTURE: CPT

## 2023-05-09 PROCEDURE — 85025 COMPLETE CBC W/AUTO DIFF WBC: CPT

## 2023-05-09 PROCEDURE — 86901 BLOOD TYPING SEROLOGIC RH(D): CPT

## 2023-05-09 PROCEDURE — 96365 THER/PROPH/DIAG IV INF INIT: CPT

## 2023-05-09 PROCEDURE — 86923 COMPATIBILITY TEST ELECTRIC: CPT

## 2023-05-09 PROCEDURE — 86850 RBC ANTIBODY SCREEN: CPT

## 2023-05-09 PROCEDURE — 84484 ASSAY OF TROPONIN QUANT: CPT

## 2023-05-09 PROCEDURE — 99285 EMERGENCY DEPT VISIT HI MDM: CPT

## 2023-05-09 PROCEDURE — P9035 PLATELET PHERES LEUKOREDUCED: HCPCS

## 2023-05-09 PROCEDURE — 82140 ASSAY OF AMMONIA: CPT

## 2023-05-09 PROCEDURE — A4216 STERILE WATER/SALINE, 10 ML: HCPCS | Performed by: PERSONAL EMERGENCY RESPONSE ATTENDANT

## 2023-05-09 PROCEDURE — P9016 RBC LEUKOCYTES REDUCED: HCPCS

## 2023-05-09 PROCEDURE — C9113 INJ PANTOPRAZOLE SODIUM, VIA: HCPCS | Performed by: PERSONAL EMERGENCY RESPONSE ATTENDANT

## 2023-05-09 PROCEDURE — 85730 THROMBOPLASTIN TIME PARTIAL: CPT

## 2023-05-09 PROCEDURE — 96375 TX/PRO/DX INJ NEW DRUG ADDON: CPT

## 2023-05-09 RX ORDER — LORAZEPAM 2 MG/ML
1 INJECTION INTRAMUSCULAR ONCE
Status: COMPLETED | OUTPATIENT
Start: 2023-05-09 | End: 2023-05-09

## 2023-05-09 RX ORDER — 0.9 % SODIUM CHLORIDE 0.9 %
1000 INTRAVENOUS SOLUTION INTRAVENOUS ONCE
Status: COMPLETED | OUTPATIENT
Start: 2023-05-09 | End: 2023-05-09

## 2023-05-09 RX ORDER — MAGNESIUM SULFATE IN WATER 40 MG/ML
4000 INJECTION, SOLUTION INTRAVENOUS ONCE
Status: COMPLETED | OUTPATIENT
Start: 2023-05-09 | End: 2023-05-10

## 2023-05-09 RX ORDER — METOCLOPRAMIDE HYDROCHLORIDE 5 MG/ML
10 INJECTION INTRAMUSCULAR; INTRAVENOUS ONCE
Status: COMPLETED | OUTPATIENT
Start: 2023-05-09 | End: 2023-05-09

## 2023-05-09 RX ORDER — ONDANSETRON 2 MG/ML
4 INJECTION INTRAMUSCULAR; INTRAVENOUS ONCE
Status: COMPLETED | OUTPATIENT
Start: 2023-05-09 | End: 2023-05-09

## 2023-05-09 RX ADMIN — LORAZEPAM 1 MG: 2 INJECTION INTRAMUSCULAR; INTRAVENOUS at 23:39

## 2023-05-09 RX ADMIN — PANTOPRAZOLE SODIUM 40 MG: 40 INJECTION, POWDER, FOR SOLUTION INTRAVENOUS at 21:41

## 2023-05-09 RX ADMIN — SODIUM CHLORIDE 1000 ML: 9 INJECTION, SOLUTION INTRAVENOUS at 21:45

## 2023-05-09 RX ADMIN — ONDANSETRON 4 MG: 2 INJECTION INTRAMUSCULAR; INTRAVENOUS at 21:40

## 2023-05-09 RX ADMIN — MAGNESIUM SULFATE HEPTAHYDRATE 4000 MG: 40 INJECTION, SOLUTION INTRAVENOUS at 23:07

## 2023-05-09 RX ADMIN — METOCLOPRAMIDE HYDROCHLORIDE 10 MG: 5 INJECTION INTRAMUSCULAR; INTRAVENOUS at 23:00

## 2023-05-09 RX ADMIN — FAMOTIDINE 20 MG: 10 INJECTION, SOLUTION INTRAVENOUS at 21:40

## 2023-05-09 ASSESSMENT — ENCOUNTER SYMPTOMS
DIARRHEA: 1
NAUSEA: 1
ABDOMINAL PAIN: 1
SHORTNESS OF BREATH: 1
COUGH: 0
VOMITING: 1
COLOR CHANGE: 0
BLOOD IN STOOL: 1
SORE THROAT: 0
CHOKING: 0
WHEEZING: 1
RHINORRHEA: 0

## 2023-05-09 ASSESSMENT — PAIN DESCRIPTION - LOCATION: LOCATION: ABDOMEN

## 2023-05-09 ASSESSMENT — LIFESTYLE VARIABLES
HOW MANY STANDARD DRINKS CONTAINING ALCOHOL DO YOU HAVE ON A TYPICAL DAY: 7 TO 9
HOW OFTEN DO YOU HAVE A DRINK CONTAINING ALCOHOL: 4 OR MORE TIMES A WEEK

## 2023-05-09 ASSESSMENT — PAIN - FUNCTIONAL ASSESSMENT: PAIN_FUNCTIONAL_ASSESSMENT: 0-10

## 2023-05-09 ASSESSMENT — PAIN SCALES - GENERAL: PAINLEVEL_OUTOF10: 10

## 2023-05-09 ASSESSMENT — PAIN DESCRIPTION - PAIN TYPE: TYPE: ACUTE PAIN

## 2023-05-09 ASSESSMENT — PATIENT HEALTH QUESTIONNAIRE - PHQ9: SUM OF ALL RESPONSES TO PHQ QUESTIONS 1-9: 9

## 2023-05-09 ASSESSMENT — PAIN DESCRIPTION - DESCRIPTORS: DESCRIPTORS: STABBING

## 2023-05-10 ENCOUNTER — ANESTHESIA EVENT (OUTPATIENT)
Dept: ENDOSCOPY | Age: 38
End: 2023-05-10
Payer: COMMERCIAL

## 2023-05-10 ENCOUNTER — PREP FOR PROCEDURE (OUTPATIENT)
Dept: GASTROENTEROLOGY | Age: 38
End: 2023-05-10

## 2023-05-10 ENCOUNTER — ANESTHESIA (OUTPATIENT)
Dept: ENDOSCOPY | Age: 38
End: 2023-05-10
Payer: COMMERCIAL

## 2023-05-10 PROBLEM — D62 ACUTE BLOOD LOSS ANEMIA: Status: ACTIVE | Noted: 2023-05-10

## 2023-05-10 PROBLEM — R79.1 ELEVATED INR: Status: ACTIVE | Noted: 2023-05-10

## 2023-05-10 PROBLEM — K92.0 HEMATEMESIS WITH NAUSEA: Status: ACTIVE | Noted: 2023-05-10

## 2023-05-10 PROBLEM — F10.10 ALCOHOL ABUSE: Status: ACTIVE | Noted: 2023-05-10

## 2023-05-10 LAB
ABO + RH BLD: NORMAL
ALBUMIN SERPL-MCNC: 2.2 G/DL (ref 3.5–4.6)
ALP SERPL-CCNC: 167 U/L (ref 40–130)
ALT SERPL-CCNC: 27 U/L (ref 0–33)
AMPHET UR QL SCN: ABNORMAL
ANION GAP SERPL CALCULATED.3IONS-SCNC: 15 MEQ/L (ref 9–15)
AST SERPL-CCNC: 118 U/L (ref 0–35)
BACTERIA URNS QL MICRO: ABNORMAL /HPF
BARBITURATES UR QL SCN: ABNORMAL
BENZODIAZ UR QL SCN: ABNORMAL
BILIRUB DIRECT SERPL-MCNC: 3.6 MG/DL (ref 0–0.4)
BILIRUB INDIRECT SERPL-MCNC: 2.3 MG/DL (ref 0–0.6)
BILIRUB SERPL-MCNC: 5.9 MG/DL (ref 0.2–0.7)
BILIRUB UR QL STRIP: ABNORMAL
BLD GP AB SCN SERPL QL: NORMAL
BLOOD BANK DISPENSE STATUS: NORMAL
BLOOD BANK PRODUCT CODE: NORMAL
BPU ID: NORMAL
BUN SERPL-MCNC: 13 MG/DL (ref 6–20)
CALCIUM SERPL-MCNC: 7.6 MG/DL (ref 8.5–9.9)
CANNABINOIDS UR QL SCN: POSITIVE
CHLORIDE SERPL-SCNC: 100 MEQ/L (ref 95–107)
CLARITY UR: ABNORMAL
CO2 SERPL-SCNC: 23 MEQ/L (ref 20–31)
COCAINE UR QL SCN: ABNORMAL
COLOR UR: ABNORMAL
CREAT SERPL-MCNC: 1.03 MG/DL (ref 0.5–0.9)
DESCRIPTION BLOOD BANK: NORMAL
DRUG SCREEN COMMENT UR-IMP: ABNORMAL
EKG ATRIAL RATE: 129 BPM
EKG P AXIS: 72 DEGREES
EKG P-R INTERVAL: 128 MS
EKG Q-T INTERVAL: 298 MS
EKG QRS DURATION: 68 MS
EKG QTC CALCULATION (BAZETT): 436 MS
EKG R AXIS: 57 DEGREES
EKG T AXIS: 52 DEGREES
EKG VENTRICULAR RATE: 129 BPM
EPI CELLS #/AREA URNS AUTO: ABNORMAL /HPF (ref 0–5)
FENTANYL SCREEN, URINE: ABNORMAL
GLUCOSE SERPL-MCNC: 86 MG/DL (ref 70–99)
GLUCOSE UR STRIP-MCNC: NEGATIVE MG/DL
HCT VFR BLD AUTO: 19.6 % (ref 37–47)
HCT VFR BLD AUTO: 21.5 % (ref 37–47)
HCT VFR BLD AUTO: 23.5 % (ref 37–47)
HCT VFR BLD AUTO: 23.7 % (ref 37–47)
HGB BLD-MCNC: 6.6 G/DL (ref 12–16)
HGB BLD-MCNC: 7.5 G/DL (ref 12–16)
HGB BLD-MCNC: 8 G/DL (ref 12–16)
HGB BLD-MCNC: 8 G/DL (ref 12–16)
HGB UR QL STRIP: NEGATIVE
HYALINE CASTS #/AREA URNS LPF: ABNORMAL /LPF (ref 0–5)
KETONES UR STRIP-MCNC: ABNORMAL MG/DL
LEUKOCYTE ESTERASE UR QL STRIP: ABNORMAL
METHADONE UR QL SCN: ABNORMAL
NITRITE UR QL STRIP: POSITIVE
OPIATES UR QL SCN: ABNORMAL
OXYCODONE UR QL SCN: ABNORMAL
PCP UR QL SCN: ABNORMAL
PH UR STRIP: 5 [PH] (ref 5–9)
POTASSIUM SERPL-SCNC: 3.8 MEQ/L (ref 3.4–4.9)
PROPOXYPH UR QL SCN: ABNORMAL
PROT SERPL-MCNC: 5.6 G/DL (ref 6.3–8)
PROT UR STRIP-MCNC: NEGATIVE MG/DL
RBC #/AREA URNS HPF: ABNORMAL /HPF (ref 0–2)
SODIUM SERPL-SCNC: 138 MEQ/L (ref 135–144)
SP GR UR STRIP: 1.02 (ref 1–1.03)
URINE REFLEX TO CULTURE: YES
UROBILINOGEN UR STRIP-ACNC: 0.2 E.U./DL
WBC #/AREA URNS AUTO: ABNORMAL /HPF (ref 0–5)

## 2023-05-10 PROCEDURE — 6360000002 HC RX W HCPCS: Performed by: INTERNAL MEDICINE

## 2023-05-10 PROCEDURE — 99254 IP/OBS CNSLTJ NEW/EST MOD 60: CPT | Performed by: INTERNAL MEDICINE

## 2023-05-10 PROCEDURE — 36415 COLL VENOUS BLD VENIPUNCTURE: CPT

## 2023-05-10 PROCEDURE — 6370000000 HC RX 637 (ALT 250 FOR IP): Performed by: INTERNAL MEDICINE

## 2023-05-10 PROCEDURE — 2580000003 HC RX 258: Performed by: INTERNAL MEDICINE

## 2023-05-10 PROCEDURE — 36430 TRANSFUSION BLD/BLD COMPNT: CPT

## 2023-05-10 PROCEDURE — 81001 URINALYSIS AUTO W/SCOPE: CPT

## 2023-05-10 PROCEDURE — 85014 HEMATOCRIT: CPT

## 2023-05-10 PROCEDURE — A4216 STERILE WATER/SALINE, 10 ML: HCPCS | Performed by: INTERNAL MEDICINE

## 2023-05-10 PROCEDURE — 3700000001 HC ADD 15 MINUTES (ANESTHESIA): Performed by: INTERNAL MEDICINE

## 2023-05-10 PROCEDURE — 80307 DRUG TEST PRSMV CHEM ANLYZR: CPT

## 2023-05-10 PROCEDURE — 3609017100 HC EGD: Performed by: INTERNAL MEDICINE

## 2023-05-10 PROCEDURE — 30233N1 TRANSFUSION OF NONAUTOLOGOUS RED BLOOD CELLS INTO PERIPHERAL VEIN, PERCUTANEOUS APPROACH: ICD-10-PCS | Performed by: INTERNAL MEDICINE

## 2023-05-10 PROCEDURE — 2580000003 HC RX 258: Performed by: PERSONAL EMERGENCY RESPONSE ATTENDANT

## 2023-05-10 PROCEDURE — 87086 URINE CULTURE/COLONY COUNT: CPT

## 2023-05-10 PROCEDURE — C9113 INJ PANTOPRAZOLE SODIUM, VIA: HCPCS | Performed by: INTERNAL MEDICINE

## 2023-05-10 PROCEDURE — 6360000002 HC RX W HCPCS: Performed by: PERSONAL EMERGENCY RESPONSE ATTENDANT

## 2023-05-10 PROCEDURE — 2500000003 HC RX 250 WO HCPCS: Performed by: REGISTERED NURSE

## 2023-05-10 PROCEDURE — 2709999900 HC NON-CHARGEABLE SUPPLY: Performed by: INTERNAL MEDICINE

## 2023-05-10 PROCEDURE — 85018 HEMOGLOBIN: CPT

## 2023-05-10 PROCEDURE — 3700000000 HC ANESTHESIA ATTENDED CARE: Performed by: INTERNAL MEDICINE

## 2023-05-10 PROCEDURE — 2000000000 HC ICU R&B

## 2023-05-10 PROCEDURE — 6370000000 HC RX 637 (ALT 250 FOR IP)

## 2023-05-10 PROCEDURE — 6360000002 HC RX W HCPCS: Performed by: REGISTERED NURSE

## 2023-05-10 PROCEDURE — 0DJ08ZZ INSPECTION OF UPPER INTESTINAL TRACT, VIA NATURAL OR ARTIFICIAL OPENING ENDOSCOPIC: ICD-10-PCS | Performed by: INTERNAL MEDICINE

## 2023-05-10 PROCEDURE — 80076 HEPATIC FUNCTION PANEL: CPT

## 2023-05-10 PROCEDURE — 99291 CRITICAL CARE FIRST HOUR: CPT | Performed by: INTERNAL MEDICINE

## 2023-05-10 PROCEDURE — 43235 EGD DIAGNOSTIC BRUSH WASH: CPT | Performed by: INTERNAL MEDICINE

## 2023-05-10 PROCEDURE — 2500000003 HC RX 250 WO HCPCS: Performed by: PERSONAL EMERGENCY RESPONSE ATTENDANT

## 2023-05-10 PROCEDURE — 93010 ELECTROCARDIOGRAM REPORT: CPT | Performed by: INTERNAL MEDICINE

## 2023-05-10 PROCEDURE — 80048 BASIC METABOLIC PNL TOTAL CA: CPT

## 2023-05-10 RX ORDER — SODIUM CHLORIDE 0.9 % (FLUSH) 0.9 %
5-40 SYRINGE (ML) INJECTION PRN
Status: CANCELLED | OUTPATIENT
Start: 2023-05-10

## 2023-05-10 RX ORDER — LORAZEPAM 2 MG/ML
1 INJECTION INTRAMUSCULAR
Status: DISCONTINUED | OUTPATIENT
Start: 2023-05-10 | End: 2023-05-16 | Stop reason: HOSPADM

## 2023-05-10 RX ORDER — MAGNESIUM HYDROXIDE 1200 MG/15ML
LIQUID ORAL PRN
Status: DISCONTINUED | OUTPATIENT
Start: 2023-05-10 | End: 2023-05-10 | Stop reason: ALTCHOICE

## 2023-05-10 RX ORDER — NOREPINEPHRINE BIT/0.9 % NACL 16MG/250ML
1-100 INFUSION BOTTLE (ML) INTRAVENOUS CONTINUOUS
Status: DISCONTINUED | OUTPATIENT
Start: 2023-05-10 | End: 2023-05-11

## 2023-05-10 RX ORDER — SODIUM CHLORIDE 9 MG/ML
25 INJECTION, SOLUTION INTRAVENOUS PRN
Status: CANCELLED | OUTPATIENT
Start: 2023-05-10

## 2023-05-10 RX ORDER — MAGNESIUM SULFATE IN WATER 40 MG/ML
2000 INJECTION, SOLUTION INTRAVENOUS ONCE
Status: COMPLETED | OUTPATIENT
Start: 2023-05-10 | End: 2023-05-10

## 2023-05-10 RX ORDER — LIDOCAINE HYDROCHLORIDE 20 MG/ML
INJECTION, SOLUTION INFILTRATION; PERINEURAL PRN
Status: DISCONTINUED | OUTPATIENT
Start: 2023-05-10 | End: 2023-05-10 | Stop reason: SDUPTHER

## 2023-05-10 RX ORDER — SODIUM CHLORIDE 0.9 % (FLUSH) 0.9 %
5-40 SYRINGE (ML) INJECTION EVERY 12 HOURS SCHEDULED
Status: CANCELLED | OUTPATIENT
Start: 2023-05-10

## 2023-05-10 RX ORDER — LORAZEPAM 1 MG/1
2 TABLET ORAL
Status: DISCONTINUED | OUTPATIENT
Start: 2023-05-10 | End: 2023-05-16 | Stop reason: HOSPADM

## 2023-05-10 RX ORDER — SODIUM CHLORIDE 9 MG/ML
INJECTION, SOLUTION INTRAVENOUS PRN
Status: DISCONTINUED | OUTPATIENT
Start: 2023-05-10 | End: 2023-05-12

## 2023-05-10 RX ORDER — SODIUM CHLORIDE 9 MG/ML
INJECTION, SOLUTION INTRAVENOUS PRN
Status: DISCONTINUED | OUTPATIENT
Start: 2023-05-10 | End: 2023-05-16 | Stop reason: HOSPADM

## 2023-05-10 RX ORDER — LORAZEPAM 1 MG/1
3 TABLET ORAL
Status: DISCONTINUED | OUTPATIENT
Start: 2023-05-10 | End: 2023-05-16 | Stop reason: HOSPADM

## 2023-05-10 RX ORDER — SODIUM CHLORIDE 9 MG/ML
INJECTION, SOLUTION INTRAVENOUS PRN
Status: DISCONTINUED | OUTPATIENT
Start: 2023-05-10 | End: 2023-05-10

## 2023-05-10 RX ORDER — LORAZEPAM 2 MG/ML
4 INJECTION INTRAMUSCULAR
Status: DISCONTINUED | OUTPATIENT
Start: 2023-05-10 | End: 2023-05-16 | Stop reason: HOSPADM

## 2023-05-10 RX ORDER — SIMETHICONE 20 MG/.3ML
EMULSION ORAL PRN
Status: DISCONTINUED | OUTPATIENT
Start: 2023-05-10 | End: 2023-05-10 | Stop reason: ALTCHOICE

## 2023-05-10 RX ORDER — SODIUM CHLORIDE 0.9 % (FLUSH) 0.9 %
5-40 SYRINGE (ML) INJECTION EVERY 12 HOURS SCHEDULED
Status: DISCONTINUED | OUTPATIENT
Start: 2023-05-10 | End: 2023-05-10 | Stop reason: HOSPADM

## 2023-05-10 RX ORDER — SODIUM CHLORIDE 9 MG/ML
INJECTION, SOLUTION INTRAVENOUS CONTINUOUS
Status: CANCELLED | OUTPATIENT
Start: 2023-05-10

## 2023-05-10 RX ORDER — LORAZEPAM 2 MG/ML
2 INJECTION INTRAMUSCULAR
Status: DISCONTINUED | OUTPATIENT
Start: 2023-05-10 | End: 2023-05-16 | Stop reason: HOSPADM

## 2023-05-10 RX ORDER — PROPOFOL 10 MG/ML
INJECTION, EMULSION INTRAVENOUS PRN
Status: DISCONTINUED | OUTPATIENT
Start: 2023-05-10 | End: 2023-05-10 | Stop reason: SDUPTHER

## 2023-05-10 RX ORDER — SODIUM CHLORIDE 0.9 % (FLUSH) 0.9 %
5-40 SYRINGE (ML) INJECTION PRN
Status: DISCONTINUED | OUTPATIENT
Start: 2023-05-10 | End: 2023-05-10 | Stop reason: HOSPADM

## 2023-05-10 RX ORDER — THIAMINE HYDROCHLORIDE 100 MG/ML
100 INJECTION, SOLUTION INTRAMUSCULAR; INTRAVENOUS DAILY
Status: DISCONTINUED | OUTPATIENT
Start: 2023-05-10 | End: 2023-05-14

## 2023-05-10 RX ORDER — SODIUM CHLORIDE 9 MG/ML
INJECTION, SOLUTION INTRAVENOUS CONTINUOUS
Status: DISCONTINUED | OUTPATIENT
Start: 2023-05-10 | End: 2023-05-10

## 2023-05-10 RX ORDER — LORAZEPAM 1 MG/1
1 TABLET ORAL
Status: DISCONTINUED | OUTPATIENT
Start: 2023-05-10 | End: 2023-05-16 | Stop reason: HOSPADM

## 2023-05-10 RX ORDER — GLYCOPYRROLATE 1 MG/5 ML
SYRINGE (ML) INTRAVENOUS PRN
Status: DISCONTINUED | OUTPATIENT
Start: 2023-05-10 | End: 2023-05-10 | Stop reason: SDUPTHER

## 2023-05-10 RX ORDER — LORAZEPAM 2 MG/ML
3 INJECTION INTRAMUSCULAR
Status: DISCONTINUED | OUTPATIENT
Start: 2023-05-10 | End: 2023-05-16 | Stop reason: HOSPADM

## 2023-05-10 RX ORDER — SODIUM CHLORIDE 9 MG/ML
25 INJECTION, SOLUTION INTRAVENOUS PRN
Status: DISCONTINUED | OUTPATIENT
Start: 2023-05-10 | End: 2023-05-10 | Stop reason: HOSPADM

## 2023-05-10 RX ORDER — 0.9 % SODIUM CHLORIDE 0.9 %
500 INTRAVENOUS SOLUTION INTRAVENOUS ONCE
Status: COMPLETED | OUTPATIENT
Start: 2023-05-10 | End: 2023-05-10

## 2023-05-10 RX ORDER — FOLIC ACID 1 MG/1
1 TABLET ORAL DAILY
Status: DISCONTINUED | OUTPATIENT
Start: 2023-05-10 | End: 2023-05-16 | Stop reason: HOSPADM

## 2023-05-10 RX ORDER — LIDOCAINE 4 G/G
1 PATCH TOPICAL DAILY
Status: DISCONTINUED | OUTPATIENT
Start: 2023-05-10 | End: 2023-05-16 | Stop reason: HOSPADM

## 2023-05-10 RX ORDER — LORAZEPAM 1 MG/1
4 TABLET ORAL
Status: DISCONTINUED | OUTPATIENT
Start: 2023-05-10 | End: 2023-05-16 | Stop reason: HOSPADM

## 2023-05-10 RX ORDER — MULTIVITAMIN WITH IRON
1 TABLET ORAL DAILY
Status: DISCONTINUED | OUTPATIENT
Start: 2023-05-10 | End: 2023-05-16 | Stop reason: HOSPADM

## 2023-05-10 RX ADMIN — PANTOPRAZOLE SODIUM 40 MG: 40 INJECTION, POWDER, FOR SOLUTION INTRAVENOUS at 20:31

## 2023-05-10 RX ADMIN — CEFTRIAXONE SODIUM 1000 MG: 1 INJECTION, POWDER, FOR SOLUTION INTRAMUSCULAR; INTRAVENOUS at 03:42

## 2023-05-10 RX ADMIN — BENZOCAINE 6 MG-MENTHOL 10 MG LOZENGES 1 LOZENGE: at 20:29

## 2023-05-10 RX ADMIN — LIDOCAINE HYDROCHLORIDE 100 MG: 20 INJECTION, SOLUTION INFILTRATION; PERINEURAL at 16:14

## 2023-05-10 RX ADMIN — THIAMINE HYDROCHLORIDE: 100 INJECTION, SOLUTION INTRAMUSCULAR; INTRAVENOUS at 01:54

## 2023-05-10 RX ADMIN — PROPOFOL 100 MG: 10 INJECTION, EMULSION INTRAVENOUS at 16:22

## 2023-05-10 RX ADMIN — PHYTONADIONE 5 MG: 10 INJECTION, EMULSION INTRAMUSCULAR; INTRAVENOUS; SUBCUTANEOUS at 11:23

## 2023-05-10 RX ADMIN — THIAMINE HYDROCHLORIDE 100 MG: 100 INJECTION, SOLUTION INTRAMUSCULAR; INTRAVENOUS at 11:14

## 2023-05-10 RX ADMIN — PROPOFOL 100 MG: 10 INJECTION, EMULSION INTRAVENOUS at 16:14

## 2023-05-10 RX ADMIN — FOLIC ACID 1 MG: 1 TABLET ORAL at 11:14

## 2023-05-10 RX ADMIN — MAGNESIUM SULFATE HEPTAHYDRATE 2000 MG: 40 INJECTION, SOLUTION INTRAVENOUS at 04:23

## 2023-05-10 RX ADMIN — OCTREOTIDE ACETATE 25 MCG/HR: 1000 INJECTION, SOLUTION INTRAVENOUS; SUBCUTANEOUS at 04:20

## 2023-05-10 RX ADMIN — PROPOFOL 100 MG: 10 INJECTION, EMULSION INTRAVENOUS at 16:21

## 2023-05-10 RX ADMIN — PROPOFOL 100 MG: 10 INJECTION, EMULSION INTRAVENOUS at 16:16

## 2023-05-10 RX ADMIN — SODIUM CHLORIDE 500 ML: 9 INJECTION, SOLUTION INTRAVENOUS at 07:38

## 2023-05-10 RX ADMIN — Medication 0.2 MG: at 16:14

## 2023-05-10 RX ADMIN — LORAZEPAM 1 MG: 1 TABLET ORAL at 23:26

## 2023-05-10 RX ADMIN — SODIUM CHLORIDE 500 ML: 9 INJECTION, SOLUTION INTRAVENOUS at 05:02

## 2023-05-10 RX ADMIN — THERA TABS 1 TABLET: TAB at 11:14

## 2023-05-10 ASSESSMENT — PAIN - FUNCTIONAL ASSESSMENT: PAIN_FUNCTIONAL_ASSESSMENT: NONE - DENIES PAIN

## 2023-05-10 ASSESSMENT — PAIN SCALES - GENERAL: PAINLEVEL_OUTOF10: 0

## 2023-05-10 NOTE — ED TRIAGE NOTES
Pt to ed from home via triage with c/o abdominal pain and vomiting since yesterday  Emesis described and bloody, mixed with green and brown  Pt reports intermittent nausea with pain  Pt reports history of liver cirrhosis, states she was \"doing good\" but has been drinking for the past week, last intake earlier today  Skin WDI. Respirations even and unlabored.

## 2023-05-10 NOTE — ACP (ADVANCE CARE PLANNING)
Advance Care Planning   Healthcare Decision Maker:    Primary Decision Maker: Tram Catalan [facility] - [phone]    Click here to complete Healthcare Decision Makers including selection of the Healthcare Decision Maker Relationship (ie \"Primary\"). Today we documented Decision Maker(s) consistent with Legal Next of Kin hierarchy.        Electronically signed by JUHI Justice on 5/10/2023 at 8:52 AM

## 2023-05-10 NOTE — ED PROVIDER NOTES
following components:    Protime 30.3 (*)     All other components within normal limits   APTT - Abnormal; Notable for the following components:    aPTT 43.3 (*)     All other components within normal limits   MAGNESIUM - Abnormal; Notable for the following components:    Magnesium 1.4 (*)     All other components within normal limits   LIPASE   TROPONIN   BRAIN NATRIURETIC PEPTIDE   AMMONIA   ETHANOL   TSH WITH REFLEX   URINALYSIS WITH REFLEX TO CULTURE   URINE DRUG SCREEN   TYPE AND SCREEN       All other labs were within normal range or not returned as of this dictation. EMERGENCY DEPARTMENT COURSE and DIFFERENTIAL DIAGNOSIS/MDM:   Vitals:    Vitals:    05/09/23 2100 05/09/23 2252 05/09/23 2300 05/09/23 2316   BP: 123/76 104/70 118/69    Pulse:   (!) 130 (!) 121   Resp:   14 21   Temp:       TempSrc:       SpO2: 100% 100% 100% 100%   Weight:       Height:             MDM    Patient presents after relapsing on alcohol with epigastric abdominal pain, hematemesis, and black tarry stools    Radiologist interpreting:  Chest x-ray shows no acute process    Sodium 134, chloride 94, anion gap 17, magnesium 1.4, alk phos 214, ALT 37, , bilirubin 6.5, ethanol 0.096, RBC 2.75, hemoglobin 8.6, hematocrit 25.1, platelet 388, INR 2.9. Pt given 1L IVF, zofran, pepcid, protonix. Patient on reassessment does still have some nausea and vomiting, however family member in the room keeps giving her fluids. Given Reglan and magnesium 4 g. Patient and family made aware until nausea has resolved to not give any oral fluids. Rectal exam was mostly mucus without a significant stool but still Hemoccult positive. Tachycardic, states she feels anxious but no restlessness, tremors or diaphoresis. Given ativan IV. Patient be admitted to the hospital at this time. CRITICAL CARE TIME   Total Critical Caretime was 0 minutes, excluding separately reportable procedures.   There was a high probability of clinically

## 2023-05-10 NOTE — ANESTHESIA PRE PROCEDURE
Department of Anesthesiology  Preprocedure Note       Name:  Candida Gilford   Age:  42 y.o.  :  1985                                          MRN:  [id number]         Date:  5/10/2023      Surgeon: Nurys Rose):  Pasquale Molina MD    Procedure: Procedure(s):  EGD ESOPHAGOGASTRODUODENOSCOPY    Medications prior to admission:   Prior to Admission medications    Medication Sig Start Date End Date Taking? Authorizing Provider   midodrine (PROAMATINE) 10 MG tablet Take 1 tablet by mouth 3 times daily  Patient not taking: Reported on 2023    Historical Provider, MD   lidocaine (LIDODERM) 5 % Place 1 patch onto the skin daily 12 hours on, 12 hours off.   Patient not taking: Reported on 2023    Historical Provider, MD   diazePAM (VALIUM) 2 MG tablet  23   Historical Provider, MD   ondansetron (ZOFRAN-ODT) 4 MG disintegrating tablet  23   Historical Provider, MD   pantoprazole (PROTONIX) 40 MG tablet Take 1 tablet by mouth 2 times daily (before meals)  Patient not taking: Reported on 2023   Obi Mcneill DO   thiamine 100 MG tablet Take 1 tablet by mouth daily  Patient not taking: Reported on 2023   Obi Mcneill DO       Current medications:    Current Facility-Administered Medications   Medication Dose Route Frequency Provider Last Rate Last Admin    octreotide (SANDOSTATIN) 500 mcg in sodium chloride 0.9 % 100 mL infusion  50 mcg/hr IntraVENous Continuous Pasquale Molina MD 10 mL/hr at 05/10/23 0856 50 mcg/hr at 05/10/23 0856    cefTRIAXone (ROCEPHIN) 1,000 mg in sodium chloride 0.9 % 50 mL IVPB (mini-bag)  1,000 mg IntraVENous Q24H Jackson Rueda DO   Stopped at 05/10/23 0421    0.9 % sodium chloride infusion   IntraVENous PRN Sanaz Rueda DO        lidocaine 4 % external patch 1 patch  1 patch TransDERmal Daily SANTA Thomas - CNP   1 patch at 05/10/23 1115    norepinephrine (LEVOPHED) 16 mg in sodium chloride 0.9 % 250 mL infusion  1-100 mcg/min IntraVENous Continuous

## 2023-05-10 NOTE — CARE COORDINATION
Case Management Assessment  Initial Evaluation    Date/Time of Evaluation: 5/10/2023 8:52 AM  Assessment Completed by: JUHI Rosenberg    If patient is discharged prior to next notation, then this note serves as note for discharge by case management. Patient Name: Shanda Pereyra                   YOB: 1985  Diagnosis: Hypomagnesemia [E83.42]  Alcohol abuse [F10.10]  GI bleeding [K92.2]  Tachycardia [R00.0]  Upper GI bleed [K92.2]  Elevated INR [R79.1]  Hematemesis with nausea [K92.0]  GI bleed [K92.2]                   Date / Time: 5/9/2023  8:51 PM    Patient Admission Status: Inpatient   Readmission Risk (Low < 19, Mod (19-27), High > 27): Readmission Risk Score: 20.7    Current PCP: Coby Reese MD  PCP verified by CM? Yes    Chart Reviewed: Yes      History Provided by: Patient  Patient Orientation: Alert and Oriented, Person, Place, Self, Situation    Patient Cognition: Alert    Hospitalization in the last 30 days (Readmission):  No    If yes, Readmission Assessment in CM Navigator will be completed. Advance Directives:      Code Status: Full Code   Patient's Primary Decision Maker is: Named in [address] [place] [address]    Primary Decision Maker: HARSHA BRYANT [facility] - [phone]    Discharge Planning:    Patient lives with:   Type of Home:    Primary Care Giver: Self  Patient Support Systems include: Parent, Children, Family Members   Current Financial resources: Medicaid  Current community resources: None  Current services prior to admission:              Current DME:              Type of Home Care services:       ADLS  Prior functional level: Independent in ADLs/IADLs  Current functional level: Independent in ADLs/IADLs    PT AM-PAC:   /24  OT AM-PAC:   /24    Family can provide assistance at DC: Yes  Would you like Case Management to discuss the discharge plan with any other family members/significant others, and if so, who?  Yes (MOTHER)  Plans to Return to Present Housing: Yes  Other

## 2023-05-10 NOTE — H&P
Pooja Saldana  -----------------------------------------------------------------   XR CHEST PORTABLE    Result Date: 5/9/2023  EXAMINATION: ONE XRAY VIEW OF THE CHEST 5/9/2023 8:53 pm COMPARISON: None. HISTORY: ORDERING SYSTEM PROVIDED HISTORY: cp TECHNOLOGIST PROVIDED HISTORY: Reason for exam:->cp Is the patient pregnant?->No What reading provider will be dictating this exam?->CRC FINDINGS: The lungs are without acute focal process. There is no effusion or pneumothorax. The cardiomediastinal silhouette is without acute process. The osseous structures are without acute process. No acute process. EKG: Sinus tachycardia no ST changes or T wave inversions    Assessment and Plan   Hematemesis and melena complicated by end-stage liver disease:MELD score 26. NPO consult GI. H&H ! 6h. Protonix IV BID. No varicies noted in 9/2022, but will start octreotide given concern for developing them. Add ceftriaxone for abx coverage. EtOH cirrhosis: encourage cessation. Monitor for S&S of withdrawal.   Ascites: no S&S of SBP, can get diagnostic paracentesis if pt develops intractable abd pain. Hypomagnesemia: supplement follow mag daily.    DVT ppx scd    Patient Active Problem List   Diagnosis Code    Major depressive disorder, severe ([facility] [address] L08.2    Alcoholic hepatitis with ascites K70.11    GI bleeding K92.2    Impaired mobility and activities of daily living dt encephalopathy and gait ataxia Z74.09, Z78.9    Bursitis of foot M77.50    Generalized pain R52    Myalgia, unspecified site M79.10    Pain in left foot M79.672    Toe deformity, acquired C66.83    Acute alcoholic hepatitis K77.73    Impaired mobility and ADLs Z74.09, Z78.9    GI bleed K92.2    WILLIE (acute kidney injury) ([facility] [address] N17.9    Abdominal distention C04.9    Alcoholic cirrhosis of liver with ascites ([facility] K70.31       Roque Quiros DO  Admitting Hospitalist    Emergency Contact:

## 2023-05-10 NOTE — CONSENT
Informed Consent for Blood Component Transfusion Note    I have discussed with the patient the rationale for blood component transfusion; its benefits in treating or preventing fatigue, organ damage, or death; and its risk which includes mild transfusion reactions, rare risk of blood borne infection, or more serious but rare reactions. I have discussed the alternatives to transfusion, including the risk and consequences of not receiving transfusion. The patient had an opportunity to ask questions and had agreed to proceed with transfusion of blood components.     Electronically signed by SANTA Freire CNP on 5/10/23 at 6:07 AM EDT

## 2023-05-10 NOTE — ANESTHESIA POSTPROCEDURE EVALUATION
Department of Anesthesiology  Postprocedure Note    Patient: Lars Olvera  MRN: [id number]  YOB: 1985  Date of evaluation: 5/10/2023      Procedure Summary     Date: 05/10/23 Room / Location: [address] [place] [address]    Anesthesia Start: 1614 Anesthesia Stop: 1626    Procedure: EGD ESOPHAGOGASTRODUODENOSCOPY Diagnosis:       Hematemesis, unspecified whether nausea present      Melena      (Hematemesis, unspecified whether nausea present [K92.0])      (Melena [K92.1])    Surgeons: Ervin Amador MD Responsible Provider:     Anesthesia Type: MAC ASA Status: 3          Anesthesia Type: No value filed.     Josi Phase I:      Josi Phase II:        Anesthesia Post Evaluation    Patient location during evaluation: bedside  Patient participation: complete - patient participated  Level of consciousness: awake and awake and alert  Airway patency: patent  Nausea & Vomiting: no nausea and no vomiting  Complications: no  Cardiovascular status: blood pressure returned to baseline and hemodynamically stable  Respiratory status: acceptable  Hydration status: euvolemic

## 2023-05-11 PROBLEM — F43.21 ADJUSTMENT DISORDER WITH DEPRESSED MOOD: Status: ACTIVE | Noted: 2023-05-11

## 2023-05-11 LAB
ALBUMIN SERPL-MCNC: 2.1 G/DL (ref 3.5–4.6)
ALP SERPL-CCNC: 141 U/L (ref 40–130)
ALT SERPL-CCNC: 27 U/L (ref 0–33)
ANION GAP SERPL CALCULATED.3IONS-SCNC: 6 MEQ/L (ref 9–15)
AST SERPL-CCNC: 124 U/L (ref 0–35)
BACTERIA UR CULT: NORMAL
BASOPHILS # BLD: 0 K/UL (ref 0–0.2)
BASOPHILS NFR BLD: 0.4 %
BILIRUB DIRECT SERPL-MCNC: 4.7 MG/DL (ref 0–0.4)
BILIRUB INDIRECT SERPL-MCNC: 3.5 MG/DL (ref 0–0.6)
BILIRUB SERPL-MCNC: 8.2 MG/DL (ref 0.2–0.7)
BLOOD BANK DISPENSE STATUS: NORMAL
BLOOD BANK PRODUCT CODE: NORMAL
BPU ID: NORMAL
BUN SERPL-MCNC: 14 MG/DL (ref 6–20)
CALCIUM SERPL-MCNC: 7.7 MG/DL (ref 8.5–9.9)
CHLORIDE SERPL-SCNC: 102 MEQ/L (ref 95–107)
CO2 SERPL-SCNC: 27 MEQ/L (ref 20–31)
CREAT SERPL-MCNC: 0.89 MG/DL (ref 0.5–0.9)
DESCRIPTION BLOOD BANK: NORMAL
EOSINOPHIL # BLD: 0.1 K/UL (ref 0–0.7)
EOSINOPHIL NFR BLD: 1 %
ERYTHROCYTE [DISTWIDTH] IN BLOOD BY AUTOMATED COUNT: 23.5 % (ref 11.5–14.5)
GLUCOSE SERPL-MCNC: 148 MG/DL (ref 70–99)
HCT VFR BLD AUTO: 19.6 % (ref 37–47)
HCT VFR BLD AUTO: 20.6 % (ref 37–47)
HCT VFR BLD AUTO: 20.9 % (ref 37–47)
HCT VFR BLD AUTO: 22.9 % (ref 37–47)
HCT VFR BLD AUTO: 24.4 % (ref 37–47)
HGB BLD-MCNC: 6.6 G/DL (ref 12–16)
HGB BLD-MCNC: 7.1 G/DL (ref 12–16)
HGB BLD-MCNC: 7.1 G/DL (ref 12–16)
HGB BLD-MCNC: 7.8 G/DL (ref 12–16)
HGB BLD-MCNC: 8.2 G/DL (ref 12–16)
INR PPP: 2.2
LYMPHOCYTES # BLD: 1.2 K/UL (ref 1–4.8)
LYMPHOCYTES NFR BLD: 24 %
MAGNESIUM SERPL-MCNC: 2.3 MG/DL (ref 1.7–2.4)
MCH RBC QN AUTO: 32.1 PG (ref 27–31.3)
MCHC RBC AUTO-ENTMCNC: 34.1 % (ref 33–37)
MCV RBC AUTO: 94.1 FL (ref 79.4–94.8)
MONOCYTES # BLD: 0.3 K/UL (ref 0.2–0.8)
MONOCYTES NFR BLD: 6.5 %
NEUTROPHILS # BLD: 3.5 K/UL (ref 1.4–6.5)
NEUTS SEG NFR BLD: 68.1 %
PLATELET # BLD AUTO: 35 K/UL (ref 130–400)
POTASSIUM SERPL-SCNC: 3.3 MEQ/L (ref 3.4–4.9)
PROT SERPL-MCNC: 5.1 G/DL (ref 6.3–8)
PROTHROMBIN TIME: 25 SEC (ref 12.3–14.9)
RBC # BLD AUTO: 2.22 M/UL (ref 4.2–5.4)
SODIUM SERPL-SCNC: 135 MEQ/L (ref 135–144)
WBC # BLD AUTO: 5.1 K/UL (ref 4.8–10.8)

## 2023-05-11 PROCEDURE — 6370000000 HC RX 637 (ALT 250 FOR IP)

## 2023-05-11 PROCEDURE — P9047 ALBUMIN (HUMAN), 25%, 50ML: HCPCS | Performed by: INTERNAL MEDICINE

## 2023-05-11 PROCEDURE — 6370000000 HC RX 637 (ALT 250 FOR IP): Performed by: INTERNAL MEDICINE

## 2023-05-11 PROCEDURE — 85610 PROTHROMBIN TIME: CPT

## 2023-05-11 PROCEDURE — 80048 BASIC METABOLIC PNL TOTAL CA: CPT

## 2023-05-11 PROCEDURE — 99233 SBSQ HOSP IP/OBS HIGH 50: CPT | Performed by: INTERNAL MEDICINE

## 2023-05-11 PROCEDURE — 6360000002 HC RX W HCPCS: Performed by: INTERNAL MEDICINE

## 2023-05-11 PROCEDURE — 49083 ABD PARACENTESIS W/IMAGING: CPT | Performed by: INTERNAL MEDICINE

## 2023-05-11 PROCEDURE — A4216 STERILE WATER/SALINE, 10 ML: HCPCS | Performed by: INTERNAL MEDICINE

## 2023-05-11 PROCEDURE — 85018 HEMOGLOBIN: CPT

## 2023-05-11 PROCEDURE — 99233 SBSQ HOSP IP/OBS HIGH 50: CPT | Performed by: NURSE PRACTITIONER

## 2023-05-11 PROCEDURE — 80076 HEPATIC FUNCTION PANEL: CPT

## 2023-05-11 PROCEDURE — 6370000000 HC RX 637 (ALT 250 FOR IP): Performed by: NURSE PRACTITIONER

## 2023-05-11 PROCEDURE — 2580000003 HC RX 258: Performed by: INTERNAL MEDICINE

## 2023-05-11 PROCEDURE — 83735 ASSAY OF MAGNESIUM: CPT

## 2023-05-11 PROCEDURE — 83540 ASSAY OF IRON: CPT

## 2023-05-11 PROCEDURE — 0W9G3ZZ DRAINAGE OF PERITONEAL CAVITY, PERCUTANEOUS APPROACH: ICD-10-PCS | Performed by: INTERNAL MEDICINE

## 2023-05-11 PROCEDURE — 85014 HEMATOCRIT: CPT

## 2023-05-11 PROCEDURE — C9113 INJ PANTOPRAZOLE SODIUM, VIA: HCPCS | Performed by: INTERNAL MEDICINE

## 2023-05-11 PROCEDURE — 83550 IRON BINDING TEST: CPT

## 2023-05-11 PROCEDURE — 36415 COLL VENOUS BLD VENIPUNCTURE: CPT

## 2023-05-11 PROCEDURE — 2500000003 HC RX 250 WO HCPCS: Performed by: INTERNAL MEDICINE

## 2023-05-11 PROCEDURE — 1210000000 HC MED SURG R&B

## 2023-05-11 PROCEDURE — 85025 COMPLETE CBC W/AUTO DIFF WBC: CPT

## 2023-05-11 PROCEDURE — 36430 TRANSFUSION BLD/BLD COMPNT: CPT

## 2023-05-11 RX ORDER — PEG-3350, SODIUM SULFATE, SODIUM CHLORIDE, POTASSIUM CHLORIDE, SODIUM ASCORBATE AND ASCORBIC ACID 7.5-2.691G
100 KIT ORAL ONCE
Status: COMPLETED | OUTPATIENT
Start: 2023-05-11 | End: 2023-05-11

## 2023-05-11 RX ORDER — SODIUM CHLORIDE 9 MG/ML
INJECTION, SOLUTION INTRAVENOUS PRN
Status: DISCONTINUED | OUTPATIENT
Start: 2023-05-11 | End: 2023-05-16 | Stop reason: HOSPADM

## 2023-05-11 RX ORDER — ALBUMIN (HUMAN) 12.5 G/50ML
50 SOLUTION INTRAVENOUS ONCE
Status: COMPLETED | OUTPATIENT
Start: 2023-05-11 | End: 2023-05-11

## 2023-05-11 RX ORDER — LANOLIN ALCOHOL/MO/W.PET/CERES
3 CREAM (GRAM) TOPICAL NIGHTLY PRN
Status: DISCONTINUED | OUTPATIENT
Start: 2023-05-11 | End: 2023-05-16 | Stop reason: HOSPADM

## 2023-05-11 RX ORDER — NOREPINEPHRINE BIT/0.9 % NACL 16MG/250ML
1-100 INFUSION BOTTLE (ML) INTRAVENOUS CONTINUOUS
Status: DISCONTINUED | OUTPATIENT
Start: 2023-05-11 | End: 2023-05-11

## 2023-05-11 RX ORDER — SODIUM CHLORIDE, SODIUM LACTATE, POTASSIUM CHLORIDE, CALCIUM CHLORIDE 600; 310; 30; 20 MG/100ML; MG/100ML; MG/100ML; MG/100ML
INJECTION, SOLUTION INTRAVENOUS CONTINUOUS
Status: DISCONTINUED | OUTPATIENT
Start: 2023-05-11 | End: 2023-05-12

## 2023-05-11 RX ADMIN — PANTOPRAZOLE SODIUM 40 MG: 40 INJECTION, POWDER, FOR SOLUTION INTRAVENOUS at 08:15

## 2023-05-11 RX ADMIN — PEG-3350, SODIUM SULFATE, SODIUM CHLORIDE, POTASSIUM CHLORIDE, SODIUM ASCORBATE AND ASCORBIC ACID 100 G: KIT at 18:05

## 2023-05-11 RX ADMIN — ALBUMIN (HUMAN) 50 G: 0.25 INJECTION, SOLUTION INTRAVENOUS at 11:34

## 2023-05-11 RX ADMIN — POTASSIUM BICARBONATE 50 MEQ: 978 TABLET, EFFERVESCENT ORAL at 08:15

## 2023-05-11 RX ADMIN — Medication 5 MCG/MIN: at 05:59

## 2023-05-11 RX ADMIN — THIAMINE HYDROCHLORIDE 100 MG: 100 INJECTION, SOLUTION INTRAMUSCULAR; INTRAVENOUS at 08:15

## 2023-05-11 RX ADMIN — SODIUM CHLORIDE, POTASSIUM CHLORIDE, SODIUM LACTATE AND CALCIUM CHLORIDE: 600; 310; 30; 20 INJECTION, SOLUTION INTRAVENOUS at 18:57

## 2023-05-11 RX ADMIN — CEFTRIAXONE SODIUM 1000 MG: 1 INJECTION, POWDER, FOR SOLUTION INTRAMUSCULAR; INTRAVENOUS at 04:04

## 2023-05-11 RX ADMIN — FOLIC ACID 1 MG: 1 TABLET ORAL at 08:15

## 2023-05-11 RX ADMIN — THERA TABS 1 TABLET: TAB at 08:16

## 2023-05-11 RX ADMIN — PHYTONADIONE 5 MG: 10 INJECTION, EMULSION INTRAMUSCULAR; INTRAVENOUS; SUBCUTANEOUS at 08:23

## 2023-05-11 RX ADMIN — Medication 3 MG: at 23:26

## 2023-05-11 ASSESSMENT — PAIN SCALES - GENERAL
PAINLEVEL_OUTOF10: 0

## 2023-05-11 NOTE — CARE COORDINATION
Team ICU quality rounds done and pt off Levo at present. Pt may have Paracentesis done today. She will likely tx out of ICU and plan is home upon dc. Sent message to Dr. Ana Rosa Reyes to ask for psych consult re: to admission notes and documentation of major depression and ETOH abuse.
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
## 2023-05-11 NOTE — PROCEDURES
I personally supervised and participated in all essential parts of this procedure      Haile Alamo MD        PROCEDURE:  Diagnostic & Therapeutic Paracentesis , [place] [id number]    DIAGNOSES:  INDICATION:  Ascites. Suspected SBP. CONSENT:  The patient was counseled regarding the procedure, it's indications, risks, potential complications and alternatives and any questions were answered. Consent was obtained. PROCEDURE SUMMARY:  A time-out was performed. The area of the  left abdomen was prepped and draped in a sterile fashion  using chlorhexidine scrub. 1% lidocaine was used to numb the region. The skin was incised 1.5 mm using a 10 blade  scalpel. The paracentesis catheter was inserted and advanced with negative pressure under ultrasound guidance. Ultrasound images were permanently documented. No blood was aspirated. Clear yellow fluid was retrieved and  collected. Approximately 65 mL of ascitic fluid was collected and sent for laboratory analysis. The catheter was then  connected to the vaccutainer and 450  liters of additional ascitic fluid were drained. The catheter was removed and  no leaking was noted. 50 g of albumin was given . The patient tolerated the procedure  well without any immediate complications.        ESTIMATED BLOOD LOSS:  0 CC     COMPLICATIONS:  None

## 2023-05-12 ENCOUNTER — ANESTHESIA (OUTPATIENT)
Dept: ENDOSCOPY | Age: 38
End: 2023-05-12
Payer: COMMERCIAL

## 2023-05-12 ENCOUNTER — ANESTHESIA EVENT (OUTPATIENT)
Dept: ENDOSCOPY | Age: 38
End: 2023-05-12
Payer: COMMERCIAL

## 2023-05-12 PROBLEM — K92.1 MELENA: Status: ACTIVE | Noted: 2023-05-09

## 2023-05-12 PROBLEM — D64.9 ANEMIA: Status: ACTIVE | Noted: 2023-05-09

## 2023-05-12 LAB
ALBUMIN SERPL-MCNC: 2.3 G/DL (ref 3.5–4.6)
ALP SERPL-CCNC: 148 U/L (ref 40–130)
ALT SERPL-CCNC: 38 U/L (ref 0–33)
ANION GAP SERPL CALCULATED.3IONS-SCNC: 7 MEQ/L (ref 9–15)
AST SERPL-CCNC: 179 U/L (ref 0–35)
BASOPHILS # BLD: 0 K/UL (ref 0–0.2)
BASOPHILS NFR BLD: 0.4 %
BILIRUB DIRECT SERPL-MCNC: 6.9 MG/DL (ref 0–0.4)
BILIRUB INDIRECT SERPL-MCNC: 3.2 MG/DL (ref 0–0.6)
BILIRUB SERPL-MCNC: 10.1 MG/DL (ref 0.2–0.7)
BLOOD BANK DISPENSE STATUS: NORMAL
BLOOD BANK PRODUCT CODE: NORMAL
BPU ID: NORMAL
BUN SERPL-MCNC: 12 MG/DL (ref 6–20)
CALCIUM SERPL-MCNC: 8 MG/DL (ref 8.5–9.9)
CHLORIDE SERPL-SCNC: 101 MEQ/L (ref 95–107)
CO2 SERPL-SCNC: 26 MEQ/L (ref 20–31)
CREAT SERPL-MCNC: 0.64 MG/DL (ref 0.5–0.9)
DAT POLY-SP REAG RBC QL: NORMAL
DESCRIPTION BLOOD BANK: NORMAL
EOSINOPHIL # BLD: 0.1 K/UL (ref 0–0.7)
EOSINOPHIL NFR BLD: 1.3 %
ERYTHROCYTE [DISTWIDTH] IN BLOOD BY AUTOMATED COUNT: 24 % (ref 11.5–14.5)
FERRITIN: 212 NG/ML (ref 13–150)
FOLATE: 10.8 NG/ML
GLUCOSE BLD-MCNC: 150 MG/DL (ref 70–99)
GLUCOSE SERPL-MCNC: 138 MG/DL (ref 70–99)
HCT VFR BLD AUTO: 22.1 % (ref 37–47)
HCT VFR BLD AUTO: 22.3 % (ref 37–47)
HCT VFR BLD AUTO: 22.9 % (ref 37–47)
HCT VFR BLD AUTO: 23.2 % (ref 37–47)
HGB BLD-MCNC: 7.5 G/DL (ref 12–16)
HGB BLD-MCNC: 7.5 G/DL (ref 12–16)
HGB BLD-MCNC: 7.7 G/DL (ref 12–16)
HGB BLD-MCNC: 8 G/DL (ref 12–16)
INR PPP: 2.3
IRON SATURATION: ABNORMAL % (ref 20–55)
IRON: 146 UG/DL (ref 37–145)
LYMPHOCYTES # BLD: 1.1 K/UL (ref 1–4.8)
LYMPHOCYTES NFR BLD: 26.1 %
MAGNESIUM SERPL-MCNC: 1.8 MG/DL (ref 1.7–2.4)
MCH RBC QN AUTO: 31.7 PG (ref 27–31.3)
MCHC RBC AUTO-ENTMCNC: 33.7 % (ref 33–37)
MCV RBC AUTO: 94 FL (ref 79.4–94.8)
MONOCYTES # BLD: 0.3 K/UL (ref 0.2–0.8)
MONOCYTES NFR BLD: 8.1 %
NEUTROPHILS # BLD: 2.6 K/UL (ref 1.4–6.5)
NEUTS SEG NFR BLD: 64.1 %
PERFORMED ON: ABNORMAL
PLATELET # BLD AUTO: 39 K/UL (ref 130–400)
POTASSIUM SERPL-SCNC: 3.3 MEQ/L (ref 3.4–4.9)
PROT SERPL-MCNC: 5.3 G/DL (ref 6.3–8)
PROTHROMBIN TIME: 25.4 SEC (ref 12.3–14.9)
RBC # BLD AUTO: 2.38 M/UL (ref 4.2–5.4)
SODIUM SERPL-SCNC: 134 MEQ/L (ref 135–144)
TOTAL IRON BINDING CAPACITY: ABNORMAL UG/DL (ref 250–450)
UNSATURATED IRON BINDING CAPACITY: <17 UG/DL (ref 112–347)
VITAMIN B-12: 1851 PG/ML (ref 232–1245)
WBC # BLD AUTO: 4 K/UL (ref 4.8–10.8)

## 2023-05-12 PROCEDURE — 6360000002 HC RX W HCPCS: Performed by: INTERNAL MEDICINE

## 2023-05-12 PROCEDURE — 3609027000 HC COLONOSCOPY: Performed by: INTERNAL MEDICINE

## 2023-05-12 PROCEDURE — 7100000011 HC PHASE II RECOVERY - ADDTL 15 MIN: Performed by: INTERNAL MEDICINE

## 2023-05-12 PROCEDURE — 2580000003 HC RX 258: Performed by: INTERNAL MEDICINE

## 2023-05-12 PROCEDURE — 80048 BASIC METABOLIC PNL TOTAL CA: CPT

## 2023-05-12 PROCEDURE — 2709999900 HC NON-CHARGEABLE SUPPLY: Performed by: INTERNAL MEDICINE

## 2023-05-12 PROCEDURE — 83735 ASSAY OF MAGNESIUM: CPT

## 2023-05-12 PROCEDURE — 82728 ASSAY OF FERRITIN: CPT

## 2023-05-12 PROCEDURE — 6370000000 HC RX 637 (ALT 250 FOR IP)

## 2023-05-12 PROCEDURE — 2580000003 HC RX 258

## 2023-05-12 PROCEDURE — A4216 STERILE WATER/SALINE, 10 ML: HCPCS | Performed by: INTERNAL MEDICINE

## 2023-05-12 PROCEDURE — 3700000001 HC ADD 15 MINUTES (ANESTHESIA): Performed by: INTERNAL MEDICINE

## 2023-05-12 PROCEDURE — 36415 COLL VENOUS BLD VENIPUNCTURE: CPT

## 2023-05-12 PROCEDURE — 85018 HEMOGLOBIN: CPT

## 2023-05-12 PROCEDURE — 7100000010 HC PHASE II RECOVERY - FIRST 15 MIN: Performed by: INTERNAL MEDICINE

## 2023-05-12 PROCEDURE — 82607 VITAMIN B-12: CPT

## 2023-05-12 PROCEDURE — 1210000000 HC MED SURG R&B

## 2023-05-12 PROCEDURE — 0DJD8ZZ INSPECTION OF LOWER INTESTINAL TRACT, VIA NATURAL OR ARTIFICIAL OPENING ENDOSCOPIC: ICD-10-PCS | Performed by: INTERNAL MEDICINE

## 2023-05-12 PROCEDURE — 6370000000 HC RX 637 (ALT 250 FOR IP): Performed by: INTERNAL MEDICINE

## 2023-05-12 PROCEDURE — 85025 COMPLETE CBC W/AUTO DIFF WBC: CPT

## 2023-05-12 PROCEDURE — 3700000000 HC ANESTHESIA ATTENDED CARE: Performed by: INTERNAL MEDICINE

## 2023-05-12 PROCEDURE — C9113 INJ PANTOPRAZOLE SODIUM, VIA: HCPCS | Performed by: INTERNAL MEDICINE

## 2023-05-12 PROCEDURE — 85014 HEMATOCRIT: CPT

## 2023-05-12 PROCEDURE — 45378 DIAGNOSTIC COLONOSCOPY: CPT | Performed by: INTERNAL MEDICINE

## 2023-05-12 PROCEDURE — 80076 HEPATIC FUNCTION PANEL: CPT

## 2023-05-12 PROCEDURE — 85610 PROTHROMBIN TIME: CPT

## 2023-05-12 PROCEDURE — 6360000002 HC RX W HCPCS: Performed by: NURSE ANESTHETIST, CERTIFIED REGISTERED

## 2023-05-12 PROCEDURE — 82746 ASSAY OF FOLIC ACID SERUM: CPT

## 2023-05-12 RX ORDER — SODIUM CHLORIDE 9 MG/ML
25 INJECTION, SOLUTION INTRAVENOUS PRN
Status: CANCELLED | OUTPATIENT
Start: 2023-05-12

## 2023-05-12 RX ORDER — SODIUM CHLORIDE 9 MG/ML
INJECTION, SOLUTION INTRAVENOUS CONTINUOUS
Status: DISCONTINUED | OUTPATIENT
Start: 2023-05-12 | End: 2023-05-13

## 2023-05-12 RX ORDER — SODIUM CHLORIDE 9 MG/ML
INJECTION, SOLUTION INTRAVENOUS PRN
Status: DISCONTINUED | OUTPATIENT
Start: 2023-05-12 | End: 2023-05-12 | Stop reason: HOSPADM

## 2023-05-12 RX ORDER — SODIUM CHLORIDE 0.9 % (FLUSH) 0.9 %
5-40 SYRINGE (ML) INJECTION EVERY 12 HOURS SCHEDULED
Status: DISCONTINUED | OUTPATIENT
Start: 2023-05-12 | End: 2023-05-12 | Stop reason: HOSPADM

## 2023-05-12 RX ORDER — SODIUM CHLORIDE 0.9 % (FLUSH) 0.9 %
5-40 SYRINGE (ML) INJECTION PRN
Status: CANCELLED | OUTPATIENT
Start: 2023-05-12

## 2023-05-12 RX ORDER — SODIUM CHLORIDE 0.9 % (FLUSH) 0.9 %
5-40 SYRINGE (ML) INJECTION PRN
Status: DISCONTINUED | OUTPATIENT
Start: 2023-05-12 | End: 2023-05-12 | Stop reason: HOSPADM

## 2023-05-12 RX ORDER — PROPOFOL 10 MG/ML
INJECTION, EMULSION INTRAVENOUS PRN
Status: DISCONTINUED | OUTPATIENT
Start: 2023-05-12 | End: 2023-05-12 | Stop reason: SDUPTHER

## 2023-05-12 RX ORDER — ONDANSETRON 2 MG/ML
4 INJECTION INTRAMUSCULAR; INTRAVENOUS
Status: DISCONTINUED | OUTPATIENT
Start: 2023-05-12 | End: 2023-05-12 | Stop reason: HOSPADM

## 2023-05-12 RX ORDER — SODIUM CHLORIDE 0.9 % (FLUSH) 0.9 %
5-40 SYRINGE (ML) INJECTION EVERY 12 HOURS SCHEDULED
Status: CANCELLED | OUTPATIENT
Start: 2023-05-12

## 2023-05-12 RX ORDER — MAGNESIUM HYDROXIDE 1200 MG/15ML
LIQUID ORAL PRN
Status: DISCONTINUED | OUTPATIENT
Start: 2023-05-12 | End: 2023-05-12 | Stop reason: ALTCHOICE

## 2023-05-12 RX ORDER — SIMETHICONE 20 MG/.3ML
EMULSION ORAL PRN
Status: DISCONTINUED | OUTPATIENT
Start: 2023-05-12 | End: 2023-05-12 | Stop reason: ALTCHOICE

## 2023-05-12 RX ORDER — POTASSIUM CHLORIDE 20 MEQ/1
40 TABLET, EXTENDED RELEASE ORAL ONCE
Status: COMPLETED | OUTPATIENT
Start: 2023-05-12 | End: 2023-05-12

## 2023-05-12 RX ORDER — SODIUM CHLORIDE 9 MG/ML
25 INJECTION, SOLUTION INTRAVENOUS PRN
Status: DISCONTINUED | OUTPATIENT
Start: 2023-05-12 | End: 2023-05-12 | Stop reason: HOSPADM

## 2023-05-12 RX ORDER — SODIUM CHLORIDE 9 MG/ML
INJECTION, SOLUTION INTRAVENOUS CONTINUOUS
Status: CANCELLED | OUTPATIENT
Start: 2023-05-12

## 2023-05-12 RX ORDER — SIMETHICONE 20 MG/.3ML
200 EMULSION ORAL ONCE
Status: COMPLETED | OUTPATIENT
Start: 2023-05-12 | End: 2023-05-12

## 2023-05-12 RX ORDER — SODIUM CHLORIDE 9 MG/ML
INJECTION, SOLUTION INTRAVENOUS
Status: COMPLETED
Start: 2023-05-12 | End: 2023-05-12

## 2023-05-12 RX ORDER — SIMETHICONE 20 MG/.3ML
EMULSION ORAL
Status: COMPLETED
Start: 2023-05-12 | End: 2023-05-12

## 2023-05-12 RX ADMIN — PANTOPRAZOLE SODIUM 40 MG: 40 INJECTION, POWDER, FOR SOLUTION INTRAVENOUS at 09:24

## 2023-05-12 RX ADMIN — FOLIC ACID 1 MG: 1 TABLET ORAL at 09:24

## 2023-05-12 RX ADMIN — SODIUM CHLORIDE: 9 INJECTION, SOLUTION INTRAVENOUS at 13:22

## 2023-05-12 RX ADMIN — PROPOFOL 100 MG: 10 INJECTION, EMULSION INTRAVENOUS at 13:41

## 2023-05-12 RX ADMIN — PROPOFOL 200 MG: 10 INJECTION, EMULSION INTRAVENOUS at 13:35

## 2023-05-12 RX ADMIN — Medication 200 MG: at 14:06

## 2023-05-12 RX ADMIN — PHENYLEPHRINE HYDROCHLORIDE 300 MCG: 10 INJECTION INTRAVENOUS at 13:41

## 2023-05-12 RX ADMIN — PHENYLEPHRINE HYDROCHLORIDE 300 MCG: 10 INJECTION INTRAVENOUS at 13:44

## 2023-05-12 RX ADMIN — CEFTRIAXONE SODIUM 1000 MG: 1 INJECTION, POWDER, FOR SOLUTION INTRAMUSCULAR; INTRAVENOUS at 03:18

## 2023-05-12 RX ADMIN — PHENYLEPHRINE HYDROCHLORIDE 300 MCG: 10 INJECTION INTRAVENOUS at 13:42

## 2023-05-12 RX ADMIN — SIMETHICONE 200 MG: 20 EMULSION ORAL at 14:06

## 2023-05-12 RX ADMIN — PROPOFOL 100 MG: 10 INJECTION, EMULSION INTRAVENOUS at 13:44

## 2023-05-12 RX ADMIN — THERA TABS 1 TABLET: TAB at 09:24

## 2023-05-12 RX ADMIN — Medication 3 MG: at 22:56

## 2023-05-12 RX ADMIN — THIAMINE HYDROCHLORIDE 100 MG: 100 INJECTION, SOLUTION INTRAMUSCULAR; INTRAVENOUS at 09:23

## 2023-05-12 RX ADMIN — POTASSIUM CHLORIDE 40 MEQ: 1500 TABLET, EXTENDED RELEASE ORAL at 17:05

## 2023-05-12 ASSESSMENT — PAIN - FUNCTIONAL ASSESSMENT
PAIN_FUNCTIONAL_ASSESSMENT: 0-10

## 2023-05-12 ASSESSMENT — PAIN SCALES - GENERAL: PAINLEVEL_OUTOF10: 0

## 2023-05-12 ASSESSMENT — PAIN DESCRIPTION - DESCRIPTORS
DESCRIPTORS: STABBING
DESCRIPTORS: STABBING

## 2023-05-12 NOTE — CARE COORDINATION
MET W/PT TO ASSESS NEEDS. PT DENIES NEEDS. PT IS UP AMBULATING IN ROOM INDEPENDENTLY AND REMAINS ON RA. WILL FOLLOW.

## 2023-05-12 NOTE — ANESTHESIA POSTPROCEDURE EVALUATION
Department of Anesthesiology  Postprocedure Note    Patient: Renée Yates  MRN: [id number]  YOB: 1985  Date of evaluation: 5/12/2023      Procedure Summary     Date: 05/12/23 Room / Location: [address] [place] [address] Philippe Hernandezd    Anesthesia Start: 1330 Anesthesia Stop:     Procedure: COLONOSCOPY DIAGNOSTIC Diagnosis:       Melena      Anemia, unspecified type      (Melena [K92.1])      (Anemia, unspecified type [D64.9])    Surgeons: Marilia Walker MD Responsible Provider: SANTA Dockery CRNA    Anesthesia Type: MAC ASA Status: 3 - Emergent          Anesthesia Type: No value filed.     Josi Phase I: Josi Score: 10    Josi Phase II:        Anesthesia Post Evaluation    Patient location during evaluation: bedside  Patient participation: complete - patient participated  Level of consciousness: awake and awake and alert  Airway patency: patent  Nausea & Vomiting: no nausea and no vomiting  Complications: no  Cardiovascular status: blood pressure returned to baseline and hemodynamically stable  Respiratory status: acceptable  Hydration status: euvolemic

## 2023-05-12 NOTE — CARE COORDINATION
MET W/PT TO ASSESS NEEDS AND DISCUSS DISCHARGE PLAN WHICH REMAINS HOME ALONE W/PARENTS SUPPORT AS NEEDED. PT HAS WALKER. DECLINES HHC. PT IS UP INDEPENDENTLY IN ROOM. WILL FOLLOW.

## 2023-05-12 NOTE — ANESTHESIA PRE PROCEDURE
Department of Anesthesiology  Preprocedure Note       Name:  Abdiaziz Hu   Age:  42 y.o.  :  1985                                          MRN:  [id number]         Date:  2023      Surgeon: Jessica Rivero):  Summer Perry MD    Procedure: Procedure(s):  COLONOSCOPY DIAGNOSTIC    Medications prior to admission:   Prior to Admission medications    Medication Sig Start Date End Date Taking? Authorizing Provider   midodrine (PROAMATINE) 10 MG tablet Take 1 tablet by mouth 3 times daily  Patient not taking: Reported on 2023    Historical Provider, MD   lidocaine (LIDODERM) 5 % Place 1 patch onto the skin daily 12 hours on, 12 hours off.   Patient not taking: Reported on 2023    Historical Provider, MD   diazePAM (VALIUM) 2 MG tablet  23   Historical Provider, MD   ondansetron (ZOFRAN-ODT) 4 MG disintegrating tablet  23   Historical Provider, MD   pantoprazole (PROTONIX) 40 MG tablet Take 1 tablet by mouth 2 times daily (before meals)  Patient not taking: Reported on 2023   Nickie Deal DO   thiamine 100 MG tablet Take 1 tablet by mouth daily  Patient not taking: Reported on 2023   Nickie Deal DO       Current medications:    Current Facility-Administered Medications   Medication Dose Route Frequency Provider Last Rate Last Admin    potassium chloride (KLOR-CON M) extended release tablet 40 mEq  40 mEq Oral Once Cassandra Rowe MD        0.9 % sodium chloride infusion   IntraVENous PRN Dewaine Marie Sedar, DO        melatonin tablet 3 mg  3 mg Oral Nightly PRN Vernadine Marina, APRN - CNP   3 mg at 23 2326    cefTRIAXone (ROCEPHIN) 1,000 mg in sodium chloride 0.9 % 50 mL IVPB (mini-bag)  1,000 mg IntraVENous Q24H Dewaine Greet Sedar, DO   Stopped at 23 0353    0.9 % sodium chloride infusion   IntraVENous PRN Chano Casear, DO        lidocaine 4 % external patch 1 patch  1 patch TransDERmal Daily Vernadine Marina, APRN - CNP   1 patch at 23 0924    thiamine (B-1)

## 2023-05-13 LAB
ALBUMIN SERPL-MCNC: 2.2 G/DL (ref 3.5–4.6)
ALP SERPL-CCNC: 163 U/L (ref 40–130)
ALT SERPL-CCNC: 46 U/L (ref 0–33)
ANION GAP SERPL CALCULATED.3IONS-SCNC: 8 MEQ/L (ref 9–15)
AST SERPL-CCNC: 195 U/L (ref 0–35)
BASOPHILS # BLD: 0 K/UL (ref 0–0.2)
BASOPHILS NFR BLD: 0.5 %
BILIRUB DIRECT SERPL-MCNC: 6.8 MG/DL (ref 0–0.4)
BILIRUB INDIRECT SERPL-MCNC: 2.5 MG/DL (ref 0–0.6)
BILIRUB SERPL-MCNC: 9.3 MG/DL (ref 0.2–0.7)
BUN SERPL-MCNC: 10 MG/DL (ref 6–20)
CALCIUM SERPL-MCNC: 7.6 MG/DL (ref 8.5–9.9)
CHLORIDE SERPL-SCNC: 102 MEQ/L (ref 95–107)
CO2 SERPL-SCNC: 25 MEQ/L (ref 20–31)
CREAT SERPL-MCNC: 0.6 MG/DL (ref 0.5–0.9)
EOSINOPHIL # BLD: 0.1 K/UL (ref 0–0.7)
EOSINOPHIL NFR BLD: 1.3 %
ERYTHROCYTE [DISTWIDTH] IN BLOOD BY AUTOMATED COUNT: 24.3 % (ref 11.5–14.5)
GLUCOSE SERPL-MCNC: 105 MG/DL (ref 70–99)
HCT VFR BLD AUTO: 21.6 % (ref 37–47)
HCT VFR BLD AUTO: 23 % (ref 37–47)
HGB BLD-MCNC: 7.2 G/DL (ref 12–16)
HGB BLD-MCNC: 7.7 G/DL (ref 12–16)
INR PPP: 2.5
LYMPHOCYTES # BLD: 1.2 K/UL (ref 1–4.8)
LYMPHOCYTES NFR BLD: 25.9 %
MAGNESIUM SERPL-MCNC: 1.6 MG/DL (ref 1.7–2.4)
MCH RBC QN AUTO: 31.6 PG (ref 27–31.3)
MCHC RBC AUTO-ENTMCNC: 33.2 % (ref 33–37)
MCV RBC AUTO: 95.2 FL (ref 79.4–94.8)
MONOCYTES # BLD: 0.6 K/UL (ref 0.2–0.8)
MONOCYTES NFR BLD: 13.3 %
NEUTROPHILS # BLD: 2.7 K/UL (ref 1.4–6.5)
NEUTS SEG NFR BLD: 59 %
PLATELET # BLD AUTO: 34 K/UL (ref 130–400)
POTASSIUM SERPL-SCNC: 3.5 MEQ/L (ref 3.4–4.9)
PROT SERPL-MCNC: 5.1 G/DL (ref 6.3–8)
PROTHROMBIN TIME: 27.1 SEC (ref 12.3–14.9)
RBC # BLD AUTO: 2.27 M/UL (ref 4.2–5.4)
SODIUM SERPL-SCNC: 135 MEQ/L (ref 135–144)
WBC # BLD AUTO: 4.5 K/UL (ref 4.8–10.8)

## 2023-05-13 PROCEDURE — 85025 COMPLETE CBC W/AUTO DIFF WBC: CPT

## 2023-05-13 PROCEDURE — 85014 HEMATOCRIT: CPT

## 2023-05-13 PROCEDURE — 1210000000 HC MED SURG R&B

## 2023-05-13 PROCEDURE — 99232 SBSQ HOSP IP/OBS MODERATE 35: CPT | Performed by: INTERNAL MEDICINE

## 2023-05-13 PROCEDURE — 36415 COLL VENOUS BLD VENIPUNCTURE: CPT

## 2023-05-13 PROCEDURE — 6360000002 HC RX W HCPCS: Performed by: INTERNAL MEDICINE

## 2023-05-13 PROCEDURE — 97165 OT EVAL LOW COMPLEX 30 MIN: CPT

## 2023-05-13 PROCEDURE — A4216 STERILE WATER/SALINE, 10 ML: HCPCS | Performed by: INTERNAL MEDICINE

## 2023-05-13 PROCEDURE — 83735 ASSAY OF MAGNESIUM: CPT

## 2023-05-13 PROCEDURE — C9113 INJ PANTOPRAZOLE SODIUM, VIA: HCPCS | Performed by: INTERNAL MEDICINE

## 2023-05-13 PROCEDURE — 6370000000 HC RX 637 (ALT 250 FOR IP)

## 2023-05-13 PROCEDURE — 80048 BASIC METABOLIC PNL TOTAL CA: CPT

## 2023-05-13 PROCEDURE — 2580000003 HC RX 258: Performed by: INTERNAL MEDICINE

## 2023-05-13 PROCEDURE — 6370000000 HC RX 637 (ALT 250 FOR IP): Performed by: INTERNAL MEDICINE

## 2023-05-13 PROCEDURE — 85610 PROTHROMBIN TIME: CPT

## 2023-05-13 PROCEDURE — 80076 HEPATIC FUNCTION PANEL: CPT

## 2023-05-13 PROCEDURE — 85018 HEMOGLOBIN: CPT

## 2023-05-13 RX ORDER — TRAMADOL HYDROCHLORIDE 50 MG/1
50 TABLET ORAL EVERY 6 HOURS PRN
Status: DISCONTINUED | OUTPATIENT
Start: 2023-05-13 | End: 2023-05-16 | Stop reason: HOSPADM

## 2023-05-13 RX ORDER — FUROSEMIDE 10 MG/ML
20 INJECTION INTRAMUSCULAR; INTRAVENOUS ONCE
Status: DISCONTINUED | OUTPATIENT
Start: 2023-05-13 | End: 2023-05-14

## 2023-05-13 RX ORDER — LANOLIN ALCOHOL/MO/W.PET/CERES
400 CREAM (GRAM) TOPICAL DAILY
Status: DISCONTINUED | OUTPATIENT
Start: 2023-05-13 | End: 2023-05-14

## 2023-05-13 RX ADMIN — TRAMADOL HYDROCHLORIDE 50 MG: 50 TABLET, COATED ORAL at 23:13

## 2023-05-13 RX ADMIN — THERA TABS 1 TABLET: TAB at 08:08

## 2023-05-13 RX ADMIN — LORAZEPAM 1 MG: 1 TABLET ORAL at 05:30

## 2023-05-13 RX ADMIN — CEFTRIAXONE SODIUM 1000 MG: 1 INJECTION, POWDER, FOR SOLUTION INTRAMUSCULAR; INTRAVENOUS at 03:02

## 2023-05-13 RX ADMIN — TRAMADOL HYDROCHLORIDE 50 MG: 50 TABLET, COATED ORAL at 12:07

## 2023-05-13 RX ADMIN — Medication 400 MG: at 12:08

## 2023-05-13 RX ADMIN — IRON SUCROSE 300 MG: 20 INJECTION, SOLUTION INTRAVENOUS at 13:51

## 2023-05-13 RX ADMIN — FOLIC ACID 1 MG: 1 TABLET ORAL at 08:08

## 2023-05-13 RX ADMIN — THIAMINE HYDROCHLORIDE 100 MG: 100 INJECTION, SOLUTION INTRAMUSCULAR; INTRAVENOUS at 08:07

## 2023-05-13 RX ADMIN — PANTOPRAZOLE SODIUM 40 MG: 40 INJECTION, POWDER, FOR SOLUTION INTRAVENOUS at 08:07

## 2023-05-13 ASSESSMENT — PAIN SCALES - GENERAL
PAINLEVEL_OUTOF10: 4
PAINLEVEL_OUTOF10: 0
PAINLEVEL_OUTOF10: 6
PAINLEVEL_OUTOF10: 3
PAINLEVEL_OUTOF10: 2
PAINLEVEL_OUTOF10: 8

## 2023-05-13 ASSESSMENT — PAIN DESCRIPTION - PAIN TYPE: TYPE: CHRONIC PAIN

## 2023-05-13 ASSESSMENT — PAIN DESCRIPTION - DESCRIPTORS
DESCRIPTORS: ACHING
DESCRIPTORS: ACHING
DESCRIPTORS: ACHING;STABBING

## 2023-05-13 ASSESSMENT — PAIN DESCRIPTION - LOCATION
LOCATION: BACK
LOCATION: ABDOMEN;BACK
LOCATION: BACK

## 2023-05-13 ASSESSMENT — PAIN DESCRIPTION - ORIENTATION
ORIENTATION: LOWER
ORIENTATION: LEFT

## 2023-05-13 ASSESSMENT — PAIN - FUNCTIONAL ASSESSMENT: PAIN_FUNCTIONAL_ASSESSMENT: ACTIVITIES ARE NOT PREVENTED

## 2023-05-13 NOTE — PLAN OF CARE
Patient progressing towards discharge    Problem: Discharge Planning  Goal: Discharge to home or other facility with appropriate resources  Outcome: Progressing  Flowsheets (Taken 5/13/2023 0800)  Discharge to home or other facility with appropriate resources: Identify barriers to discharge with patient and caregiver     Problem: Pain  Goal: Verbalizes/displays adequate comfort level or baseline comfort level  Outcome: Progressing  Flowsheets (Taken 5/13/2023 0900)  Verbalizes/displays adequate comfort level or baseline comfort level: Encourage patient to monitor pain and request assistance     Problem: Safety - Adult  Goal: Free from fall injury  Outcome: Progressing     Problem: ABCDS Injury Assessment  Goal: Absence of physical injury  Outcome: Progressing     Problem: Cardiovascular - Adult  Goal: Maintains optimal cardiac output and hemodynamic stability  Outcome: Progressing  Goal: Absence of cardiac dysrhythmias or at baseline  Outcome: Progressing     Problem: Gastrointestinal - Adult  Goal: Minimal or absence of nausea and vomiting  Outcome: Progressing  Flowsheets (Taken 5/13/2023 0800)  Minimal or absence of nausea and vomiting: Administer IV fluids as ordered to ensure adequate hydration  Goal: Maintains or returns to baseline bowel function  Outcome: Progressing  Flowsheets (Taken 5/13/2023 0800)  Maintains or returns to baseline bowel function: Assess bowel function  Goal: Maintains adequate nutritional intake  Outcome: Progressing  Flowsheets (Taken 5/13/2023 0800)  Maintains adequate nutritional intake: Monitor percentage of each meal consumed     Problem: Hematologic - Adult  Goal: Maintains hematologic stability  Outcome: Progressing  Flowsheets (Taken 5/13/2023 0800)  Maintains hematologic stability: Assess for signs and symptoms of bleeding or hemorrhage     Problem: Anxiety  Goal: Will report anxiety at manageable levels  Description: INTERVENTIONS:  1. Administer medication as ordered  2.  Teach
2000)  Maintains or returns to baseline bowel function:   Assess bowel function   Encourage oral fluids to ensure adequate hydration  Goal: Maintains adequate nutritional intake  Outcome: Progressing  Flowsheets (Taken 5/10/2023 2000)  Maintains adequate nutritional intake:   Monitor percentage of each meal consumed   Monitor intake and output, weight and lab values     Problem: Hematologic - Adult  Goal: Maintains hematologic stability  Outcome: Progressing  Flowsheets (Taken 5/10/2023 2000)  Maintains hematologic stability:   Assess for signs and symptoms of bleeding or hemorrhage   Monitor labs for bleeding or clotting disorders     Problem: Anxiety  Goal: Will report anxiety at manageable levels  Description: INTERVENTIONS:  1. Administer medication as ordered  2. Teach and rehearse alternative coping skills  3. Provide emotional support with 1:1 interaction with staff  Outcome: Progressing  Flowsheets (Taken 5/10/2023 2000)  Will report anxiety at manageable levels: Administer medication as ordered     Problem: Behavior  Goal: Pt/Family maintain appropriate behavior and adhere to behavioral management agreement, if implemented  Description: INTERVENTIONS:  1. Assess patient/family's coping skills and  non-compliant behavior (including use of illegal substances)  2. Notify security of behavior or suspected illegal substances which indicate the need for search of the family and/or belongings  3. Encourage verbalization of thoughts and concerns in a socially appropriate manner  4. Utilize positive, consistent limit setting strategies supporting safety of patient, staff and others  5. Encourage participation in the decision making process about the behavioral management agreement  6. If a visitor's behavior poses a threat to safety call refer to organization policy.   7. Initiate consult with , Psychosocial CNS, Spiritual Care as appropriate  Outcome: Progressing  Flowsheets (Taken 5/10/2023

## 2023-05-13 NOTE — FLOWSHEET NOTE
0908: Patient assessment completed, Ascites noted to ABD, edema noted to bilateral upper thighs. No S. O.B noted at rest, unable to determine with exertion, patient remains in bed at this time. Patient c/o pain to lower back. Heating pad in place. Band aid to paracentesis  site intact, minimal drainage. Maintains IV fluids. Call light reach, HgB of 7.2 reported to Dr Tiffanie Mccormack at this time. Will continue to monitor throughout this shift.  .Electronically signed by Katie Jaimes RN on 5/13/2023 at 9:23 AM

## 2023-05-14 VITALS
OXYGEN SATURATION: 99 % | RESPIRATION RATE: 16 BRPM | WEIGHT: 169 LBS | HEIGHT: 65 IN | HEART RATE: 95 BPM | BODY MASS INDEX: 28.16 KG/M2 | SYSTOLIC BLOOD PRESSURE: 95 MMHG | TEMPERATURE: 98.2 F | DIASTOLIC BLOOD PRESSURE: 68 MMHG

## 2023-05-14 LAB
ALBUMIN SERPL-MCNC: 2.3 G/DL (ref 3.5–4.6)
ALP SERPL-CCNC: 179 U/L (ref 40–130)
ALT SERPL-CCNC: 49 U/L (ref 0–33)
ANION GAP SERPL CALCULATED.3IONS-SCNC: 9 MEQ/L (ref 9–15)
AST SERPL-CCNC: 188 U/L (ref 0–35)
BASOPHILS # BLD: 0 K/UL (ref 0–0.2)
BASOPHILS NFR BLD: 0.2 %
BILIRUB DIRECT SERPL-MCNC: 6.2 MG/DL (ref 0–0.4)
BILIRUB INDIRECT SERPL-MCNC: 2.2 MG/DL (ref 0–0.6)
BILIRUB SERPL-MCNC: 8.4 MG/DL (ref 0.2–0.7)
BUN SERPL-MCNC: 9 MG/DL (ref 6–20)
CALCIUM SERPL-MCNC: 7.7 MG/DL (ref 8.5–9.9)
CHLORIDE SERPL-SCNC: 103 MEQ/L (ref 95–107)
CO2 SERPL-SCNC: 24 MEQ/L (ref 20–31)
CREAT SERPL-MCNC: 0.6 MG/DL (ref 0.5–0.9)
EOSINOPHIL # BLD: 0 K/UL (ref 0–0.7)
EOSINOPHIL NFR BLD: 1.1 %
ERYTHROCYTE [DISTWIDTH] IN BLOOD BY AUTOMATED COUNT: 24.4 % (ref 11.5–14.5)
GLUCOSE SERPL-MCNC: 76 MG/DL (ref 70–99)
HCT VFR BLD AUTO: 24 % (ref 37–47)
HGB BLD-MCNC: 8 G/DL (ref 12–16)
INR PPP: 2.5
LYMPHOCYTES # BLD: 0.9 K/UL (ref 1–4.8)
LYMPHOCYTES NFR BLD: 22.8 %
MAGNESIUM SERPL-MCNC: 1.6 MG/DL (ref 1.7–2.4)
MCH RBC QN AUTO: 31.8 PG (ref 27–31.3)
MCHC RBC AUTO-ENTMCNC: 33.4 % (ref 33–37)
MCV RBC AUTO: 95 FL (ref 79.4–94.8)
MONOCYTES # BLD: 0.7 K/UL (ref 0.2–0.8)
MONOCYTES NFR BLD: 17.5 %
NEUTROPHILS # BLD: 2.4 K/UL (ref 1.4–6.5)
NEUTS SEG NFR BLD: 58.4 %
PLATELET # BLD AUTO: 40 K/UL (ref 130–400)
POTASSIUM SERPL-SCNC: 3.5 MEQ/L (ref 3.4–4.9)
PROT SERPL-MCNC: 5.3 G/DL (ref 6.3–8)
PROTHROMBIN TIME: 27 SEC (ref 12.3–14.9)
RBC # BLD AUTO: 2.52 M/UL (ref 4.2–5.4)
SODIUM SERPL-SCNC: 136 MEQ/L (ref 135–144)
WBC # BLD AUTO: 4 K/UL (ref 4.8–10.8)

## 2023-05-14 PROCEDURE — 85610 PROTHROMBIN TIME: CPT

## 2023-05-14 PROCEDURE — A4216 STERILE WATER/SALINE, 10 ML: HCPCS | Performed by: INTERNAL MEDICINE

## 2023-05-14 PROCEDURE — 6360000002 HC RX W HCPCS: Performed by: INTERNAL MEDICINE

## 2023-05-14 PROCEDURE — 85025 COMPLETE CBC W/AUTO DIFF WBC: CPT

## 2023-05-14 PROCEDURE — 2580000003 HC RX 258: Performed by: INTERNAL MEDICINE

## 2023-05-14 PROCEDURE — 1210000000 HC MED SURG R&B

## 2023-05-14 PROCEDURE — 6370000000 HC RX 637 (ALT 250 FOR IP): Performed by: INTERNAL MEDICINE

## 2023-05-14 PROCEDURE — 36415 COLL VENOUS BLD VENIPUNCTURE: CPT

## 2023-05-14 PROCEDURE — 80048 BASIC METABOLIC PNL TOTAL CA: CPT

## 2023-05-14 PROCEDURE — 80076 HEPATIC FUNCTION PANEL: CPT

## 2023-05-14 PROCEDURE — C9113 INJ PANTOPRAZOLE SODIUM, VIA: HCPCS | Performed by: INTERNAL MEDICINE

## 2023-05-14 PROCEDURE — 99232 SBSQ HOSP IP/OBS MODERATE 35: CPT | Performed by: INTERNAL MEDICINE

## 2023-05-14 PROCEDURE — 83735 ASSAY OF MAGNESIUM: CPT

## 2023-05-14 PROCEDURE — 97161 PT EVAL LOW COMPLEX 20 MIN: CPT

## 2023-05-14 RX ORDER — PANTOPRAZOLE SODIUM 40 MG/1
40 TABLET, DELAYED RELEASE ORAL
Status: DISCONTINUED | OUTPATIENT
Start: 2023-05-15 | End: 2023-05-16 | Stop reason: HOSPADM

## 2023-05-14 RX ORDER — LANOLIN ALCOHOL/MO/W.PET/CERES
100 CREAM (GRAM) TOPICAL DAILY
Status: DISCONTINUED | OUTPATIENT
Start: 2023-05-14 | End: 2023-05-16 | Stop reason: HOSPADM

## 2023-05-14 RX ORDER — LANOLIN ALCOHOL/MO/W.PET/CERES
400 CREAM (GRAM) TOPICAL 2 TIMES DAILY
Status: DISCONTINUED | OUTPATIENT
Start: 2023-05-14 | End: 2023-05-16 | Stop reason: HOSPADM

## 2023-05-14 RX ADMIN — THIAMINE HYDROCHLORIDE 100 MG: 100 INJECTION, SOLUTION INTRAMUSCULAR; INTRAVENOUS at 08:14

## 2023-05-14 RX ADMIN — THERA TABS 1 TABLET: TAB at 08:13

## 2023-05-14 RX ADMIN — FOLIC ACID 1 MG: 1 TABLET ORAL at 08:13

## 2023-05-14 RX ADMIN — CEFTRIAXONE SODIUM 1000 MG: 1 INJECTION, POWDER, FOR SOLUTION INTRAMUSCULAR; INTRAVENOUS at 03:30

## 2023-05-14 RX ADMIN — Medication 400 MG: at 20:41

## 2023-05-14 RX ADMIN — TRAMADOL HYDROCHLORIDE 50 MG: 50 TABLET, COATED ORAL at 22:25

## 2023-05-14 RX ADMIN — PANTOPRAZOLE SODIUM 40 MG: 40 INJECTION, POWDER, FOR SOLUTION INTRAVENOUS at 08:13

## 2023-05-14 RX ADMIN — Medication 400 MG: at 08:13

## 2023-05-14 ASSESSMENT — PAIN SCALES - GENERAL
PAINLEVEL_OUTOF10: 3
PAINLEVEL_OUTOF10: 0
PAINLEVEL_OUTOF10: 6

## 2023-05-15 LAB
ALBUMIN SERPL-MCNC: 2.2 G/DL (ref 3.5–4.6)
ALP SERPL-CCNC: 176 U/L (ref 40–130)
ALT SERPL-CCNC: 48 U/L (ref 0–33)
ANION GAP SERPL CALCULATED.3IONS-SCNC: 8 MEQ/L (ref 9–15)
AST SERPL-CCNC: 172 U/L (ref 0–35)
BASOPHILS # BLD: 0 K/UL (ref 0–0.2)
BASOPHILS NFR BLD: 0.3 %
BILIRUB DIRECT SERPL-MCNC: 5.6 MG/DL (ref 0–0.4)
BILIRUB INDIRECT SERPL-MCNC: 2.6 MG/DL (ref 0–0.6)
BILIRUB SERPL-MCNC: 8.2 MG/DL (ref 0.2–0.7)
BUN SERPL-MCNC: 9 MG/DL (ref 6–20)
CALCIUM SERPL-MCNC: 7.8 MG/DL (ref 8.5–9.9)
CHLORIDE SERPL-SCNC: 103 MEQ/L (ref 95–107)
CO2 SERPL-SCNC: 24 MEQ/L (ref 20–31)
CREAT SERPL-MCNC: 0.62 MG/DL (ref 0.5–0.9)
EOSINOPHIL # BLD: 0 K/UL (ref 0–0.7)
EOSINOPHIL NFR BLD: 0.9 %
ERYTHROCYTE [DISTWIDTH] IN BLOOD BY AUTOMATED COUNT: 24.8 % (ref 11.5–14.5)
GLUCOSE SERPL-MCNC: 95 MG/DL (ref 70–99)
HCT VFR BLD AUTO: 22.5 % (ref 37–47)
HGB BLD-MCNC: 7.5 G/DL (ref 12–16)
INR PPP: 2.5
LYMPHOCYTES # BLD: 1 K/UL (ref 1–4.8)
LYMPHOCYTES NFR BLD: 23.4 %
MAGNESIUM SERPL-MCNC: 1.6 MG/DL (ref 1.7–2.4)
MCH RBC QN AUTO: 32.4 PG (ref 27–31.3)
MCHC RBC AUTO-ENTMCNC: 33.4 % (ref 33–37)
MCV RBC AUTO: 96.8 FL (ref 79.4–94.8)
MONOCYTES # BLD: 0.8 K/UL (ref 0.2–0.8)
MONOCYTES NFR BLD: 18.2 %
NEUTROPHILS # BLD: 2.5 K/UL (ref 1.4–6.5)
NEUTS SEG NFR BLD: 57.2 %
PLATELET # BLD AUTO: 44 K/UL (ref 130–400)
POTASSIUM SERPL-SCNC: 3.6 MEQ/L (ref 3.4–4.9)
PROT SERPL-MCNC: 5.3 G/DL (ref 6.3–8)
PROTHROMBIN TIME: 27.6 SEC (ref 12.3–14.9)
RBC # BLD AUTO: 2.32 M/UL (ref 4.2–5.4)
SODIUM SERPL-SCNC: 135 MEQ/L (ref 135–144)
WBC # BLD AUTO: 4.4 K/UL (ref 4.8–10.8)

## 2023-05-15 PROCEDURE — 6370000000 HC RX 637 (ALT 250 FOR IP)

## 2023-05-15 PROCEDURE — 1210000000 HC MED SURG R&B

## 2023-05-15 PROCEDURE — 99232 SBSQ HOSP IP/OBS MODERATE 35: CPT | Performed by: INTERNAL MEDICINE

## 2023-05-15 PROCEDURE — 85610 PROTHROMBIN TIME: CPT

## 2023-05-15 PROCEDURE — 6370000000 HC RX 637 (ALT 250 FOR IP): Performed by: INTERNAL MEDICINE

## 2023-05-15 PROCEDURE — 36415 COLL VENOUS BLD VENIPUNCTURE: CPT

## 2023-05-15 PROCEDURE — 80048 BASIC METABOLIC PNL TOTAL CA: CPT

## 2023-05-15 PROCEDURE — 88112 CYTOPATH CELL ENHANCE TECH: CPT

## 2023-05-15 PROCEDURE — 83735 ASSAY OF MAGNESIUM: CPT

## 2023-05-15 PROCEDURE — 80076 HEPATIC FUNCTION PANEL: CPT

## 2023-05-15 PROCEDURE — 88305 TISSUE EXAM BY PATHOLOGIST: CPT

## 2023-05-15 PROCEDURE — 85025 COMPLETE CBC W/AUTO DIFF WBC: CPT

## 2023-05-15 RX ORDER — MULTIVITAMIN WITH IRON
1 TABLET ORAL DAILY
Qty: 30 TABLET | Refills: 0 | Status: SHIPPED | OUTPATIENT
Start: 2023-05-16 | End: 2023-06-15

## 2023-05-15 RX ORDER — LANOLIN ALCOHOL/MO/W.PET/CERES
100 CREAM (GRAM) TOPICAL DAILY
Qty: 30 TABLET | Refills: 0 | Status: SHIPPED | OUTPATIENT
Start: 2023-05-15 | End: 2023-06-14

## 2023-05-15 RX ORDER — PANTOPRAZOLE SODIUM 40 MG/1
40 TABLET, DELAYED RELEASE ORAL
Qty: 30 TABLET | Refills: 3 | Status: SHIPPED | OUTPATIENT
Start: 2023-05-16

## 2023-05-15 RX ORDER — FOLIC ACID 1 MG/1
1 TABLET ORAL DAILY
Qty: 30 TABLET | Refills: 3 | Status: SHIPPED | OUTPATIENT
Start: 2023-05-16

## 2023-05-15 RX ORDER — LANOLIN ALCOHOL/MO/W.PET/CERES
400 CREAM (GRAM) TOPICAL 2 TIMES DAILY
Qty: 60 TABLET | Refills: 0 | Status: SHIPPED | OUTPATIENT
Start: 2023-05-15 | End: 2023-06-14

## 2023-05-15 RX ADMIN — Medication 100 MG: at 07:50

## 2023-05-15 RX ADMIN — Medication 400 MG: at 07:50

## 2023-05-15 RX ADMIN — TRAMADOL HYDROCHLORIDE 50 MG: 50 TABLET, COATED ORAL at 06:10

## 2023-05-15 RX ADMIN — Medication 400 MG: at 20:51

## 2023-05-15 RX ADMIN — FOLIC ACID 1 MG: 1 TABLET ORAL at 07:50

## 2023-05-15 RX ADMIN — PANTOPRAZOLE SODIUM 40 MG: 40 TABLET, DELAYED RELEASE ORAL at 06:09

## 2023-05-15 RX ADMIN — LORAZEPAM 1 MG: 1 TABLET ORAL at 03:16

## 2023-05-15 RX ADMIN — TRAMADOL HYDROCHLORIDE 50 MG: 50 TABLET, COATED ORAL at 23:09

## 2023-05-15 RX ADMIN — THERA TABS 1 TABLET: TAB at 07:50

## 2023-05-15 RX ADMIN — TRAMADOL HYDROCHLORIDE 50 MG: 50 TABLET, COATED ORAL at 12:41

## 2023-05-15 ASSESSMENT — PAIN SCALES - GENERAL
PAINLEVEL_OUTOF10: 2
PAINLEVEL_OUTOF10: 10
PAINLEVEL_OUTOF10: 0
PAINLEVEL_OUTOF10: 10
PAINLEVEL_OUTOF10: 0
PAINLEVEL_OUTOF10: 5

## 2023-05-15 ASSESSMENT — PAIN DESCRIPTION - DESCRIPTORS
DESCRIPTORS: ACHING
DESCRIPTORS: ACHING
DESCRIPTORS: SHARP

## 2023-05-15 ASSESSMENT — PAIN DESCRIPTION - LOCATION
LOCATION: BACK
LOCATION: ABDOMEN
LOCATION: BACK

## 2023-05-15 ASSESSMENT — PAIN SCALES - WONG BAKER: WONGBAKER_NUMERICALRESPONSE: 0

## 2023-05-15 NOTE — DISCHARGE INSTR - DIET

## 2023-05-16 ENCOUNTER — APPOINTMENT (OUTPATIENT)
Dept: INTERVENTIONAL RADIOLOGY/VASCULAR | Age: 38
DRG: 254 | End: 2023-05-16
Payer: COMMERCIAL

## 2023-05-16 VITALS
RESPIRATION RATE: 16 BRPM | OXYGEN SATURATION: 98 % | DIASTOLIC BLOOD PRESSURE: 60 MMHG | SYSTOLIC BLOOD PRESSURE: 97 MMHG | TEMPERATURE: 97.3 F | BODY MASS INDEX: 28.16 KG/M2 | HEIGHT: 65 IN | HEART RATE: 87 BPM | WEIGHT: 169 LBS

## 2023-05-16 LAB
ALBUMIN SERPL-MCNC: 2 G/DL (ref 3.5–4.6)
ALP SERPL-CCNC: 184 U/L (ref 40–130)
ALT SERPL-CCNC: 46 U/L (ref 0–33)
ANION GAP SERPL CALCULATED.3IONS-SCNC: 6 MEQ/L (ref 9–15)
APPEARANCE FLUID: CLEAR
AST SERPL-CCNC: 156 U/L (ref 0–35)
BASOPHILS # BLD: 0 K/UL (ref 0–0.2)
BASOPHILS NFR BLD: 0.4 %
BILIRUB DIRECT SERPL-MCNC: 4.7 MG/DL (ref 0–0.4)
BILIRUB INDIRECT SERPL-MCNC: 2.1 MG/DL (ref 0–0.6)
BILIRUB SERPL-MCNC: 6.8 MG/DL (ref 0.2–0.7)
BUN SERPL-MCNC: 9 MG/DL (ref 6–20)
CALCIUM SERPL-MCNC: 7.7 MG/DL (ref 8.5–9.9)
CELL COUNT FLUID TYPE: NORMAL
CHLORIDE SERPL-SCNC: 105 MEQ/L (ref 95–107)
CLOT EVALUATION: NORMAL
CO2 SERPL-SCNC: 26 MEQ/L (ref 20–31)
COLOR FLUID: YELLOW
CREAT SERPL-MCNC: 0.7 MG/DL (ref 0.5–0.9)
EOSINOPHIL # BLD: 0 K/UL (ref 0–0.7)
EOSINOPHIL NFR BLD: 0.6 %
ERYTHROCYTE [DISTWIDTH] IN BLOOD BY AUTOMATED COUNT: 25.8 % (ref 11.5–14.5)
FLUID TYPE: NORMAL
GLUCOSE FLD-MCNC: 101.7 MG/DL
GLUCOSE SERPL-MCNC: 95 MG/DL (ref 70–99)
HCT VFR BLD AUTO: 22.8 % (ref 37–47)
HGB BLD-MCNC: 7.7 G/DL (ref 12–16)
INR PPP: 2.4
LYMPHOCYTES # BLD: 1.1 K/UL (ref 1–4.8)
LYMPHOCYTES NFR BLD: 26.2 %
LYMPHOCYTES, BODY FLUID: 43 %
MAGNESIUM SERPL-MCNC: 1.7 MG/DL (ref 1.7–2.4)
MCH RBC QN AUTO: 32.2 PG (ref 27–31.3)
MCHC RBC AUTO-ENTMCNC: 33.9 % (ref 33–37)
MCV RBC AUTO: 95.1 FL (ref 79.4–94.8)
MONOCYTE, FLUID: 52 %
MONOCYTES # BLD: 0.8 K/UL (ref 0.2–0.8)
MONOCYTES NFR BLD: 19.4 %
NEUTROPHIL, FLUID: 6 %
NEUTROPHILS # BLD: 2.2 K/UL (ref 1.4–6.5)
NEUTS SEG NFR BLD: 53.4 %
NUCLEATED CELLS FLUID: 74 /CUMM
NUMBER OF CELLS COUNTED FLUID: 100
PLATELET # BLD AUTO: 55 K/UL (ref 130–400)
POTASSIUM SERPL-SCNC: 3.7 MEQ/L (ref 3.4–4.9)
PROT FLD-MCNC: 0.8 G/DL
PROT SERPL-MCNC: 5.2 G/DL (ref 6.3–8)
PROTHROMBIN TIME: 26.3 SEC (ref 12.3–14.9)
RBC # BLD AUTO: 2.4 M/UL (ref 4.2–5.4)
RBC FLUID: 140 /CUMM
SODIUM SERPL-SCNC: 137 MEQ/L (ref 135–144)
WBC # BLD AUTO: 4.1 K/UL (ref 4.8–10.8)

## 2023-05-16 PROCEDURE — 6370000000 HC RX 637 (ALT 250 FOR IP)

## 2023-05-16 PROCEDURE — C1729 CATH, DRAINAGE: HCPCS

## 2023-05-16 PROCEDURE — 80076 HEPATIC FUNCTION PANEL: CPT

## 2023-05-16 PROCEDURE — 85025 COMPLETE CBC W/AUTO DIFF WBC: CPT

## 2023-05-16 PROCEDURE — 36415 COLL VENOUS BLD VENIPUNCTURE: CPT

## 2023-05-16 PROCEDURE — 83615 LACTATE (LD) (LDH) ENZYME: CPT

## 2023-05-16 PROCEDURE — 89051 BODY FLUID CELL COUNT: CPT

## 2023-05-16 PROCEDURE — 2500000003 HC RX 250 WO HCPCS: Performed by: RADIOLOGY

## 2023-05-16 PROCEDURE — 85610 PROTHROMBIN TIME: CPT

## 2023-05-16 PROCEDURE — 80048 BASIC METABOLIC PNL TOTAL CA: CPT

## 2023-05-16 PROCEDURE — 84157 ASSAY OF PROTEIN OTHER: CPT

## 2023-05-16 PROCEDURE — 82150 ASSAY OF AMYLASE: CPT

## 2023-05-16 PROCEDURE — 6370000000 HC RX 637 (ALT 250 FOR IP): Performed by: INTERNAL MEDICINE

## 2023-05-16 PROCEDURE — 6360000002 HC RX W HCPCS: Performed by: RADIOLOGY

## 2023-05-16 PROCEDURE — 87205 SMEAR GRAM STAIN: CPT

## 2023-05-16 PROCEDURE — 82945 GLUCOSE OTHER FLUID: CPT

## 2023-05-16 PROCEDURE — 82042 OTHER SOURCE ALBUMIN QUAN EA: CPT

## 2023-05-16 PROCEDURE — 87070 CULTURE OTHR SPECIMN AEROBIC: CPT

## 2023-05-16 PROCEDURE — P9047 ALBUMIN (HUMAN), 25%, 50ML: HCPCS | Performed by: RADIOLOGY

## 2023-05-16 PROCEDURE — 83735 ASSAY OF MAGNESIUM: CPT

## 2023-05-16 PROCEDURE — 49083 ABD PARACENTESIS W/IMAGING: CPT

## 2023-05-16 RX ORDER — LIDOCAINE HYDROCHLORIDE 20 MG/ML
INJECTION, SOLUTION INFILTRATION; PERINEURAL PRN
Status: COMPLETED | OUTPATIENT
Start: 2023-05-16 | End: 2023-05-16

## 2023-05-16 RX ORDER — ALBUMIN (HUMAN) 12.5 G/50ML
25 SOLUTION INTRAVENOUS ONCE
Status: COMPLETED | OUTPATIENT
Start: 2023-05-16 | End: 2023-05-16

## 2023-05-16 RX ADMIN — TRAMADOL HYDROCHLORIDE 50 MG: 50 TABLET, COATED ORAL at 05:41

## 2023-05-16 RX ADMIN — ALBUMIN (HUMAN) 25 G: 0.25 INJECTION, SOLUTION INTRAVENOUS at 11:46

## 2023-05-16 RX ADMIN — FOLIC ACID 1 MG: 1 TABLET ORAL at 09:30

## 2023-05-16 RX ADMIN — PANTOPRAZOLE SODIUM 40 MG: 40 TABLET, DELAYED RELEASE ORAL at 05:41

## 2023-05-16 RX ADMIN — Medication 100 MG: at 09:30

## 2023-05-16 RX ADMIN — LIDOCAINE HYDROCHLORIDE 10 ML: 20 INJECTION, SOLUTION INFILTRATION; PERINEURAL at 11:04

## 2023-05-16 RX ADMIN — Medication 400 MG: at 09:30

## 2023-05-16 RX ADMIN — THERA TABS 1 TABLET: TAB at 09:30

## 2023-05-16 ASSESSMENT — PAIN SCALES - GENERAL: PAINLEVEL_OUTOF10: 10

## 2023-05-16 ASSESSMENT — PAIN SCALES - WONG BAKER: WONGBAKER_NUMERICALRESPONSE: 0

## 2023-05-16 NOTE — PROGRESS NOTES
[id number] Report from [facility]. Alert and oriented. VSS Lev0 at 3meq dec to1 meq. Jaundice. No complaints  07:30am Dr Valarie Smith in and updated on patient  0800am Levo off  08:40am Dr Ana Rosa Reyes in and updated on status  11:30am Paracentesis per Dr Valarie Smith and Walter Worrell NP. 400cc clear yellow fluid. Tolerated procedure well. No complaints  12;30pm DR Gerald Johnson in to see patient for psyche consult  13;15PM  Ambulated in hallway down to room 9 and back. Gait steady no complaints while ambulating  1400pm Report to [facility] on [facility] tower.  Patient to transfer
Assessment complete. Patient A&Ox4. VSS. She denies pain and nausea at this time. Medication given per [facility]. Requesting shower. Call light in reach.
Assessment completed this shift. Alert and oriented x4. Up with standby assist. Bruising noted BUE. Last BM was clear yellow with some sediment. Patient currently off floor  for colonoscopy.   Electronically signed by Aliya Rod RN on 5/12/2023 at 12:57 PM
[facility] Occupational Therapy      Date: 2023  Patient Name: Dulce Boeck        MRN: [id number]  Account: [de-identified]   : 1985  (42 y.o.)  Room: [place] [address]    Chart reviewed, attempted OT at [phone] for eval. Patient not seen 2° to: Other: Pt with colonoscopy this PM. Will hold at this time    Will attempt again when able.     Electronically signed by Eward Lanes, OTR/L on 2023 at 1:28 PM
Comprehensive Nutrition Assessment    Type and Reason for Visit:  RD Nutrition Re-Screen/LOS, Initial    Nutrition Recommendations/Plan:   Continue with 2gNa diet as tolerated  Begin high calorie oral supplement @ B & D  Counseled on importance of adequate energy & protein intake, for recovery and disease management, with emphasis on nutrient rich food choices and appropriate supplement use        Malnutrition Assessment:  Malnutrition Status: At risk for malnutrition (Comment) (05/16/23 1342)    Context:  Chronic Illness     Findings of the 6 clinical characteristics of malnutrition:  Energy Intake:  Mild decrease in energy intake (Comment)  Weight Loss:  Unable to assess     Body Fat Loss:  No significant body fat loss     Muscle Mass Loss:  No significant muscle mass loss    Fluid Accumulation:  Mild Ascites   Strength:  Not Performed    Nutrition Assessment:    Pt at high risk for malnutrition related to ESLD, currently eating well after paracentesis, difficult to ascertain usual body weight with ascites, pt is also unsure of her ' dry weight', takes an oral nutrition supplement at home occastionally, receptive to taking while here    Nutrition Related Findings:    \"presents with hematemesis and melena. ..a history of ESLD, MELD score 26. Beatrice Arevalo ... She has had EGD before. She last saw GI 4/12, last paracentesis greater than 5 months ago. Previous EGD showed hypertensive gastropathy she had no varices at that time  \" + ascites/jaundice, labs noted ( no NH3), meds reviewed: folvite, thiamine, MVI, protonix, had paracentesis today ~ 5800 ml, variable appetite, receptive to ONS Wound Type: None       Current Nutrition Intake & Therapies:    Average Meal Intake: 51-75%, %  Average Supplements Intake: None Ordered  ADULT DIET; Regular;  Low Sodium (2 gm)  ADULT ORAL NUTRITION SUPPLEMENT; Breakfast, Dinner; Standard High Calorie/High Protein Oral Supplement    Anthropometric Measures:  Height: 5' 5\" (165.1
Critical Care Registered Nurse Shift Summary  5/10/23   Patient Name: Amado Du  Attending Provider: Rayna Campbell MD      Admit Date: 2023  MRN: [id number]                           : 1985  Full Code  Report received from [address] at bedside, I assumed care at 0800    Last set of vitals:  Enc Vitals  BP: 106/64 (05/10/23 1900)  Pulse: (!) 117 (05/10/23 1930)  Respirations: 19 (05/10/23 1930)  Temp: 98.2 °F (36.8 °C) (05/10/23 1833)  Temp Source: Temporal (05/10/23 144)  SpO2: 100 % (05/10/23 1930)  Weight - Scale: 147 lb 3.2 oz (66.8 kg) (23)  Height: 5' 5\" (165.1 cm) (23)     I/O    Intake/Output Summary (Last 24 hours) at 5/10/2023 2024  Last data filed at 5/10/2023 2017  Gross per 24 hour   Intake 2305.3 ml   Output 250 ml   Net 2055.3 ml     Unmeasured U/O  Multiple loose BM this shift, last bm was 05/10/23, green & dark loose    GTT's   sodium chloride      norepinephrine      sodium chloride          Vent/o2    At start of shift  At handoff   R/A -> R/A     No results found for: PHART, PO2ART, TTX7BNZ, GMK0HDE, V2CCLYTH, FIO2     LDA  Peripheral IV 23 Left Antecubital (Active)   Site Assessment Clean, dry & intact 05/10/23 1600   Line Status Brisk blood return; Other (Comment) 05/10/23 1600   Line Care Chlorhexidine wipes; Connections checked and tightened;Ports disinfected 05/10/23 1600   Phlebitis Assessment No symptoms 05/10/23 1600   Infiltration Assessment 0 05/10/23 1600   Alcohol Cap Used No 05/10/23 1600   Dressing Status Clean, dry & intact 05/10/23 1600   Dressing Type Transparent 05/10/23 1600   Dressing Intervention Other (Comment) 05/10/23 1600       Peripheral IV 05/10/23 Distal;Left; Anterior Forearm (Active)   Site Assessment Clean, dry & intact 05/10/23 1600   Line Status Brisk blood return; Infusing 05/10/23 [address] pulled back;Ports disinfected; Connections checked and tightened 05/10/23 1600   Phlebitis Assessment No symptoms 05/10/23 1600
Gastroenterology Progress Note    Shelby Austin is a 42 y.o. female patient. Hospitalization Day:1    Chief C/O: GI bleeding, history of cirrhosis    SUBJECTIVE: Seen and examined in ICU. She denies nausea/vomiting or abdominal pain. Does report discomfort related to distension. To have paracentesis performed at bedside. States she had small bowel movement this morning, per nursing dark in color. Physical    VITALS:  BP (!) 92/51   Pulse 99   Temp 98.6 °F (37 °C) (Oral)   Resp 19   Ht 5' 5\" (1.651 m)   Wt 169 lb 1.5 oz (76.7 kg)   LMP 2022 Comment: tubal ligation  SpO2 100%   BMI 28.14 kg/m²   TEMPERATURE:  Current - Temp: 98.6 °F (37 °C); Max - Temp  Av.3 °F (36.8 °C)  Min: 97.9 °F (36.6 °C)  Max: 98.7 °F (37.1 °C)    General - Alert, oriented, in no acute distress  Eyes - + Icterus, no pallor  Cardiovascular - RRR  Lungs - Clear to auscultation bilaterally  Abdomen - + distended (grade 2-3 ascites),  non tender, Bowel sounds present  Extremities - no edema  Skin - warm/dry, +jaundice  Neuro: no focal deficit, moves all extremities     Data    Data Review:    Recent Labs     05/09/23  2115 05/10/23  0442 05/10/23  1852 05/11/23  0020 23  0511   WBC 10.0  --   --   --  5.1   HGB 8.6*   < > 8.0* 6.6* 7.1*  7.1*   HCT 25.1*   < > 23.7* 19.6* 20.6*  20.9*   MCV 91.3  --   --   --  94.1   *  --   --   --  35*    < > = values in this interval not displayed.      Recent Labs     05/09/23  2115 05/10/23  0442 05/11/23  05   * 138 135   K 4.3 3.8 3.3*   CL 94* 100 102   CO2  27   BUN 11 13 14   CREATININE 0.69 1.03* 0.89     Recent Labs     05/09/23  2115 05/10/23  0442 05/11/23  0511   * 118* 124*   ALT 37* 27 27   BILIDIR  --  3.6* 4.7*   BILITOT 6.5* 5.9* 8.2*   ALKPHOS 214* 167* 141*     Recent Labs     23   LIPASE 30     Recent Labs     23  0511   PROTIME 30.3* 25.0*   INR 2.9 2.2     Previous endoscopic history:  EGD 5/10/23: No
Handoff report received from [facility] at patients bedside. Skin check completed, no issued noted. Head to toe assessment completed. Pt is A&Ox4, restless and impulsive. Pulses palpable, extremity strength moderate, and slight tremors noted in arms. PERRLA. VSS with no s/s of distress at this time. Bed in lowest position, bed alarm on and call light within reach. Pt educated to call nurse for assistance to the bathroom. 2000: Pt stating throat is sore, Dr. Karen ramos served. Orders received. Nightly medications administered. 2320: Pts blood pressure on the softer side with MAPs ranging from 60-70. Pts CIWA score an 8. Dr. Karen ramos served due to possibility of blood pressure going down even more. Ativan administered, will continue to monitor blood pressure. 0105: Dr. Karen Conley notified of critical hemoglobin of 6.6. Orders received. 0145: 1 unit of PRBC transfusing, Beau Huerta RN double verified. 0200: VSS, no s/s of distress. No suspected transfusion reaction. Patient educated on multiple medications throughout the night and the need for a blood transfusion. Pt needs reinforcement on education due to not understanding. 0545: BP 85/49. Levo gtt started at 5 mcg per order. 0630: Pt SpO2 ranging from 87%-89%, placed on 2L NC.      Handoff report given to Evette MORELOS    Electronically signed by Angely Orta RN on 5/11/2023 at 7:27 AM
Hospitalist Progress Note      PCP: Krysta Burton MD    Date of Admission: 5/9/2023    Chief Complaint:  no acute events, no fevers, SBP in the 90-100s, no bleeding per nursing, s/p colonoscopy yesterday that was negative per GI, complaining of back pain, no overt bleeding per nursing staff    Medications:  Reviewed    Infusion Medications    sodium chloride      sodium chloride       Scheduled Medications    cefTRIAXone (ROCEPHIN) IV  1,000 mg IntraVENous Q24H    lidocaine  1 patch TransDERmal Daily    thiamine  100 mg IntraVENous Daily    multivitamin  1 tablet Oral Daily    folic acid  1 mg Oral Daily    pantoprazole (PROTONIX) 40 mg injection  40 mg IntraVENous Daily     PRN Meds: sodium chloride, melatonin, sodium chloride, LORazepam **OR** LORazepam **OR** LORazepam **OR** LORazepam **OR** LORazepam **OR** LORazepam **OR** LORazepam **OR** LORazepam, Benzocaine-Menthol      Intake/Output Summary (Last 24 hours) at 5/13/2023 1107  Last data filed at 5/12/2023 1348  Gross per 24 hour   Intake 400 ml   Output --   Net 400 ml         Exam:    BP (!) 101/53   Pulse 92   Temp 98.2 °F (36.8 °C) (Oral)   Resp 18   Ht 5' 5\" (1.651 m)   Wt 169 lb (76.7 kg)   LMP 04/01/2022 Comment: tubal ligation  SpO2 99%   BMI 28.12 kg/m²     General appearance: awake, cooperative. Respiratory:  clear to auscultation, bilaterally   Cardiovascular: S1/S2, tachycardic . Abdomen: distended, active bowel sounds. Musculoskeletal: edema bilaterally      Labs:   Recent Labs     05/11/23  0511 05/11/23  1402 05/12/23  0507 05/12/23  1155 05/12/23  1643 05/13/23  0032 05/13/23  0421   WBC 5.1  --  4.0*  --   --   --  4.5*   HGB 7.1*  7.1*   < > 7.5*   < > 7.7* 7.7* 7.2*   HCT 20.6*  20.9*   < > 22.3*   < > 22.9* 23.0* 21.6*   PLT 35*  --  39*  --   --   --  34*    < > = values in this interval not displayed.        Recent Labs     05/11/23  0511 05/12/23  0507 05/13/23  0421    134* 135   K 3.3* 3.3* 3.5    101 102   CO2
Hospitalist Progress Note      PCP: Manuel Leiva MD    Date of Admission: 5/9/2023    Chief Complaint:  Patient was transferred to ICU due to hypotension despite saline boluses, Hb down to 6.6, had two black stools overnight, 1 unit of PRBCs is being tranfused    Medications:  Reviewed    Infusion Medications    octreotide (SandoSTATIN) infusion 25 mcg/hr (05/10/23 0420)    sodium chloride      norepinephrine      sodium chloride       Scheduled Medications    pantoprazole (PROTONIX) 40 mg injection  40 mg IntraVENous Q12H    cefTRIAXone (ROCEPHIN) IV  1,000 mg IntraVENous Q24H    lidocaine  1 patch TransDERmal Daily    sodium chloride  500 mL IntraVENous Once     PRN Meds: sodium chloride, sodium chloride      Intake/Output Summary (Last 24 hours) at 5/10/2023 0735  Last data filed at 5/10/2023 0355  Gross per 24 hour   Intake 1132.28 ml   Output 250 ml   Net 882.28 ml       Exam:    BP (!) 99/42   Pulse (!) 115   Temp 98.6 °F (37 °C) (Oral)   Resp 18   Ht 5' 5\" (1.651 m)   Wt 147 lb 3.2 oz (66.8 kg)   SpO2 97%   BMI 24.50 kg/m²     General appearance: awake, cooperative. Respiratory:  clear to auscultation, bilaterally   Cardiovascular: S1/S2, tachycardic . Abdomen: Soft, distended, active bowel sounds.   Musculoskeletal: No edema bilaterally      Labs:   Recent Labs     05/09/23  2115 05/10/23  0442   WBC 10.0  --    HGB 8.6* 6.6*   HCT 25.1* 19.6*   *  --      Recent Labs     05/09/23  2115 05/10/23  0442   * 138   K 4.3 3.8   CL 94* 100   CO2 23 23   BUN 11 13   CREATININE 0.69 1.03*   CALCIUM 8.3* 7.6*     Recent Labs     05/09/23  2115 05/10/23  0442   * 118*   ALT 37* 27   BILIDIR  --  3.6*   BILITOT 6.5* 5.9*   ALKPHOS 214* 167*     Recent Labs     05/09/23 2115   INR 2.9     Recent Labs     05/09/23 2115   TROPONINI <0.010       Urinalysis:      Lab Results   Component Value Date/Time    NITRU POSITIVE 05/10/2023 02:59 AM    WBCUA 10-20 05/10/2023 03:49 AM    BACTERIA RARE
Hospitalist Progress Note      PCP: Romy Yates MD    Date of Admission: 5/9/2023    Chief Complaint:  Pt seen and examined. Feels good and wants to go home. Plan was for paracentesis then discharge after, but radiology postponed paracentesis due to low platelets. Medications:  Reviewed    Infusion Medications    sodium chloride      sodium chloride       Scheduled Medications    pantoprazole  40 mg Oral QAM AC    thiamine  100 mg Oral Daily    magnesium oxide  400 mg Oral BID    lidocaine  1 patch TransDERmal Daily    multivitamin  1 tablet Oral Daily    folic acid  1 mg Oral Daily     PRN Meds: traMADol, sodium chloride, melatonin, sodium chloride, LORazepam **OR** LORazepam **OR** LORazepam **OR** LORazepam **OR** LORazepam **OR** LORazepam **OR** LORazepam **OR** LORazepam, Benzocaine-Menthol      Intake/Output Summary (Last 24 hours) at 5/15/2023 1129  Last data filed at 5/14/2023 1731  Gross per 24 hour   Intake 1120 ml   Output --   Net 1120 ml       Exam:    BP (!) 100/59   Pulse 92   Temp 98.2 °F (36.8 °C) (Oral)   Resp 16   Ht 5' 5\" (1.651 m)   Wt 169 lb (76.7 kg)   LMP 04/01/2022 Comment: tubal ligation  SpO2 99%   BMI 28.12 kg/m²     General appearance: awake, cooperative. Respiratory:  clear to auscultation, bilaterally   Cardiovascular: S1/S2, RRR . Abdomen: distended, active bowel sounds.   Musculoskeletal: edema bilaterally      Labs:   Recent Labs     05/13/23  0421 05/14/23  0625 05/15/23  0429   WBC 4.5* 4.0* 4.4*   HGB 7.2* 8.0* 7.5*   HCT 21.6* 24.0* 22.5*   PLT 34* 40* 44*     Recent Labs     05/13/23  0421 05/14/23  0625 05/15/23  0429    136 135   K 3.5 3.5 3.6    103 103   CO2 25 24 24   BUN 10 9 9   CREATININE 0.60 0.60 0.62   CALCIUM 7.6* 7.7* 7.8*     Recent Labs     05/13/23  0421 05/14/23  0625 05/15/23  0429   * 188* 172*   ALT 46* 49* 48*   BILIDIR 6.8* 6.2* 5.6*   BILITOT 9.3* 8.4* 8.2*   ALKPHOS 163* 179* 176*     Recent Labs     05/13/23  0421
Hospitalist Progress Note      PCP: Tavo Navarro MD    Date of Admission: 5/9/2023    Chief Complaint:  transferred out of ICU, no acute events, SBP in the 90s, no bleeding per nursing, is scheduled for colonoscopy today per GI    Medications:  Reviewed    Infusion Medications    sodium chloride      sodium chloride       Scheduled Medications    cefTRIAXone (ROCEPHIN) IV  1,000 mg IntraVENous Q24H    lidocaine  1 patch TransDERmal Daily    thiamine  100 mg IntraVENous Daily    multivitamin  1 tablet Oral Daily    folic acid  1 mg Oral Daily    pantoprazole (PROTONIX) 40 mg injection  40 mg IntraVENous Daily     PRN Meds: sodium chloride, melatonin, sodium chloride, LORazepam **OR** LORazepam **OR** LORazepam **OR** LORazepam **OR** LORazepam **OR** LORazepam **OR** LORazepam **OR** LORazepam, Benzocaine-Menthol      Intake/Output Summary (Last 24 hours) at 5/12/2023 1234  Last data filed at 5/12/2023 0653  Gross per 24 hour   Intake 1330.04 ml   Output 500 ml   Net 830.04 ml         Exam:    BP (!) 97/51   Pulse 99   Temp 98.2 °F (36.8 °C) (Oral)   Resp 18   Ht 5' 5\" (1.651 m)   Wt 169 lb 1.5 oz (76.7 kg)   LMP 04/01/2022 Comment: tubal ligation  SpO2 96%   BMI 28.14 kg/m²     General appearance: awake, cooperative. Respiratory:  clear to auscultation, bilaterally   Cardiovascular: S1/S2, tachycardic . Abdomen: distended, active bowel sounds. Musculoskeletal: No edema bilaterally      Labs:   Recent Labs     05/09/23  2115 05/10/23  0442 05/11/23  0511 05/11/23  1402 05/12/23  0026 05/12/23  0507 05/12/23  1155   WBC 10.0  --  5.1  --   --  4.0*  --    HGB 8.6*   < > 7.1*  7.1*   < > 7.5* 7.5* 8.0*   HCT 25.1*   < > 20.6*  20.9*   < > 22.1* 22.3* 23.2*   *  --  35*  --   --  39*  --     < > = values in this interval not displayed.        Recent Labs     05/10/23  0442 05/11/23  0511 05/12/23  0507    135 134*   K 3.8 3.3* 3.3*    102 101   CO2 23 27 26   BUN 13 14 12   CREATININE
Nurse spoke to I&R Nurse advised the Dr does not want to perform pt paracentesis until her platelets are greater than 50 and her INR is less than 2. Nurse advised pt and pt states she is fine going home and following up out pt.
Nurse spoke to pt advised she is going to be going home today after paracentesis. Pt reviewed pharmacy and medication with nurse.
Physician Progress Note      PATIENTKait Pass  CSN #:                  [id number]  :                       1985  ADMIT DATE:       2023 8:51 PM  [address] [place] DATE:  RESPONDING  PROVIDER #:        Odelia Cogan MD          QUERY TEXT:    Pt admitted with GIB. Pt noted to have BP 85/50 (61), 87/47 (57), 87/51 (61) . If possible, please document in the progress notes and discharge summary if   you are evaluating and/or treating any of the following: The medical record reflects the following:  Risk Factors: GIB, alcoholic cirrhosis, ABLA, hypotension  Clinical Indicators:  BP 85/50 (61), 87/47 (57), 87/51 (61)  Treatment: Levophed, 2.5L NS bolus, [email or web address],  PRBC transfusion    Tolu LONG, RN, CDS  [phone]  Options provided:  -- Hemorrhagic Shock  -- Hypovolemic Shock  -- Hypovolemia without Shock  -- Hypotension without Shock  -- Other - I will add my own diagnosis  -- Disagree - Not applicable / Not valid  -- Disagree - Clinically unable to determine / Unknown  -- Refer to Clinical Documentation Reviewer    PROVIDER RESPONSE TEXT:    This patient is in hypovolemic shock.     Query created by: Ayesha Conroy on 2023 10:20 AM      Electronically signed by:  Odelia Cogan MD 2023 10:25 AM
Prep started.  Electronically signed by Renetta Jefferson RN on 5/11/2023 at 6:20 PM
Pt aox4 pwd sitting up in bed states she is feeling better today.
Pt has c/o of abd distension, and feeling full, repositioned for comfort, prn ativan provided, call light in reach.
Pt prepared for discharge. Discharge paperwork and AVS reviewed with pt. Pt IV removed by previous RN. Transport took pt off floor at [facility]. Charge RN notified.
Pulmonary & Critical Care Medicine ICU Progress Note  Chief complaint : ICU management     Subjunctive/24 hour events :   Patient seen and examined during multidisciplinary rounds with RN, charge nurse, RT, pharmacy, dietitian, and social service. Patient received one unit of PRBCs overnight and required levophed for a short period of time. Currently levophed remains off. She is on room air and O2 sats are 100%. No fever overnight and incontinent of urine. Started on a clear liquid diet, last Bm 5/10/2023. Social History     Tobacco Use    Smoking status: Every Day     Packs/day: 0.25     Types: Cigarettes    Smokeless tobacco: Never    Tobacco comments:     1/13/23 states 3-4 cigs per day tram   Substance Use Topics    Alcohol use: Yes     Comment: 5/9/23 drink today         Problem Relation Age of Onset    High Cholesterol Mother     Hypertension Mother     Melanoma Father     High Cholesterol Father     Hypertension Father     Colon Cancer Neg Hx        No results for input(s): PHART, RLF1QLO, PO2ART in the last 72 hours. MV Settings:     / / /            IV:   sodium chloride      norepinephrine Stopped (05/11/23 0801)    sodium chloride      sodium chloride         Vitals:  BP (!) 92/51   Pulse 99   Temp 98.6 °F (37 °C) (Oral)   Resp 19   Ht 5' 5\" (1.651 m)   Wt 169 lb 1.5 oz (76.7 kg)   LMP 04/01/2022 Comment: tubal ligation  SpO2 100%   BMI 28.14 kg/m²    Tmax:       Intake/Output Summary (Last 24 hours) at 5/11/2023 1024  Last data filed at 5/11/2023 0728  Gross per 24 hour   Intake 2540.7 ml   Output --   Net 2540.7 ml       EXAM:    General: alert, cooperative  Head: normocephalic, atraumatic  Eyes:No gross abnormalities. ENT:  MMM no lesions  Neck:  supple and no masses  Chest : Good air movement no rales no wheezes   Heart[de-identified] Heart sounds are normal.  Regular rate and rhythm without murmur, gallop or rub.   ABD:  bowel sounds normal, soft, non-tender, distended   Musculoskeletal : no
Resumed care of pt at this time. Agree with previous RN assessment. Skin check done- bruising to BUE. Call light within reach.      Electronically signed by Chanelle Fish RN on 5/11/2023 at 3:10 PM
Shift assessments completed and documented, see flowsheets. A&OX4. Medications administered per MAR. Call light within reach. No further needs verbalized at this time by pt.
& diagnostic    PARACENTESIS Left 01/16/2023    4400ml removed by Dr. Stephany Ball Left 02/01/2023    5600 ml removed by Dr. Dorothy Tao 09/09/2022    EGD DIAGNOSTIC ONLY performed by Terrence Wu MD at [facility] 5/10/2023    EGD ESOPHAGOGASTRODUODENOSCOPY performed by Thu Landis MD at [facility]        Restrictions    none    Safety Devices: Safety Devices  Type of Devices: Call light within reach     Patient's date of birth confirmed: Yes    General:  Chart Reviewed: Yes  Patient assessed for rehabilitation services?: Yes  Family / Caregiver Present: No    Subjective  Subjective:  My belly needs drained again       Pain at start of treatment: Yes: 9/10    Pain at end of treatment: Yes: 9/10    Location: belly   Description: full of fluid  Nursing notified: Declined  RN:   Intervention: None    Prior Level of Function:  Social/Functional History  Lives With: Alone  Type of Home: House  Home Layout: Multi-level, Laundry in basement  Home Access: Stairs to enter with rails  Entrance Stairs - Rails: Both  Bathroom Shower/Tub: Tub/Shower unit  Bathroom Toilet: Bedside commode  Bathroom Equipment: Grab bars in shower, Shower chair  Home Equipment: Sock aid  Receives Help From: Family  ADL Assistance: Independent  Homemaking Assistance: Independent  Homemaking Responsibilities: Yes  Ambulation Assistance: Independent  Transfer Assistance: Independent  Active : Yes  Mode of Transportation: Car  Occupation: Unemployed    OBJECTIVE:     Orientation Status:  Orientation  Overall Orientation Status: Within Normal Limits  Orientation Level: Oriented X4    Observation:  Observation/Palpation  Posture: Good  Observation: Pt sitting up in bed and awake,pleasant and willing to participate in eval.    Cognition Status:  Cognition  Overall Cognitive Status: [facility]    Perception
Recent Labs     05/09/23  2115 05/10/23  0442 05/11/23  0511   * 138 135   K 4.3 3.8 3.3*   CL 94* 100 102   CO2 23 23 27   BUN 11 13 14   CREATININE 0.69 1.03* 0.89   CALCIUM 8.3* 7.6* 7.7*       Recent Labs     05/09/23  2115 05/10/23  0442 05/11/23  0511   * 118* 124*   ALT 37* 27 27   BILIDIR  --  3.6* 4.7*   BILITOT 6.5* 5.9* 8.2*   ALKPHOS 214* 167* 141*       Recent Labs     05/09/23 2115 05/11/23 0511   INR 2.9 2.2       Recent Labs     05/09/23 2115   Sophia Roys <0.010         Urinalysis:      Lab Results   Component Value Date/Time    NITRU POSITIVE 05/10/2023 02:59 AM    WBCUA 10-20 05/10/2023 03:49 AM    BACTERIA RARE 05/10/2023 03:49 AM    RBCUA 0-2 05/10/2023 03:49 AM    BLOODU Negative 05/10/2023 02:59 AM    SPECGRAV 1.019 05/10/2023 02:59 AM    GLUCOSEU Negative 05/10/2023 02:59 AM       Radiology:  XR CHEST PORTABLE   Final Result   No acute process.                  Assessment/Plan:    41 y/o female with history of ETOH/tobacco/ marijuana use, alcoholic liver cirrhosis with ascites s/p large volume paracentesis, PHG/esophagitis, thrombocytopenia who presented with :    Acute blood loss anemia  - likely from upper GI bleed  - Hb down to 6.6, baseline around 11-12  - s/p transfusion of 2 units of PRBCs  - EGD was negative  - off Octreotide and protonix infusion  - GI following   - monitor H/H closely  - transfuse if Hb<7, active bleeding or unstable HD    Shock   - due to volume depletion from GI bleed  - requiring Norepinephrine infusion  - critical care service following     WILLIE  - monitor u/o and renal function closely    Hypomagnesemia   - replaced    ETOH use disorder with acute withdrawal  - on CIWA protocol     Hyperbilirubinemia, elevated INR  - due to alcoholic liver cirrhosis, concerning for alcoholic hepatitis  - Maddrey score >32  - not a candidate for prednisolone due to GI bleed    Thrombocytopenia  - acute / chronic  - down to 35, recent baseline around 120-130  -
supple, no significant adenopathy  Chest - clear to auscultation, no wheezes, rales or rhonchi, symmetric air entry  Heart - normal rate, regular rhythm, normal S1, S2, no murmurs, rubs, clicks or gallops  Abdomen - soft, tender and distended, diminished bowel sounds   Rectal - deferred, not clinically indicated  Neurological - alert, oriented, normal speech, no focal findings or movement disorder noted, motor and sensory grossly normal bilaterally  Musculoskeletal - no joint tenderness, deformity or swelling  Extremities - peripheral pulses normal, present pedal edema, no clubbing or cyanosis  Skin - normal coloration and turgor, no rashes               BMP:    Recent Labs     05/11/23  0511 05/12/23  0507 05/13/23  0421    134* 135   K 3.3* 3.3* 3.5    101 102   CO2 27 26 25   BUN 14 12 10   CREATININE 0.89 0.64 0.60   GLUCOSE 148* 138* 105*    . MG:3,PHOS:3)@  Ionized Calcium: No results found for: IONCA  CBC:   Recent Labs     05/12/23  0507 05/12/23  1155 05/13/23  0032 05/13/23  0421   WBC 4.0*  --   --  4.5*   HGB 7.5*   < > 7.7* 7.2*   PLT 39*  --   --  34*    < > = values in this interval not displayed. ABG: No results for input(s): PH, PCO2, PO2 in the last 72 hours. Assessment and Plan:     Impression:    -Acute blood loss anemia. Status post endoscopy. Hemoglobin today 7.7. Required transfusion.  -Thrombocytopenia likely due to liver disease.  -Shock, was in ICU on required vasopressors.  -Acute kidney injury which has resolved. -Alcohol abuse. -Decompensated liver cirrhosis. -Coagulopathy. Recommendations:    -Daily CBC and transfuse for hemoglobin below 7.  -Watch for thrombocytopenia. -Might need ultrasound-guided paracentesis. Her abdomen is more distended.  -Strict intake and output measurement. Watch kidney function closely.  -Diuresis as tolerated by blood pressure.  -Replace electrolytes. -Multivitamins.              Electronically signed by Bandar Tidwell MD
There is no endoscopic evidence of inflammation, bleeding, mass, polyps or           diverticula in the entire colon. -  External hemorrhoids were found during retroflexion, during perianal exam           and during digital exam.  The hemorrhoids were moderate. -  A small anal fissure was found in the anal canal. Impression:        -  Normal mucosa in the entire examined colon. -  External hemorrhoids.        -  Anal fissure.        -  No specimens collected. Recommendation:        -  Return patient to [facility] for ongoing care. - Inpatient GI consult team to follow        -  Resume regular diet today. -  Continue present medications. Procedure Code(s):        - [id number], Colonoscopy, flexible; diagnostic, including collection of           specimen(s) by brushing or washing, when performed (separate procedure) Diagnosis Code(s):        - K60.2, Anal fissure, unspecified        - K64.4, Residual hemorrhoidal skin tags       CPT(R) - 2022 copyright [employer]. All Rights Reserved. The CPT codes, CCI edits and ICD codes generated are intended as suggestions       and were generated based on input data. These codes are preliminary and upon        review may be revised to meet current compliance and payer requirements. The provider is responsible for the final determination of appropriate codes,       and modifiers. Scope Withdrawal Time:       00:08:13 Jaja Awan MD This document has been electronically signed. Note Initiated:5/12/2023 Note Completed:5/12/2023 2:07 PM    Most recent    Chest CT      WITH CONTRAST:No results found for this or any previous visit. WITHOUT CONTRAST: No results found for this or any previous visit. CXR      2-view: No results found for this or any previous visit.        Portable: Results for orders placed during the hospital encounter of 05/09/23    XR CHEST PORTABLE    Narrative  EXAMINATION:  ONE XRAY VIEW

## 2023-05-16 NOTE — DISCHARGE SUMMARY
tablet, Refills: 0           STOP taking these medications       midodrine (PROAMATINE) 10 MG tablet Comments:   Reason for Stopping:         lidocaine (LIDODERM) 5 % Comments:   Reason for Stopping:         diazePAM (VALIUM) 2 MG tablet Comments:   Reason for Stopping:         ondansetron (ZOFRAN-ODT) 4 MG disintegrating tablet Comments:   Reason for Stopping:             Activity: activity as tolerated  Diet: regular diet  Wound Care: as directed    Follow-up with Valeria Hua MD  in 1 week.     DC time 45 minutes    Signed:  Electronically signed by David Cassidy DO on 5/16/2023 at 1:47 PM

## 2023-05-16 NOTE — DISCHARGE INSTRUCTIONS
Ultrasound Guided Paracentesis    Activity:  Rest, avoid strenuous activity such as lifting heavy objects, and bending, stretching or pulling. Diet:  Resume usual diet    Medication:  * Resume home medication unless otherwise instructed by your physician. * (If Applicable) If taking any blood thinners, speak with your physician before restarting them. * May take mild pain reliever, as needed, such as Acetaminophen or Ibuprofen. INSTRUCTIONS OR COMMENTS RELATIVE TO SPECIFIC EXAM PERFORMED:    - Some tenderness at site of paracentesis will be normal for a few days.  - May remove band aid after 24-48 hours. - Monitor the site for the next 24 - 48 hours. - If you experience any drainage or bleeding at the site, hold pressure for 30 minutes and lay on side opposite of the procedure site (move fluid away from the   area of leakage). If drainage or bleeding does not stop and seems excessive, call your physician or go to the ER for evaluation.   - If any signs of infection (redness, swelling, drainage/pus) at the site, go to ER for evaluation.   - Please keep all follow-up appointments with your Hepatologist. Schedule follow-up appointment for repeat paracentesis if necessary. IF YOU DEVELOP ANY OF THE FOLLOWING SYMPTOMS, CONTACT PHYSICIAN LISTED BELOW:  Physician performing procedure: DR. Cuauhtemoc Tabor - [phone] or [phone] and ask to be put in contact with the RADIOLOGY RN. After hours contact ER. * Extreme pain that increases after leaving the hospital  * Active bleeding (refer to above instructions)  * Chills, fever above 100 degrees Farenheit and fatigue. * Weakness, dizziness, lightheadedness or fainting. If you are unable to contact any of the above physician and you feel you have a major problem, please go to the Emergency Room for treatment.     TOTAL REMOVED WITH PARACENTESIS TODAY:

## 2023-05-16 NOTE — OR NURSING
PARACENTESIS - NO SEDATION    1054 - Patient arrived to special procedures via cart. A&Ox4. Positioned on cart propped to left side. Rad tech obtained images prior to start of procedure using U/S. Pt  VSS, on RA.      1058 -  Procedure explained, consent signed. 1101 - Site marked, then timeout completed for Ultrasound Guided Paracentesis. 5 - Dr. Laurent Bui marked, prepped left side of abdomen with Chloraprep and covered with sterile drape and towels. 1104 - Site numbed with 2% lidocaine, see eMAR. [id number] Centesis 5F catheter placed using Ultrasound guidance. Fluid appears clear yellow. Catheter tubing connected to onlinetours machine at 200mmHG suction to remove fluid. 1146 - Patient with >5L drain, order obtained for IV albumin infusion, see eMAR. #22 left wrist site flushed and albumin started. Patient resting on cart, continues to drain, no issues / complaints noted. 1157 - Pt tolerated well. Total 5835 ml removed. Catheter removed, pressure held and bandaid applied. Verbal and tactile reassurance given throughout. Report called to Roland Ruiz RN on [facility] requested to return patient to her room.  Electronically signed by Angella Santoyo RN on 5/16/2023 at 12:06 PM

## 2023-05-17 LAB
ALBUMIN FLUID: 0.4 G/DL
AMYLASE FLUID: 18 U/L
LACTATE DEHYDROGENASE, FLUID: 40 U/L
SPECIMEN TYPE: NORMAL

## 2023-05-21 LAB — BACTERIA FLD AEROBE CULT: NORMAL

## 2023-05-24 LAB — BACTERIA FLD AEROBE CULT: NORMAL

## 2023-05-26 ENCOUNTER — TELEPHONE (OUTPATIENT)
Dept: INTERVENTIONAL RADIOLOGY/VASCULAR | Age: 38
End: 2023-05-26

## 2023-05-26 DIAGNOSIS — K70.11 ALCOHOLIC HEPATITIS WITH ASCITES: Primary | ICD-10-CM

## 2023-05-26 NOTE — TELEPHONE ENCOUNTER
Patient was given this information via phone conversation - voiced understanding  2. Spoke to [place] in [address] [place] [address] ON: 05/30/23 @ 2:00PM  >  You will need to arrive at 1:30PM (from home) and check in at the [address] desk.

## 2023-05-30 ENCOUNTER — HOSPITAL ENCOUNTER (OUTPATIENT)
Dept: INTERVENTIONAL RADIOLOGY/VASCULAR | Age: 38
Discharge: HOME | End: 2023-06-01
Payer: COMMERCIAL

## 2023-05-30 ENCOUNTER — APPOINTMENT (OUTPATIENT)
Dept: ULTRASOUND IMAGING | Age: 38
End: 2023-05-30
Payer: COMMERCIAL

## 2023-05-30 ENCOUNTER — APPOINTMENT (OUTPATIENT)
Dept: GENERAL RADIOLOGY | Age: 38
End: 2023-05-30
Payer: COMMERCIAL

## 2023-05-30 ENCOUNTER — HOSPITAL ENCOUNTER (OUTPATIENT)
Age: 38
Setting detail: OBSERVATION
Discharge: HOME | End: 2023-06-01
Attending: INTERNAL MEDICINE | Admitting: INTERNAL MEDICINE
Payer: COMMERCIAL

## 2023-05-30 VITALS
RESPIRATION RATE: 16 BRPM | OXYGEN SATURATION: 100 % | HEART RATE: 127 BPM | SYSTOLIC BLOOD PRESSURE: 107 MMHG | DIASTOLIC BLOOD PRESSURE: 52 MMHG

## 2023-05-30 DIAGNOSIS — K70.11 ALCOHOLIC HEPATITIS WITH ASCITES: ICD-10-CM

## 2023-05-30 DIAGNOSIS — K74.60 HEPATIC CIRRHOSIS, UNSPECIFIED HEPATIC CIRRHOSIS TYPE, UNSPECIFIED WHETHER ASCITES PRESENT (HCC): ICD-10-CM

## 2023-05-30 DIAGNOSIS — R60.9 PERIPHERAL EDEMA: Primary | ICD-10-CM

## 2023-05-30 DIAGNOSIS — L03.119 CELLULITIS OF LOWER EXTREMITY, UNSPECIFIED LATERALITY: ICD-10-CM

## 2023-05-30 PROBLEM — K72.90 DECOMPENSATED HEPATIC CIRRHOSIS (HCC): Status: ACTIVE | Noted: 2023-05-30

## 2023-05-30 LAB
ALBUMIN SERPL-MCNC: 2.6 G/DL (ref 3.5–4.6)
ALP SERPL-CCNC: 247 U/L (ref 40–130)
ALT SERPL-CCNC: 36 U/L (ref 0–33)
ANION GAP SERPL CALCULATED.3IONS-SCNC: 12 MEQ/L (ref 9–15)
AST SERPL-CCNC: 153 U/L (ref 0–35)
BACTERIA URNS QL MICRO: NEGATIVE /HPF
BASOPHILS # BLD: 0.1 K/UL (ref 0–0.2)
BASOPHILS NFR BLD: 0.6 %
BILIRUB SERPL-MCNC: 7.2 MG/DL (ref 0.2–0.7)
BILIRUB UR QL STRIP: NEGATIVE
BNP BLD-MCNC: 146 PG/ML
BUN SERPL-MCNC: 11 MG/DL (ref 6–20)
CALCIUM SERPL-MCNC: 8.2 MG/DL (ref 8.5–9.9)
CHLORIDE SERPL-SCNC: 99 MEQ/L (ref 95–107)
CLARITY UR: CLEAR
CO2 SERPL-SCNC: 26 MEQ/L (ref 20–31)
COLOR UR: ABNORMAL
CREAT SERPL-MCNC: 0.9 MG/DL (ref 0.5–0.9)
EOSINOPHIL # BLD: 0 K/UL (ref 0–0.7)
EOSINOPHIL NFR BLD: 0.5 %
EPI CELLS #/AREA URNS AUTO: ABNORMAL /HPF (ref 0–5)
ERYTHROCYTE [DISTWIDTH] IN BLOOD BY AUTOMATED COUNT: 22.3 % (ref 11.5–14.5)
GLOBULIN SER CALC-MCNC: 3.5 G/DL (ref 2.3–3.5)
GLUCOSE SERPL-MCNC: 110 MG/DL (ref 70–99)
GLUCOSE UR STRIP-MCNC: NEGATIVE MG/DL
HCT VFR BLD AUTO: 23.3 % (ref 37–47)
HGB BLD-MCNC: 7.8 G/DL (ref 12–16)
HGB UR QL STRIP: NEGATIVE
HYALINE CASTS #/AREA URNS AUTO: ABNORMAL /HPF (ref 0–5)
INR PPP: 2
KETONES UR STRIP-MCNC: NEGATIVE MG/DL
LACTATE BLDV-SCNC: 3.3 MMOL/L (ref 0.5–2.2)
LACTATE BLDV-SCNC: 3.6 MMOL/L (ref 0.5–2.2)
LACTATE BLDV-SCNC: 3.6 MMOL/L (ref 0.5–2.2)
LEUKOCYTE ESTERASE UR QL STRIP: ABNORMAL
LYMPHOCYTES # BLD: 1.3 K/UL (ref 1–4.8)
LYMPHOCYTES NFR BLD: 15.2 %
MCH RBC QN AUTO: 33.6 PG (ref 27–31.3)
MCHC RBC AUTO-ENTMCNC: 33.5 % (ref 33–37)
MCV RBC AUTO: 100.4 FL (ref 79.4–94.8)
MONOCYTES # BLD: 0.8 K/UL (ref 0.2–0.8)
MONOCYTES NFR BLD: 9.7 %
NEUTROPHILS # BLD: 6.4 K/UL (ref 1.4–6.5)
NEUTS SEG NFR BLD: 74 %
NITRITE UR QL STRIP: NEGATIVE
PH UR STRIP: 7 [PH] (ref 5–9)
PLATELET # BLD AUTO: 84 K/UL (ref 130–400)
PLATELET BLD QL SMEAR: ABNORMAL
POTASSIUM SERPL-SCNC: 3.9 MEQ/L (ref 3.4–4.9)
PROCALCITONIN SERPL IA-MCNC: 0.15 NG/ML (ref 0–0.15)
PROT SERPL-MCNC: 6.1 G/DL (ref 6.3–8)
PROT UR STRIP-MCNC: NEGATIVE MG/DL
PROTHROMBIN TIME: 23.2 SEC (ref 12.3–14.9)
RBC # BLD AUTO: 2.32 M/UL (ref 4.2–5.4)
RBC #/AREA URNS AUTO: ABNORMAL /HPF (ref 0–5)
SODIUM SERPL-SCNC: 137 MEQ/L (ref 135–144)
SP GR UR STRIP: 1.01 (ref 1–1.03)
STREP GRP A PCR: NEGATIVE
TARGETS BLD QL SMEAR: ABNORMAL
TROPONIN T SERPL-MCNC: <0.01 NG/ML (ref 0–0.01)
TSH SERPL-MCNC: 2.12 UIU/ML (ref 0.44–3.86)
URINE REFLEX TO CULTURE: ABNORMAL
UROBILINOGEN UR STRIP-ACNC: 1 E.U./DL
WBC # BLD AUTO: 8.6 K/UL (ref 4.8–10.8)
WBC #/AREA URNS AUTO: ABNORMAL /HPF (ref 0–5)

## 2023-05-30 PROCEDURE — 93005 ELECTROCARDIOGRAM TRACING: CPT | Performed by: PHYSICIAN ASSISTANT

## 2023-05-30 PROCEDURE — 2580000003 HC RX 258: Performed by: INTERNAL MEDICINE

## 2023-05-30 PROCEDURE — 87651 STREP A DNA AMP PROBE: CPT

## 2023-05-30 PROCEDURE — 2580000003 HC RX 258: Performed by: PHYSICIAN ASSISTANT

## 2023-05-30 PROCEDURE — 49083 ABD PARACENTESIS W/IMAGING: CPT

## 2023-05-30 PROCEDURE — 93970 EXTREMITY STUDY: CPT

## 2023-05-30 PROCEDURE — P9047 ALBUMIN (HUMAN), 25%, 50ML: HCPCS | Performed by: RADIOLOGY

## 2023-05-30 PROCEDURE — 2500000003 HC RX 250 WO HCPCS: Performed by: RADIOLOGY

## 2023-05-30 PROCEDURE — 85610 PROTHROMBIN TIME: CPT

## 2023-05-30 PROCEDURE — 96365 THER/PROPH/DIAG IV INF INIT: CPT

## 2023-05-30 PROCEDURE — 6360000002 HC RX W HCPCS: Performed by: RADIOLOGY

## 2023-05-30 PROCEDURE — 87040 BLOOD CULTURE FOR BACTERIA: CPT

## 2023-05-30 PROCEDURE — 99285 EMERGENCY DEPT VISIT HI MDM: CPT

## 2023-05-30 PROCEDURE — 81001 URINALYSIS AUTO W/SCOPE: CPT

## 2023-05-30 PROCEDURE — G0378 HOSPITAL OBSERVATION PER HR: HCPCS

## 2023-05-30 PROCEDURE — 85025 COMPLETE CBC W/AUTO DIFF WBC: CPT

## 2023-05-30 PROCEDURE — 83880 ASSAY OF NATRIURETIC PEPTIDE: CPT

## 2023-05-30 PROCEDURE — 84443 ASSAY THYROID STIM HORMONE: CPT

## 2023-05-30 PROCEDURE — 83605 ASSAY OF LACTIC ACID: CPT

## 2023-05-30 PROCEDURE — 36415 COLL VENOUS BLD VENIPUNCTURE: CPT

## 2023-05-30 PROCEDURE — 6370000000 HC RX 637 (ALT 250 FOR IP): Performed by: INTERNAL MEDICINE

## 2023-05-30 PROCEDURE — 96361 HYDRATE IV INFUSION ADD-ON: CPT

## 2023-05-30 PROCEDURE — 71045 X-RAY EXAM CHEST 1 VIEW: CPT

## 2023-05-30 PROCEDURE — 2709999900 IR US GUIDED PARACENTESIS

## 2023-05-30 PROCEDURE — 96367 TX/PROPH/DG ADDL SEQ IV INF: CPT

## 2023-05-30 PROCEDURE — 84145 PROCALCITONIN (PCT): CPT

## 2023-05-30 PROCEDURE — 6360000002 HC RX W HCPCS: Performed by: PHYSICIAN ASSISTANT

## 2023-05-30 PROCEDURE — 80053 COMPREHEN METABOLIC PANEL: CPT

## 2023-05-30 PROCEDURE — 84484 ASSAY OF TROPONIN QUANT: CPT

## 2023-05-30 RX ORDER — SODIUM CHLORIDE 0.9 % (FLUSH) 0.9 %
5-40 SYRINGE (ML) INJECTION EVERY 12 HOURS SCHEDULED
Status: DISCONTINUED | OUTPATIENT
Start: 2023-05-30 | End: 2023-06-01 | Stop reason: HOSPADM

## 2023-05-30 RX ORDER — LORAZEPAM 1 MG/1
3 TABLET ORAL
Status: DISCONTINUED | OUTPATIENT
Start: 2023-05-30 | End: 2023-06-01 | Stop reason: HOSPADM

## 2023-05-30 RX ORDER — POLYETHYLENE GLYCOL 3350 17 G/17G
17 POWDER, FOR SOLUTION ORAL DAILY PRN
Status: DISCONTINUED | OUTPATIENT
Start: 2023-05-30 | End: 2023-06-01 | Stop reason: HOSPADM

## 2023-05-30 RX ORDER — LORAZEPAM 2 MG/ML
4 INJECTION INTRAMUSCULAR
Status: DISCONTINUED | OUTPATIENT
Start: 2023-05-30 | End: 2023-06-01 | Stop reason: HOSPADM

## 2023-05-30 RX ORDER — SODIUM CHLORIDE 0.9 % (FLUSH) 0.9 %
5-40 SYRINGE (ML) INJECTION PRN
Status: DISCONTINUED | OUTPATIENT
Start: 2023-05-30 | End: 2023-06-01 | Stop reason: HOSPADM

## 2023-05-30 RX ORDER — ALBUMIN (HUMAN) 12.5 G/50ML
25 SOLUTION INTRAVENOUS ONCE
Status: COMPLETED | OUTPATIENT
Start: 2023-05-30 | End: 2023-05-30

## 2023-05-30 RX ORDER — LORAZEPAM 2 MG/ML
1 INJECTION INTRAMUSCULAR
Status: DISCONTINUED | OUTPATIENT
Start: 2023-05-30 | End: 2023-06-01 | Stop reason: HOSPADM

## 2023-05-30 RX ORDER — ACETAMINOPHEN 650 MG/1
650 SUPPOSITORY RECTAL EVERY 6 HOURS PRN
Status: DISCONTINUED | OUTPATIENT
Start: 2023-05-30 | End: 2023-06-01 | Stop reason: HOSPADM

## 2023-05-30 RX ORDER — CHLORDIAZEPOXIDE HYDROCHLORIDE 10 MG/1
10 CAPSULE, GELATIN COATED ORAL NIGHTLY
Status: COMPLETED | OUTPATIENT
Start: 2023-05-30 | End: 2023-05-30

## 2023-05-30 RX ORDER — FOLIC ACID 1 MG/1
1 TABLET ORAL DAILY
Status: DISCONTINUED | OUTPATIENT
Start: 2023-05-30 | End: 2023-06-01 | Stop reason: HOSPADM

## 2023-05-30 RX ORDER — LANOLIN ALCOHOL/MO/W.PET/CERES
100 CREAM (GRAM) TOPICAL DAILY
Status: DISCONTINUED | OUTPATIENT
Start: 2023-05-30 | End: 2023-06-01 | Stop reason: HOSPADM

## 2023-05-30 RX ORDER — 0.9 % SODIUM CHLORIDE 0.9 %
1000 INTRAVENOUS SOLUTION INTRAVENOUS ONCE
Status: COMPLETED | OUTPATIENT
Start: 2023-05-30 | End: 2023-05-30

## 2023-05-30 RX ORDER — ONDANSETRON 4 MG/1
4 TABLET, ORALLY DISINTEGRATING ORAL EVERY 8 HOURS PRN
Status: DISCONTINUED | OUTPATIENT
Start: 2023-05-30 | End: 2023-06-01 | Stop reason: HOSPADM

## 2023-05-30 RX ORDER — POTASSIUM CHLORIDE 750 MG/1
10 TABLET, EXTENDED RELEASE ORAL DAILY
Status: ON HOLD | COMMUNITY
Start: 2023-05-19 | End: 2023-06-01 | Stop reason: HOSPADM

## 2023-05-30 RX ORDER — LORAZEPAM 1 MG/1
2 TABLET ORAL
Status: DISCONTINUED | OUTPATIENT
Start: 2023-05-30 | End: 2023-06-01 | Stop reason: HOSPADM

## 2023-05-30 RX ORDER — MULTIVITAMIN WITH IRON
1 TABLET ORAL DAILY
Status: DISCONTINUED | OUTPATIENT
Start: 2023-05-30 | End: 2023-06-01 | Stop reason: HOSPADM

## 2023-05-30 RX ORDER — LIDOCAINE HYDROCHLORIDE 20 MG/ML
INJECTION, SOLUTION INFILTRATION; PERINEURAL PRN
Status: COMPLETED | OUTPATIENT
Start: 2023-05-30 | End: 2023-05-30

## 2023-05-30 RX ORDER — FUROSEMIDE 20 MG/1
20 TABLET ORAL DAILY
Status: DISCONTINUED | OUTPATIENT
Start: 2023-05-30 | End: 2023-05-31

## 2023-05-30 RX ORDER — FUROSEMIDE 40 MG/1
40 TABLET ORAL EVERY MORNING
Status: ON HOLD | COMMUNITY
Start: 2023-05-19 | End: 2023-08-10 | Stop reason: HOSPADM

## 2023-05-30 RX ORDER — METRONIDAZOLE 7.5 MG/G
GEL TOPICAL 2 TIMES DAILY
Status: ON HOLD | COMMUNITY
End: 2023-09-24 | Stop reason: HOSPADM

## 2023-05-30 RX ORDER — CLOTRIMAZOLE AND BETAMETHASONE DIPROPIONATE 10; .64 MG/G; MG/G
CREAM TOPICAL 2 TIMES DAILY
Status: ON HOLD | COMMUNITY
End: 2023-06-01 | Stop reason: HOSPADM

## 2023-05-30 RX ORDER — ONDANSETRON 2 MG/ML
4 INJECTION INTRAMUSCULAR; INTRAVENOUS EVERY 6 HOURS PRN
Status: DISCONTINUED | OUTPATIENT
Start: 2023-05-30 | End: 2023-06-01 | Stop reason: HOSPADM

## 2023-05-30 RX ORDER — LORAZEPAM 1 MG/1
4 TABLET ORAL
Status: DISCONTINUED | OUTPATIENT
Start: 2023-05-30 | End: 2023-06-01 | Stop reason: HOSPADM

## 2023-05-30 RX ORDER — LANOLIN ALCOHOL/MO/W.PET/CERES
400 CREAM (GRAM) TOPICAL 2 TIMES DAILY
Status: DISCONTINUED | OUTPATIENT
Start: 2023-05-30 | End: 2023-06-01 | Stop reason: HOSPADM

## 2023-05-30 RX ORDER — LORAZEPAM 1 MG/1
1 TABLET ORAL
Status: DISCONTINUED | OUTPATIENT
Start: 2023-05-30 | End: 2023-06-01 | Stop reason: HOSPADM

## 2023-05-30 RX ORDER — ENOXAPARIN SODIUM 100 MG/ML
40 INJECTION SUBCUTANEOUS DAILY
Status: DISCONTINUED | OUTPATIENT
Start: 2023-05-30 | End: 2023-06-01 | Stop reason: HOSPADM

## 2023-05-30 RX ORDER — PANTOPRAZOLE SODIUM 40 MG/1
40 TABLET, DELAYED RELEASE ORAL
Status: DISCONTINUED | OUTPATIENT
Start: 2023-05-31 | End: 2023-06-01 | Stop reason: HOSPADM

## 2023-05-30 RX ORDER — SODIUM CHLORIDE 9 MG/ML
INJECTION, SOLUTION INTRAVENOUS PRN
Status: DISCONTINUED | OUTPATIENT
Start: 2023-05-30 | End: 2023-06-01 | Stop reason: HOSPADM

## 2023-05-30 RX ORDER — LORAZEPAM 2 MG/ML
2 INJECTION INTRAMUSCULAR
Status: DISCONTINUED | OUTPATIENT
Start: 2023-05-30 | End: 2023-06-01 | Stop reason: HOSPADM

## 2023-05-30 RX ORDER — SPIRONOLACTONE 50 MG/1
50 TABLET, FILM COATED ORAL DAILY
Status: DISCONTINUED | OUTPATIENT
Start: 2023-05-30 | End: 2023-05-31

## 2023-05-30 RX ORDER — TRIAMCINOLONE ACETONIDE 1 MG/ML
LOTION TOPICAL 2 TIMES DAILY
Status: DISCONTINUED | OUTPATIENT
Start: 2023-05-30 | End: 2023-06-01 | Stop reason: HOSPADM

## 2023-05-30 RX ORDER — ACETAMINOPHEN 325 MG/1
650 TABLET ORAL EVERY 6 HOURS PRN
Status: DISCONTINUED | OUTPATIENT
Start: 2023-05-30 | End: 2023-06-01 | Stop reason: HOSPADM

## 2023-05-30 RX ORDER — LORAZEPAM 2 MG/ML
3 INJECTION INTRAMUSCULAR
Status: DISCONTINUED | OUTPATIENT
Start: 2023-05-30 | End: 2023-06-01 | Stop reason: HOSPADM

## 2023-05-30 RX ADMIN — ALBUMIN (HUMAN) 25 G: 0.25 INJECTION, SOLUTION INTRAVENOUS at 14:53

## 2023-05-30 RX ADMIN — SPIRONOLACTONE 50 MG: 50 TABLET ORAL at 22:03

## 2023-05-30 RX ADMIN — SODIUM CHLORIDE 1000 ML: 9 INJECTION, SOLUTION INTRAVENOUS at 16:48

## 2023-05-30 RX ADMIN — CHLORDIAZEPOXIDE HYDROCHLORIDE 10 MG: 10 CAPSULE ORAL at 22:03

## 2023-05-30 RX ADMIN — Medication 10 ML: at 22:04

## 2023-05-30 RX ADMIN — Medication 400 MG: at 22:03

## 2023-05-30 RX ADMIN — LIDOCAINE HYDROCHLORIDE 10 ML: 20 INJECTION, SOLUTION INFILTRATION; PERINEURAL at 14:16

## 2023-05-30 RX ADMIN — TRIAMCINOLONE ACETONIDE: 1 LOTION TOPICAL at 22:03

## 2023-05-30 RX ADMIN — CEFTRIAXONE SODIUM 1000 MG: 1 INJECTION, POWDER, FOR SOLUTION INTRAMUSCULAR; INTRAVENOUS at 16:51

## 2023-05-30 ASSESSMENT — ENCOUNTER SYMPTOMS
APNEA: 0
ANAL BLEEDING: 0
COLOR CHANGE: 1
ABDOMINAL DISTENTION: 0
EYE DISCHARGE: 0
VOICE CHANGE: 0
SORE THROAT: 1
VOMITING: 0
SHORTNESS OF BREATH: 0
NAUSEA: 0

## 2023-05-30 ASSESSMENT — PAIN SCALES - GENERAL: PAINLEVEL_OUTOF10: 10

## 2023-05-30 ASSESSMENT — PAIN DESCRIPTION - LOCATION: LOCATION: LEG

## 2023-05-30 ASSESSMENT — PAIN DESCRIPTION - FREQUENCY: FREQUENCY: CONTINUOUS

## 2023-05-30 ASSESSMENT — PAIN - FUNCTIONAL ASSESSMENT
PAIN_FUNCTIONAL_ASSESSMENT: NONE - DENIES PAIN
PAIN_FUNCTIONAL_ASSESSMENT: 0-10

## 2023-05-30 ASSESSMENT — PAIN DESCRIPTION - PAIN TYPE: TYPE: ACUTE PAIN

## 2023-05-30 ASSESSMENT — PAIN DESCRIPTION - DESCRIPTORS: DESCRIPTORS: BURNING

## 2023-05-30 ASSESSMENT — PAIN DESCRIPTION - ORIENTATION: ORIENTATION: RIGHT;LEFT

## 2023-05-30 NOTE — ED NOTES
Pt stable, resting in bed, talking on the phone to mom, pt has 0 c/o at this time. 0 distress. Juan zheng aware of pt's hr, 0 new orders.      Nelson Anderson RN  05/30/23 2397

## 2023-05-30 NOTE — ED PROVIDER NOTES
Options  Diagnosis management comments: Patient brought from [facility] with redness tenderness swelling in her legs. Nonblanchable discussed with Dr. Helio Salinas will admit he recommends bilateral DVT studies we discussed patient also has an elevated INR secondary to disease process       Amount and/or Complexity of Data Reviewed  Clinical lab tests: ordered and reviewed  Tests in the radiology section of CPT®: ordered and reviewed  Tests in the medicine section of CPT®: reviewed and ordered  Discuss the patient with other providers: yes        CRITICAL CARE TIME   Total Critical Care time was   minutes, excluding separately reportableprocedures. There was a high probability of clinicallysignificant/life threatening deterioration in the patient's condition which required my urgent intervention. CONSULTS:  IP CONSULT TO INFECTIOUS DISEASES  IP CONSULT TO SOCIAL WORK    PROCEDURES:  Unless otherwise noted below, none     Procedures    FINAL IMPRESSION    No diagnosis found. DISPOSITION/PLAN   DISPOSITION Admitted 05/30/2023 06:12:29 PM      PATIENT REFERRED TO:  No follow-up provider specified.     DISCHARGE MEDICATIONS:  Current Discharge Medication List             (Please note that portions of this note were completed with a voice recognition program.  Efforts were made to edit the dictations but occasionally words are mis-transcribed.)    Dorian Ott PA-C (electronically signed)  Attending Emergency Physician        Dorian Ott PA-C  05/31/23 0111

## 2023-05-30 NOTE — DISCHARGE INSTRUCTIONS
Ultrasound Guided Paracentesis    Activity:  Rest, avoid strenuous activity such as lifting heavy objects, and bending, stretching or pulling. Diet:  Resume usual diet    Medication:  * Resume home medication unless otherwise instructed by your physician. * (If Applicable) If taking any blood thinners, speak with your physician before restarting them. * May take mild pain reliever, as needed, such as Acetaminophen or Ibuprofen. INSTRUCTIONS OR COMMENTS RELATIVE TO SPECIFIC EXAM PERFORMED:    - Some tenderness at site of paracentesis will be normal for a few days.  - May remove band aid after 48 hours. - Monitor the site for the next 24 - 48 hours. - If you experience any drainage or bleeding at the site, hold pressure for 30 minutes and lay on side opposite of the procedure site (move fluid away from the   area of leakage). If drainage or bleeding does not stop and seems excessive, call your physician or go to the ER for evaluation.   - If any signs of infection (redness, swelling, drainage/pus) at the site, go to ER for evaluation.   - Please keep all follow-up appointments with your Hepatologist. Schedule follow-up appointment for repeat paracentesis if necessary. IF YOU DEVELOP ANY OF THE FOLLOWING SYMPTOMS, CONTACT PHYSICIAN LISTED BELOW:  Physician performing procedure: DR. Rachele Monroe - [phone] or [phone] and ask to be put in contact with the RADIOLOGY RN. After hours contact ER. * Extreme pain that increases after leaving the hospital  * Active bleeding (refer to above instructions)  * Chills, fever above 100 degrees Farenheit and fatigue. * Weakness, dizziness, lightheadedness or fainting. If you are unable to contact any of the above physician and you feel you have a major problem, please go to the Emergency Room for treatment.     TOTAL REMOVED WITH PARACENTESIS TODAY: [id number]

## 2023-05-30 NOTE — ED NOTES
Pt up to restroom with slow steady gait, using cane, mauro. Well, 0 sob noted or reported.      Nalini Ceballos RN  05/30/23 5372

## 2023-05-30 NOTE — OR NURSING
PARACENTESIS - NO SEDATION    1400 - Patient brought to special procedures room # 6 - steady independent gait with cane. A&Ox4. Procedure explained, consent signed. Patient reports significant swelling to B/L LE's with redness that has been worsening since her discharge from the hospital. LE's assessed - 4+ edema noted from knees to toes, bright red in color, skin peeling and patient reports is very painful and warm to touch. Images obtained prior to start of procedure using U/S. Pt  VS obtained, on RA. Patient's HR noted to be 120's, even at rest. QUYEN, RN supervisor notified of above and into assess patient. [id number] - Dr. Gurpreet Tan present and patient speaking with him about LE swelling. Recommends patient go to ER for evaluation once paracentesis completed. 1414 - Timeout completed for Ultrasound Guided Paracentesis. Using U/S, Dr. Gurpreet Tan marked, prepped left side of abdomen with Chloraprep and draped with sterile drape and towels. 1416 - Site numbed with 2% lidocaine, see eMAR. [id number] Centesis 5F catheter placed using Ultrasound guidance. Fluid appears clear yellow. Catheter tubing connected to Kevin machine at 200mmHG suction to remove fluid. [address], CNP at cart side to evaluate patient. Agrees with plan to send patient to ER for further evaluation. 1445 - Patient with >5L drain, IV #22 inserted RFA - patient tolerated well. Order received for IV albumin administration. See eMAR. 1510 -  Patient tolerated well. Total 5980 ml removed. Catheter removed, pressure held and bandaid applied. Verbal and tactile reassurance given throughout. D/C instructions reviewed with patient and understanding indicated. 1530 - Patient taken to ER #24 via cart, mom accompanied patient. Verbal report given to John Vazquez RN in ER.  Electronically signed by Tiny Palmer RN on 5/30/2023 at 3:44 PM

## 2023-05-30 NOTE — ED NOTES
Juan zheng at bedside. Pt's bilat. Lower ext. Redness marked with skin marker, pt mauro. Well.      Trevon Sosa RN  05/30/23 7258

## 2023-05-30 NOTE — H&P
rashes or lesions  HEENT: eomi, perrla. MMM. Scleral icterus present  Neck: No JVD or lymphadenopathy  Lungs: CTA bilaterally. No wheeze   Heart: RRR, no murmur or gallp  Abdomen: soft, nontender. Bsx4. No masses or organomegaly  Extremities: 3+ pitting edema with bilateral lower extremity erythema and tenderness to palpation  Neurologic: No focal deficits         Recent Labs     05/30/23  1600   WBC 8.6   HGB 7.8*   PLT 84*     Recent Labs     05/30/23  1600      K 3.9   CL 99   CO2 26   BUN 11   CREATININE 0.90   GLUCOSE 110*   *   ALT 36*   BILITOT 7.2*   ALKPHOS 247*     Troponin T:   Recent Labs     05/30/23  1600   TROPONINI <0.010       ABGs: No results found for: PHART, PO2ART, [id number]  INR:   Recent Labs     05/30/23  1600   INR 2.0     URINALYSIS:  Recent Labs     05/30/23  1600   COLORU DARK YELLOW*   PHUR 7.0   WBCUA 6-9*   RBCUA 3-5*   BACTERIA Negative   CLARITYU Clear   SPECGRAV 1.006   LEUKOCYTESUR MODERATE*   UROBILINOGEN 1.0   BILIRUBINUR Negative   BLOODU Negative   GLUCOSEU Negative     -----------------------------------------------------------------   XR CHEST PORTABLE    Result Date: 5/30/2023  EXAMINATION: ONE XRAY VIEW OF THE CHEST 5/30/2023 4:18 pm COMPARISON: 05/09/2023 HISTORY: ORDERING SYSTEM PROVIDED HISTORY: leg swelling TECHNOLOGIST PROVIDED HISTORY: Reason for exam:->leg swelling Is the patient pregnant?->No What reading provider will be dictating this exam?->CRC FINDINGS: The lungs are without acute focal process. There is no effusion or pneumothorax. The cardiomediastinal silhouette is without acute process. The osseous structures are without acute process. No acute process. Assessment and Plan     1.   Decompensated alcoholic cirrhosis complicated by venous stasis dermatitis  -Rule out DVT  -Lasix, spironolactone  -Counseled on low-sodium diet  -Leg elevation, compression stocking  -Topical steroid twice daily for 1 to 2 weeks  -Ask infectious diseases

## 2023-05-31 ENCOUNTER — POST-OP TELEPHONE (OUTPATIENT)
Dept: INTERVENTIONAL RADIOLOGY/VASCULAR | Age: 38
End: 2023-05-31

## 2023-05-31 PROBLEM — L03.119 LOWER EXTREMITY CELLULITIS: Status: ACTIVE | Noted: 2023-05-31

## 2023-05-31 PROBLEM — I87.2 VENOUS STASIS DERMATITIS OF BOTH LOWER EXTREMITIES: Status: ACTIVE | Noted: 2023-05-31

## 2023-05-31 PROBLEM — F10.20 SEVERE ALCOHOL USE DISORDER (HCC): Status: ACTIVE | Noted: 2023-05-10

## 2023-05-31 LAB
ALBUMIN SERPL-MCNC: 2.1 G/DL (ref 3.5–4.6)
ALP SERPL-CCNC: 201 U/L (ref 40–130)
ALT SERPL-CCNC: 30 U/L (ref 0–33)
ANION GAP SERPL CALCULATED.3IONS-SCNC: 9 MEQ/L (ref 9–15)
AST SERPL-CCNC: 135 U/L (ref 0–35)
BASOPHILS # BLD: 0 K/UL (ref 0–0.2)
BASOPHILS NFR BLD: 0.2 %
BILIRUB SERPL-MCNC: 8.1 MG/DL (ref 0.2–0.7)
BUN SERPL-MCNC: 11 MG/DL (ref 6–20)
CALCIUM SERPL-MCNC: 8 MG/DL (ref 8.5–9.9)
CHLORIDE SERPL-SCNC: 101 MEQ/L (ref 95–107)
CO2 SERPL-SCNC: 26 MEQ/L (ref 20–31)
CREAT SERPL-MCNC: 0.87 MG/DL (ref 0.5–0.9)
EOSINOPHIL # BLD: 0.1 K/UL (ref 0–0.7)
EOSINOPHIL NFR BLD: 0.7 %
ERYTHROCYTE [DISTWIDTH] IN BLOOD BY AUTOMATED COUNT: 22 % (ref 11.5–14.5)
GLOBULIN SER CALC-MCNC: 3.3 G/DL (ref 2.3–3.5)
GLUCOSE SERPL-MCNC: 138 MG/DL (ref 70–99)
HCT VFR BLD AUTO: 23.4 % (ref 37–47)
HGB BLD-MCNC: 7.9 G/DL (ref 12–16)
LYMPHOCYTES # BLD: 1.1 K/UL (ref 1–4.8)
LYMPHOCYTES NFR BLD: 14.9 %
MCH RBC QN AUTO: 33.5 PG (ref 27–31.3)
MCHC RBC AUTO-ENTMCNC: 34 % (ref 33–37)
MCV RBC AUTO: 98.4 FL (ref 79.4–94.8)
MONOCYTES # BLD: 0.7 K/UL (ref 0.2–0.8)
MONOCYTES NFR BLD: 9.1 %
NEUTROPHILS # BLD: 5.5 K/UL (ref 1.4–6.5)
NEUTS SEG NFR BLD: 75.1 %
PLATELET # BLD AUTO: 74 K/UL (ref 130–400)
POTASSIUM SERPL-SCNC: 3.9 MEQ/L (ref 3.4–4.9)
PROT SERPL-MCNC: 5.4 G/DL (ref 6.3–8)
RBC # BLD AUTO: 2.38 M/UL (ref 4.2–5.4)
SODIUM SERPL-SCNC: 136 MEQ/L (ref 135–144)
WBC # BLD AUTO: 7.3 K/UL (ref 4.8–10.8)

## 2023-05-31 PROCEDURE — 6360000002 HC RX W HCPCS: Performed by: INTERNAL MEDICINE

## 2023-05-31 PROCEDURE — 2580000003 HC RX 258: Performed by: INTERNAL MEDICINE

## 2023-05-31 PROCEDURE — G0378 HOSPITAL OBSERVATION PER HR: HCPCS

## 2023-05-31 PROCEDURE — 6370000000 HC RX 637 (ALT 250 FOR IP): Performed by: INTERNAL MEDICINE

## 2023-05-31 PROCEDURE — 96376 TX/PRO/DX INJ SAME DRUG ADON: CPT

## 2023-05-31 PROCEDURE — 96366 THER/PROPH/DIAG IV INF ADDON: CPT

## 2023-05-31 PROCEDURE — 80053 COMPREHEN METABOLIC PANEL: CPT

## 2023-05-31 PROCEDURE — 96375 TX/PRO/DX INJ NEW DRUG ADDON: CPT

## 2023-05-31 PROCEDURE — 36415 COLL VENOUS BLD VENIPUNCTURE: CPT

## 2023-05-31 PROCEDURE — 99222 1ST HOSP IP/OBS MODERATE 55: CPT | Performed by: INTERNAL MEDICINE

## 2023-05-31 PROCEDURE — 96372 THER/PROPH/DIAG INJ SC/IM: CPT

## 2023-05-31 PROCEDURE — 85025 COMPLETE CBC W/AUTO DIFF WBC: CPT

## 2023-05-31 PROCEDURE — 6370000000 HC RX 637 (ALT 250 FOR IP): Performed by: NURSE PRACTITIONER

## 2023-05-31 RX ORDER — CLOTRIMAZOLE 1 %
CREAM (GRAM) TOPICAL 2 TIMES DAILY
Status: DISCONTINUED | OUTPATIENT
Start: 2023-05-31 | End: 2023-06-01 | Stop reason: HOSPADM

## 2023-05-31 RX ORDER — FUROSEMIDE 40 MG/1
40 TABLET ORAL DAILY
Status: DISCONTINUED | OUTPATIENT
Start: 2023-06-01 | End: 2023-06-01 | Stop reason: HOSPADM

## 2023-05-31 RX ORDER — SPIRONOLACTONE 50 MG/1
50 TABLET, FILM COATED ORAL 2 TIMES DAILY
Status: DISCONTINUED | OUTPATIENT
Start: 2023-05-31 | End: 2023-06-01 | Stop reason: HOSPADM

## 2023-05-31 RX ORDER — FUROSEMIDE 20 MG/1
20 TABLET ORAL ONCE
Status: COMPLETED | OUTPATIENT
Start: 2023-05-31 | End: 2023-05-31

## 2023-05-31 RX ORDER — HYDROXYZINE HYDROCHLORIDE 25 MG/1
25 TABLET, FILM COATED ORAL EVERY 6 HOURS PRN
Status: DISCONTINUED | OUTPATIENT
Start: 2023-05-31 | End: 2023-06-01 | Stop reason: HOSPADM

## 2023-05-31 RX ORDER — LIDOCAINE 4 G/G
3 PATCH TOPICAL DAILY
Status: DISCONTINUED | OUTPATIENT
Start: 2023-05-31 | End: 2023-06-01 | Stop reason: HOSPADM

## 2023-05-31 RX ORDER — ACAMPROSATE CALCIUM 333 MG/1
666 TABLET, DELAYED RELEASE ORAL 3 TIMES DAILY
Qty: 180 TABLET | Refills: 3 | Status: ON HOLD | OUTPATIENT
Start: 2023-05-31 | End: 2023-08-10 | Stop reason: SDUPTHER

## 2023-05-31 RX ADMIN — PANTOPRAZOLE SODIUM 40 MG: 40 TABLET, DELAYED RELEASE ORAL at 05:59

## 2023-05-31 RX ADMIN — ONDANSETRON 4 MG: 2 INJECTION INTRAMUSCULAR; INTRAVENOUS at 17:10

## 2023-05-31 RX ADMIN — Medication 10 ML: at 08:37

## 2023-05-31 RX ADMIN — ENOXAPARIN SODIUM 40 MG: 100 INJECTION SUBCUTANEOUS at 08:18

## 2023-05-31 RX ADMIN — HYDROXYZINE HYDROCHLORIDE 25 MG: 25 TABLET, FILM COATED ORAL at 17:10

## 2023-05-31 RX ADMIN — ONDANSETRON 4 MG: 2 INJECTION INTRAMUSCULAR; INTRAVENOUS at 23:18

## 2023-05-31 RX ADMIN — Medication 400 MG: at 08:16

## 2023-05-31 RX ADMIN — POLYETHYLENE GLYCOL 3350 17 G: 17 POWDER, FOR SOLUTION ORAL at 08:37

## 2023-05-31 RX ADMIN — SPIRONOLACTONE 50 MG: 50 TABLET ORAL at 08:16

## 2023-05-31 RX ADMIN — FOLIC ACID 1 MG: 1 TABLET ORAL at 08:16

## 2023-05-31 RX ADMIN — CEFTRIAXONE SODIUM 1000 MG: 1 INJECTION, POWDER, FOR SOLUTION INTRAMUSCULAR; INTRAVENOUS at 17:13

## 2023-05-31 RX ADMIN — FUROSEMIDE 20 MG: 20 TABLET ORAL at 08:16

## 2023-05-31 RX ADMIN — ONDANSETRON 4 MG: 4 TABLET, ORALLY DISINTEGRATING ORAL at 08:37

## 2023-05-31 RX ADMIN — FUROSEMIDE 20 MG: 20 TABLET ORAL at 17:10

## 2023-05-31 RX ADMIN — SPIRONOLACTONE 50 MG: 50 TABLET ORAL at 17:10

## 2023-05-31 RX ADMIN — Medication 100 MG: at 08:16

## 2023-05-31 RX ADMIN — TRIAMCINOLONE ACETONIDE: 1 LOTION TOPICAL at 21:00

## 2023-05-31 RX ADMIN — Medication 400 MG: at 21:00

## 2023-05-31 RX ADMIN — Medication 10 ML: at 21:01

## 2023-05-31 RX ADMIN — TRIAMCINOLONE ACETONIDE: 1 LOTION TOPICAL at 08:18

## 2023-05-31 RX ADMIN — THERA TABS 1 TABLET: TAB at 08:29

## 2023-05-31 NOTE — PROGRESS NOTES
Physician Progress Note    5/31/2023   3:19 PM    Name:  Sahara Munoz  MRN:    [id number]     IP Day: 0     Admit Date: 5/30/2023  3:28 PM  PCP: Gardenia Oconnor MD    Code Status:  Full Code    Subjective:     Leg swelling improved. .  Legs still itchy and red    Current Facility-Administered Medications   Medication Dose Route Frequency Provider Last Rate Last Admin    lidocaine 4 % external patch 3 patch  3 patch TransDERmal Daily Garnet Libman, APRN - CNP   3 patch at 05/31/23 0093    cefTRIAXone (ROCEPHIN) 1,000 mg in sodium chloride 0.9 % 50 mL IVPB (mini-bag)  1,000 mg IntraVENous Q24H Bebe Rodríguez MD        clotrimazole (LOTRIMIN) 1 % cream   Topical BID Cesilia Caldwell MD        spironolactone (ALDACTONE) tablet 50 mg  50 mg Oral BID Pati Díaz, DO        [START ON 6/1/2023] furosemide (LASIX) tablet 40 mg  40 mg Oral Daily Pati Díaz, DO        furosemide (LASIX) tablet 20 mg  20 mg Oral Once Sigifredo Justin DO        hydrOXYzine HCl (ATARAX) tablet 25 mg  25 mg Oral Q6H PRN Pati Díaz, DO        folic acid (FOLVITE) tablet 1 mg  1 mg Oral Daily Madonna Gables, DO   1 mg at 05/31/23 0816    magnesium oxide (MAG-OX) tablet 400 mg  400 mg Oral BID Madonna Gables, DO   400 mg at 05/31/23 9623    multivitamin 1 tablet  1 tablet Oral Daily Madonna Gables, DO   1 tablet at 05/31/23 0829    pantoprazole (PROTONIX) tablet 40 mg  40 mg Oral QAM AC Madonna Gables, DO   40 mg at 05/31/23 0559    thiamine tablet 100 mg  100 mg Oral Daily Madonna Gables, DO   100 mg at 05/31/23 0816    sodium chloride flush 0.9 % injection 5-40 mL  5-40 mL IntraVENous 2 times per day Madonna Gables, DO   10 mL at 05/31/23 1497    sodium chloride flush 0.9 % injection 5-40 mL  5-40 mL IntraVENous PRN Madvaloriea Dagoles, DO        0.9 % sodium chloride infusion   IntraVENous PRN Madonna Gables, DO        enoxaparin (LOVENOX) injection 40 mg  40 mg SubCUTAneous Daily Pati Díaz DO   40 mg at 05/31/23 2003

## 2023-05-31 NOTE — PROGRESS NOTES
Shift assessment complete. Pt is Axox4. Meds given per mar. BLE is red and swollen +4, pitting. Lidocaine patches ordered and placed on pts back. Pt c/o nausea, zofran PO given. Pt denies any further needs at this time. Call light within reach. Will continue to monitor.     Electronically signed by Desi Chung RN on 5/31/2023 at 9:14 AM

## 2023-05-31 NOTE — PLAN OF CARE
Problem: Discharge Planning  Goal: Discharge to home or other facility with appropriate resources  Outcome: Progressing  Flowsheets (Taken 5/30/2023 2032)  Discharge to home or other facility with appropriate resources:   Identify discharge learning needs (meds, wound care, etc)   Identify barriers to discharge with patient and caregiver     Problem: Safety - Adult  Goal: Free from fall injury  Outcome: Progressing

## 2023-05-31 NOTE — PROGRESS NOTES
Admission assessments and med rec completed, see flowsheets. Dr Tin Starr notified. A&OX4. Oriented to room and call light. Medications administered per [facility], with exception of pt refusals of AM medications she states she took at home and did not want to repeat dosages. This RN informed Melida Baker NP via [employer] of pt refusal, pt requests of cream for vaginal yeast infection and of lidocaine patches for pain as she refused tylenol, stating she cannot take it. This RN also notified MARA Baker of pt plt being 84, and holding lovenox. Call light within reach. No further needs verbalized at this time by pt.     2323: MARA Baker notified via [employer] of critical lactic 3.6. No new orders at this time.

## 2023-05-31 NOTE — CONSULTS
Infectious Diseases Inpatient Consult Note      Reason for Consult:   Cellulitis versus dermatitis  Requesting Physician:   Dr. Pablo Sauceda  Primary Care Physician: Feroz Hebert MD  History Obtained From:   Pt, EPIC    Admit Date: 5/30/2023  Hospital Day: 2      HISTORY OF PRESENT ILLNESS:  This is a 42 y.o. female with past medical history of alcoholic liver cirrhosis, MELD score of 26 with recurrent ascites, portal hypertension, upper GI bleed was admitted to [facility]  from home  through ER yesterday with 1 week bilateral leg swelling, pain redness, chills. Patient was admitted with decompensated liver cirrhosis with ascites. Had paracentesis done on admission. Was given 1 dose of Rocephin in the emergency room. Patient reports bilateral groin/that was noted by mom on admission. He has positive itching and skin peeling bilateral legs. Patient had productive cough of white phlegm for months duration. Had mild shortness of breath, much improved since paracentesis. Patient was diagnosed with leukocytosis 24 and continues to drink intermittently.     Past medical surgical and social history were reviewed and are as detailed below    Past Medical History:   Diagnosis Date    Alcohol abuse     Cirrhosis ([address]     Tobacco dependence        Past Surgical History:   Procedure Laterality Date    APPENDECTOMY      COLONOSCOPY N/A 01/13/2023    COLONOSCOPY DIAGNOSTIC performed by José Miguel Valdes MD at [address] [facility] 05/12/2023    COLONOSCOPY DIAGNOSTIC performed by Mandy Nunez MD at [facility]      reports 6 sx on L foot and one on right foot    PARACENTESIS Left 09/13/2022    1510 ml removed per Dr Teja Bingham  specimen obtained    PARACENTESIS Left 01/11/2023    4720 ml removed by Dr. Teja Bingham - therapeutic & diagnostic    PARACENTESIS Left 01/16/2023    4400ml removed by Dr. Mikayla Onofre Left 02/01/2023    5600 ml removed by Dr. Leatha Morel Left 05/16/2023

## 2023-05-31 NOTE — FLOWSHEET NOTE
RN call to patient to follow up post-op, as pt had paracentesis on 5/30/23 with Dr. Eloina Hensley. Pt reports 0/10 pain to site. Pt confirms site and dressing is clean, dry, and intact. Pt states that she is inpt at this time on 4W for BLE edema. Pt has no questions or concerns at this time regarding post-op paracentesis. Pt instructed to call back the radiology dept and request to speak with a nurse if any questions or concerns should develop at [phone].

## 2023-05-31 NOTE — DISCHARGE INSTRUCTIONS
Low Sodium Diet (2,000 Milligram): Care Instructions  Overview     Limiting sodium can be an important part of managing some health problems. The most common source of sodium is salt. People get most of the salt in their diet from canned, prepared, and packaged foods. Fast food and restaurant meals also are very high in sodium. Your doctor will probably limit your sodium to less than 2,000 milligrams (mg) a day. This limit counts all the sodium in prepared and packaged foods and any salt you add to your food. Follow-up care is a key part of your treatment and safety. Be sure to make and go to all appointments, and call your doctor if you are having problems. It's also a good idea to know your test results and keep a list of the medicines you take. How can you care for yourself at home? Read food labels  Read labels on cans and food packages. The labels tell you how much sodium is in each serving. Make sure that you look at the serving size. If you eat more than the serving size, you have eaten more sodium. Food labels also tell you the Percent Daily Value for sodium. Choose products with low Percent Daily Values for sodium. Be aware that sodium can come in forms other than salt, including monosodium glutamate (MSG), sodium citrate, and sodium bicarbonate (baking soda). MSG is often added to Asian food. When you eat out, you can sometimes ask for food without MSG or added salt. Buy low-sodium foods  Buy foods that are labeled \"unsalted\" (no salt added), \"sodium-free\" (less than 5 mg of sodium per serving), or \"low-sodium\" (140 mg or less of sodium per serving). Foods labeled \"reduced-sodium\" and \"light sodium\" may still have too much sodium. Be sure to read the label to see how much sodium you are getting. Buy fresh vegetables, or frozen vegetables without added sauces. Buy low-sodium versions of canned vegetables, soups, and other canned goods.   Prepare low-sodium meals  Cut back on the amount of salt

## 2023-06-01 VITALS
SYSTOLIC BLOOD PRESSURE: 103 MMHG | HEART RATE: 96 BPM | TEMPERATURE: 97.9 F | BODY MASS INDEX: 28.28 KG/M2 | OXYGEN SATURATION: 100 % | WEIGHT: 169.75 LBS | DIASTOLIC BLOOD PRESSURE: 53 MMHG | HEIGHT: 65 IN | RESPIRATION RATE: 16 BRPM

## 2023-06-01 LAB
ALBUMIN SERPL-MCNC: 2 G/DL (ref 3.5–4.6)
ALP SERPL-CCNC: 188 U/L (ref 40–130)
ALT SERPL-CCNC: 26 U/L (ref 0–33)
ANION GAP SERPL CALCULATED.3IONS-SCNC: 9 MEQ/L (ref 9–15)
AST SERPL-CCNC: 109 U/L (ref 0–35)
BASOPHILS # BLD: 0 K/UL (ref 0–0.2)
BASOPHILS NFR BLD: 0.4 %
BILIRUB SERPL-MCNC: 7.5 MG/DL (ref 0.2–0.7)
BUN SERPL-MCNC: 13 MG/DL (ref 6–20)
CALCIUM SERPL-MCNC: 7.9 MG/DL (ref 8.5–9.9)
CHLORIDE SERPL-SCNC: 100 MEQ/L (ref 95–107)
CO2 SERPL-SCNC: 26 MEQ/L (ref 20–31)
CREAT SERPL-MCNC: 1.07 MG/DL (ref 0.5–0.9)
EKG ATRIAL RATE: 120 BPM
EKG P AXIS: 72 DEGREES
EKG P-R INTERVAL: 128 MS
EKG Q-T INTERVAL: 344 MS
EKG QRS DURATION: 78 MS
EKG QTC CALCULATION (BAZETT): 486 MS
EKG R AXIS: 55 DEGREES
EKG T AXIS: 33 DEGREES
EKG VENTRICULAR RATE: 120 BPM
EOSINOPHIL # BLD: 0.1 K/UL (ref 0–0.7)
EOSINOPHIL NFR BLD: 1.6 %
ERYTHROCYTE [DISTWIDTH] IN BLOOD BY AUTOMATED COUNT: 21.3 % (ref 11.5–14.5)
GLOBULIN SER CALC-MCNC: 3.2 G/DL (ref 2.3–3.5)
GLUCOSE SERPL-MCNC: 96 MG/DL (ref 70–99)
HCT VFR BLD AUTO: 21.5 % (ref 37–47)
HGB BLD-MCNC: 7.3 G/DL (ref 12–16)
LYMPHOCYTES # BLD: 1.3 K/UL (ref 1–4.8)
LYMPHOCYTES NFR BLD: 23 %
MCH RBC QN AUTO: 33.8 PG (ref 27–31.3)
MCHC RBC AUTO-ENTMCNC: 34 % (ref 33–37)
MCV RBC AUTO: 99.4 FL (ref 79.4–94.8)
MONOCYTES # BLD: 0.6 K/UL (ref 0.2–0.8)
MONOCYTES NFR BLD: 9.7 %
NEUTROPHILS # BLD: 3.8 K/UL (ref 1.4–6.5)
NEUTS SEG NFR BLD: 65.3 %
PLATELET # BLD AUTO: 59 K/UL (ref 130–400)
POTASSIUM SERPL-SCNC: 3.7 MEQ/L (ref 3.4–4.9)
PROT SERPL-MCNC: 5.2 G/DL (ref 6.3–8)
RBC # BLD AUTO: 2.16 M/UL (ref 4.2–5.4)
SODIUM SERPL-SCNC: 135 MEQ/L (ref 135–144)
WBC # BLD AUTO: 5.8 K/UL (ref 4.8–10.8)

## 2023-06-01 PROCEDURE — 6370000000 HC RX 637 (ALT 250 FOR IP): Performed by: INTERNAL MEDICINE

## 2023-06-01 PROCEDURE — 6370000000 HC RX 637 (ALT 250 FOR IP): Performed by: NURSE PRACTITIONER

## 2023-06-01 PROCEDURE — 36415 COLL VENOUS BLD VENIPUNCTURE: CPT

## 2023-06-01 PROCEDURE — 85025 COMPLETE CBC W/AUTO DIFF WBC: CPT

## 2023-06-01 PROCEDURE — 80053 COMPREHEN METABOLIC PANEL: CPT

## 2023-06-01 PROCEDURE — 99232 SBSQ HOSP IP/OBS MODERATE 35: CPT | Performed by: INTERNAL MEDICINE

## 2023-06-01 PROCEDURE — G0378 HOSPITAL OBSERVATION PER HR: HCPCS

## 2023-06-01 PROCEDURE — 93010 ELECTROCARDIOGRAM REPORT: CPT | Performed by: INTERNAL MEDICINE

## 2023-06-01 PROCEDURE — 2580000003 HC RX 258: Performed by: INTERNAL MEDICINE

## 2023-06-01 RX ORDER — CLOTRIMAZOLE 1 %
CREAM (GRAM) TOPICAL
Qty: 113 G | Refills: 1 | Status: SHIPPED | OUTPATIENT
Start: 2023-06-01 | End: 2023-06-08

## 2023-06-01 RX ORDER — CEPHALEXIN 500 MG/1
500 CAPSULE ORAL 3 TIMES DAILY
Qty: 30 CAPSULE | Refills: 0 | Status: SHIPPED | OUTPATIENT
Start: 2023-06-01 | End: 2023-06-11

## 2023-06-01 RX ORDER — SODIUM CHLORIDE 0.9 % (FLUSH) 0.9 %
5-40 SYRINGE (ML) INJECTION EVERY 12 HOURS SCHEDULED
Status: DISCONTINUED | OUTPATIENT
Start: 2023-06-01 | End: 2023-06-01 | Stop reason: HOSPADM

## 2023-06-01 RX ORDER — SODIUM CHLORIDE 0.9 % (FLUSH) 0.9 %
5-40 SYRINGE (ML) INJECTION PRN
Status: DISCONTINUED | OUTPATIENT
Start: 2023-06-01 | End: 2023-06-01 | Stop reason: HOSPADM

## 2023-06-01 RX ORDER — SODIUM CHLORIDE 9 MG/ML
INJECTION, SOLUTION INTRAVENOUS PRN
Status: DISCONTINUED | OUTPATIENT
Start: 2023-06-01 | End: 2023-06-01 | Stop reason: HOSPADM

## 2023-06-01 RX ORDER — SPIRONOLACTONE 50 MG/1
50 TABLET, FILM COATED ORAL DAILY
Qty: 30 TABLET | Refills: 3 | Status: ON HOLD | OUTPATIENT
Start: 2023-06-01 | End: 2023-08-10 | Stop reason: HOSPADM

## 2023-06-01 RX ORDER — CLOTRIMAZOLE 1 %
CREAM (GRAM) TOPICAL
Qty: 45 G | Refills: 1 | Status: SHIPPED | OUTPATIENT
Start: 2023-06-01 | End: 2023-06-08

## 2023-06-01 RX ADMIN — Medication 10 ML: at 11:04

## 2023-06-01 RX ADMIN — PANTOPRAZOLE SODIUM 40 MG: 40 TABLET, DELAYED RELEASE ORAL at 05:14

## 2023-06-01 RX ADMIN — Medication 100 MG: at 11:03

## 2023-06-01 RX ADMIN — CLOTRIMAZOLE: 0.01 CREAM TOPICAL at 10:58

## 2023-06-01 RX ADMIN — ACETAMINOPHEN 650 MG: 325 TABLET ORAL at 05:14

## 2023-06-01 RX ADMIN — SPIRONOLACTONE 50 MG: 50 TABLET ORAL at 11:03

## 2023-06-01 RX ADMIN — SODIUM CHLORIDE, PRESERVATIVE FREE 10 ML: 5 INJECTION INTRAVENOUS at 11:04

## 2023-06-01 RX ADMIN — THERA TABS 1 TABLET: TAB at 11:03

## 2023-06-01 RX ADMIN — FUROSEMIDE 40 MG: 40 TABLET ORAL at 11:03

## 2023-06-01 RX ADMIN — FOLIC ACID 1 MG: 1 TABLET ORAL at 11:03

## 2023-06-01 RX ADMIN — TRIAMCINOLONE ACETONIDE: 1 LOTION TOPICAL at 10:59

## 2023-06-01 RX ADMIN — Medication 400 MG: at 11:03

## 2023-06-01 ASSESSMENT — ENCOUNTER SYMPTOMS
COUGH: 1
COLOR CHANGE: 1
ABDOMINAL PAIN: 1
ABDOMINAL DISTENTION: 1

## 2023-06-01 ASSESSMENT — PAIN SCALES - GENERAL: PAINLEVEL_OUTOF10: 9

## 2023-06-01 ASSESSMENT — PAIN DESCRIPTION - DESCRIPTORS: DESCRIPTORS: ACHING;DISCOMFORT

## 2023-06-01 ASSESSMENT — PAIN DESCRIPTION - ORIENTATION: ORIENTATION: MID

## 2023-06-01 ASSESSMENT — PAIN DESCRIPTION - LOCATION: LOCATION: ABDOMEN

## 2023-06-01 NOTE — PROGRESS NOTES
Shift assessments completed. A&OX 4. Medications given per MAR. Call light within reach. No other needs stated by pt at this time. Pt requesting information on home healthcare supplies and what her insurance covers. I told Pt I would let her day shift nurse know her concerns.

## 2023-06-01 NOTE — PROGRESS NOTES
Spiritual Care Services     Summary of Visit:   visited patient in her room on 2W. Patient asked for prayer and mentioned specifically that she wanted prayer for liver because she is preparing for another paracentesis before she is discharged. Patient grew tearful as she spoke about her liver condition and procedure.  provided prayer for patient's healing and comfort. Encounter Summary  Encounter Overview/Reason : Initial Encounter  Service Provided For[de-identified] Patient  Referral/Consult From[de-identified] Rounding  Support System: Children, Family members  Complexity of Encounter: Moderate  Begin Time: 1345  End Time : 1400  Total Time Calculated: 15 min  Encounter   Type: Initial Screen/Assessment     Spiritual/Emotional needs  Type: Spiritual Support                      Spiritual Assessment/Intervention/Outcomes:    Assessment: Anxious, Tearful    Intervention: Prayer (assurance of)/[place], Nurtured Hope, Sustaining Presence/Ministry of presence, Empowerment    Outcome: Comfort, Concerns relieved, Receptive      Care Plan:    Plan and Referrals  Plan/Referrals: Continue Support (comment)          Spiritual Care Services   Electronically signed by Chaplain Paulo on 6/1/2023 at 2:12 PM.    To reach a  for emotional and spiritual support, place an [facility] consult request.   If a  is needed immediately, dial 0 and ask to page the on-call .

## 2023-06-01 NOTE — FLOWSHEET NOTE
Patient arrived to [facility] and ambulated with slow steady gait with cane and laid onto cart independently. Minimal fluid seen via ultrasound . No paracentesis needed for today.

## 2023-06-01 NOTE — DISCHARGE SUMMARY
have another paracentesis prior to discharge. If this cannot be done, will be scheduled as outpatient. Exam on Discharge:   BP (!) 99/54   Pulse 94   Temp 98.1 °F (36.7 °C) (Oral)   Resp 16   Ht 5' 5\" (1.651 m)   Wt 169 lb 12.1 oz (77 kg)   LMP 04/01/2022 Comment: tubal ligation  SpO2 100%   BMI 28.25 kg/m²   General appearance: alert, cooperative and no distress. Jaundiced with scleral icterus  Mental Status: oriented to person, place and time and normal affect  Lungs: clear to auscultation bilaterally, normal effort  Heart: regular rate and rhythm, no murmur  Abdomen: Slightly distended mildly tender in the left lower quadrant. Abdomen is otherwise soft. No guarding or rebound tenderness. Extremities: 2+ pitting edema with bilateral warmth and erythema to the mid shin    Discharge Medication List:     Medication List        START taking these medications      acamprosate 333 MG tablet  Commonly known as: CAMPRAL  Take 2 tablets by mouth 3 times daily     cephALEXin 500 MG capsule  Commonly known as: KEFLEX  Take 1 capsule by mouth 3 times daily for 10 days     * clotrimazole 1 % cream  Commonly known as: Lotrimin AF  Apply topically 2 times daily. * clotrimazole 1 % cream  Commonly known as: LOTRIMIN  Apply topically 2 times daily. spironolactone 50 MG tablet  Commonly known as: ALDACTONE  Take 1 tablet by mouth daily           * This list has 2 medication(s) that are the same as other medications prescribed for you. Read the directions carefully, and ask your doctor or other care provider to review them with you.                 CONTINUE taking these medications      folic acid 1 MG tablet  Commonly known as: FOLVITE  Take 1 tablet by mouth daily     furosemide 40 MG tablet  Commonly known as: LASIX     magnesium oxide 400 (240 Mg) MG tablet  Commonly known as: MAG-OX  Take 1 tablet by mouth 2 times daily     metroNIDAZOLE 0.75 % gel  Commonly known as: METROGEL     multivitamin Tabs

## 2023-06-01 NOTE — PROGRESS NOTES
Shift assessment complete. Pt is AxOx4. Meds given per mar. Pt to go for a para (if it can be done today otherwise f/u outpt) and then be discharged. Call light within reach. Will continue to monitor.      Electronically signed by Christiano Upton RN on 6/1/2023 at 11:53 AM

## 2023-06-01 NOTE — PROGRESS NOTES
Infectious Diseases Inpatient Progress Note          HISTORY OF PRESENT ILLNESS:  Follow up bilateral leg cellulitis secondary to edema in a patient with decompensated alcoholic liver cirrhosis with recurrent ascites status post paracentesis on IV Rocephin, well tolerated. Patient had overall clinical improvement with decreased bilateral leg swelling and redness. Persistent bilateral groin rash  Current Medications:     lidocaine  3 patch TransDERmal Daily    cefTRIAXone (ROCEPHIN) IV  1,000 mg IntraVENous Q24H    clotrimazole   Topical BID    spironolactone  50 mg Oral BID    furosemide  40 mg Oral Daily    folic acid  1 mg Oral Daily    magnesium oxide  400 mg Oral BID    multivitamin  1 tablet Oral Daily    pantoprazole  40 mg Oral QAM AC    thiamine  100 mg Oral Daily    sodium chloride flush  5-40 mL IntraVENous 2 times per day    enoxaparin  40 mg SubCUTAneous Daily    triamcinolone   Topical BID    sodium chloride flush  5-40 mL IntraVENous 2 times per day       Allergies:  Oxycodone-acetaminophen      Review of Systems   Respiratory:  Positive for cough. Cardiovascular:  Positive for leg swelling. Gastrointestinal:  Positive for abdominal distention and abdominal pain. Skin:  Positive for color change. 14 system review is negative other than HPI    Physical Exam  Vitals:    05/31/23 2107 05/31/23 2327 06/01/23 0737 06/01/23 0836   BP:  129/73 (!) 99/54    Pulse:  (!) 109 94    Resp:  16 16    Temp:  98.4 °F (36.9 °C) 98.1 °F (36.7 °C)    TempSrc:  Oral Oral    SpO2:  100% 100%    Weight: 169 lb 12.1 oz (77 kg)      Height:    5' 5\" (1.651 m)     General Appearance: alert and oriented to person, place and time, well-developed and well-nourished, in no acute distress, on room air  Skin: warm and dry, no rash. Head: normocephalic and atraumatic  Eyes: anicteric sclerae  ENT: oropharynx clear and moist with normal mucous membranes.  No oral thrush  Lungs: normal respiratory effort, mild bilateral end

## 2023-06-01 NOTE — CARE COORDINATION
IV BENEFIT REQUEST FORM    [facility]                        [address]    REQUESTED BY: Electronically signed by Melba Trejo RN on 2023 at 12:00 PM                                               RN/C3: PHONE: [phone]    DATE:/TIME OF REQUEST: 23  TIME: 12:00 PM      TO: [facility]      FAX TO: [phone]    PHONE: [phone]     THIS PATIENT HAS BEEN IDENTIFIED TO POSSIBLY NEED LONG TERM IV'S. PLEASE CHECK INSURANCE COVERAGE FOR THE FOLLOWING PT/DRUGS. PATIENT'S NAME: Martin Vieyra                              [facility]   PATIENT'S : 1985  PATIENT ADDRESS: [address]  SSN:     PAYOR NAME:  Payor: Marcia Oropeza / Plan: Chantellealy Marquezft / Product Type: *No Product type* /     DRUG: ROCEPHIN DOSE: 1GM FREQUENCY: Q 24 HR      __________ CHECK HERE IF PT HAS NO INSURANCE AND REQUESTING SELF PAY COST. *IF [facility] IS NOT A PROVIDER FOR THIS PATIENT, PLEASE FORWARD INFO VIA FAX TO [facility] @ [phone],(PHONE NUMBER: [phone]) TO RUN BENEFIT VERIFICATION AND NOTIFY THE ABOVE C3 OF THIS PLAN. (FAX FACE SHEET WITH DEMOGRAPHICS AND INSURANCE INFO WITH THIS FORM.)  PLEASE FAX BENEFIT INFO TO: [facility] [phone]    This message is intended only for the use of the individual or entity to which it is addressed and may contain information that is privileged, confidential, and exempt from disclosure under applicable law. If the reader of the notice is not intended recipient of the employee/agent responsible for delivering the message to the intended recipient, you are hereby notified than any dissemination, distribution or copying of this communication is strictly prohibited. Please contact the sender for further instructions on handling the information.
Met with patient, freedom of choice offered for dc planning, declined rehab, snf, states if iv abx needed [facility]. She states she will return to mothers home at dc. Will need final id plan for abx therapy. Call placed to [facility] with [facility] intake to notify of potential need.  Iv benefit check sent to pharmacy
Patient to dc to home with oral abx, denies home care needs. Plan for home today.  Dc written
Physician  Does the patient report anything that got in the way of taking their medications?: No  In our efforts to provide the best possible care to you and others like you, can you think of anything that we could have done to help you after you left the hospital the first time, so that you might not have needed to return so soon?: Teach back instructions regarding management of illness, Additional Community resources available for illness support, Other (Comment) ([facility]ALL CARE INFORMATION GIVEN AS RESOURCE AS WELL PT CONTINUES TO DRINK INTERMITTENTLY DECLINED HELP WITH THIS)                 Advance Directives:      Code Status: Prior   Patient's Primary Decision Maker is: Legal Next of Kin    Primary Decision Maker: HARSHA BRYANT - Parent - [phone]    Discharge Planning:    Patient lives with: Alone Type of Home: House  Primary Care Giver: Self  Patient Support Systems include: Parent, Children, Family Members   Current Financial resources: Medicaid  Current community resources: None  Current services prior to admission: None            Current DME:  CANE, SHOWER CHAIR, SOCK AID            Type of Home Care services:  None    ADLS  Prior functional level: Independent in ADLs/IADLs  Current functional level: Independent in ADLs/IADLs, Mobility (USING A CANE)      Family can provide assistance at DC: Yes  Would you like Case Management to discuss the discharge plan with any other family members/significant others, and if so, who? Yes (MOTHER IF SHE CALLS)  Plans to Return to Present Housing: Yes  Other Identified Issues/Barriers to RETURNING to current housing: medical barriers  Potential Assistance needed at discharge:  Other (Comment) (ORDER FOR PALL CARE)            Potential DME:    Patient expects to discharge to: [address] [place] [address] for transportation at discharge:  TBD    Financial    Payor: Jung Fields / Plan: Ag Solis / Product Type: *No Product type* /     Does insurance require precert for SNF:

## 2023-06-01 NOTE — PROGRESS NOTES
IV removed without complication. Pt tolerated well. Catheter intact, dressing applied. No drainage noted. Discharge instructions provided to patient. Verbalized understanding of follow up appointments, diet, medications and reasons to return to ED/call physician. All questions answered. Copy of discharge instructions provided. Assisted into wheelchair and taken to personal car. Denies further needs.      Electronically signed by Carolyn Campbell RN on 6/1/2023 at 5:14 PM

## 2023-06-04 LAB
BACTERIA BLD CULT ORG #2: NORMAL
BACTERIA BLD CULT: NORMAL

## 2023-06-05 DIAGNOSIS — K70.11 ALCOHOLIC HEPATITIS WITH ASCITES: Primary | ICD-10-CM

## 2023-06-05 DIAGNOSIS — R14.0 ABDOMINAL DISTENTION: ICD-10-CM

## 2023-06-06 ENCOUNTER — TELEPHONE (OUTPATIENT)
Dept: INTERVENTIONAL RADIOLOGY/VASCULAR | Age: 38
End: 2023-06-06

## 2023-06-06 NOTE — TELEPHONE ENCOUNTER
Patient was given this information prior to leaving the office / via phone conversation - voiced understanding  2. Spoke to [place] in [address] [place] [address] ON: 6/7/23 @ 1:00pm  >  You will need to arrive at 12:30pm (from home) and check in at the [address] desk.

## 2023-06-07 ENCOUNTER — HOSPITAL ENCOUNTER (OUTPATIENT)
Dept: INTERVENTIONAL RADIOLOGY/VASCULAR | Age: 38
Discharge: HOME | End: 2023-06-09
Payer: COMMERCIAL

## 2023-06-07 VITALS
RESPIRATION RATE: 12 BRPM | HEART RATE: 97 BPM | DIASTOLIC BLOOD PRESSURE: 56 MMHG | SYSTOLIC BLOOD PRESSURE: 105 MMHG | OXYGEN SATURATION: 100 %

## 2023-06-07 DIAGNOSIS — K70.11 ALCOHOLIC HEPATITIS WITH ASCITES: ICD-10-CM

## 2023-06-07 DIAGNOSIS — R14.0 ABDOMINAL DISTENTION: ICD-10-CM

## 2023-06-07 PROCEDURE — C1729 CATH, DRAINAGE: HCPCS

## 2023-06-07 PROCEDURE — 49083 ABD PARACENTESIS W/IMAGING: CPT

## 2023-06-07 PROCEDURE — 2500000003 HC RX 250 WO HCPCS: Performed by: RADIOLOGY

## 2023-06-07 RX ORDER — LIDOCAINE HYDROCHLORIDE 20 MG/ML
INJECTION, SOLUTION INFILTRATION; PERINEURAL PRN
Status: COMPLETED | OUTPATIENT
Start: 2023-06-07 | End: 2023-06-07

## 2023-06-07 RX ADMIN — LIDOCAINE HYDROCHLORIDE 10 ML: 20 INJECTION, SOLUTION INFILTRATION; PERINEURAL at 13:30

## 2023-06-07 NOTE — OR NURSING
NO SEDATION    1255 U/S [facility] obtained images prior to start of procedure using U/S. Pt  VSS. History, allergies and medications reviewed. 1300 Procedure explained, consent signed. [address] completed for Ultrasound Guided Paracentesis. Using U/S, [facility] NP marked, prepped LLQ with Chloraprep and draped with sterile drape and towels. 1310  Site numbed with lidocaine by Dr Shar Garcia then [id number] Centesis 5F catheter placed using Ultrasound guidance. Fluid appears clear yellow. Catheter tubing connected to [employer] machine to remove fluid. 1350  Pt tolerated well. Total 3050 ml removed. Catheter removed, pressure held and bandaid applied. Verbal and tactile reassurance given throughout. 1355 dc instructions given with verbalized understanding. Encouraged pt to take all of her prescribed medications. Pt assisted off cart and walked to lobby with a steady gait for dc to home with her mother.

## 2023-06-07 NOTE — DISCHARGE INSTRUCTIONS
Ultrasound Guided Paracentesis Discharge Instructions     Rest today. No heavy lifting, bending, stretching or pulling. Some tenderness will be normal at the procedure site for a few days. You may remove the bandaid in 48 hours. If you experience any drainage or bleeding at the site, hold pressure for 30 minutes and lay on side opposite of the procedure site (move fluid away from the area of leakage). If drainage or bleeding does not stop and seems excessive, call your physician or proceed to the ER. Watch for signs of infection such as fever, chills, drainage or redness at the site. If you experience any of these symptoms, call your primary doctor or go to the emergency room. Please keep all follow-up appointments with your Hepatologist. Schedule follow-up appointment for repeat paracentesis if necessary. If you have any concerns please contact the [facility] Radiology Department at [phone].      TOTAL REMOVED TODAY;  3050 ML

## 2023-06-08 NOTE — FLOWSHEET NOTE
Spoke to patient to follow up with her after having her procedure yesterday. Patient states that she is doing well. No pain at this time. Bandaid remains in place over site and is clean, dry, and intact. Patient had no additional questions or concerns at this time.

## 2023-06-11 PROBLEM — B36.9 FUNGAL DERMATITIS: Status: ACTIVE | Noted: 2023-06-11

## 2023-06-19 DIAGNOSIS — R14.0 ABDOMINAL DISTENTION: ICD-10-CM

## 2023-06-19 DIAGNOSIS — K70.11 ALCOHOLIC HEPATITIS WITH ASCITES: Primary | ICD-10-CM

## 2023-06-21 ENCOUNTER — TELEPHONE (OUTPATIENT)
Dept: INTERVENTIONAL RADIOLOGY/VASCULAR | Age: 38
End: 2023-06-21

## 2023-06-21 ENCOUNTER — OFFICE VISIT (OUTPATIENT)
Dept: INFECTIOUS DISEASES | Age: 38
End: 2023-06-21

## 2023-06-21 VITALS
HEIGHT: 65 IN | TEMPERATURE: 98.1 F | HEART RATE: 118 BPM | BODY MASS INDEX: 24.99 KG/M2 | DIASTOLIC BLOOD PRESSURE: 77 MMHG | RESPIRATION RATE: 20 BRPM | SYSTOLIC BLOOD PRESSURE: 126 MMHG | WEIGHT: 150 LBS

## 2023-06-21 DIAGNOSIS — I87.2 VENOUS STASIS DERMATITIS OF BOTH LOWER EXTREMITIES: ICD-10-CM

## 2023-06-21 DIAGNOSIS — K70.31 ALCOHOLIC CIRRHOSIS OF LIVER WITH ASCITES (HCC): Primary | ICD-10-CM

## 2023-06-21 DIAGNOSIS — B36.9 FUNGAL DERMATITIS: ICD-10-CM

## 2023-06-21 DIAGNOSIS — R14.0 ABDOMINAL DISTENTION: ICD-10-CM

## 2023-06-21 RX ORDER — TRIAMCINOLONE ACETONIDE 0.25 MG/G
CREAM TOPICAL
Qty: 80 G | Refills: 0 | Status: SHIPPED | OUTPATIENT
Start: 2023-06-21

## 2023-06-21 ASSESSMENT — PATIENT HEALTH QUESTIONNAIRE - PHQ9
SUM OF ALL RESPONSES TO PHQ QUESTIONS 1-9: 2
2. FEELING DOWN, DEPRESSED OR HOPELESS: 1
1. LITTLE INTEREST OR PLEASURE IN DOING THINGS: 1
SUM OF ALL RESPONSES TO PHQ9 QUESTIONS 1 & 2: 2
SUM OF ALL RESPONSES TO PHQ QUESTIONS 1-9: 2

## 2023-06-21 NOTE — PROGRESS NOTES
Subjective:      Patient ID: Alicia Leong is a 42 y.o. female who presents today for:  Chief Complaint   Patient presents with    Swelling     [facility] -2 week f/u- bilateral lower extremity swelling- groin       Patient. she states that her major complaint is she is having more abdominal distention, and she still has several days until her paracentesis. She denies any significant abdominal pain fevers chills. Her legs are improved overall and the intent of wax and wane with slight erythema. Strength use compression stockings during the day currently not on. She has also tried elevation at night.   She still having occasional alcoholic drinks she is cut down she is having multiple stressors social issues which is leading to this she relates  Past Medical History:   Diagnosis Date    Alcohol abuse     Cirrhosis ([address]     Tobacco dependence      Past Surgical History:   Procedure Laterality Date    APPENDECTOMY      COLONOSCOPY N/A 01/13/2023    COLONOSCOPY DIAGNOSTIC performed by Matthias Villatoro MD at [address] [facility] 05/12/2023    COLONOSCOPY DIAGNOSTIC performed by Franki Lange MD at [address] [facility]      reports 6 sx on L foot and one on right foot    PARACENTESIS Left 09/13/2022    1510 ml removed per Dr Vilma Basurto  specimen obtained    PARACENTESIS Left 01/11/2023    4720 ml removed by Dr. Vilma Basurto - therapeutic & diagnostic    PARACENTESIS Left 01/16/2023    4400ml removed by Dr. Deb Arellano Left 02/01/2023    5600 ml removed by Dr. Rema Koyanagi Left 05/16/2023    5835 ml removed by Dr. Salbador Obrien - diagnostics sent    PARACENTESIS Left 05/30/2023    5980 ml removed by Dr. Kaz Vazquez Left 06/07/2023    3050 ml removed by Dr Sherman Isidro 09/09/2022    EGD DIAGNOSTIC ONLY performed by Matthias Villatoro MD at [address] [facility] 05/10/2023    EGD

## 2023-06-21 NOTE — TELEPHONE ENCOUNTER
Patient was given this information via phone conversation - voiced understanding  2. Spoke to [facility] in [address]: 6/23/23 @ 9:00AM  >  You will need to arrive at 8:30AM (from home) and check in at the [address] desk.

## 2023-06-22 ENCOUNTER — HOSPITAL ENCOUNTER (OUTPATIENT)
Dept: INTERVENTIONAL RADIOLOGY/VASCULAR | Age: 38
Discharge: HOME | End: 2023-06-24
Payer: COMMERCIAL

## 2023-06-22 VITALS
RESPIRATION RATE: 16 BRPM | HEART RATE: 114 BPM | SYSTOLIC BLOOD PRESSURE: 107 MMHG | DIASTOLIC BLOOD PRESSURE: 61 MMHG | OXYGEN SATURATION: 99 %

## 2023-06-22 DIAGNOSIS — K70.11 ALCOHOLIC HEPATITIS WITH ASCITES: ICD-10-CM

## 2023-06-22 DIAGNOSIS — R14.0 ABDOMINAL DISTENTION: ICD-10-CM

## 2023-06-22 PROCEDURE — 2709999900 IR US GUIDED PARACENTESIS

## 2023-06-22 PROCEDURE — 2500000003 HC RX 250 WO HCPCS: Performed by: NURSE PRACTITIONER

## 2023-06-22 PROCEDURE — 49083 ABD PARACENTESIS W/IMAGING: CPT

## 2023-06-22 PROCEDURE — 49083 ABD PARACENTESIS W/IMAGING: CPT | Performed by: RADIOLOGY

## 2023-06-22 RX ORDER — LIDOCAINE HYDROCHLORIDE 20 MG/ML
INJECTION, SOLUTION INFILTRATION; PERINEURAL PRN
Status: COMPLETED | OUTPATIENT
Start: 2023-06-22 | End: 2023-06-22

## 2023-06-22 RX ADMIN — LIDOCAINE HYDROCHLORIDE 10 ML: 20 INJECTION, SOLUTION INFILTRATION; PERINEURAL at 10:34

## 2023-06-22 NOTE — DISCHARGE INSTRUCTIONS
Ultrasound Guided Paracentesis Discharge Instructions     Rest today. No heavy lifting, bending, stretching or pulling. Some tenderness will be normal at the procedure site for a few days. You may remove the bandaid in 24-48 hours. If you experience any drainage or bleeding at the site, hold pressure for 30 minutes and lay on side opposite of the procedure site (move fluid away from the area of leakage). If drainage or bleeding does not stop and seems excessive, call your physician or proceed to the ER. Watch for signs of infection such as fever, chills, drainage or redness at the site. If you experience any of these symptoms, call your primary doctor or go to the emergency room. Please keep all follow-up appointments with your Hepatologist. Schedule follow-up appointment for repeat paracentesis if necessary. If you have any concerns please contact the [address] [place] [address] Radiology Department at [phone].      AMOUNT DRAINED IN TODAY'S PROCEDURE: 4970 ml

## 2023-06-22 NOTE — OR NURSING
PARACENTESIS - NO SEDATION    1022 - Patient brought back to hospitals, steady independent gait. A&Ox4, calm and cooperative. U/S tech obtained images prior to start of procedure using U/S. Pt  VSS. 1023 - Procedure explained, consent signed. 1030 - Timeout completed for Ultrasound Guided Paracentesis. 1032 - Using U/S, Kasandra Arauz CNP marked, prepped left side of abdomen with Chloraprep and draped with sterile drape and towels. 1034 - Site numbed with 2% lidocaine, see eMAR. [id number] Centesis 5F catheter placed using Ultrasound guidance. Fluid appears clear yellow. Catheter tubing connected to [employer] machine at 200mmHG suction to remove fluid. 1109 - Pt tolerated well. Total 4970 ml removed. Catheter removed, pressure held and bandaid applied. Verbal and tactile reassurance given throughout. 1114 - D/C instructions reviewed with patient and understanding indicated. Patient encouraged to call office to schedule next appointment. She is hoping to get on a weekly routine of draining to avoid becoming so uncomfortable. 1117 - D/C from unit - ambulated to [facility].  Electronically signed by Isael Knott RN on 6/22/2023 at 11:19 AM

## 2023-06-23 ENCOUNTER — TELEPHONE (OUTPATIENT)
Dept: INTERVENTIONAL RADIOLOGY/VASCULAR | Age: 38
End: 2023-06-23

## 2023-06-23 NOTE — PROGRESS NOTES
Spoke to this patient who had a paracentesis done on 06/22/2023. By Bere Pratt NP. The patient states that the site looks good. She still has the band aide on but has had no issues,complications, or problems overnight due to the paracentesis.

## 2023-07-05 DIAGNOSIS — K70.31 ALCOHOLIC CIRRHOSIS OF LIVER WITH ASCITES (HCC): Primary | ICD-10-CM

## 2023-07-06 ENCOUNTER — TELEPHONE (OUTPATIENT)
Dept: INTERVENTIONAL RADIOLOGY/VASCULAR | Age: 38
End: 2023-07-06

## 2023-07-06 NOTE — TELEPHONE ENCOUNTER
Patient was given this information via phone conversation - voiced understanding  2. Spoke to Michelle Keller in [address]: 7/7/23 @ 8:30AM  >  You will need to arrive at 8:00AM (from home ) and check in at the [facility] In desk.

## 2023-07-07 ENCOUNTER — HOSPITAL ENCOUNTER (OUTPATIENT)
Dept: INTERVENTIONAL RADIOLOGY/VASCULAR | Age: 38
End: 2023-07-07
Payer: COMMERCIAL

## 2023-07-07 VITALS
DIASTOLIC BLOOD PRESSURE: 60 MMHG | HEART RATE: 103 BPM | SYSTOLIC BLOOD PRESSURE: 115 MMHG | OXYGEN SATURATION: 99 % | RESPIRATION RATE: 16 BRPM

## 2023-07-07 DIAGNOSIS — K70.31 ALCOHOLIC CIRRHOSIS OF LIVER WITH ASCITES (HCC): ICD-10-CM

## 2023-07-07 PROCEDURE — 49083 ABD PARACENTESIS W/IMAGING: CPT | Performed by: RADIOLOGY

## 2023-07-07 PROCEDURE — 49083 ABD PARACENTESIS W/IMAGING: CPT

## 2023-07-07 PROCEDURE — C1729 CATH, DRAINAGE: HCPCS

## 2023-07-07 PROCEDURE — 2500000003 HC RX 250 WO HCPCS: Performed by: RADIOLOGY

## 2023-07-07 RX ORDER — LIDOCAINE HYDROCHLORIDE 20 MG/ML
INJECTION, SOLUTION INFILTRATION; PERINEURAL PRN
Status: COMPLETED | OUTPATIENT
Start: 2023-07-07 | End: 2023-07-07

## 2023-07-07 RX ADMIN — LIDOCAINE HYDROCHLORIDE 10 ML: 20 INJECTION, SOLUTION INFILTRATION; PERINEURAL at 08:54

## 2023-07-07 NOTE — OR NURSING
PARACENTESIS - NO SEDATION    0830 - Patient arrived to special procedures room 6, steady independent gait. A&Ox4. Procedure explained, consent signed. Allergies reviewed. Patient settled onto cart, propped to left side using pillow. Initial images obtained prior to start of procedure using U/S. Pt  VSS, on RA. Let side of abdomen cleansed with Chloraprep and covered with sterile drape and towels. 8471 - Timeout completed for Ultrasound Guided Paracentesis. 1479 - Site numbed with 2% Lidocaine, see eMAR. [id number] Centesis 5F catheter placed using Ultrasound guidance. Fluid appears clear yellow. Catheter tubing connected to [employer]urocket machine at 200mmHG suction to remove fluid. 0930 - Pt tolerated well. Total 2300 ml removed. Catheter removed, pressure held and guaze / tegaderm applied. Verbal and tactile reassurance given throughout. 1183 - D/C instructions reviewed with patient and understanding indicated. Patient walked to [facility] waiting room where her uncle was waiting for her.  Electronically signed by Paul Finley RN on 7/7/2023 at 10:20 AM

## 2023-07-07 NOTE — DISCHARGE INSTRUCTIONS
Ultrasound Guided Paracentesis Discharge Instructions     Rest today. No heavy lifting, bending, stretching or pulling. Some tenderness will be normal at the procedure site for a few days. You may remove the bandaid in 24-48 hours. If you experience any drainage or bleeding at the site, hold pressure for 30 minutes and lay on side opposite of the procedure site (move fluid away from the area of leakage). If drainage or bleeding does not stop and seems excessive, call your physician or proceed to the ER. Watch for signs of infection such as fever, chills, drainage or redness at the site. If you experience any of these symptoms, call your primary doctor or go to the emergency room. Please keep all follow-up appointments with your Hepatologist. Schedule follow-up appointment for repeat paracentesis if necessary. If you have any concerns please contact the [facility] Radiology Department at [phone] and / or Dr. Lisette Ochoa office at [phone].

## 2023-07-10 ENCOUNTER — TELEPHONE (OUTPATIENT)
Dept: INTERVENTIONAL RADIOLOGY/VASCULAR | Age: 38
End: 2023-07-10

## 2023-07-10 NOTE — TELEPHONE ENCOUNTER
Spoke to this patient who had a paracentesis by Dr. Devang Dixon on 07/07/2023. The patient denied any problems,concerns, or issues with the site post paracentesis. Patient stated that the site was a little sore but it is always that way for a few days after her paracentesis.

## 2023-07-12 ENCOUNTER — TELEPHONE (OUTPATIENT)
Dept: INFECTIOUS DISEASES | Age: 38
End: 2023-07-12

## 2023-07-12 NOTE — TELEPHONE ENCOUNTER
Follow up call to patient, as she left a vm on the ID voice mail box. Patient thought she had an appt with the ID office, and she is Not on the schedule. Advised to call when she is able during regular business hours to assist with an appt, if needed.

## 2023-07-17 ENCOUNTER — OFFICE VISIT (OUTPATIENT)
Dept: GASTROENTEROLOGY | Age: 38
End: 2023-07-17
Payer: COMMERCIAL

## 2023-07-17 ENCOUNTER — TELEPHONE (OUTPATIENT)
Dept: GASTROENTEROLOGY | Age: 38
End: 2023-07-17

## 2023-07-17 VITALS
HEART RATE: 112 BPM | DIASTOLIC BLOOD PRESSURE: 60 MMHG | WEIGHT: 146 LBS | OXYGEN SATURATION: 98 % | SYSTOLIC BLOOD PRESSURE: 114 MMHG | BODY MASS INDEX: 24.3 KG/M2

## 2023-07-17 DIAGNOSIS — K70.31 ALCOHOLIC CIRRHOSIS OF LIVER WITH ASCITES (HCC): Primary | ICD-10-CM

## 2023-07-17 DIAGNOSIS — K70.31 ALCOHOLIC CIRRHOSIS OF LIVER WITH ASCITES (HCC): ICD-10-CM

## 2023-07-17 DIAGNOSIS — E87.6 HYPOKALEMIA: Primary | ICD-10-CM

## 2023-07-17 LAB
ALBUMIN SERPL-MCNC: 2.6 G/DL (ref 3.5–4.6)
ALP SERPL-CCNC: 245 U/L (ref 40–130)
ALT SERPL-CCNC: 27 U/L (ref 0–33)
ANION GAP SERPL CALCULATED.3IONS-SCNC: 13 MEQ/L (ref 9–15)
AST SERPL-CCNC: 125 U/L (ref 0–35)
BILIRUB SERPL-MCNC: 8.6 MG/DL (ref 0.2–0.7)
BUN SERPL-MCNC: 13 MG/DL (ref 6–20)
CALCIUM SERPL-MCNC: 8.2 MG/DL (ref 8.5–9.9)
CHLORIDE SERPL-SCNC: 92 MEQ/L (ref 95–107)
CO2 SERPL-SCNC: 29 MEQ/L (ref 20–31)
CREAT SERPL-MCNC: 1 MG/DL (ref 0.5–0.9)
ERYTHROCYTE [DISTWIDTH] IN BLOOD BY AUTOMATED COUNT: 15.1 % (ref 11.5–14.5)
GLOBULIN SER CALC-MCNC: 4.7 G/DL (ref 2.3–3.5)
GLUCOSE SERPL-MCNC: 121 MG/DL (ref 70–99)
HCT VFR BLD AUTO: 26.7 % (ref 37–47)
HGB BLD-MCNC: 9.1 G/DL (ref 12–16)
INR PPP: 1.7
MCH RBC QN AUTO: 36.6 PG (ref 27–31.3)
MCHC RBC AUTO-ENTMCNC: 34.1 % (ref 33–37)
MCV RBC AUTO: 107.3 FL (ref 79.4–94.8)
PLATELET # BLD AUTO: 103 K/UL (ref 130–400)
POTASSIUM SERPL-SCNC: 3 MEQ/L (ref 3.4–4.9)
PROT SERPL-MCNC: 7.3 G/DL (ref 6.3–8)
PROTHROMBIN TIME: 20 SEC (ref 12.3–14.9)
RBC # BLD AUTO: 2.49 M/UL (ref 4.2–5.4)
SODIUM SERPL-SCNC: 134 MEQ/L (ref 135–144)
WBC # BLD AUTO: 6.3 K/UL (ref 4.8–10.8)

## 2023-07-17 PROCEDURE — 4004F PT TOBACCO SCREEN RCVD TLK: CPT | Performed by: NURSE PRACTITIONER

## 2023-07-17 PROCEDURE — 99214 OFFICE O/P EST MOD 30 MIN: CPT | Performed by: NURSE PRACTITIONER

## 2023-07-17 PROCEDURE — G8427 DOCREV CUR MEDS BY ELIG CLIN: HCPCS | Performed by: NURSE PRACTITIONER

## 2023-07-17 PROCEDURE — G8420 CALC BMI NORM PARAMETERS: HCPCS | Performed by: NURSE PRACTITIONER

## 2023-07-17 RX ORDER — POTASSIUM CHLORIDE 20 MEQ/1
20 TABLET, EXTENDED RELEASE ORAL 2 TIMES DAILY
Qty: 6 TABLET | Refills: 0 | Status: SHIPPED | OUTPATIENT
Start: 2023-07-17 | End: 2023-07-20

## 2023-07-17 ASSESSMENT — ENCOUNTER SYMPTOMS
TROUBLE SWALLOWING: 0
NAUSEA: 0
VOMITING: 0
WHEEZING: 0
RECTAL PAIN: 0
SHORTNESS OF BREATH: 0
CONSTIPATION: 0
CHEST TIGHTNESS: 0
EYE REDNESS: 0
ABDOMINAL PAIN: 0
VOICE CHANGE: 0
COLOR CHANGE: 0
ABDOMINAL DISTENTION: 1
DIARRHEA: 0
EYE PAIN: 0
ANAL BLEEDING: 0
PHOTOPHOBIA: 0
BLOOD IN STOOL: 0

## 2023-07-17 NOTE — PROGRESS NOTES
Subjective:      Patient ID: Adelaida Willams is a 42 y.o. female who presents today for:  Chief Complaint   Patient presents with    Follow-up       HPI  Patient came in as follow-up and further management of decompensated alcoholic cirrhosis, has struggled with alcohol abstinence, she did drink alcohol yesterday. Since her last office visit she was hospitalized in May for GI bleeding had EGD and colonoscopy with no varices or fresh or old blood identified and was recently hospitalized again for bilateral lower extremity cellulitis. Completed oral course of Keflex, clinically improved. Has ascites, will continue paracentesis as needed, has been maintained on dual diuretics however does not take these consistently. Has been maintained on lactulose for hepatic encephalopathy currently no confusion. OV 4/12/23  Patient came in as follow-up and further evaluation and management of cirrhosis, as recall patient carries a diagnosis of decompensated alcoholic cirrhosis and has been abstinent since December 2022. Had previously been on dual diuretics and requiring large-volume paracentesis, she stopped her diuretics and has not required a paracentesis in 5 months. Noted history of SBP remotely approximately 1 year prior. Noted history of EGD with portal hypertensive gastropathy, no varices esophageal varices and no history of GI bleed. Otherwise no recent liver related hospitalizations, memory loss or confusion, jaundice is improved, no pruritus, abdominal swelling, lower extremity edema, nausea or vomiting, hematemesis, melena, or hematochezia. OV 2/16/23  Patient came in as follow-up and further evaluation and management of cirrhosis as recall patient carries a diagnosis of decompensated cirrhosis from alcohol use has been abstinent since December 2022.   Is maintained on dual diuretics requires large-volume paracentesis as needed, most recent ascitic fluid was negative for SBP, has a history of SBP, has a history of

## 2023-07-17 NOTE — TELEPHONE ENCOUNTER
The patient was advised, she is waiting for Dr. Mitesh Velez office to call to schedule a paracinesis

## 2023-07-17 NOTE — TELEPHONE ENCOUNTER
Called patient no answer, left voicemail for her to take potassium supplement as prescribed, hold lasix for now, stop drinking alcohol and have blood work rechecked after finishing potassium. Was advised to go to the ED for any CP, SOB, or muscle weakness.     Fabian Eric

## 2023-07-18 DIAGNOSIS — K70.31 ALCOHOLIC CIRRHOSIS OF LIVER WITH ASCITES (HCC): Primary | ICD-10-CM

## 2023-07-18 LAB — AFP-TM SERPL-MCNC: 5.7 UG/L

## 2023-07-20 ENCOUNTER — TELEPHONE (OUTPATIENT)
Dept: INTERVENTIONAL RADIOLOGY/VASCULAR | Age: 38
End: 2023-07-20

## 2023-07-20 NOTE — TELEPHONE ENCOUNTER
Patient was given this information via phone conversation - voiced understanding  2. Spoke to [employer] in [place]: 7/24/2023 @ 10:00 am  >  You will need to arrive at 9:30 am from home and check in at the Diagnostic Imaging Check In desk.

## 2023-07-24 ENCOUNTER — HOSPITAL ENCOUNTER (OUTPATIENT)
Dept: INTERVENTIONAL RADIOLOGY/VASCULAR | Age: 38
Discharge: HOME | End: 2023-07-26
Payer: COMMERCIAL

## 2023-07-24 VITALS — SYSTOLIC BLOOD PRESSURE: 102 MMHG | OXYGEN SATURATION: 99 % | HEART RATE: 106 BPM | DIASTOLIC BLOOD PRESSURE: 58 MMHG

## 2023-07-24 DIAGNOSIS — E87.6 HYPOKALEMIA: ICD-10-CM

## 2023-07-24 DIAGNOSIS — K70.31 ALCOHOLIC CIRRHOSIS OF LIVER WITH ASCITES (HCC): ICD-10-CM

## 2023-07-24 LAB
ANION GAP SERPL CALCULATED.3IONS-SCNC: 13 MEQ/L (ref 9–15)
BUN SERPL-MCNC: 9 MG/DL (ref 6–20)
CALCIUM SERPL-MCNC: 8.2 MG/DL (ref 8.5–9.9)
CHLORIDE SERPL-SCNC: 98 MEQ/L (ref 95–107)
CO2 SERPL-SCNC: 27 MEQ/L (ref 20–31)
CREAT SERPL-MCNC: 0.95 MG/DL (ref 0.5–0.9)
GLUCOSE SERPL-MCNC: 81 MG/DL (ref 70–99)
POTASSIUM SERPL-SCNC: 3.8 MEQ/L (ref 3.4–4.9)
SODIUM SERPL-SCNC: 138 MEQ/L (ref 135–144)

## 2023-07-24 PROCEDURE — 2500000003 HC RX 250 WO HCPCS: Performed by: RADIOLOGY

## 2023-07-24 PROCEDURE — 49083 ABD PARACENTESIS W/IMAGING: CPT

## 2023-07-24 PROCEDURE — 2709999900 IR US GUIDED PARACENTESIS

## 2023-07-24 RX ORDER — LIDOCAINE HYDROCHLORIDE 20 MG/ML
INJECTION, SOLUTION INFILTRATION; PERINEURAL PRN
Status: COMPLETED | OUTPATIENT
Start: 2023-07-24 | End: 2023-07-24

## 2023-07-24 RX ADMIN — LIDOCAINE HYDROCHLORIDE 8 ML: 20 INJECTION, SOLUTION INFILTRATION; PERINEURAL at 11:02

## 2023-07-24 NOTE — DISCHARGE INSTRUCTIONS
Ultrasound Guided Paracentesis Discharge Instructions     Rest today. No heavy lifting, bending, stretching or pulling. Some tenderness will be normal at the procedure site for a few days. You may remove the bandaid in 24-48 hours. If you experience any drainage or bleeding at the site, hold pressure for 30 minutes and lay on side opposite of the procedure site (move fluid away from the area of leakage). If drainage or bleeding does not stop and seems excessive, call your physician or proceed to the ER. Watch for signs of infection such as fever, chills, drainage or redness at the site. If you experience any of these symptoms, call your primary doctor or go to the emergency room. Please keep all follow-up appointments with your Hepatologist. Schedule follow-up appointment for repeat paracentesis if necessary. If you have any concerns please contact the [address] [place] [address] Radiology Department at [phone] and / or Dr. Watson Channel office at [phone].

## 2023-07-24 NOTE — OR NURSING
NO SEDATION    1010 Patient taken to restroom upon arrival. Procedure explained, consent signed. US student tech obtained images prior to start of procedure using U/S. Pt  VSS. [address] Inderj[address] Lima. Call to [facility] to notify. [address] completed for Ultrasound Guided Paracentesis. Using U/S, Dr. Inderjit Lima marked, prepped LLQ with Chloraprep and draped with sterile drape and towels. 1102 Site numbed with lidocaine. [id number] Centesis 5F catheter placed using Ultrasound guidance. Fluid appears clear yellow. Catheter tubing connected to [employer] machine to remove fluid. 1120 Pt tolerating well. Continues to drain without complication. VSS. 1135 Pt tolerated well. Total 4250 ml removed. Catheter removed, pressure held and bandaid applied. Verbal and tactile reassurance given throughout. 0 Pt walked to discharge exit.

## 2023-07-24 NOTE — OR NURSING
NO SEDATION    1010 Patient taken to restroom upon arrival. Procedure explained, consent signed. [employer] tech obtained images prior to start of procedure using U/S. Pt  VSS. Timeout completed for Ultrasound Guided Paracentesis. Using U/S, Dr. Lucy Pulido marked, prepped LLQ with Chloraprep and draped with sterile drape and towels. Site numbed with lidocaine. [id number] Centesis 5F catheter placed using Ultrasound guidance. Fluid appears @. Catheter tubing connected to [employer] machine to remove fluid. Pt tolerated well. Total @ ml removed. Catheter removed, pressure held and bandaid applied. Verbal and tactile reassurance given throughout. Discharge instructions reviewed with patient. Pt discharged to waiting area.

## 2023-07-25 ENCOUNTER — HOSPITAL ENCOUNTER (OUTPATIENT)
Dept: ULTRASOUND IMAGING | Age: 38
Discharge: HOME | End: 2023-07-27
Payer: COMMERCIAL

## 2023-07-25 ENCOUNTER — POST-OP TELEPHONE (OUTPATIENT)
Dept: INTERVENTIONAL RADIOLOGY/VASCULAR | Age: 38
End: 2023-07-25

## 2023-07-25 DIAGNOSIS — K70.31 ALCOHOLIC CIRRHOSIS OF LIVER WITH ASCITES (HCC): ICD-10-CM

## 2023-07-25 PROCEDURE — 76705 ECHO EXAM OF ABDOMEN: CPT

## 2023-07-25 NOTE — FLOWSHEET NOTE
Post procedure follow up phone call re: paracentesis completed on 7/24/23. No answer, voicemail message left with return radiology # to call if any issues / concerns. Electronically signed by Karlene Cm RN on 7/25/2023 at 9:47 AM

## 2023-08-01 DIAGNOSIS — K70.31 ALCOHOLIC CIRRHOSIS OF LIVER WITH ASCITES (HCC): Primary | ICD-10-CM

## 2023-08-02 ENCOUNTER — TELEPHONE (OUTPATIENT)
Dept: INTERVENTIONAL RADIOLOGY/VASCULAR | Age: 38
End: 2023-08-02

## 2023-08-02 ENCOUNTER — HOSPITAL ENCOUNTER (OUTPATIENT)
Dept: INTERVENTIONAL RADIOLOGY/VASCULAR | Age: 38
Discharge: HOME | End: 2023-08-04
Payer: COMMERCIAL

## 2023-08-02 VITALS
TEMPERATURE: 98.1 F | RESPIRATION RATE: 16 BRPM | HEART RATE: 111 BPM | SYSTOLIC BLOOD PRESSURE: 105 MMHG | DIASTOLIC BLOOD PRESSURE: 51 MMHG | OXYGEN SATURATION: 100 %

## 2023-08-02 DIAGNOSIS — K70.31 ALCOHOLIC CIRRHOSIS OF LIVER WITH ASCITES (HCC): ICD-10-CM

## 2023-08-02 PROCEDURE — C1729 CATH, DRAINAGE: HCPCS

## 2023-08-02 PROCEDURE — 49083 ABD PARACENTESIS W/IMAGING: CPT

## 2023-08-02 PROCEDURE — 2500000003 HC RX 250 WO HCPCS: Performed by: RADIOLOGY

## 2023-08-02 RX ORDER — LIDOCAINE HYDROCHLORIDE 20 MG/ML
INJECTION, SOLUTION INFILTRATION; PERINEURAL PRN
Status: DISCONTINUED | OUTPATIENT
Start: 2023-08-02 | End: 2023-08-05 | Stop reason: HOSPADM

## 2023-08-02 RX ADMIN — LIDOCAINE HYDROCHLORIDE 7 ML: 20 INJECTION, SOLUTION INFILTRATION; PERINEURAL at 13:43

## 2023-08-02 NOTE — DISCHARGE INSTRUCTIONS
Ultrasound Guided Paracentesis    Activity:  Rest, avoid strenuous activity such as lifting heavy objects, and bending, stretching or pulling. Diet:  Resume usual diet    Medication:  * Resume home medication unless otherwise instructed by your physician. * (If Applicable) If taking any blood thinners, speak with your physician before restarting them. * May take mild pain reliever, as needed, such as Acetaminophen or Ibuprofen. INSTRUCTIONS OR COMMENTS RELATIVE TO SPECIFIC EXAM PERFORMED:    - Some tenderness at site of paracentesis will be normal for a few days.  - May remove band aid after 48 hours. - Monitor the site for the next 24 - 48 hours. - If you experience any drainage or bleeding at the site, hold pressure for 30 minutes and lay on side opposite of the procedure site (move fluid away from the   area of leakage). If drainage or bleeding does not stop and seems excessive, call your physician or go to the ER for evaluation.   - If any signs of infection (redness, swelling, drainage/pus) at the site, go to ER for evaluation.   - Please keep all follow-up appointments with your Hepatologist. Schedule follow-up appointment for repeat paracentesis if necessary. IF YOU DEVELOP ANY OF THE FOLLOWING SYMPTOMS, CONTACT PHYSICIAN LISTED BELOW:  Physician performing procedure: Dr. Fan Stark [phone] and ask to be put in contact with the RADIOLOGY RN. After hours contact ER. * Extreme pain that increases after leaving the hospital  * Active bleeding (refer to above instructions)  * Chills, fever above 100 degrees Farenheit and fatigue. * Weakness, dizziness, lightheadedness or fainting. If you are unable to contact any of the above physician and you feel you have a major problem, please go to the Emergency Room for treatment.     TOTAL REMOVED WITH PARACENTESIS TODAY:  4850ml

## 2023-08-02 NOTE — OR NURSING
NO SEDATION      1334- Pt arrived to Specials room 6. Pt alert and oriented, able to follow commands and answer questions appropriately. Hx, allergies, medications reviewed. Pt offered reassurance and VSS. Pt denies pain at this time. Consent obtained for ultrasound guided paracentesis. 1341- U/S image obtained, Dr. Jeff Cuevas approved [facility] site. Timeout completed. 1342- Area cleansed with large tinted chloraprep and once dry, draped with fenestrated drape and sterile towels by [employer]. 1343- Using U/S guidance, Dr. Nahomy Izaguirre site with 2% lidocaine, see eMar.     1344- Using U/S guidance, access obtained with [id number] centesis 5F catheter with return of fluid that appears clear, yellow. Catheter tubing connected to Kevin machine with suction at 280 mmHG and draining well. Pt tolerating well. VSS.     1405- Patient states that she has an upcoming appointment with Sandi Cotto in August. Reminded patient to go over her home medication list with Jori Pulse at her appointment. There are several medications on the list that appear to need to have refills added. Patient continues to tolerate paracentesis well. 1414-Drainage complete. Total 4850ml removed. Centesis catheter removed and digital pressure held to site for 2 minutes. Q site soft, no drainage, large bandaid applied. VSS. 1415- Discharge instructions gone over with patient. Patient verbalizes understanding.

## 2023-08-02 NOTE — TELEPHONE ENCOUNTER
Patient was given this information via phone conversation - voiced understanding  2. Spoke to [employer] in [place]: 8/2/2023 @ 1:00 pm  >  You will need to arrive at 12:30 pm from home and check in at the Diagnostic Imaging Check In desk.

## 2023-08-03 NOTE — FLOWSHEET NOTE
Spoke to patient to follow up with her after having her paracentesis yesterday. Patient is doing well, states that she is having some \"full body aches\" today, but the paracentesis site is good. No drainage or pain from site. Patient had no additional questions or concerns at this time.

## 2023-08-05 ENCOUNTER — HOSPITAL ENCOUNTER (INPATIENT)
Age: 38
LOS: 4 days | Discharge: HOME | DRG: 254 | End: 2023-08-10
Attending: EMERGENCY MEDICINE | Admitting: INTERNAL MEDICINE
Payer: COMMERCIAL

## 2023-08-05 DIAGNOSIS — K72.90 DECOMPENSATED HEPATIC CIRRHOSIS (HCC): ICD-10-CM

## 2023-08-05 DIAGNOSIS — K80.20 CALCULUS OF GALLBLADDER WITHOUT CHOLECYSTITIS WITHOUT OBSTRUCTION: ICD-10-CM

## 2023-08-05 DIAGNOSIS — E87.1 HYPONATREMIA: ICD-10-CM

## 2023-08-05 DIAGNOSIS — E83.42 HYPOMAGNESEMIA: ICD-10-CM

## 2023-08-05 DIAGNOSIS — I95.89 HYPOTENSION DUE TO HYPOVOLEMIA: ICD-10-CM

## 2023-08-05 DIAGNOSIS — D64.9 ANEMIA REQUIRING TRANSFUSIONS: ICD-10-CM

## 2023-08-05 DIAGNOSIS — N17.9 AKI (ACUTE KIDNEY INJURY) (HCC): Primary | ICD-10-CM

## 2023-08-05 DIAGNOSIS — E86.1 HYPOTENSION DUE TO HYPOVOLEMIA: ICD-10-CM

## 2023-08-05 DIAGNOSIS — K74.60 DECOMPENSATED HEPATIC CIRRHOSIS (HCC): ICD-10-CM

## 2023-08-05 DIAGNOSIS — K70.31 ALCOHOLIC CIRRHOSIS OF LIVER WITH ASCITES (HCC): ICD-10-CM

## 2023-08-05 DIAGNOSIS — K92.1 BLOOD IN STOOL: ICD-10-CM

## 2023-08-05 DIAGNOSIS — N30.00 ACUTE CYSTITIS WITHOUT HEMATURIA: ICD-10-CM

## 2023-08-05 LAB
ALBUMIN SERPL-MCNC: 2.1 G/DL (ref 3.5–4.6)
ALP SERPL-CCNC: 147 U/L (ref 40–130)
ALT SERPL-CCNC: 35 U/L (ref 0–33)
AMMONIA PLAS-SCNC: 104 UMOL/L (ref 11–51)
ANION GAP SERPL CALCULATED.3IONS-SCNC: 17 MEQ/L (ref 9–15)
AST SERPL-CCNC: 156 U/L (ref 0–35)
BILIRUB SERPL-MCNC: 11.4 MG/DL (ref 0.2–0.7)
BUN SERPL-MCNC: 39 MG/DL (ref 6–20)
CALCIUM SERPL-MCNC: 7.6 MG/DL (ref 8.5–9.9)
CHLORIDE SERPL-SCNC: 83 MEQ/L (ref 95–107)
CO2 SERPL-SCNC: 21 MEQ/L (ref 20–31)
CREAT SERPL-MCNC: 3 MG/DL (ref 0.5–0.9)
ETHANOL PERCENT: 0.01 G/DL
ETHANOLAMINE SERPL-MCNC: 13 MG/DL (ref 0–0.08)
GLOBULIN SER CALC-MCNC: 3.6 G/DL (ref 2.3–3.5)
GLUCOSE SERPL-MCNC: 117 MG/DL (ref 70–99)
LIPASE SERPL-CCNC: 46 U/L (ref 12–95)
MAGNESIUM SERPL-MCNC: 1 MG/DL (ref 1.7–2.4)
POTASSIUM SERPL-SCNC: 3.3 MEQ/L (ref 3.4–4.9)
PROT SERPL-MCNC: 5.7 G/DL (ref 6.3–8)
SODIUM SERPL-SCNC: 121 MEQ/L (ref 135–144)

## 2023-08-05 PROCEDURE — 96366 THER/PROPH/DIAG IV INF ADDON: CPT

## 2023-08-05 PROCEDURE — 81001 URINALYSIS AUTO W/SCOPE: CPT

## 2023-08-05 PROCEDURE — 84443 ASSAY THYROID STIM HORMONE: CPT

## 2023-08-05 PROCEDURE — 80307 DRUG TEST PRSMV CHEM ANLYZR: CPT

## 2023-08-05 PROCEDURE — 86923 COMPATIBILITY TEST ELECTRIC: CPT

## 2023-08-05 PROCEDURE — 82077 ASSAY SPEC XCP UR&BREATH IA: CPT

## 2023-08-05 PROCEDURE — 85025 COMPLETE CBC W/AUTO DIFF WBC: CPT

## 2023-08-05 PROCEDURE — 96375 TX/PRO/DX INJ NEW DRUG ADDON: CPT

## 2023-08-05 PROCEDURE — 80053 COMPREHEN METABOLIC PANEL: CPT

## 2023-08-05 PROCEDURE — 85730 THROMBOPLASTIN TIME PARTIAL: CPT

## 2023-08-05 PROCEDURE — 84484 ASSAY OF TROPONIN QUANT: CPT

## 2023-08-05 PROCEDURE — P9016 RBC LEUKOCYTES REDUCED: HCPCS

## 2023-08-05 PROCEDURE — 84145 PROCALCITONIN (PCT): CPT

## 2023-08-05 PROCEDURE — 82140 ASSAY OF AMMONIA: CPT

## 2023-08-05 PROCEDURE — 86850 RBC ANTIBODY SCREEN: CPT

## 2023-08-05 PROCEDURE — 85610 PROTHROMBIN TIME: CPT

## 2023-08-05 PROCEDURE — 96365 THER/PROPH/DIAG IV INF INIT: CPT

## 2023-08-05 PROCEDURE — 99285 EMERGENCY DEPT VISIT HI MDM: CPT

## 2023-08-05 PROCEDURE — 86900 BLOOD TYPING SEROLOGIC ABO: CPT

## 2023-08-05 PROCEDURE — 83880 ASSAY OF NATRIURETIC PEPTIDE: CPT

## 2023-08-05 PROCEDURE — 83735 ASSAY OF MAGNESIUM: CPT

## 2023-08-05 PROCEDURE — 93005 ELECTROCARDIOGRAM TRACING: CPT | Performed by: EMERGENCY MEDICINE

## 2023-08-05 PROCEDURE — 83690 ASSAY OF LIPASE: CPT

## 2023-08-05 PROCEDURE — 86901 BLOOD TYPING SEROLOGIC RH(D): CPT

## 2023-08-05 PROCEDURE — P9059 PLASMA, FRZ BETWEEN 8-24HOUR: HCPCS

## 2023-08-05 RX ORDER — 0.9 % SODIUM CHLORIDE 0.9 %
500 INTRAVENOUS SOLUTION INTRAVENOUS ONCE
Status: COMPLETED | OUTPATIENT
Start: 2023-08-05 | End: 2023-08-06

## 2023-08-05 ASSESSMENT — PAIN SCALES - GENERAL
PAINLEVEL_OUTOF10: 10
PAINLEVEL_OUTOF10: 10

## 2023-08-05 ASSESSMENT — ENCOUNTER SYMPTOMS
PHOTOPHOBIA: 0
COUGH: 0
ABDOMINAL PAIN: 1
NAUSEA: 1
BLOOD IN STOOL: 1
VOMITING: 1

## 2023-08-05 ASSESSMENT — PAIN DESCRIPTION - FREQUENCY: FREQUENCY: CONTINUOUS

## 2023-08-05 ASSESSMENT — PAIN DESCRIPTION - LOCATION
LOCATION: ABDOMEN;BACK
LOCATION: ABDOMEN;BACK

## 2023-08-05 ASSESSMENT — PAIN DESCRIPTION - DESCRIPTORS
DESCRIPTORS: DISCOMFORT
DESCRIPTORS: DISCOMFORT

## 2023-08-05 ASSESSMENT — PAIN DESCRIPTION - ONSET: ONSET: ON-GOING

## 2023-08-06 ENCOUNTER — APPOINTMENT (OUTPATIENT)
Dept: CT IMAGING | Age: 38
DRG: 254 | End: 2023-08-06
Payer: COMMERCIAL

## 2023-08-06 ENCOUNTER — APPOINTMENT (OUTPATIENT)
Dept: GENERAL RADIOLOGY | Age: 38
DRG: 254 | End: 2023-08-06
Payer: COMMERCIAL

## 2023-08-06 PROBLEM — I95.9 HYPOTENSION: Status: ACTIVE | Noted: 2023-08-06

## 2023-08-06 PROBLEM — K92.2 GI BLEED: Status: ACTIVE | Noted: 2023-08-06

## 2023-08-06 LAB
ABO + RH BLD: NORMAL
AMPHET UR QL SCN: ABNORMAL
ANION GAP SERPL CALCULATED.3IONS-SCNC: 12 MEQ/L (ref 9–15)
ANION GAP SERPL CALCULATED.3IONS-SCNC: 14 MEQ/L (ref 9–15)
ANISOCYTOSIS BLD QL SMEAR: ABNORMAL
ANISOCYTOSIS BLD QL SMEAR: ABNORMAL
APTT PPP: 47.8 SEC (ref 24.4–36.8)
BACTERIA URNS QL MICRO: ABNORMAL /HPF
BACTERIA URNS QL MICRO: NEGATIVE /HPF
BARBITURATES UR QL SCN: ABNORMAL
BASOPHILS # BLD: 0 K/UL (ref 0–0.2)
BASOPHILS # BLD: 0 K/UL (ref 0–0.2)
BASOPHILS NFR BLD: 0 %
BASOPHILS NFR BLD: 0.4 %
BENZODIAZ UR QL SCN: ABNORMAL
BILIRUB DIRECT SERPL-MCNC: 5.9 MG/DL (ref 0–0.4)
BILIRUB INDIRECT SERPL-MCNC: 5.1 MG/DL (ref 0–0.6)
BILIRUB SERPL-MCNC: 11 MG/DL (ref 0.2–0.7)
BILIRUB UR QL STRIP: ABNORMAL
BILIRUB UR QL STRIP: NEGATIVE
BLD GP AB SCN SERPL QL: NORMAL
BLOOD BANK DISPENSE STATUS: NORMAL
BLOOD BANK PRODUCT CODE: NORMAL
BNP BLD-MCNC: 594 PG/ML
BPU ID: NORMAL
BUN SERPL-MCNC: 35 MG/DL (ref 6–20)
BUN SERPL-MCNC: 39 MG/DL (ref 6–20)
BURR CELLS: ABNORMAL
CALCIUM SERPL-MCNC: 7.8 MG/DL (ref 8.5–9.9)
CALCIUM SERPL-MCNC: 8.5 MG/DL (ref 8.5–9.9)
CANNABINOIDS UR QL SCN: POSITIVE
CHLORIDE SERPL-SCNC: 89 MEQ/L (ref 95–107)
CHLORIDE SERPL-SCNC: 90 MEQ/L (ref 95–107)
CHP ED QC CHECK: NORMAL
CLARITY UR: ABNORMAL
CLARITY UR: CLEAR
CO2 SERPL-SCNC: 22 MEQ/L (ref 20–31)
CO2 SERPL-SCNC: 24 MEQ/L (ref 20–31)
COCAINE UR QL SCN: ABNORMAL
COLOR UR: ABNORMAL
COLOR UR: YELLOW
CREAT SERPL-MCNC: 1.8 MG/DL (ref 0.5–0.9)
CREAT SERPL-MCNC: 2.41 MG/DL (ref 0.5–0.9)
CREAT UR-MCNC: 129.2 MG/DL
DESCRIPTION BLOOD BANK: NORMAL
DRUG SCREEN COMMENT UR-IMP: ABNORMAL
EOSINOPHIL # BLD: 0 K/UL (ref 0–0.7)
EOSINOPHIL # BLD: 0.1 K/UL (ref 0–0.7)
EOSINOPHIL NFR BLD: 0 %
EOSINOPHIL NFR BLD: 0.7 %
EPI CELLS #/AREA URNS AUTO: ABNORMAL /HPF (ref 0–5)
EPI CELLS #/AREA URNS AUTO: ABNORMAL /HPF (ref 0–5)
ERYTHROCYTE [DISTWIDTH] IN BLOOD BY AUTOMATED COUNT: 17.1 % (ref 11.5–14.5)
ERYTHROCYTE [DISTWIDTH] IN BLOOD BY AUTOMATED COUNT: 24.8 % (ref 11.5–14.5)
ERYTHROCYTE [DISTWIDTH] IN BLOOD BY AUTOMATED COUNT: 26.4 % (ref 11.5–14.5)
FENTANYL SCREEN, URINE: ABNORMAL
GLUCOSE BLD-MCNC: 105 MG/DL (ref 70–99)
GLUCOSE BLD-MCNC: 69 MG/DL (ref 70–99)
GLUCOSE SERPL-MCNC: 101 MG/DL (ref 70–99)
GLUCOSE SERPL-MCNC: 62 MG/DL (ref 70–99)
GLUCOSE UR STRIP-MCNC: NEGATIVE MG/DL
GLUCOSE UR STRIP-MCNC: NEGATIVE MG/DL
HCG, URINE, POC: NEGATIVE
HCT VFR BLD AUTO: 18.4 % (ref 37–47)
HCT VFR BLD AUTO: 22.6 % (ref 37–47)
HCT VFR BLD AUTO: 22.7 % (ref 37–47)
HEMOCCULT STL QL: POSITIVE
HGB BLD-MCNC: 6.3 G/DL (ref 12–16)
HGB BLD-MCNC: 7.8 G/DL (ref 12–16)
HGB BLD-MCNC: 7.9 G/DL (ref 12–16)
HGB UR QL STRIP: ABNORMAL
HGB UR QL STRIP: NEGATIVE
HYALINE CASTS #/AREA URNS AUTO: ABNORMAL /HPF (ref 0–5)
HYALINE CASTS #/AREA URNS LPF: ABNORMAL /LPF (ref 0–5)
HYPOCHROMIA BLD QL SMEAR: ABNORMAL
INR PPP: 1.8
INR PPP: 2.4
KETONES UR STRIP-MCNC: NEGATIVE MG/DL
KETONES UR STRIP-MCNC: NEGATIVE MG/DL
LACTATE BLDV-SCNC: 1.9 MMOL/L (ref 0.5–2.2)
LACTATE BLDV-SCNC: 2.4 MMOL/L (ref 0.5–2.2)
LACTIC ACID, SEPSIS: 2.8 MMOL/L (ref 0.5–1.9)
LACTIC ACID, SEPSIS: 4 MMOL/L (ref 0.5–1.9)
LEUKOCYTE ESTERASE UR QL STRIP: ABNORMAL
LEUKOCYTE ESTERASE UR QL STRIP: NEGATIVE
LYMPHOCYTES # BLD: 0.6 K/UL (ref 1–4.8)
LYMPHOCYTES # BLD: 1.2 K/UL (ref 1–4.8)
LYMPHOCYTES NFR BLD: 13.6 %
LYMPHOCYTES NFR BLD: 5 %
Lab: NORMAL
MAGNESIUM SERPL-MCNC: 2.7 MG/DL (ref 1.7–2.4)
MAGNESIUM SERPL-MCNC: 3.3 MG/DL (ref 1.7–2.4)
MCH RBC QN AUTO: 34.7 PG (ref 27–31.3)
MCH RBC QN AUTO: 35.2 PG (ref 27–31.3)
MCH RBC QN AUTO: 38.8 PG (ref 27–31.3)
MCHC RBC AUTO-ENTMCNC: 34.2 % (ref 33–37)
MCHC RBC AUTO-ENTMCNC: 34.5 % (ref 33–37)
MCHC RBC AUTO-ENTMCNC: 35 % (ref 33–37)
MCV RBC AUTO: 100.6 FL (ref 79.4–94.8)
MCV RBC AUTO: 100.7 FL (ref 79.4–94.8)
MCV RBC AUTO: 113.5 FL (ref 79.4–94.8)
METHADONE UR QL SCN: ABNORMAL
MICROCYTES BLD QL SMEAR: ABNORMAL
MONOCYTES # BLD: 0.6 K/UL (ref 0.2–0.8)
MONOCYTES # BLD: 0.8 K/UL (ref 0.2–0.8)
MONOCYTES NFR BLD: 5 %
MONOCYTES NFR BLD: 8.8 %
NEGATIVE QC PASS/FAIL: NORMAL
NEUTROPHILS # BLD: 11.5 K/UL (ref 1.4–6.5)
NEUTROPHILS # BLD: 6.8 K/UL (ref 1.4–6.5)
NEUTS BAND NFR BLD MANUAL: 9 % (ref 5–11)
NEUTS SEG NFR BLD: 76.5 %
NEUTS SEG NFR BLD: 81 %
NITRITE UR QL STRIP: NEGATIVE
NITRITE UR QL STRIP: POSITIVE
OPIATES UR QL SCN: ABNORMAL
OXYCODONE UR QL SCN: ABNORMAL
PCP UR QL SCN: ABNORMAL
PERFORMED ON: ABNORMAL
PH UR STRIP: 5 [PH] (ref 5–9)
PH UR STRIP: 6 [PH] (ref 5–9)
PHOSPHATE SERPL-MCNC: 3.8 MG/DL (ref 2.3–4.8)
PLATELET # BLD AUTO: 105 K/UL (ref 130–400)
PLATELET # BLD AUTO: 146 K/UL (ref 130–400)
PLATELET # BLD AUTO: 80 K/UL (ref 130–400)
PLATELET BLD QL SMEAR: ABNORMAL
POC CREATININE: 3.9 MG/DL (ref 0.6–1.2)
POC SAMPLE TYPE: ABNORMAL
POIKILOCYTOSIS BLD QL SMEAR: ABNORMAL
POIKILOCYTOSIS BLD QL SMEAR: ABNORMAL
POLYCHROMASIA BLD QL SMEAR: ABNORMAL
POLYCHROMASIA BLD QL SMEAR: ABNORMAL
POSITIVE QC PASS/FAIL: NORMAL
POTASSIUM SERPL-SCNC: 2.6 MEQ/L (ref 3.4–4.9)
POTASSIUM SERPL-SCNC: 4.1 MEQ/L (ref 3.4–4.9)
PROCALCITONIN SERPL IA-MCNC: 0.94 NG/ML (ref 0–0.15)
PROPOXYPH UR QL SCN: ABNORMAL
PROT UR STRIP-MCNC: ABNORMAL MG/DL
PROT UR STRIP-MCNC: NEGATIVE MG/DL
PROTHROMBIN TIME: 20.8 SEC (ref 12.3–14.9)
PROTHROMBIN TIME: 26.3 SEC (ref 12.3–14.9)
RBC # BLD AUTO: 1.62 M/UL (ref 4.2–5.4)
RBC # BLD AUTO: 2.25 M/UL (ref 4.2–5.4)
RBC # BLD AUTO: 2.25 M/UL (ref 4.2–5.4)
RBC #/AREA URNS AUTO: ABNORMAL /HPF (ref 0–5)
RBC #/AREA URNS HPF: ABNORMAL /HPF (ref 0–2)
SERUM OSMOLALITY: 277 MOSM/KG (ref 275–295)
SLIDE REVIEW: ABNORMAL
SMUDGE CELLS BLD QL SMEAR: PRESENT
SODIUM SERPL-SCNC: 125 MEQ/L (ref 135–144)
SODIUM SERPL-SCNC: 126 MEQ/L (ref 135–144)
SODIUM UR-SCNC: <20 MEQ/L
SP GR UR STRIP: 1.01 (ref 1–1.03)
SP GR UR STRIP: 1.02 (ref 1–1.03)
STOMATOCYTES BLD QL SMEAR: ABNORMAL
TROPONIN T SERPL-MCNC: <0.01 NG/ML (ref 0–0.01)
TSH REFLEX: 1.37 UIU/ML (ref 0.44–3.86)
URINE REFLEX TO CULTURE: ABNORMAL
URINE REFLEX TO CULTURE: ABNORMAL
UROBILINOGEN UR STRIP-ACNC: 0.2 E.U./DL
UROBILINOGEN UR STRIP-ACNC: 1 E.U./DL
WBC # BLD AUTO: 11.6 K/UL (ref 4.8–10.8)
WBC # BLD AUTO: 12.8 K/UL (ref 4.8–10.8)
WBC # BLD AUTO: 8.8 K/UL (ref 4.8–10.8)
WBC #/AREA URNS AUTO: ABNORMAL /HPF (ref 0–5)
WBC #/AREA URNS AUTO: ABNORMAL /HPF (ref 0–5)

## 2023-08-06 PROCEDURE — 82248 BILIRUBIN DIRECT: CPT

## 2023-08-06 PROCEDURE — P9047 ALBUMIN (HUMAN), 25%, 50ML: HCPCS | Performed by: INTERNAL MEDICINE

## 2023-08-06 PROCEDURE — 85610 PROTHROMBIN TIME: CPT

## 2023-08-06 PROCEDURE — 99254 IP/OBS CNSLTJ NEW/EST MOD 60: CPT | Performed by: INTERNAL MEDICINE

## 2023-08-06 PROCEDURE — 6360000002 HC RX W HCPCS: Performed by: EMERGENCY MEDICINE

## 2023-08-06 PROCEDURE — 80048 BASIC METABOLIC PNL TOTAL CA: CPT

## 2023-08-06 PROCEDURE — 6360000002 HC RX W HCPCS: Performed by: SPECIALIST

## 2023-08-06 PROCEDURE — 2580000003 HC RX 258: Performed by: INTERNAL MEDICINE

## 2023-08-06 PROCEDURE — 6360000002 HC RX W HCPCS: Performed by: INTERNAL MEDICINE

## 2023-08-06 PROCEDURE — 82247 BILIRUBIN TOTAL: CPT

## 2023-08-06 PROCEDURE — 83735 ASSAY OF MAGNESIUM: CPT

## 2023-08-06 PROCEDURE — 84100 ASSAY OF PHOSPHORUS: CPT

## 2023-08-06 PROCEDURE — 2000000000 HC ICU R&B

## 2023-08-06 PROCEDURE — 82570 ASSAY OF URINE CREATININE: CPT

## 2023-08-06 PROCEDURE — 99255 IP/OBS CONSLTJ NEW/EST HI 80: CPT | Performed by: INTERNAL MEDICINE

## 2023-08-06 PROCEDURE — 2500000003 HC RX 250 WO HCPCS: Performed by: INTERNAL MEDICINE

## 2023-08-06 PROCEDURE — C9113 INJ PANTOPRAZOLE SODIUM, VIA: HCPCS | Performed by: EMERGENCY MEDICINE

## 2023-08-06 PROCEDURE — 83930 ASSAY OF BLOOD OSMOLALITY: CPT

## 2023-08-06 PROCEDURE — 36415 COLL VENOUS BLD VENIPUNCTURE: CPT

## 2023-08-06 PROCEDURE — 85027 COMPLETE CBC AUTOMATED: CPT

## 2023-08-06 PROCEDURE — 83605 ASSAY OF LACTIC ACID: CPT

## 2023-08-06 PROCEDURE — 87040 BLOOD CULTURE FOR BACTERIA: CPT

## 2023-08-06 PROCEDURE — 81001 URINALYSIS AUTO W/SCOPE: CPT

## 2023-08-06 PROCEDURE — 2580000003 HC RX 258: Performed by: SPECIALIST

## 2023-08-06 PROCEDURE — 99253 IP/OBS CNSLTJ NEW/EST LOW 45: CPT | Performed by: SPECIALIST

## 2023-08-06 PROCEDURE — C9113 INJ PANTOPRAZOLE SODIUM, VIA: HCPCS | Performed by: INTERNAL MEDICINE

## 2023-08-06 PROCEDURE — 74176 CT ABD & PELVIS W/O CONTRAST: CPT

## 2023-08-06 PROCEDURE — 36430 TRANSFUSION BLD/BLD COMPNT: CPT

## 2023-08-06 PROCEDURE — 85025 COMPLETE CBC W/AUTO DIFF WBC: CPT

## 2023-08-06 PROCEDURE — A4216 STERILE WATER/SALINE, 10 ML: HCPCS | Performed by: EMERGENCY MEDICINE

## 2023-08-06 PROCEDURE — 6370000000 HC RX 637 (ALT 250 FOR IP): Performed by: INTERNAL MEDICINE

## 2023-08-06 PROCEDURE — 84300 ASSAY OF URINE SODIUM: CPT

## 2023-08-06 PROCEDURE — 71045 X-RAY EXAM CHEST 1 VIEW: CPT

## 2023-08-06 PROCEDURE — 2580000003 HC RX 258: Performed by: EMERGENCY MEDICINE

## 2023-08-06 PROCEDURE — 87086 URINE CULTURE/COLONY COUNT: CPT

## 2023-08-06 RX ORDER — ALBUMIN (HUMAN) 12.5 G/50ML
50 SOLUTION INTRAVENOUS ONCE
Status: COMPLETED | OUTPATIENT
Start: 2023-08-06 | End: 2023-08-06

## 2023-08-06 RX ORDER — POTASSIUM CHLORIDE 20 MEQ/1
40 TABLET, EXTENDED RELEASE ORAL ONCE
Status: COMPLETED | OUTPATIENT
Start: 2023-08-06 | End: 2023-08-06

## 2023-08-06 RX ORDER — SODIUM CHLORIDE 9 MG/ML
INJECTION, SOLUTION INTRAVENOUS PRN
Status: DISCONTINUED | OUTPATIENT
Start: 2023-08-06 | End: 2023-08-08

## 2023-08-06 RX ORDER — LORAZEPAM 2 MG/ML
2 INJECTION INTRAMUSCULAR
Status: DISCONTINUED | OUTPATIENT
Start: 2023-08-06 | End: 2023-08-10 | Stop reason: HOSPADM

## 2023-08-06 RX ORDER — SODIUM CHLORIDE 9 MG/ML
INJECTION, SOLUTION INTRAVENOUS PRN
Status: DISCONTINUED | OUTPATIENT
Start: 2023-08-06 | End: 2023-08-09 | Stop reason: SDUPTHER

## 2023-08-06 RX ORDER — DEXTROSE MONOHYDRATE 100 MG/ML
INJECTION, SOLUTION INTRAVENOUS CONTINUOUS PRN
Status: DISCONTINUED | OUTPATIENT
Start: 2023-08-06 | End: 2023-08-10 | Stop reason: HOSPADM

## 2023-08-06 RX ORDER — PHYTONADIONE 10 MG/ML
10 INJECTION, EMULSION INTRAMUSCULAR; INTRAVENOUS; SUBCUTANEOUS ONCE
Status: COMPLETED | OUTPATIENT
Start: 2023-08-06 | End: 2023-08-06

## 2023-08-06 RX ORDER — SODIUM CHLORIDE 9 MG/ML
INJECTION, SOLUTION INTRAVENOUS PRN
Status: DISCONTINUED | OUTPATIENT
Start: 2023-08-06 | End: 2023-08-06

## 2023-08-06 RX ORDER — POTASSIUM CHLORIDE 7.45 MG/ML
10 INJECTION INTRAVENOUS
Status: COMPLETED | OUTPATIENT
Start: 2023-08-06 | End: 2023-08-06

## 2023-08-06 RX ORDER — POTASSIUM CHLORIDE 7.45 MG/ML
10 INJECTION INTRAVENOUS
Status: DISCONTINUED | OUTPATIENT
Start: 2023-08-06 | End: 2023-08-06 | Stop reason: SDUPTHER

## 2023-08-06 RX ORDER — LORAZEPAM 2 MG/ML
3 INJECTION INTRAMUSCULAR
Status: DISCONTINUED | OUTPATIENT
Start: 2023-08-06 | End: 2023-08-10 | Stop reason: HOSPADM

## 2023-08-06 RX ORDER — LORAZEPAM 1 MG/1
4 TABLET ORAL
Status: DISCONTINUED | OUTPATIENT
Start: 2023-08-06 | End: 2023-08-10 | Stop reason: HOSPADM

## 2023-08-06 RX ORDER — SODIUM CHLORIDE, SODIUM LACTATE, POTASSIUM CHLORIDE, CALCIUM CHLORIDE 600; 310; 30; 20 MG/100ML; MG/100ML; MG/100ML; MG/100ML
INJECTION, SOLUTION INTRAVENOUS CONTINUOUS
Status: DISCONTINUED | OUTPATIENT
Start: 2023-08-06 | End: 2023-08-07

## 2023-08-06 RX ORDER — LORAZEPAM 2 MG/ML
1 INJECTION INTRAMUSCULAR ONCE
Status: COMPLETED | OUTPATIENT
Start: 2023-08-06 | End: 2023-08-06

## 2023-08-06 RX ORDER — NOREPINEPHRINE BITARTRATE 0.06 MG/ML
1-100 INJECTION, SOLUTION INTRAVENOUS CONTINUOUS
Status: DISCONTINUED | OUTPATIENT
Start: 2023-08-06 | End: 2023-08-08

## 2023-08-06 RX ORDER — SODIUM CHLORIDE 9 MG/ML
INJECTION, SOLUTION INTRAVENOUS PRN
Status: DISCONTINUED | OUTPATIENT
Start: 2023-08-06 | End: 2023-08-07

## 2023-08-06 RX ORDER — SODIUM CHLORIDE 0.9 % (FLUSH) 0.9 %
5-40 SYRINGE (ML) INJECTION PRN
Status: DISCONTINUED | OUTPATIENT
Start: 2023-08-06 | End: 2023-08-10 | Stop reason: HOSPADM

## 2023-08-06 RX ORDER — LORAZEPAM 2 MG/ML
4 INJECTION INTRAMUSCULAR
Status: DISCONTINUED | OUTPATIENT
Start: 2023-08-06 | End: 2023-08-10 | Stop reason: HOSPADM

## 2023-08-06 RX ORDER — 0.9 % SODIUM CHLORIDE 0.9 %
500 INTRAVENOUS SOLUTION INTRAVENOUS ONCE
Status: COMPLETED | OUTPATIENT
Start: 2023-08-06 | End: 2023-08-06

## 2023-08-06 RX ORDER — ALBUMIN (HUMAN) 12.5 G/50ML
25 SOLUTION INTRAVENOUS EVERY 8 HOURS
Status: DISCONTINUED | OUTPATIENT
Start: 2023-08-06 | End: 2023-08-06

## 2023-08-06 RX ORDER — THIAMINE HYDROCHLORIDE 100 MG/ML
100 INJECTION, SOLUTION INTRAMUSCULAR; INTRAVENOUS DAILY
Status: DISCONTINUED | OUTPATIENT
Start: 2023-08-06 | End: 2023-08-09

## 2023-08-06 RX ORDER — 0.9 % SODIUM CHLORIDE 0.9 %
750 INTRAVENOUS SOLUTION INTRAVENOUS ONCE
Status: COMPLETED | OUTPATIENT
Start: 2023-08-06 | End: 2023-08-06

## 2023-08-06 RX ORDER — OCTREOTIDE ACETATE 50 UG/ML
50 INJECTION, SOLUTION INTRAVENOUS; SUBCUTANEOUS ONCE
Status: DISCONTINUED | OUTPATIENT
Start: 2023-08-06 | End: 2023-08-06

## 2023-08-06 RX ORDER — LORAZEPAM 1 MG/1
2 TABLET ORAL
Status: DISCONTINUED | OUTPATIENT
Start: 2023-08-06 | End: 2023-08-10 | Stop reason: HOSPADM

## 2023-08-06 RX ORDER — METOCLOPRAMIDE HYDROCHLORIDE 5 MG/ML
10 INJECTION INTRAMUSCULAR; INTRAVENOUS EVERY 6 HOURS
Status: DISCONTINUED | OUTPATIENT
Start: 2023-08-06 | End: 2023-08-09

## 2023-08-06 RX ORDER — ACETAMINOPHEN 500 MG
500 TABLET ORAL EVERY 6 HOURS PRN
Status: DISCONTINUED | OUTPATIENT
Start: 2023-08-06 | End: 2023-08-10 | Stop reason: HOSPADM

## 2023-08-06 RX ORDER — MAGNESIUM SULFATE IN WATER 40 MG/ML
2000 INJECTION, SOLUTION INTRAVENOUS ONCE
Status: COMPLETED | OUTPATIENT
Start: 2023-08-06 | End: 2023-08-06

## 2023-08-06 RX ORDER — LORAZEPAM 1 MG/1
1 TABLET ORAL
Status: DISCONTINUED | OUTPATIENT
Start: 2023-08-06 | End: 2023-08-10 | Stop reason: HOSPADM

## 2023-08-06 RX ORDER — SODIUM CHLORIDE 0.9 % (FLUSH) 0.9 %
5-40 SYRINGE (ML) INJECTION EVERY 12 HOURS SCHEDULED
Status: DISCONTINUED | OUTPATIENT
Start: 2023-08-06 | End: 2023-08-10 | Stop reason: HOSPADM

## 2023-08-06 RX ORDER — LORAZEPAM 2 MG/ML
1 INJECTION INTRAMUSCULAR
Status: DISCONTINUED | OUTPATIENT
Start: 2023-08-06 | End: 2023-08-10 | Stop reason: HOSPADM

## 2023-08-06 RX ORDER — LORAZEPAM 1 MG/1
3 TABLET ORAL
Status: DISCONTINUED | OUTPATIENT
Start: 2023-08-06 | End: 2023-08-10 | Stop reason: HOSPADM

## 2023-08-06 RX ORDER — ALBUMIN (HUMAN) 12.5 G/50ML
25 SOLUTION INTRAVENOUS ONCE
Status: COMPLETED | OUTPATIENT
Start: 2023-08-06 | End: 2023-08-06

## 2023-08-06 RX ADMIN — POTASSIUM CHLORIDE 10 MEQ: 7.46 INJECTION, SOLUTION INTRAVENOUS at 11:05

## 2023-08-06 RX ADMIN — METOCLOPRAMIDE HYDROCHLORIDE 10 MG: 5 INJECTION INTRAMUSCULAR; INTRAVENOUS at 14:55

## 2023-08-06 RX ADMIN — MAGNESIUM SULFATE HEPTAHYDRATE 2000 MG: 40 INJECTION, SOLUTION INTRAVENOUS at 00:24

## 2023-08-06 RX ADMIN — DEXTROSE MONOHYDRATE 125 ML: 100 INJECTION, SOLUTION INTRAVENOUS at 22:39

## 2023-08-06 RX ADMIN — POTASSIUM CHLORIDE 10 MEQ: 7.46 INJECTION, SOLUTION INTRAVENOUS at 12:13

## 2023-08-06 RX ADMIN — THIAMINE HYDROCHLORIDE 100 MG: 100 INJECTION, SOLUTION INTRAMUSCULAR; INTRAVENOUS at 09:41

## 2023-08-06 RX ADMIN — SODIUM CHLORIDE 500 ML: 9 INJECTION, SOLUTION INTRAVENOUS at 01:55

## 2023-08-06 RX ADMIN — MAGNESIUM SULFATE HEPTAHYDRATE 6000 MG: 500 INJECTION, SOLUTION INTRAMUSCULAR; INTRAVENOUS at 03:58

## 2023-08-06 RX ADMIN — CEFTRIAXONE SODIUM 2000 MG: 2 INJECTION, POWDER, FOR SOLUTION INTRAMUSCULAR; INTRAVENOUS at 09:51

## 2023-08-06 RX ADMIN — SODIUM CHLORIDE 750 ML: 9 INJECTION, SOLUTION INTRAVENOUS at 03:00

## 2023-08-06 RX ADMIN — CALCIUM GLUCONATE 1000 MG: 98 INJECTION, SOLUTION INTRAVENOUS at 04:00

## 2023-08-06 RX ADMIN — SODIUM CHLORIDE 500 ML: 9 INJECTION, SOLUTION INTRAVENOUS at 04:04

## 2023-08-06 RX ADMIN — SODIUM CHLORIDE, POTASSIUM CHLORIDE, SODIUM LACTATE AND CALCIUM CHLORIDE: 600; 310; 30; 20 INJECTION, SOLUTION INTRAVENOUS at 05:21

## 2023-08-06 RX ADMIN — Medication 10 ML: at 09:40

## 2023-08-06 RX ADMIN — OCTREOTIDE ACETATE 50 MCG/HR: 1000 INJECTION, SOLUTION INTRAVENOUS; SUBCUTANEOUS at 18:20

## 2023-08-06 RX ADMIN — OCTREOTIDE ACETATE 50 MCG/HR: 1000 INJECTION, SOLUTION INTRAVENOUS; SUBCUTANEOUS at 10:40

## 2023-08-06 RX ADMIN — Medication 10 ML: at 21:22

## 2023-08-06 RX ADMIN — SODIUM CHLORIDE 80 MG: 9 INJECTION INTRAMUSCULAR; INTRAVENOUS; SUBCUTANEOUS at 00:15

## 2023-08-06 RX ADMIN — ALBUMIN (HUMAN) 25 G: 0.25 INJECTION, SOLUTION INTRAVENOUS at 11:39

## 2023-08-06 RX ADMIN — PHYTONADIONE 10 MG: 10 INJECTION, EMULSION INTRAMUSCULAR; INTRAVENOUS; SUBCUTANEOUS at 11:38

## 2023-08-06 RX ADMIN — POTASSIUM CHLORIDE 10 MEQ: 7.46 INJECTION, SOLUTION INTRAVENOUS at 13:17

## 2023-08-06 RX ADMIN — SODIUM CHLORIDE, POTASSIUM CHLORIDE, SODIUM LACTATE AND CALCIUM CHLORIDE: 600; 310; 30; 20 INJECTION, SOLUTION INTRAVENOUS at 14:49

## 2023-08-06 RX ADMIN — ALBUMIN (HUMAN) 50 G: 0.25 INJECTION, SOLUTION INTRAVENOUS at 04:19

## 2023-08-06 RX ADMIN — Medication 5 MCG/MIN: at 11:35

## 2023-08-06 RX ADMIN — FOLIC ACID 1 MG: 5 INJECTION, SOLUTION INTRAMUSCULAR; INTRAVENOUS; SUBCUTANEOUS at 10:19

## 2023-08-06 RX ADMIN — LORAZEPAM 2 MG: 2 INJECTION INTRAMUSCULAR; INTRAVENOUS at 09:40

## 2023-08-06 RX ADMIN — METOCLOPRAMIDE HYDROCHLORIDE 10 MG: 5 INJECTION INTRAMUSCULAR; INTRAVENOUS at 09:41

## 2023-08-06 RX ADMIN — METOCLOPRAMIDE HYDROCHLORIDE 10 MG: 5 INJECTION INTRAMUSCULAR; INTRAVENOUS at 21:15

## 2023-08-06 RX ADMIN — POTASSIUM CHLORIDE 40 MEQ: 1500 TABLET, EXTENDED RELEASE ORAL at 11:02

## 2023-08-06 RX ADMIN — SODIUM CHLORIDE 8 MG/HR: 9 INJECTION, SOLUTION INTRAVENOUS at 05:19

## 2023-08-06 RX ADMIN — SODIUM CHLORIDE 500 ML: 9 INJECTION, SOLUTION INTRAVENOUS at 00:16

## 2023-08-06 RX ADMIN — CEFTRIAXONE SODIUM 1000 MG: 1 INJECTION, POWDER, FOR SOLUTION INTRAMUSCULAR; INTRAVENOUS at 05:00

## 2023-08-06 RX ADMIN — ALBUMIN (HUMAN) 50 G: 0.25 INJECTION, SOLUTION INTRAVENOUS at 21:20

## 2023-08-06 RX ADMIN — LORAZEPAM 1 MG: 2 INJECTION INTRAMUSCULAR; INTRAVENOUS at 00:16

## 2023-08-06 ASSESSMENT — ENCOUNTER SYMPTOMS
SHORTNESS OF BREATH: 1
CONSTIPATION: 0
ABDOMINAL PAIN: 0
DIARRHEA: 1
COUGH: 1
NAUSEA: 1
ABDOMINAL DISTENTION: 1
EYES NEGATIVE: 1
VOMITING: 1

## 2023-08-06 ASSESSMENT — PAIN SCALES - GENERAL: PAINLEVEL_OUTOF10: 1

## 2023-08-06 NOTE — ED NOTES
Pt c/o abdominal pain, shortness of breath, vomiting, back pain, fatigue, and bloody stool x's 2 days.      Bertha Browning  08/05/23 7019

## 2023-08-06 NOTE — CONSULTS
[facility] [place], [address] [facility]                                  CONSULTATION    PATIENT NAME: Justine Gaucher                         :        1985  MED REC NO:   [id number]                            ROOM:       IC12  ACCOUNT NO:   [de-identified]                           ADMIT DATE: 2023  PROVIDER:     Andrew Chung DO    CONSULT DATE:  2023    RENAL CONSULTATION    HISTORY OF PRESENT ILLNESS:  A 58-year-old admitted to the hospital with  history of severe alcohol abuse. She has been having two to three days  of abdominal pain with emesis and bloody stools. The patient has  underwent EGD and colonoscopy two months ago which showed external  hemorrhoids of esophageal varices. She is being seen for WILLIE with  admission sodium of 121, potassium of 3.3 and creatinine of 3. Old  records show the patient has had normal renal function in the past.  She  is drowsy, has been given Ativan recently for tremors due to alcohol  withdrawal.  She cannot give an adequate history. Magnesium was low and  lactic acid level elevated on her arrival.    PAST MEDICAL HISTORY:  Cirrhosis, alcohol abuse. PAST SURGICAL HISTORY:  Appendectomy, multiple paracentesis and  colonoscopy, tubal ligation. SOCIAL HISTORY:  Single. Uses cigarettes daily in addition to his  severe alcohol abuse at 20 drinks per day. ALLERGIES:  OXYCODONE. REVIEW OF SYSTEMS:  Cannot be obtained. PHYSICAL EXAMINATION:  VITAL SIGNS:  5 feet and 5 inches, 120 pounds. Blood pressure is 90/50  and has been low since her admission. HEENT:  Normocephalic. Pupils reactive. Sclerae is muddy. CARDIOVASCULAR:  Heart is regular with 1/6 systolic murmur. CHEST:  Lungs clear. ABDOMEN:  Somewhat distended. No mild tenderness. EXTREMITIES:  No edema. SKIN:  Warm and dry. IMPRESSION:  1. WILLIE secondary to prerenal azotemia. 2.  Cirrhosis.   3.  Anemia, possible GI

## 2023-08-06 NOTE — CONSULTS
Consults    Patient Name: Earle Campoverde  Admit Date: 2023 10:16 PM  MR #: [id number]  : 1985    Attending Physician: Vishal Guerrero DO  Reason for consult: Rectal bleeding. History of Presenting Illness:      Earle Campoverde is a 42 y.o. female on hospital day 0 with a history of abdominal pain and rectal bleeding, patient also reports having emesis few days before and no history of hematemesis she has a history of cirrhosis secondary alcohol abuse complicated by ascites and has been getting paracentesis. Patient had recent EGD and colonoscopy in the last month which showed no gastroesophageal varices and colonoscopy showed diverticulosis and hemorrhoids. Patient continues to drink alcohol. . History Obtained From:  patient      History:      Past Medical History:   Diagnosis Date    Alcohol abuse     Cirrhosis ([address])     Tobacco dependence      Past Surgical History:   Procedure Laterality Date    APPENDECTOMY      COLONOSCOPY N/A 2023    COLONOSCOPY DIAGNOSTIC performed by Stan Arrington MD at [facility]    COLONOSCOPY N/A 2023    COLONOSCOPY DIAGNOSTIC performed by Brooklynn Montgomery MD at [address]      reports 6 sx on L foot and one on right foot    PARACENTESIS Left 2022    1510 ml removed per Dr Maninder Rosenthal  specimen obtained    PARACENTESIS Left 2023    4720 ml removed by Dr. Maninder Rosenthal - therapeutic & diagnostic    PARACENTESIS Left 2023    4400ml removed by Dr. Carolina Perez Left 2023    5600 ml removed by Dr. Evaristo Salzaar Left 2023    5835 ml removed by Dr. Jan Roy - diagnostics sent    PARACENTESIS Left 2023    5980 ml removed by Dr. Corazon Marroquin Left 2023    3050 ml removed by Dr Evaristo Salazar Left 2023    4970 ml removed by Funmilayo Whitlock CNP    PARACENTESIS Left 2023    2300ml removed by Dr. Evaristo Salazar Left 2023    4250  ml removed by Dr. Chrisandra Holter Active : Yes    Type of Occupation: unemployed    GED--then some college. Social Determinants of Health     Financial Resource Strain: Not on file   Food Insecurity: Not on file   Transportation Needs: Not on file   Physical Activity: Not on file   Stress: Not on file   Social Connections: Not on file   Intimate Partner Violence: Not on file   Housing Stability: Not on file      [] Unable to obtain due to ventilated and/ or neurologic status      Home Medications:      Medications Prior to Admission: potassium chloride (KLOR-CON M) 20 MEQ extended release tablet, Take 1 tablet by mouth 2 times daily for 3 days  triamcinolone (KENALOG) 0.025 % cream, Apply topically 2 times daily as directed small amount to red areas of  each leg for 2 weeks  spironolactone (ALDACTONE) 50 MG tablet, Take 1 tablet by mouth daily  acamprosate (CAMPRAL) 333 MG tablet, Take 2 tablets by mouth 3 times daily  furosemide (LASIX) 40 MG tablet, Take 1 tablet by mouth every morning  metroNIDAZOLE (METROGEL) 0.75 % gel, Apply topically 2 times daily Apply topically 2 times daily.   folic acid (FOLVITE) 1 MG tablet, Take 1 tablet by mouth daily  magnesium oxide (MAG-OX) 400 (240 Mg) MG tablet, Take 1 tablet by mouth 2 times daily  Multiple Vitamin (MULTIVITAMIN) TABS tablet, Take 1 tablet by mouth daily  pantoprazole (PROTONIX) 40 MG tablet, Take 1 tablet by mouth every morning (before breakfast)  thiamine 100 MG tablet, Take 1 tablet by mouth daily    Current Hospital Medications:     Scheduled Meds:   sodium chloride flush  5-40 mL IntraVENous 2 times per day    thiamine  100 mg IntraVENous Daily    folic acid IVPB  1 mg IntraVENous Daily    metoclopramide  10 mg IntraVENous Q6H    cefTRIAXone (ROCEPHIN) IV  2,000 mg IntraVENous Q24H     Continuous Infusions:   sodium chloride      sodium chloride      lactated ringers IV soln 100 mL/hr at 08/06/23 0810    pantoprozole (PROTONIX) infusion Stopped (08/06/23 0745)    sodium

## 2023-08-06 NOTE — ED NOTES
Lab came down for morning blood draw  stated she cant draw any blood while patient is receiving blood products     Ailyn Mendez RN  08/06/23 2516

## 2023-08-06 NOTE — ED NOTES
lAB UNABLE TO OBTAIN CULTURE 2 TECH STATED HE WILL SEND SOMEONE ELSE DOWN.  I ATTEMPTED TO DRAWN CULTURE 2 PATIENT DIDN'T WANT ME TO CONTINUE WILL WAIT FOR [facility] TO COME DOWN     Ailyn Mendez RN  08/06/23 0134

## 2023-08-06 NOTE — FLOWSHEET NOTE
I took over this patient in the 12 noon hour, her temperature was was low both po and axillary refer to flow sheet. Bear hugger applied and rechecked, temp coming up slowly. Bilateral EJ IV's flushed and patent as well as the #22 in her right hand. She has no complaints of pain, she has disorganized words when trying to have a conversation however there are times when she is alert and oriented X4. She had family in this shift to visit. Her mother took home her home medications. She has her cell phone and  at bedside. Patient is asleep and call light in reach at this time. No distress noted.

## 2023-08-06 NOTE — CONSULTS
Renal consult dictated  Refer to dictation   began Levo/ replace KCl IV  Vitamin K sub-q  follow H&H  and Na+ (related to ETOH abuse) sedation as needed watch BP

## 2023-08-06 NOTE — H&P
[facility]    HISTORY AND PHYSICAL EXAM    PATIENT NAME:  Nat Jacques    MRN:  [id number]  SERVICE DATE:  8/6/2023   SERVICE TIME:  2:57 AM    Primary Care Physician: Anh Gregorio MD     SUBJECTIVE  CHIEF COMPLAINT: Abdominal pain    HPI:  Nat Jacques is a 42 y.o. female who presents with complaint of abdominal pain. Patient is a [id number] alcoholic female presenting with 2 to 3 days of abdominal pain, dyspnea, emesis, and bloody stools. She also endorses decreased tolerance of oral intake for 2 to 3 days. She denies any hematemesis. She is reports dark bowel movements with occasional clots. She is not on any outpatient blood thinners. She does have dyspnea on exertion. Patient is a baseline alcoholic cirrhotic with serial outpatient paracenteses for ascites. Her last paracentesis was 3 days prior to presentation at which time 5 L was removed. She underwent EGD with colonoscopy 2 months previously which demonstrated external hemorrhoid and an anal fissure but no esophageal varices. She has chronic jaundice and leg edema. In the ED, patient was found to have small volume of BRBPR by ED provider. She was somewhat hypotensive in the ED, 89/56 with heart rate of 134, no fever, and extensive laboratory abnormalities to include: New hyponatremia 121, hypokalemia 3.3, severe WILLIE with creatinine 3.0 up from normal range baseline, leukocytosis 12.8, acute anemia 6.3, hypomagnesemia 1.0, lactic acidosis 2.8, hypocalcemia 7.6, hyperammonemia 104, diffuse transaminitis in alcoholic pattern, total bilirubin 11.4 (initially no fractionation performed), urinalysis concerning for UTI but not clean-catch with 3-5 epithelial cells on initial sample, INR 2.4, and detectable alcohol level 0.011 consistent with ongoing alcohol use. EKG demonstrated sinus tachycardia at 116 bpm with QTc prolongation 500 ms and nonspecific ST changes.   CT of the abdomen demonstrated cholelithiasis without evidence of Urinalysis with Reflex to Culture    Collection Time: 08/05/23 10:45 PM    Specimen: Urine   Result Value Ref Range    Color, UA DARK YELLOW (A) Straw/Yellow    Clarity, UA CLOUDY (A) Clear    Glucose, Ur Negative Negative mg/dL    Bilirubin Urine LARGE (A) Negative    Ketones, Urine Negative Negative mg/dL    Specific Gravity, UA 1.019 1.005 - 1.030    Blood, Urine Negative Negative    pH, UA 5.0 5.0 - 9.0    Protein, UA TRACE (A) Negative mg/dL    Urobilinogen, Urine 1.0 <2.0 E.U./dL    Nitrite, Urine POSITIVE (A) Negative    Leukocyte Esterase, Urine MODERATE (A) Negative    Urine Reflex to Culture Not Indicated    URINE DRUG SCREEN    Collection Time: 08/05/23 10:45 PM   Result Value Ref Range    Amphetamine Screen, Urine Neg Negative <1000 ng/mL    Barbiturate Screen, Ur Neg Negative < 200 ng/mL    Benzodiazepine Screen, Urine Neg Negative < 200 ng/mL    Cannabinoid Scrn, Ur POSITIVE (A) Negative < 50 ng/mL    Cocaine Metabolite Screen, Urine Neg Negative < 300 ng/mL    Opiate Scrn, Ur Neg Negative < 300 ng/mL    PCP Screen, Urine Neg Negative < 25 ng/mL    Methadone Screen, Urine Neg Negative <300 ng/mL    Propoxyphene Scrn, Ur Neg Negative <300 ng/mL    Oxycodone Urine Neg Negative <100 ng/mL    FENTANYL SCREEN, URINE Neg Negative < 50 ng/mL    Drug Screen Comment: see below    Ethanol    Collection Time: 08/05/23 10:45 PM   Result Value Ref Range    Ethanol Lvl 13 mg/dL    Ethanol percent 0.011 G/dL   TYPE AND SCREEN    Collection Time: 08/05/23 10:45 PM   Result Value Ref Range    ABO/Rh O POS     Antibody Screen NEG    TSH with Reflex    Collection Time: 08/05/23 10:45 PM   Result Value Ref Range    TSH 1.370 0.440 - 3.860 uIU/mL   Microscopic Urinalysis    Collection Time: 08/05/23 10:45 PM   Result Value Ref Range    Hyaline Casts, UA 1-3 0 - 5 /LPF    RBC, UA 0-2 0 - 2 /HPF    Bacteria, UA RARE (A) Negative /HPF    WBC, UA 6-9 (A) 0 - 5 /HPF    Epithelial Cells, UA 3-5 0 - 5 /HPF   PREPARE RBC

## 2023-08-06 NOTE — CONSULTS
Infectious Disease     Patient Name: Javad Maza  Date: 8/6/2023  YOB: 1985  Medical Record Number: [id number]        Hypotension possible sepsis      History of Present Illness:  History of alcohol abuse cirrhosis tobacco abuse  Undergoes paracentesis for ascites as outpatient  3 days prior to admission 5 L were removed      Presented with GI bleeding abdominal pain dyspnea emesis  Passing clots in stool  Dyspnea on exertion  Hypotensive without fever    Lactic acid 2.8  Hyponatremic hypokalemic  Elevated transaminases  White cell on admission 12,000 platelet 80  Elevated total bilirubin and alk phos  Li pase normal  Ammonia elevated      Blood cultures pending    Chest not consistent with infection    Procalcitonin elevated 0.94    Review of Systems   Constitutional: Negative. HENT: Negative. Eyes: Negative. Respiratory:  Positive for cough and shortness of breath. Cardiovascular: Negative. Gastrointestinal:  Positive for abdominal distention, diarrhea, nausea and vomiting. Negative for abdominal pain and constipation. Endocrine: Negative. Genitourinary: Negative. Musculoskeletal: Negative. Skin: Negative. Neurological: Negative. Review of Systems: All 14 review of systems negative other than as stated above    Social History     Tobacco Use    Smoking status: Every Day     Packs/day: 0.25     Types: Cigarettes    Smokeless tobacco: Never    Tobacco comments:     5/30/23 states 3-4 cigs per day now   Vaping Use    Vaping Use: Never used   Substance Use Topics    Alcohol use:  Yes     Alcohol/week: 20.0 standard drinks     Types: 20 Shots of liquor per week     Comment: last drink: 5/28/23    Drug use: Yes     Types: Marijuana William Inch)     Comment: gummies         Past Medical History:   Diagnosis Date    Alcohol abuse     Cirrhosis ([address])     Tobacco dependence            Past Surgical History:   Procedure Laterality Date    APPENDECTOMY      COLONOSCOPY N/A 01/13/2023 COLONOSCOPY DIAGNOSTIC performed by Maureen Huang MD at [address] 05/12/2023    COLONOSCOPY DIAGNOSTIC performed by Hieu Jeronimo MD at [address]      reports 6 sx on L foot and one on right foot    PARACENTESIS Left 09/13/2022    1510 ml removed per Dr Bennett Francisco  specimen obtained    PARACENTESIS Left 01/11/2023    4720 ml removed by Dr. Bennett Francisco - therapeutic & diagnostic    PARACENTESIS Left 01/16/2023    4400ml removed by Dr. Dewight Fothergill Left 02/01/2023    5600 ml removed by Dr. Lynda Dempsey Left 05/16/2023    5835 ml removed by Dr. Camilla Kemp - diagnostics sent    PARACENTESIS Left 05/30/2023    5980 ml removed by Dr. Gregorio Wilson Left 06/07/2023    3050 ml removed by Dr Lynda Dempsey Left 06/22/2023    4970 ml removed by Jeannie Jaimes CNP    PARACENTESIS Left 07/07/2023    2300ml removed by Dr. Lynda Dempsey Left 07/24/2023    4250  ml removed by Dr. Gregorio Wilson Left 08/02/2023    4850ml removed by Dr. Valeria Moy 09/09/2022    EGD DIAGNOSTIC ONLY performed by Maureen Huang MD at [address] [place] [address] 05/10/2023    EGD ESOPHAGOGASTRODUODENOSCOPY performed by Hieu Jeronimo MD at [facility]         No current facility-administered medications on file prior to encounter.      Current Outpatient Medications on File Prior to Encounter   Medication Sig Dispense Refill    potassium chloride (KLOR-CON M) 20 MEQ extended release tablet Take 1 tablet by mouth 2 times daily for 3 days 6 tablet 0    triamcinolone (KENALOG) 0.025 % cream Apply topically 2 times daily as directed small amount to red areas of  each leg for 2 weeks 80 g 0    spironolactone (ALDACTONE) 50 MG tablet Take 1 tablet by mouth daily 30 tablet 3    acamprosate (CAMPRAL) 333 MG tablet Take 2 tablets by mouth 3 times daily 180 tablet 3    furosemide (LASIX) 40

## 2023-08-06 NOTE — CONSULTS
Pulmonary and Critical Care Medicine  Consult Note  Encounter Date: 2023 10:01 AM    Ms. Nick Smith is a 42 y.o. female  : 1985  Requesting Provider: Mariela Colon DO    Reason for request: GI bleed             HISTORY OF PRESENT ILLNESS:    Patient is 42 y.o. presents with Lower GI bleed, 3 days history of feeling unwell, she reported vomiting, fatigue, and diarrhea, later she started noticing blood per rectum. No chest pain, she does report mild shortness of breath, no fever. She drinks alcohol, 3 shots of vodka a day, she did not do any for the last 2 days. Patient has a history of alcoholic liver cirrhosis, alcohol abuse, smoking, and ascites with frequent paracentesis.   She had EGD and colonoscopy earlier this year showing external hemorrhoids no varices      Past Medical History:        Diagnosis Date    Alcohol abuse     Cirrhosis ([address])     Tobacco dependence        Past Surgical History:        Procedure Laterality Date    APPENDECTOMY      COLONOSCOPY N/A 2023    COLONOSCOPY DIAGNOSTIC performed by Prudence Joyce MD at [address] [facility] 2023    COLONOSCOPY DIAGNOSTIC performed by Aaron Redmond MD at [address]      reports 6 sx on L foot and one on right foot    PARACENTESIS Left 2022    1510 ml removed per Dr Eleazar Ledbetter  specimen obtained    PARACENTESIS Left 2023    4720 ml removed by Dr. Eleazar Ledbetter - therapeutic & diagnostic    PARACENTESIS Left 2023    4400ml removed by Dr. Kay Pa Left 2023    5600 ml removed by Dr. Vinicio Jeffers Left 2023    5835 ml removed by Dr. Erin Shahid - diagnostics sent    PARACENTESIS Left 2023    5980 ml removed by Dr. Kelly Talbot Left 2023    3050 ml removed by Dr Vinicio Jeffers Left 2023    4970 ml removed by Jose Naranjo CNP    PARACENTESIS Left 2023    2300ml removed by Dr. Vinicio Jeffers Left 2023 lesion. Splenomegaly with findings of portal venous hypertension with ascites and varices throughout the upper abdomen. Stone identified within the gallbladder with mild wall thickening likely related to the decompression. No biliary ductal dilatation.     Assessment, plan:   Patient is at risk due to    Lower GI bleed, possibly due to hemorrhoidal bleed, no signs of variceal bleed, no hematemesis  Acute alcoholic hepatitis discriminating score is 66, MELD score 38  WILLIE, likely hepatorenal syndrome  Hyponatremia secondary to liver cirrhosis  Increased procalcitonin, could be due to renal failure and liver failure however cannot rule out underlying bacterial infection  Coagulopathy  Alcoholism, at risk of withdrawal/DT     Recommendation  GI consult  Continue octreotide drip, PPI  FFP x4 units  Monitor hemoglobin transfuse if less than 7  Monitor INR  Watch volume status, avoid overload  Albumin 3 times daily  Nephrology consult  Continue Rocephin  Discussed with GI, recommended to hold on starting prednisone for now  SCD  CIWA     Discussed with          Thank you for consultation    Electronically signed by Katerina Antoine MD, [facility],  on 8/6/2023 at 10:01 AM

## 2023-08-07 ENCOUNTER — APPOINTMENT (OUTPATIENT)
Dept: GENERAL RADIOLOGY | Age: 38
DRG: 254 | End: 2023-08-07
Payer: COMMERCIAL

## 2023-08-07 LAB
ALBUMIN SERPL-MCNC: 3.4 G/DL (ref 3.5–4.6)
ALP SERPL-CCNC: 103 U/L (ref 40–130)
ALT SERPL-CCNC: 31 U/L (ref 0–33)
ANION GAP SERPL CALCULATED.3IONS-SCNC: 19 MEQ/L (ref 9–15)
AST SERPL-CCNC: 146 U/L (ref 0–35)
BACTERIA UR CULT: NORMAL
BASE EXCESS ARTERIAL: 0 (ref -3–3)
BASOPHILS # BLD: 0 K/UL (ref 0–0.2)
BASOPHILS NFR BLD: 0.3 %
BILIRUB DIRECT SERPL-MCNC: 5.9 MG/DL (ref 0–0.4)
BILIRUB INDIRECT SERPL-MCNC: 6.2 MG/DL (ref 0–0.6)
BILIRUB SERPL-MCNC: 12.1 MG/DL (ref 0.2–0.7)
BLOOD BANK DISPENSE STATUS: NORMAL
BLOOD BANK PRODUCT CODE: NORMAL
BPU ID: NORMAL
BUN SERPL-MCNC: 38 MG/DL (ref 6–20)
CALCIUM IONIZED: 1.06 MMOL/L (ref 1.12–1.32)
CALCIUM SERPL-MCNC: 8.4 MG/DL (ref 8.5–9.9)
CHLORIDE SERPL-SCNC: 93 MEQ/L (ref 95–107)
CO2 SERPL-SCNC: 20 MEQ/L (ref 20–31)
CREAT SERPL-MCNC: 2.06 MG/DL (ref 0.5–0.9)
DESCRIPTION BLOOD BANK: NORMAL
EKG ATRIAL RATE: 116 BPM
EKG P AXIS: 78 DEGREES
EKG P-R INTERVAL: 128 MS
EKG Q-T INTERVAL: 360 MS
EKG QRS DURATION: 72 MS
EKG QTC CALCULATION (BAZETT): 500 MS
EKG R AXIS: 71 DEGREES
EKG T AXIS: 42 DEGREES
EKG VENTRICULAR RATE: 116 BPM
EOSINOPHIL # BLD: 0.1 K/UL (ref 0–0.7)
EOSINOPHIL NFR BLD: 0.5 %
ERYTHROCYTE [DISTWIDTH] IN BLOOD BY AUTOMATED COUNT: 27.1 % (ref 11.5–14.5)
GLUCOSE BLD-MCNC: 128 MG/DL (ref 70–99)
GLUCOSE BLD-MCNC: 130 MG/DL (ref 70–99)
GLUCOSE BLD-MCNC: 83 MG/DL (ref 70–99)
GLUCOSE BLD-MCNC: 91 MG/DL (ref 70–99)
GLUCOSE SERPL-MCNC: 80 MG/DL (ref 70–99)
HCO3 ARTERIAL: 22.5 MMOL/L (ref 21–29)
HCT VFR BLD AUTO: 19.7 % (ref 37–47)
HCT VFR BLD AUTO: 21 % (ref 36–48)
HCT VFR BLD AUTO: 23.7 % (ref 37–47)
HGB BLD CALC-MCNC: 7.3 GM/DL (ref 12–16)
HGB BLD-MCNC: 6.9 G/DL (ref 12–16)
HGB BLD-MCNC: 8 G/DL (ref 12–16)
INR PPP: 2.2
IRON SATURATION: ABNORMAL % (ref 20–55)
IRON: 102 UG/DL (ref 37–145)
LACTATE: 3.56 MMOL/L (ref 0.4–2)
LYMPHOCYTES # BLD: 1 K/UL (ref 1–4.8)
LYMPHOCYTES NFR BLD: 9.4 %
MCH RBC QN AUTO: 36.1 PG (ref 27–31.3)
MCHC RBC AUTO-ENTMCNC: 34.8 % (ref 33–37)
MCV RBC AUTO: 103.6 FL (ref 79.4–94.8)
MONOCYTES # BLD: 1.1 K/UL (ref 0.2–0.8)
MONOCYTES NFR BLD: 11 %
NEUTROPHILS # BLD: 8.1 K/UL (ref 1.4–6.5)
NEUTS SEG NFR BLD: 78.8 %
O2 SAT, ARTERIAL: 90 % (ref 93–100)
PCO2 ARTERIAL: 27 MM HG (ref 35–45)
PERFORMED ON: ABNORMAL
PERFORMED ON: NORMAL
PH ARTERIAL: 7.53 (ref 7.35–7.45)
PHOSPHATE SERPL-MCNC: 3.7 MG/DL (ref 2.3–4.8)
PLATELET # BLD AUTO: 80 K/UL (ref 130–400)
PO2 ARTERIAL: 51 MM HG (ref 75–108)
POC CHLORIDE: 97 MEQ/L (ref 99–110)
POC CREATININE: 1.9 MG/DL (ref 0.6–1.2)
POC SAMPLE TYPE: ABNORMAL
POTASSIUM SERPL-SCNC: 3.7 MEQ/L (ref 3.5–5.1)
POTASSIUM SERPL-SCNC: 4 MEQ/L (ref 3.4–4.9)
PROT SERPL-MCNC: 6 G/DL (ref 6.3–8)
PROTHROMBIN TIME: 24.6 SEC (ref 12.3–14.9)
RBC # BLD AUTO: 1.91 M/UL (ref 4.2–5.4)
SODIUM BLD-SCNC: 131 MEQ/L (ref 136–145)
SODIUM SERPL-SCNC: 132 MEQ/L (ref 135–144)
TCO2 ARTERIAL: 23 MMOL/L (ref 21–32)
TOTAL IRON BINDING CAPACITY: ABNORMAL UG/DL (ref 250–450)
UNSATURATED IRON BINDING CAPACITY: <17 UG/DL (ref 112–347)
WBC # BLD AUTO: 10.2 K/UL (ref 4.8–10.8)

## 2023-08-07 PROCEDURE — 6370000000 HC RX 637 (ALT 250 FOR IP): Performed by: INTERNAL MEDICINE

## 2023-08-07 PROCEDURE — 83605 ASSAY OF LACTIC ACID: CPT

## 2023-08-07 PROCEDURE — 2500000003 HC RX 250 WO HCPCS: Performed by: INTERNAL MEDICINE

## 2023-08-07 PROCEDURE — 2000000000 HC ICU R&B

## 2023-08-07 PROCEDURE — 2580000003 HC RX 258: Performed by: INTERNAL MEDICINE

## 2023-08-07 PROCEDURE — 84132 ASSAY OF SERUM POTASSIUM: CPT

## 2023-08-07 PROCEDURE — 85610 PROTHROMBIN TIME: CPT

## 2023-08-07 PROCEDURE — 83540 ASSAY OF IRON: CPT

## 2023-08-07 PROCEDURE — 99291 CRITICAL CARE FIRST HOUR: CPT | Performed by: INTERNAL MEDICINE

## 2023-08-07 PROCEDURE — 82330 ASSAY OF CALCIUM: CPT

## 2023-08-07 PROCEDURE — 6360000002 HC RX W HCPCS: Performed by: SPECIALIST

## 2023-08-07 PROCEDURE — 80048 BASIC METABOLIC PNL TOTAL CA: CPT

## 2023-08-07 PROCEDURE — 82565 ASSAY OF CREATININE: CPT

## 2023-08-07 PROCEDURE — 02HV33Z INSERTION OF INFUSION DEVICE INTO SUPERIOR VENA CAVA, PERCUTANEOUS APPROACH: ICD-10-PCS

## 2023-08-07 PROCEDURE — 83550 IRON BINDING TEST: CPT

## 2023-08-07 PROCEDURE — 71045 X-RAY EXAM CHEST 1 VIEW: CPT

## 2023-08-07 PROCEDURE — 85025 COMPLETE CBC W/AUTO DIFF WBC: CPT

## 2023-08-07 PROCEDURE — 84295 ASSAY OF SERUM SODIUM: CPT

## 2023-08-07 PROCEDURE — 99232 SBSQ HOSP IP/OBS MODERATE 35: CPT | Performed by: INTERNAL MEDICINE

## 2023-08-07 PROCEDURE — 84100 ASSAY OF PHOSPHORUS: CPT

## 2023-08-07 PROCEDURE — 82803 BLOOD GASES ANY COMBINATION: CPT

## 2023-08-07 PROCEDURE — 85018 HEMOGLOBIN: CPT

## 2023-08-07 PROCEDURE — 36556 INSERT NON-TUNNEL CV CATH: CPT

## 2023-08-07 PROCEDURE — 2580000003 HC RX 258: Performed by: SPECIALIST

## 2023-08-07 PROCEDURE — 36600 WITHDRAWAL OF ARTERIAL BLOOD: CPT

## 2023-08-07 PROCEDURE — 80076 HEPATIC FUNCTION PANEL: CPT

## 2023-08-07 PROCEDURE — 85014 HEMATOCRIT: CPT

## 2023-08-07 PROCEDURE — 02HV33Z INSERTION OF INFUSION DEVICE INTO SUPERIOR VENA CAVA, PERCUTANEOUS APPROACH: ICD-10-PCS | Performed by: INTERNAL MEDICINE

## 2023-08-07 PROCEDURE — 6360000002 HC RX W HCPCS: Performed by: INTERNAL MEDICINE

## 2023-08-07 PROCEDURE — 82435 ASSAY OF BLOOD CHLORIDE: CPT

## 2023-08-07 PROCEDURE — 36415 COLL VENOUS BLD VENIPUNCTURE: CPT

## 2023-08-07 PROCEDURE — 36430 TRANSFUSION BLD/BLD COMPNT: CPT

## 2023-08-07 PROCEDURE — 82948 REAGENT STRIP/BLOOD GLUCOSE: CPT

## 2023-08-07 RX ORDER — CHLORDIAZEPOXIDE HYDROCHLORIDE 10 MG/1
10 CAPSULE, GELATIN COATED ORAL 3 TIMES DAILY
Status: DISCONTINUED | OUTPATIENT
Start: 2023-08-07 | End: 2023-08-09

## 2023-08-07 RX ORDER — MIDODRINE HYDROCHLORIDE 5 MG/1
5 TABLET ORAL
Status: DISCONTINUED | OUTPATIENT
Start: 2023-08-07 | End: 2023-08-08

## 2023-08-07 RX ORDER — SODIUM CHLORIDE 9 MG/ML
INJECTION, SOLUTION INTRAVENOUS
Status: DISPENSED
Start: 2023-08-07 | End: 2023-08-07

## 2023-08-07 RX ORDER — SODIUM CHLORIDE 9 MG/ML
INJECTION, SOLUTION INTRAVENOUS PRN
Status: DISCONTINUED | OUTPATIENT
Start: 2023-08-07 | End: 2023-08-10 | Stop reason: HOSPADM

## 2023-08-07 RX ORDER — DEXTROSE, SODIUM CHLORIDE, SODIUM LACTATE, POTASSIUM CHLORIDE, AND CALCIUM CHLORIDE 5; .6; .31; .03; .02 G/100ML; G/100ML; G/100ML; G/100ML; G/100ML
INJECTION, SOLUTION INTRAVENOUS CONTINUOUS
Status: DISCONTINUED | OUTPATIENT
Start: 2023-08-07 | End: 2023-08-08

## 2023-08-07 RX ADMIN — METOCLOPRAMIDE HYDROCHLORIDE 10 MG: 5 INJECTION INTRAMUSCULAR; INTRAVENOUS at 05:11

## 2023-08-07 RX ADMIN — METOCLOPRAMIDE HYDROCHLORIDE 10 MG: 5 INJECTION INTRAMUSCULAR; INTRAVENOUS at 12:49

## 2023-08-07 RX ADMIN — FOLIC ACID 1 MG: 5 INJECTION, SOLUTION INTRAMUSCULAR; INTRAVENOUS; SUBCUTANEOUS at 09:23

## 2023-08-07 RX ADMIN — Medication 10 ML: at 23:09

## 2023-08-07 RX ADMIN — DEXMEDETOMIDINE 0.2 MCG/KG/HR: 100 INJECTION, SOLUTION INTRAVENOUS at 10:23

## 2023-08-07 RX ADMIN — SODIUM CHLORIDE, POTASSIUM CHLORIDE, SODIUM LACTATE AND CALCIUM CHLORIDE: 600; 310; 30; 20 INJECTION, SOLUTION INTRAVENOUS at 01:40

## 2023-08-07 RX ADMIN — METOCLOPRAMIDE HYDROCHLORIDE 10 MG: 5 INJECTION INTRAMUSCULAR; INTRAVENOUS at 16:57

## 2023-08-07 RX ADMIN — THIAMINE HYDROCHLORIDE 100 MG: 100 INJECTION, SOLUTION INTRAMUSCULAR; INTRAVENOUS at 08:05

## 2023-08-07 RX ADMIN — CHLORDIAZEPOXIDE HYDROCHLORIDE 10 MG: 5 CAPSULE ORAL at 14:37

## 2023-08-07 RX ADMIN — METOCLOPRAMIDE HYDROCHLORIDE 10 MG: 5 INJECTION INTRAMUSCULAR; INTRAVENOUS at 21:16

## 2023-08-07 RX ADMIN — SODIUM CHLORIDE, SODIUM LACTATE, POTASSIUM CHLORIDE, CALCIUM CHLORIDE AND DEXTROSE MONOHYDRATE: 5; 600; 310; 30; 20 INJECTION, SOLUTION INTRAVENOUS at 09:49

## 2023-08-07 RX ADMIN — CHLORDIAZEPOXIDE HYDROCHLORIDE 10 MG: 5 CAPSULE ORAL at 08:29

## 2023-08-07 RX ADMIN — OCTREOTIDE ACETATE 50 MCG/HR: 1000 INJECTION, SOLUTION INTRAVENOUS; SUBCUTANEOUS at 04:14

## 2023-08-07 RX ADMIN — MIDODRINE HYDROCHLORIDE 5 MG: 5 TABLET ORAL at 12:49

## 2023-08-07 RX ADMIN — Medication 10 ML: at 08:06

## 2023-08-07 RX ADMIN — Medication 15 MCG/MIN: at 03:44

## 2023-08-07 RX ADMIN — CHLORDIAZEPOXIDE HYDROCHLORIDE 10 MG: 5 CAPSULE ORAL at 21:15

## 2023-08-07 RX ADMIN — MIDODRINE HYDROCHLORIDE 5 MG: 5 TABLET ORAL at 16:57

## 2023-08-07 RX ADMIN — CEFTRIAXONE SODIUM 2000 MG: 2 INJECTION, POWDER, FOR SOLUTION INTRAMUSCULAR; INTRAVENOUS at 08:08

## 2023-08-07 ASSESSMENT — PAIN SCALES - GENERAL
PAINLEVEL_OUTOF10: 1
PAINLEVEL_OUTOF10: 0

## 2023-08-07 ASSESSMENT — ENCOUNTER SYMPTOMS
ABDOMINAL PAIN: 0
VOMITING: 1
NAUSEA: 1
SHORTNESS OF BREATH: 1
ABDOMINAL DISTENTION: 1
CONSTIPATION: 0
COUGH: 1
DIARRHEA: 1

## 2023-08-07 NOTE — PROGRESS NOTES
Physician Progress Note    8/7/2023   3:40 PM    Name:  Kyree Chavez  MRN:    [id number]      Day: 1     Admit Date: 8/5/2023 10:16 PM  PCP: Marcie Kearney MD    Code Status:  Full Code    Subjective: Will open eyes to voice but then fall back asleep.   Currently on Precedex    Current Facility-Administered Medications   Medication Dose Route Frequency Provider Last Rate Last Admin    0.9 % sodium chloride infusion   IntraVENous PRN Aletta , DO        sodium chloride 0.9 % infusion             chlordiazePOXIDE (LIBRIUM) capsule 10 mg  10 mg Oral TID Sandra Posada MD   10 mg at 08/07/23 1437    dextrose 5 % in lactated ringers infusion   IntraVENous Continuous Rajeshaf MD Kelly 60 mL/hr at 08/07/23 0949 New Bag at 08/07/23 0949    dexmedetomidine (PRECEDEX) 1,000 mcg in sodium chloride 0.9 % 250 mL infusion  0.1-1.5 mcg/kg/hr IntraVENous Continuous Sandra Posada MD 2.72 mL/hr at 08/07/23 1023 0.2 mcg/kg/hr at 08/07/23 1023    midodrine (PROAMATINE) tablet 5 mg  5 mg Oral TID  Almaz Priestly, DO   5 mg at 08/07/23 1249    sodium chloride flush 0.9 % injection 5-40 mL  5-40 mL IntraVENous 2 times per day Aletta , DO   10 mL at 08/07/23 0806    sodium chloride flush 0.9 % injection 5-40 mL  5-40 mL IntraVENous PRN Aletta , DO        0.9 % sodium chloride infusion   IntraVENous PRN Aletta , DO        thiamine (B-1) injection 100 mg  100 mg IntraVENous Daily Aletta , DO   100 mg at 70/04/55 1780    folic acid 1 mg in sodium chloride 0.9 % 50 mL IVPB  1 mg IntraVENous Daily Aletta , DO   Stopped at 08/07/23 0955    acetaminophen (TYLENOL) tablet 500 mg  500 mg Oral Q6H PRN Aletta , DO        metoclopramide (REGLAN) injection 10 mg  10 mg IntraVENous Q6H Aletta , DO   10 mg at 08/07/23 1249    pantoprazole (PROTONIX) 80 mg in sodium chloride 0.9 % 100 mL infusion  8 mg/hr IntraVENous Continuous Carlyn Rios DO   Stopped at 08/06/23 6615    cefTRIAXone (ROCEPHIN)

## 2023-08-07 NOTE — PROGRESS NOTES
0305am Report from [facility]. VSS MP-SR. Alert and oriented. Levo 20meqs Fluids Sandostatin infusing via central line in R IJ. Dressing dry and intact. Voiding per bed pan. ABD distended. Jaundice. 0500am Resting with eyes close  0605am Dr Dian Jensen H/H results  0705VSS PRBC's up as ordered  0720am Blood infusing.  VSS no reaction noted  09:20am Critical care rounds with DR Oseas Eid and team

## 2023-08-07 NOTE — CARE COORDINATION
Case Management Assessment  Initial Evaluation    Date/Time of Evaluation: 8/7/2023 8:56 AM  Assessment Completed by: JUHI Baker    If patient is discharged prior to next notation, then this note serves as note for discharge by case management. Patient Name: Earle Campoverde                   YOB: 1985  Diagnosis: Blood in stool [K92.1]  Hypomagnesemia [E83.42]  Hyponatremia [E87.1]  GI bleed [K92.2]  WILLIE (acute kidney injury) ([address]) [N17.9]  Calculus of gallbladder without cholecystitis without obstruction [K80.20]  Acute cystitis without hematuria [F59.55]  Alcoholic cirrhosis of liver with ascites ([address]) [K70.31]  Anemia requiring transfusions [D64.9]                   Date / Time: 8/5/2023 10:16 PM    Patient Admission Status: Inpatient   Readmission Risk (Low < 19, Mod (19-27), High > 27): Readmission Risk Score: 27.4    Current PCP: Brad Moreno MD  PCP verified by CM? Yes    Chart Reviewed: Yes      History Provided by: Patient  Patient Orientation: Alert and Oriented, Person, Place, Situation, Self    Patient Cognition: Alert    Hospitalization in the last 30 days (Readmission):  No    If yes, Readmission Assessment in CM Navigator will be completed. Advance Directives:      Code Status: Full Code   Patient's Primary Decision Maker is: Legal Next of Kin    Primary Decision Maker: Valentino Armendariz - Parent - [phone]    Discharge Planning:    Patient lives with:   Type of Home:    Primary Care Giver: Self  Patient Support Systems include: Children, Family Members   Current Financial resources: Medicaid  Current community resources: None  Current services prior to admission:              Current DME:              Type of Home Care services:       ADLS  Prior functional level: Independent in ADLs/IADLs  Current functional level: Independent in ADLs/IADLs    PT AM-PAC:   /24  OT AM-PAC:   /24    Family can provide assistance at DC:  Yes  Would you like Case Management to discuss the discharge

## 2023-08-07 NOTE — PROGRESS NOTES
Nephrology Progress Note  Labs pending  Assessment:  WILLIE d/t hypotension  Cirrhosis-ETOH  Anemia  Hgb 7.3   Hyperbilirubinemia  Depression      Plan:  BP stable   following signs of GIB  and maintain Levophed    Patient Active Problem List:     Major depressive disorder, severe (HCC)     Alcoholic hepatitis with ascites     GI bleeding     Impaired mobility and activities of daily living dt encephalopathy and gait ataxia     Bursitis of foot     Generalized pain     Myalgia, unspecified site     Pain in left foot     Toe deformity, acquired     Acute alcoholic hepatitis     Impaired mobility and ADLs     Upper GI bleed     WILLIE (acute kidney injury) (HCC)     Abdominal distention     Alcoholic cirrhosis of liver with ascites (HCC)     Hematemesis with nausea     Elevated INR     Severe alcohol use disorder (HCC)     Acute blood loss anemia     Adjustment disorder with depressed mood     Melena     Anemia     Decompensated hepatic cirrhosis (HCC)     Venous stasis dermatitis of both lower extremities     Lower extremity cellulitis     Fungal dermatitis     GI bleed     Hypotension      Subjective:  Admit Date: 8/5/2023    Interval History: no changes  maintain Levo. .    Medications:  Scheduled Meds:   chlordiazePOXIDE  10 mg Oral TID    sodium chloride flush  5-40 mL IntraVENous 2 times per day    thiamine  100 mg IntraVENous Daily    folic acid IVPB  1 mg IntraVENous Daily    metoclopramide  10 mg IntraVENous Q6H    cefTRIAXone (ROCEPHIN) IV  2,000 mg IntraVENous Q24H     Continuous Infusions:   sodium chloride      sodium chloride      sodium chloride      lactated ringers IV soln Stopped (08/07/23 0702)    pantoprozole (PROTONIX) infusion Stopped (08/06/23 0745)    sodium chloride      sodium chloride      octreotide (SandoSTATIN) infusion 50 mcg/hr (08/07/23 0715)    norepinephrine 10 mcg/min (08/07/23 0715)    dextrose         CBC:   Recent Labs     08/06/23  2115 08/07/23  0534 08/07/23  0827   WBC 11.6* 10.2

## 2023-08-07 NOTE — PROGRESS NOTES
I took over this patient at 1, received report patient is on [employer] for [employer]. Temp rechecked it is 99.1 Axillary, Toni hugger turned off and on standby. Patient gtt checked. Levo going at 16mcg in EJ IV. Blood pressure continues to drop  notified. Asked for central line placement.

## 2023-08-07 NOTE — PROGRESS NOTES
Consent formed signed by patient for central line placement. Consent is on the chart. Lab called for STAT orders to come up and draw.

## 2023-08-07 NOTE — PROGRESS NOTES
Notified by nursing that patient is having increasing vasopressor requirement. She reportedly is currently requiring ~20mcg of Levophed to maintain goal BP, up from around 16mcg of Levophed when the nurse came on shift at approximately 1500 hrs., at which time the Intensivist had already left the ICU for the day. No central line was placed on day shift despite vasopressor requirement, for unclear reason. Review of Intensivist note from this morning also states that there will be planned 3 times daily albumin dosing, but those orders do not appear to be active. Patient had a critically low potassium this morning of 2.6, for which reported 40 mEq p.o. and 30 mEq IV KCl were given, but no repeat metabolic panel as of yet. Nursing is contacting this author in setting of worsening vasopressor requirement with Levophed running through peripheral line, asking for consideration for central line placement due to risks of limb ischemia in setting of high-dose vasopressors. We will obtain new stat labs now to include: PT/INR, BMP, and CBC without differential.  We will also order repeat single dose 50mg 25% albumin IV. Patient has already received a reported 4 units of FFP and 1 unit RBC since admission overnight, in setting of cirrhosis-associated coagulopathy with active GIB bleeding and posthemorrhagic anemia with hypovolemic/hemorrhagic shock. We will reevaluate current coagulation markers, platelet level, and hemoglobin level, and monitor blood pressure closely with this repeat administration of 25% albumin, as patient clearly has indication for CVC placement, but same is also relatively contraindicated by significant coagulopathy.     Electronically signed by Patrick Parks DO on 8/6/2023 at 8:57 PM

## 2023-08-07 NOTE — PROGRESS NOTES
PHARMACY NOTE:   ICU Rounds Attended (10-15 minutes in patient room):    Pt diagnosis: GI bleed    IV Fluids: LR changed to D5/LR on rounds   Renal:   Recent Labs     08/06/23 2115 08/07/23  0534 08/07/23  0827   CREATININE 1.80* 2.06* 1.9*    Estimated Creatinine Clearance: 34 mL/min (A) (based on SCr of 1.9 mg/dL (H)).         Antimicrobial Therapy:   Day 2/7   Antimicrobial agents: rocephin  Cultures in process   ID on consult: yes    Recent Labs     08/05/23  2245 08/06/23  0912 08/06/23 2115 08/07/23  0534   WBC 12.8* 8.8 11.6* 10.2           Pressors:   norepinephrine @ 8 mcg/min     Drips: sandostatin, folic acid    Insulin Therapy (goal: 140-180): none    Recent Labs     08/06/23  0912 08/06/23 2115 08/07/23  0534   GLUCOSE 101* 62* 80     Stress Ulcer Prophylaxis:   Pantoprazole drip    DVT Prophylaxis/Anticoagulant Therapy: none  Recent Labs     08/06/23  0912 08/06/23 2115 08/07/23  0534   PLT 80* 105* 80*     Recent Labs     08/05/23  2354 08/06/23 2115 08/07/23  0534   INR 2.4 1.8 2.2       Bowel Regimen:   none      Follow up/Changes:   -precedex added per verbal  -Fluids changed to LR/D5 @ 60 per verbal  -monitor qt  -monitor C/S       NATANAEL Morelos [facility] [place] PharmD

## 2023-08-07 NOTE — PROGRESS NOTES
Patient is requiring higher dose of Levo to sustain blood pressure. Mando Served  for possible central line placement.

## 2023-08-07 NOTE — CARE COORDINATION
AM TEAM QUALITY ICU ROUNDS AT BEDSIDE. NO FAMILY PRESENT. PT IS AWAKE, ALERT AND APPEARS ANXIOUS. TO START PRECEDEX AND LIBRIUM. CIWA PROTOCOL. ON IVF AND ROCEPHIN. CM/LSW TO FOLLOW FOR NEEDS. DISCHARGE PLAN IS TBD PENDING PROGRESS. SEE LSW NOTES.

## 2023-08-08 LAB
ALBUMIN SERPL-MCNC: 2.7 G/DL (ref 3.5–4.6)
ALP SERPL-CCNC: 110 U/L (ref 40–130)
ALT SERPL-CCNC: 29 U/L (ref 0–33)
AMMONIA PLAS-SCNC: 85 UMOL/L (ref 11–51)
ANION GAP SERPL CALCULATED.3IONS-SCNC: 11 MEQ/L (ref 9–15)
AST SERPL-CCNC: 116 U/L (ref 0–35)
BASOPHILS # BLD: 0.2 K/UL (ref 0–0.2)
BASOPHILS NFR BLD: 1.4 %
BILIRUB DIRECT SERPL-MCNC: 7 MG/DL (ref 0–0.4)
BILIRUB INDIRECT SERPL-MCNC: 6.5 MG/DL (ref 0–0.6)
BILIRUB SERPL-MCNC: 13.5 MG/DL (ref 0.2–0.7)
BUN SERPL-MCNC: 35 MG/DL (ref 6–20)
CALCIUM SERPL-MCNC: 8.6 MG/DL (ref 8.5–9.9)
CHLORIDE SERPL-SCNC: 96 MEQ/L (ref 95–107)
CO2 SERPL-SCNC: 24 MEQ/L (ref 20–31)
CREAT SERPL-MCNC: 1.31 MG/DL (ref 0.5–0.9)
EOSINOPHIL # BLD: 0.1 K/UL (ref 0–0.7)
EOSINOPHIL NFR BLD: 0.8 %
ERYTHROCYTE [DISTWIDTH] IN BLOOD BY AUTOMATED COUNT: 25.9 % (ref 11.5–14.5)
GLUCOSE SERPL-MCNC: 136 MG/DL (ref 70–99)
HCT VFR BLD AUTO: 23.9 % (ref 37–47)
HCT VFR BLD AUTO: 24.3 % (ref 37–47)
HCT VFR BLD AUTO: 24.3 % (ref 37–47)
HGB BLD-MCNC: 8.1 G/DL (ref 12–16)
HGB BLD-MCNC: 8.2 G/DL (ref 12–16)
HGB BLD-MCNC: 8.2 G/DL (ref 12–16)
INR PPP: 2.7
LYMPHOCYTES # BLD: 1.3 K/UL (ref 1–4.8)
LYMPHOCYTES NFR BLD: 10.5 %
MCH RBC QN AUTO: 34.5 PG (ref 27–31.3)
MCHC RBC AUTO-ENTMCNC: 33.9 % (ref 33–37)
MCV RBC AUTO: 101.5 FL (ref 79.4–94.8)
MONOCYTES # BLD: 1.2 K/UL (ref 0.2–0.8)
MONOCYTES NFR BLD: 9.8 %
NEUTROPHILS # BLD: 9.5 K/UL (ref 1.4–6.5)
NEUTS SEG NFR BLD: 77.5 %
PHOSPHATE SERPL-MCNC: 2.6 MG/DL (ref 2.3–4.8)
PLATELET # BLD AUTO: 92 K/UL (ref 130–400)
POTASSIUM SERPL-SCNC: 3.4 MEQ/L (ref 3.4–4.9)
PROT SERPL-MCNC: 5.4 G/DL (ref 6.3–8)
PROTHROMBIN TIME: 28.9 SEC (ref 12.3–14.9)
RBC # BLD AUTO: 2.39 M/UL (ref 4.2–5.4)
SODIUM SERPL-SCNC: 131 MEQ/L (ref 135–144)
WBC # BLD AUTO: 12.2 K/UL (ref 4.8–10.8)

## 2023-08-08 PROCEDURE — 2500000003 HC RX 250 WO HCPCS: Performed by: INTERNAL MEDICINE

## 2023-08-08 PROCEDURE — 6370000000 HC RX 637 (ALT 250 FOR IP): Performed by: NURSE PRACTITIONER

## 2023-08-08 PROCEDURE — 80048 BASIC METABOLIC PNL TOTAL CA: CPT

## 2023-08-08 PROCEDURE — 85014 HEMATOCRIT: CPT

## 2023-08-08 PROCEDURE — 6370000000 HC RX 637 (ALT 250 FOR IP): Performed by: INTERNAL MEDICINE

## 2023-08-08 PROCEDURE — 85018 HEMOGLOBIN: CPT

## 2023-08-08 PROCEDURE — 6360000002 HC RX W HCPCS: Performed by: INTERNAL MEDICINE

## 2023-08-08 PROCEDURE — 85025 COMPLETE CBC W/AUTO DIFF WBC: CPT

## 2023-08-08 PROCEDURE — 99232 SBSQ HOSP IP/OBS MODERATE 35: CPT | Performed by: SPECIALIST

## 2023-08-08 PROCEDURE — 85610 PROTHROMBIN TIME: CPT

## 2023-08-08 PROCEDURE — 2580000003 HC RX 258: Performed by: SPECIALIST

## 2023-08-08 PROCEDURE — 6360000002 HC RX W HCPCS: Performed by: SPECIALIST

## 2023-08-08 PROCEDURE — 84100 ASSAY OF PHOSPHORUS: CPT

## 2023-08-08 PROCEDURE — 2580000003 HC RX 258: Performed by: INTERNAL MEDICINE

## 2023-08-08 PROCEDURE — 99291 CRITICAL CARE FIRST HOUR: CPT | Performed by: INTERNAL MEDICINE

## 2023-08-08 PROCEDURE — 1210000000 HC MED SURG R&B

## 2023-08-08 PROCEDURE — 82140 ASSAY OF AMMONIA: CPT

## 2023-08-08 PROCEDURE — 80076 HEPATIC FUNCTION PANEL: CPT

## 2023-08-08 RX ORDER — POTASSIUM CHLORIDE 29.8 MG/ML
20 INJECTION INTRAVENOUS ONCE
Status: COMPLETED | OUTPATIENT
Start: 2023-08-08 | End: 2023-08-08

## 2023-08-08 RX ORDER — LACTULOSE 10 G/15ML
20 SOLUTION ORAL 3 TIMES DAILY
Status: DISCONTINUED | OUTPATIENT
Start: 2023-08-08 | End: 2023-08-10 | Stop reason: HOSPADM

## 2023-08-08 RX ORDER — MIDODRINE HYDROCHLORIDE 5 MG/1
10 TABLET ORAL
Status: DISCONTINUED | OUTPATIENT
Start: 2023-08-08 | End: 2023-08-09

## 2023-08-08 RX ORDER — MIDODRINE HYDROCHLORIDE 5 MG/1
5 TABLET ORAL ONCE
Status: COMPLETED | OUTPATIENT
Start: 2023-08-08 | End: 2023-08-08

## 2023-08-08 RX ADMIN — OCTREOTIDE ACETATE 50 MCG/HR: 1000 INJECTION, SOLUTION INTRAVENOUS; SUBCUTANEOUS at 02:30

## 2023-08-08 RX ADMIN — THIAMINE HYDROCHLORIDE 100 MG: 100 INJECTION, SOLUTION INTRAMUSCULAR; INTRAVENOUS at 08:29

## 2023-08-08 RX ADMIN — SODIUM CHLORIDE, SODIUM LACTATE, POTASSIUM CHLORIDE, CALCIUM CHLORIDE AND DEXTROSE MONOHYDRATE: 5; 600; 310; 30; 20 INJECTION, SOLUTION INTRAVENOUS at 03:44

## 2023-08-08 RX ADMIN — CHLORDIAZEPOXIDE HYDROCHLORIDE 10 MG: 5 CAPSULE ORAL at 22:14

## 2023-08-08 RX ADMIN — METOCLOPRAMIDE HYDROCHLORIDE 10 MG: 5 INJECTION INTRAMUSCULAR; INTRAVENOUS at 03:43

## 2023-08-08 RX ADMIN — FOLIC ACID 1 MG: 5 INJECTION, SOLUTION INTRAMUSCULAR; INTRAVENOUS; SUBCUTANEOUS at 08:27

## 2023-08-08 RX ADMIN — LACTULOSE 20 G: 20 SOLUTION ORAL at 14:13

## 2023-08-08 RX ADMIN — Medication 10 ML: at 22:14

## 2023-08-08 RX ADMIN — CHLORDIAZEPOXIDE HYDROCHLORIDE 10 MG: 5 CAPSULE ORAL at 08:29

## 2023-08-08 RX ADMIN — CEFTRIAXONE SODIUM 2000 MG: 2 INJECTION, POWDER, FOR SOLUTION INTRAMUSCULAR; INTRAVENOUS at 08:28

## 2023-08-08 RX ADMIN — MIDODRINE HYDROCHLORIDE 5 MG: 5 TABLET ORAL at 08:29

## 2023-08-08 RX ADMIN — OCTREOTIDE ACETATE 50 MCG/HR: 1000 INJECTION, SOLUTION INTRAVENOUS; SUBCUTANEOUS at 12:56

## 2023-08-08 RX ADMIN — LACTULOSE 20 G: 20 SOLUTION ORAL at 22:14

## 2023-08-08 RX ADMIN — CHLORDIAZEPOXIDE HYDROCHLORIDE 10 MG: 5 CAPSULE ORAL at 14:13

## 2023-08-08 RX ADMIN — MIDODRINE HYDROCHLORIDE 5 MG: 5 TABLET ORAL at 09:56

## 2023-08-08 RX ADMIN — METOCLOPRAMIDE HYDROCHLORIDE 10 MG: 5 INJECTION INTRAMUSCULAR; INTRAVENOUS at 22:14

## 2023-08-08 RX ADMIN — MIDODRINE HYDROCHLORIDE 10 MG: 5 TABLET ORAL at 11:20

## 2023-08-08 RX ADMIN — METOCLOPRAMIDE HYDROCHLORIDE 10 MG: 5 INJECTION INTRAMUSCULAR; INTRAVENOUS at 08:29

## 2023-08-08 RX ADMIN — MIDODRINE HYDROCHLORIDE 10 MG: 5 TABLET ORAL at 16:49

## 2023-08-08 RX ADMIN — METOCLOPRAMIDE HYDROCHLORIDE 10 MG: 5 INJECTION INTRAMUSCULAR; INTRAVENOUS at 16:49

## 2023-08-08 RX ADMIN — POTASSIUM CHLORIDE 20 MEQ: 29.8 INJECTION, SOLUTION INTRAVENOUS at 11:20

## 2023-08-08 ASSESSMENT — ENCOUNTER SYMPTOMS
NAUSEA: 1
ABDOMINAL DISTENTION: 1
DIARRHEA: 1
VOMITING: 1
CONSTIPATION: 0
ABDOMINAL PAIN: 0
SHORTNESS OF BREATH: 1
COUGH: 1

## 2023-08-08 NOTE — CARE COORDINATION
AM TEAM QUALITY ROUNDS AT BEDSIDE. NO FAMILY PRESENT. PT SLEEPING AT THIS TIME. CIWA SCALE. ON IV ROCEPHIN FOR INTRAABDOMINAL INFECTION. ON PRECEDEX, LEVOPHED, SANDOSTATIN, PROTONIX, IVF. DISCHARGE PLAN IS TBD AT THIS TIME PENDING PROGRESS, PT/OT EVALS WHEN MEDICALLY ABLE. WILL FOLLOW FOR NEEDS.

## 2023-08-08 NOTE — PROGRESS NOTES
Nephrology Progress Note  Labs pending  Assessment:  WILLIE d/t hypotension  Cirrhosis-ETOH  Anemia  Hgb 7.3   Hyperbilirubinemia  Depression      Plan:  BP stable    Cont midodrine  Replete k    Patient Active Problem List:     Major depressive disorder, severe (HCC)     Alcoholic hepatitis with ascites     GI bleeding     Impaired mobility and activities of daily living dt encephalopathy and gait ataxia     Bursitis of foot     Generalized pain     Myalgia, unspecified site     Pain in left foot     Toe deformity, acquired     Acute alcoholic hepatitis     Impaired mobility and ADLs     Upper GI bleed     WILLIE (acute kidney injury) (HCC)     Abdominal distention     Alcoholic cirrhosis of liver with ascites (HCC)     Hematemesis with nausea     Elevated INR     Severe alcohol use disorder (HCC)     Acute blood loss anemia     Adjustment disorder with depressed mood     Melena     Anemia     Decompensated hepatic cirrhosis (HCC)     Venous stasis dermatitis of both lower extremities     Lower extremity cellulitis     Fungal dermatitis     GI bleed     Hypotension      Subjective:  Admit Date: 8/5/2023    Interval History: pt doing well. On midodrine and abx.  Tired but no new complaints    Medications:  Scheduled Meds:   midodrine  10 mg Oral TID WC    chlordiazePOXIDE  10 mg Oral TID    sodium chloride flush  5-40 mL IntraVENous 2 times per day    thiamine  100 mg IntraVENous Daily    folic acid IVPB  1 mg IntraVENous Daily    metoclopramide  10 mg IntraVENous Q6H    cefTRIAXone (ROCEPHIN) IV  2,000 mg IntraVENous Q24H     Continuous Infusions:   sodium chloride      dextrose 5% in lactated ringers 60 mL/hr at 08/08/23 0851    dexmedetomidine (PRECEDEX) IV infusion Stopped (08/08/23 0717)    sodium chloride      pantoprozole (PROTONIX) infusion Stopped (08/06/23 0745)    sodium chloride      octreotide (SandoSTATIN) infusion 50 mcg/hr (08/08/23 0851)    norepinephrine 5 mcg/min (08/08/23 0851)    dextrose         CBC:

## 2023-08-08 NOTE — PLAN OF CARE
Problem: Discharge Planning  Goal: Discharge to home or other facility with appropriate resources  Outcome: Progressing  Flowsheets (Taken 8/8/2023 0800 by Patrick Acuña RN)  Discharge to home or other facility with appropriate resources: Identify barriers to discharge with patient and caregiver     Problem: Pain  Goal: Verbalizes/displays adequate comfort level or baseline comfort level  Outcome: Progressing     Problem: Safety - Adult  Goal: Free from fall injury  Outcome: Progressing

## 2023-08-08 NOTE — PROGRESS NOTES
PHARMACY NOTE:   Interdisciplinary Rounds Completed     Changes made today by Pharmacy:   Midodrine increased to 10mg TID per verbal  Follow up levo dc  Follow up precedex dc   Monitor QTc     Additional information:   D5/LR @ 60 cc/hr  Insulin coverage:none  Pressors: levo @ 3 on rounds  Sedation: precedex  Antimicrobial therapy, day #3/7:rocephin. ID on consult.     Core measures assessed/met    Marlen Linclon RPHPharmD  8/8/2023 11:27 AM

## 2023-08-08 NOTE — CARE COORDINATION
Quality round completed with care management team.  Pt lives at home with family; independent at baseline; No Home O2; No dialysis; pt use cane; discuss DC plan with pt. Pt agree with DC plan Home with family. Denies any needs.      Electronically signed by JUHI Peralta on 8/8/2023 at 10:25 AM

## 2023-08-08 NOTE — PROGRESS NOTES
Shift summary:  Assumed patient at 1600. Patient is lethargic but easy to arouse, shes alert and oriented x3, takes PO meds with sips of water. She voids using a bedpan had 1 Void for me through the shift. Labs drawn through CVC line. Levophed titrated (see flow sheet) . Patient denies needs at this time, call bell in reach. Plan of care continued.

## 2023-08-08 NOTE — PROGRESS NOTES
1625 Patient arrived on floor. No complaints of pain or discomfort. Agree with previous nurse assessment.

## 2023-08-08 NOTE — PROGRESS NOTES
Physician Progress Note    8/8/2023   3:10 PM    Name:  Jerod Hooks  MRN:    [id number]      Day: 2     Admit Date: 8/5/2023 10:16 PM  PCP: Anitha Shen MD    Code Status:  Full Code    Subjective:     Overall doing better. Mother at bedside reports she is mentating near baseline. She is in discomfort right now getting her central line removed.     Current Facility-Administered Medications   Medication Dose Route Frequency Provider Last Rate Last Admin    midodrine (PROAMATINE) tablet 10 mg  10 mg Oral TID  Janelle Wu MD   10 mg at 08/08/23 1120    lactulose (CHRONULAC) 10 GM/15ML solution 20 g  20 g Oral TID Scar Fill, APRN - CNP   20 g at 08/08/23 1413    [START ON 8/9/2023] pantoprazole (PROTONIX) 40 mg in sodium chloride (PF) 0.9 % 10 mL injection  40 mg IntraVENous Q12H Scar Fill, APRN - CNP        0.9 % sodium chloride infusion   IntraVENous PRN Suzette Simms DO        chlordiazePOXIDE (LIBRIUM) capsule 10 mg  10 mg Oral TID Janelle Wu MD   10 mg at 08/08/23 1413    sodium chloride flush 0.9 % injection 5-40 mL  5-40 mL IntraVENous 2 times per day Suzette Simms DO   10 mL at 08/07/23 2309    sodium chloride flush 0.9 % injection 5-40 mL  5-40 mL IntraVENous PRN Suzette Simms DO        0.9 % sodium chloride infusion   IntraVENous PRN Suzette Simms DO        thiamine (B-1) injection 100 mg  100 mg IntraVENous Daily Suzette Simms DO   100 mg at 24/49/24 5858    folic acid 1 mg in sodium chloride 0.9 % 50 mL IVPB  1 mg IntraVENous Daily Suzette Simms DO   Stopped at 08/08/23 0855    acetaminophen (TYLENOL) tablet 500 mg  500 mg Oral Q6H PRN Suzette Simms DO        metoclopramide (REGLAN) injection 10 mg  10 mg IntraVENous Q6H Suzette Simms DO   10 mg at 08/08/23 0829    cefTRIAXone (ROCEPHIN) 2,000 mg in sodium chloride 0.9 % 50 mL IVPB (mini-bag)  2,000 mg IntraVENous Q24H Suzette Simms DO   Stopped at 08/08/23 0858    LORazepam (ATIVAN) tablet 1 mg  1 mg Oral Q1H PRN Suzette Simms, 08/08/23  0900   WBC 10.2  --  12.2*  --    HGB 6.9*   < > 8.2* 8.1*   PLT 80*  --  92*  --     < > = values in this interval not displayed. Recent Labs     08/07/23  0534 08/07/23  0827 08/08/23  0244   *  --  131*   K 4.0  --  3.4   CL 93*  --  96   CO2 20  --  24   BUN 38*  --  35*   CREATININE 2.06* 1.9* 1.31*   GLUCOSE 80  --  136*     Recent Labs     08/07/23  0534 08/08/23  0546   * 116*   ALT 31 29   BILITOT 12.1* 13.5*   ALKPHOS 103 110       Assessment and Plan:        1. Acute blood loss anemia secondary to GI bleed in setting of decompensated alcoholic cirrhosis  -Transfuse to maintain hemoglobin greater than 7-8  -Octreotide and PPI infusion  -Ceftriaxone  -Endoscopy timing per GI    2. Shock due to above: Improved  -Off pressors. Now on midodrine    3. WILLIE due to above: Improved  -Nephrology following    4. Decompensated liver cirrhosis    5. Severe alcohol use disorder  -Caution with Librium as it is cleared hepatically. On Precedex and CIWA scale    6. Toxic, metabolic encephalopathy due to above processes  -Lactulose started    7. Hypovolemic hyponatremia: Improved    8. Coagulopathy due to liver disease     Diet: ADULT DIET;  Regular  Full Code    ICU    37 minutes in total care time    Electronically signed by Daniel Alegria DO on 8/8/2023 at 3:10 PM

## 2023-08-09 LAB
ALBUMIN SERPL-MCNC: 2.4 G/DL (ref 3.5–4.6)
ALP SERPL-CCNC: 119 U/L (ref 40–130)
ALT SERPL-CCNC: 25 U/L (ref 0–33)
ANION GAP SERPL CALCULATED.3IONS-SCNC: 12 MEQ/L (ref 9–15)
ANISOCYTOSIS BLD QL SMEAR: ABNORMAL
AST SERPL-CCNC: 81 U/L (ref 0–35)
BASOPHILS # BLD: 0 K/UL (ref 0–0.2)
BASOPHILS NFR BLD: 0 %
BILIRUB DIRECT SERPL-MCNC: 8.2 MG/DL (ref 0–0.4)
BILIRUB INDIRECT SERPL-MCNC: 6 MG/DL (ref 0–0.6)
BILIRUB SERPL-MCNC: 14.2 MG/DL (ref 0.2–0.7)
BUN SERPL-MCNC: 29 MG/DL (ref 6–20)
CALCIUM SERPL-MCNC: 8.2 MG/DL (ref 8.5–9.9)
CHLORIDE SERPL-SCNC: 100 MEQ/L (ref 95–107)
CO2 SERPL-SCNC: 22 MEQ/L (ref 20–31)
CREAT SERPL-MCNC: 0.84 MG/DL (ref 0.5–0.9)
EOSINOPHIL # BLD: 0 K/UL (ref 0–0.7)
EOSINOPHIL NFR BLD: 0 %
ERYTHROCYTE [DISTWIDTH] IN BLOOD BY AUTOMATED COUNT: 25.2 % (ref 11.5–14.5)
GLUCOSE BLD-MCNC: 209 MG/DL (ref 70–99)
GLUCOSE SERPL-MCNC: 130 MG/DL (ref 70–99)
HCT VFR BLD AUTO: 24.6 % (ref 37–47)
HGB BLD-MCNC: 8.6 G/DL (ref 12–16)
INR PPP: 2.8
LYMPHOCYTES # BLD: 1.3 K/UL (ref 1–4.8)
LYMPHOCYTES NFR BLD: 14 %
MAGNESIUM SERPL-MCNC: 1.7 MG/DL (ref 1.7–2.4)
MCH RBC QN AUTO: 35.4 PG (ref 27–31.3)
MCHC RBC AUTO-ENTMCNC: 34.9 % (ref 33–37)
MCV RBC AUTO: 101.3 FL (ref 79.4–94.8)
MONOCYTES # BLD: 0.5 K/UL (ref 0.2–0.8)
MONOCYTES NFR BLD: 5 %
NEUTROPHILS # BLD: 7.5 K/UL (ref 1.4–6.5)
NEUTS SEG NFR BLD: 81 %
PERFORMED ON: ABNORMAL
PLATELET # BLD AUTO: 81 K/UL (ref 130–400)
PLATELET BLD QL SMEAR: ABNORMAL
POTASSIUM SERPL-SCNC: 3.3 MEQ/L (ref 3.4–4.9)
PROT SERPL-MCNC: 4.9 G/DL (ref 6.3–8)
PROTHROMBIN TIME: 29.9 SEC (ref 12.3–14.9)
RBC # BLD AUTO: 2.42 M/UL (ref 4.2–5.4)
SLIDE REVIEW: ABNORMAL
SODIUM SERPL-SCNC: 134 MEQ/L (ref 135–144)
WBC # BLD AUTO: 9.2 K/UL (ref 4.8–10.8)

## 2023-08-09 PROCEDURE — 2580000003 HC RX 258: Performed by: NURSE PRACTITIONER

## 2023-08-09 PROCEDURE — 99232 SBSQ HOSP IP/OBS MODERATE 35: CPT | Performed by: INTERNAL MEDICINE

## 2023-08-09 PROCEDURE — 80048 BASIC METABOLIC PNL TOTAL CA: CPT

## 2023-08-09 PROCEDURE — 80076 HEPATIC FUNCTION PANEL: CPT

## 2023-08-09 PROCEDURE — 6370000000 HC RX 637 (ALT 250 FOR IP): Performed by: NURSE PRACTITIONER

## 2023-08-09 PROCEDURE — 85610 PROTHROMBIN TIME: CPT

## 2023-08-09 PROCEDURE — 36415 COLL VENOUS BLD VENIPUNCTURE: CPT

## 2023-08-09 PROCEDURE — 85025 COMPLETE CBC W/AUTO DIFF WBC: CPT

## 2023-08-09 PROCEDURE — C9113 INJ PANTOPRAZOLE SODIUM, VIA: HCPCS | Performed by: NURSE PRACTITIONER

## 2023-08-09 PROCEDURE — 6360000002 HC RX W HCPCS: Performed by: NURSE PRACTITIONER

## 2023-08-09 PROCEDURE — 6370000000 HC RX 637 (ALT 250 FOR IP): Performed by: INTERNAL MEDICINE

## 2023-08-09 PROCEDURE — A4216 STERILE WATER/SALINE, 10 ML: HCPCS | Performed by: NURSE PRACTITIONER

## 2023-08-09 PROCEDURE — 2580000003 HC RX 258: Performed by: INTERNAL MEDICINE

## 2023-08-09 PROCEDURE — 6360000002 HC RX W HCPCS: Performed by: INTERNAL MEDICINE

## 2023-08-09 PROCEDURE — 83735 ASSAY OF MAGNESIUM: CPT

## 2023-08-09 PROCEDURE — 2580000003 HC RX 258: Performed by: SPECIALIST

## 2023-08-09 PROCEDURE — 6360000002 HC RX W HCPCS: Performed by: SPECIALIST

## 2023-08-09 PROCEDURE — 1210000000 HC MED SURG R&B

## 2023-08-09 PROCEDURE — 99222 1ST HOSP IP/OBS MODERATE 55: CPT | Performed by: NURSE PRACTITIONER

## 2023-08-09 RX ORDER — LANOLIN ALCOHOL/MO/W.PET/CERES
100 CREAM (GRAM) TOPICAL DAILY
Status: DISCONTINUED | OUTPATIENT
Start: 2023-08-09 | End: 2023-08-10 | Stop reason: HOSPADM

## 2023-08-09 RX ORDER — PHYTONADIONE 5 MG/1
10 TABLET ORAL DAILY
Status: COMPLETED | OUTPATIENT
Start: 2023-08-09 | End: 2023-08-10

## 2023-08-09 RX ORDER — SODIUM CHLORIDE 0.9 % (FLUSH) 0.9 %
5-40 SYRINGE (ML) INJECTION EVERY 12 HOURS SCHEDULED
Status: DISCONTINUED | OUTPATIENT
Start: 2023-08-09 | End: 2023-08-09

## 2023-08-09 RX ORDER — PANTOPRAZOLE SODIUM 40 MG/1
40 TABLET, DELAYED RELEASE ORAL
Status: DISCONTINUED | OUTPATIENT
Start: 2023-08-09 | End: 2023-08-10 | Stop reason: HOSPADM

## 2023-08-09 RX ORDER — POTASSIUM CHLORIDE 20 MEQ/1
40 TABLET, EXTENDED RELEASE ORAL ONCE
Status: DISCONTINUED | OUTPATIENT
Start: 2023-08-09 | End: 2023-08-09

## 2023-08-09 RX ORDER — POTASSIUM CHLORIDE 7.45 MG/ML
10 INJECTION INTRAVENOUS ONCE
Status: DISCONTINUED | OUTPATIENT
Start: 2023-08-09 | End: 2023-08-09

## 2023-08-09 RX ORDER — MIDODRINE HYDROCHLORIDE 5 MG/1
5 TABLET ORAL
Status: DISCONTINUED | OUTPATIENT
Start: 2023-08-09 | End: 2023-08-10

## 2023-08-09 RX ORDER — SODIUM CHLORIDE 0.9 % (FLUSH) 0.9 %
5-40 SYRINGE (ML) INJECTION PRN
Status: DISCONTINUED | OUTPATIENT
Start: 2023-08-09 | End: 2023-08-10 | Stop reason: HOSPADM

## 2023-08-09 RX ORDER — SODIUM CHLORIDE 9 MG/ML
INJECTION, SOLUTION INTRAVENOUS PRN
Status: DISCONTINUED | OUTPATIENT
Start: 2023-08-09 | End: 2023-08-10 | Stop reason: HOSPADM

## 2023-08-09 RX ORDER — MAGNESIUM SULFATE IN WATER 40 MG/ML
2000 INJECTION, SOLUTION INTRAVENOUS ONCE
Status: COMPLETED | OUTPATIENT
Start: 2023-08-09 | End: 2023-08-09

## 2023-08-09 RX ORDER — FOLIC ACID 1 MG/1
1 TABLET ORAL DAILY
Status: DISCONTINUED | OUTPATIENT
Start: 2023-08-10 | End: 2023-08-10 | Stop reason: HOSPADM

## 2023-08-09 RX ADMIN — PHYTONADIONE 10 MG: 5 TABLET ORAL at 16:01

## 2023-08-09 RX ADMIN — SODIUM CHLORIDE, PRESERVATIVE FREE 10 ML: 5 INJECTION INTRAVENOUS at 04:08

## 2023-08-09 RX ADMIN — MAGNESIUM SULFATE HEPTAHYDRATE 2000 MG: 40 INJECTION, SOLUTION INTRAVENOUS at 13:19

## 2023-08-09 RX ADMIN — LACTULOSE 20 G: 20 SOLUTION ORAL at 10:58

## 2023-08-09 RX ADMIN — OCTREOTIDE ACETATE 50 MCG/HR: 1000 INJECTION, SOLUTION INTRAVENOUS; SUBCUTANEOUS at 10:59

## 2023-08-09 RX ADMIN — POTASSIUM BICARBONATE 40 MEQ: 782 TABLET, EFFERVESCENT ORAL at 13:06

## 2023-08-09 RX ADMIN — OCTREOTIDE ACETATE 50 MCG/HR: 500 INJECTION, SOLUTION INTRAVENOUS; SUBCUTANEOUS at 16:08

## 2023-08-09 RX ADMIN — Medication 10 ML: at 21:00

## 2023-08-09 RX ADMIN — MIDODRINE HYDROCHLORIDE 10 MG: 5 TABLET ORAL at 13:06

## 2023-08-09 RX ADMIN — OCTREOTIDE ACETATE 50 MCG/HR: 1000 INJECTION, SOLUTION INTRAVENOUS; SUBCUTANEOUS at 00:02

## 2023-08-09 RX ADMIN — CEFTRIAXONE SODIUM 2000 MG: 2 INJECTION, POWDER, FOR SOLUTION INTRAMUSCULAR; INTRAVENOUS at 11:00

## 2023-08-09 RX ADMIN — Medication 10 ML: at 10:58

## 2023-08-09 RX ADMIN — METOCLOPRAMIDE HYDROCHLORIDE 10 MG: 5 INJECTION INTRAMUSCULAR; INTRAVENOUS at 04:06

## 2023-08-09 RX ADMIN — MIDODRINE HYDROCHLORIDE 5 MG: 5 TABLET ORAL at 17:40

## 2023-08-09 RX ADMIN — SODIUM CHLORIDE 40 MG: 9 INJECTION INTRAMUSCULAR; INTRAVENOUS; SUBCUTANEOUS at 02:41

## 2023-08-09 RX ADMIN — Medication 100 MG: at 13:07

## 2023-08-09 RX ADMIN — MIDODRINE HYDROCHLORIDE 10 MG: 5 TABLET ORAL at 10:57

## 2023-08-09 RX ADMIN — PANTOPRAZOLE SODIUM 40 MG: 40 TABLET, DELAYED RELEASE ORAL at 16:01

## 2023-08-09 RX ADMIN — CHLORDIAZEPOXIDE HYDROCHLORIDE 10 MG: 5 CAPSULE ORAL at 10:57

## 2023-08-09 ASSESSMENT — ENCOUNTER SYMPTOMS
DIARRHEA: 1
EYES NEGATIVE: 1
SHORTNESS OF BREATH: 1
VOMITING: 1
NAUSEA: 1
ABDOMINAL PAIN: 0
ALLERGIC/IMMUNOLOGIC NEGATIVE: 1
SHORTNESS OF BREATH: 0
VOMITING: 0
RESPIRATORY NEGATIVE: 1
CONSTIPATION: 0
NAUSEA: 0
ABDOMINAL DISTENTION: 1
COUGH: 1

## 2023-08-09 NOTE — PROGRESS NOTES
RECOMMENDATION: Careful clinical correlation and follow up recommended. US ABDOMEN LIMITED Specify organ? LIVER    Result Date: 7/25/2023  EXAMINATION: RIGHT UPPER QUADRANT ULTRASOUND 7/25/2023 10:42 am COMPARISON: None. HISTORY: ORDERING SYSTEM PROVIDED HISTORY: Alcoholic cirrhosis of liver with ascites [facility]) TECHNOLOGIST PROVIDED HISTORY: This procedure can be scheduled via [email or web address]. Access your [email or web address] account by visiting [employer]. Reason for exam:->cirrhsosi Specify organ?->LIVER What reading provider will be dictating this exam?->CRC FINDINGS: LIVER:  The liver demonstrates normal echogenicity without evidence of intrahepatic biliary ductal dilatation. BILIARY SYSTEM:  Gallbladder contains show overall echogenic, nonshadowing foci adherent to the wall of the gallbladder. Fluid is visualized adjacent to the gallbladder. No wall thickening is demonstrated. Negative sonographic Gonzalez's sign. Common bile duct is within normal limits measuring 2.5 mm. Gracia Claritza RIGHT KIDNEY: The right kidney is grossly unremarkable without evidence of hydronephrosis. Right kidney measures 11.2 x 4.5 x 4.8 cm. PANCREAS:  Visualized portions of the pancreas are unremarkable. OTHER: Free fluid is identified cephalad and inferior the liver, and adjacent to the gallbladder. Unremarkable right upper quadrant ultrasound. Ascites. Gallbladder polyps. Unclear fluid adjacent the gallbladder secondary to ascites or represents true para cholecystic fluid. Clinical correlation required.      IR US GUIDED PARACENTESIS    Result Date: 8/2/2023  PROCEDURE: PARACENTESIS WITHOUT IMAGE GUIDANCE US ABDOMEN LIMITED 8/2/2023 HISTORY: ORDERING SYSTEM PROVIDED HISTORY: Alcoholic cirrhosis of liver with ascites [facility]) TECHNOLOGIST PROVIDED HISTORY: Reason for exam:->ascites Is the patient pregnant?->No What reading provider will be dictating this exam?->CRC TECHNIQUE: Informed consent was obtained after a detailed explanation of the procedure report was transcribed using voice recognition software. Every effort was made to ensure accuracy; however, inadvertent computerized transcription errors may be present.       Electronically signed by Barbara Dong MD, FCCP on 8/9/2023 at 4:25 PM

## 2023-08-09 NOTE — CONSULTS
General: No focal deficit present. Mental Status: She is alert and oriented to person, place, and time. Mental status is at baseline. Psychiatric:         Mood and Affect: Mood normal.         Behavior: Behavior normal.         Thought Content: Thought content normal.         Judgment: Judgment normal.     Lab Results   Component Value Date    WBC 9.2 08/09/2023    HGB 8.6 (L) 08/09/2023    HCT 24.6 (L) 08/09/2023    .3 (H) 08/09/2023    PLT 81 (L) 08/09/2023     Lab Results   Component Value Date     (L) 08/09/2023    K 3.3 (L) 08/09/2023     08/09/2023    CO2 22 08/09/2023    BUN 29 (H) 08/09/2023    CREATININE 0.84 08/09/2023    GLUCOSE 130 (H) 08/09/2023    CALCIUM 8.2 (L) 08/09/2023    PROT 4.9 (L) 08/09/2023    LABALBU 2.4 (L) 08/09/2023    BILITOT 14.2 (H) 08/09/2023    ALKPHOS 119 08/09/2023    AST 81 (H) 08/09/2023    ALT 25 08/09/2023    LABGLOM >60.0 08/09/2023    GFRAA >60.0 10/05/2022    GLOB 3.6 (H) 08/05/2023       Lab Results   Component Value Date    INR 2.8 08/09/2023    INR 2.7 08/08/2023    INR 2.2 08/07/2023    PROTIME 29.9 (H) 08/09/2023    PROTIME 28.9 (H) 08/08/2023    PROTIME 24.6 (H) 08/07/2023     CT Result (most recent):  CT ABDOMEN PELVIS WO IV CONTRAST 08/06/2023    Narrative  EXAMINATION:  CT OF THE ABDOMEN AND PELVIS WITHOUT CONTRAST 8/6/2023 1:08 am    TECHNIQUE:  CT of the abdomen and pelvis was performed without the administration of  intravenous contrast. Multiplanar reformatted images are provided for review. Automated exposure control, iterative reconstruction, and/or weight based  adjustment of the mA/kV was utilized to reduce the radiation dose to as low  as reasonably achievable.     COMPARISON:  01/10/2023    HISTORY:  ORDERING SYSTEM PROVIDED HISTORY: divertic? gi bleed, bhavesh  TECHNOLOGIST PROVIDED HISTORY:  Reason for exam:->divertic? gi bleed, bhavesh  Additional Contrast?->None  Decision Support Exception - unselect if not a suspected or confirmed  emergency medical condition->Emergency Medical Condition (MA)  What reading provider will be dictating this exam?->CRC    FINDINGS:  Lower Chest: Lung bases are grossly clear. Organs: The liver reveals cirrhotic morphology. The spleen is enlarged. Heterogeneous appearance seen throughout the liver. Gallbladder is  contracted with wall thickening and evidence of a small stone. The pancreas  is homogeneous. Both adrenal glands are within normal limits. Symmetric  appearance of the kidneys. No stones or distension seen in the renal  collecting system. GI/Bowel: The stomach is unremarkable in appearance. No wall thickening. The small bowel is within normal limits. Abnormal appearance of the colon  with diffuse wall thickening to suggest a colitis. No signs of obvious  obstruction. No pneumatosis. Pelvis: The bladder is mildly distended. The uterus is unremarkable. Peritoneum/Retroperitoneum: Prominence of the retroperitoneal region. Findings may suggest varices however borderline enlarged lymph nodes cannot  be excluded. Stone identified within the gallbladder with decompression and wall  thickening likely related to the decompression. No biliary ductal  dilatation. No free fluid or free air. No abnormal mass or fluid  collections identified. Bony structures reveal degenerative changes seen  within the spine and pelvis. Bones/Soft Tissues: Bony structures reveal no evidence of acute or aggressive  osseous abnormality. No ventral abdominal wall mass or defect. Impression  Abnormal wall thickening diffusely throughout the colon to suggest a diffuse  colitis. No signs of obvious obstruction. Cirrhotic morphology identified of the liver with diffuse fatty infiltration  of the liver and heterogeneity  with several low-attenuation areas. Continued follow-up is recommended to exclude possible underlying lesion.   Splenomegaly with findings of portal venous hypertension

## 2023-08-09 NOTE — PROGRESS NOTES
Physician Progress Note    8/9/2023   3:24 PM    Name:  Naye Meraz  MRN:    [id number]      Day: 3     Admit Date: 8/5/2023 10:16 PM  PCP: Amanda Orta MD    Code Status:  Full Code    Subjective:     She continues to feel better every day. She is eating and drinking. Moving bowels. No further bleeding since hospitalized.     Current Facility-Administered Medications   Medication Dose Route Frequency Provider Last Rate Last Admin    pantoprazole (PROTONIX) tablet 40 mg  40 mg Oral BID AC Jayde Rumpf, DO        thiamine tablet 100 mg  100 mg Oral Daily [place] Rumpf, DO   100 mg at 08/09/23 1307    sodium chloride flush 0.9 % injection 5-40 mL  5-40 mL IntraVENous 2 times per day Jayde Rumpf, DO        sodium chloride flush 0.9 % injection 5-40 mL  5-40 mL IntraVENous PRN [place] Rumpf, DO        0.9 % sodium chloride infusion   IntraVENous PRN Jayde Rumpf, DO        phytonadione (VITAMIN K) tablet 10 mg  10 mg Oral Daily Jayde Rumpf, DO        midodrine (PROAMATINE) tablet 5 mg  5 mg Oral TID  Sigifredo Justin, DO        lactulose (CHRONULAC) 10 GM/15ML solution 20 g  20 g Oral TID SANTA Mendez - CNP   20 g at 08/09/23 1058    0.9 % sodium chloride infusion   IntraVENous PRN Catarino Herrera, DO        chlordiazePOXIDE (LIBRIUM) capsule 10 mg  10 mg Oral TID Andrea Walden MD   10 mg at 08/09/23 1057    sodium chloride flush 0.9 % injection 5-40 mL  5-40 mL IntraVENous 2 times per day Catarino Herrera, DO   10 mL at 08/09/23 1058    sodium chloride flush 0.9 % injection 5-40 mL  5-40 mL IntraVENous PRN Catarino Herrera, DO   10 mL at 92/14/37 5991    folic acid 1 mg in sodium chloride 0.9 % 50 mL IVPB  1 mg IntraVENous Daily Catarino Herrera, DO   Stopped at 08/08/23 0855    acetaminophen (TYLENOL) tablet 500 mg  500 mg Oral Q6H PRN Catarino Herrera, DO        LORazepam (ATIVAN) tablet 1 mg  1 mg Oral Q1H PRN Catarino Herrera DO        Or    LORazepam (ATIVAN) injection 1 mg  1 mg IntraVENous Q1H PRN

## 2023-08-09 NOTE — PROGRESS NOTES
Nephrology Progress Note    Assessment:  Resolved WILLIE  Cirrhosis                                                                BP stable  Hypokalemia          Plan: replace KCL IV     Patient Active Problem List:     Major depressive disorder, severe (HCC)     Alcoholic hepatitis with ascites     GI bleeding     Impaired mobility and activities of daily living dt encephalopathy and gait ataxia     Bursitis of foot     Generalized pain     Myalgia, unspecified site     Pain in left foot     Toe deformity, acquired     Acute alcoholic hepatitis     Impaired mobility and ADLs     Upper GI bleed     WILLIE (acute kidney injury) (HCC)     Abdominal distention     Alcoholic cirrhosis of liver with ascites (HCC)     Hematemesis with nausea     Elevated INR     Severe alcohol use disorder (HCC)     Acute blood loss anemia     Adjustment disorder with depressed mood     Melena     Anemia     Decompensated hepatic cirrhosis (HCC)     Venous stasis dermatitis of both lower extremities     Lower extremity cellulitis     Fungal dermatitis     GI bleed     Hypotension      Subjective:  Admit Date: 8/5/2023    Interval History: stable    Medications:  Scheduled Meds:   midodrine  10 mg Oral TID WC    lactulose  20 g Oral TID    pantoprazole (PROTONIX) 40 mg injection  40 mg IntraVENous Q12H    chlordiazePOXIDE  10 mg Oral TID    sodium chloride flush  5-40 mL IntraVENous 2 times per day    thiamine  100 mg IntraVENous Daily    folic acid IVPB  1 mg IntraVENous Daily    metoclopramide  10 mg IntraVENous Q6H    cefTRIAXone (ROCEPHIN) IV  2,000 mg IntraVENous Q24H     Continuous Infusions:   sodium chloride      sodium chloride      octreotide (SandoSTATIN) infusion 50 mcg/hr (08/09/23 0002)    dextrose         CBC:   Recent Labs     08/08/23  0545 08/08/23  0900 08/09/23  0357   WBC 12.2*  --  9.2   HGB 8.2* 8.1* 8.6*   PLT 92*  --  81*     CMP:    Recent Labs     08/07/23  0534 08/07/23  0827 08/08/23  0244 08/09/23  0357   *

## 2023-08-10 ENCOUNTER — HOSPITAL ENCOUNTER (INPATIENT)
Dept: INTERVENTIONAL RADIOLOGY/VASCULAR | Age: 38
Discharge: HOME | DRG: 254 | End: 2023-08-12
Payer: COMMERCIAL

## 2023-08-10 VITALS
HEART RATE: 79 BPM | WEIGHT: 142.2 LBS | BODY MASS INDEX: 23.69 KG/M2 | HEIGHT: 65 IN | DIASTOLIC BLOOD PRESSURE: 57 MMHG | TEMPERATURE: 97.9 F | SYSTOLIC BLOOD PRESSURE: 101 MMHG | RESPIRATION RATE: 16 BRPM | OXYGEN SATURATION: 100 %

## 2023-08-10 VITALS
RESPIRATION RATE: 16 BRPM | DIASTOLIC BLOOD PRESSURE: 65 MMHG | HEART RATE: 84 BPM | SYSTOLIC BLOOD PRESSURE: 107 MMHG | OXYGEN SATURATION: 100 %

## 2023-08-10 DIAGNOSIS — K74.60 CIRRHOSIS OF LIVER WITHOUT ASCITES, UNSPECIFIED HEPATIC CIRRHOSIS TYPE (HCC): ICD-10-CM

## 2023-08-10 DIAGNOSIS — R18.8 OTHER ASCITES: ICD-10-CM

## 2023-08-10 PROBLEM — Z71.89 ADVANCED CARE PLANNING/COUNSELING DISCUSSION: Status: ACTIVE | Noted: 2023-08-10

## 2023-08-10 PROBLEM — Z71.89 ENCOUNTER FOR HOSPICE CARE DISCUSSION: Status: ACTIVE | Noted: 2023-08-10

## 2023-08-10 PROBLEM — Z71.89 GOALS OF CARE, COUNSELING/DISCUSSION: Status: ACTIVE | Noted: 2023-08-10

## 2023-08-10 PROBLEM — Z51.5 PALLIATIVE CARE ENCOUNTER: Status: ACTIVE | Noted: 2023-08-10

## 2023-08-10 PROBLEM — E43 SEVERE MALNUTRITION (HCC): Status: ACTIVE | Noted: 2023-08-10

## 2023-08-10 LAB
ALBUMIN FLUID: 0.8 G/DL
ALBUMIN SERPL-MCNC: 2.2 G/DL (ref 3.5–4.6)
ALP SERPL-CCNC: 119 U/L (ref 40–130)
ALT SERPL-CCNC: 22 U/L (ref 0–33)
ANION GAP SERPL CALCULATED.3IONS-SCNC: 10 MEQ/L (ref 9–15)
APPEARANCE FLUID: CLEAR
AST SERPL-CCNC: 59 U/L (ref 0–35)
BASOPHILS # BLD: 0 K/UL (ref 0–0.2)
BASOPHILS NFR BLD: 0.6 %
BILIRUB DIRECT SERPL-MCNC: 9.2 MG/DL (ref 0–0.4)
BILIRUB INDIRECT SERPL-MCNC: 6 MG/DL (ref 0–0.6)
BILIRUB SERPL-MCNC: 15.2 MG/DL (ref 0.2–0.7)
BUN SERPL-MCNC: 29 MG/DL (ref 6–20)
CALCIUM SERPL-MCNC: 7.9 MG/DL (ref 8.5–9.9)
CELL COUNT FLUID TYPE: NORMAL
CHLORIDE SERPL-SCNC: 101 MEQ/L (ref 95–107)
CLOT EVALUATION: NORMAL
CO2 SERPL-SCNC: 23 MEQ/L (ref 20–31)
COLOR FLUID: YELLOW
CREAT SERPL-MCNC: 0.8 MG/DL (ref 0.5–0.9)
EOSINOPHIL # BLD: 0.1 K/UL (ref 0–0.7)
EOSINOPHIL FLUID: 1 %
EOSINOPHIL NFR BLD: 1.5 %
ERYTHROCYTE [DISTWIDTH] IN BLOOD BY AUTOMATED COUNT: 24.7 % (ref 11.5–14.5)
FLUID PATH CONSULT: YES
FLUID TYPE: NORMAL
GLUCOSE BLD-MCNC: 130 MG/DL (ref 70–99)
GLUCOSE SERPL-MCNC: 117 MG/DL (ref 70–99)
HCT VFR BLD AUTO: 25.3 % (ref 37–47)
HGB BLD-MCNC: 8.7 G/DL (ref 12–16)
INR PPP: 2.7
LYMPHOCYTES # BLD: 1.2 K/UL (ref 1–4.8)
LYMPHOCYTES NFR BLD: 16.6 %
MAGNESIUM SERPL-MCNC: 2.2 MG/DL (ref 1.7–2.4)
MCH RBC QN AUTO: 35.1 PG (ref 27–31.3)
MCHC RBC AUTO-ENTMCNC: 34.3 % (ref 33–37)
MCV RBC AUTO: 102.4 FL (ref 79.4–94.8)
MONOCYTE, FLUID: 50 %
MONOCYTES # BLD: 1.2 K/UL (ref 0.2–0.8)
MONOCYTES NFR BLD: 17 %
NEUTROPHIL, FLUID: 49 %
NEUTROPHILS # BLD: 4.7 K/UL (ref 1.4–6.5)
NEUTS SEG NFR BLD: 64.3 %
NUCLEATED CELLS FLUID: 95 /CUMM
NUMBER OF CELLS COUNTED FLUID: 100
PERFORMED ON: ABNORMAL
PLATELET # BLD AUTO: 80 K/UL (ref 130–400)
POTASSIUM SERPL-SCNC: 3.4 MEQ/L (ref 3.4–4.9)
PROT FLD-MCNC: 1.3 G/DL
PROT SERPL-MCNC: 5 G/DL (ref 6.3–8)
PROTHROMBIN TIME: 28.5 SEC (ref 12.3–14.9)
RBC # BLD AUTO: 2.47 M/UL (ref 4.2–5.4)
RBC FLUID: 105 /CUMM
SODIUM SERPL-SCNC: 134 MEQ/L (ref 135–144)
SPECIMEN TYPE: NORMAL
WBC # BLD AUTO: 7.3 K/UL (ref 4.8–10.8)

## 2023-08-10 PROCEDURE — 85610 PROTHROMBIN TIME: CPT

## 2023-08-10 PROCEDURE — 49083 ABD PARACENTESIS W/IMAGING: CPT | Performed by: RADIOLOGY

## 2023-08-10 PROCEDURE — 80048 BASIC METABOLIC PNL TOTAL CA: CPT

## 2023-08-10 PROCEDURE — 99223 1ST HOSP IP/OBS HIGH 75: CPT

## 2023-08-10 PROCEDURE — 99232 SBSQ HOSP IP/OBS MODERATE 35: CPT | Performed by: INTERNAL MEDICINE

## 2023-08-10 PROCEDURE — 83735 ASSAY OF MAGNESIUM: CPT

## 2023-08-10 PROCEDURE — 89051 BODY FLUID CELL COUNT: CPT

## 2023-08-10 PROCEDURE — 82042 OTHER SOURCE ALBUMIN QUAN EA: CPT

## 2023-08-10 PROCEDURE — 36415 COLL VENOUS BLD VENIPUNCTURE: CPT

## 2023-08-10 PROCEDURE — 87070 CULTURE OTHR SPECIMN AEROBIC: CPT

## 2023-08-10 PROCEDURE — 85025 COMPLETE CBC W/AUTO DIFF WBC: CPT

## 2023-08-10 PROCEDURE — 6360000002 HC RX W HCPCS: Performed by: NURSE PRACTITIONER

## 2023-08-10 PROCEDURE — 6370000000 HC RX 637 (ALT 250 FOR IP): Performed by: NURSE PRACTITIONER

## 2023-08-10 PROCEDURE — 2580000003 HC RX 258: Performed by: INTERNAL MEDICINE

## 2023-08-10 PROCEDURE — P9047 ALBUMIN (HUMAN), 25%, 50ML: HCPCS | Performed by: NURSE PRACTITIONER

## 2023-08-10 PROCEDURE — 87205 SMEAR GRAM STAIN: CPT

## 2023-08-10 PROCEDURE — 2500000003 HC RX 250 WO HCPCS: Performed by: NURSE PRACTITIONER

## 2023-08-10 PROCEDURE — 96365 THER/PROPH/DIAG IV INF INIT: CPT

## 2023-08-10 PROCEDURE — C1729 CATH, DRAINAGE: HCPCS

## 2023-08-10 PROCEDURE — 49083 ABD PARACENTESIS W/IMAGING: CPT

## 2023-08-10 PROCEDURE — 0W9G3ZZ DRAINAGE OF PERITONEAL CAVITY, PERCUTANEOUS APPROACH: ICD-10-PCS | Performed by: INTERNAL MEDICINE

## 2023-08-10 PROCEDURE — 84157 ASSAY OF PROTEIN OTHER: CPT

## 2023-08-10 PROCEDURE — 2700000000 HC OXYGEN THERAPY PER DAY

## 2023-08-10 PROCEDURE — 6370000000 HC RX 637 (ALT 250 FOR IP): Performed by: INTERNAL MEDICINE

## 2023-08-10 PROCEDURE — 80076 HEPATIC FUNCTION PANEL: CPT

## 2023-08-10 RX ORDER — ALBUMIN (HUMAN) 12.5 G/50ML
25 SOLUTION INTRAVENOUS ONCE
Status: COMPLETED | OUTPATIENT
Start: 2023-08-10 | End: 2023-08-10

## 2023-08-10 RX ORDER — SPIRONOLACTONE 50 MG/1
50 TABLET, FILM COATED ORAL DAILY
Qty: 30 TABLET | Refills: 0 | Status: ON HOLD | OUTPATIENT
Start: 2023-08-10

## 2023-08-10 RX ORDER — MIDODRINE HYDROCHLORIDE 5 MG/1
10 TABLET ORAL
Status: DISCONTINUED | OUTPATIENT
Start: 2023-08-10 | End: 2023-08-10 | Stop reason: HOSPADM

## 2023-08-10 RX ORDER — MIDODRINE HYDROCHLORIDE 10 MG/1
10 TABLET ORAL
Qty: 90 TABLET | Refills: 0 | Status: ON HOLD | OUTPATIENT
Start: 2023-08-10

## 2023-08-10 RX ORDER — ACAMPROSATE CALCIUM 333 MG/1
666 TABLET, DELAYED RELEASE ORAL 3 TIMES DAILY
Qty: 180 TABLET | Refills: 3 | Status: ON HOLD | OUTPATIENT
Start: 2023-08-10

## 2023-08-10 RX ORDER — LIDOCAINE HYDROCHLORIDE 20 MG/ML
INJECTION, SOLUTION INFILTRATION; PERINEURAL PRN
Status: COMPLETED | OUTPATIENT
Start: 2023-08-10 | End: 2023-08-10

## 2023-08-10 RX ORDER — LACTULOSE 10 G/15ML
20 SOLUTION ORAL 2 TIMES DAILY
Qty: 946 ML | Refills: 1 | Status: ON HOLD | OUTPATIENT
Start: 2023-08-10

## 2023-08-10 RX ORDER — MIDODRINE HYDROCHLORIDE 5 MG/1
10 TABLET ORAL
Status: DISCONTINUED | OUTPATIENT
Start: 2023-08-10 | End: 2023-08-10

## 2023-08-10 RX ADMIN — LIDOCAINE HYDROCHLORIDE 10 ML: 20 INJECTION, SOLUTION INFILTRATION; PERINEURAL at 11:00

## 2023-08-10 RX ADMIN — PANTOPRAZOLE SODIUM 40 MG: 40 TABLET, DELAYED RELEASE ORAL at 08:01

## 2023-08-10 RX ADMIN — PHYTONADIONE 10 MG: 5 TABLET ORAL at 08:01

## 2023-08-10 RX ADMIN — MIDODRINE HYDROCHLORIDE 5 MG: 5 TABLET ORAL at 08:01

## 2023-08-10 RX ADMIN — FOLIC ACID 1 MG: 1 TABLET ORAL at 08:01

## 2023-08-10 RX ADMIN — PANTOPRAZOLE SODIUM 40 MG: 40 TABLET, DELAYED RELEASE ORAL at 16:11

## 2023-08-10 RX ADMIN — ALBUMIN (HUMAN) 25 G: 0.25 INJECTION, SOLUTION INTRAVENOUS at 11:50

## 2023-08-10 RX ADMIN — LACTULOSE 20 G: 20 SOLUTION ORAL at 08:01

## 2023-08-10 RX ADMIN — Medication 100 MG: at 08:01

## 2023-08-10 RX ADMIN — MIDODRINE HYDROCHLORIDE 10 MG: 5 TABLET ORAL at 13:47

## 2023-08-10 RX ADMIN — Medication 10 ML: at 08:04

## 2023-08-10 ASSESSMENT — ENCOUNTER SYMPTOMS
CONSTIPATION: 0
SHORTNESS OF BREATH: 0
DIARRHEA: 0
COLOR CHANGE: 1
VOMITING: 0
ABDOMINAL DISTENTION: 0
NAUSEA: 0
ABDOMINAL PAIN: 0
COUGH: 0
SHORTNESS OF BREATH: 1
DIARRHEA: 1

## 2023-08-10 ASSESSMENT — VISUAL ACUITY: OU: 1

## 2023-08-10 NOTE — PROGRESS NOTES
Comprehensive Nutrition Assessment    Type and Reason for Visit:  Initial    Nutrition Recommendations/Plan:   Continue General diet, add sodium restriction once po > 75%  Change supplements to Clear @ B, Frozen @ L and high calorie /high protein @ D     Malnutrition Assessment:  Malnutrition Status:  Severe malnutrition (08/10/23 1145)    Context:  Chronic Illness     Findings of the 6 clinical characteristics of malnutrition:  Energy Intake:  75% or less estimated energy requirements for 1 month or longer  Weight Loss:  Greater than 20% over 1 year     Body Fat Loss:  Mild body fat loss Orbital   Muscle Mass Loss:  Mild muscle mass loss Clavicles (pectoralis & deltoids)  Fluid Accumulation:  Severe Ascites   Strength:  Not Performed    Nutrition Assessment:    Pt admitted with severe malnutrition in the context of chronic disease, significant weight loss > 20% < 1 year. Oral itnake has been poor since admission, will not add a sodium restriction at this time, will modify oral supplement and follow up for preferences    Nutrition Related Findings:    \"history of severe alcohol abuse. She has been having two to three days  of abdominal pain with emesis and bloody stools. The patient has  underwent EGD and colonoscopy two months ago which showed external  hemorrhoids of esophageal varices. Hx cirhosis with paracentesis 1-2 x per month last 8 months. . ( last 8/2  - 5 liters), for paracentesis again today+ jaundice/ascites, + tremors and + CIWA scale, Labs noted: Na = 134, NH3 85 ( 8/8), elevated LFT's, meds reviewed :reglan, thiamine,chronulac, + loose stools Wound Type: None       Current Nutrition Intake & Therapies:    Average Meal Intake: 26-50%  Average Supplements Intake: Unable to assess (pt off floor, no EMR documentation)  ADULT DIET;  Regular  ADULT ORAL NUTRITION SUPPLEMENT; Breakfast, Lunch, Dinner; Standard High Calorie/High Protein Oral Supplement    Anthropometric Measures:  Height: 5' 5\" (165.1
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
## 2023-08-10 NOTE — PLAN OF CARE
Nutrition Problem #1: Inadequate oral intake  Intervention: Food and/or Nutrient Delivery: Continue Current Diet, Modify Oral Nutrition Supplement  Nutritional

## 2023-08-10 NOTE — PROGRESS NOTES
Shift assessment complete. VSS. A&Ox4. Pt calm and cooperative. Was lethargic this morning but awake at this time. Denies having pain, nausea, or SOB. SR. Abdomen is distended/firm. Bowel sounds are active. One Large loose BM today. Tolerating PO diet. Meds given per MAR. Up x stand-by-assist to [facility]. Skin intact. Pt turns self. Falls precautions in place. Call light in reach. Bed alarm on, bed in low position. No needs at this time. Care ongoing     1000 - Report given to [facility]. All questions answered. (Paracentesis)     1150 - Patient back on 4W. VSS. 5165 ml out. No drainage at site. Band-aid intact. Electronically signed by Cornelio Jeffries RN on 8/10/2023 at 1:13 PM     Discharge instructions reviewed with pt. Medications reviewed. All questions answered. IV removed without complication. Pt father to pick her up.  Will call transport when ready

## 2023-08-10 NOTE — DISCHARGE SUMMARY
[facility] Medicine Discharge Summary    Nick Smith  :  1985  MRN:  [id number]    Admit date:  2023  Discharge date:  8/10/2023    Admitting Physician:  Eric Bennett DO  Primary Care Physician: Louise Palacios MD    Discharge Diagnoses:    Principal Problem:    GI bleed  Active Problems:    Alcoholic cirrhosis of liver with ascites (HCC)    Severe alcohol use disorder (HCC)    Decompensated hepatic cirrhosis (HCC)    Hypotension    Severe malnutrition (HCC)  Resolved Problems:    * No resolved hospital problems. *    Chief Complaint   Patient presents with    Abdominal Pain     Shortness of breath, fatigue, vomiting, back pain, and bloody stool. Condition: improved   Activity: no strenuous activity   Diet: low sodium  Disposition: home  Functional Status: ambulatory with assistance    Significant Findings:     Recent Labs     08/10/23  0416 23  0357 23  0900 23  0545   WBC 7.3 9.2  --  12.2*   HGB 8.7* 8.6* 8.1* 8.2*   HCT 25.3* 24.6* 23.9* 24.3*   .4* 101.3*  --  101.5*   PLT 80* 81*  --  92*     Labs Renal Latest Ref Rng & Units 8/10/2023 2023 2023 2023 2023   BUN 6 - 20 mg/dL 29(H) 29(H) 35(H) 38(H) 35(H)   Cr 0.50 - 0.90 mg/dL 0.80 0.84 1.31(H) 2.06(H) 1.80(H)   K 3.4 - 4.9 mEq/L 3.4 3.3(L) 3.4 4.0 4.1   Na 135 - 144 mEq/L 134(L) 134(L) 131(L) 132(L) 126(L)     Tbili 15.2  INR 2.7    MELD 29 on : 19.6% estimated 3-month mortality      Hospital Course:   Unfortunate 68-year-old female with alcoholic cirrhosis with ascites, continued alcohol use was hospitalized for decompensated cirrhosis with concern for acute blood loss anemia secondary to GI bleed. She had hypovolemic shock on admission as well as severe WILLIE, hyponatremia, and toxic/metabolic encephalopathy. She had recently relapsed on alcohol and was drinking regularly prior to admission. She had no further bleeding during the hospitalization.   She completed 72 hours

## 2023-08-10 NOTE — FLOWSHEET NOTE
Patient slept most of the night,she had a couple of loose green brown  stool. her abdomen is more distended pliable to palpation ,she gets short of breath with minimal activity like getting up to the bedside commode. patient was informed that she will be going to have her  abdominal fluid drain today.

## 2023-08-10 NOTE — OR NURSING
NO SEDATION    1025 Procedure explained, consent signed. IR RN obtained images prior to start of procedure using U/S. Pt  VSS. [address] prepped LLQ with Chloraprep and draped with sterile drape and towels. Funmilayo Whitlock CNP notified patient ready. 56 Timeout completed for Ultrasound Guided Paracentesis. 1100 Site numbed with lidocaine by Funmilayo Whitlock CNP. [id number] Centesis 5F catheter placed using Ultrasound guidance. Fluid appears clear dark yellow. Specimen obtained and taken to lab. Catheter tubing connected to Songfor machine to remove fluid. 1140 Fluid continues to drain. Patient tolerating well. 1150 Patient has Albumin ordered. Albumin started as per order. Patient tolerating well. 1212 Pt tolerated well. Total 5165 ml removed. Catheter removed, pressure held and bandaid applied. Verbal and tactile reassurance given throughout. Report called to Meme Kimble rn on floor. Albumin infusion completed. Patient tolerated well.

## 2023-08-11 LAB
BACTERIA BLD CULT: NORMAL
PATH CONSULT FLUID: NORMAL

## 2023-08-13 LAB — BACTERIA FLD AEROBE CULT: NORMAL

## 2023-08-16 LAB — BACTERIA FLD AEROBE CULT: NORMAL

## 2023-08-17 ENCOUNTER — HOSPITAL ENCOUNTER (OUTPATIENT)
Dept: INTERVENTIONAL RADIOLOGY/VASCULAR | Age: 38
Discharge: HOME | End: 2023-08-19
Payer: COMMERCIAL

## 2023-08-17 ENCOUNTER — OFFICE VISIT (OUTPATIENT)
Dept: PALLATIVE CARE | Age: 38
End: 2023-08-17
Payer: COMMERCIAL

## 2023-08-17 ENCOUNTER — TELEPHONE (OUTPATIENT)
Dept: INFECTIOUS DISEASES | Age: 38
End: 2023-08-17

## 2023-08-17 ENCOUNTER — TELEPHONE (OUTPATIENT)
Dept: INTERVENTIONAL RADIOLOGY/VASCULAR | Age: 38
End: 2023-08-17

## 2023-08-17 VITALS
SYSTOLIC BLOOD PRESSURE: 100 MMHG | OXYGEN SATURATION: 100 % | RESPIRATION RATE: 14 BRPM | DIASTOLIC BLOOD PRESSURE: 50 MMHG | HEART RATE: 82 BPM

## 2023-08-17 DIAGNOSIS — K70.31 ALCOHOLIC CIRRHOSIS OF LIVER WITH ASCITES (HCC): ICD-10-CM

## 2023-08-17 DIAGNOSIS — R11.2 NAUSEA AND VOMITING, UNSPECIFIED VOMITING TYPE: ICD-10-CM

## 2023-08-17 DIAGNOSIS — Z71.89 ADVANCED CARE PLANNING/COUNSELING DISCUSSION: ICD-10-CM

## 2023-08-17 DIAGNOSIS — R60.0 BILATERAL LOWER EXTREMITY EDEMA: ICD-10-CM

## 2023-08-17 DIAGNOSIS — K74.60 CIRRHOSIS OF LIVER WITHOUT ASCITES, UNSPECIFIED HEPATIC CIRRHOSIS TYPE (HCC): Primary | ICD-10-CM

## 2023-08-17 DIAGNOSIS — Z51.5 PALLIATIVE CARE ENCOUNTER: ICD-10-CM

## 2023-08-17 DIAGNOSIS — F41.1 GENERALIZED ANXIETY DISORDER: ICD-10-CM

## 2023-08-17 DIAGNOSIS — E83.42 HYPOMAGNESEMIA: ICD-10-CM

## 2023-08-17 DIAGNOSIS — Z71.89 GOALS OF CARE, COUNSELING/DISCUSSION: ICD-10-CM

## 2023-08-17 DIAGNOSIS — K70.31 ALCOHOLIC CIRRHOSIS OF LIVER WITH ASCITES (HCC): Primary | ICD-10-CM

## 2023-08-17 DIAGNOSIS — F32.A DEPRESSION, UNSPECIFIED DEPRESSION TYPE: ICD-10-CM

## 2023-08-17 DIAGNOSIS — K74.60 CIRRHOSIS OF LIVER WITHOUT ASCITES, UNSPECIFIED HEPATIC CIRRHOSIS TYPE (HCC): ICD-10-CM

## 2023-08-17 PROCEDURE — 49083 ABD PARACENTESIS W/IMAGING: CPT

## 2023-08-17 PROCEDURE — 2500000003 HC RX 250 WO HCPCS: Performed by: RADIOLOGY

## 2023-08-17 PROCEDURE — 0W9G3ZZ DRAINAGE OF PERITONEAL CAVITY, PERCUTANEOUS APPROACH: ICD-10-PCS | Performed by: RADIOLOGY

## 2023-08-17 PROCEDURE — 99345 HOME/RES VST NEW HIGH MDM 75: CPT

## 2023-08-17 PROCEDURE — C1729 CATH, DRAINAGE: HCPCS

## 2023-08-17 RX ORDER — PROMETHAZINE HYDROCHLORIDE 25 MG/1
25 TABLET ORAL EVERY 6 HOURS PRN
Qty: 28 TABLET | Refills: 0 | Status: SHIPPED | OUTPATIENT
Start: 2023-08-17 | End: 2023-08-24

## 2023-08-17 RX ORDER — LANOLIN ALCOHOL/MO/W.PET/CERES
400 CREAM (GRAM) TOPICAL 2 TIMES DAILY
Qty: 60 TABLET | Refills: 0 | Status: SHIPPED | OUTPATIENT
Start: 2023-08-17 | End: 2023-09-16

## 2023-08-17 RX ORDER — ONDANSETRON 4 MG/1
4 TABLET, ORALLY DISINTEGRATING ORAL 3 TIMES DAILY PRN
Qty: 21 TABLET | Refills: 0 | Status: SHIPPED | OUTPATIENT
Start: 2023-08-17

## 2023-08-17 RX ORDER — LIDOCAINE HYDROCHLORIDE 20 MG/ML
INJECTION, SOLUTION INFILTRATION; PERINEURAL PRN
Status: COMPLETED | OUTPATIENT
Start: 2023-08-17 | End: 2023-08-17

## 2023-08-17 RX ORDER — ESCITALOPRAM OXALATE 10 MG/1
10 TABLET ORAL DAILY
Qty: 30 TABLET | Refills: 0 | Status: SHIPPED | OUTPATIENT
Start: 2023-08-17

## 2023-08-17 RX ADMIN — LIDOCAINE HYDROCHLORIDE 10 ML: 20 INJECTION, SOLUTION INFILTRATION; PERINEURAL at 15:00

## 2023-08-17 NOTE — PROGRESS NOTES
10 MG tablet; Take 1 tablet by mouth daily  Dispense: 30 tablet; Refill: 0    7. Goals of care, counseling/discussion  A. Disease process and goals of treatment were discussed in basic terms. Keli's goal is to optimize available comfort care measures current treatment plan. We discussed the palliative care philosophy in light of those goals. We discussed all care options contingent on treatment response and QOL. Much active listening, presence, and emotional support were given. 8. Advanced care planning/counseling discussion   A. Patient's HPOA is her mother. B.  Patient wishes to be full code with CPR and intubation. C.  Discussed overall poor prognosis if patient continues to use alcohol, patient states that she is not currently drinking and has been sober since her last admission. Her last admission was August 5 to August 10. D. Explained to patient that this was only 1 week ago and that she is at high risk for relapse. 9. Palliative care encounter  A. Explained the role of palliative care in treating patient. Discussed symptom management related to chronic disease/condition. Provided emotional support and active listening. Patient understands and is agreeable to current plan. Medications Discontinued During This Encounter   Medication Reason    magnesium oxide (MAG-OX) 400 (240 Mg) MG tablet REORDER       Due to acuity, symptomatology and high-risk medication management, I advised patient to No follow-ups on file. Thanks for the opportunity you have allowed us to provide palliative care to Dee Concepcion. We will be in touch as care progresses. Please feel free to reach out to us should you have any questions or requests.     Total Time 75 17 mins (Adv. Care planning 17 mins)         Total time includes reviewing labs and tests, reviewing history, medications, and allergies, counseling patient, performing medically appropriate evaluation and examination, ordering medications, and documenting in

## 2023-08-17 NOTE — OR NURSING
NO SEDATION    1440 pt brought to specials , gait slow and steady. U/S tech obtained images prior to start of procedure using U/S. Pt  VSS. History, allergies and medications reviewed. 1445 Procedure explained, consent signed. Timeout completed for Ultrasound Guided Paracentesis. Using U/S,  marked, prepped LLQ with Chloraprep and draped with sterile drape and towels. 1450  Site numbed with lidocaine. Vvm7ille Centesis 5F catheter placed using Ultrasound guidance. Fluid appears clear yellow. Catheter tubing connected to Seafile machine to remove fluid. 1500 tolerating drain very well. VSS    1515  Pt tolerated well. Total 3890 ml removed. Catheter removed, pressure held and bandaid applied. Verbal and tactile reassurance given throughout. 1520 d/c instructions given, pt verbalized understanding. Denies pain or cramping. Walked to lobby with steady gait for d/c to home.

## 2023-08-17 NOTE — TELEPHONE ENCOUNTER
Patient was given this information via phone conversation - voiced understanding  2. Spoke to Canal do Credito in [address]: 8/17/2023 @ 3:00 pm  >  You will need to arrive at 2:30 pm from home and check in at the Diagnostic Imaging Check In desk.

## 2023-08-17 NOTE — DISCHARGE INSTRUCTIONS
Ultrasound Guided Paracentesis Discharge Instructions     Rest today. No heavy lifting, bending, stretching or pulling. Some tenderness will be normal at the procedure site for a few days. You may remove the bandaid in 24-48 hours. If you experience any drainage or bleeding at the site, hold pressure for 30 minutes and lay on side opposite of the procedure site (move fluid away from the area of leakage). If drainage or bleeding does not stop and seems excessive, call your physician or proceed to the ER. Watch for signs of infection such as fever, chills, drainage or redness at the site. If you experience any of these symptoms, call your primary doctor or go to the emergency room. Please keep all follow-up appointments with your Hepatologist. Schedule follow-up appointment for repeat paracentesis if necessary. If you have any concerns please contact the [facility] Radiology Department at [phone] and / or Dr. Rome Calle office at [phone].

## 2023-08-17 NOTE — TELEPHONE ENCOUNTER
Patient called ID Office after hours and left a vm (10:07pm) requesting to see Dr Salmeron Said. Will update the IR office. Return call to patient, she states she needs to contact Dr Ramon Bahena but only had the ID office number available at the time. Provided [phone] for Dr Ramon Bahena 's office.

## 2023-08-18 ENCOUNTER — POST-OP TELEPHONE (OUTPATIENT)
Dept: GENERAL RADIOLOGY | Age: 38
End: 2023-08-18

## 2023-08-18 NOTE — PROGRESS NOTES
F/u call post para. Patient denies any drainage and stated she took off her dressing and has no concerns.  Patient is slightly sore but says its normal.

## 2023-08-19 ENCOUNTER — APPOINTMENT (OUTPATIENT)
Dept: GENERAL RADIOLOGY | Age: 38
DRG: 280 | End: 2023-08-19
Payer: COMMERCIAL

## 2023-08-19 ENCOUNTER — HOSPITAL ENCOUNTER (INPATIENT)
Age: 38
LOS: 4 days | Discharge: HOME | DRG: 280 | End: 2023-08-23
Attending: EMERGENCY MEDICINE | Admitting: INTERNAL MEDICINE
Payer: COMMERCIAL

## 2023-08-19 ENCOUNTER — APPOINTMENT (OUTPATIENT)
Dept: CT IMAGING | Age: 38
DRG: 280 | End: 2023-08-19
Payer: COMMERCIAL

## 2023-08-19 DIAGNOSIS — M79.605 PAIN IN BOTH LOWER EXTREMITIES: ICD-10-CM

## 2023-08-19 DIAGNOSIS — M79.604 PAIN IN BOTH LOWER EXTREMITIES: ICD-10-CM

## 2023-08-19 DIAGNOSIS — R79.89 INCREASED AMMONIA LEVEL: ICD-10-CM

## 2023-08-19 DIAGNOSIS — K72.00 ACUTE LIVER FAILURE WITHOUT HEPATIC COMA: ICD-10-CM

## 2023-08-19 DIAGNOSIS — R41.0 DELIRIUM: Primary | ICD-10-CM

## 2023-08-19 PROBLEM — K76.82 HEPATIC ENCEPHALOPATHY (HCC): Status: ACTIVE | Noted: 2023-08-19

## 2023-08-19 LAB
ACANTHOCYTES BLD QL SMEAR: ABNORMAL
ALBUMIN SERPL-MCNC: 2.5 G/DL (ref 3.5–4.6)
ALP SERPL-CCNC: 162 U/L (ref 40–130)
ALT SERPL-CCNC: 24 U/L (ref 0–33)
AMMONIA PLAS-SCNC: 116 UMOL/L (ref 11–51)
ANION GAP SERPL CALCULATED.3IONS-SCNC: 12 MEQ/L (ref 9–15)
ANISOCYTOSIS BLD QL SMEAR: ABNORMAL
APTT PPP: 52.7 SEC (ref 24.4–36.8)
AST SERPL-CCNC: 88 U/L (ref 0–35)
BASOPHILS # BLD: 0 K/UL (ref 0–0.2)
BASOPHILS NFR BLD: 0.2 %
BILIRUB SERPL-MCNC: 10.5 MG/DL (ref 0.2–0.7)
BILIRUB UR QL STRIP: NEGATIVE
BUN SERPL-MCNC: 21 MG/DL (ref 6–20)
CALCIUM SERPL-MCNC: 7.4 MG/DL (ref 8.5–9.9)
CHLORIDE SERPL-SCNC: 102 MEQ/L (ref 95–107)
CK SERPL-CCNC: 33 U/L (ref 0–170)
CLARITY UR: CLEAR
CO2 SERPL-SCNC: 20 MEQ/L (ref 20–31)
COLOR UR: YELLOW
CREAT SERPL-MCNC: 0.89 MG/DL (ref 0.5–0.9)
EOSINOPHIL # BLD: 0 K/UL (ref 0–0.7)
EOSINOPHIL NFR BLD: 0.2 %
ERYTHROCYTE [DISTWIDTH] IN BLOOD BY AUTOMATED COUNT: 23.4 % (ref 11.5–14.5)
ETHANOL PERCENT: NORMAL G/DL
ETHANOLAMINE SERPL-MCNC: <10 MG/DL (ref 0–0.08)
GLOBULIN SER CALC-MCNC: 3.1 G/DL (ref 2.3–3.5)
GLUCOSE SERPL-MCNC: 101 MG/DL (ref 70–99)
GLUCOSE UR STRIP-MCNC: NEGATIVE MG/DL
HCT VFR BLD AUTO: 23.9 % (ref 37–47)
HGB BLD-MCNC: 8.2 G/DL (ref 12–16)
HGB UR QL STRIP: NEGATIVE
INR PPP: 2.3
KETONES UR STRIP-MCNC: NEGATIVE MG/DL
LACTATE BLDV-SCNC: 3 MMOL/L (ref 0.5–2.2)
LEUKOCYTE ESTERASE UR QL STRIP: NEGATIVE
LIPASE SERPL-CCNC: 86 U/L (ref 12–95)
LYMPHOCYTES # BLD: 0.9 K/UL (ref 1–4.8)
LYMPHOCYTES NFR BLD: 9 %
MCH RBC QN AUTO: 34.7 PG (ref 27–31.3)
MCHC RBC AUTO-ENTMCNC: 34.5 % (ref 33–37)
MCV RBC AUTO: 100.7 FL (ref 79.4–94.8)
MONOCYTES # BLD: 0.5 K/UL (ref 0.2–0.8)
MONOCYTES NFR BLD: 5.4 %
NEUTROPHILS # BLD: 8.2 K/UL (ref 1.4–6.5)
NEUTS SEG NFR BLD: 85.2 %
NITRITE UR QL STRIP: NEGATIVE
PATH INTERP BLD-IMP: YES
PH UR STRIP: 6.5 [PH] (ref 5–9)
PLATELET # BLD AUTO: 70 K/UL (ref 130–400)
PLATELET BLD QL SMEAR: ABNORMAL
POLYCHROMASIA BLD QL SMEAR: ABNORMAL
POTASSIUM SERPL-SCNC: 3.3 MEQ/L (ref 3.4–4.9)
PROT SERPL-MCNC: 5.6 G/DL (ref 6.3–8)
PROT UR STRIP-MCNC: NEGATIVE MG/DL
PROTHROMBIN TIME: 25.8 SEC (ref 12.3–14.9)
RBC # BLD AUTO: 2.37 M/UL (ref 4.2–5.4)
SCHISTOCYTES BLD QL SMEAR: ABNORMAL
SODIUM SERPL-SCNC: 134 MEQ/L (ref 135–144)
SP GR UR STRIP: 1 (ref 1–1.03)
TROPONIN T SERPL-MCNC: <0.01 NG/ML (ref 0–0.01)
UROBILINOGEN UR STRIP-ACNC: 1 E.U./DL
WBC # BLD AUTO: 9.7 K/UL (ref 4.8–10.8)

## 2023-08-19 PROCEDURE — 93005 ELECTROCARDIOGRAM TRACING: CPT | Performed by: EMERGENCY MEDICINE

## 2023-08-19 PROCEDURE — 2580000003 HC RX 258: Performed by: INTERNAL MEDICINE

## 2023-08-19 PROCEDURE — 85025 COMPLETE CBC W/AUTO DIFF WBC: CPT

## 2023-08-19 PROCEDURE — 6370000000 HC RX 637 (ALT 250 FOR IP): Performed by: EMERGENCY MEDICINE

## 2023-08-19 PROCEDURE — 36415 COLL VENOUS BLD VENIPUNCTURE: CPT

## 2023-08-19 PROCEDURE — 85730 THROMBOPLASTIN TIME PARTIAL: CPT

## 2023-08-19 PROCEDURE — 83605 ASSAY OF LACTIC ACID: CPT

## 2023-08-19 PROCEDURE — 80053 COMPREHEN METABOLIC PANEL: CPT

## 2023-08-19 PROCEDURE — 1210000000 HC MED SURG R&B

## 2023-08-19 PROCEDURE — 83690 ASSAY OF LIPASE: CPT

## 2023-08-19 PROCEDURE — 99285 EMERGENCY DEPT VISIT HI MDM: CPT

## 2023-08-19 PROCEDURE — 82140 ASSAY OF AMMONIA: CPT

## 2023-08-19 PROCEDURE — 84484 ASSAY OF TROPONIN QUANT: CPT

## 2023-08-19 PROCEDURE — 81003 URINALYSIS AUTO W/O SCOPE: CPT

## 2023-08-19 PROCEDURE — 70450 CT HEAD/BRAIN W/O DYE: CPT

## 2023-08-19 PROCEDURE — 71045 X-RAY EXAM CHEST 1 VIEW: CPT

## 2023-08-19 PROCEDURE — 85610 PROTHROMBIN TIME: CPT

## 2023-08-19 PROCEDURE — 6370000000 HC RX 637 (ALT 250 FOR IP): Performed by: REGISTERED NURSE

## 2023-08-19 PROCEDURE — 2580000003 HC RX 258: Performed by: EMERGENCY MEDICINE

## 2023-08-19 PROCEDURE — 82077 ASSAY SPEC XCP UR&BREATH IA: CPT

## 2023-08-19 PROCEDURE — 82550 ASSAY OF CK (CPK): CPT

## 2023-08-19 PROCEDURE — 6370000000 HC RX 637 (ALT 250 FOR IP): Performed by: INTERNAL MEDICINE

## 2023-08-19 PROCEDURE — 99255 IP/OBS CONSLTJ NEW/EST HI 80: CPT | Performed by: INTERNAL MEDICINE

## 2023-08-19 RX ORDER — LACTULOSE 10 G/15ML
30 SOLUTION ORAL 3 TIMES DAILY
Status: DISCONTINUED | OUTPATIENT
Start: 2023-08-19 | End: 2023-08-23 | Stop reason: HOSPADM

## 2023-08-19 RX ORDER — PANTOPRAZOLE SODIUM 40 MG/1
40 TABLET, DELAYED RELEASE ORAL
Status: DISCONTINUED | OUTPATIENT
Start: 2023-08-20 | End: 2023-08-23 | Stop reason: HOSPADM

## 2023-08-19 RX ORDER — ENOXAPARIN SODIUM 100 MG/ML
40 INJECTION SUBCUTANEOUS DAILY
Status: DISCONTINUED | OUTPATIENT
Start: 2023-08-19 | End: 2023-08-19

## 2023-08-19 RX ORDER — LIDOCAINE 4 G/G
2 PATCH TOPICAL DAILY
Status: DISCONTINUED | OUTPATIENT
Start: 2023-08-19 | End: 2023-08-23 | Stop reason: HOSPADM

## 2023-08-19 RX ORDER — SODIUM CHLORIDE 0.9 % (FLUSH) 0.9 %
5-40 SYRINGE (ML) INJECTION EVERY 12 HOURS SCHEDULED
Status: DISCONTINUED | OUTPATIENT
Start: 2023-08-19 | End: 2023-08-23 | Stop reason: HOSPADM

## 2023-08-19 RX ORDER — MIDODRINE HYDROCHLORIDE 5 MG/1
10 TABLET ORAL
Status: DISCONTINUED | OUTPATIENT
Start: 2023-08-19 | End: 2023-08-23 | Stop reason: HOSPADM

## 2023-08-19 RX ORDER — LACTULOSE 10 G/15ML
20 SOLUTION ORAL ONCE
Status: COMPLETED | OUTPATIENT
Start: 2023-08-19 | End: 2023-08-19

## 2023-08-19 RX ORDER — ESCITALOPRAM OXALATE 10 MG/1
10 TABLET ORAL DAILY
Status: DISCONTINUED | OUTPATIENT
Start: 2023-08-19 | End: 2023-08-23 | Stop reason: HOSPADM

## 2023-08-19 RX ORDER — SPIRONOLACTONE 50 MG/1
50 TABLET, FILM COATED ORAL DAILY
Status: DISCONTINUED | OUTPATIENT
Start: 2023-08-19 | End: 2023-08-23 | Stop reason: HOSPADM

## 2023-08-19 RX ORDER — SODIUM CHLORIDE 0.9 % (FLUSH) 0.9 %
5-40 SYRINGE (ML) INJECTION PRN
Status: DISCONTINUED | OUTPATIENT
Start: 2023-08-19 | End: 2023-08-23 | Stop reason: HOSPADM

## 2023-08-19 RX ORDER — 0.9 % SODIUM CHLORIDE 0.9 %
1000 INTRAVENOUS SOLUTION INTRAVENOUS ONCE
Status: COMPLETED | OUTPATIENT
Start: 2023-08-19 | End: 2023-08-19

## 2023-08-19 RX ORDER — POLYETHYLENE GLYCOL 3350 17 G/17G
17 POWDER, FOR SOLUTION ORAL DAILY PRN
Status: DISCONTINUED | OUTPATIENT
Start: 2023-08-19 | End: 2023-08-23 | Stop reason: HOSPADM

## 2023-08-19 RX ORDER — SODIUM CHLORIDE 9 MG/ML
INJECTION, SOLUTION INTRAVENOUS PRN
Status: DISCONTINUED | OUTPATIENT
Start: 2023-08-19 | End: 2023-08-23 | Stop reason: HOSPADM

## 2023-08-19 RX ORDER — ONDANSETRON 2 MG/ML
4 INJECTION INTRAMUSCULAR; INTRAVENOUS EVERY 6 HOURS PRN
Status: DISCONTINUED | OUTPATIENT
Start: 2023-08-19 | End: 2023-08-23 | Stop reason: HOSPADM

## 2023-08-19 RX ORDER — ONDANSETRON 4 MG/1
4 TABLET, ORALLY DISINTEGRATING ORAL EVERY 8 HOURS PRN
Status: DISCONTINUED | OUTPATIENT
Start: 2023-08-19 | End: 2023-08-23 | Stop reason: HOSPADM

## 2023-08-19 RX ORDER — FOLIC ACID 1 MG/1
1 TABLET ORAL DAILY
Status: DISCONTINUED | OUTPATIENT
Start: 2023-08-20 | End: 2023-08-23 | Stop reason: HOSPADM

## 2023-08-19 RX ORDER — POTASSIUM CHLORIDE 20 MEQ/1
20 TABLET, EXTENDED RELEASE ORAL ONCE
Status: COMPLETED | OUTPATIENT
Start: 2023-08-19 | End: 2023-08-19

## 2023-08-19 RX ADMIN — LACTULOSE 30 G: 20 SOLUTION ORAL at 20:41

## 2023-08-19 RX ADMIN — Medication 10 ML: at 20:59

## 2023-08-19 RX ADMIN — LACTULOSE 20 G: 20 SOLUTION ORAL at 13:25

## 2023-08-19 RX ADMIN — RIFAXIMIN 400 MG: 200 TABLET ORAL at 20:41

## 2023-08-19 RX ADMIN — ESCITALOPRAM OXALATE 10 MG: 10 TABLET ORAL at 15:49

## 2023-08-19 RX ADMIN — RIFAXIMIN 400 MG: 200 TABLET ORAL at 15:50

## 2023-08-19 RX ADMIN — MIDODRINE HYDROCHLORIDE 10 MG: 5 TABLET ORAL at 15:50

## 2023-08-19 RX ADMIN — SODIUM CHLORIDE 1000 ML: 9 INJECTION, SOLUTION INTRAVENOUS at 11:43

## 2023-08-19 RX ADMIN — POTASSIUM CHLORIDE 20 MEQ: 1500 TABLET, EXTENDED RELEASE ORAL at 13:50

## 2023-08-19 SDOH — ECONOMIC STABILITY: INCOME INSECURITY: IN THE LAST 12 MONTHS, WAS THERE A TIME WHEN YOU WERE NOT ABLE TO PAY THE MORTGAGE OR RENT ON TIME?: NO

## 2023-08-19 SDOH — ECONOMIC STABILITY: HOUSING INSECURITY
IN THE LAST 12 MONTHS, WAS THERE A TIME WHEN YOU DID NOT HAVE A STEADY PLACE TO SLEEP OR SLEPT IN A SHELTER (INCLUDING NOW)?: NO

## 2023-08-19 SDOH — ECONOMIC STABILITY: HOUSING INSECURITY: IN THE LAST 12 MONTHS, HOW MANY PLACES HAVE YOU LIVED?: 1

## 2023-08-19 SDOH — ECONOMIC STABILITY: TRANSPORTATION INSECURITY
IN THE PAST 12 MONTHS, HAS THE LACK OF TRANSPORTATION KEPT YOU FROM MEDICAL APPOINTMENTS OR FROM GETTING MEDICATIONS?: NO

## 2023-08-19 SDOH — ECONOMIC STABILITY: TRANSPORTATION INSECURITY
IN THE PAST 12 MONTHS, HAS LACK OF TRANSPORTATION KEPT YOU FROM MEETINGS, WORK, OR FROM GETTING THINGS NEEDED FOR DAILY LIVING?: NO

## 2023-08-19 ASSESSMENT — PAIN - FUNCTIONAL ASSESSMENT: PAIN_FUNCTIONAL_ASSESSMENT: NONE - DENIES PAIN

## 2023-08-19 ASSESSMENT — SOCIAL DETERMINANTS OF HEALTH (SDOH): HOW HARD IS IT FOR YOU TO PAY FOR THE VERY BASICS LIKE FOOD, HOUSING, MEDICAL CARE, AND HEATING?: SOMEWHAT HARD

## 2023-08-19 NOTE — ACP (ADVANCE CARE PLANNING)
Advance Care Planning   Healthcare Decision Maker:    Primary Decision Maker: [facility] - [facility] - [phone]    Primary Decision Maker: [facility] [place] - Child - [phone]    Click here to complete Healthcare Decision Makers including selection of the Healthcare Decision Maker Relationship (ie \"Primary\"). ACP decision makers listed today, pt's dtr is 24years old.     Christina Warner, BSN, RN

## 2023-08-19 NOTE — H&P
HISTORY AND PHYSICAL             Date: 8/19/2023        Patient Name: Cecilia Guerrero     YOB: 1985      Age:  42 y.o. Chief Complaint     Chief Complaint   Patient presents with    Altered Mental Status          History Obtained From   patient, electronic medical record    History of Present Illness   Patient is a 56-year-old female who presents to ED for increased confusion. Patient has a past medical history of alcohol abuse, chronic liver disease associate with alcoholic cirrhosis , and tobacco dependence. CT pending, ammonia level 116, AST 88, total bilirubin 10.5. Followed by GI; Per EMR; patient had recent paracentesis for ascites. Patient alert but confused; had to redirect multiple times. Patient denies chest pain, palpitations, headache, dizziness, shortness of breath, cough, fever, chills, N/V/D, and changes in appetite.     Past Medical History     Past Medical History:   Diagnosis Date    Alcohol abuse     Cirrhosis ([address])     Tobacco dependence         Past Surgical History     Past Surgical History:   Procedure Laterality Date    APPENDECTOMY      COLONOSCOPY N/A 01/13/2023    COLONOSCOPY DIAGNOSTIC performed by Fauzia Fam MD at [facility]    COLONOSCOPY N/A 05/12/2023    COLONOSCOPY DIAGNOSTIC performed by Ryne Babin MD at [address]      reports 6 sx on L foot and one on right foot    PARACENTESIS Left 09/13/2022    1510 ml removed per Dr Bernardo Vazquez  specimen obtained    PARACENTESIS Left 01/11/2023    4720 ml removed by Dr. Bernardo Vazquez - therapeutic & diagnostic    PARACENTESIS Left 01/16/2023    4400ml removed by Dr. Louise Espitia Left 02/01/2023    5600 ml removed by Dr. Javon Pulido Left 05/16/2023    5835 ml removed by Dr. Inderjit Lima - diagnostics sent    PARACENTESIS Left 05/30/2023    5980 ml removed by Dr. Micheal Zhong Left 06/07/2023    3050 ml removed by Dr Javon Pulido Left 06/22/2023    4970 ml removed by Ann Shrestha

## 2023-08-19 NOTE — ED TRIAGE NOTES
[facility]/o altered mental status   Pt has yellowing of her scleras  Pt had a paracentesis done yesterday  Pt denies pain   Pt is A&Ox4   Pt is afebrile

## 2023-08-19 NOTE — ED NOTES
Page to [facility] @ 7155  Dr Jason Del Castillo returned call @ 0120 [facility] 305  08/19/23 3269

## 2023-08-19 NOTE — ED PROVIDER NOTES
[facility] ED  eMERGENCY dEPARTMENT eNCOUnter      Pt Name: Zora Fish  MRN: [id number]  [address] [facility] 1985  Date of evaluation: 8/19/2023  Provider: Jorje Santillan MD    [address]       Chief Complaint   Patient presents with    Altered Mental Status         HISTORY OF PRESENT ILLNESS   (Location/Symptom, Timing/Onset,Context/Setting, Quality, Duration, Modifying Factors, Severity)  Note limiting factors. Zora Fish is a 42 y.o. female who presents to the emergency department for mental status. Patient is known to have chronic liver disease associate with alcoholic cirrhotic allergy. Patient had multiple drains for ascites. Patient is presented to the emergency department by EMS for complaint of progressive alteration of mental status. At this time patient does answer questions, however take significant periods for redirection. HPI and review of systems is fairly limited at this time in reliability. Denies fevers or chills, shortness of breath, patient does admit to generally feeling weak. HPI    NursingNotes were reviewed. REVIEW OF SYSTEMS    (2-9 systems for level 4, 10 or more for level 5)     Review of Systems   Unable to perform ROS: Mental status change     Except as noted above the remainder of the review of systems was reviewed and negative.        PAST MEDICAL HISTORY     Past Medical History:   Diagnosis Date    Alcohol abuse     Cirrhosis ([address])     Tobacco dependence          SURGICALHISTORY       Past Surgical History:   Procedure Laterality Date    APPENDECTOMY      COLONOSCOPY N/A 01/13/2023    COLONOSCOPY DIAGNOSTIC performed by Quan Hook MD at [address] 05/12/2023    COLONOSCOPY DIAGNOSTIC performed by Flex Johnson MD at [address]      reports 6 sx on L foot and one on right foot    PARACENTESIS Left 09/13/2022    1510 ml removed per Dr Nic Canseco  specimen obtained    PARACENTESIS Left 01/11/2023    4720 ml

## 2023-08-20 ENCOUNTER — APPOINTMENT (OUTPATIENT)
Dept: ULTRASOUND IMAGING | Age: 38
DRG: 280 | End: 2023-08-20
Payer: COMMERCIAL

## 2023-08-20 ENCOUNTER — APPOINTMENT (OUTPATIENT)
Dept: GENERAL RADIOLOGY | Age: 38
DRG: 280 | End: 2023-08-20
Payer: COMMERCIAL

## 2023-08-20 LAB
ACANTHOCYTES BLD QL SMEAR: ABNORMAL
ALBUMIN SERPL-MCNC: 2.4 G/DL (ref 3.5–4.6)
ALP SERPL-CCNC: 146 U/L (ref 40–130)
ALT SERPL-CCNC: 25 U/L (ref 0–33)
AMMONIA PLAS-SCNC: 94 UMOL/L (ref 11–51)
ANION GAP SERPL CALCULATED.3IONS-SCNC: 12 MEQ/L (ref 9–15)
ANISOCYTOSIS BLD QL SMEAR: ABNORMAL
AST SERPL-CCNC: 100 U/L (ref 0–35)
BASOPHILS # BLD: 0 K/UL (ref 0–0.2)
BASOPHILS NFR BLD: 0.2 %
BILIRUB SERPL-MCNC: 13.3 MG/DL (ref 0.2–0.7)
BUN SERPL-MCNC: 24 MG/DL (ref 6–20)
CALCIUM SERPL-MCNC: 8.4 MG/DL (ref 8.5–9.9)
CHLORIDE SERPL-SCNC: 101 MEQ/L (ref 95–107)
CO2 SERPL-SCNC: 22 MEQ/L (ref 20–31)
CREAT SERPL-MCNC: 0.77 MG/DL (ref 0.5–0.9)
EOSINOPHIL # BLD: 0 K/UL (ref 0–0.7)
EOSINOPHIL NFR BLD: 0.4 %
ERYTHROCYTE [DISTWIDTH] IN BLOOD BY AUTOMATED COUNT: 23.6 % (ref 11.5–14.5)
GLOBULIN SER CALC-MCNC: 3.2 G/DL (ref 2.3–3.5)
GLUCOSE SERPL-MCNC: 94 MG/DL (ref 70–99)
HCT VFR BLD AUTO: 21.8 % (ref 37–47)
HGB BLD-MCNC: 7.5 G/DL (ref 12–16)
LYMPHOCYTES # BLD: 1.1 K/UL (ref 1–4.8)
LYMPHOCYTES NFR BLD: 10.2 %
MCH RBC QN AUTO: 34.5 PG (ref 27–31.3)
MCHC RBC AUTO-ENTMCNC: 34.4 % (ref 33–37)
MCV RBC AUTO: 100.2 FL (ref 79.4–94.8)
MONOCYTES # BLD: 0.6 K/UL (ref 0.2–0.8)
MONOCYTES NFR BLD: 5.4 %
NEUTROPHILS # BLD: 9.2 K/UL (ref 1.4–6.5)
NEUTS SEG NFR BLD: 83.8 %
PLATELET # BLD AUTO: 64 K/UL (ref 130–400)
PLATELET BLD QL SMEAR: ABNORMAL
POLYCHROMASIA BLD QL SMEAR: ABNORMAL
POTASSIUM SERPL-SCNC: 4.4 MEQ/L (ref 3.4–4.9)
PROT SERPL-MCNC: 5.6 G/DL (ref 6.3–8)
RBC # BLD AUTO: 2.17 M/UL (ref 4.2–5.4)
SCHISTOCYTES BLD QL SMEAR: ABNORMAL
SODIUM SERPL-SCNC: 135 MEQ/L (ref 135–144)
TARGETS BLD QL SMEAR: ABNORMAL
WBC # BLD AUTO: 11 K/UL (ref 4.8–10.8)

## 2023-08-20 PROCEDURE — 71111 X-RAY EXAM RIBS/CHEST4/> VWS: CPT

## 2023-08-20 PROCEDURE — 80053 COMPREHEN METABOLIC PANEL: CPT

## 2023-08-20 PROCEDURE — 36415 COLL VENOUS BLD VENIPUNCTURE: CPT

## 2023-08-20 PROCEDURE — 6370000000 HC RX 637 (ALT 250 FOR IP): Performed by: INTERNAL MEDICINE

## 2023-08-20 PROCEDURE — 93970 EXTREMITY STUDY: CPT

## 2023-08-20 PROCEDURE — 6360000002 HC RX W HCPCS: Performed by: INTERNAL MEDICINE

## 2023-08-20 PROCEDURE — 1210000000 HC MED SURG R&B

## 2023-08-20 PROCEDURE — 99231 SBSQ HOSP IP/OBS SF/LOW 25: CPT | Performed by: INTERNAL MEDICINE

## 2023-08-20 PROCEDURE — 85025 COMPLETE CBC W/AUTO DIFF WBC: CPT

## 2023-08-20 PROCEDURE — 2580000003 HC RX 258: Performed by: INTERNAL MEDICINE

## 2023-08-20 PROCEDURE — 82140 ASSAY OF AMMONIA: CPT

## 2023-08-20 RX ORDER — TRAMADOL HYDROCHLORIDE 50 MG/1
50 TABLET ORAL EVERY 6 HOURS PRN
Status: DISCONTINUED | OUTPATIENT
Start: 2023-08-20 | End: 2023-08-23 | Stop reason: HOSPADM

## 2023-08-20 RX ADMIN — FOLIC ACID 1 MG: 1 TABLET ORAL at 08:27

## 2023-08-20 RX ADMIN — LACTULOSE 30 G: 20 SOLUTION ORAL at 14:14

## 2023-08-20 RX ADMIN — ESCITALOPRAM OXALATE 10 MG: 10 TABLET ORAL at 08:25

## 2023-08-20 RX ADMIN — RIFAXIMIN 400 MG: 200 TABLET ORAL at 08:25

## 2023-08-20 RX ADMIN — MIDODRINE HYDROCHLORIDE 10 MG: 5 TABLET ORAL at 12:47

## 2023-08-20 RX ADMIN — TRAMADOL HYDROCHLORIDE 50 MG: 50 TABLET ORAL at 19:01

## 2023-08-20 RX ADMIN — RIFAXIMIN 400 MG: 200 TABLET ORAL at 14:13

## 2023-08-20 RX ADMIN — Medication 10 ML: at 08:29

## 2023-08-20 RX ADMIN — SPIRONOLACTONE 50 MG: 50 TABLET ORAL at 08:25

## 2023-08-20 RX ADMIN — MIDODRINE HYDROCHLORIDE 10 MG: 5 TABLET ORAL at 08:28

## 2023-08-20 RX ADMIN — MIDODRINE HYDROCHLORIDE 10 MG: 5 TABLET ORAL at 16:57

## 2023-08-20 RX ADMIN — Medication 10 ML: at 22:33

## 2023-08-20 RX ADMIN — PANTOPRAZOLE SODIUM 40 MG: 40 TABLET, DELAYED RELEASE ORAL at 07:08

## 2023-08-20 RX ADMIN — ONDANSETRON 4 MG: 2 INJECTION INTRAMUSCULAR; INTRAVENOUS at 22:36

## 2023-08-20 RX ADMIN — RIFAXIMIN 400 MG: 200 TABLET ORAL at 22:31

## 2023-08-20 RX ADMIN — LACTULOSE 30 G: 20 SOLUTION ORAL at 08:28

## 2023-08-20 RX ADMIN — TRAMADOL HYDROCHLORIDE 50 MG: 50 TABLET ORAL at 12:48

## 2023-08-20 ASSESSMENT — PAIN SCALES - GENERAL
PAINLEVEL_OUTOF10: 10
PAINLEVEL_OUTOF10: 7

## 2023-08-20 ASSESSMENT — ENCOUNTER SYMPTOMS
COUGH: 0
DIARRHEA: 0
SHORTNESS OF BREATH: 0
NAUSEA: 0
VOMITING: 0

## 2023-08-20 ASSESSMENT — PAIN DESCRIPTION - LOCATION: LOCATION: RIB CAGE

## 2023-08-20 ASSESSMENT — PAIN DESCRIPTION - PAIN TYPE: TYPE: ACUTE PAIN

## 2023-08-20 ASSESSMENT — PAIN DESCRIPTION - ORIENTATION: ORIENTATION: LEFT

## 2023-08-21 ENCOUNTER — APPOINTMENT (OUTPATIENT)
Dept: GENERAL RADIOLOGY | Age: 38
DRG: 280 | End: 2023-08-21
Payer: COMMERCIAL

## 2023-08-21 ENCOUNTER — APPOINTMENT (OUTPATIENT)
Dept: INTERVENTIONAL RADIOLOGY/VASCULAR | Age: 38
DRG: 280 | End: 2023-08-21
Payer: COMMERCIAL

## 2023-08-21 LAB
ALBUMIN SERPL-MCNC: 2.3 G/DL (ref 3.5–4.6)
ALP SERPL-CCNC: 128 U/L (ref 40–130)
ALT SERPL-CCNC: 24 U/L (ref 0–33)
AMMONIA PLAS-SCNC: 75 UMOL/L (ref 11–51)
ANION GAP SERPL CALCULATED.3IONS-SCNC: 11 MEQ/L (ref 9–15)
APPEARANCE FLUID: CLEAR
AST SERPL-CCNC: 82 U/L (ref 0–35)
BACTERIA URNS QL MICRO: NEGATIVE /HPF
BASOPHILS # BLD: 0 K/UL (ref 0–0.2)
BASOPHILS NFR BLD: 0.5 %
BILIRUB SERPL-MCNC: 13.6 MG/DL (ref 0.2–0.7)
BILIRUB UR QL STRIP: ABNORMAL
BUN SERPL-MCNC: 20 MG/DL (ref 6–20)
CALCIUM SERPL-MCNC: 8.1 MG/DL (ref 8.5–9.9)
CELL COUNT FLUID TYPE: NORMAL
CHLORIDE SERPL-SCNC: 100 MEQ/L (ref 95–107)
CLARITY UR: CLEAR
CLOT EVALUATION: NORMAL
CO2 SERPL-SCNC: 21 MEQ/L (ref 20–31)
COLOR FLUID: YELLOW
COLOR UR: ABNORMAL
CREAT SERPL-MCNC: 0.69 MG/DL (ref 0.5–0.9)
EKG ATRIAL RATE: 94 BPM
EKG P AXIS: 68 DEGREES
EKG P-R INTERVAL: 138 MS
EKG Q-T INTERVAL: 414 MS
EKG QRS DURATION: 78 MS
EKG QTC CALCULATION (BAZETT): 517 MS
EKG R AXIS: 64 DEGREES
EKG T AXIS: 44 DEGREES
EKG VENTRICULAR RATE: 94 BPM
EOSINOPHIL # BLD: 0 K/UL (ref 0–0.7)
EOSINOPHIL NFR BLD: 0.5 %
EPI CELLS #/AREA URNS AUTO: ABNORMAL /HPF (ref 0–5)
ERYTHROCYTE [DISTWIDTH] IN BLOOD BY AUTOMATED COUNT: 23.5 % (ref 11.5–14.5)
FLUID PATH CONSULT: YES
FLUID TYPE: NORMAL
GLOBULIN SER CALC-MCNC: 3.3 G/DL (ref 2.3–3.5)
GLUCOSE FLD-MCNC: 134 MG/DL
GLUCOSE SERPL-MCNC: 129 MG/DL (ref 70–99)
GLUCOSE UR STRIP-MCNC: NEGATIVE MG/DL
HCT VFR BLD AUTO: 20.5 % (ref 37–47)
HGB BLD-MCNC: 7.1 G/DL (ref 12–16)
HGB UR QL STRIP: NEGATIVE
HYALINE CASTS #/AREA URNS AUTO: ABNORMAL /HPF (ref 0–5)
INR PPP: 2.7
KETONES UR STRIP-MCNC: NEGATIVE MG/DL
LEUKOCYTE ESTERASE UR QL STRIP: ABNORMAL
LYMPHOCYTES # BLD: 1.3 K/UL (ref 1–4.8)
LYMPHOCYTES NFR BLD: 16 %
LYMPHOCYTES, BODY FLUID: 2 %
MCH RBC QN AUTO: 34.5 PG (ref 27–31.3)
MCHC RBC AUTO-ENTMCNC: 34.6 % (ref 33–37)
MCV RBC AUTO: 99.8 FL (ref 79.4–94.8)
MONOCYTE, FLUID: 48 %
MONOCYTES # BLD: 0.3 K/UL (ref 0.2–0.8)
MONOCYTES NFR BLD: 3.2 %
NEUTROPHIL, FLUID: 50 %
NEUTROPHILS # BLD: 6.4 K/UL (ref 1.4–6.5)
NEUTS SEG NFR BLD: 79.8 %
NITRITE UR QL STRIP: POSITIVE
NUCLEATED CELLS FLUID: 180 /CUMM
NUMBER OF CELLS COUNTED FLUID: 100
PATH INTERP BLD-IMP: NORMAL
PH UR STRIP: 5.5 [PH] (ref 5–9)
PLATELET # BLD AUTO: 64 K/UL (ref 130–400)
POTASSIUM SERPL-SCNC: 3.7 MEQ/L (ref 3.4–4.9)
PROCALCITONIN SERPL IA-MCNC: 1.05 NG/ML (ref 0–0.15)
PROT FLD-MCNC: 0.8 G/DL
PROT SERPL-MCNC: 5.6 G/DL (ref 6.3–8)
PROT UR STRIP-MCNC: NEGATIVE MG/DL
PROTHROMBIN TIME: 28.6 SEC (ref 12.3–14.9)
RBC # BLD AUTO: 2.06 M/UL (ref 4.2–5.4)
RBC #/AREA URNS AUTO: ABNORMAL /HPF (ref 0–5)
RBC FLUID: 108 /CUMM
SODIUM SERPL-SCNC: 132 MEQ/L (ref 135–144)
SP GR UR STRIP: 1.01 (ref 1–1.03)
URINE REFLEX TO CULTURE: ABNORMAL
UROBILINOGEN UR STRIP-ACNC: 1 E.U./DL
WBC # BLD AUTO: 8 K/UL (ref 4.8–10.8)
WBC #/AREA URNS AUTO: ABNORMAL /HPF (ref 0–5)

## 2023-08-21 PROCEDURE — 93010 ELECTROCARDIOGRAM REPORT: CPT | Performed by: INTERNAL MEDICINE

## 2023-08-21 PROCEDURE — 82042 OTHER SOURCE ALBUMIN QUAN EA: CPT

## 2023-08-21 PROCEDURE — 87040 BLOOD CULTURE FOR BACTERIA: CPT

## 2023-08-21 PROCEDURE — 6370000000 HC RX 637 (ALT 250 FOR IP): Performed by: INTERNAL MEDICINE

## 2023-08-21 PROCEDURE — 49083 ABD PARACENTESIS W/IMAGING: CPT

## 2023-08-21 PROCEDURE — 84145 PROCALCITONIN (PCT): CPT

## 2023-08-21 PROCEDURE — 87070 CULTURE OTHR SPECIMN AEROBIC: CPT

## 2023-08-21 PROCEDURE — 97166 OT EVAL MOD COMPLEX 45 MIN: CPT

## 2023-08-21 PROCEDURE — 2709999900 IR US GUIDED PARACENTESIS

## 2023-08-21 PROCEDURE — 2580000003 HC RX 258: Performed by: INTERNAL MEDICINE

## 2023-08-21 PROCEDURE — 82945 GLUCOSE OTHER FLUID: CPT

## 2023-08-21 PROCEDURE — 99223 1ST HOSP IP/OBS HIGH 75: CPT

## 2023-08-21 PROCEDURE — 2500000003 HC RX 250 WO HCPCS: Performed by: RADIOLOGY

## 2023-08-21 PROCEDURE — 81001 URINALYSIS AUTO W/SCOPE: CPT

## 2023-08-21 PROCEDURE — 6360000002 HC RX W HCPCS: Performed by: INTERNAL MEDICINE

## 2023-08-21 PROCEDURE — 85025 COMPLETE CBC W/AUTO DIFF WBC: CPT

## 2023-08-21 PROCEDURE — 97161 PT EVAL LOW COMPLEX 20 MIN: CPT

## 2023-08-21 PROCEDURE — 84157 ASSAY OF PROTEIN OTHER: CPT

## 2023-08-21 PROCEDURE — 71045 X-RAY EXAM CHEST 1 VIEW: CPT

## 2023-08-21 PROCEDURE — 1210000000 HC MED SURG R&B

## 2023-08-21 PROCEDURE — 82140 ASSAY OF AMMONIA: CPT

## 2023-08-21 PROCEDURE — 85610 PROTHROMBIN TIME: CPT

## 2023-08-21 PROCEDURE — 82150 ASSAY OF AMYLASE: CPT

## 2023-08-21 PROCEDURE — 83615 LACTATE (LD) (LDH) ENZYME: CPT

## 2023-08-21 PROCEDURE — 0W9G3ZZ DRAINAGE OF PERITONEAL CAVITY, PERCUTANEOUS APPROACH: ICD-10-PCS | Performed by: RADIOLOGY

## 2023-08-21 PROCEDURE — 80053 COMPREHEN METABOLIC PANEL: CPT

## 2023-08-21 PROCEDURE — 36415 COLL VENOUS BLD VENIPUNCTURE: CPT

## 2023-08-21 PROCEDURE — 87205 SMEAR GRAM STAIN: CPT

## 2023-08-21 PROCEDURE — 99222 1ST HOSP IP/OBS MODERATE 55: CPT | Performed by: NURSE PRACTITIONER

## 2023-08-21 PROCEDURE — 89051 BODY FLUID CELL COUNT: CPT

## 2023-08-21 RX ORDER — LIDOCAINE HYDROCHLORIDE 20 MG/ML
INJECTION, SOLUTION INFILTRATION; PERINEURAL PRN
Status: COMPLETED | OUTPATIENT
Start: 2023-08-21 | End: 2023-08-21

## 2023-08-21 RX ORDER — NYSTATIN 100000 U/G
CREAM TOPICAL 2 TIMES DAILY
Status: DISCONTINUED | OUTPATIENT
Start: 2023-08-21 | End: 2023-08-23 | Stop reason: HOSPADM

## 2023-08-21 RX ADMIN — MEROPENEM 1000 MG: 1 INJECTION, POWDER, FOR SOLUTION INTRAVENOUS at 09:48

## 2023-08-21 RX ADMIN — LACTULOSE 30 G: 20 SOLUTION ORAL at 09:46

## 2023-08-21 RX ADMIN — RIFAXIMIN 400 MG: 200 TABLET ORAL at 14:20

## 2023-08-21 RX ADMIN — Medication 10 ML: at 09:45

## 2023-08-21 RX ADMIN — FOLIC ACID 1 MG: 1 TABLET ORAL at 09:44

## 2023-08-21 RX ADMIN — LACTULOSE 30 G: 20 SOLUTION ORAL at 21:12

## 2023-08-21 RX ADMIN — TRAMADOL HYDROCHLORIDE 50 MG: 50 TABLET ORAL at 21:13

## 2023-08-21 RX ADMIN — MEROPENEM 1000 MG: 1 INJECTION, POWDER, FOR SOLUTION INTRAVENOUS at 16:47

## 2023-08-21 RX ADMIN — RIFAXIMIN 400 MG: 200 TABLET ORAL at 09:44

## 2023-08-21 RX ADMIN — SPIRONOLACTONE 50 MG: 50 TABLET ORAL at 09:44

## 2023-08-21 RX ADMIN — LIDOCAINE HYDROCHLORIDE 10 ML: 20 INJECTION, SOLUTION INFILTRATION; PERINEURAL at 17:39

## 2023-08-21 RX ADMIN — TRAMADOL HYDROCHLORIDE 50 MG: 50 TABLET ORAL at 09:44

## 2023-08-21 RX ADMIN — MIDODRINE HYDROCHLORIDE 10 MG: 5 TABLET ORAL at 09:44

## 2023-08-21 RX ADMIN — NYSTATIN: 100000 CREAM TOPICAL at 14:23

## 2023-08-21 RX ADMIN — NYSTATIN: 100000 CREAM TOPICAL at 21:12

## 2023-08-21 RX ADMIN — ESCITALOPRAM OXALATE 10 MG: 10 TABLET ORAL at 09:44

## 2023-08-21 RX ADMIN — MIDODRINE HYDROCHLORIDE 10 MG: 5 TABLET ORAL at 16:44

## 2023-08-21 RX ADMIN — MIDODRINE HYDROCHLORIDE 10 MG: 5 TABLET ORAL at 11:53

## 2023-08-21 RX ADMIN — RIFAXIMIN 400 MG: 200 TABLET ORAL at 21:13

## 2023-08-21 RX ADMIN — PANTOPRAZOLE SODIUM 40 MG: 40 TABLET, DELAYED RELEASE ORAL at 06:14

## 2023-08-21 RX ADMIN — Medication 10 ML: at 21:13

## 2023-08-21 RX ADMIN — LACTULOSE 30 G: 20 SOLUTION ORAL at 14:20

## 2023-08-21 RX ADMIN — TRAMADOL HYDROCHLORIDE 50 MG: 50 TABLET ORAL at 02:41

## 2023-08-21 ASSESSMENT — PAIN DESCRIPTION - ORIENTATION: ORIENTATION: LEFT

## 2023-08-21 ASSESSMENT — ENCOUNTER SYMPTOMS
VOMITING: 0
DIARRHEA: 0
COUGH: 0
NAUSEA: 0
SHORTNESS OF BREATH: 0

## 2023-08-21 ASSESSMENT — PAIN SCALES - GENERAL
PAINLEVEL_OUTOF10: 10

## 2023-08-21 ASSESSMENT — PAIN DESCRIPTION - LOCATION
LOCATION: LEG;BACK
LOCATION: LEG;BACK

## 2023-08-21 NOTE — PLAN OF CARE
See OT evaluation for all goals and OT POC.  Electronically signed by GIOVANY Ceja/L on 8/21/2023 at 10:19 AM

## 2023-08-21 NOTE — PLAN OF CARE
Therapy evaluation completed. Please see daily notes and/or progress notes for details related to planned treatment interventions, goals and functional performance.      Electronically signed by Tim Givens PT on 8/21/2023 at 10:05 AM

## 2023-08-21 NOTE — OR NURSING
PARACENTESIS - NO SEDATION     1736 - Patient arrived to special procedures via cart. A&Ox4, calm and cooperative. Procedure explained, consent signed. Patient positioned propped to left side using pillow wedge. Rad tech obtained images prior to start of procedure using U/S. Pt  VSS, on RA.     1738 - Timeout completed for Ultrasound Guided Paracentesis. Using U/S, Dr. Merna Yates marked, prepped left side of abdomen with Chloraprep and draped with sterile drape and towels. 1739 - Site numbed with 2% lidocaine, see eMAR. [id number] Centesis 5F catheter placed using Ultrasound guidance. Fluid appears gold colored / clear. Sample of fluid obtained and placed into specimen containers for ordered laboratory testing. Catheter tubing connected to Re-Compose machine at 200mmHG suction to remove fluid. 1745 - Centesis catheter pulled out accidentally. Dr. Merna Yates replaced new catheter using Ultrasound guidance. Patient tolerated well.     1802 - Pt tolerated well. Total 2020 ml removed. Catheter removed, pressure held and bandaid applied. Verbal and tactile reassurance given throughout. 8621 - Report called to Ngozi Cuevas RN on [facility] and transport requested to take patient back to her room.  Electronically signed by Abbie Hugo RN on 8/21/2023 at 6:17 PM

## 2023-08-22 LAB
ABO + RH BLD: NORMAL
ACANTHOCYTES BLD QL SMEAR: ABNORMAL
ALBUMIN FLUID: <0.2 G/DL
ALBUMIN SERPL-MCNC: 2.2 G/DL (ref 3.5–4.6)
ALP SERPL-CCNC: 125 U/L (ref 40–130)
ALT SERPL-CCNC: 20 U/L (ref 0–33)
AMMONIA PLAS-SCNC: 70 UMOL/L (ref 11–51)
AMYLASE FLUID: 14 U/L
ANION GAP SERPL CALCULATED.3IONS-SCNC: 10 MEQ/L (ref 9–15)
ANISOCYTOSIS BLD QL SMEAR: ABNORMAL
AST SERPL-CCNC: 60 U/L (ref 0–35)
BASOPHILS # BLD: 0 K/UL (ref 0–0.2)
BASOPHILS NFR BLD: 0 %
BILIRUB SERPL-MCNC: 12.1 MG/DL (ref 0.2–0.7)
BLD GP AB SCN SERPL QL: NORMAL
BLOOD BANK DISPENSE STATUS: NORMAL
BLOOD BANK PRODUCT CODE: NORMAL
BPU ID: NORMAL
BUN SERPL-MCNC: 16 MG/DL (ref 6–20)
BURR CELLS: ABNORMAL
CALCIUM SERPL-MCNC: 7.9 MG/DL (ref 8.5–9.9)
CHLORIDE SERPL-SCNC: 103 MEQ/L (ref 95–107)
CO2 SERPL-SCNC: 21 MEQ/L (ref 20–31)
CREAT SERPL-MCNC: 0.51 MG/DL (ref 0.5–0.9)
DESCRIPTION BLOOD BANK: NORMAL
EOSINOPHIL # BLD: 0 K/UL (ref 0–0.7)
EOSINOPHIL NFR BLD: 0 %
ERYTHROCYTE [DISTWIDTH] IN BLOOD BY AUTOMATED COUNT: 22.9 % (ref 11.5–14.5)
FOLATE: 16.2 NG/ML
GLOBULIN SER CALC-MCNC: 3.1 G/DL (ref 2.3–3.5)
GLUCOSE SERPL-MCNC: 110 MG/DL (ref 70–99)
HCT VFR BLD AUTO: 19.6 % (ref 37–47)
HCT VFR BLD AUTO: 24 % (ref 37–47)
HGB BLD-MCNC: 6.9 G/DL (ref 12–16)
HGB BLD-MCNC: 8.2 G/DL (ref 12–16)
LACTATE DEHYDROGENASE, FLUID: 51 U/L
LYMPHOCYTES # BLD: 0.4 K/UL (ref 1–4.8)
LYMPHOCYTES NFR BLD: 4 %
MCH RBC QN AUTO: 35.2 PG (ref 27–31.3)
MCHC RBC AUTO-ENTMCNC: 35.4 % (ref 33–37)
MCV RBC AUTO: 99.3 FL (ref 79.4–94.8)
MONOCYTES # BLD: 0.2 K/UL (ref 0.2–0.8)
MONOCYTES NFR BLD: 3 %
NEUTROPHILS # BLD: 5.9 K/UL (ref 1.4–6.5)
NEUTS SEG NFR BLD: 91 %
OVALOCYTES BLD QL SMEAR: ABNORMAL
PATH CONSULT FLUID: NORMAL
PLATELET # BLD AUTO: 64 K/UL (ref 130–400)
PLATELET BLD QL SMEAR: ABNORMAL
POIKILOCYTOSIS BLD QL SMEAR: ABNORMAL
POTASSIUM SERPL-SCNC: 3.2 MEQ/L (ref 3.4–4.9)
PROT SERPL-MCNC: 5.3 G/DL (ref 6.3–8)
RBC # BLD AUTO: 1.98 M/UL (ref 4.2–5.4)
SODIUM SERPL-SCNC: 134 MEQ/L (ref 135–144)
SPECIMEN TYPE: NORMAL
STOMATOCYTES BLD QL SMEAR: ABNORMAL
TARGETS BLD QL SMEAR: ABNORMAL
VARIANT LYMPHS NFR BLD: 2 %
VITAMIN B-12: >2000 PG/ML (ref 232–1245)
WBC # BLD AUTO: 6.5 K/UL (ref 4.8–10.8)

## 2023-08-22 PROCEDURE — 6360000002 HC RX W HCPCS: Performed by: INTERNAL MEDICINE

## 2023-08-22 PROCEDURE — 86900 BLOOD TYPING SEROLOGIC ABO: CPT

## 2023-08-22 PROCEDURE — 82746 ASSAY OF FOLIC ACID SERUM: CPT

## 2023-08-22 PROCEDURE — 6370000000 HC RX 637 (ALT 250 FOR IP): Performed by: INTERNAL MEDICINE

## 2023-08-22 PROCEDURE — 82607 VITAMIN B-12: CPT

## 2023-08-22 PROCEDURE — 99232 SBSQ HOSP IP/OBS MODERATE 35: CPT | Performed by: NURSE PRACTITIONER

## 2023-08-22 PROCEDURE — 2580000003 HC RX 258: Performed by: INTERNAL MEDICINE

## 2023-08-22 PROCEDURE — 86923 COMPATIBILITY TEST ELECTRIC: CPT

## 2023-08-22 PROCEDURE — 1210000000 HC MED SURG R&B

## 2023-08-22 PROCEDURE — 86850 RBC ANTIBODY SCREEN: CPT

## 2023-08-22 PROCEDURE — 86901 BLOOD TYPING SEROLOGIC RH(D): CPT

## 2023-08-22 PROCEDURE — 80053 COMPREHEN METABOLIC PANEL: CPT

## 2023-08-22 PROCEDURE — P9016 RBC LEUKOCYTES REDUCED: HCPCS

## 2023-08-22 PROCEDURE — 85018 HEMOGLOBIN: CPT

## 2023-08-22 PROCEDURE — 82140 ASSAY OF AMMONIA: CPT

## 2023-08-22 PROCEDURE — 85025 COMPLETE CBC W/AUTO DIFF WBC: CPT

## 2023-08-22 PROCEDURE — 85014 HEMATOCRIT: CPT

## 2023-08-22 PROCEDURE — 36430 TRANSFUSION BLD/BLD COMPNT: CPT

## 2023-08-22 PROCEDURE — 36415 COLL VENOUS BLD VENIPUNCTURE: CPT

## 2023-08-22 RX ORDER — SODIUM CHLORIDE 9 MG/ML
INJECTION, SOLUTION INTRAVENOUS PRN
Status: DISCONTINUED | OUTPATIENT
Start: 2023-08-22 | End: 2023-08-23 | Stop reason: HOSPADM

## 2023-08-22 RX ORDER — POTASSIUM CHLORIDE 20 MEQ/1
40 TABLET, EXTENDED RELEASE ORAL ONCE
Status: COMPLETED | OUTPATIENT
Start: 2023-08-22 | End: 2023-08-22

## 2023-08-22 RX ADMIN — MEROPENEM 1000 MG: 1 INJECTION, POWDER, FOR SOLUTION INTRAVENOUS at 17:51

## 2023-08-22 RX ADMIN — MIDODRINE HYDROCHLORIDE 10 MG: 5 TABLET ORAL at 14:42

## 2023-08-22 RX ADMIN — TRAMADOL HYDROCHLORIDE 50 MG: 50 TABLET ORAL at 05:19

## 2023-08-22 RX ADMIN — MEROPENEM 1000 MG: 1 INJECTION, POWDER, FOR SOLUTION INTRAVENOUS at 01:05

## 2023-08-22 RX ADMIN — LACTULOSE 30 G: 20 SOLUTION ORAL at 14:43

## 2023-08-22 RX ADMIN — NYSTATIN: 100000 CREAM TOPICAL at 21:10

## 2023-08-22 RX ADMIN — LACTULOSE 30 G: 20 SOLUTION ORAL at 21:09

## 2023-08-22 RX ADMIN — Medication 10 ML: at 10:51

## 2023-08-22 RX ADMIN — RIFAXIMIN 400 MG: 200 TABLET ORAL at 10:50

## 2023-08-22 RX ADMIN — RIFAXIMIN 400 MG: 200 TABLET ORAL at 21:09

## 2023-08-22 RX ADMIN — LACTULOSE 30 G: 20 SOLUTION ORAL at 11:05

## 2023-08-22 RX ADMIN — FOLIC ACID 1 MG: 1 TABLET ORAL at 10:51

## 2023-08-22 RX ADMIN — POTASSIUM CHLORIDE 40 MEQ: 1500 TABLET, EXTENDED RELEASE ORAL at 10:50

## 2023-08-22 RX ADMIN — PANTOPRAZOLE SODIUM 40 MG: 40 TABLET, DELAYED RELEASE ORAL at 05:19

## 2023-08-22 RX ADMIN — NYSTATIN: 100000 CREAM TOPICAL at 11:06

## 2023-08-22 RX ADMIN — RIFAXIMIN 400 MG: 200 TABLET ORAL at 14:43

## 2023-08-22 RX ADMIN — MIDODRINE HYDROCHLORIDE 10 MG: 5 TABLET ORAL at 17:50

## 2023-08-22 RX ADMIN — MIDODRINE HYDROCHLORIDE 10 MG: 5 TABLET ORAL at 10:50

## 2023-08-22 RX ADMIN — ESCITALOPRAM OXALATE 10 MG: 10 TABLET ORAL at 10:51

## 2023-08-22 RX ADMIN — Medication 10 ML: at 21:09

## 2023-08-22 RX ADMIN — SPIRONOLACTONE 50 MG: 50 TABLET ORAL at 10:51

## 2023-08-22 RX ADMIN — TRAMADOL HYDROCHLORIDE 50 MG: 50 TABLET ORAL at 14:42

## 2023-08-22 RX ADMIN — MEROPENEM 1000 MG: 1 INJECTION, POWDER, FOR SOLUTION INTRAVENOUS at 10:55

## 2023-08-22 ASSESSMENT — ENCOUNTER SYMPTOMS
SHORTNESS OF BREATH: 0
DIARRHEA: 0
TROUBLE SWALLOWING: 0
VOICE CHANGE: 0
COUGH: 0
CONSTIPATION: 0
NAUSEA: 0
ABDOMINAL DISTENTION: 1
RESPIRATORY NEGATIVE: 1
ABDOMINAL PAIN: 0
VOMITING: 0

## 2023-08-22 ASSESSMENT — PAIN SCALES - GENERAL
PAINLEVEL_OUTOF10: 8
PAINLEVEL_OUTOF10: 8
PAINLEVEL_OUTOF10: 5

## 2023-08-22 ASSESSMENT — PAIN DESCRIPTION - ORIENTATION: ORIENTATION: LEFT

## 2023-08-22 ASSESSMENT — PAIN DESCRIPTION - LOCATION
LOCATION: BACK
LOCATION: BACK;LEG

## 2023-08-22 NOTE — CONSENT
Informed Consent for Blood Component Transfusion Note    I have discussed with the patient the rationale for blood component transfusion; its benefits in treating or preventing fatigue, organ damage, or death; and its risk which includes mild transfusion reactions, rare risk of blood borne infection, or more serious but rare reactions. I have discussed the alternatives to transfusion, including the risk and consequences of not receiving transfusion. The patient had an opportunity to ask questions and had agreed to proceed with transfusion of blood components.     Electronically signed by Jluis Cutler MD on 8/22/23 at 8:44 AM EDT

## 2023-08-22 NOTE — PLAN OF CARE
Problem: Discharge Planning  Goal: Discharge to home or other facility with appropriate resources  Outcome: Progressing  Flowsheets (Taken 8/21/2023 2105 by Ramesh Gray RN)  Discharge to home or other facility with appropriate resources:   Identify barriers to discharge with patient and caregiver   Arrange for needed discharge resources and transportation as appropriate   Identify discharge learning needs (meds, wound care, etc)

## 2023-08-22 NOTE — DISCHARGE INSTR - COC
Continuity of Care Form    Patient Name: Didier Tim   :  1985  MRN:  [id number]    Admit date:  2023  Discharge date:  ***    Code Status Order: Full Code   Advance Directives:     Admitting Physician:  Marcelino Bobo MD  PCP: West Morelos MD    Discharging Nurse: [facility] Unit/Room#: J854/Z838-33  Discharging Unit Phone Number: ***    Emergency Contact:   Extended Emergency Contact Information  Primary Emergency Contact: [address] [facility] Phone: [phone]  Relation: Parent  Secondary Emergency Contact: ITZ JONES  Mobile Phone: [phone]  Relation: Child    Past Surgical History:  Past Surgical History:   Procedure Laterality Date    APPENDECTOMY      COLONOSCOPY N/A 2023    COLONOSCOPY DIAGNOSTIC performed by Maureen Huang MD at [address] [facility] [address] [address] 2023    COLONOSCOPY DIAGNOSTIC performed by Hieu Jeronimo MD at [address]      reports 6 sx on L foot and one on right foot    PARACENTESIS Left 2022    1510 ml removed per Dr Bennett Francisco  specimen obtained    PARACENTESIS Left 2023    4720 ml removed by Dr. Bennett Francisco - therapeutic & diagnostic    PARACENTESIS Left 2023    4400ml removed by Dr. Dewight Fothergill Left 2023    5600 ml removed by Dr. Lynda Dempsey Left 2023    5835 ml removed by Dr. Camilla Kemp - diagnostics sent    PARACENTESIS Left 2023    5980 ml removed by Dr. Gregorio Wilson Left 2023    3050 ml removed by Dr Lynda Dempsey Left 2023    4970 ml removed by Jeannie Jaimes CNP    PARACENTESIS Left 2023    2300ml removed by Dr. Lynda Dempsey Left 2023    4250  ml removed by Dr. Gregorio Wilson Left 2023    4850ml removed by Dr. Gregorio Wilson Left 08/10/2023    5165 ml removed by Jeannie Jaimes CNP.     PARACENTESIS Left 2023    3890 ml total removed by Dr Gregorio Wilson Left 2023    2020 ml removed by

## 2023-08-23 VITALS
OXYGEN SATURATION: 98 % | RESPIRATION RATE: 16 BRPM | HEART RATE: 80 BPM | TEMPERATURE: 97.9 F | DIASTOLIC BLOOD PRESSURE: 50 MMHG | WEIGHT: 145 LBS | HEIGHT: 65 IN | SYSTOLIC BLOOD PRESSURE: 98 MMHG | BODY MASS INDEX: 24.16 KG/M2

## 2023-08-23 LAB
ALBUMIN SERPL-MCNC: 2.1 G/DL (ref 3.5–4.6)
ALP SERPL-CCNC: 126 U/L (ref 40–130)
ALT SERPL-CCNC: 18 U/L (ref 0–33)
ANION GAP SERPL CALCULATED.3IONS-SCNC: 9 MEQ/L (ref 9–15)
AST SERPL-CCNC: 58 U/L (ref 0–35)
BASOPHILS # BLD: 0 K/UL (ref 0–0.2)
BASOPHILS NFR BLD: 0.4 %
BILIRUB SERPL-MCNC: 10.4 MG/DL (ref 0.2–0.7)
BUN SERPL-MCNC: 14 MG/DL (ref 6–20)
CALCIUM SERPL-MCNC: 8.1 MG/DL (ref 8.5–9.9)
CHLORIDE SERPL-SCNC: 104 MEQ/L (ref 95–107)
CO2 SERPL-SCNC: 20 MEQ/L (ref 20–31)
CREAT SERPL-MCNC: 0.35 MG/DL (ref 0.5–0.9)
EOSINOPHIL # BLD: 0.1 K/UL (ref 0–0.7)
EOSINOPHIL NFR BLD: 2.1 %
ERYTHROCYTE [DISTWIDTH] IN BLOOD BY AUTOMATED COUNT: 23.6 % (ref 11.5–14.5)
GLOBULIN SER CALC-MCNC: 3.2 G/DL (ref 2.3–3.5)
GLUCOSE SERPL-MCNC: 102 MG/DL (ref 70–99)
HCT VFR BLD AUTO: 22.6 % (ref 37–47)
HGB BLD-MCNC: 8 G/DL (ref 12–16)
LYMPHOCYTES # BLD: 0.9 K/UL (ref 1–4.8)
LYMPHOCYTES NFR BLD: 19.5 %
MCH RBC QN AUTO: 33.9 PG (ref 27–31.3)
MCHC RBC AUTO-ENTMCNC: 35.2 % (ref 33–37)
MCV RBC AUTO: 96.3 FL (ref 79.4–94.8)
MONOCYTES # BLD: 0.5 K/UL (ref 0.2–0.8)
MONOCYTES NFR BLD: 10.9 %
NEUTROPHILS # BLD: 3.1 K/UL (ref 1.4–6.5)
NEUTS SEG NFR BLD: 67.1 %
PLATELET # BLD AUTO: 72 K/UL (ref 130–400)
POTASSIUM SERPL-SCNC: 3.7 MEQ/L (ref 3.4–4.9)
PROCALCITONIN SERPL IA-MCNC: 0.48 NG/ML (ref 0–0.15)
PROT SERPL-MCNC: 5.3 G/DL (ref 6.3–8)
RBC # BLD AUTO: 2.35 M/UL (ref 4.2–5.4)
SODIUM SERPL-SCNC: 133 MEQ/L (ref 135–144)
WBC # BLD AUTO: 4.6 K/UL (ref 4.8–10.8)

## 2023-08-23 PROCEDURE — 6360000002 HC RX W HCPCS: Performed by: INTERNAL MEDICINE

## 2023-08-23 PROCEDURE — 97116 GAIT TRAINING THERAPY: CPT

## 2023-08-23 PROCEDURE — 2580000003 HC RX 258: Performed by: INTERNAL MEDICINE

## 2023-08-23 PROCEDURE — 85025 COMPLETE CBC W/AUTO DIFF WBC: CPT

## 2023-08-23 PROCEDURE — 36415 COLL VENOUS BLD VENIPUNCTURE: CPT

## 2023-08-23 PROCEDURE — 80053 COMPREHEN METABOLIC PANEL: CPT

## 2023-08-23 PROCEDURE — 6370000000 HC RX 637 (ALT 250 FOR IP): Performed by: INTERNAL MEDICINE

## 2023-08-23 PROCEDURE — 84145 PROCALCITONIN (PCT): CPT

## 2023-08-23 RX ORDER — AMOXICILLIN AND CLAVULANATE POTASSIUM 875; 125 MG/1; MG/1
1 TABLET, FILM COATED ORAL 2 TIMES DAILY
Qty: 14 TABLET | Refills: 0 | Status: SHIPPED | OUTPATIENT
Start: 2023-08-23 | End: 2023-08-30

## 2023-08-23 RX ADMIN — LACTULOSE 30 G: 20 SOLUTION ORAL at 07:28

## 2023-08-23 RX ADMIN — RIFAXIMIN 400 MG: 200 TABLET ORAL at 07:29

## 2023-08-23 RX ADMIN — SPIRONOLACTONE 50 MG: 50 TABLET ORAL at 07:29

## 2023-08-23 RX ADMIN — LACTULOSE 30 G: 20 SOLUTION ORAL at 13:21

## 2023-08-23 RX ADMIN — ESCITALOPRAM OXALATE 10 MG: 10 TABLET ORAL at 07:29

## 2023-08-23 RX ADMIN — MIDODRINE HYDROCHLORIDE 10 MG: 5 TABLET ORAL at 13:21

## 2023-08-23 RX ADMIN — FOLIC ACID 1 MG: 1 TABLET ORAL at 07:29

## 2023-08-23 RX ADMIN — Medication 10 ML: at 07:38

## 2023-08-23 RX ADMIN — NYSTATIN: 100000 CREAM TOPICAL at 07:36

## 2023-08-23 RX ADMIN — MEROPENEM 1000 MG: 1 INJECTION, POWDER, FOR SOLUTION INTRAVENOUS at 00:30

## 2023-08-23 RX ADMIN — RIFAXIMIN 400 MG: 200 TABLET ORAL at 13:21

## 2023-08-23 RX ADMIN — PANTOPRAZOLE SODIUM 40 MG: 40 TABLET, DELAYED RELEASE ORAL at 06:37

## 2023-08-23 RX ADMIN — MIDODRINE HYDROCHLORIDE 10 MG: 5 TABLET ORAL at 07:29

## 2023-08-23 RX ADMIN — MEROPENEM 1000 MG: 1 INJECTION, POWDER, FOR SOLUTION INTRAVENOUS at 07:35

## 2023-08-23 ASSESSMENT — PAIN SCALES - GENERAL: PAINLEVEL_OUTOF10: 4

## 2023-08-23 NOTE — FLOWSHEET NOTE
0840: Patient assessment completed, patient alert and oriented times 4, able to make all needs known, patient continues to have ascites, remains on lactulose, positive bowel movement. Patient c/o pain 4/10 to back, patch applied per routine orders, repositioned. Call light in lace, will continue to monitor throughout this shift. Maintains ATB therapy, Merem , no adverse effects noted. .Electronically signed by Izabela Cleveland RN on 8/23/2023 at 8:43 AM  [phone]; Perfect serve sent to Dr Bev Marie in regards to discharging patient. Per Dr Seth [facility] for discharge from GI. Claire Hamman Electronically signed by Izabela Cleveland RN on 8/23/2023 at 1:42 PM  [phone]: Patient to discharge to home with [facility] get real to follow up with patient at home. All discharge instructions provided to patient, patient states understanding, IV access removed to right and left arm, no bleeding noted. Mother to transport patient home. All personal belongings sent home with patient.  ..esign

## 2023-08-23 NOTE — DISCHARGE INSTR - DIET

## 2023-08-23 NOTE — PLAN OF CARE
Problem: Discharge Planning  Goal: Discharge to home or other facility with appropriate resources  Outcome: Progressing  Flowsheets (Taken 8/22/2023 2030)  Discharge to home or other facility with appropriate resources:   Identify barriers to discharge with patient and caregiver   Arrange for needed discharge resources and transportation as appropriate   Identify discharge learning needs (meds, wound care, etc)   Arrange for interpreters to assist at discharge as needed   Refer to discharge planning if patient needs post-hospital services based on physician order or complex needs related to functional status, cognitive ability or social support system     Problem: Safety - Adult  Goal: Free from fall injury  Outcome: Progressing     Problem: Pain  Goal: Verbalizes/displays adequate comfort level or baseline comfort level  Outcome: Progressing

## 2023-08-23 NOTE — PLAN OF CARE
Patient to discharge home    Problem: Discharge Planning  Goal: Discharge to home or other facility with appropriate resources  8/23/2023 1338 by Lisa Bui RN  Outcome: Adequate for Discharge  8/23/2023 0839 by Lisa Bui RN  Outcome: Progressing  Flowsheets (Taken 8/23/2023 0800)  Discharge to home or other facility with appropriate resources: Identify barriers to discharge with patient and caregiver  8/23/2023 0225 by Lucia Guillory RN  Outcome: Progressing  Flowsheets (Taken 8/22/2023 2030)  Discharge to home or other facility with appropriate resources:   Identify barriers to discharge with patient and caregiver   Arrange for needed discharge resources and transportation as appropriate   Identify discharge learning needs (meds, wound care, etc)   Arrange for interpreters to assist at discharge as needed   Refer to discharge planning if patient needs post-hospital services based on physician order or complex needs related to functional status, cognitive ability or social support system     Problem: Safety - Adult  Goal: Free from fall injury  8/23/2023 1338 by Lisa Bui RN  Outcome: Adequate for Discharge  8/23/2023 0839 by Lisa Bui RN  Outcome: Progressing  8/23/2023 0225 by Lucia Guillory RN  Outcome: Progressing     Problem: Pain  Goal: Verbalizes/displays adequate comfort level or baseline comfort level  8/23/2023 1338 by Lisa Bui RN  Outcome: Adequate for Discharge  8/23/2023 0839 by Lisa Bui RN  Outcome: Progressing  8/23/2023 0225 by Lucia Guillory RN  Outcome: Progressing

## 2023-08-23 NOTE — CARE COORDINATION
08/19/23    From:HOME ALONE JUST D/C 8/10 HAD PARA 8/17 (3980 RMD)     Admit:HEPATIC ENCEPHALOPATHY     PMH:ETOH, ALCOHOLIC  LIVER DISEASE TOBACCO ABUSE    Anticipated Discharge Disposition:SNF    Patient Mobility or PT/OT ordered:N/A    Consults: PALL CARE, GI, LGR    Clinical: CHRONULAC/ XIFAXAN    Barriers to Discharge:  GI/PALL CARE NEEDS TO SEE  NON-COMPLIANT  LGR TO SEE PT  WILL NEED PRECERT    Assessments: CMI/ ACP DECISION MAKER DONE
LSW spoke with Priya Noel) who said patient's pre cert for [facility] is still pending at this time. 7000 completed.
RECEIVED UPDATE FROM [facility] THAT TIFFANIE NEEDS SIGNED AND [id number] NEEDS COMPLETED PRIOR TO PRECERT. WILL FOLLOW. [address] WILL FOLLOW.
This LSW met with patient at bedside this am. Patient and I discussed discharge plans. Patient accepting of going to SNF upon discharge. Per patient request patients referral was sent to [facility] at [facility]. Awaiting response at this time. LSW/CM to follow. Electronically signed by LIDYA Avendaño LSW on 8/21/23 at 12:32 PM EDT     This LSW was notified that patient has been accepted to [facility] and pre-cert was initiated today, 8/21/23.   Electronically signed by LIDYA Avendaño LSW on 8/21/23 at 1:29 PM EDT
This LSW was notified that pre-cert for [facility] has been denied. Peer to Peer to be completed by Dr. Sandra Woodruff tomorrow, 8/24/23 at 9:30am.  LSW/ CM to follow. Electronically signed by LIDYA Arrieta LSW on 8/23/23 at 9:52 AM EDT   This LSW met with patient at bedside this afternoon. Patient states that she will be able to stay with her mother upon discharge. Patient is requesting [facility] and [facility] to follow her. Patient states to staff that [address] [place] [address] is to see her today here at the hospital. I called and spoke with Bryan Nichols at [facility] verify on site visit and contact me. LSW/CM to follow. Electronically signed by LIDYA Arrieta LSW on 8/23/23 at 12:03 PM EDT    I called and spoke with Amanda Seaman at [facility] at 12:10pm today- I notified her of potential referral.  Electronically signed by LIDYA Arrieta LSW on 8/23/23 at 12:13 PM EDT   This LSW received call from Bryan Nichols at [facility] stated that she spoke with patient via phone and that patient is willing to be transferred to [address] [place] [address] in [facility]. This is a long term 30+ day recovery program. This LSW faxed  referral to [address] [place] [address] at : [phone]. Awaiting response at this time. Electronically signed by LIDYA Arrieta LSW on 8/23/23 at 1:32 PM EDT   This LSW was notified by patient that she is going home and that the [address] [place] [address] program can follow up with her at home. Patients mother is providing transportation home. Electronically signed by LIDYA Arrieta LSW on 8/23/23 at 1:56 PM EDT   This LSW received call from Amanda Seaman at [facility]. Patient has been accepted. Patient will be seen on or before August 30th,2023- patient and Dr. Sandra Woodruff aware. I also informed Amanda Seaman at [facility] that patient may be going to [address] rehab.   Electronically signed by LIDYA Arrieta LSW on 8/23/23 at 2:33 PM EDT
standing deficits, Medical complications, Medication managment, and NON-COMPLIANCE    Additional Case Management Notes: FROM HOME ALONE SEE ABOVE NOTE FOR D/C PLAN. MOTHER HAS SNF LIST & FEELS SHE SHOULD GO TO SNF FOR CLOSER MONITORING & FOR ER LACK OF UNDERSTANDING. WILL NEED PRECERT MOTHER CHOSE #1 [facility]2 [facility]    The Plan for Transition of Care is related to the following treatment goals of Hepatic encephalopathy ([address]) [D51.00]    IF APPLICABLE: The Patient and/or patient representative Christiano Luke and her family were provided with a choice of provider and agrees with the discharge plan. Freedom of choice list with basic dialogue that supports the patient's individualized plan of care/goals and shares the quality data associated with the providers was provided to: Patient, Patient Representative   Patient Representative Name: [address] [place] [address]     The Patient and/or Patient Representative Agree with the Discharge Plan?  [facility],  [facility], RN  Case Management Department

## 2023-08-23 NOTE — DISCHARGE SUMMARY
Diagnoses:Generalized anxiety disorder; Depression, unspecified depression type    acamprosate (CAMPRAL) 333 MG tablet  Take 2 tablets by mouth 3 times daily  Qty: 180 tablet Refills: 3    lactulose (CHRONULAC) 10 GM/15ML solution  Take 30 mLs by mouth 2 times daily Take so you have 2-3BM per day  Qty: 946 mL Refills: 1    midodrine (PROAMATINE) 10 MG tablet  Take 1 tablet by mouth 3 times daily (with meals)  Qty: 90 tablet Refills: 0    spironolactone (ALDACTONE) 50 MG tablet  Take 1 tablet by mouth daily  Qty: 30 tablet Refills: 0    triamcinolone (KENALOG) 0.025 % cream  Apply topically 2 times daily as directed small amount to red areas of  each leg for 2 weeks  Qty: 80 g Refills: 0    metroNIDAZOLE (METROGEL) 0.75 % gel  Apply topically 2 times daily Apply topically 2 times daily. folic acid (FOLVITE) 1 MG tablet  Take 1 tablet by mouth daily  Qty: 30 tablet Refills: 3    Multiple Vitamin (MULTIVITAMIN) TABS tablet  Take 1 tablet by mouth daily  Qty: 30 tablet Refills: 0    pantoprazole (PROTONIX) 40 MG tablet  Take 1 tablet by mouth every morning (before breakfast)  Qty: 30 tablet Refills: 3          Current Discharge Medication List    STOP taking these medications    thiamine 100 MG tablet  Comments:  Reason for Stopping:          Time Spent on Discharge:  minutes were spent in patient examination, evaluation, counseling as well as medication reconciliation, prescriptions for required medications, discharge plan, and follow up.     Electronically signed by Charma Cockayne, MD on 8/23/23 at 2:25 PM EDT   Overtime on dc summary was 45 min

## 2023-08-23 NOTE — PLAN OF CARE
Patient progressing towards discharge    Problem: Discharge Planning  Goal: Discharge to home or other facility with appropriate resources  8/23/2023 0839 by Van Cordero RN  Outcome: Progressing  Flowsheets (Taken 8/23/2023 0800)  Discharge to home or other facility with appropriate resources: Identify barriers to discharge with patient and caregiver  8/23/2023 0225 by Frank Bain RN  Outcome: Progressing  Flowsheets (Taken 8/22/2023 2030)  Discharge to home or other facility with appropriate resources:   Identify barriers to discharge with patient and caregiver   Arrange for needed discharge resources and transportation as appropriate   Identify discharge learning needs (meds, wound care, etc)   Arrange for interpreters to assist at discharge as needed   Refer to discharge planning if patient needs post-hospital services based on physician order or complex needs related to functional status, cognitive ability or social support system     Problem: Safety - Adult  Goal: Free from fall injury  8/23/2023 0839 by Van Cordero RN  Outcome: Progressing  8/23/2023 0225 by Frank Bain RN  Outcome: Progressing     Problem: Pain  Goal: Verbalizes/displays adequate comfort level or baseline comfort level  8/23/2023 0839 by Van Cordero RN  Outcome: Progressing  8/23/2023 0225 by Frank Bain RN  Outcome: Progressing

## 2023-08-26 LAB
BACTERIA BLD CULT ORG #2: NORMAL
BACTERIA BLD CULT: NORMAL

## 2023-08-27 LAB — BACTERIA FLD AEROBE CULT: NORMAL

## 2023-08-28 LAB — BACTERIA FLD AEROBE CULT: NORMAL

## 2023-08-29 ENCOUNTER — TELEPHONE (OUTPATIENT)
Dept: INTERVENTIONAL RADIOLOGY/VASCULAR | Age: 38
End: 2023-08-29

## 2023-08-29 DIAGNOSIS — K70.31 ALCOHOLIC CIRRHOSIS OF LIVER WITH ASCITES (HCC): Primary | ICD-10-CM

## 2023-08-29 ASSESSMENT — ENCOUNTER SYMPTOMS
COLOR CHANGE: 1
SHORTNESS OF BREATH: 1
CHEST TIGHTNESS: 0
BACK PAIN: 1
NAUSEA: 1
VOICE CHANGE: 0
TROUBLE SWALLOWING: 0
DIARRHEA: 1

## 2023-08-29 NOTE — TELEPHONE ENCOUNTER
Patient was given this information via phone conversation - voiced understanding  2. Spoke to Henrietta Mail in [place]: 8/31/2023 @ 10:00 am  >  You will need to arrive at 9:30 am from home and check in at the Diagnostic Imaging Check In desk.

## 2023-08-31 ENCOUNTER — HOSPITAL ENCOUNTER (OUTPATIENT)
Dept: INTERVENTIONAL RADIOLOGY/VASCULAR | Age: 38
Discharge: HOME | End: 2023-09-02
Payer: COMMERCIAL

## 2023-08-31 VITALS
DIASTOLIC BLOOD PRESSURE: 59 MMHG | RESPIRATION RATE: 14 BRPM | OXYGEN SATURATION: 98 % | SYSTOLIC BLOOD PRESSURE: 105 MMHG | HEART RATE: 70 BPM

## 2023-08-31 DIAGNOSIS — K70.31 ALCOHOLIC CIRRHOSIS OF LIVER WITH ASCITES (HCC): ICD-10-CM

## 2023-08-31 PROCEDURE — 2500000003 HC RX 250 WO HCPCS: Performed by: RADIOLOGY

## 2023-08-31 PROCEDURE — C1729 CATH, DRAINAGE: HCPCS

## 2023-08-31 PROCEDURE — 49083 ABD PARACENTESIS W/IMAGING: CPT

## 2023-08-31 RX ORDER — LIDOCAINE HYDROCHLORIDE 20 MG/ML
INJECTION, SOLUTION INFILTRATION; PERINEURAL PRN
Status: COMPLETED | OUTPATIENT
Start: 2023-08-31 | End: 2023-08-31

## 2023-08-31 RX ADMIN — LIDOCAINE HYDROCHLORIDE 9 ML: 20 INJECTION, SOLUTION INFILTRATION; PERINEURAL at 10:17

## 2023-08-31 NOTE — DISCHARGE INSTRUCTIONS
Ultrasound Guided Paracentesis    Activity:  Rest, avoid strenuous activity such as lifting heavy objects, and bending, stretching or pulling. Diet:  Resume usual diet    Medication:  * Resume home medication unless otherwise instructed by your physician. * (If Applicable) If taking any blood thinners, speak with your physician before restarting them. * May take mild pain reliever, as needed, such as Acetaminophen or Ibuprofen. INSTRUCTIONS OR COMMENTS RELATIVE TO SPECIFIC EXAM PERFORMED:    - Some tenderness at site of paracentesis will be normal for a few days.  - May remove band aid after 48 hours. - Monitor the site for the next 24 - 48 hours. - If you experience any drainage or bleeding at the site, hold pressure for 30 minutes and lay on side opposite of the procedure site (move fluid away from the   area of leakage). If drainage or bleeding does not stop and seems excessive, call your physician or go to the ER for evaluation.   - If any signs of infection (redness, swelling, drainage/pus) at the site, go to ER for evaluation.   - Please keep all follow-up appointments with your Hepatologist. Schedule follow-up appointment for repeat paracentesis if necessary. IF YOU DEVELOP ANY OF THE FOLLOWING SYMPTOMS, CONTACT PHYSICIAN LISTED BELOW:  Physician performing procedure: DR. Adrian Sanchez - [phone] or [phone] and ask to be put in contact with the RADIOLOGY RN. After hours contact ER. * Extreme pain that increases after leaving the hospital  * Active bleeding (refer to above instructions)  * Chills, fever above 100 degrees Farenheit and fatigue. * Weakness, dizziness, lightheadedness or fainting. If you are unable to contact any of the above physician and you feel you have a major problem, please go to the Emergency Room for treatment.     TOTAL REMOVED WITH PARACENTESIS TODAY:

## 2023-08-31 NOTE — OR NURSING
NO SEDATION    Pt's allergy list and medical history reviewed. Pt denies taking any blood thinners. U/S tech obtained images prior to start of procedure using U/S. Pt  VSS. Awaiting for Dr Estephania Pfeiffer to arrive. Dr Estephania Pfeiffer at cart-side talking with pt. Procedure explained, verified signed consent. 5 Timeout completed for Ultrasound Guided Paracentesis. Using U/S, Dr. Estephania Pfeiffer marked site. Darina Epley RTR tech prepped LLQ with Chloraprep and draped with sterile drape and towels. 1017 Site numbed with lidocaine. Ede1ltvy Centesis 5F catheter placed using Ultrasound guidance. Fluid appears clear yellow. Catheter tubing connected to Fillm machine to remove fluid. 1031 Pts VSS. Pt draining and tolerating well. 1043 Pt tolerated well. Total 2790  ml removed. Catheter removed, pressure held and 4x4 and tegaderm applied. Verbal and tactile reassurance given throughout. [id number] Discharge instructions reviewed with pt and pt voices understanding. Pt ambulated with steady gait with all belongings for discharge home.

## 2023-09-01 ENCOUNTER — POST-OP TELEPHONE (OUTPATIENT)
Dept: INTERVENTIONAL RADIOLOGY/VASCULAR | Age: 38
End: 2023-09-01

## 2023-09-01 NOTE — FLOWSHEET NOTE
RN call to patient to follow up post-op, as pt had paracentesis on 8/31/23 with Dr. Amol Smith. Pt reports 0/10 pain to site. Pt confirms site and dressing is clean, dry, and intact. Pt has no questions or concerns at this time. Pt instructed to call back the radiology dept and request to speak with a nurse if any questions or concerns should develop at [phone].

## 2023-09-06 ENCOUNTER — APPOINTMENT (OUTPATIENT)
Dept: INTERVENTIONAL RADIOLOGY/VASCULAR | Age: 38
End: 2023-09-06
Payer: COMMERCIAL

## 2023-09-06 ENCOUNTER — HOSPITAL ENCOUNTER (EMERGENCY)
Age: 38
Discharge: HOME | End: 2023-09-06
Attending: EMERGENCY MEDICINE
Payer: COMMERCIAL

## 2023-09-06 VITALS
TEMPERATURE: 97.6 F | BODY MASS INDEX: 22.3 KG/M2 | SYSTOLIC BLOOD PRESSURE: 110 MMHG | DIASTOLIC BLOOD PRESSURE: 58 MMHG | HEART RATE: 64 BPM | RESPIRATION RATE: 16 BRPM | WEIGHT: 134 LBS | OXYGEN SATURATION: 99 %

## 2023-09-06 DIAGNOSIS — D69.6 THROMBOCYTOPENIA (HCC): ICD-10-CM

## 2023-09-06 DIAGNOSIS — D64.9 ANEMIA, UNSPECIFIED TYPE: Primary | ICD-10-CM

## 2023-09-06 DIAGNOSIS — K70.31 ASCITES DUE TO ALCOHOLIC CIRRHOSIS (HCC): ICD-10-CM

## 2023-09-06 LAB
ABO + RH BLD: NORMAL
ALBUMIN SERPL-MCNC: 2.4 G/DL (ref 3.5–5.2)
ALP SERPL-CCNC: 229 U/L (ref 35–104)
ALT SERPL-CCNC: 16 U/L (ref 0–32)
AMMONIA PLAS-SCNC: 34 UMOL/L (ref 11–51)
ANION GAP SERPL CALCULATED.3IONS-SCNC: 7 MMOL/L (ref 7–16)
ARM BAND NUMBER: NORMAL
AST SERPL-CCNC: 57 U/L (ref 0–31)
BACTERIA URNS QL MICRO: ABNORMAL
BASOPHILS # BLD: 0 K/UL (ref 0–0.2)
BASOPHILS NFR BLD: 0 % (ref 0–2)
BILIRUB SERPL-MCNC: 6.8 MG/DL (ref 0–1.2)
BILIRUB UR QL STRIP: ABNORMAL
BLOOD BANK SAMPLE EXPIRATION: NORMAL
BLOOD GROUP ANTIBODIES SERPL: NEGATIVE
BUN SERPL-MCNC: 16 MG/DL (ref 6–20)
CALCIUM SERPL-MCNC: 9.1 MG/DL (ref 8.6–10.2)
CHLORIDE SERPL-SCNC: 105 MMOL/L (ref 98–107)
CLARITY UR: CLEAR
CO2 SERPL-SCNC: 27 MMOL/L (ref 22–29)
COLOR UR: ABNORMAL
CREAT SERPL-MCNC: 1.1 MG/DL (ref 0.5–1)
EOSINOPHIL # BLD: 0.12 K/UL (ref 0.05–0.5)
EOSINOPHILS RELATIVE PERCENT: 2 % (ref 0–6)
ERYTHROCYTE [DISTWIDTH] IN BLOOD BY AUTOMATED COUNT: 20.9 % (ref 11.5–15)
GFR SERPL CREATININE-BSD FRML MDRD: >60 ML/MIN/1.73M2
GLUCOSE SERPL-MCNC: 114 MG/DL (ref 74–99)
GLUCOSE UR STRIP-MCNC: 100 MG/DL
HCG, URINE, POC: NEGATIVE
HCT VFR BLD AUTO: 25.7 % (ref 34–48)
HGB BLD-MCNC: 8.4 G/DL (ref 11.5–15.5)
HGB UR QL STRIP.AUTO: NEGATIVE
INR PPP: 2.1
KETONES UR STRIP-MCNC: ABNORMAL MG/DL
LEUKOCYTE ESTERASE UR QL STRIP: NEGATIVE
LYMPHOCYTES NFR BLD: 0.82 K/UL (ref 1.5–4)
LYMPHOCYTES RELATIVE PERCENT: 12 % (ref 20–42)
Lab: NORMAL
MCH RBC QN AUTO: 33.3 PG (ref 26–35)
MCHC RBC AUTO-ENTMCNC: 32.7 G/DL (ref 32–34.5)
MCV RBC AUTO: 102 FL (ref 80–99.9)
MONOCYTES NFR BLD: 0.35 K/UL (ref 0.1–0.95)
MONOCYTES NFR BLD: 5 % (ref 2–12)
NEGATIVE QC PASS/FAIL: NORMAL
NEUTROPHILS NFR BLD: 81 % (ref 43–80)
NEUTS SEG NFR BLD: 5.42 K/UL (ref 1.8–7.3)
NITRITE UR QL STRIP: NEGATIVE
PH UR STRIP: 6 [PH] (ref 5–9)
PLATELET CONFIRMATION: NORMAL
PLATELET, FLUORESCENCE: 77 K/UL (ref 130–450)
PMV BLD AUTO: 11.1 FL (ref 7–12)
POSITIVE QC PASS/FAIL: NORMAL
POTASSIUM SERPL-SCNC: 5 MMOL/L (ref 3.5–5)
PROT SERPL-MCNC: 6.7 G/DL (ref 6.4–8.3)
PROT UR STRIP-MCNC: ABNORMAL MG/DL
PROTHROMBIN TIME: 23.7 SEC (ref 9.3–12.4)
RBC # BLD AUTO: 2.52 M/UL (ref 3.5–5.5)
RBC # BLD: ABNORMAL 10*6/UL
RBC #/AREA URNS HPF: ABNORMAL /HPF
SODIUM SERPL-SCNC: 139 MMOL/L (ref 132–146)
SP GR UR STRIP: 1.02 (ref 1–1.03)
UROBILINOGEN UR STRIP-ACNC: 0.2 EU/DL (ref 0–1)
WBC #/AREA URNS HPF: ABNORMAL /HPF
WBC OTHER # BLD: 6.7 K/UL (ref 4.5–11.5)

## 2023-09-06 PROCEDURE — 80053 COMPREHEN METABOLIC PANEL: CPT

## 2023-09-06 PROCEDURE — C1729 CATH, DRAINAGE: HCPCS

## 2023-09-06 PROCEDURE — 85610 PROTHROMBIN TIME: CPT

## 2023-09-06 PROCEDURE — 81001 URINALYSIS AUTO W/SCOPE: CPT

## 2023-09-06 PROCEDURE — 85025 COMPLETE CBC W/AUTO DIFF WBC: CPT

## 2023-09-06 PROCEDURE — 99283 EMERGENCY DEPT VISIT LOW MDM: CPT

## 2023-09-06 PROCEDURE — 82140 ASSAY OF AMMONIA: CPT

## 2023-09-06 PROCEDURE — 49083 ABD PARACENTESIS W/IMAGING: CPT

## 2023-09-06 PROCEDURE — 86900 BLOOD TYPING SEROLOGIC ABO: CPT

## 2023-09-06 PROCEDURE — 86850 RBC ANTIBODY SCREEN: CPT

## 2023-09-06 PROCEDURE — 86901 BLOOD TYPING SEROLOGIC RH(D): CPT

## 2023-09-06 ASSESSMENT — ENCOUNTER SYMPTOMS
WHEEZING: 0
STRIDOR: 0
ABDOMINAL DISTENTION: 1
APNEA: 0
ABDOMINAL PAIN: 0
CHEST TIGHTNESS: 0
SHORTNESS OF BREATH: 0

## 2023-09-06 ASSESSMENT — PAIN - FUNCTIONAL ASSESSMENT: PAIN_FUNCTIONAL_ASSESSMENT: NONE - DENIES PAIN

## 2023-09-06 NOTE — PROGRESS NOTES
Patient came down to special procedures for ultrasound guided paracentesis. Instructions given and questions answered. Consent signed. Abdomen scanned and marked under the guidance of procedural Radiologist.     1522 start procedure /57 7016 98% on room air. 1535 end procedure /59 69 16 98% on room air. 2050cc drained of clear, straw colored ascites fluid. Dry sterile dressing of folded 4 x 4 and tegaderm to RLQ. Nurse to nurse called, spoke with Camilo Herrera, nurse notified of above information. Patient transported back to the emergency department.

## 2023-09-15 ENCOUNTER — HOSPITAL ENCOUNTER (EMERGENCY)
Age: 38
Discharge: NURSING, REHAB OR LONG-TERM CARE | End: 2023-09-15
Payer: COMMERCIAL

## 2023-09-15 ENCOUNTER — APPOINTMENT (OUTPATIENT)
Dept: CT IMAGING | Age: 38
End: 2023-09-15
Payer: COMMERCIAL

## 2023-09-15 VITALS
SYSTOLIC BLOOD PRESSURE: 107 MMHG | OXYGEN SATURATION: 96 % | TEMPERATURE: 97.3 F | WEIGHT: 134 LBS | HEART RATE: 98 BPM | RESPIRATION RATE: 18 BRPM | BODY MASS INDEX: 22.3 KG/M2 | DIASTOLIC BLOOD PRESSURE: 56 MMHG

## 2023-09-15 DIAGNOSIS — K76.9 LIVER LESION: ICD-10-CM

## 2023-09-15 DIAGNOSIS — K52.9 COLITIS: Primary | ICD-10-CM

## 2023-09-15 DIAGNOSIS — K80.20 GALL STONES: ICD-10-CM

## 2023-09-15 DIAGNOSIS — J90 PLEURAL EFFUSION: ICD-10-CM

## 2023-09-15 LAB
ALBUMIN SERPL-MCNC: 2.2 G/DL (ref 3.5–5.2)
ALP SERPL-CCNC: 188 U/L (ref 35–104)
ALT SERPL-CCNC: 16 U/L (ref 0–32)
ANION GAP SERPL CALCULATED.3IONS-SCNC: 6 MMOL/L (ref 7–16)
AST SERPL-CCNC: 55 U/L (ref 0–31)
BASOPHILS # BLD: 0 K/UL (ref 0–0.2)
BASOPHILS NFR BLD: 0 % (ref 0–2)
BILIRUB DIRECT SERPL-MCNC: 2.8 MG/DL (ref 0–0.3)
BILIRUB INDIRECT SERPL-MCNC: 2.4 MG/DL (ref 0–1)
BILIRUB SERPL-MCNC: 5.2 MG/DL (ref 0–1.2)
BUN SERPL-MCNC: 14 MG/DL (ref 6–20)
CALCIUM SERPL-MCNC: 8.6 MG/DL (ref 8.6–10.2)
CHLORIDE SERPL-SCNC: 103 MMOL/L (ref 98–107)
CO2 SERPL-SCNC: 24 MMOL/L (ref 22–29)
CREAT SERPL-MCNC: 0.9 MG/DL (ref 0.5–1)
EOSINOPHIL # BLD: 0.21 K/UL (ref 0.05–0.5)
EOSINOPHILS RELATIVE PERCENT: 4 % (ref 0–6)
ERYTHROCYTE [DISTWIDTH] IN BLOOD BY AUTOMATED COUNT: 20 % (ref 11.5–15)
GFR SERPL CREATININE-BSD FRML MDRD: >60 ML/MIN/1.73M2
GLUCOSE SERPL-MCNC: 132 MG/DL (ref 74–99)
HCG SERPL QL: NEGATIVE
HCT VFR BLD AUTO: 24.4 % (ref 34–48)
HGB BLD-MCNC: 7.7 G/DL (ref 11.5–15.5)
INR PPP: 2.2
LACTATE BLDV-SCNC: 2.2 MMOL/L (ref 0.5–2.2)
LIPASE SERPL-CCNC: 44 U/L (ref 13–60)
LYMPHOCYTES NFR BLD: 0.93 K/UL (ref 1.5–4)
LYMPHOCYTES RELATIVE PERCENT: 16 % (ref 20–42)
MCH RBC QN AUTO: 33.5 PG (ref 26–35)
MCHC RBC AUTO-ENTMCNC: 31.6 G/DL (ref 32–34.5)
MCV RBC AUTO: 106.1 FL (ref 80–99.9)
MONOCYTES NFR BLD: 0.26 K/UL (ref 0.1–0.95)
MONOCYTES NFR BLD: 4 % (ref 2–12)
NEUTROPHILS NFR BLD: 76 % (ref 43–80)
NEUTS SEG NFR BLD: 4.5 K/UL (ref 1.8–7.3)
PLATELET # BLD AUTO: 76 K/UL (ref 130–450)
PLATELET CONFIRMATION: NORMAL
PMV BLD AUTO: 10.3 FL (ref 7–12)
POTASSIUM SERPL-SCNC: 4.2 MMOL/L (ref 3.5–5)
PROT SERPL-MCNC: 5.9 G/DL (ref 6.4–8.3)
PROTHROMBIN TIME: 25 SEC (ref 9.3–12.4)
RBC # BLD AUTO: 2.3 M/UL (ref 3.5–5.5)
RBC # BLD: ABNORMAL 10*6/UL
SODIUM SERPL-SCNC: 133 MMOL/L (ref 132–146)
WBC OTHER # BLD: 5.9 K/UL (ref 4.5–11.5)

## 2023-09-15 PROCEDURE — 84703 CHORIONIC GONADOTROPIN ASSAY: CPT

## 2023-09-15 PROCEDURE — 74177 CT ABD & PELVIS W/CONTRAST: CPT

## 2023-09-15 PROCEDURE — 85610 PROTHROMBIN TIME: CPT

## 2023-09-15 PROCEDURE — 83690 ASSAY OF LIPASE: CPT

## 2023-09-15 PROCEDURE — 6360000004 HC RX CONTRAST MEDICATION: Performed by: RADIOLOGY

## 2023-09-15 PROCEDURE — 6360000002 HC RX W HCPCS: Performed by: STUDENT IN AN ORGANIZED HEALTH CARE EDUCATION/TRAINING PROGRAM

## 2023-09-15 PROCEDURE — 80053 COMPREHEN METABOLIC PANEL: CPT

## 2023-09-15 PROCEDURE — 99285 EMERGENCY DEPT VISIT HI MDM: CPT

## 2023-09-15 PROCEDURE — 6370000000 HC RX 637 (ALT 250 FOR IP): Performed by: STUDENT IN AN ORGANIZED HEALTH CARE EDUCATION/TRAINING PROGRAM

## 2023-09-15 PROCEDURE — 85025 COMPLETE CBC W/AUTO DIFF WBC: CPT

## 2023-09-15 PROCEDURE — 82248 BILIRUBIN DIRECT: CPT

## 2023-09-15 PROCEDURE — 83605 ASSAY OF LACTIC ACID: CPT

## 2023-09-15 PROCEDURE — 96372 THER/PROPH/DIAG INJ SC/IM: CPT

## 2023-09-15 RX ORDER — DICYCLOMINE HYDROCHLORIDE 10 MG/ML
20 INJECTION INTRAMUSCULAR ONCE
Status: COMPLETED | OUTPATIENT
Start: 2023-09-15 | End: 2023-09-15

## 2023-09-15 RX ORDER — AMOXICILLIN AND CLAVULANATE POTASSIUM 875; 125 MG/1; MG/1
1 TABLET, FILM COATED ORAL 2 TIMES DAILY
Qty: 14 TABLET | Refills: 0 | Status: ON HOLD | OUTPATIENT
Start: 2023-09-15 | End: 2023-09-24 | Stop reason: HOSPADM

## 2023-09-15 RX ORDER — AMOXICILLIN AND CLAVULANATE POTASSIUM 875; 125 MG/1; MG/1
1 TABLET, FILM COATED ORAL ONCE
Status: COMPLETED | OUTPATIENT
Start: 2023-09-15 | End: 2023-09-15

## 2023-09-15 RX ADMIN — AMOXICILLIN AND CLAVULANATE POTASSIUM 1 TABLET: 875; 125 TABLET, FILM COATED ORAL at 04:56

## 2023-09-15 RX ADMIN — DICYCLOMINE HYDROCHLORIDE 20 MG: 10 INJECTION, SOLUTION INTRAMUSCULAR at 04:14

## 2023-09-15 RX ADMIN — IOPAMIDOL 80 ML: 755 INJECTION, SOLUTION INTRAVENOUS at 03:36

## 2023-09-15 ASSESSMENT — PAIN SCALES - GENERAL: PAINLEVEL_OUTOF10: 10

## 2023-09-15 ASSESSMENT — PAIN DESCRIPTION - LOCATION: LOCATION: ABDOMEN;BUTTOCKS

## 2023-09-15 ASSESSMENT — PAIN - FUNCTIONAL ASSESSMENT: PAIN_FUNCTIONAL_ASSESSMENT: 0-10

## 2023-09-15 NOTE — DISCHARGE INSTRUCTIONS
Call your GI specialist as soon as possible for follow up. Return to the ED if you develop fever, worsening pain, or other concerns arise.

## 2023-09-15 NOTE — ED NOTES
[facility] notified of pt discharge. Transportation was set up by the facility. [phone].      Anitra Cortes LPN  69/93/28 8829

## 2023-09-19 ENCOUNTER — HOSPITAL ENCOUNTER (INPATIENT)
Age: 38
LOS: 5 days | Discharge: HOME | DRG: 253 | End: 2023-09-24
Attending: EMERGENCY MEDICINE | Admitting: FAMILY MEDICINE
Payer: COMMERCIAL

## 2023-09-19 ENCOUNTER — APPOINTMENT (OUTPATIENT)
Dept: CT IMAGING | Age: 38
DRG: 253 | End: 2023-09-19
Payer: COMMERCIAL

## 2023-09-19 DIAGNOSIS — K52.9 COLITIS: ICD-10-CM

## 2023-09-19 DIAGNOSIS — R18.8 OTHER ASCITES: Primary | ICD-10-CM

## 2023-09-19 DIAGNOSIS — K80.80 BILIARY CALCULUS OF OTHER SITE WITHOUT OBSTRUCTION: ICD-10-CM

## 2023-09-19 DIAGNOSIS — I85.00 ESOPHAGEAL VARICES WITHOUT BLEEDING, UNSPECIFIED ESOPHAGEAL VARICES TYPE (HCC): ICD-10-CM

## 2023-09-19 DIAGNOSIS — K62.5 RECTAL BLEEDING: ICD-10-CM

## 2023-09-19 DIAGNOSIS — N39.0 URINARY TRACT INFECTION WITHOUT HEMATURIA, SITE UNSPECIFIED: ICD-10-CM

## 2023-09-19 DIAGNOSIS — D64.9 ANEMIA, UNSPECIFIED TYPE: ICD-10-CM

## 2023-09-19 DIAGNOSIS — K70.31 ALCOHOLIC CIRRHOSIS OF LIVER WITH ASCITES (HCC): ICD-10-CM

## 2023-09-19 LAB
ALBUMIN SERPL-MCNC: 2.4 G/DL (ref 3.5–5.2)
ALP SERPL-CCNC: 173 U/L (ref 35–104)
ALT SERPL-CCNC: 18 U/L (ref 0–32)
AMMONIA PLAS-SCNC: 24 UMOL/L (ref 11–51)
ANION GAP SERPL CALCULATED.3IONS-SCNC: 8 MMOL/L (ref 7–16)
AST SERPL-CCNC: 63 U/L (ref 0–31)
BASOPHILS # BLD: 0.05 K/UL (ref 0–0.2)
BASOPHILS NFR BLD: 1 % (ref 0–2)
BILIRUB SERPL-MCNC: 4.2 MG/DL (ref 0–1.2)
BILIRUB UR QL STRIP: ABNORMAL
BNP SERPL-MCNC: 318 PG/ML (ref 0–450)
BUN SERPL-MCNC: 12 MG/DL (ref 6–20)
CALCIUM SERPL-MCNC: 8.4 MG/DL (ref 8.6–10.2)
CHLORIDE SERPL-SCNC: 105 MMOL/L (ref 98–107)
CLARITY UR: CLEAR
CO2 SERPL-SCNC: 23 MMOL/L (ref 22–29)
COLOR UR: YELLOW
CREAT SERPL-MCNC: 0.9 MG/DL (ref 0.5–1)
EOSINOPHIL # BLD: 0.05 K/UL (ref 0.05–0.5)
EOSINOPHILS RELATIVE PERCENT: 1 % (ref 0–6)
ERYTHROCYTE [DISTWIDTH] IN BLOOD BY AUTOMATED COUNT: 19.1 % (ref 11.5–15)
ETHANOLAMINE SERPL-MCNC: <10 MG/DL
GFR SERPL CREATININE-BSD FRML MDRD: >60 ML/MIN/1.73M2
GLUCOSE SERPL-MCNC: 104 MG/DL (ref 74–99)
GLUCOSE UR STRIP-MCNC: NEGATIVE MG/DL
HCT VFR BLD AUTO: 21 % (ref 34–48)
HGB BLD-MCNC: 6.8 G/DL (ref 11.5–15.5)
HGB UR QL STRIP.AUTO: ABNORMAL
INR PPP: 2
KETONES UR STRIP-MCNC: ABNORMAL MG/DL
LACTATE BLDV-SCNC: 1.2 MMOL/L (ref 0.5–2.2)
LEUKOCYTE ESTERASE UR QL STRIP: ABNORMAL
LIPASE SERPL-CCNC: 49 U/L (ref 13–60)
LYMPHOCYTES NFR BLD: 1.65 K/UL (ref 1.5–4)
LYMPHOCYTES RELATIVE PERCENT: 30 % (ref 20–42)
MCH RBC QN AUTO: 34.2 PG (ref 26–35)
MCHC RBC AUTO-ENTMCNC: 32.4 G/DL (ref 32–34.5)
MCV RBC AUTO: 105.5 FL (ref 80–99.9)
MONOCYTES NFR BLD: 0.1 K/UL (ref 0.1–0.95)
MONOCYTES NFR BLD: 2 % (ref 2–12)
MYELOCYTES ABSOLUTE COUNT: 0.05 K/UL
MYELOCYTES: 1 %
NEUTROPHILS NFR BLD: 66 % (ref 43–80)
NEUTS SEG NFR BLD: 3.6 K/UL (ref 1.8–7.3)
NITRITE UR QL STRIP: NEGATIVE
PH UR STRIP: 7 [PH] (ref 5–9)
PLATELET # BLD AUTO: 84 K/UL (ref 130–450)
PLATELET CONFIRMATION: NORMAL
PMV BLD AUTO: 9.6 FL (ref 7–12)
POTASSIUM SERPL-SCNC: 4.8 MMOL/L (ref 3.5–5)
PROT SERPL-MCNC: 5.8 G/DL (ref 6.4–8.3)
PROT UR STRIP-MCNC: NEGATIVE MG/DL
PROTHROMBIN TIME: 22.6 SEC (ref 9.3–12.4)
RBC # BLD AUTO: 1.99 M/UL (ref 3.5–5.5)
RBC # BLD: ABNORMAL 10*6/UL
RBC #/AREA URNS HPF: NORMAL /HPF
SODIUM SERPL-SCNC: 136 MMOL/L (ref 132–146)
SP GR UR STRIP: 1.01 (ref 1–1.03)
UROBILINOGEN UR STRIP-ACNC: 0.2 EU/DL (ref 0–1)
WBC #/AREA URNS HPF: NORMAL /HPF
WBC OTHER # BLD: 5.5 K/UL (ref 4.5–11.5)

## 2023-09-19 PROCEDURE — 99223 1ST HOSP IP/OBS HIGH 75: CPT | Performed by: FAMILY MEDICINE

## 2023-09-19 PROCEDURE — 6360000004 HC RX CONTRAST MEDICATION: Performed by: RADIOLOGY

## 2023-09-19 PROCEDURE — 86923 COMPATIBILITY TEST ELECTRIC: CPT

## 2023-09-19 PROCEDURE — 83880 ASSAY OF NATRIURETIC PEPTIDE: CPT

## 2023-09-19 PROCEDURE — G0480 DRUG TEST DEF 1-7 CLASSES: HCPCS

## 2023-09-19 PROCEDURE — 86900 BLOOD TYPING SEROLOGIC ABO: CPT

## 2023-09-19 PROCEDURE — P9016 RBC LEUKOCYTES REDUCED: HCPCS

## 2023-09-19 PROCEDURE — 85025 COMPLETE CBC W/AUTO DIFF WBC: CPT

## 2023-09-19 PROCEDURE — 85610 PROTHROMBIN TIME: CPT

## 2023-09-19 PROCEDURE — 83605 ASSAY OF LACTIC ACID: CPT

## 2023-09-19 PROCEDURE — 81001 URINALYSIS AUTO W/SCOPE: CPT

## 2023-09-19 PROCEDURE — 80053 COMPREHEN METABOLIC PANEL: CPT

## 2023-09-19 PROCEDURE — 83690 ASSAY OF LIPASE: CPT

## 2023-09-19 PROCEDURE — 30233N1 TRANSFUSION OF NONAUTOLOGOUS RED BLOOD CELLS INTO PERIPHERAL VEIN, PERCUTANEOUS APPROACH: ICD-10-PCS | Performed by: STUDENT IN AN ORGANIZED HEALTH CARE EDUCATION/TRAINING PROGRAM

## 2023-09-19 PROCEDURE — 2060000000 HC ICU INTERMEDIATE R&B

## 2023-09-19 PROCEDURE — 36415 COLL VENOUS BLD VENIPUNCTURE: CPT

## 2023-09-19 PROCEDURE — 86901 BLOOD TYPING SEROLOGIC RH(D): CPT

## 2023-09-19 PROCEDURE — 86850 RBC ANTIBODY SCREEN: CPT

## 2023-09-19 PROCEDURE — 82140 ASSAY OF AMMONIA: CPT

## 2023-09-19 PROCEDURE — 99285 EMERGENCY DEPT VISIT HI MDM: CPT

## 2023-09-19 PROCEDURE — 74177 CT ABD & PELVIS W/CONTRAST: CPT

## 2023-09-19 PROCEDURE — 6370000000 HC RX 637 (ALT 250 FOR IP): Performed by: FAMILY MEDICINE

## 2023-09-19 RX ORDER — SODIUM CHLORIDE 9 MG/ML
INJECTION, SOLUTION INTRAVENOUS PRN
Status: DISCONTINUED | OUTPATIENT
Start: 2023-09-19 | End: 2023-09-24 | Stop reason: HOSPADM

## 2023-09-19 RX ORDER — SODIUM CHLORIDE 9 MG/ML
INJECTION, SOLUTION INTRAVENOUS CONTINUOUS
Status: DISCONTINUED | OUTPATIENT
Start: 2023-09-19 | End: 2023-09-20

## 2023-09-19 RX ORDER — SODIUM CHLORIDE 0.9 % (FLUSH) 0.9 %
5-40 SYRINGE (ML) INJECTION PRN
Status: DISCONTINUED | OUTPATIENT
Start: 2023-09-19 | End: 2023-09-24 | Stop reason: HOSPADM

## 2023-09-19 RX ORDER — ACETAMINOPHEN 325 MG/1
650 TABLET ORAL EVERY 6 HOURS PRN
Status: DISCONTINUED | OUTPATIENT
Start: 2023-09-19 | End: 2023-09-24 | Stop reason: HOSPADM

## 2023-09-19 RX ORDER — M-VIT,TX,IRON,MINS/CALC/FOLIC 27MG-0.4MG
1 TABLET ORAL DAILY
COMMUNITY

## 2023-09-19 RX ORDER — ACETAMINOPHEN 650 MG/1
650 SUPPOSITORY RECTAL EVERY 6 HOURS PRN
Status: DISCONTINUED | OUTPATIENT
Start: 2023-09-19 | End: 2023-09-24 | Stop reason: HOSPADM

## 2023-09-19 RX ORDER — SPIRONOLACTONE 25 MG/1
50 TABLET ORAL DAILY
Status: DISCONTINUED | OUTPATIENT
Start: 2023-09-20 | End: 2023-09-24 | Stop reason: HOSPADM

## 2023-09-19 RX ORDER — FOLIC ACID 1 MG/1
1 TABLET ORAL DAILY
Status: DISCONTINUED | OUTPATIENT
Start: 2023-09-20 | End: 2023-09-24 | Stop reason: HOSPADM

## 2023-09-19 RX ORDER — ESCITALOPRAM OXALATE 10 MG/1
10 TABLET ORAL DAILY
Status: DISCONTINUED | OUTPATIENT
Start: 2023-09-20 | End: 2023-09-24 | Stop reason: HOSPADM

## 2023-09-19 RX ORDER — SODIUM CHLORIDE 0.9 % (FLUSH) 0.9 %
5-40 SYRINGE (ML) INJECTION EVERY 12 HOURS SCHEDULED
Status: DISCONTINUED | OUTPATIENT
Start: 2023-09-19 | End: 2023-09-24 | Stop reason: HOSPADM

## 2023-09-19 RX ORDER — M-VIT,TX,IRON,MINS/CALC/FOLIC 27MG-0.4MG
1 TABLET ORAL DAILY
Status: DISCONTINUED | OUTPATIENT
Start: 2023-09-20 | End: 2023-09-24 | Stop reason: HOSPADM

## 2023-09-19 RX ORDER — LACTULOSE 10 G/15ML
20 SOLUTION ORAL 2 TIMES DAILY
Status: DISCONTINUED | OUTPATIENT
Start: 2023-09-19 | End: 2023-09-24 | Stop reason: HOSPADM

## 2023-09-19 RX ORDER — PANTOPRAZOLE SODIUM 40 MG/1
40 TABLET, DELAYED RELEASE ORAL
Status: DISCONTINUED | OUTPATIENT
Start: 2023-09-20 | End: 2023-09-20

## 2023-09-19 RX ORDER — ACAMPROSATE CALCIUM 333 MG/1
666 TABLET, DELAYED RELEASE ORAL 3 TIMES DAILY
Status: DISCONTINUED | OUTPATIENT
Start: 2023-09-20 | End: 2023-09-24 | Stop reason: HOSPADM

## 2023-09-19 RX ADMIN — ACETAMINOPHEN 650 MG: 325 TABLET ORAL at 23:18

## 2023-09-19 RX ADMIN — IOPAMIDOL 75 ML: 755 INJECTION, SOLUTION INTRAVENOUS at 19:03

## 2023-09-19 ASSESSMENT — PAIN DESCRIPTION - LOCATION
LOCATION: ABDOMEN
LOCATION: ABDOMEN

## 2023-09-19 ASSESSMENT — PAIN SCALES - GENERAL
PAINLEVEL_OUTOF10: 8
PAINLEVEL_OUTOF10: 8

## 2023-09-19 ASSESSMENT — PAIN DESCRIPTION - PAIN TYPE: TYPE: ACUTE PAIN

## 2023-09-19 ASSESSMENT — PAIN DESCRIPTION - DESCRIPTORS: DESCRIPTORS: CRAMPING

## 2023-09-19 NOTE — ED PROVIDER NOTES
[address]        Pt Name: Karen Ernst  MRN: [id number]  [address] [facility] [place] 1985  Date of evaluation: 9/19/2023  Provider: Murry Gilford, DO  PCP: Jessica Granger MD  Note Started: 5:36 PM EDT 9/19/23    CHIEF COMPLAINT       Chief Complaint   Patient presents with    Abdominal Pain     Needs paracinesis, swelling going down both legs. Headaches, fatigue, gi bleed (bright red). HISTORY OF PRESENT ILLNESS: 1 or more Elements        Limitations to history : None    Karen Ernst is a 42 y.o. female who presents with diffuse abdominal pain and ascites. She has a longstanding history of alcoholic cirrhosis. She also notes peripheral edema. She also notes a headache that is mild. She has intermittent rectal bleeding with bowel movements only. This has been present quite some time and has actually improved but is still present. On her last visit that she had a CAT scan they told her she had liver lesions. She would like to know what these are. Denies any fever chills. Denies any nausea or vomiting. She reports her last paracentesis was 2 weeks ago, chart review does reveal that she had one on 8/31/2023. There are no alleviating factors. Nursing Notes were all reviewed and agreed with or any disagreements were addressed in the HPI. REVIEW OF EXTERNAL NOTE :       Reviewed previous ER visit on 9/6/2023 and 9/15/2023. Chart Review/External Note Review    Last Echo reviewed by Me:  No results found for: \"LVEF\", \"LVEFMODE\"          Controlled Substance Monitoring:    Acute and Chronic Pain Monitoring:   RX Monitoring Periodic Controlled Substance Monitoring   9/12/2022   7:31 AM Possible medication side effects, risk of tolerance/dependence & alternative treatments discussed. ;No signs of potential drug abuse or diversion identified. ;Assessed functional status. ;Obtaining appropriate analgesic effect of treatment.

## 2023-09-20 ENCOUNTER — APPOINTMENT (OUTPATIENT)
Dept: INTERVENTIONAL RADIOLOGY/VASCULAR | Age: 38
DRG: 253 | End: 2023-09-20
Payer: COMMERCIAL

## 2023-09-20 PROBLEM — K70.10 ACUTE ALCOHOLIC HEPATITIS: Status: RESOLVED | Noted: 2022-09-14 | Resolved: 2023-09-20

## 2023-09-20 PROBLEM — R18.8 OTHER ASCITES: Status: ACTIVE | Noted: 2022-09-04

## 2023-09-20 PROBLEM — Z71.89 GOALS OF CARE, COUNSELING/DISCUSSION: Status: RESOLVED | Noted: 2023-08-10 | Resolved: 2023-09-20

## 2023-09-20 PROBLEM — Z78.9 IMPAIRED MOBILITY AND ACTIVITIES OF DAILY LIVING: Status: RESOLVED | Noted: 2022-09-12 | Resolved: 2023-09-20

## 2023-09-20 PROBLEM — K92.2 UPPER GI BLEED: Status: RESOLVED | Noted: 2023-01-10 | Resolved: 2023-09-20

## 2023-09-20 PROBLEM — Z74.09 IMPAIRED MOBILITY AND ACTIVITIES OF DAILY LIVING: Status: RESOLVED | Noted: 2022-09-12 | Resolved: 2023-09-20

## 2023-09-20 PROBLEM — Z74.09 IMPAIRED MOBILITY AND ADLS: Status: RESOLVED | Noted: 2022-09-16 | Resolved: 2023-09-20

## 2023-09-20 PROBLEM — M79.10 MYALGIA, UNSPECIFIED SITE: Status: RESOLVED | Noted: 2022-01-16 | Resolved: 2023-09-20

## 2023-09-20 PROBLEM — K92.0 HEMATEMESIS WITH NAUSEA: Status: RESOLVED | Noted: 2023-05-10 | Resolved: 2023-09-20

## 2023-09-20 PROBLEM — B36.9 FUNGAL DERMATITIS: Status: RESOLVED | Noted: 2023-06-11 | Resolved: 2023-09-20

## 2023-09-20 PROBLEM — L03.119 LOWER EXTREMITY CELLULITIS: Status: RESOLVED | Noted: 2023-05-31 | Resolved: 2023-09-20

## 2023-09-20 PROBLEM — K76.82 HEPATIC ENCEPHALOPATHY (HCC): Status: RESOLVED | Noted: 2023-08-19 | Resolved: 2023-09-20

## 2023-09-20 PROBLEM — Z71.89 ENCOUNTER FOR HOSPICE CARE DISCUSSION: Status: RESOLVED | Noted: 2023-08-10 | Resolved: 2023-09-20

## 2023-09-20 PROBLEM — R52 GENERALIZED PAIN: Status: RESOLVED | Noted: 2022-09-12 | Resolved: 2023-09-20

## 2023-09-20 PROBLEM — N17.9 AKI (ACUTE KIDNEY INJURY) (HCC): Status: RESOLVED | Noted: 2023-01-11 | Resolved: 2023-09-20

## 2023-09-20 PROBLEM — Z78.9 IMPAIRED MOBILITY AND ADLS: Status: RESOLVED | Noted: 2022-09-16 | Resolved: 2023-09-20

## 2023-09-20 PROBLEM — E43 SEVERE MALNUTRITION (HCC): Status: RESOLVED | Noted: 2023-08-10 | Resolved: 2023-09-20

## 2023-09-20 PROBLEM — Z51.5 PALLIATIVE CARE ENCOUNTER: Status: RESOLVED | Noted: 2023-08-10 | Resolved: 2023-09-20

## 2023-09-20 PROBLEM — F43.21 ADJUSTMENT DISORDER WITH DEPRESSED MOOD: Status: RESOLVED | Noted: 2023-05-11 | Resolved: 2023-09-20

## 2023-09-20 PROBLEM — Z71.89 ADVANCED CARE PLANNING/COUNSELING DISCUSSION: Status: RESOLVED | Noted: 2023-08-10 | Resolved: 2023-09-20

## 2023-09-20 PROBLEM — K70.11 ALCOHOLIC HEPATITIS WITH ASCITES: Status: RESOLVED | Noted: 2022-09-04 | Resolved: 2023-09-20

## 2023-09-20 LAB
ALBUMIN FLD-MCNC: 0.3 G/DL
ALBUMIN SERPL-MCNC: 1.9 G/DL (ref 3.5–5.2)
ALP SERPL-CCNC: 138 U/L (ref 35–104)
ALT SERPL-CCNC: 15 U/L (ref 0–32)
AMYLASE FLD-CCNC: 22 U/L
ANION GAP SERPL CALCULATED.3IONS-SCNC: 7 MMOL/L (ref 7–16)
APPEARANCE FLD: NORMAL
AST SERPL-CCNC: 47 U/L (ref 0–31)
BASOPHILS # BLD: 0 K/UL (ref 0–0.2)
BASOPHILS NFR BLD: 0 % (ref 0–2)
BILIRUB SERPL-MCNC: 4 MG/DL (ref 0–1.2)
BODY FLD TYPE: NORMAL
BUN SERPL-MCNC: 12 MG/DL (ref 6–20)
CALCIUM SERPL-MCNC: 7.9 MG/DL (ref 8.6–10.2)
CHLORIDE SERPL-SCNC: 107 MMOL/L (ref 98–107)
CLOT CHECK: NORMAL
CO2 SERPL-SCNC: 21 MMOL/L (ref 22–29)
COLOR FLD: YELLOW
CREAT SERPL-MCNC: 0.9 MG/DL (ref 0.5–1)
EKG ATRIAL RATE: 67 BPM
EKG P AXIS: 42 DEGREES
EKG P-R INTERVAL: 142 MS
EKG Q-T INTERVAL: 436 MS
EKG QRS DURATION: 82 MS
EKG QTC CALCULATION (BAZETT): 460 MS
EKG R AXIS: 52 DEGREES
EKG T AXIS: 48 DEGREES
EKG VENTRICULAR RATE: 67 BPM
EOSINOPHIL # BLD: 0.16 K/UL (ref 0.05–0.5)
EOSINOPHILS RELATIVE PERCENT: 3 % (ref 0–6)
ERYTHROCYTE [DISTWIDTH] IN BLOOD BY AUTOMATED COUNT: 22.1 % (ref 11.5–15)
GFR SERPL CREATININE-BSD FRML MDRD: >60 ML/MIN/1.73M2
GLUCOSE FLD-MCNC: 94 MG/DL
GLUCOSE SERPL-MCNC: 102 MG/DL (ref 74–99)
HCT VFR BLD AUTO: 22.3 % (ref 34–48)
HCT VFR BLD AUTO: 22.7 % (ref 34–48)
HCT VFR BLD AUTO: 23.2 % (ref 34–48)
HGB BLD-MCNC: 7.1 G/DL (ref 11.5–15.5)
HGB BLD-MCNC: 7.2 G/DL (ref 11.5–15.5)
HGB BLD-MCNC: 7.4 G/DL (ref 11.5–15.5)
LACTATE BLDV-SCNC: 1.3 MMOL/L (ref 0.5–2.2)
LDH FLD L TO P-CCNC: 40 U/L
LDH SERPL-CCNC: 201 U/L (ref 135–214)
LYMPHOCYTES NFR BLD: 1.37 K/UL (ref 1.5–4)
LYMPHOCYTES RELATIVE PERCENT: 29 % (ref 20–42)
MCH RBC QN AUTO: 32.1 PG (ref 26–35)
MCHC RBC AUTO-ENTMCNC: 31.8 G/DL (ref 32–34.5)
MCV RBC AUTO: 100.9 FL (ref 80–99.9)
MONOCYTES NFR BLD: 0 % (ref 2–12)
MONOCYTES NFR BLD: 0 K/UL (ref 0.1–0.95)
MONOCYTES NFR FLD: 91 %
NEUTROPHILS NFR BLD: 68 % (ref 43–80)
NEUTROPHILS NFR FLD: 9 %
NEUTS SEG NFR BLD: 3.17 K/UL (ref 1.8–7.3)
PLATELET CONFIRMATION: NORMAL
PLATELET, FLUORESCENCE: 73 K/UL (ref 130–450)
PMV BLD AUTO: 9.5 FL (ref 7–12)
POTASSIUM SERPL-SCNC: 4.4 MMOL/L (ref 3.5–5)
PROT FLD-MCNC: 0.8 G/DL
PROT SERPL-MCNC: 4.9 G/DL (ref 6.4–8.3)
RBC # BLD AUTO: 2.21 M/UL (ref 3.5–5.5)
RBC # BLD: ABNORMAL 10*6/UL
RBC # FLD: <2000 CELLS/UL
SODIUM SERPL-SCNC: 135 MMOL/L (ref 132–146)
SPECIMEN TYPE: NORMAL
WBC # FLD: 161 CELLS/UL
WBC OTHER # BLD: 4.7 K/UL (ref 4.5–11.5)

## 2023-09-20 PROCEDURE — 2060000000 HC ICU INTERMEDIATE R&B

## 2023-09-20 PROCEDURE — 36415 COLL VENOUS BLD VENIPUNCTURE: CPT

## 2023-09-20 PROCEDURE — 82042 OTHER SOURCE ALBUMIN QUAN EA: CPT

## 2023-09-20 PROCEDURE — 83605 ASSAY OF LACTIC ACID: CPT

## 2023-09-20 PROCEDURE — 6360000002 HC RX W HCPCS: Performed by: NURSE PRACTITIONER

## 2023-09-20 PROCEDURE — APPSS30 APP SPLIT SHARED TIME 16-30 MINUTES: Performed by: NURSE PRACTITIONER

## 2023-09-20 PROCEDURE — 85025 COMPLETE CBC W/AUTO DIFF WBC: CPT

## 2023-09-20 PROCEDURE — 87045 FECES CULTURE AEROBIC BACT: CPT

## 2023-09-20 PROCEDURE — 87329 GIARDIA AG IA: CPT

## 2023-09-20 PROCEDURE — 83615 LACTATE (LD) (LDH) ENZYME: CPT

## 2023-09-20 PROCEDURE — 99232 SBSQ HOSP IP/OBS MODERATE 35: CPT | Performed by: INTERNAL MEDICINE

## 2023-09-20 PROCEDURE — 84157 ASSAY OF PROTEIN OTHER: CPT

## 2023-09-20 PROCEDURE — 85018 HEMOGLOBIN: CPT

## 2023-09-20 PROCEDURE — 87427 SHIGA-LIKE TOXIN AG IA: CPT

## 2023-09-20 PROCEDURE — P9047 ALBUMIN (HUMAN), 25%, 50ML: HCPCS | Performed by: NURSE PRACTITIONER

## 2023-09-20 PROCEDURE — C1729 CATH, DRAINAGE: HCPCS

## 2023-09-20 PROCEDURE — 87046 STOOL CULTR AEROBIC BACT EA: CPT

## 2023-09-20 PROCEDURE — 87070 CULTURE OTHR SPECIMN AEROBIC: CPT

## 2023-09-20 PROCEDURE — 2580000003 HC RX 258: Performed by: EMERGENCY MEDICINE

## 2023-09-20 PROCEDURE — 0W9G3ZZ DRAINAGE OF PERITONEAL CAVITY, PERCUTANEOUS APPROACH: ICD-10-PCS | Performed by: STUDENT IN AN ORGANIZED HEALTH CARE EDUCATION/TRAINING PROGRAM

## 2023-09-20 PROCEDURE — 80053 COMPREHEN METABOLIC PANEL: CPT

## 2023-09-20 PROCEDURE — 88305 TISSUE EXAM BY PATHOLOGIST: CPT

## 2023-09-20 PROCEDURE — 85014 HEMATOCRIT: CPT

## 2023-09-20 PROCEDURE — 6360000002 HC RX W HCPCS: Performed by: EMERGENCY MEDICINE

## 2023-09-20 PROCEDURE — 87328 CRYPTOSPORIDIUM AG IA: CPT

## 2023-09-20 PROCEDURE — 89051 BODY FLUID CELL COUNT: CPT

## 2023-09-20 PROCEDURE — 49083 ABD PARACENTESIS W/IMAGING: CPT

## 2023-09-20 PROCEDURE — 82945 GLUCOSE OTHER FLUID: CPT

## 2023-09-20 PROCEDURE — 82705 FATS/LIPIDS FECES QUAL: CPT

## 2023-09-20 PROCEDURE — C9113 INJ PANTOPRAZOLE SODIUM, VIA: HCPCS | Performed by: NURSE PRACTITIONER

## 2023-09-20 PROCEDURE — 93005 ELECTROCARDIOGRAM TRACING: CPT | Performed by: NURSE PRACTITIONER

## 2023-09-20 PROCEDURE — 6370000000 HC RX 637 (ALT 250 FOR IP): Performed by: FAMILY MEDICINE

## 2023-09-20 PROCEDURE — 6360000002 HC RX W HCPCS: Performed by: FAMILY MEDICINE

## 2023-09-20 PROCEDURE — 82272 OCCULT BLD FECES 1-3 TESTS: CPT

## 2023-09-20 PROCEDURE — 2580000003 HC RX 258: Performed by: FAMILY MEDICINE

## 2023-09-20 PROCEDURE — 82150 ASSAY OF AMYLASE: CPT

## 2023-09-20 PROCEDURE — 82105 ALPHA-FETOPROTEIN SERUM: CPT

## 2023-09-20 PROCEDURE — 6370000000 HC RX 637 (ALT 250 FOR IP): Performed by: NURSE PRACTITIONER

## 2023-09-20 PROCEDURE — 87205 SMEAR GRAM STAIN: CPT

## 2023-09-20 PROCEDURE — 93010 ELECTROCARDIOGRAM REPORT: CPT | Performed by: INTERNAL MEDICINE

## 2023-09-20 PROCEDURE — 88112 CYTOPATH CELL ENHANCE TECH: CPT

## 2023-09-20 RX ORDER — ALBUMIN (HUMAN) 12.5 G/50ML
25 SOLUTION INTRAVENOUS ONCE
Status: COMPLETED | OUTPATIENT
Start: 2023-09-20 | End: 2023-09-20

## 2023-09-20 RX ORDER — LIDOCAINE 4 G/G
1 PATCH TOPICAL DAILY
Status: DISCONTINUED | OUTPATIENT
Start: 2023-09-20 | End: 2023-09-24 | Stop reason: HOSPADM

## 2023-09-20 RX ORDER — PANTOPRAZOLE SODIUM 40 MG/10ML
40 INJECTION, POWDER, LYOPHILIZED, FOR SOLUTION INTRAVENOUS 2 TIMES DAILY
Status: DISCONTINUED | OUTPATIENT
Start: 2023-09-20 | End: 2023-09-24 | Stop reason: HOSPADM

## 2023-09-20 RX ADMIN — PIPERACILLIN AND TAZOBACTAM 3375 MG: 3; .375 INJECTION, POWDER, FOR SOLUTION INTRAVENOUS at 00:41

## 2023-09-20 RX ADMIN — FOLIC ACID 1 MG: 1 TABLET ORAL at 08:07

## 2023-09-20 RX ADMIN — ACETAMINOPHEN 650 MG: 325 TABLET ORAL at 08:06

## 2023-09-20 RX ADMIN — PANTOPRAZOLE SODIUM 40 MG: 40 INJECTION, POWDER, FOR SOLUTION INTRAVENOUS at 20:22

## 2023-09-20 RX ADMIN — ACAMPROSATE CALCIUM 666 MG: 333 TABLET, DELAYED RELEASE ORAL at 00:26

## 2023-09-20 RX ADMIN — PANTOPRAZOLE SODIUM 40 MG: 40 TABLET, DELAYED RELEASE ORAL at 05:09

## 2023-09-20 RX ADMIN — LACTULOSE 20 G: 20 SOLUTION ORAL at 20:21

## 2023-09-20 RX ADMIN — PIPERACILLIN AND TAZOBACTAM 3375 MG: 3; .375 INJECTION, POWDER, LYOPHILIZED, FOR SOLUTION INTRAVENOUS at 08:19

## 2023-09-20 RX ADMIN — ALBUMIN (HUMAN) 25 G: 0.25 INJECTION, SOLUTION INTRAVENOUS at 10:13

## 2023-09-20 RX ADMIN — ACAMPROSATE CALCIUM 666 MG: 333 TABLET, DELAYED RELEASE ORAL at 08:04

## 2023-09-20 RX ADMIN — Medication 10 ML: at 20:22

## 2023-09-20 RX ADMIN — Medication 10 ML: at 00:32

## 2023-09-20 RX ADMIN — ACAMPROSATE CALCIUM 666 MG: 333 TABLET, DELAYED RELEASE ORAL at 20:22

## 2023-09-20 RX ADMIN — ACAMPROSATE CALCIUM 666 MG: 333 TABLET, DELAYED RELEASE ORAL at 15:43

## 2023-09-20 RX ADMIN — PANTOPRAZOLE SODIUM 40 MG: 40 INJECTION, POWDER, FOR SOLUTION INTRAVENOUS at 12:38

## 2023-09-20 RX ADMIN — SODIUM CHLORIDE: 9 INJECTION, SOLUTION INTRAVENOUS at 00:37

## 2023-09-20 RX ADMIN — MULTIPLE VITAMINS W/ MINERALS TAB 1 TABLET: TAB at 08:06

## 2023-09-20 RX ADMIN — ALBUMIN (HUMAN) 25 G: 0.25 INJECTION, SOLUTION INTRAVENOUS at 12:30

## 2023-09-20 RX ADMIN — ESCITALOPRAM OXALATE 10 MG: 10 TABLET ORAL at 08:07

## 2023-09-20 RX ADMIN — PIPERACILLIN AND TAZOBACTAM 3375 MG: 3; .375 INJECTION, POWDER, LYOPHILIZED, FOR SOLUTION INTRAVENOUS at 17:49

## 2023-09-20 ASSESSMENT — PAIN DESCRIPTION - LOCATION
LOCATION: ABDOMEN;BACK
LOCATION: BACK

## 2023-09-20 ASSESSMENT — PAIN SCALES - GENERAL
PAINLEVEL_OUTOF10: 0
PAINLEVEL_OUTOF10: 3

## 2023-09-20 ASSESSMENT — PAIN DESCRIPTION - PAIN TYPE: TYPE: ACUTE PAIN

## 2023-09-20 ASSESSMENT — PAIN DESCRIPTION - ORIENTATION
ORIENTATION: RIGHT
ORIENTATION: LEFT;LOWER

## 2023-09-20 ASSESSMENT — PAIN DESCRIPTION - DESCRIPTORS
DESCRIPTORS: CRAMPING;ACHING
DESCRIPTORS: DISCOMFORT

## 2023-09-20 NOTE — CONSULTS
Elvia Griffith M.D. [facility]  Dr. Carissa Cheney M.D.,  Dr. Claudio Pollard M.D.,  Dr. Nat To D.O.,  Dr. Willow Grullon D.O. ,  Dr. Beatrice Kendrick M.D.,          Lashay Stoll  42 y.o.  female      Re:\"GI bleed, hx cirrhosis, varices, portal HTN, hgb 6.8 given 1 unit PRBC in ED\"  Requesting physician: Dr. Joon Flanagan  Date:12:10 PM 9/20/2023          HPI: 26-year-old female patient seen in the hospital for above-described issue. Patient has established diagnosis of decompensated alcoholic cirrhosis and has been drinking until 2-1/2 weeks ago. Patient currently is undergoing some type of alcohol rehab locally otherwise she is from  [facility] where she follows with gastroenterologist.  Patient presents to the emergency department because of rectal bleeding. She states that she was recently in the emergency department with similar complaint and was sent home. Patient reports bright red blood per rectum but denies black stool. She denies hematemesis emesis of coffee-ground material.  Patient reports that she had a very small amount of bright red blood this morning with her bowel movement. She denies any significant abdominal pain. She reports that she is hungry.     Information sources:   -Patient  -medical record  -health care team    PMHx:  Past Medical History:   Diagnosis Date    Alcohol abuse     Chronic kidney disease     Cirrhosis ([address])     Tobacco dependence        PSHx:  Past Surgical History:   Procedure Laterality Date    APPENDECTOMY      COLONOSCOPY N/A 01/13/2023    COLONOSCOPY DIAGNOSTIC performed by Mandy Thompson MD at [address] 05/12/2023    COLONOSCOPY DIAGNOSTIC performed by Jaylin Morelos MD at [address]      reports 6 sx on L foot and one on right foot    PARACENTESIS Left 09/13/2022    1510 ml removed per Dr Antonia Nails  specimen obtained    PARACENTESIS Left 01/11/2023    4720 ml removed by Dr. Antonia Nails - therapeutic & diagnostic    PARACENTESIS Left 01/16/2023    4400ml removed by Dr. Chelsey Delgado Left 02/01/2023    5600 ml removed by Dr. Braulio Emerson Left 05/16/2023    5835 ml removed by Dr. Papi Catalan diagnostics sent    PARACENTESIS Left 05/30/2023    5980 ml removed by Dr. Becca Sanchez Left 06/07/2023    3050 ml removed by Dr Braulio Emerson Left 06/22/2023    4970 ml removed by Shana Max, SAVANNAH    PARACENTESIS Left 07/07/2023    2300ml removed by Dr. Braulio Emerson Left 07/24/2023    4250  ml removed by Dr. Becca Sanchez Left 08/02/2023    4850ml removed by Dr. Becca Sanchez Left 08/10/2023    5165 ml removed by Shana Max CNP.     PARACENTESIS Left 08/17/2023    3890 ml total removed by Dr Becca Sanchez Left 08/21/2023    2020 ml removed by Dr. Papi lott sent    PARACENTESIS Left 08/31/2023    2790 mls removed by Dr Maay Agrawal N/CLARK 09/09/2022    EGD DIAGNOSTIC ONLY performed by Melanie Zarco MD at [address] [place] [address] 05/10/2023    EGD ESOPHAGOGASTRODUODENOSCOPY performed by Renate Dancer, MD at [facility]:  Current Facility-Administered Medications   Medication Dose Route Frequency Provider Last Rate Last Admin    piperacillin-tazobactam (ZOSYN) 3,375 mg in sodium chloride 0.9 % 50 mL IVPB ([id number])  3,375 mg IntraVENous Q8H Pamelia Downy, DO 12.5 mL/hr at 09/20/23 0819 3,375 mg at 09/20/23 0819    lidocaine 4 % external patch 1 patch  1 patch TransDERmal Daily [place] Plane, APRN - CNP        albumin human 25% IV solution 25 g  25 g IntraVENous Once Joey Plane, APRN - CNP        0.9 % sodium chloride infusion   IntraVENous PRN Aliya Myers, DO        sodium chloride flush 0.9 % injection 5-40 mL  5-40 mL IntraVENous 2 times per day Pamelia Downy, DO   10 mL at 09/20/23 0032    sodium chloride flush 0.9 % injection 5-40 mL  5-40 mL

## 2023-09-20 NOTE — PROGRESS NOTES
Patient here for ultrasound guided paracentesis. Instructions given and questions answered. Consent signed. Abdomen scanned and marked under the guidance of procedural Radiologist.     1007  start procedure VS 96/52  74  16  100% on room air    1037   end procedure VS 95/50  74  16  100% on room air      3250  cc drained of hazy yellow colored ascites fluid. Dry sterile dressing of folded 4 x 4 and tegaderm to LLQ     25  grams of IV Albumin given, per order    Specimen collected and sent to lab with labels    Nurse to nurse given to [facility]    Patient sent back to the floor.

## 2023-09-20 NOTE — PLAN OF CARE
Problem: Discharge Planning  Goal: Discharge to home or other facility with appropriate resources  Outcome: Progressing     Problem: Safety - Adult  Goal: Free from fall injury  Outcome: Progressing  Flowsheets (Taken 9/20/2023 0113)  Free From Fall Injury: Instruct family/caregiver on patient safety

## 2023-09-20 NOTE — PROGRESS NOTES
4 Eyes Skin Assessment     NAME:  Tammie Pretty  YOB: 1985  MEDICAL RECORD NUMBER:  [id number]    The patient is being assessed for  Admission    I agree that at least one RN has performed a thorough Head to Toe Skin Assessment on the patient. ALL assessment sites listed below have been assessed. Areas assessed by both nurses:    Head, Face, Ears, Shoulders, Back, Chest, Arms, Elbows, Hands, Sacrum. Buttock, Coccyx, Ischium, Legs. Feet and Heels, and Under Medical Devices         Does the Patient have a Wound?  No noted wound(s)       Andrew Prevention initiated by RN: No  Wound Care Orders initiated by RN: No    Pressure Injury (Stage 3,4, Unstageable, DTI, NWPT, and Complex wounds) if present, place Wound referral order by RN under : No    New Ostomies, if present place, Ostomy referral order under : No     Nurse 1 eSignature: Electronically signed by Keysha Jewell RN on 9/20/23 at 12:02 AM EDT    **SHARE this note so that the co-signing nurse can place an eSignature**    Nurse 2 eSignature: Electronically signed by Trinity Hernadez RN on 9/20/23 at 12:03 AM EDT

## 2023-09-20 NOTE — ED NOTES
Attempted to call 5th floor.  Nurse will call back for report     Gurinder Marley RN  09/19/23 4493 Statement Selected

## 2023-09-20 NOTE — CARE COORDINATION
Case Management Assessment  Initial Evaluation    Date/Time of Evaluation: 9/20/2023 3:04 PM  Assessment Completed by: Andres French RN    If patient is discharged prior to next notation, then this note serves as note for discharge by case management.     Patient Name: Myla Monsalve                   YOB: 1985  Diagnosis: Colitis [K52.9]  Rectal bleeding [K62.5]  GI bleed [K92.2]  Other ascites [C49.4]  Alcoholic cirrhosis of liver with ascites ([address]) [K70.31]  Urinary tract infection without hematuria, site unspecified [N39.0]  Biliary calculus of other site without obstruction [K80.80]  Esophageal varices without bleeding, unspecified esophageal varices type ([address]) [I85.00]  Anemia, unspecified type [D64.9]                   Date / Time: 9/19/2023  5:27 PM    Patient Admission Status: Inpatient   Readmission Risk (Low < 19, Mod (19-27), High > 27): Readmission Risk Score: 31.4    Readmission Assessment  Number of Days since last admission?: 8-30 days  Previous Disposition: Other (comment) (ETOH rehab at [facility])  Who is being Interviewed: Patient  What was the patient's/caregiver's perception as to why they think they needed to return back to the hospital?: Other (Comment) (swelling, bloating)  Did you visit your Primary Care Physician after you left the hospital, before you returned this time?: No  Why weren't you able to visit your PCP?: Other (Comment) (pt states she didn't have a chance)  Did you see a specialist, such as Cardiac, Pulmonary, Orthopedic Physician, etc. after you left the hospital?: No  Who advised the patient to return to the hospital?: Self-referral  Does the patient report anything that got in the way of taking their medications?: No  What reasons did they give?: Other (Comment) (pt states she was taking her medications)  In our efforts to provide the best possible care to you and others like you, can you think of anything that we could have done to help you after you [id number]  Phone: [phone] Fax: [phone]      Notes:    Factors facilitating achievement of predicted outcomes: Family support and Cooperative    Barriers to discharge: Medical complications    Additional Case Management Notes: 9/20/23 1030 CM note: no covid testing this admit. Room air. IV zosyn. Hx alcoholic cirrhosis. Pt had a paracentesis today w/ 3250 ml off. Pt recently discharged from [facility] on 8/23/23. She had paracentesis 8/31/23 w/ 2790 ml off and 9/6  w/ 2050 ml off. Hgb 6.8 on admit, received 1 unit PRBC, last hgb 7.4. Met with patient at the bedside to discuss transition of care at discharge. Pt came to us from [facility] and her plan is to return to this facility when medically stable. Per [facility], they will need to review pt when medically stable  for discharge to determine that she is appropriate to return and verify bed availability. Pt has not drank for 2 1/2 wks, drank vodka. Pt states she lives alone in [address] but plans to stay at her mom's for 1 week when she discharges from [facility]. CM will follow.  Electronically signed by Tabitha Pineda RN on 9/20/2023 at 2:53 PM        Tabitha Pineda RN  Case Management Department

## 2023-09-20 NOTE — PLAN OF CARE
Problem: Discharge Planning  Goal: Discharge to home or other facility with appropriate resources  Outcome: Progressing     Problem: Safety - Adult  Goal: Free from fall injury  Outcome: Progressing  Flowsheets (Taken 9/20/2023 5103 by Gerardo Arenas RN)  Free From Fall Injury: Instruct family/caregiver on patient safety     Problem: Hematologic - Adult  Goal: Maintains hematologic stability  Outcome: Progressing

## 2023-09-21 ENCOUNTER — APPOINTMENT (OUTPATIENT)
Dept: GENERAL RADIOLOGY | Age: 38
DRG: 253 | End: 2023-09-21
Payer: COMMERCIAL

## 2023-09-21 PROBLEM — K62.5 RECTAL BLEEDING: Status: ACTIVE | Noted: 2023-09-21

## 2023-09-21 PROBLEM — K52.9 COLITIS: Status: ACTIVE | Noted: 2023-09-21

## 2023-09-21 LAB
ALBUMIN SERPL-MCNC: 2.7 G/DL (ref 3.5–5.2)
ALP SERPL-CCNC: 149 U/L (ref 35–104)
ALT SERPL-CCNC: 13 U/L (ref 0–32)
ANION GAP SERPL CALCULATED.3IONS-SCNC: 8 MMOL/L (ref 7–16)
AST SERPL-CCNC: 45 U/L (ref 0–31)
BASOPHILS # BLD: 0 K/UL (ref 0–0.2)
BASOPHILS NFR BLD: 0 % (ref 0–2)
BILIRUB DIRECT SERPL-MCNC: 1.9 MG/DL (ref 0–0.3)
BILIRUB INDIRECT SERPL-MCNC: 1.8 MG/DL (ref 0–1)
BILIRUB SERPL-MCNC: 3.7 MG/DL (ref 0–1.2)
BNP SERPL-MCNC: 397 PG/ML (ref 0–450)
BUN SERPL-MCNC: 10 MG/DL (ref 6–20)
CALCIUM SERPL-MCNC: 8.3 MG/DL (ref 8.6–10.2)
CHLORIDE SERPL-SCNC: 108 MMOL/L (ref 98–107)
CO2 SERPL-SCNC: 21 MMOL/L (ref 22–29)
CREAT SERPL-MCNC: 0.9 MG/DL (ref 0.5–1)
EOSINOPHIL # BLD: 0.13 K/UL (ref 0.05–0.5)
EOSINOPHILS RELATIVE PERCENT: 4 % (ref 0–6)
ERYTHROCYTE [DISTWIDTH] IN BLOOD BY AUTOMATED COUNT: 22.5 % (ref 11.5–15)
GFR SERPL CREATININE-BSD FRML MDRD: >60 ML/MIN/1.73M2
GLUCOSE SERPL-MCNC: 111 MG/DL (ref 74–99)
HCT VFR BLD AUTO: 23.1 % (ref 34–48)
HCT VFR BLD AUTO: 23.2 % (ref 34–48)
HCT VFR BLD AUTO: 23.7 % (ref 34–48)
HGB BLD-MCNC: 7.3 G/DL (ref 11.5–15.5)
HGB BLD-MCNC: 7.4 G/DL (ref 11.5–15.5)
HGB BLD-MCNC: 7.7 G/DL (ref 11.5–15.5)
LYMPHOCYTES NFR BLD: 1.14 K/UL (ref 1.5–4)
LYMPHOCYTES RELATIVE PERCENT: 30 % (ref 20–42)
MAGNESIUM SERPL-MCNC: 1.5 MG/DL (ref 1.6–2.6)
MCH RBC QN AUTO: 32.6 PG (ref 26–35)
MCHC RBC AUTO-ENTMCNC: 32 G/DL (ref 32–34.5)
MCV RBC AUTO: 101.8 FL (ref 80–99.9)
MICROORGANISM SPEC CULT: NO GROWTH
MICROORGANISM/AGENT SPEC: NORMAL
MONOCYTES NFR BLD: 0.17 K/UL (ref 0.1–0.95)
MONOCYTES NFR BLD: 4 % (ref 2–12)
MYELOCYTES ABSOLUTE COUNT: 0.03 K/UL
MYELOCYTES: 1 %
NEUTROPHILS NFR BLD: 61 % (ref 43–80)
NEUTS SEG NFR BLD: 2.32 K/UL (ref 1.8–7.3)
PHOSPHATE SERPL-MCNC: 3.5 MG/DL (ref 2.5–4.5)
PLATELET # BLD AUTO: 66 K/UL (ref 130–450)
PLATELET CONFIRMATION: NORMAL
PMV BLD AUTO: 9.6 FL (ref 7–12)
POTASSIUM SERPL-SCNC: 4.4 MMOL/L (ref 3.5–5)
PROCALCITONIN SERPL-MCNC: 0.11 NG/ML (ref 0–0.08)
PROT SERPL-MCNC: 5.3 G/DL (ref 6.4–8.3)
RBC # BLD AUTO: 2.27 M/UL (ref 3.5–5.5)
RBC # BLD: ABNORMAL 10*6/UL
SODIUM SERPL-SCNC: 137 MMOL/L (ref 132–146)
SPECIMEN DESCRIPTION: NORMAL
WBC OTHER # BLD: 3.8 K/UL (ref 4.5–11.5)

## 2023-09-21 PROCEDURE — 85014 HEMATOCRIT: CPT

## 2023-09-21 PROCEDURE — 6360000002 HC RX W HCPCS: Performed by: NURSE PRACTITIONER

## 2023-09-21 PROCEDURE — 6370000000 HC RX 637 (ALT 250 FOR IP): Performed by: NURSE PRACTITIONER

## 2023-09-21 PROCEDURE — 2060000000 HC ICU INTERMEDIATE R&B

## 2023-09-21 PROCEDURE — 80053 COMPREHEN METABOLIC PANEL: CPT

## 2023-09-21 PROCEDURE — 99232 SBSQ HOSP IP/OBS MODERATE 35: CPT | Performed by: STUDENT IN AN ORGANIZED HEALTH CARE EDUCATION/TRAINING PROGRAM

## 2023-09-21 PROCEDURE — 71046 X-RAY EXAM CHEST 2 VIEWS: CPT

## 2023-09-21 PROCEDURE — 85018 HEMOGLOBIN: CPT

## 2023-09-21 PROCEDURE — 84100 ASSAY OF PHOSPHORUS: CPT

## 2023-09-21 PROCEDURE — 6370000000 HC RX 637 (ALT 250 FOR IP): Performed by: HOSPITALIST

## 2023-09-21 PROCEDURE — 6370000000 HC RX 637 (ALT 250 FOR IP): Performed by: FAMILY MEDICINE

## 2023-09-21 PROCEDURE — 84145 PROCALCITONIN (PCT): CPT

## 2023-09-21 PROCEDURE — 6360000002 HC RX W HCPCS: Performed by: FAMILY MEDICINE

## 2023-09-21 PROCEDURE — 82248 BILIRUBIN DIRECT: CPT

## 2023-09-21 PROCEDURE — 83735 ASSAY OF MAGNESIUM: CPT

## 2023-09-21 PROCEDURE — APPSS30 APP SPLIT SHARED TIME 16-30 MINUTES: Performed by: NURSE PRACTITIONER

## 2023-09-21 PROCEDURE — 36415 COLL VENOUS BLD VENIPUNCTURE: CPT

## 2023-09-21 PROCEDURE — 85025 COMPLETE CBC W/AUTO DIFF WBC: CPT

## 2023-09-21 PROCEDURE — 83880 ASSAY OF NATRIURETIC PEPTIDE: CPT

## 2023-09-21 PROCEDURE — C9113 INJ PANTOPRAZOLE SODIUM, VIA: HCPCS | Performed by: NURSE PRACTITIONER

## 2023-09-21 PROCEDURE — 2580000003 HC RX 258: Performed by: FAMILY MEDICINE

## 2023-09-21 RX ORDER — PROCHLORPERAZINE EDISYLATE 5 MG/ML
10 INJECTION INTRAMUSCULAR; INTRAVENOUS EVERY 6 HOURS PRN
Status: DISCONTINUED | OUTPATIENT
Start: 2023-09-21 | End: 2023-09-24 | Stop reason: HOSPADM

## 2023-09-21 RX ORDER — BISACODYL 5 MG/1
20 TABLET, DELAYED RELEASE ORAL ONCE
Status: COMPLETED | OUTPATIENT
Start: 2023-09-21 | End: 2023-09-21

## 2023-09-21 RX ORDER — FLUCONAZOLE 100 MG/1
150 TABLET ORAL ONCE
Status: COMPLETED | OUTPATIENT
Start: 2023-09-21 | End: 2023-09-21

## 2023-09-21 RX ORDER — PENTOXIFYLLINE 400 MG/1
400 TABLET, EXTENDED RELEASE ORAL
Status: DISCONTINUED | OUTPATIENT
Start: 2023-09-21 | End: 2023-09-24 | Stop reason: HOSPADM

## 2023-09-21 RX ORDER — MAGNESIUM SULFATE IN WATER 40 MG/ML
2000 INJECTION, SOLUTION INTRAVENOUS ONCE
Status: COMPLETED | OUTPATIENT
Start: 2023-09-21 | End: 2023-09-21

## 2023-09-21 RX ORDER — POLYETHYLENE GLYCOL 3350, SODIUM CHLORIDE, SODIUM BICARBONATE, POTASSIUM CHLORIDE 420; 11.2; 5.72; 1.48 G/4L; G/4L; G/4L; G/4L
4000 POWDER, FOR SOLUTION ORAL ONCE
Status: DISCONTINUED | OUTPATIENT
Start: 2023-09-21 | End: 2023-09-21 | Stop reason: CLARIF

## 2023-09-21 RX ADMIN — POLYETHYLENE GLYCOL-3350 AND ELECTROLYTES 4000 ML: 236; 6.74; 5.86; 2.97; 22.74 POWDER, FOR SOLUTION ORAL at 20:15

## 2023-09-21 RX ADMIN — ACETAMINOPHEN 650 MG: 325 TABLET ORAL at 00:46

## 2023-09-21 RX ADMIN — MULTIPLE VITAMINS W/ MINERALS TAB 1 TABLET: TAB at 08:37

## 2023-09-21 RX ADMIN — PENTOXIFYLLINE 400 MG: 400 TABLET, EXTENDED RELEASE ORAL at 16:36

## 2023-09-21 RX ADMIN — FLUCONAZOLE 150 MG: 100 TABLET ORAL at 12:48

## 2023-09-21 RX ADMIN — PIPERACILLIN AND TAZOBACTAM 3375 MG: 3; .375 INJECTION, POWDER, LYOPHILIZED, FOR SOLUTION INTRAVENOUS at 23:58

## 2023-09-21 RX ADMIN — Medication 10 ML: at 20:19

## 2023-09-21 RX ADMIN — PROCHLORPERAZINE EDISYLATE 10 MG: 5 INJECTION INTRAMUSCULAR; INTRAVENOUS at 18:42

## 2023-09-21 RX ADMIN — ACAMPROSATE CALCIUM 666 MG: 333 TABLET, DELAYED RELEASE ORAL at 08:37

## 2023-09-21 RX ADMIN — PIPERACILLIN AND TAZOBACTAM 3375 MG: 3; .375 INJECTION, POWDER, LYOPHILIZED, FOR SOLUTION INTRAVENOUS at 16:43

## 2023-09-21 RX ADMIN — LACTULOSE 20 G: 20 SOLUTION ORAL at 08:36

## 2023-09-21 RX ADMIN — FOLIC ACID 1 MG: 1 TABLET ORAL at 08:37

## 2023-09-21 RX ADMIN — MAGNESIUM SULFATE HEPTAHYDRATE 2000 MG: 40 INJECTION, SOLUTION INTRAVENOUS at 12:57

## 2023-09-21 RX ADMIN — Medication 10 ML: at 08:53

## 2023-09-21 RX ADMIN — ACAMPROSATE CALCIUM 666 MG: 333 TABLET, DELAYED RELEASE ORAL at 16:36

## 2023-09-21 RX ADMIN — PIPERACILLIN AND TAZOBACTAM 3375 MG: 3; .375 INJECTION, POWDER, LYOPHILIZED, FOR SOLUTION INTRAVENOUS at 08:52

## 2023-09-21 RX ADMIN — ACAMPROSATE CALCIUM 666 MG: 333 TABLET, DELAYED RELEASE ORAL at 20:18

## 2023-09-21 RX ADMIN — PIPERACILLIN AND TAZOBACTAM 3375 MG: 3; .375 INJECTION, POWDER, LYOPHILIZED, FOR SOLUTION INTRAVENOUS at 00:40

## 2023-09-21 RX ADMIN — LACTULOSE 20 G: 20 SOLUTION ORAL at 20:18

## 2023-09-21 RX ADMIN — BISACODYL 20 MG: 5 TABLET, COATED ORAL at 16:36

## 2023-09-21 RX ADMIN — PANTOPRAZOLE SODIUM 40 MG: 40 INJECTION, POWDER, FOR SOLUTION INTRAVENOUS at 08:36

## 2023-09-21 RX ADMIN — PANTOPRAZOLE SODIUM 40 MG: 40 INJECTION, POWDER, FOR SOLUTION INTRAVENOUS at 20:18

## 2023-09-21 RX ADMIN — ESCITALOPRAM OXALATE 10 MG: 10 TABLET ORAL at 08:37

## 2023-09-21 RX ADMIN — PENTOXIFYLLINE 400 MG: 400 TABLET, EXTENDED RELEASE ORAL at 12:47

## 2023-09-21 ASSESSMENT — PAIN DESCRIPTION - DESCRIPTORS: DESCRIPTORS: ACHING

## 2023-09-21 ASSESSMENT — PAIN SCALES - GENERAL
PAINLEVEL_OUTOF10: 5
PAINLEVEL_OUTOF10: 0
PAINLEVEL_OUTOF10: 0
PAINLEVEL_OUTOF10: 5

## 2023-09-21 ASSESSMENT — PAIN DESCRIPTION - LOCATION
LOCATION: BACK
LOCATION: BACK

## 2023-09-21 NOTE — PLAN OF CARE
Problem: Discharge Planning  Goal: Discharge to home or other facility with appropriate resources  9/20/2023 2228 by Anoop Calixto RN  Outcome: Progressing  Flowsheets (Taken 9/20/2023 2000)  Discharge to home or other facility with appropriate resources:   Identify barriers to discharge with patient and caregiver   Arrange for needed discharge resources and transportation as appropriate   Identify discharge learning needs (meds, wound care, etc)   Refer to discharge planning if patient needs post-hospital services based on physician order or complex needs related to functional status, cognitive ability or social support system  9/20/2023 1853 by Brittney Paredes RN  Outcome: Progressing     Problem: Safety - Adult  Goal: Free from fall injury  9/20/2023 2228 by Anoop Calixto RN  Outcome: Progressing  [address] (Taken 9/20/2023 2000)  Free From Fall Injury: Instruct family/caregiver on patient safety  9/20/2023 1853 by Brittney Paredes RN  Outcome: Progressing  [address] (Taken 9/20/2023 0759 by Bess Dixon RN)  Free From Fall Injury: Instruct family/caregiver on patient safety     Problem: Hematologic - Adult  Goal: Maintains hematologic stability  9/20/2023 2228 by Anoop Calixto RN  Outcome: Progressing  9/20/2023 1853 by Brittney Paredes RN  Outcome: Progressing     Problem: Pain  Goal: Verbalizes/displays adequate comfort level or baseline comfort level  9/20/2023 2228 by Anoop Calixto RN  Outcome: Progressing  9/20/2023 1853 by Brittney Paredes RN  Outcome: Progressing

## 2023-09-21 NOTE — PROGRESS NOTES
Called [phone] to schedule colonoscopy for pt tomorrow 9/22/23 with Dr Santa Tanner. Left message on voicemail.

## 2023-09-21 NOTE — CARE COORDINATION
9/21/23 1500 CM note: no covid testing this admit. Room air. IV zosyn. Hx alcoholic cirrhosis. Pt had a paracentesis 9/20/23 w/ 3250 ml off, cx pending. Pt recently discharged from [facility] on 8/23/23. She had paracentesis 8/31/23 w/ 2790 ml off and 9/6  w/ 2050 ml off. Hgb 6.8 on admit, received 1 unit PRBC, last hgb 7.4. Per GI plan to follow outpt for EGD/c-scope. Pt came to us from [facility] and her plan is to return to this facility when medically stable. Per [facility], they will need to review pt when medically stable  for discharge to determine that she is appropriate to return and verify bed availability. Pt has not drank for 2 1/2 wks, drank vodka. Pt states she lives alone in [address] but plans to stay at her mom's for 1 week when she discharges from [facility]. CM will follow.  Electronically signed by Yuliana Ochoa RN on 9/21/2023 at 3:03 PM

## 2023-09-22 ENCOUNTER — ANESTHESIA (OUTPATIENT)
Dept: ENDOSCOPY | Age: 38
End: 2023-09-22
Payer: COMMERCIAL

## 2023-09-22 ENCOUNTER — ANESTHESIA EVENT (OUTPATIENT)
Dept: ENDOSCOPY | Age: 38
End: 2023-09-22
Payer: COMMERCIAL

## 2023-09-22 PROBLEM — K31.82 DIEULAFOY LESION OF STOMACH: Status: ACTIVE | Noted: 2023-09-22

## 2023-09-22 PROBLEM — I85.10 SECONDARY ESOPHAGEAL VARICES WITHOUT BLEEDING (HCC): Status: ACTIVE | Noted: 2023-09-22

## 2023-09-22 LAB
AFP SERPL-MCNC: 12.4 UG/L
ALBUMIN SERPL-MCNC: 2.5 G/DL (ref 3.5–5.2)
ALP SERPL-CCNC: 105 U/L (ref 35–104)
ALT SERPL-CCNC: 14 U/L (ref 0–32)
ANION GAP SERPL CALCULATED.3IONS-SCNC: 8 MMOL/L (ref 7–16)
AST SERPL-CCNC: 47 U/L (ref 0–31)
BASOPHILS # BLD: 0.01 K/UL (ref 0–0.2)
BASOPHILS NFR BLD: 0 % (ref 0–2)
BILIRUB SERPL-MCNC: 5 MG/DL (ref 0–1.2)
BUN SERPL-MCNC: 9 MG/DL (ref 6–20)
C PARVUM AG STL QL IA: NEGATIVE
CALCIUM SERPL-MCNC: 8.7 MG/DL (ref 8.6–10.2)
CHLORIDE SERPL-SCNC: 110 MMOL/L (ref 98–107)
CO2 SERPL-SCNC: 21 MMOL/L (ref 22–29)
CREAT SERPL-MCNC: 0.9 MG/DL (ref 0.5–1)
DATE, STOOL #1: NORMAL
EOSINOPHIL # BLD: 0.06 K/UL (ref 0.05–0.5)
EOSINOPHILS RELATIVE PERCENT: 1 % (ref 0–6)
ERYTHROCYTE [DISTWIDTH] IN BLOOD BY AUTOMATED COUNT: 21.6 % (ref 11.5–15)
G LAMBLIA AG STL QL IA: NEGATIVE
GFR SERPL CREATININE-BSD FRML MDRD: >60 ML/MIN/1.73M2
GLUCOSE SERPL-MCNC: 75 MG/DL (ref 74–99)
HCT VFR BLD AUTO: 21.4 % (ref 34–48)
HCT VFR BLD AUTO: 25.1 % (ref 34–48)
HEMOCCULT SP1 STL QL: NEGATIVE
HGB BLD-MCNC: 7 G/DL (ref 11.5–15.5)
HGB BLD-MCNC: 8.1 G/DL (ref 11.5–15.5)
LYMPHOCYTES NFR BLD: 1.07 K/UL (ref 1.5–4)
LYMPHOCYTES RELATIVE PERCENT: 26 % (ref 20–42)
MAGNESIUM SERPL-MCNC: 1.7 MG/DL (ref 1.6–2.6)
MCH RBC QN AUTO: 32.6 PG (ref 26–35)
MCHC RBC AUTO-ENTMCNC: 32.7 G/DL (ref 32–34.5)
MCV RBC AUTO: 99.5 FL (ref 80–99.9)
MONOCYTES NFR BLD: 0.32 K/UL (ref 0.1–0.95)
MONOCYTES NFR BLD: 8 % (ref 2–12)
NEUTROPHILS NFR BLD: 65 % (ref 43–80)
NEUTS SEG NFR BLD: 2.71 K/UL (ref 1.8–7.3)
NON-GYN CYTOLOGY REPORT: NORMAL
PHOSPHATE SERPL-MCNC: 3.4 MG/DL (ref 2.5–4.5)
PLATELET # BLD AUTO: 74 K/UL (ref 130–450)
PLATELET CONFIRMATION: NORMAL
PMV BLD AUTO: 9.9 FL (ref 7–12)
POTASSIUM SERPL-SCNC: 4.2 MMOL/L (ref 3.5–5)
PROT SERPL-MCNC: 5.1 G/DL (ref 6.4–8.3)
RBC # BLD AUTO: 2.15 M/UL (ref 3.5–5.5)
RBC # BLD: ABNORMAL 10*6/UL
SODIUM SERPL-SCNC: 139 MMOL/L (ref 132–146)
SOURCE, 60200063: NORMAL
SOURCE: NORMAL
SPECIMEN DESCRIPTION: NORMAL
SPECIMEN DESCRIPTION: NORMAL
TIME, STOOL #1: 1558
WBC OTHER # BLD: 4.2 K/UL (ref 4.5–11.5)

## 2023-09-22 PROCEDURE — C1889 IMPLANT/INSERT DEVICE, NOC: HCPCS | Performed by: INTERNAL MEDICINE

## 2023-09-22 PROCEDURE — APPSS30 APP SPLIT SHARED TIME 16-30 MINUTES: Performed by: NURSE PRACTITIONER

## 2023-09-22 PROCEDURE — 2709999900 HC NON-CHARGEABLE SUPPLY: Performed by: INTERNAL MEDICINE

## 2023-09-22 PROCEDURE — 7100000010 HC PHASE II RECOVERY - FIRST 15 MIN: Performed by: INTERNAL MEDICINE

## 2023-09-22 PROCEDURE — 85014 HEMATOCRIT: CPT

## 2023-09-22 PROCEDURE — 2720000010 HC SURG SUPPLY STERILE: Performed by: INTERNAL MEDICINE

## 2023-09-22 PROCEDURE — 3609013000 HC EGD TRANSORAL CONTROL BLEEDING ANY METHOD: Performed by: INTERNAL MEDICINE

## 2023-09-22 PROCEDURE — 3700000000 HC ANESTHESIA ATTENDED CARE: Performed by: INTERNAL MEDICINE

## 2023-09-22 PROCEDURE — 6370000000 HC RX 637 (ALT 250 FOR IP): Performed by: HOSPITALIST

## 2023-09-22 PROCEDURE — 85018 HEMOGLOBIN: CPT

## 2023-09-22 PROCEDURE — 2580000003 HC RX 258: Performed by: FAMILY MEDICINE

## 2023-09-22 PROCEDURE — P9016 RBC LEUKOCYTES REDUCED: HCPCS

## 2023-09-22 PROCEDURE — 99232 SBSQ HOSP IP/OBS MODERATE 35: CPT | Performed by: STUDENT IN AN ORGANIZED HEALTH CARE EDUCATION/TRAINING PROGRAM

## 2023-09-22 PROCEDURE — 85025 COMPLETE CBC W/AUTO DIFF WBC: CPT

## 2023-09-22 PROCEDURE — 0DJD8ZZ INSPECTION OF LOWER INTESTINAL TRACT, VIA NATURAL OR ARTIFICIAL OPENING ENDOSCOPIC: ICD-10-PCS | Performed by: INTERNAL MEDICINE

## 2023-09-22 PROCEDURE — 2580000003 HC RX 258: Performed by: NURSE ANESTHETIST, CERTIFIED REGISTERED

## 2023-09-22 PROCEDURE — 7100000011 HC PHASE II RECOVERY - ADDTL 15 MIN: Performed by: INTERNAL MEDICINE

## 2023-09-22 PROCEDURE — 6370000000 HC RX 637 (ALT 250 FOR IP): Performed by: FAMILY MEDICINE

## 2023-09-22 PROCEDURE — 36415 COLL VENOUS BLD VENIPUNCTURE: CPT

## 2023-09-22 PROCEDURE — C9113 INJ PANTOPRAZOLE SODIUM, VIA: HCPCS | Performed by: NURSE PRACTITIONER

## 2023-09-22 PROCEDURE — 0W3P8ZZ CONTROL BLEEDING IN GASTROINTESTINAL TRACT, VIA NATURAL OR ARTIFICIAL OPENING ENDOSCOPIC: ICD-10-PCS | Performed by: INTERNAL MEDICINE

## 2023-09-22 PROCEDURE — 84100 ASSAY OF PHOSPHORUS: CPT

## 2023-09-22 PROCEDURE — 80053 COMPREHEN METABOLIC PANEL: CPT

## 2023-09-22 PROCEDURE — 3700000001 HC ADD 15 MINUTES (ANESTHESIA): Performed by: INTERNAL MEDICINE

## 2023-09-22 PROCEDURE — 6360000002 HC RX W HCPCS: Performed by: FAMILY MEDICINE

## 2023-09-22 PROCEDURE — 6360000002 HC RX W HCPCS: Performed by: NURSE ANESTHETIST, CERTIFIED REGISTERED

## 2023-09-22 PROCEDURE — 83735 ASSAY OF MAGNESIUM: CPT

## 2023-09-22 PROCEDURE — 6360000002 HC RX W HCPCS: Performed by: NURSE PRACTITIONER

## 2023-09-22 PROCEDURE — 36430 TRANSFUSION BLD/BLD COMPNT: CPT

## 2023-09-22 PROCEDURE — 2580000003 HC RX 258: Performed by: NURSE PRACTITIONER

## 2023-09-22 PROCEDURE — 2060000000 HC ICU INTERMEDIATE R&B

## 2023-09-22 PROCEDURE — 3609027000 HC COLONOSCOPY: Performed by: INTERNAL MEDICINE

## 2023-09-22 DEVICE — CLIP LIG L235CM RESOL 360 BX/20: Type: IMPLANTABLE DEVICE | Status: FUNCTIONAL

## 2023-09-22 RX ORDER — SODIUM CHLORIDE 9 MG/ML
INJECTION, SOLUTION INTRAVENOUS PRN
Status: DISCONTINUED | OUTPATIENT
Start: 2023-09-22 | End: 2023-09-24 | Stop reason: HOSPADM

## 2023-09-22 RX ORDER — SODIUM CHLORIDE 9 MG/ML
INJECTION, SOLUTION INTRAVENOUS CONTINUOUS PRN
Status: DISCONTINUED | OUTPATIENT
Start: 2023-09-22 | End: 2023-09-22 | Stop reason: SDUPTHER

## 2023-09-22 RX ORDER — SODIUM CHLORIDE 9 MG/ML
INJECTION, SOLUTION INTRAVENOUS CONTINUOUS
Status: DISCONTINUED | OUTPATIENT
Start: 2023-09-22 | End: 2023-09-23

## 2023-09-22 RX ORDER — PROPOFOL 10 MG/ML
INJECTION, EMULSION INTRAVENOUS PRN
Status: DISCONTINUED | OUTPATIENT
Start: 2023-09-22 | End: 2023-09-22 | Stop reason: SDUPTHER

## 2023-09-22 RX ADMIN — PROPOFOL 30 MG: 10 INJECTION, EMULSION INTRAVENOUS at 14:54

## 2023-09-22 RX ADMIN — Medication 10 ML: at 09:52

## 2023-09-22 RX ADMIN — PANTOPRAZOLE SODIUM 40 MG: 40 INJECTION, POWDER, FOR SOLUTION INTRAVENOUS at 09:47

## 2023-09-22 RX ADMIN — ACAMPROSATE CALCIUM 666 MG: 333 TABLET, DELAYED RELEASE ORAL at 09:48

## 2023-09-22 RX ADMIN — PROPOFOL 40 MG: 10 INJECTION, EMULSION INTRAVENOUS at 14:48

## 2023-09-22 RX ADMIN — SODIUM CHLORIDE: 900 INJECTION, SOLUTION INTRAVENOUS at 14:43

## 2023-09-22 RX ADMIN — PANTOPRAZOLE SODIUM 40 MG: 40 INJECTION, POWDER, FOR SOLUTION INTRAVENOUS at 19:58

## 2023-09-22 RX ADMIN — LACTULOSE 20 G: 20 SOLUTION ORAL at 09:47

## 2023-09-22 RX ADMIN — SODIUM CHLORIDE: 9 INJECTION, SOLUTION INTRAVENOUS at 10:50

## 2023-09-22 RX ADMIN — PIPERACILLIN AND TAZOBACTAM 3375 MG: 3; .375 INJECTION, POWDER, LYOPHILIZED, FOR SOLUTION INTRAVENOUS at 23:55

## 2023-09-22 RX ADMIN — ACAMPROSATE CALCIUM 666 MG: 333 TABLET, DELAYED RELEASE ORAL at 19:58

## 2023-09-22 RX ADMIN — PROPOFOL 40 MG: 10 INJECTION, EMULSION INTRAVENOUS at 14:51

## 2023-09-22 RX ADMIN — PROPOFOL 140 MCG/KG/MIN: 10 INJECTION, EMULSION INTRAVENOUS at 14:49

## 2023-09-22 RX ADMIN — LACTULOSE 20 G: 20 SOLUTION ORAL at 19:58

## 2023-09-22 RX ADMIN — PHENYLEPHRINE HYDROCHLORIDE 100 MCG: 10 INJECTION INTRAVENOUS at 14:53

## 2023-09-22 RX ADMIN — PENTOXIFYLLINE 400 MG: 400 TABLET, EXTENDED RELEASE ORAL at 17:32

## 2023-09-22 RX ADMIN — PIPERACILLIN AND TAZOBACTAM 3375 MG: 3; .375 INJECTION, POWDER, LYOPHILIZED, FOR SOLUTION INTRAVENOUS at 17:31

## 2023-09-22 ASSESSMENT — PAIN SCALES - GENERAL
PAINLEVEL_OUTOF10: 0

## 2023-09-22 ASSESSMENT — LIFESTYLE VARIABLES: SMOKING_STATUS: 1

## 2023-09-22 NOTE — ANESTHESIA POSTPROCEDURE EVALUATION
Department of Anesthesiology  Postprocedure Note    Patient: Randy Malave  MRN: [id number]  YOB: 1985  Date of evaluation: 9/22/2023      Procedure Summary     Date: 09/22/23 Room / Location: [address] [place] [address] 01 / [address]    Anesthesia Start: 9933 Anesthesia Stop: 4061    Procedures:       COLONOSCOPY      EGD CONTROL HEMORRHAGE Diagnosis:       Colitis      (Colitis [K52.9])    Surgeons: Humberto Granger MD Responsible Provider: Masha Hebert MD    Anesthesia Type: MAC ASA Status: 3          Anesthesia Type: No value filed.     Josi Phase I:      Josi Phase II: Josi Score: 9      Anesthesia Post Evaluation    Patient location during evaluation: PACU  Patient participation: complete - patient participated  Level of consciousness: awake and alert  Pain score: 3  Airway patency: patent  Nausea & Vomiting: no nausea  Complications: no  Cardiovascular status: blood pressure returned to baseline  Respiratory status: acceptable  Hydration status: euvolemic  Pain management: adequate

## 2023-09-22 NOTE — PLAN OF CARE
Problem: Discharge Planning  Goal: Discharge to home or other facility with appropriate resources  7/77/7220 2429 by Jocelyn Saenz RN  Outcome: Progressing  9/22/2023 0300 by Lexx Myers RN  Outcome: Progressing     Problem: Safety - Adult  Goal: Free from fall injury  Outcome: Progressing     Problem: Hematologic - Adult  Goal: Maintains hematologic stability  Outcome: Progressing     Problem: Pain  Goal: Verbalizes/displays adequate comfort level or baseline comfort level  Outcome: Progressing

## 2023-09-22 NOTE — OP NOTE
[address] [facility]                    [address] [place], [address] [place] [address]                                OPERATIVE REPORT    PATIENT NAME: Myla Monsalve                         :        1985  MED REC NO:   [id number]                            ROOM:  ACCOUNT NO:   [de-identified]                           ADMIT DATE: 2023  PROVIDER:     Priscilla Grimm MD    DATE OF PROCEDURE:  2023    PREOPERATIVE DIAGNOSES:  GI blood loss anemia, cirrhosis. POSTOPERATIVE DIAGNOSES:  Anatomic colonoscopy, hemorrhoids. Small  esophageal varices. Bleeding Dieulafoy lesion in the upper stomach. OPERATION  Colonoscopy, EGD. SURGEON:  Priscilla Grimm MD    INDICATIONS:  The patient is a 67-year-old, presented with acute GI  blood loss anemia, history of cirrhosis. ASA classification III. Informed consent. OPERATIVE PROCEDURE:  Digital rectal exam anatomic. The Olympus video  colonoscope was introduced through the anus. It was advanced gently  until the cecum and terminal ileum. The prep was good. The mucosa was  normal.  Small internal hemorrhoids. Endoscope withdrawn. Then the Olympus upper video endoscope was introduced through the mouth. The esophagus, the stomach and proximal duodenum were inspected. Exam  of the gastric fundus by retroflexion. A small esophageal varices  bleeding Dieulafoy lesion in the upper stomach greater curvature,  treated with two Endo clips. Hemostasis accomplished. Endoscope  withdrawn. Estimated blood loss none.         Priscilla Grimm MD    D: 2023 15:22:13       T: 2023 15:25:29     [email or web address]  Job#: [id number]     Doc#: [id number]    CC:

## 2023-09-22 NOTE — PROGRESS NOTES
GI endoscopic procedures completed, note dictated. Colonoscopy: Anatomical study, hemorrhoids. ... EGD: Small esophageal varices, bleeding Dieulafoy lesion in the upper stomach, 2 endoclips applied, hemostasis accomplished.

## 2023-09-22 NOTE — ANESTHESIA POSTPROCEDURE EVALUATION
Department of Anesthesiology  Postprocedure Note    Patient: Didier Tim  MRN: [id number]  YOB: 1985  Date of evaluation: 9/22/2023      Procedure Summary     Date: 09/22/23 Room / Location: [address] [place] [address] 01 / [address]    Anesthesia Start: 1374 Anesthesia Stop: 5386    Procedures:       COLONOSCOPY      EGD CONTROL HEMORRHAGE Diagnosis:       Colitis      (Colitis [K52.9])    Surgeons: Arabella Christianson MD Responsible Provider: Breanne Fisher MD    Anesthesia Type: MAC ASA Status: 3          Anesthesia Type: No value filed.     Josi Phase I:      Josi Phase II: Josi Score: 9      Anesthesia Post Evaluation

## 2023-09-22 NOTE — CARE COORDINATION
Patient seen and examined prior to procedure. Reports stool to be clear.   Consider upper endoscopy depending on colonoscopy findings  Please, see orders for plan of care    Trudy Riley MD

## 2023-09-22 NOTE — CARE COORDINATION
9/22/23 1504 CM note: no covid testing this admit. Room air. IV zosyn. Hx alcoholic cirrhosis. Pt had a paracentesis 9/20/23 w/ 3250 ml off, cx pending. Pt recently discharged from [facility] on 8/23/23. She had paracentesis 8/31/23 w/ 2790 ml off and 9/6  w/ 2050 ml off. Hgb 6.8 on admit, received 1 unit PRBC, last hgb 7.0-pt ordered 1 unit PRBC. Plan for c-scope today. Pt came to us from [facility] and her plan is to return to this facility when medically stable. Per [facility], they will need to review pt when medically stable  for discharge to determine that she is appropriate to return and verify bed availability. Pt has not drank for 2 1/2 wks, drank vodka. Pt states she lives alone in [address] but plans to stay at her mom's for 1 week when she discharges from [facility]. CM will follow.  Electronically signed by Zechariah Villavicencio RN on 9/22/2023 at 3:07 PM

## 2023-09-23 LAB
ABO/RH: NORMAL
ALBUMIN SERPL-MCNC: 2.4 G/DL (ref 3.5–5.2)
ALP SERPL-CCNC: 88 U/L (ref 35–104)
ALT SERPL-CCNC: 15 U/L (ref 0–32)
ANION GAP SERPL CALCULATED.3IONS-SCNC: 10 MMOL/L (ref 7–16)
ANTIBODY SCREEN: NEGATIVE
ARM BAND NUMBER: NORMAL
AST SERPL-CCNC: 49 U/L (ref 0–31)
BACTERIA URNS QL MICRO: ABNORMAL
BASOPHILS # BLD: 0.01 K/UL (ref 0–0.2)
BASOPHILS NFR BLD: 0 % (ref 0–2)
BILIRUB SERPL-MCNC: 4.7 MG/DL (ref 0–1.2)
BILIRUB UR QL STRIP: NEGATIVE
BLOOD BANK BLOOD PRODUCT EXPIRATION DATE: NORMAL
BLOOD BANK BLOOD PRODUCT EXPIRATION DATE: NORMAL
BLOOD BANK DISPENSE STATUS: NORMAL
BLOOD BANK DISPENSE STATUS: NORMAL
BLOOD BANK ISBT PRODUCT BLOOD TYPE: 5100
BLOOD BANK ISBT PRODUCT BLOOD TYPE: 5100
BLOOD BANK PRODUCT CODE: NORMAL
BLOOD BANK PRODUCT CODE: NORMAL
BLOOD BANK SAMPLE EXPIRATION: NORMAL
BLOOD BANK UNIT TYPE AND RH: NORMAL
BLOOD BANK UNIT TYPE AND RH: NORMAL
BPU ID: NORMAL
BPU ID: NORMAL
BUN SERPL-MCNC: 8 MG/DL (ref 6–20)
CALCIUM SERPL-MCNC: 8.2 MG/DL (ref 8.6–10.2)
CHLORIDE SERPL-SCNC: 110 MMOL/L (ref 98–107)
CLARITY UR: CLEAR
CO2 SERPL-SCNC: 18 MMOL/L (ref 22–29)
COLOR UR: YELLOW
COMPONENT: NORMAL
COMPONENT: NORMAL
CREAT SERPL-MCNC: 0.8 MG/DL (ref 0.5–1)
CROSSMATCH RESULT: NORMAL
CROSSMATCH RESULT: NORMAL
EOSINOPHIL # BLD: 0.09 K/UL (ref 0.05–0.5)
EOSINOPHILS RELATIVE PERCENT: 2 % (ref 0–6)
EPI CELLS #/AREA URNS HPF: ABNORMAL /HPF
ERYTHROCYTE [DISTWIDTH] IN BLOOD BY AUTOMATED COUNT: 22.9 % (ref 11.5–15)
FAT QUALITATIVE SPLIT STOOL: NORMAL
FECAL NEUTRAL FAT: NORMAL
GFR SERPL CREATININE-BSD FRML MDRD: >60 ML/MIN/1.73M2
GLUCOSE SERPL-MCNC: 93 MG/DL (ref 74–99)
GLUCOSE UR STRIP-MCNC: NEGATIVE MG/DL
HCT VFR BLD AUTO: 24.3 % (ref 34–48)
HCT VFR BLD AUTO: 27.4 % (ref 34–48)
HGB BLD-MCNC: 7.7 G/DL (ref 11.5–15.5)
HGB BLD-MCNC: 8.8 G/DL (ref 11.5–15.5)
HGB UR QL STRIP.AUTO: NEGATIVE
KETONES UR STRIP-MCNC: NEGATIVE MG/DL
LEUKOCYTE ESTERASE UR QL STRIP: ABNORMAL
LYMPHOCYTES NFR BLD: 1.16 K/UL (ref 1.5–4)
LYMPHOCYTES RELATIVE PERCENT: 29 % (ref 20–42)
MAGNESIUM SERPL-MCNC: 1.3 MG/DL (ref 1.6–2.6)
MCH RBC QN AUTO: 31.4 PG (ref 26–35)
MCHC RBC AUTO-ENTMCNC: 31.7 G/DL (ref 32–34.5)
MCV RBC AUTO: 99.2 FL (ref 80–99.9)
MICROORGANISM SPEC CULT: ABNORMAL
MICROORGANISM SPEC CULT: ABNORMAL
MONOCYTES NFR BLD: 0.32 K/UL (ref 0.1–0.95)
MONOCYTES NFR BLD: 8 % (ref 2–12)
NEUTROPHILS NFR BLD: 60 % (ref 43–80)
NEUTS SEG NFR BLD: 2.36 K/UL (ref 1.8–7.3)
NITRITE UR QL STRIP: NEGATIVE
PH UR STRIP: 6 [PH] (ref 5–9)
PHOSPHATE SERPL-MCNC: 3.1 MG/DL (ref 2.5–4.5)
PLATELET # BLD AUTO: 77 K/UL (ref 130–450)
PLATELET CONFIRMATION: NORMAL
PMV BLD AUTO: 10.3 FL (ref 7–12)
POTASSIUM SERPL-SCNC: 3.6 MMOL/L (ref 3.5–5)
PROT SERPL-MCNC: 5 G/DL (ref 6.4–8.3)
PROT UR STRIP-MCNC: NEGATIVE MG/DL
RBC # BLD AUTO: 2.45 M/UL (ref 3.5–5.5)
RBC # BLD: ABNORMAL 10*6/UL
RBC #/AREA URNS HPF: ABNORMAL /HPF
SODIUM SERPL-SCNC: 138 MMOL/L (ref 132–146)
SP GR UR STRIP: 1.02 (ref 1–1.03)
SPECIMEN DESCRIPTION: ABNORMAL
TRANSFUSION STATUS: NORMAL
TRANSFUSION STATUS: NORMAL
UNIT DIVISION: 0
UNIT DIVISION: 0
UNIT ISSUE DATE/TIME: NORMAL
UNIT ISSUE DATE/TIME: NORMAL
UROBILINOGEN UR STRIP-ACNC: 0.2 EU/DL (ref 0–1)
WBC #/AREA URNS HPF: ABNORMAL /HPF
WBC OTHER # BLD: 4 K/UL (ref 4.5–11.5)

## 2023-09-23 PROCEDURE — 6360000002 HC RX W HCPCS: Performed by: NURSE PRACTITIONER

## 2023-09-23 PROCEDURE — 99232 SBSQ HOSP IP/OBS MODERATE 35: CPT | Performed by: STUDENT IN AN ORGANIZED HEALTH CARE EDUCATION/TRAINING PROGRAM

## 2023-09-23 PROCEDURE — 6370000000 HC RX 637 (ALT 250 FOR IP): Performed by: NURSE PRACTITIONER

## 2023-09-23 PROCEDURE — 83735 ASSAY OF MAGNESIUM: CPT

## 2023-09-23 PROCEDURE — 80053 COMPREHEN METABOLIC PANEL: CPT

## 2023-09-23 PROCEDURE — 2580000003 HC RX 258: Performed by: FAMILY MEDICINE

## 2023-09-23 PROCEDURE — 6360000002 HC RX W HCPCS: Performed by: STUDENT IN AN ORGANIZED HEALTH CARE EDUCATION/TRAINING PROGRAM

## 2023-09-23 PROCEDURE — 85025 COMPLETE CBC W/AUTO DIFF WBC: CPT

## 2023-09-23 PROCEDURE — 81001 URINALYSIS AUTO W/SCOPE: CPT

## 2023-09-23 PROCEDURE — 6370000000 HC RX 637 (ALT 250 FOR IP): Performed by: STUDENT IN AN ORGANIZED HEALTH CARE EDUCATION/TRAINING PROGRAM

## 2023-09-23 PROCEDURE — 6370000000 HC RX 637 (ALT 250 FOR IP): Performed by: FAMILY MEDICINE

## 2023-09-23 PROCEDURE — 87086 URINE CULTURE/COLONY COUNT: CPT

## 2023-09-23 PROCEDURE — 6370000000 HC RX 637 (ALT 250 FOR IP): Performed by: HOSPITALIST

## 2023-09-23 PROCEDURE — C9113 INJ PANTOPRAZOLE SODIUM, VIA: HCPCS | Performed by: NURSE PRACTITIONER

## 2023-09-23 PROCEDURE — 36415 COLL VENOUS BLD VENIPUNCTURE: CPT

## 2023-09-23 PROCEDURE — 6360000002 HC RX W HCPCS: Performed by: FAMILY MEDICINE

## 2023-09-23 PROCEDURE — APPSS30 APP SPLIT SHARED TIME 16-30 MINUTES: Performed by: NURSE PRACTITIONER

## 2023-09-23 PROCEDURE — 84100 ASSAY OF PHOSPHORUS: CPT

## 2023-09-23 PROCEDURE — 2060000000 HC ICU INTERMEDIATE R&B

## 2023-09-23 PROCEDURE — 85018 HEMOGLOBIN: CPT

## 2023-09-23 PROCEDURE — 85014 HEMATOCRIT: CPT

## 2023-09-23 RX ORDER — POTASSIUM CHLORIDE 20 MEQ/1
20 TABLET, EXTENDED RELEASE ORAL ONCE
Status: COMPLETED | OUTPATIENT
Start: 2023-09-23 | End: 2023-09-23

## 2023-09-23 RX ORDER — MAGNESIUM SULFATE IN WATER 40 MG/ML
4000 INJECTION, SOLUTION INTRAVENOUS ONCE
Status: COMPLETED | OUTPATIENT
Start: 2023-09-23 | End: 2023-09-23

## 2023-09-23 RX ADMIN — MULTIPLE VITAMINS W/ MINERALS TAB 1 TABLET: TAB at 09:12

## 2023-09-23 RX ADMIN — PENTOXIFYLLINE 400 MG: 400 TABLET, EXTENDED RELEASE ORAL at 11:59

## 2023-09-23 RX ADMIN — LACTULOSE 20 G: 20 SOLUTION ORAL at 20:47

## 2023-09-23 RX ADMIN — Medication 10 ML: at 09:13

## 2023-09-23 RX ADMIN — PANTOPRAZOLE SODIUM 40 MG: 40 INJECTION, POWDER, FOR SOLUTION INTRAVENOUS at 20:47

## 2023-09-23 RX ADMIN — PIPERACILLIN AND TAZOBACTAM 3375 MG: 3; .375 INJECTION, POWDER, LYOPHILIZED, FOR SOLUTION INTRAVENOUS at 09:24

## 2023-09-23 RX ADMIN — PANTOPRAZOLE SODIUM 40 MG: 40 INJECTION, POWDER, FOR SOLUTION INTRAVENOUS at 09:12

## 2023-09-23 RX ADMIN — LACTULOSE 20 G: 20 SOLUTION ORAL at 09:12

## 2023-09-23 RX ADMIN — MAGNESIUM SULFATE HEPTAHYDRATE 4000 MG: 40 INJECTION, SOLUTION INTRAVENOUS at 13:08

## 2023-09-23 RX ADMIN — Medication 10 ML: at 21:20

## 2023-09-23 RX ADMIN — ACETAMINOPHEN 650 MG: 325 TABLET ORAL at 19:33

## 2023-09-23 RX ADMIN — FOLIC ACID 1 MG: 1 TABLET ORAL at 09:12

## 2023-09-23 RX ADMIN — ACAMPROSATE CALCIUM 666 MG: 333 TABLET, DELAYED RELEASE ORAL at 14:58

## 2023-09-23 RX ADMIN — PENTOXIFYLLINE 400 MG: 400 TABLET, EXTENDED RELEASE ORAL at 16:38

## 2023-09-23 RX ADMIN — ESCITALOPRAM OXALATE 10 MG: 10 TABLET ORAL at 09:13

## 2023-09-23 RX ADMIN — PIPERACILLIN AND TAZOBACTAM 3375 MG: 3; .375 INJECTION, POWDER, LYOPHILIZED, FOR SOLUTION INTRAVENOUS at 16:45

## 2023-09-23 RX ADMIN — PENTOXIFYLLINE 400 MG: 400 TABLET, EXTENDED RELEASE ORAL at 09:12

## 2023-09-23 RX ADMIN — ACAMPROSATE CALCIUM 666 MG: 333 TABLET, DELAYED RELEASE ORAL at 20:47

## 2023-09-23 RX ADMIN — POTASSIUM CHLORIDE 20 MEQ: 1500 TABLET, EXTENDED RELEASE ORAL at 11:58

## 2023-09-23 RX ADMIN — ACAMPROSATE CALCIUM 666 MG: 333 TABLET, DELAYED RELEASE ORAL at 09:18

## 2023-09-23 ASSESSMENT — PAIN DESCRIPTION - ORIENTATION: ORIENTATION: LOWER;MID

## 2023-09-23 ASSESSMENT — PAIN SCALES - GENERAL
PAINLEVEL_OUTOF10: 4
PAINLEVEL_OUTOF10: 0

## 2023-09-23 ASSESSMENT — PAIN DESCRIPTION - LOCATION: LOCATION: BACK

## 2023-09-23 ASSESSMENT — PAIN DESCRIPTION - DESCRIPTORS: DESCRIPTORS: ACHING

## 2023-09-23 NOTE — PROGRESS NOTES
Patient voiced dysuria; upon bladder scan bladder volume 419cc. Dr. Peter Gordillo notified, see order greenberg catheter placement. Patient in the mean time able to urinate PVR bladder scan 519 however patient voiced feeling of complete relief. Discussed with Dr. Peter Gordillo, concern for bladder scan erroneously picking up abdominal ascites for mistaken bladder volume. Patient refusing catheter placement at this time, continue to trend bladder scan volume in correspondence to urinary output and relay to Dr. Peter Gordillo.

## 2023-09-23 NOTE — PLAN OF CARE
Problem: Discharge Planning  Goal: Discharge to home or other facility with appropriate resources  Outcome: Progressing  Flowsheets (Taken 9/23/2023 0855)  Discharge to home or other facility with appropriate resources: Identify barriers to discharge with patient and caregiver     Problem: Safety - Adult  Goal: Free from fall injury  Outcome: Progressing     Problem: Hematologic - Adult  Goal: Maintains hematologic stability  Outcome: Progressing     Problem: Pain  Goal: Verbalizes/displays adequate comfort level or baseline comfort level  Outcome: Progressing  Flowsheets (Taken 9/23/2023 0855)  Verbalizes/displays adequate comfort level or baseline comfort level: Encourage patient to monitor pain and request assistance

## 2023-09-24 VITALS
BODY MASS INDEX: 24.99 KG/M2 | HEART RATE: 76 BPM | SYSTOLIC BLOOD PRESSURE: 108 MMHG | HEIGHT: 65 IN | RESPIRATION RATE: 16 BRPM | WEIGHT: 150 LBS | OXYGEN SATURATION: 98 % | DIASTOLIC BLOOD PRESSURE: 57 MMHG | TEMPERATURE: 98 F

## 2023-09-24 LAB
ALBUMIN SERPL-MCNC: 2.3 G/DL (ref 3.5–5.2)
ALP SERPL-CCNC: 92 U/L (ref 35–104)
ALT SERPL-CCNC: 16 U/L (ref 0–32)
ANION GAP SERPL CALCULATED.3IONS-SCNC: 11 MMOL/L (ref 7–16)
AST SERPL-CCNC: 49 U/L (ref 0–31)
BASOPHILS # BLD: 0.01 K/UL (ref 0–0.2)
BASOPHILS NFR BLD: 0 % (ref 0–2)
BILIRUB SERPL-MCNC: 4.9 MG/DL (ref 0–1.2)
BUN SERPL-MCNC: 6 MG/DL (ref 6–20)
CALCIUM SERPL-MCNC: 8.4 MG/DL (ref 8.6–10.2)
CHLORIDE SERPL-SCNC: 112 MMOL/L (ref 98–107)
CO2 SERPL-SCNC: 18 MMOL/L (ref 22–29)
CREAT SERPL-MCNC: 0.9 MG/DL (ref 0.5–1)
EOSINOPHIL # BLD: 0.14 K/UL (ref 0.05–0.5)
EOSINOPHILS RELATIVE PERCENT: 3 % (ref 0–6)
ERYTHROCYTE [DISTWIDTH] IN BLOOD BY AUTOMATED COUNT: 21.9 % (ref 11.5–15)
GFR SERPL CREATININE-BSD FRML MDRD: >60 ML/MIN/1.73M2
GLUCOSE SERPL-MCNC: 79 MG/DL (ref 74–99)
HCT VFR BLD AUTO: 25.4 % (ref 34–48)
HGB BLD-MCNC: 8.3 G/DL (ref 11.5–15.5)
IMM GRANULOCYTES # BLD AUTO: <0.03 K/UL (ref 0–0.58)
IMM GRANULOCYTES NFR BLD: 1 % (ref 0–5)
LYMPHOCYTES NFR BLD: 1.17 K/UL (ref 1.5–4)
LYMPHOCYTES RELATIVE PERCENT: 28 % (ref 20–42)
MAGNESIUM SERPL-MCNC: 1.7 MG/DL (ref 1.6–2.6)
MCH RBC QN AUTO: 31.9 PG (ref 26–35)
MCHC RBC AUTO-ENTMCNC: 32.7 G/DL (ref 32–34.5)
MCV RBC AUTO: 97.7 FL (ref 80–99.9)
MONOCYTES NFR BLD: 0.29 K/UL (ref 0.1–0.95)
MONOCYTES NFR BLD: 7 % (ref 2–12)
NEUTROPHILS NFR BLD: 62 % (ref 43–80)
NEUTS SEG NFR BLD: 2.62 K/UL (ref 1.8–7.3)
PHOSPHATE SERPL-MCNC: 2.5 MG/DL (ref 2.5–4.5)
PLATELET # BLD AUTO: 76 K/UL (ref 130–450)
PLATELET CONFIRMATION: NORMAL
PMV BLD AUTO: 10 FL (ref 7–12)
POTASSIUM SERPL-SCNC: 3.6 MMOL/L (ref 3.5–5)
PROT SERPL-MCNC: 5.1 G/DL (ref 6.4–8.3)
RBC # BLD AUTO: 2.6 M/UL (ref 3.5–5.5)
RBC # BLD: ABNORMAL 10*6/UL
SODIUM SERPL-SCNC: 141 MMOL/L (ref 132–146)
WBC OTHER # BLD: 4.3 K/UL (ref 4.5–11.5)

## 2023-09-24 PROCEDURE — 6360000002 HC RX W HCPCS: Performed by: NURSE PRACTITIONER

## 2023-09-24 PROCEDURE — 99239 HOSP IP/OBS DSCHRG MGMT >30: CPT | Performed by: STUDENT IN AN ORGANIZED HEALTH CARE EDUCATION/TRAINING PROGRAM

## 2023-09-24 PROCEDURE — 36415 COLL VENOUS BLD VENIPUNCTURE: CPT

## 2023-09-24 PROCEDURE — 84100 ASSAY OF PHOSPHORUS: CPT

## 2023-09-24 PROCEDURE — 6360000002 HC RX W HCPCS: Performed by: FAMILY MEDICINE

## 2023-09-24 PROCEDURE — C9113 INJ PANTOPRAZOLE SODIUM, VIA: HCPCS | Performed by: NURSE PRACTITIONER

## 2023-09-24 PROCEDURE — 6370000000 HC RX 637 (ALT 250 FOR IP): Performed by: NURSE PRACTITIONER

## 2023-09-24 PROCEDURE — 85025 COMPLETE CBC W/AUTO DIFF WBC: CPT

## 2023-09-24 PROCEDURE — 6370000000 HC RX 637 (ALT 250 FOR IP): Performed by: FAMILY MEDICINE

## 2023-09-24 PROCEDURE — 6370000000 HC RX 637 (ALT 250 FOR IP): Performed by: HOSPITALIST

## 2023-09-24 PROCEDURE — 2580000003 HC RX 258: Performed by: FAMILY MEDICINE

## 2023-09-24 PROCEDURE — 80053 COMPREHEN METABOLIC PANEL: CPT

## 2023-09-24 PROCEDURE — 83735 ASSAY OF MAGNESIUM: CPT

## 2023-09-24 PROCEDURE — APPSS45 APP SPLIT SHARED TIME 31-45 MINUTES: Performed by: NURSE PRACTITIONER

## 2023-09-24 RX ORDER — METRONIDAZOLE 500 MG/1
500 TABLET ORAL 3 TIMES DAILY
Qty: 6 TABLET | Refills: 0 | Status: SHIPPED | OUTPATIENT
Start: 2023-09-24 | End: 2023-09-26

## 2023-09-24 RX ORDER — PENTOXIFYLLINE 400 MG/1
400 TABLET, EXTENDED RELEASE ORAL
Qty: 90 TABLET | Refills: 0 | Status: SHIPPED | OUTPATIENT
Start: 2023-09-24

## 2023-09-24 RX ORDER — LIDOCAINE 4 G/G
1 PATCH TOPICAL DAILY
Qty: 30 EACH | Refills: 0 | Status: SHIPPED | OUTPATIENT
Start: 2023-09-25

## 2023-09-24 RX ADMIN — PANTOPRAZOLE SODIUM 40 MG: 40 INJECTION, POWDER, FOR SOLUTION INTRAVENOUS at 08:57

## 2023-09-24 RX ADMIN — MULTIPLE VITAMINS W/ MINERALS TAB 1 TABLET: TAB at 08:57

## 2023-09-24 RX ADMIN — ACAMPROSATE CALCIUM 666 MG: 333 TABLET, DELAYED RELEASE ORAL at 08:57

## 2023-09-24 RX ADMIN — ESCITALOPRAM OXALATE 10 MG: 10 TABLET ORAL at 08:57

## 2023-09-24 RX ADMIN — PIPERACILLIN AND TAZOBACTAM 3375 MG: 3; .375 INJECTION, POWDER, LYOPHILIZED, FOR SOLUTION INTRAVENOUS at 08:59

## 2023-09-24 RX ADMIN — PENTOXIFYLLINE 400 MG: 400 TABLET, EXTENDED RELEASE ORAL at 12:15

## 2023-09-24 RX ADMIN — PIPERACILLIN AND TAZOBACTAM 3375 MG: 3; .375 INJECTION, POWDER, LYOPHILIZED, FOR SOLUTION INTRAVENOUS at 01:25

## 2023-09-24 RX ADMIN — ACAMPROSATE CALCIUM 666 MG: 333 TABLET, DELAYED RELEASE ORAL at 14:22

## 2023-09-24 RX ADMIN — LACTULOSE 20 G: 20 SOLUTION ORAL at 08:57

## 2023-09-24 RX ADMIN — PENTOXIFYLLINE 400 MG: 400 TABLET, EXTENDED RELEASE ORAL at 16:39

## 2023-09-24 RX ADMIN — PENTOXIFYLLINE 400 MG: 400 TABLET, EXTENDED RELEASE ORAL at 08:57

## 2023-09-24 RX ADMIN — FOLIC ACID 1 MG: 1 TABLET ORAL at 08:57

## 2023-09-24 RX ADMIN — Medication 10 ML: at 08:59

## 2023-09-24 ASSESSMENT — PAIN SCALES - GENERAL
PAINLEVEL_OUTOF10: 0
PAINLEVEL_OUTOF10: 0

## 2023-09-24 NOTE — CARE COORDINATION
9/24/23 1427 CM note: no covid testing this admit. Room air. Room air. IV zosyn & protonix. EGD revealed small varices and a gastric dieulafoy post clipping; Colonoscopy normal. Per NP Zeb Cables plan to discharge today if okay with GI. Per [facility], [facility], will need to fax latest doctor notes and discharge med rec to [phone], and their medical team will review documents to see if pt is appropriate to return to their facility. Updated pt on above and she gave permission for me to send these documents to [facility]. Faxed requested documents and awaiting their response. Pt has not drank for 2 1/2 wks, drank ross. Pt states she lives alone in [address] but plans to stay at her mom's for 1 week when she discharges from [facility]. CM will follow. Electronically signed by Kate Yeh RN on 9/24/2023 at 2:50 PM    Update: 9/24/23 9378 Discharge order noted. [facility] and they will send a  to pick pt up in about 1/2 hr, they will call nursing unit when they arrive, and nurse will walk pt down. Updated patient, charge nurse Bianca Pennington, and NP Zeb Cables of above.  Electronically signed by Kate Yeh RN on 9/24/2023 at 4:53 PM

## 2023-09-24 NOTE — PLAN OF CARE
Problem: Discharge Planning  Goal: Discharge to home or other facility with appropriate resources  Outcome: Progressing     Problem: Safety - Adult  Goal: Free from fall injury  Outcome: Progressing     Problem: Hematologic - Adult  Goal: Maintains hematologic stability  Outcome: Progressing     Problem: Pain  Goal: Verbalizes/displays adequate comfort level or baseline comfort level  Outcome: Progressing

## 2023-09-25 LAB
MICROORGANISM SPEC CULT: ABNORMAL
SPECIMEN DESCRIPTION: ABNORMAL

## 2023-09-28 NOTE — PROGRESS NOTES
Physician Progress Note      Madi Fish  CSN #:                  [id number]  :                       1985  ADMIT DATE:       2023 5:27 PM  [address] [facility] DATE:        2023 5:16 PM  RESPONDING  PROVIDER #:        Katerina Griffith MD          QUERY TEXT:    Dear ,    Patient admitted with GI bleeding, noted to have ? internal hemorrhoids and   Colitis/enteritis. If possible, please document in progress notes and   discharge summary the cause of the GI bleeding: The medical record reflects the following:  Risk Factors: Alcoholic cirrhosis of liver with ascites, alcohol abuse  Clinical Indicators: H&H 6.8/21.0-7.4/23.1; CT abdomen: \"Redemonstration of   multiple thick walled segments of small bowel throughout abdomen and pelvis   suggesting nonspecific enteritis  4. Thickened wall of left colon and sigmoid colon suggesting colitis\"; per IM   pn : \"Hematochezia , ? Internal hemorrhoids. Ciera Plants Ciera Plants Was in ER  and 9/15   because of abdominal distention and anemia had paracentesis 2 l removed on    on 9/15 returned due to rectal bleed and hb was 7.7 ? Colitis and was given   augmentin and went back to rehab. Ciera Plants Ciera Plants Colitis ? Enterits on zosyn\"  Treatment: PCU monitoring, GI consult, CT scan, serial H&H every 8 hrs, IVPPI,   transfuse 1 URBC, IV Zosyn    Thank You,  Sun Fernandez RN, BSN, CDS  Clinical Documentation Improvement Specialist  Options provided:  -- GI bleeding due to internal hemorrhoids  -- GI bleeding due to infectious colitis  -- Other - I will add my own diagnosis  -- Disagree - Not applicable / Not valid  -- Disagree - Clinically unable to determine / Unknown  -- Refer to Clinical Documentation Reviewer    PROVIDER RESPONSE TEXT:    This patient has GI bleeding due to internal hemorrhoids.     Query created by: Sun Fernandez on 2023 2:58 PM      Electronically signed by:  Katerina Griffith MD 2023 3:54 PM 0 = swallows foods/liquids without difficulty

## 2023-10-02 ENCOUNTER — TELEPHONE (OUTPATIENT)
Dept: PULMONOLOGY | Age: 38
End: 2023-10-02

## 2023-10-02 DIAGNOSIS — K70.31 ALCOHOLIC CIRRHOSIS OF LIVER WITH ASCITES (HCC): Primary | ICD-10-CM

## 2023-10-02 NOTE — TELEPHONE ENCOUNTER
Patient was given this information prior to leaving the office / via phone conversation - voiced understanding  2. Spoke to [place] in [address]: 10/3/2023 @ 2:00 pm    >  You will need to arrive at 1:30 pm from home and check in at the Diagnostic Imaging Check In desk.

## 2023-10-03 ENCOUNTER — HOSPITAL ENCOUNTER (OUTPATIENT)
Dept: INTERVENTIONAL RADIOLOGY/VASCULAR | Age: 38
Discharge: HOME | End: 2023-10-05
Payer: COMMERCIAL

## 2023-10-03 VITALS
SYSTOLIC BLOOD PRESSURE: 126 MMHG | HEART RATE: 81 BPM | RESPIRATION RATE: 14 BRPM | OXYGEN SATURATION: 99 % | DIASTOLIC BLOOD PRESSURE: 59 MMHG

## 2023-10-03 DIAGNOSIS — K70.31 ALCOHOLIC CIRRHOSIS OF LIVER WITH ASCITES (HCC): ICD-10-CM

## 2023-10-03 PROCEDURE — 49083 ABD PARACENTESIS W/IMAGING: CPT

## 2023-10-03 PROCEDURE — C1729 CATH, DRAINAGE: HCPCS

## 2023-10-03 PROCEDURE — 49083 ABD PARACENTESIS W/IMAGING: CPT | Performed by: RADIOLOGY

## 2023-10-03 PROCEDURE — 2500000003 HC RX 250 WO HCPCS: Performed by: NURSE PRACTITIONER

## 2023-10-03 RX ORDER — LIDOCAINE HYDROCHLORIDE 20 MG/ML
INJECTION, SOLUTION INFILTRATION; PERINEURAL PRN
Status: COMPLETED | OUTPATIENT
Start: 2023-10-03 | End: 2023-10-03

## 2023-10-03 RX ADMIN — LIDOCAINE HYDROCHLORIDE 5 ML: 20 INJECTION, SOLUTION INFILTRATION; PERINEURAL at 14:02

## 2023-10-03 NOTE — DISCHARGE INSTRUCTIONS
Ultrasound Guided Paracentesis Discharge Instructions     Rest today. No heavy lifting, bending, stretching or pulling. Some tenderness will be normal at the procedure site for a few days. You may remove the bandaid in 24-48 hours. If you experience any drainage or bleeding at the site, hold pressure for 30 minutes and lay on side opposite of the procedure site (move fluid away from the area of leakage). If drainage or bleeding does not stop and seems excessive, call your physician or proceed to the ER. Watch for signs of infection such as fever, chills, drainage or redness at the site. If you experience any of these symptoms, call your primary doctor or go to the emergency room. Please keep all follow-up appointments with your Hepatologist. Schedule follow-up appointment for repeat paracentesis if necessary. If you have any concerns please contact the [facility] Radiology Department at [phone] and / or Dr. Josiah Patel office at [phone].

## 2023-10-03 NOTE — OR NURSING
PARACENTESIS - NO SEDATION    1350 - Patient brought back to special procedures room 6, steady independent gait. A&Ox4, calm and cooperative. Procedure explained, consent signed. Settled onto cart lying propped to left side with pillow wedge. Initial U/S images obtained prior to start of procedure. Pt  VSS, on RA. Allergies and home medications reviewed. Patient confirms is not taking a blood thinner. 1359 - Timeout completed for Ultrasound Guided Paracentesis. Using U/S, Gucci Guerrero CNP marked, prepped left side of abdomen with Chloraprep and draped with sterile drape and towels. 1402 - Site numbed with 2% lidocaine, see eMAR. [id number] Centesis 5F catheter placed using Ultrasound guidance. Fluid appears clear yellow. Catheter tubing connected to RIISnet machine at 200mmHG suction to remove fluid. [address] stopped. U/S images show patient still has ascites - catheter is up against bowel. Dr. Tamera Flor into room and injected sterile saline into centesis catheter. Drain restarted. Patient tolerating well. VSS.     1523 - Pt tolerated well. Total 4020 ml removed. Catheter removed, pressure held and guaze / tegaderm dressing applied. Verbal and tactile reassurance given throughout. D/C instructions reviewed with patient and understanding indicated. Patient walked to [place] where she was met by mom who will drive her home.  Electronically signed by Austin Bell RN on 10/3/2023 at 3:30 PM

## 2023-10-13 ENCOUNTER — TELEPHONE (OUTPATIENT)
Dept: INTERVENTIONAL RADIOLOGY/VASCULAR | Age: 38
End: 2023-10-13

## 2023-10-13 ENCOUNTER — HOSPITAL ENCOUNTER (OUTPATIENT)
Dept: INTERVENTIONAL RADIOLOGY/VASCULAR | Age: 38
Discharge: HOME | End: 2023-10-13
Payer: COMMERCIAL

## 2023-10-13 VITALS — DIASTOLIC BLOOD PRESSURE: 61 MMHG | SYSTOLIC BLOOD PRESSURE: 119 MMHG | OXYGEN SATURATION: 100 % | HEART RATE: 77 BPM

## 2023-10-13 DIAGNOSIS — K70.31 ALCOHOLIC CIRRHOSIS OF LIVER WITH ASCITES (HCC): Primary | ICD-10-CM

## 2023-10-13 DIAGNOSIS — K70.31 ALCOHOLIC CIRRHOSIS OF LIVER WITH ASCITES (HCC): ICD-10-CM

## 2023-10-13 PROCEDURE — 49083 ABD PARACENTESIS W/IMAGING: CPT | Performed by: RADIOLOGY

## 2023-10-13 PROCEDURE — 2709999900 IR US GUIDED PARACENTESIS

## 2023-10-13 PROCEDURE — 49083 ABD PARACENTESIS W/IMAGING: CPT

## 2023-10-13 NOTE — OR NURSING
NO SEDATION    1540 Procedure explained, consent signed. IR tech obtained images prior to start of procedure using U/S. Pt  VSS. 1550 Prepped LLQ with Chloraprep and draped with sterile drape and towels. [address] completed for Ultrasound Guided Paracentesis. 1556 Site numbed with lidocaine. [id number] Centesis 5F catheter placed using Ultrasound guidance. Fluid appears clear yellow. Catheter tubing connected to Val[employer]change machine to remove fluid. Pt tolerated well. Total @ ml removed. Catheter removed, pressure held and bandaid applied. Verbal and tactile reassurance given throughout.

## 2023-10-13 NOTE — TELEPHONE ENCOUNTER
José Luis Quispe was given this information via phone conversation - voiced understanding  2. Spoke to [employer] in [address] [place] [address]: 10/13/23 @ 3:00 pm    >  You will need to arrive at 2:30 pm  from home and check in at the Diagnostic Imaging Check In desk.

## 2023-10-13 NOTE — OR NURSING
NO SEDATION    1540 Procedure explained, consent signed. IR tech obtained images prior to start of procedure using U/S. Pt  VSS. 1550 Prepped LLQ with Chloraprep and draped with sterile drape and towels. [address] completed for Ultrasound Guided Paracentesis. 1556 Site numbed with lidocaine. [id number] Centesis 5F catheter placed using Ultrasound guidance. Fluid appears clear yellow. Catheter tubing connected to [employer] machine to remove fluid. 1649 Pt tolerated well. Total 4170 ml removed. Catheter removed, pressure held and bandaid applied. Verbal and tactile reassurance given throughout. [address] Discharge instructions reviewed with patient. Pt has no questions or concerns. Pt walked to discharge exit.

## 2023-10-13 NOTE — DISCHARGE INSTRUCTIONS
Ultrasound Guided Paracentesis Discharge Instructions     Rest today. No heavy lifting, bending, stretching or pulling. Some tenderness will be normal at the procedure site for a few days. You may remove the bandaid in 48 hours. If you experience any drainage or bleeding at the site, hold pressure for 30 minutes and lay on side opposite of the procedure site (move fluid away from the area of leakage). If drainage or bleeding does not stop and seems excessive, call your physician or proceed to the ER. Watch for signs of infection such as fever, chills, drainage or redness at the site. If you experience any of these symptoms, call your primary doctor or go to the emergency room. Please keep all follow-up appointments with your Hepatologist. Schedule follow-up appointment for repeat paracentesis if necessary. If you have any concerns please contact the [facility] Radiology Department at [phone].

## 2023-10-16 ENCOUNTER — POST-OP TELEPHONE (OUTPATIENT)
Dept: INTERVENTIONAL RADIOLOGY/VASCULAR | Age: 38
End: 2023-10-16

## 2023-10-16 NOTE — FLOWSHEET NOTE
RN call to patient to follow up post-op, as pt had paracentesis on 10/13/23 with Dr. Neal Merlin. Pt reports 0/10 pain to site. Pt confirms site and dressing is clean, dry, and intact. Pt has no questions or concerns at this time. Pt instructed to call back the radiology dept and request to speak with a nurse if any questions or concerns should develop at [phone].

## 2023-10-20 ENCOUNTER — HOSPITAL ENCOUNTER (EMERGENCY)
Age: 38
Discharge: LEFT BEFORE DISCHARGE | End: 2023-10-20

## 2023-10-20 VITALS
DIASTOLIC BLOOD PRESSURE: 83 MMHG | RESPIRATION RATE: 17 BRPM | WEIGHT: 135 LBS | OXYGEN SATURATION: 100 % | BODY MASS INDEX: 22.49 KG/M2 | TEMPERATURE: 97.7 F | HEART RATE: 134 BPM | SYSTOLIC BLOOD PRESSURE: 135 MMHG | HEIGHT: 65 IN

## 2023-10-20 ASSESSMENT — PAIN DESCRIPTION - LOCATION: LOCATION: BACK;ABDOMEN

## 2023-10-20 ASSESSMENT — PAIN SCALES - GENERAL: PAINLEVEL_OUTOF10: 10

## 2023-10-20 ASSESSMENT — LIFESTYLE VARIABLES: HOW OFTEN DO YOU HAVE A DRINK CONTAINING ALCOHOL: NEVER

## 2023-10-20 ASSESSMENT — PAIN - FUNCTIONAL ASSESSMENT: PAIN_FUNCTIONAL_ASSESSMENT: 0-10

## 2023-10-21 ENCOUNTER — HOSPITAL ENCOUNTER (INPATIENT)
Age: 38
LOS: 5 days | Discharge: HOME | DRG: 253 | End: 2023-10-26
Attending: INTERNAL MEDICINE | Admitting: INTERNAL MEDICINE
Payer: COMMERCIAL

## 2023-10-21 ENCOUNTER — ANESTHESIA (OUTPATIENT)
Dept: ENDOSCOPY | Age: 38
End: 2023-10-21
Payer: COMMERCIAL

## 2023-10-21 ENCOUNTER — ANESTHESIA EVENT (OUTPATIENT)
Dept: ENDOSCOPY | Age: 38
End: 2023-10-21
Payer: COMMERCIAL

## 2023-10-21 ENCOUNTER — APPOINTMENT (OUTPATIENT)
Dept: GENERAL RADIOLOGY | Age: 38
DRG: 253 | End: 2023-10-21
Payer: COMMERCIAL

## 2023-10-21 DIAGNOSIS — K70.31 ALCOHOLIC CIRRHOSIS OF LIVER WITH ASCITES (HCC): ICD-10-CM

## 2023-10-21 DIAGNOSIS — K92.2 GASTROINTESTINAL HEMORRHAGE, UNSPECIFIED GASTROINTESTINAL HEMORRHAGE TYPE: Primary | ICD-10-CM

## 2023-10-21 LAB
ABO + RH BLD: NORMAL
ALBUMIN SERPL-MCNC: 2.2 G/DL (ref 3.5–4.6)
ALP SERPL-CCNC: 105 U/L (ref 40–130)
ALT SERPL-CCNC: 27 U/L (ref 0–33)
ANION GAP SERPL CALCULATED.3IONS-SCNC: 10 MEQ/L (ref 9–15)
APTT PPP: 47.4 SEC (ref 24.4–36.8)
AST SERPL-CCNC: 116 U/L (ref 0–35)
BASOPHILS # BLD: 0 K/UL (ref 0–0.2)
BASOPHILS NFR BLD: 0.2 %
BILIRUB SERPL-MCNC: 8 MG/DL (ref 0.2–0.7)
BLD GP AB SCN SERPL QL: NORMAL
BUN SERPL-MCNC: 21 MG/DL (ref 6–20)
CALCIUM SERPL-MCNC: 8.2 MG/DL (ref 8.5–9.9)
CHLORIDE SERPL-SCNC: 91 MEQ/L (ref 95–107)
CO2 SERPL-SCNC: 29 MEQ/L (ref 20–31)
CREAT SERPL-MCNC: 0.88 MG/DL (ref 0.5–0.9)
EOSINOPHIL # BLD: 0 K/UL (ref 0–0.7)
EOSINOPHIL NFR BLD: 0.1 %
ERYTHROCYTE [DISTWIDTH] IN BLOOD BY AUTOMATED COUNT: 18.6 % (ref 11.5–14.5)
ETHANOL PERCENT: 0.06 G/DL
ETHANOLAMINE SERPL-MCNC: 67 MG/DL (ref 0–0.08)
GLOBULIN SER CALC-MCNC: 3.6 G/DL (ref 2.3–3.5)
GLUCOSE SERPL-MCNC: 126 MG/DL (ref 70–99)
HCT VFR BLD AUTO: 26.7 % (ref 37–47)
HGB BLD-MCNC: 9 G/DL (ref 12–16)
INR PPP: 3
LIPASE SERPL-CCNC: 28 U/L (ref 12–95)
LYMPHOCYTES # BLD: 1 K/UL (ref 1–4.8)
LYMPHOCYTES NFR BLD: 9.8 %
MCH RBC QN AUTO: 31.6 PG (ref 27–31.3)
MCHC RBC AUTO-ENTMCNC: 33.7 % (ref 33–37)
MCV RBC AUTO: 93.7 FL (ref 79.4–94.8)
MONOCYTES # BLD: 1.4 K/UL (ref 0.2–0.8)
MONOCYTES NFR BLD: 13.9 %
NEUTROPHILS # BLD: 7.4 K/UL (ref 1.4–6.5)
NEUTS SEG NFR BLD: 75.4 %
PLATELET # BLD AUTO: 68 K/UL (ref 130–400)
POTASSIUM SERPL-SCNC: 3.9 MEQ/L (ref 3.4–4.9)
PROT SERPL-MCNC: 5.8 G/DL (ref 6.3–8)
PROTHROMBIN TIME: 31.4 SEC (ref 12.3–14.9)
RBC # BLD AUTO: 2.85 M/UL (ref 4.2–5.4)
REASON FOR REJECTION: NORMAL
REJECTED TEST: NORMAL
SODIUM SERPL-SCNC: 130 MEQ/L (ref 135–144)
TROPONIN, HIGH SENSITIVITY: 16 NG/L (ref 0–19)
WBC # BLD AUTO: 9.9 K/UL (ref 4.8–10.8)

## 2023-10-21 PROCEDURE — 1210000000 HC MED SURG R&B

## 2023-10-21 PROCEDURE — 2720000010 HC SURG SUPPLY STERILE: Performed by: SPECIALIST

## 2023-10-21 PROCEDURE — 2580000003 HC RX 258

## 2023-10-21 PROCEDURE — 6360000002 HC RX W HCPCS: Performed by: PHYSICIAN ASSISTANT

## 2023-10-21 PROCEDURE — 30233N1 TRANSFUSION OF NONAUTOLOGOUS RED BLOOD CELLS INTO PERIPHERAL VEIN, PERCUTANEOUS APPROACH: ICD-10-PCS | Performed by: INTERNAL MEDICINE

## 2023-10-21 PROCEDURE — 71045 X-RAY EXAM CHEST 1 VIEW: CPT

## 2023-10-21 PROCEDURE — 82077 ASSAY SPEC XCP UR&BREATH IA: CPT

## 2023-10-21 PROCEDURE — 6360000002 HC RX W HCPCS: Performed by: NURSE PRACTITIONER

## 2023-10-21 PROCEDURE — 6370000000 HC RX 637 (ALT 250 FOR IP): Performed by: SPECIALIST

## 2023-10-21 PROCEDURE — 83690 ASSAY OF LIPASE: CPT

## 2023-10-21 PROCEDURE — 80053 COMPREHEN METABOLIC PANEL: CPT

## 2023-10-21 PROCEDURE — 3700000000 HC ANESTHESIA ATTENDED CARE: Performed by: SPECIALIST

## 2023-10-21 PROCEDURE — 84484 ASSAY OF TROPONIN QUANT: CPT

## 2023-10-21 PROCEDURE — 96374 THER/PROPH/DIAG INJ IV PUSH: CPT

## 2023-10-21 PROCEDURE — 3609017100 HC EGD: Performed by: SPECIALIST

## 2023-10-21 PROCEDURE — 2580000003 HC RX 258: Performed by: SPECIALIST

## 2023-10-21 PROCEDURE — 93005 ELECTROCARDIOGRAM TRACING: CPT | Performed by: PHYSICIAN ASSISTANT

## 2023-10-21 PROCEDURE — 86850 RBC ANTIBODY SCREEN: CPT

## 2023-10-21 PROCEDURE — C9113 INJ PANTOPRAZOLE SODIUM, VIA: HCPCS | Performed by: NURSE PRACTITIONER

## 2023-10-21 PROCEDURE — 6360000002 HC RX W HCPCS: Performed by: ANESTHESIOLOGY

## 2023-10-21 PROCEDURE — 96376 TX/PRO/DX INJ SAME DRUG ADON: CPT

## 2023-10-21 PROCEDURE — 2580000003 HC RX 258: Performed by: PHYSICIAN ASSISTANT

## 2023-10-21 PROCEDURE — 36415 COLL VENOUS BLD VENIPUNCTURE: CPT

## 2023-10-21 PROCEDURE — 6370000000 HC RX 637 (ALT 250 FOR IP): Performed by: NURSE PRACTITIONER

## 2023-10-21 PROCEDURE — 99285 EMERGENCY DEPT VISIT HI MDM: CPT

## 2023-10-21 PROCEDURE — C9113 INJ PANTOPRAZOLE SODIUM, VIA: HCPCS | Performed by: PHYSICIAN ASSISTANT

## 2023-10-21 PROCEDURE — 2580000003 HC RX 258: Performed by: NURSE PRACTITIONER

## 2023-10-21 PROCEDURE — P9016 RBC LEUKOCYTES REDUCED: HCPCS

## 2023-10-21 PROCEDURE — A4216 STERILE WATER/SALINE, 10 ML: HCPCS | Performed by: PHYSICIAN ASSISTANT

## 2023-10-21 PROCEDURE — 86900 BLOOD TYPING SEROLOGIC ABO: CPT

## 2023-10-21 PROCEDURE — 85730 THROMBOPLASTIN TIME PARTIAL: CPT

## 2023-10-21 PROCEDURE — 7100000010 HC PHASE II RECOVERY - FIRST 15 MIN: Performed by: SPECIALIST

## 2023-10-21 PROCEDURE — 0W3P8ZZ CONTROL BLEEDING IN GASTROINTESTINAL TRACT, VIA NATURAL OR ARTIFICIAL OPENING ENDOSCOPIC: ICD-10-PCS | Performed by: SPECIALIST

## 2023-10-21 PROCEDURE — 3700000001 HC ADD 15 MINUTES (ANESTHESIA): Performed by: SPECIALIST

## 2023-10-21 PROCEDURE — 86901 BLOOD TYPING SEROLOGIC RH(D): CPT

## 2023-10-21 PROCEDURE — 85610 PROTHROMBIN TIME: CPT

## 2023-10-21 PROCEDURE — 85025 COMPLETE CBC W/AUTO DIFF WBC: CPT

## 2023-10-21 PROCEDURE — 2709999900 HC NON-CHARGEABLE SUPPLY: Performed by: SPECIALIST

## 2023-10-21 PROCEDURE — 7100000011 HC PHASE II RECOVERY - ADDTL 15 MIN: Performed by: SPECIALIST

## 2023-10-21 PROCEDURE — 86923 COMPATIBILITY TEST ELECTRIC: CPT

## 2023-10-21 PROCEDURE — 96375 TX/PRO/DX INJ NEW DRUG ADDON: CPT

## 2023-10-21 PROCEDURE — 2580000003 HC RX 258: Performed by: ANESTHESIOLOGY

## 2023-10-21 DEVICE — INSTINCT PLUS ENDOSCOPIC CLIPPING DEVICE
Type: IMPLANTABLE DEVICE | Site: ESOPHAGUS | Status: FUNCTIONAL
Brand: INSTINCT

## 2023-10-21 RX ORDER — LORAZEPAM 2 MG/ML
2 INJECTION INTRAMUSCULAR
Status: DISCONTINUED | OUTPATIENT
Start: 2023-10-21 | End: 2023-10-26 | Stop reason: HOSPADM

## 2023-10-21 RX ORDER — FAMOTIDINE 40 MG/1
40 TABLET, FILM COATED ORAL DAILY
COMMUNITY

## 2023-10-21 RX ORDER — ESMOLOL HYDROCHLORIDE 10 MG/ML
INJECTION INTRAVENOUS PRN
Status: DISCONTINUED | OUTPATIENT
Start: 2023-10-21 | End: 2023-10-21 | Stop reason: SDUPTHER

## 2023-10-21 RX ORDER — SODIUM CHLORIDE 9 MG/ML
INJECTION, SOLUTION INTRAVENOUS CONTINUOUS PRN
Status: DISCONTINUED | OUTPATIENT
Start: 2023-10-21 | End: 2023-10-21 | Stop reason: SDUPTHER

## 2023-10-21 RX ORDER — LACTULOSE 10 G/15ML
20 SOLUTION ORAL 2 TIMES DAILY
Status: DISCONTINUED | OUTPATIENT
Start: 2023-10-21 | End: 2023-10-26 | Stop reason: HOSPADM

## 2023-10-21 RX ORDER — FOLIC ACID 1 MG/1
1 TABLET ORAL DAILY
Status: DISCONTINUED | OUTPATIENT
Start: 2023-10-21 | End: 2023-10-26 | Stop reason: HOSPADM

## 2023-10-21 RX ORDER — SODIUM CHLORIDE 0.9 % (FLUSH) 0.9 %
5-40 SYRINGE (ML) INJECTION EVERY 12 HOURS SCHEDULED
Status: DISCONTINUED | OUTPATIENT
Start: 2023-10-21 | End: 2023-10-26 | Stop reason: HOSPADM

## 2023-10-21 RX ORDER — LIDOCAINE 4 G/G
1 PATCH TOPICAL DAILY
Status: DISCONTINUED | OUTPATIENT
Start: 2023-10-21 | End: 2023-10-26 | Stop reason: HOSPADM

## 2023-10-21 RX ORDER — FUROSEMIDE 40 MG/1
40 TABLET ORAL DAILY
COMMUNITY

## 2023-10-21 RX ORDER — LORAZEPAM 2 MG/ML
4 INJECTION INTRAMUSCULAR
Status: DISCONTINUED | OUTPATIENT
Start: 2023-10-21 | End: 2023-10-26 | Stop reason: HOSPADM

## 2023-10-21 RX ORDER — LORAZEPAM 1 MG/1
2 TABLET ORAL
Status: DISCONTINUED | OUTPATIENT
Start: 2023-10-21 | End: 2023-10-26 | Stop reason: HOSPADM

## 2023-10-21 RX ORDER — FUROSEMIDE 40 MG/1
40 TABLET ORAL DAILY
Status: DISCONTINUED | OUTPATIENT
Start: 2023-10-22 | End: 2023-10-26 | Stop reason: HOSPADM

## 2023-10-21 RX ORDER — POTASSIUM CHLORIDE 750 MG/1
10 CAPSULE, EXTENDED RELEASE ORAL DAILY
COMMUNITY

## 2023-10-21 RX ORDER — SIMETHICONE 20 MG/.3ML
EMULSION ORAL PRN
Status: DISCONTINUED | OUTPATIENT
Start: 2023-10-21 | End: 2023-10-21 | Stop reason: ALTCHOICE

## 2023-10-21 RX ORDER — MIDODRINE HYDROCHLORIDE 5 MG/1
10 TABLET ORAL
Status: DISCONTINUED | OUTPATIENT
Start: 2023-10-21 | End: 2023-10-26 | Stop reason: HOSPADM

## 2023-10-21 RX ORDER — VITAMIN B COMPLEX
1 CAPSULE ORAL DAILY
COMMUNITY

## 2023-10-21 RX ORDER — LORAZEPAM 1 MG/1
4 TABLET ORAL
Status: DISCONTINUED | OUTPATIENT
Start: 2023-10-21 | End: 2023-10-26 | Stop reason: HOSPADM

## 2023-10-21 RX ORDER — SODIUM CHLORIDE 9 MG/ML
INJECTION, SOLUTION INTRAVENOUS
Status: COMPLETED
Start: 2023-10-21 | End: 2023-10-21

## 2023-10-21 RX ORDER — ONDANSETRON 2 MG/ML
4 INJECTION INTRAMUSCULAR; INTRAVENOUS ONCE
Status: COMPLETED | OUTPATIENT
Start: 2023-10-21 | End: 2023-10-21

## 2023-10-21 RX ORDER — LANOLIN ALCOHOL/MO/W.PET/CERES
100 CREAM (GRAM) TOPICAL DAILY
Status: DISCONTINUED | OUTPATIENT
Start: 2023-10-21 | End: 2023-10-26 | Stop reason: HOSPADM

## 2023-10-21 RX ORDER — SODIUM CHLORIDE 0.9 % (FLUSH) 0.9 %
5-40 SYRINGE (ML) INJECTION PRN
Status: DISCONTINUED | OUTPATIENT
Start: 2023-10-21 | End: 2023-10-26 | Stop reason: HOSPADM

## 2023-10-21 RX ORDER — LORAZEPAM 1 MG/1
3 TABLET ORAL
Status: DISCONTINUED | OUTPATIENT
Start: 2023-10-21 | End: 2023-10-26 | Stop reason: HOSPADM

## 2023-10-21 RX ORDER — LORAZEPAM 2 MG/ML
1 INJECTION INTRAMUSCULAR
Status: DISCONTINUED | OUTPATIENT
Start: 2023-10-21 | End: 2023-10-26 | Stop reason: HOSPADM

## 2023-10-21 RX ORDER — SODIUM CHLORIDE 9 MG/ML
INJECTION, SOLUTION INTRAVENOUS PRN
Status: DISCONTINUED | OUTPATIENT
Start: 2023-10-21 | End: 2023-10-26 | Stop reason: HOSPADM

## 2023-10-21 RX ORDER — ONDANSETRON 4 MG/1
4 TABLET, ORALLY DISINTEGRATING ORAL EVERY 8 HOURS PRN
Status: DISCONTINUED | OUTPATIENT
Start: 2023-10-21 | End: 2023-10-26 | Stop reason: HOSPADM

## 2023-10-21 RX ORDER — PROPOFOL 10 MG/ML
INJECTION, EMULSION INTRAVENOUS PRN
Status: DISCONTINUED | OUTPATIENT
Start: 2023-10-21 | End: 2023-10-21 | Stop reason: SDUPTHER

## 2023-10-21 RX ORDER — 0.9 % SODIUM CHLORIDE 0.9 %
1000 INTRAVENOUS SOLUTION INTRAVENOUS ONCE
Status: COMPLETED | OUTPATIENT
Start: 2023-10-21 | End: 2023-10-21

## 2023-10-21 RX ORDER — MORPHINE SULFATE 2 MG/ML
2 INJECTION, SOLUTION INTRAMUSCULAR; INTRAVENOUS
Status: DISCONTINUED | OUTPATIENT
Start: 2023-10-21 | End: 2023-10-21 | Stop reason: SDUPTHER

## 2023-10-21 RX ORDER — LANOLIN ALCOHOL/MO/W.PET/CERES
400 CREAM (GRAM) TOPICAL 2 TIMES DAILY
Status: DISCONTINUED | OUTPATIENT
Start: 2023-10-21 | End: 2023-10-26 | Stop reason: HOSPADM

## 2023-10-21 RX ORDER — MORPHINE SULFATE 4 MG/ML
4 INJECTION, SOLUTION INTRAMUSCULAR; INTRAVENOUS
Status: DISCONTINUED | OUTPATIENT
Start: 2023-10-21 | End: 2023-10-26 | Stop reason: HOSPADM

## 2023-10-21 RX ORDER — MORPHINE SULFATE 2 MG/ML
2 INJECTION, SOLUTION INTRAMUSCULAR; INTRAVENOUS
Status: DISCONTINUED | OUTPATIENT
Start: 2023-10-21 | End: 2023-10-26 | Stop reason: HOSPADM

## 2023-10-21 RX ORDER — MAGNESIUM HYDROXIDE 1200 MG/15ML
LIQUID ORAL PRN
Status: DISCONTINUED | OUTPATIENT
Start: 2023-10-21 | End: 2023-10-21 | Stop reason: ALTCHOICE

## 2023-10-21 RX ORDER — LORAZEPAM 2 MG/ML
3 INJECTION INTRAMUSCULAR
Status: DISCONTINUED | OUTPATIENT
Start: 2023-10-21 | End: 2023-10-26 | Stop reason: HOSPADM

## 2023-10-21 RX ORDER — LORAZEPAM 1 MG/1
1 TABLET ORAL
Status: DISCONTINUED | OUTPATIENT
Start: 2023-10-21 | End: 2023-10-26 | Stop reason: HOSPADM

## 2023-10-21 RX ORDER — OCTREOTIDE ACETATE 50 UG/ML
50 INJECTION, SOLUTION INTRAVENOUS; SUBCUTANEOUS ONCE
Status: COMPLETED | OUTPATIENT
Start: 2023-10-21 | End: 2023-10-21

## 2023-10-21 RX ORDER — POTASSIUM CHLORIDE 750 MG/1
10 CAPSULE, EXTENDED RELEASE ORAL DAILY
Status: DISCONTINUED | OUTPATIENT
Start: 2023-10-22 | End: 2023-10-26 | Stop reason: HOSPADM

## 2023-10-21 RX ORDER — ONDANSETRON 2 MG/ML
4 INJECTION INTRAMUSCULAR; INTRAVENOUS EVERY 6 HOURS PRN
Status: DISCONTINUED | OUTPATIENT
Start: 2023-10-21 | End: 2023-10-26 | Stop reason: HOSPADM

## 2023-10-21 RX ADMIN — PROPOFOL 50 MG: 10 INJECTION, EMULSION INTRAVENOUS at 11:19

## 2023-10-21 RX ADMIN — PANTOPRAZOLE SODIUM 40 MG: 40 INJECTION, POWDER, FOR SOLUTION INTRAVENOUS at 08:36

## 2023-10-21 RX ADMIN — OCTREOTIDE ACETATE 25 MCG/HR: 500 INJECTION, SOLUTION INTRAVENOUS; SUBCUTANEOUS at 19:49

## 2023-10-21 RX ADMIN — SODIUM CHLORIDE 1000 ML: 9 INJECTION, SOLUTION INTRAVENOUS at 06:36

## 2023-10-21 RX ADMIN — SODIUM CHLORIDE: 9 INJECTION, SOLUTION INTRAVENOUS at 10:58

## 2023-10-21 RX ADMIN — MORPHINE SULFATE 4 MG: 4 INJECTION INTRAVENOUS at 16:08

## 2023-10-21 RX ADMIN — Medication 100 MG: at 22:11

## 2023-10-21 RX ADMIN — LORAZEPAM 1 MG: 2 INJECTION INTRAMUSCULAR; INTRAVENOUS at 16:09

## 2023-10-21 RX ADMIN — Medication 0.1 MG: at 11:07

## 2023-10-21 RX ADMIN — MORPHINE SULFATE 2 MG: 2 INJECTION, SOLUTION INTRAMUSCULAR; INTRAVENOUS at 08:38

## 2023-10-21 RX ADMIN — Medication 400 MG: at 22:12

## 2023-10-21 RX ADMIN — ONDANSETRON 4 MG: 2 INJECTION INTRAMUSCULAR; INTRAVENOUS at 08:36

## 2023-10-21 RX ADMIN — PROPOFOL 70 MG: 10 INJECTION, EMULSION INTRAVENOUS at 11:05

## 2023-10-21 RX ADMIN — PANTOPRAZOLE SODIUM 8 MG/HR: 40 INJECTION, POWDER, FOR SOLUTION INTRAVENOUS at 23:37

## 2023-10-21 RX ADMIN — SODIUM CHLORIDE 500 ML: 9 INJECTION, SOLUTION INTRAVENOUS at 10:59

## 2023-10-21 RX ADMIN — ONDANSETRON 4 MG: 2 INJECTION INTRAMUSCULAR; INTRAVENOUS at 06:34

## 2023-10-21 RX ADMIN — PROPOFOL 50 MG: 10 INJECTION, EMULSION INTRAVENOUS at 11:12

## 2023-10-21 RX ADMIN — CEFTRIAXONE SODIUM 1000 MG: 1 INJECTION, POWDER, FOR SOLUTION INTRAMUSCULAR; INTRAVENOUS at 09:03

## 2023-10-21 RX ADMIN — ESMOLOL HYDROCHLORIDE 10 MG: 100 INJECTION, SOLUTION INTRAVENOUS at 11:19

## 2023-10-21 RX ADMIN — PANTOPRAZOLE SODIUM 8 MG/HR: 40 INJECTION, POWDER, FOR SOLUTION INTRAVENOUS at 10:33

## 2023-10-21 RX ADMIN — FOLIC ACID 1 MG: 1 TABLET ORAL at 22:11

## 2023-10-21 RX ADMIN — LACTULOSE 20 G: 20 SOLUTION ORAL at 22:12

## 2023-10-21 RX ADMIN — ONDANSETRON 4 MG: 4 TABLET, ORALLY DISINTEGRATING ORAL at 16:17

## 2023-10-21 RX ADMIN — OCTREOTIDE ACETATE 50 MCG: 50 INJECTION, SOLUTION INTRAVENOUS; SUBCUTANEOUS at 08:53

## 2023-10-21 RX ADMIN — ESMOLOL HYDROCHLORIDE 10 MG: 100 INJECTION, SOLUTION INTRAVENOUS at 11:10

## 2023-10-21 ASSESSMENT — ENCOUNTER SYMPTOMS
PHOTOPHOBIA: 0
ABDOMINAL DISTENTION: 1
EYE PAIN: 0
DIARRHEA: 0
NAUSEA: 0
SORE THROAT: 0
ABDOMINAL PAIN: 1
SHORTNESS OF BREATH: 0
RHINORRHEA: 0
COUGH: 0
VOMITING: 1
BACK PAIN: 0

## 2023-10-21 ASSESSMENT — PAIN DESCRIPTION - FREQUENCY
FREQUENCY: CONTINUOUS
FREQUENCY: CONTINUOUS

## 2023-10-21 ASSESSMENT — PAIN SCALES - GENERAL
PAINLEVEL_OUTOF10: 10
PAINLEVEL_OUTOF10: 8
PAINLEVEL_OUTOF10: 8
PAINLEVEL_OUTOF10: 5
PAINLEVEL_OUTOF10: 10

## 2023-10-21 ASSESSMENT — PATIENT HEALTH QUESTIONNAIRE - PHQ9
1. LITTLE INTEREST OR PLEASURE IN DOING THINGS: 0
2. FEELING DOWN, DEPRESSED OR HOPELESS: 0
SUM OF ALL RESPONSES TO PHQ QUESTIONS 1-9: 0
SUM OF ALL RESPONSES TO PHQ9 QUESTIONS 1 & 2: 0
SUM OF ALL RESPONSES TO PHQ QUESTIONS 1-9: 0

## 2023-10-21 ASSESSMENT — PAIN DESCRIPTION - PAIN TYPE
TYPE: CHRONIC PAIN
TYPE: CHRONIC PAIN

## 2023-10-21 ASSESSMENT — PAIN DESCRIPTION - DESCRIPTORS
DESCRIPTORS: STABBING
DESCRIPTORS: STABBING

## 2023-10-21 ASSESSMENT — PAIN DESCRIPTION - ORIENTATION
ORIENTATION: RIGHT
ORIENTATION: RIGHT;LEFT;UPPER

## 2023-10-21 ASSESSMENT — PAIN DESCRIPTION - LOCATION
LOCATION: ABDOMEN
LOCATION: ABDOMEN;BACK
LOCATION: ABDOMEN

## 2023-10-21 ASSESSMENT — LIFESTYLE VARIABLES
HOW MANY STANDARD DRINKS CONTAINING ALCOHOL DO YOU HAVE ON A TYPICAL DAY: 5 OR 6
SMOKING_STATUS: 1
HOW OFTEN DO YOU HAVE A DRINK CONTAINING ALCOHOL: 4 OR MORE TIMES A WEEK

## 2023-10-21 ASSESSMENT — PAIN - FUNCTIONAL ASSESSMENT
PAIN_FUNCTIONAL_ASSESSMENT: 0-10
PAIN_FUNCTIONAL_ASSESSMENT: 0-10

## 2023-10-21 NOTE — H&P
HISTORY AND PHYSICAL             Date: 10/21/2023        Patient Name: Amauri Saldivar     YOB: 1985      Age:  42 y.o. Chief Complaint     Chief Complaint   Patient presents with    Emesis     Pt c/o n/v for 2 hours with blood noted in emesis      History Obtained From   patient, electronic medical record    History of Present Illness   Amauri Saldivar is a 49-year-old  female with significant past medical history of EtOH abuse, liver cirrhosis, CKD, who was admitted for GI bleed. According to the patient, she had 2 drinks of \" airplane shots\" and she is okay up until yesterday night. However, earlier this morning she had 2 episodes of bloody emesis with clots but she doesn't think that it is associated with her alcohol intake yesterday, but thought that it might be from the pulled pork the she ate last night. Other associated symptoms includes: Abdominal pain, chills, lightheadedness, fatigue, weakness. Patient denies fever, chest pain, headache. She will be admitted for further evaluation and management.   Past Medical History     Past Medical History:   Diagnosis Date    Alcohol abuse     Chronic kidney disease     Cirrhosis ([address])     Tobacco dependence       Past Surgical History     Past Surgical History:   Procedure Laterality Date    APPENDECTOMY      COLONOSCOPY N/A 01/13/2023    COLONOSCOPY DIAGNOSTIC performed by Rodrigo Frias MD at [address] [place] [address] 05/12/2023    COLONOSCOPY DIAGNOSTIC performed by Karen Drake MD at [facility] N/A 09/22/2023    COLONOSCOPY performed by Giana Pfeiffer MD at [address] [place] [address]      reports 6 sx on L foot and one on right foot    PARACENTESIS Left 09/13/2022    1510 ml removed per Dr Mesfin Tipton  specimen obtained    PARACENTESIS Left 01/11/2023    4720 ml removed by Dr. Mesfin Tipton - therapeutic & diagnostic    PARACENTESIS Left 01/16/2023    4400ml removed by Dr. Carla Flower Left 02/01/2023 8/17/23 9/16/23  Ronna Kayla Varghese, APRN - CNP   escitalopram (LEXAPRO) 10 MG tablet Take 1 tablet by mouth daily 8/17/23   RonnaLeaha REN VargheseN - CNP   acamprosate (CAMPRAL) 333 MG tablet Take 2 tablets by mouth 3 times daily 8/10/23   Adarsh Parents, DO   lactulose (CHRONULAC) 10 GM/15ML solution Take 30 mLs by mouth 2 times daily Take so you have 2-3BM per day 8/10/23   Adarsh Parents, DO   midodrine (PROAMATINE) 10 MG tablet Take 1 tablet by mouth 3 times daily (with meals) 8/10/23   Adarsh Parents, DO   spironolactone (ALDACTONE) 50 MG tablet Take 1 tablet by mouth daily 8/10/23   Adarsh Parents, DO   folic acid (FOLVITE) 1 MG tablet Take 1 tablet by mouth daily 5/16/23   Yissel Lincoln, DO   Multiple Vitamin (MULTIVITAMIN) TABS tablet Take 1 tablet by mouth daily 5/16/23 6/15/23  Yissel Lincoln,    pantoprazole (PROTONIX) 40 MG tablet Take 1 tablet by mouth every morning (before breakfast) 5/16/23   Yissel Lincoln, DO        Allergies   Oxycodone-acetaminophen    Social History     Social History       Tobacco History       Smoking Status  Every Day Smoking Frequency  0.25 packs/day Smoking Tobacco Type  Cigarettes      Smokeless Tobacco Use  Never      Tobacco Comments  5/30/23 states 3-4 cigs per day now              Alcohol History       Alcohol Use Status  Yes Drinks/Week  3 Shots of liquor per week Amount  3.0 standard drinks of alcohol/wk Comment  Last shot 8/18              Drug Use       Drug Use Status  Yes Types  Marijuana (Weed) Comment  gummies              Sexual Activity       Sexually Active  Not Currently                    Family History     Family History   Problem Relation Age of Onset    High Cholesterol Mother     Hypertension Mother     Melanoma Father     High Cholesterol Father     Hypertension Father     Colon Cancer Neg Hx      Review of Systems   12 point review of systems reviewed with patient with pertinent positive listed in HPI, otherwise, negative.     Physical Exam

## 2023-10-21 NOTE — ANESTHESIA PRE PROCEDURE
Department of Anesthesiology  Preprocedure Note       Name:  Didier Tim   Age:  42 y.o.  :  1985                                          MRN:  [id number]         Date:  10/21/2023      Surgeon: [address]):  Maureen Huang MD    Procedure: Procedure(s):  EGD DIAGNOSTIC ONLY    Medications prior to admission:   Prior to Admission medications    Medication Sig Start Date End Date Taking? Authorizing Provider   pentoxifylline (TRENTAL) 400 MG extended release tablet Take 1 tablet by mouth 3 times daily (with meals) 23   SANTA Gibson CNP   lidocaine 4 % external patch Place 1 patch onto the skin daily 23   SANTA Gibson CNP   Multiple Vitamins-Minerals (THERAPEUTIC MULTIVITAMIN-MINERALS) tablet Take 1 tablet by mouth daily    Provider, MD Whit   magnesium oxide (MAG-OX) 400 (240 Mg) MG tablet Take 1 tablet by mouth 2 times daily 23  Migdalia Friend APRN - CNP   escitalopram (LEXAPRO) 10 MG tablet Take 1 tablet by mouth daily 23   Migdalia Friend APRN - CNP   acamprosate (CAMPRAL) 333 MG tablet Take 2 tablets by mouth 3 times daily 8/10/23   Olivia Carter, DO   lactulose (CHRONULAC) 10 GM/15ML solution Take 30 mLs by mouth 2 times daily Take so you have 2-3BM per day 8/10/23   Olivia Mema, DO   midodrine (PROAMATINE) 10 MG tablet Take 1 tablet by mouth 3 times daily (with meals) 8/10/23   Olivia Carter, DO   spironolactone (ALDACTONE) 50 MG tablet Take 1 tablet by mouth daily 8/10/23   Olivia Carter, DO   folic acid (FOLVITE) 1 MG tablet Take 1 tablet by mouth daily 23   Nicole Shields, DO   Multiple Vitamin (MULTIVITAMIN) TABS tablet Take 1 tablet by mouth daily 5/16/23 6/15/23  Nicole Shields, DO   pantoprazole (PROTONIX) 40 MG tablet Take 1 tablet by mouth every morning (before breakfast) 23   Nicole Shields, DO       Current medications:    No current facility-administered medications for this visit.      Current Outpatient

## 2023-10-21 NOTE — ED NOTES
Pt complaining of lightheadedness and dizziness. Pa, Lauren aware.       Yossi Brink RN  10/21/23 0573

## 2023-10-21 NOTE — ANESTHESIA POSTPROCEDURE EVALUATION
Department of Anesthesiology  Postprocedure Note    Patient: Tammie Pretty  MRN: [id number]  YOB: 1985  Date of evaluation: 10/21/2023      Procedure Summary     Date: 10/21/23 Room / Location: [address] [place] [address]    Anesthesia Start: 6321 Anesthesia Stop: 1127    Procedure: EGD DIAGNOSTIC ONLY Diagnosis:       Gastrointestinal hemorrhage, unspecified gastrointestinal hemorrhage type      (Gastrointestinal hemorrhage, unspecified gastrointestinal hemorrhage type [K92.2])    Surgeons: Leary Peabody, MD Responsible Provider: Gabriel Love MD    Anesthesia Type: MAC ASA Status: 4 - Emergent          Anesthesia Type: MAC    Josi Phase I: Josi Score: 10    Josi Phase II:        Anesthesia Post Evaluation    Patient location during evaluation: bedside  Patient participation: complete - patient participated  Level of consciousness: awake and awake and alert  Airway patency: patent  Nausea & Vomiting: no nausea and no vomiting  Complications: no  Cardiovascular status: blood pressure returned to baseline and hemodynamically stable  Respiratory status: acceptable  Hydration status: euvolemic  Pain management: adequate

## 2023-10-21 NOTE — ED PROVIDER NOTES
[facility] ED  eMERGENCY dEPARTMENTeNCOUnter      Pt Name: Lazarus Poe  MRN: [id number]  [address] [facility] [address] 1985  Date ofevaluation: 10/21/2023  Provider: Tawanda Aquino PA-C    CHIEF COMPLAINT       Chief Complaint   Patient presents with    Emesis     Pt c/o n/v for 2 hours with blood noted in emesis         HISTORY OF PRESENT ILLNESS   (Location/Symptom, Timing/Onset,Context/Setting, Quality, Duration, Modifying Factors, Severity)  Note limiting factors. Lazarus Poe is a 42 y.o. female who presents to the emergency department vomiting blood x2 at home. She does have a history of alcoholic cirrhosis with varices. She has abdominal distention. Time was done last week. I did review outside hospital note from last month where she was hospitalized received blood transfusions had an EGD with endoclips. She also endorses some dizziness fatigue and weakness. She denies blood in her stool, black stool. She does have h/o this and denies bowel changes. HPI    NursingNotes were reviewed. REVIEW OF SYSTEMS    (2-9 systems for level 4, 10 or more for level 5)     Review of Systems   Constitutional:  Positive for fatigue. Negative for chills, diaphoresis and fever. HENT:  Negative for congestion, rhinorrhea and sore throat. Eyes:  Negative for photophobia and pain. Respiratory:  Negative for cough and shortness of breath. Cardiovascular:  Negative for chest pain and palpitations. Gastrointestinal:  Positive for abdominal distention, abdominal pain and vomiting. Negative for diarrhea and nausea. Genitourinary:  Negative for dysuria and flank pain. Musculoskeletal:  Negative for back pain. Skin:  Negative for rash. Neurological:  Positive for headaches. Negative for dizziness and light-headedness. Psychiatric/Behavioral: Negative. All other systems reviewed and are negative. Except as noted above the remainder of the review of systems was reviewed and negative.        PAST MEDICAL

## 2023-10-22 LAB
ANION GAP SERPL CALCULATED.3IONS-SCNC: 6 MEQ/L (ref 9–15)
BUN SERPL-MCNC: 35 MG/DL (ref 6–20)
CALCIUM SERPL-MCNC: 7.9 MG/DL (ref 8.5–9.9)
CHLORIDE SERPL-SCNC: 93 MEQ/L (ref 95–107)
CO2 SERPL-SCNC: 29 MEQ/L (ref 20–31)
CREAT SERPL-MCNC: 1.56 MG/DL (ref 0.5–0.9)
GLUCOSE SERPL-MCNC: 98 MG/DL (ref 70–99)
HCT VFR BLD AUTO: 20 % (ref 37–47)
HGB BLD-MCNC: 6.5 G/DL (ref 12–16)
IRON SATURATION: ABNORMAL % (ref 20–55)
IRON: 73 UG/DL (ref 37–145)
POTASSIUM SERPL-SCNC: 5.1 MEQ/L (ref 3.4–4.9)
REASON FOR REJECTION: NORMAL
REJECTED TEST: NORMAL
SODIUM SERPL-SCNC: 128 MEQ/L (ref 135–144)
TOTAL IRON BINDING CAPACITY: ABNORMAL UG/DL (ref 250–450)
UNSATURATED IRON BINDING CAPACITY: <17 UG/DL (ref 112–347)

## 2023-10-22 PROCEDURE — 83550 IRON BINDING TEST: CPT

## 2023-10-22 PROCEDURE — 80048 BASIC METABOLIC PNL TOTAL CA: CPT

## 2023-10-22 PROCEDURE — 36415 COLL VENOUS BLD VENIPUNCTURE: CPT

## 2023-10-22 PROCEDURE — 1210000000 HC MED SURG R&B

## 2023-10-22 PROCEDURE — 6370000000 HC RX 637 (ALT 250 FOR IP): Performed by: NURSE PRACTITIONER

## 2023-10-22 PROCEDURE — 85014 HEMATOCRIT: CPT

## 2023-10-22 PROCEDURE — 6360000002 HC RX W HCPCS: Performed by: NURSE PRACTITIONER

## 2023-10-22 PROCEDURE — 83540 ASSAY OF IRON: CPT

## 2023-10-22 PROCEDURE — 85018 HEMOGLOBIN: CPT

## 2023-10-22 PROCEDURE — 2580000003 HC RX 258: Performed by: NURSE PRACTITIONER

## 2023-10-22 PROCEDURE — C9113 INJ PANTOPRAZOLE SODIUM, VIA: HCPCS | Performed by: NURSE PRACTITIONER

## 2023-10-22 PROCEDURE — 2580000003 HC RX 258: Performed by: INTERNAL MEDICINE

## 2023-10-22 PROCEDURE — 36430 TRANSFUSION BLD/BLD COMPNT: CPT

## 2023-10-22 RX ORDER — SODIUM CHLORIDE 9 MG/ML
INJECTION, SOLUTION INTRAVENOUS PRN
Status: DISCONTINUED | OUTPATIENT
Start: 2023-10-22 | End: 2023-10-26 | Stop reason: HOSPADM

## 2023-10-22 RX ORDER — 0.9 % SODIUM CHLORIDE 0.9 %
500 INTRAVENOUS SOLUTION INTRAVENOUS ONCE
Status: DISCONTINUED | OUTPATIENT
Start: 2023-10-23 | End: 2023-10-26 | Stop reason: HOSPADM

## 2023-10-22 RX ORDER — SODIUM CHLORIDE 9 MG/ML
INJECTION, SOLUTION INTRAVENOUS CONTINUOUS
Status: DISPENSED | OUTPATIENT
Start: 2023-10-22 | End: 2023-10-24

## 2023-10-22 RX ADMIN — Medication 400 MG: at 20:06

## 2023-10-22 RX ADMIN — FUROSEMIDE 40 MG: 40 TABLET ORAL at 08:53

## 2023-10-22 RX ADMIN — CEFTRIAXONE SODIUM 1000 MG: 1 INJECTION, POWDER, FOR SOLUTION INTRAMUSCULAR; INTRAVENOUS at 08:55

## 2023-10-22 RX ADMIN — LACTULOSE 20 G: 20 SOLUTION ORAL at 20:06

## 2023-10-22 RX ADMIN — MIDODRINE HYDROCHLORIDE 10 MG: 5 TABLET ORAL at 19:06

## 2023-10-22 RX ADMIN — Medication 10 ML: at 08:55

## 2023-10-22 RX ADMIN — LACTULOSE 20 G: 20 SOLUTION ORAL at 08:53

## 2023-10-22 RX ADMIN — MIDODRINE HYDROCHLORIDE 10 MG: 5 TABLET ORAL at 14:35

## 2023-10-22 RX ADMIN — Medication 400 MG: at 08:53

## 2023-10-22 RX ADMIN — POTASSIUM CHLORIDE 10 MEQ: 750 CAPSULE, EXTENDED RELEASE ORAL at 08:53

## 2023-10-22 RX ADMIN — PANTOPRAZOLE SODIUM 8 MG/HR: 40 INJECTION, POWDER, FOR SOLUTION INTRAVENOUS at 09:59

## 2023-10-22 RX ADMIN — Medication 100 MG: at 08:53

## 2023-10-22 RX ADMIN — SODIUM CHLORIDE: 9 INJECTION, SOLUTION INTRAVENOUS at 20:08

## 2023-10-22 RX ADMIN — FOLIC ACID 1 MG: 1 TABLET ORAL at 08:53

## 2023-10-22 RX ADMIN — MIDODRINE HYDROCHLORIDE 10 MG: 5 TABLET ORAL at 08:54

## 2023-10-22 ASSESSMENT — ENCOUNTER SYMPTOMS
VOMITING: 0
NAUSEA: 0
DIARRHEA: 0
COUGH: 0
SHORTNESS OF BREATH: 0

## 2023-10-22 ASSESSMENT — PAIN SCALES - WONG BAKER: WONGBAKER_NUMERICALRESPONSE: 0

## 2023-10-22 NOTE — PROGRESS NOTES
Pt has delayed speech, spontaneously arouseable however notably lethargic. Pt A+OX4 . Abd distended and pt complains of cramping in general region. Medication given per [facility]. Phlembotomy on downtime throughout the night. No overnight labs posted to results page at this time. Currently lab is back to processing samples however has a back up of unprocessed samples. Secondary IV added for continuous medication admin. Call light in reach and bed in the lowest positions.  No episodes of vomiting through the night, pt denies nausea

## 2023-10-22 NOTE — CONSENT
Informed Consent for Blood Component Transfusion Note    I have discussed with the patient the rationale for blood component transfusion; its benefits in treating or preventing fatigue, organ damage, or death; and its risk which includes mild transfusion reactions, rare risk of blood borne infection, or more serious but rare reactions. I have discussed the alternatives to transfusion, including the risk and consequences of not receiving transfusion. The patient had an opportunity to ask questions and had agreed to proceed with transfusion of blood components.     Electronically signed by Cassandra Goldsmith MD on 10/22/23 at 6:36 PM EDT

## 2023-10-22 NOTE — PROGRESS NOTES
Assessment complete. Patient is lethargic, opens eye's to voice, oriented x4. She denies pain and nausea at this time. Medication given per [facility]. Denies other needs at this time. Call light in reach. Lab called with critical H&H Dr. Wilfredo Lewis notified. See orders.

## 2023-10-23 ENCOUNTER — HOSPITAL ENCOUNTER (INPATIENT)
Dept: INTERVENTIONAL RADIOLOGY/VASCULAR | Age: 38
Discharge: HOME | DRG: 253 | End: 2023-10-25
Payer: COMMERCIAL

## 2023-10-23 VITALS
SYSTOLIC BLOOD PRESSURE: 97 MMHG | RESPIRATION RATE: 14 BRPM | OXYGEN SATURATION: 97 % | HEART RATE: 80 BPM | DIASTOLIC BLOOD PRESSURE: 49 MMHG

## 2023-10-23 DIAGNOSIS — K70.31 ALCOHOLIC CIRRHOSIS OF LIVER WITH ASCITES (HCC): Primary | ICD-10-CM

## 2023-10-23 DIAGNOSIS — K70.31 ALCOHOLIC CIRRHOSIS OF LIVER WITH ASCITES (HCC): ICD-10-CM

## 2023-10-23 LAB
ALBUMIN SERPL-MCNC: 1.8 G/DL (ref 3.5–4.6)
ALP SERPL-CCNC: 70 U/L (ref 40–130)
ALT SERPL-CCNC: 18 U/L (ref 0–33)
ANION GAP SERPL CALCULATED.3IONS-SCNC: 7 MEQ/L (ref 9–15)
AST SERPL-CCNC: 70 U/L (ref 0–35)
BASOPHILS # BLD: 0 K/UL (ref 0–0.2)
BASOPHILS NFR BLD: 0.1 %
BILIRUB SERPL-MCNC: 8.7 MG/DL (ref 0.2–0.7)
BLOOD BANK DISPENSE STATUS: NORMAL
BLOOD BANK DISPENSE STATUS: NORMAL
BLOOD BANK PRODUCT CODE: NORMAL
BLOOD BANK PRODUCT CODE: NORMAL
BPU ID: NORMAL
BPU ID: NORMAL
BUN SERPL-MCNC: 51 MG/DL (ref 6–20)
CALCIUM SERPL-MCNC: 7.7 MG/DL (ref 8.5–9.9)
CHLORIDE SERPL-SCNC: 95 MEQ/L (ref 95–107)
CO2 SERPL-SCNC: 28 MEQ/L (ref 20–31)
CREAT SERPL-MCNC: 2.23 MG/DL (ref 0.5–0.9)
DESCRIPTION BLOOD BANK: NORMAL
DESCRIPTION BLOOD BANK: NORMAL
EKG ATRIAL RATE: 127 BPM
EKG P AXIS: 71 DEGREES
EKG P-R INTERVAL: 120 MS
EKG Q-T INTERVAL: 328 MS
EKG QRS DURATION: 74 MS
EKG QTC CALCULATION (BAZETT): 476 MS
EKG R AXIS: 44 DEGREES
EKG T AXIS: 49 DEGREES
EKG VENTRICULAR RATE: 127 BPM
EOSINOPHIL # BLD: 0.1 K/UL (ref 0–0.7)
EOSINOPHIL NFR BLD: 0.9 %
ERYTHROCYTE [DISTWIDTH] IN BLOOD BY AUTOMATED COUNT: 17.7 % (ref 11.5–14.5)
ERYTHROCYTE [DISTWIDTH] IN BLOOD BY AUTOMATED COUNT: 17.7 % (ref 11.5–14.5)
GLOBULIN SER CALC-MCNC: 2.9 G/DL (ref 2.3–3.5)
GLUCOSE SERPL-MCNC: 102 MG/DL (ref 70–99)
HCT VFR BLD AUTO: 20.7 % (ref 37–47)
HCT VFR BLD AUTO: 23 % (ref 37–47)
HCT VFR BLD AUTO: 23.1 % (ref 37–47)
HCT VFR BLD AUTO: 25.2 % (ref 37–47)
HCT VFR BLD AUTO: 25.9 % (ref 37–47)
HCT VFR BLD AUTO: 26.7 % (ref 37–47)
HGB BLD-MCNC: 6.8 G/DL (ref 12–16)
HGB BLD-MCNC: 7.9 G/DL (ref 12–16)
HGB BLD-MCNC: 7.9 G/DL (ref 12–16)
HGB BLD-MCNC: 8.6 G/DL (ref 12–16)
HGB BLD-MCNC: 8.7 G/DL (ref 12–16)
HGB BLD-MCNC: 9 G/DL (ref 12–16)
INR PPP: 2.3
LACTATE BLDV-SCNC: 2.6 MMOL/L (ref 0.5–2.2)
LYMPHOCYTES # BLD: 1.4 K/UL (ref 1–4.8)
LYMPHOCYTES NFR BLD: 15.8 %
MCH RBC QN AUTO: 30.9 PG (ref 27–31.3)
MCH RBC QN AUTO: 31 PG (ref 27–31.3)
MCHC RBC AUTO-ENTMCNC: 34.1 % (ref 33–37)
MCHC RBC AUTO-ENTMCNC: 34.2 % (ref 33–37)
MCV RBC AUTO: 90.2 FL (ref 79.4–94.8)
MCV RBC AUTO: 91 FL (ref 79.4–94.8)
MONOCYTES # BLD: 1 K/UL (ref 0.2–0.8)
MONOCYTES NFR BLD: 11.6 %
NEUTROPHILS # BLD: 6.2 K/UL (ref 1.4–6.5)
NEUTS SEG NFR BLD: 70.1 %
PLATELET # BLD AUTO: 64 K/UL (ref 130–400)
PLATELET # BLD AUTO: 64 K/UL (ref 130–400)
POTASSIUM SERPL-SCNC: 4.9 MEQ/L (ref 3.4–4.9)
POTASSIUM SERPL-SCNC: 4.9 MEQ/L (ref 3.4–4.9)
PROT SERPL-MCNC: 4.7 G/DL (ref 6.3–8)
PROTHROMBIN TIME: 25.8 SEC (ref 12.3–14.9)
RBC # BLD AUTO: 2.56 M/UL (ref 4.2–5.4)
RBC # BLD AUTO: 2.77 M/UL (ref 4.2–5.4)
SODIUM SERPL-SCNC: 130 MEQ/L (ref 135–144)
WBC # BLD AUTO: 8.1 K/UL (ref 4.8–10.8)
WBC # BLD AUTO: 8.9 K/UL (ref 4.8–10.8)

## 2023-10-23 PROCEDURE — 6360000002 HC RX W HCPCS: Performed by: NURSE PRACTITIONER

## 2023-10-23 PROCEDURE — 85610 PROTHROMBIN TIME: CPT

## 2023-10-23 PROCEDURE — 80053 COMPREHEN METABOLIC PANEL: CPT

## 2023-10-23 PROCEDURE — 36430 TRANSFUSION BLD/BLD COMPNT: CPT

## 2023-10-23 PROCEDURE — 93010 ELECTROCARDIOGRAM REPORT: CPT | Performed by: INTERNAL MEDICINE

## 2023-10-23 PROCEDURE — 2580000003 HC RX 258: Performed by: INTERNAL MEDICINE

## 2023-10-23 PROCEDURE — 36415 COLL VENOUS BLD VENIPUNCTURE: CPT

## 2023-10-23 PROCEDURE — 85025 COMPLETE CBC W/AUTO DIFF WBC: CPT

## 2023-10-23 PROCEDURE — 83605 ASSAY OF LACTIC ACID: CPT

## 2023-10-23 PROCEDURE — 0W9G3ZZ DRAINAGE OF PERITONEAL CAVITY, PERCUTANEOUS APPROACH: ICD-10-PCS | Performed by: RADIOLOGY

## 2023-10-23 PROCEDURE — 49083 ABD PARACENTESIS W/IMAGING: CPT

## 2023-10-23 PROCEDURE — 1210000000 HC MED SURG R&B

## 2023-10-23 PROCEDURE — 49083 ABD PARACENTESIS W/IMAGING: CPT | Performed by: RADIOLOGY

## 2023-10-23 PROCEDURE — 85014 HEMATOCRIT: CPT

## 2023-10-23 PROCEDURE — 2580000003 HC RX 258: Performed by: NURSE PRACTITIONER

## 2023-10-23 PROCEDURE — 85018 HEMOGLOBIN: CPT

## 2023-10-23 PROCEDURE — C1729 CATH, DRAINAGE: HCPCS

## 2023-10-23 PROCEDURE — C9113 INJ PANTOPRAZOLE SODIUM, VIA: HCPCS | Performed by: NURSE PRACTITIONER

## 2023-10-23 PROCEDURE — 85027 COMPLETE CBC AUTOMATED: CPT

## 2023-10-23 PROCEDURE — 6370000000 HC RX 637 (ALT 250 FOR IP): Performed by: NURSE PRACTITIONER

## 2023-10-23 PROCEDURE — 2500000003 HC RX 250 WO HCPCS: Performed by: RADIOLOGY

## 2023-10-23 RX ORDER — SODIUM CHLORIDE 9 MG/ML
INJECTION, SOLUTION INTRAVENOUS PRN
Status: DISCONTINUED | OUTPATIENT
Start: 2023-10-23 | End: 2023-10-26 | Stop reason: HOSPADM

## 2023-10-23 RX ORDER — LIDOCAINE HYDROCHLORIDE 20 MG/ML
INJECTION, SOLUTION INFILTRATION; PERINEURAL PRN
Status: COMPLETED | OUTPATIENT
Start: 2023-10-23 | End: 2023-10-23

## 2023-10-23 RX ORDER — 0.9 % SODIUM CHLORIDE 0.9 %
1000 INTRAVENOUS SOLUTION INTRAVENOUS ONCE
Status: COMPLETED | OUTPATIENT
Start: 2023-10-23 | End: 2023-10-23

## 2023-10-23 RX ADMIN — Medication 10 ML: at 08:53

## 2023-10-23 RX ADMIN — Medication 400 MG: at 08:53

## 2023-10-23 RX ADMIN — Medication 100 MG: at 08:53

## 2023-10-23 RX ADMIN — Medication 500 ML: at 00:02

## 2023-10-23 RX ADMIN — CEFTRIAXONE SODIUM 1000 MG: 1 INJECTION, POWDER, FOR SOLUTION INTRAMUSCULAR; INTRAVENOUS at 08:59

## 2023-10-23 RX ADMIN — SODIUM CHLORIDE 1000 ML: 9 INJECTION, SOLUTION INTRAVENOUS at 16:38

## 2023-10-23 RX ADMIN — FOLIC ACID 1 MG: 1 TABLET ORAL at 08:53

## 2023-10-23 RX ADMIN — MIDODRINE HYDROCHLORIDE 10 MG: 5 TABLET ORAL at 11:57

## 2023-10-23 RX ADMIN — OCTREOTIDE ACETATE 25 MCG/HR: 500 INJECTION, SOLUTION INTRAVENOUS; SUBCUTANEOUS at 16:37

## 2023-10-23 RX ADMIN — PANTOPRAZOLE SODIUM 8 MG/HR: 40 INJECTION, POWDER, FOR SOLUTION INTRAVENOUS at 18:12

## 2023-10-23 RX ADMIN — POTASSIUM CHLORIDE 10 MEQ: 750 CAPSULE, EXTENDED RELEASE ORAL at 08:53

## 2023-10-23 RX ADMIN — SODIUM CHLORIDE: 9 INJECTION, SOLUTION INTRAVENOUS at 12:53

## 2023-10-23 RX ADMIN — Medication 400 MG: at 21:39

## 2023-10-23 RX ADMIN — LACTULOSE 20 G: 20 SOLUTION ORAL at 08:53

## 2023-10-23 RX ADMIN — PANTOPRAZOLE SODIUM 8 MG/HR: 40 INJECTION, POWDER, FOR SOLUTION INTRAVENOUS at 03:50

## 2023-10-23 RX ADMIN — MIDODRINE HYDROCHLORIDE 10 MG: 5 TABLET ORAL at 16:37

## 2023-10-23 RX ADMIN — MIDODRINE HYDROCHLORIDE 10 MG: 5 TABLET ORAL at 08:52

## 2023-10-23 RX ADMIN — LIDOCAINE HYDROCHLORIDE 8 ML: 20 INJECTION, SOLUTION INFILTRATION; PERINEURAL at 10:58

## 2023-10-23 NOTE — CARE COORDINATION
Chart reviewed, plan for diet advance to fulls today, patient in IR for paracentesis. Discharge destination plan for home, will confirm when patient returns to room. Mayito Matt MSN, RN Mgr Case Mgmt.

## 2023-10-23 NOTE — OR NURSING
PARACENTESIS - NO SEDATION    1051 - Patient arrived to special procedures via bed from  #477. A&Ox4, calm and cooperative. Procedure explained, consent signed. Patient positioned propped lying to left side with pillow. Images prior to start of procedure using U/S. Pt  VSS, on RA. Left side of abdomen cleansed with Chloraprep and covered with sterile drape and towels. 1057 - Timeout completed for Ultrasound Guided Paracentesis. 1058 - Site numbed with 2% lidocaine, see eMAR. [id number] Centesis 5F catheter placed using Ultrasound guidance. Fluid appears clear yellow. Catheter tubing connected to [employer] machine at 200mmHG suction to remove fluid. 1139 - Pt tolerated well. Total 4170 ml removed. Catheter removed, pressure held and bandaid applied. Verbal and tactile reassurance given throughout. 1144 - Report called to Dorie Ramon RN on [address]. Patient transported back to room via bed by RN & IR tech.  Electronically signed by Marbin Jimenez RN on 10/23/2023 at 11:46 AM

## 2023-10-23 NOTE — DISCHARGE INSTRUCTIONS
Ultrasound Guided Paracentesis Discharge Instructions     Rest today. No heavy lifting, bending, stretching or pulling. Some tenderness will be normal at the procedure site for a few days. You may remove the bandaid in 24-48 hours. If you experience any drainage or bleeding at the site, hold pressure for 30 minutes and lay on side opposite of the procedure site (move fluid away from the area of leakage). If drainage or bleeding does not stop and seems excessive, call your physician or proceed to the ER. Watch for signs of infection such as fever, chills, drainage or redness at the site. If you experience any of these symptoms, call your primary doctor or go to the emergency room. Please keep all follow-up appointments with your Hepatologist. Schedule follow-up appointment for repeat paracentesis if necessary. If you have any concerns please contact the [facility] Radiology Department at [phone] and / or Dr. Deejay Luis office at [phone].

## 2023-10-23 NOTE — PROGRESS NOTES
10/23/23  1342 10/23/23  1422   WBC 9.9  --  8.9  --  8.1   HGB 9.0*   < > 7.9*  7.9* 9.0* 8.6*   HCT 26.7*   < > 23.1*  23.0* 26.7* 25.2*   PLT 68*  --  64*  --  64*    < > = values in this interval not displayed. Recent Labs     10/21/23  0657 10/22/23  0600 10/23/23  0700   * 128* 130*  129*   K 3.9 5.1* 4.9  4.9  4.9   CL 91* 93* 95  94*   CO2 29 29 28  28   BUN 21* 35* 51*  50*   CREATININE 0.88 1.56* 2.23*  2.21*   CALCIUM 8.2* 7.9* 7.7*  7.6*     Recent Labs     10/21/23  0657 10/23/23  0700   * 70*   ALT 27 18   BILITOT 8.0* 8.7*   ALKPHOS 105 70     Recent Labs     10/21/23  0827 10/23/23  1422   INR 3.0 2.3     No results for input(s): \"CKTOTAL\", \"TROPONINI\" in the last 72 hours. Urinalysis:      Lab Results   Component Value Date/Time    NITRU NEGATIVE 09/23/2023 10:10 AM    WBCUA 0 TO 5 09/23/2023 10:10 AM    BACTERIA 1+ 09/23/2023 10:10 AM    RBCUA 0 TO 2 09/23/2023 10:10 AM    BLOODU Negative 08/21/2023 08:00 AM    SPECGRAV 1.025 09/23/2023 10:10 AM    GLUCOSEU NEGATIVE 09/23/2023 10:10 AM       Radiology:  XR CHEST PORTABLE   Final Result   Hypoinflated lungs. No acute consolidation or infiltrate. Bilateral lower   lung field atelectasis.                  Assessment/Plan:    41 y/o female with history of ETOH/tobacco/ marijuana use, alcoholic liver cirrhosis with ascites s/p large volume paracentesis, PHG, esophagitis, thrombocytopenia who presented with :     Acute blood loss anemia  - due to upper GI bleed  - Hb down to 6.5, baseline around 11-12  - s/p transfusion of PRBCs   - EGD showed active bleeding from GE junction s/p 2 endoclips placed with cessation of bleeding per report  - on Octreotide and Protonix infusion  - on IV Rocephin empirically  - GI following   - monitor H/H closely  - transfuse if Hb<7, active bleeding or unstable HD     WILLIE  - in the setting of volume depletion from GI bleed  - continue IVFs  - consult nephrology     ETOH use disorder with acute withdrawal  - on Lorazepam per CIWA protocol      Alcoholic liver cirrhosis with ascites  - s/p paracentesis with drainage of 4100 ml today per IR      Hyperbilirubinemia, elevated INR  - due to alcoholic liver cirrhosis, concerning for alcoholic hepatitis  - Maddrey score >32  - not a candidate for prednisolone due to GI bleed     Thrombocytopenia  - in the setting of GI bleed, liver cirrhosis  - avoid antiplatelets, anticoagulants  - monitor      Diet: ADULT DIET;  Full Liquid    Code Status: Full Code              Electronically signed by Natalia Riddle MD on 10/23/2023 at 4:23 PM

## 2023-10-23 NOTE — PLAN OF CARE
Problem: Pain  Goal: Verbalizes/displays adequate comfort level or baseline comfort level  Outcome: Progressing  Flowsheets (Taken 10/23/2023 0732)  Verbalizes/displays adequate comfort level or baseline comfort level:   Encourage patient to monitor pain and request assistance   Assess pain using appropriate pain scale   Implement non-pharmacological measures as appropriate and evaluate response   Administer analgesics based on type and severity of pain and evaluate response   Consider cultural and social influences on pain and pain management   Notify Licensed Independent Practitioner if interventions unsuccessful or patient reports new pain     Problem: Discharge Planning  Goal: Discharge to home or other facility with appropriate resources  Outcome: Progressing  Flowsheets (Taken 10/23/2023 0800)  Discharge to home or other facility with appropriate resources:   Identify barriers to discharge with patient and caregiver   Arrange for needed discharge resources and transportation as appropriate   Identify discharge learning needs (meds, wound care, etc)   Arrange for interpreters to assist at discharge as needed   Refer to discharge planning if patient needs post-hospital services based on physician order or complex needs related to functional status, cognitive ability or social support system     Problem: Safety - Adult  Goal: Free from fall injury  10/23/2023 1645 by Silverio Silva RN  Outcome: Progressing  Flowsheets (Taken 10/23/2023 1644)  Free From Fall Injury:   Instruct family/caregiver on patient safety   Based on caregiver fall risk screen, instruct family/caregiver to ask for assistance with transferring infant if caregiver noted to have fall risk factors  10/23/2023 0317 by Olinda Boyer RN  Outcome: Progressing

## 2023-10-24 LAB
ACANTHOCYTES BLD QL SMEAR: ABNORMAL
ALBUMIN SERPL-MCNC: 1.5 G/DL (ref 3.5–4.6)
ALP SERPL-CCNC: 84 U/L (ref 40–130)
ALT SERPL-CCNC: 14 U/L (ref 0–33)
ANION GAP SERPL CALCULATED.3IONS-SCNC: 4 MEQ/L (ref 9–15)
ANISOCYTOSIS BLD QL SMEAR: ABNORMAL
AST SERPL-CCNC: 50 U/L (ref 0–35)
BASOPHILS # BLD: 0 K/UL (ref 0–0.2)
BASOPHILS NFR BLD: 0.1 %
BILIRUB SERPL-MCNC: 5.6 MG/DL (ref 0.2–0.7)
BUN SERPL-MCNC: 52 MG/DL (ref 6–20)
BURR CELLS: ABNORMAL
CALCIUM SERPL-MCNC: 7.1 MG/DL (ref 8.5–9.9)
CHLORIDE SERPL-SCNC: 101 MEQ/L (ref 95–107)
CO2 SERPL-SCNC: 24 MEQ/L (ref 20–31)
CREAT SERPL-MCNC: 2.27 MG/DL (ref 0.5–0.9)
EOSINOPHIL # BLD: 0.2 K/UL (ref 0–0.7)
EOSINOPHIL NFR BLD: 3 %
ERYTHROCYTE [DISTWIDTH] IN BLOOD BY AUTOMATED COUNT: 17.7 % (ref 11.5–14.5)
GLOBULIN SER CALC-MCNC: 2.9 G/DL (ref 2.3–3.5)
GLUCOSE SERPL-MCNC: 120 MG/DL (ref 70–99)
HCT VFR BLD AUTO: 24.2 % (ref 37–47)
HCT VFR BLD AUTO: 24.6 % (ref 37–47)
HCT VFR BLD AUTO: 24.6 % (ref 37–47)
HCT VFR BLD AUTO: 25.6 % (ref 37–47)
HGB BLD-MCNC: 8.2 G/DL (ref 12–16)
HGB BLD-MCNC: 8.3 G/DL (ref 12–16)
HGB BLD-MCNC: 8.3 G/DL (ref 12–16)
HGB BLD-MCNC: 8.7 G/DL (ref 12–16)
INR PPP: 2.2
LYMPHOCYTES # BLD: 0.4 K/UL (ref 1–4.8)
LYMPHOCYTES NFR BLD: 6 %
MACROCYTES BLD QL SMEAR: ABNORMAL
MAGNESIUM SERPL-MCNC: 1.6 MG/DL (ref 1.7–2.4)
MCH RBC QN AUTO: 30.4 PG (ref 27–31.3)
MCHC RBC AUTO-ENTMCNC: 33.3 % (ref 33–37)
MCV RBC AUTO: 91.1 FL (ref 79.4–94.8)
MONOCYTES # BLD: 0 K/UL (ref 0.2–0.8)
MONOCYTES NFR BLD: 14.6 %
NEUTROPHILS # BLD: 6.2 K/UL (ref 1.4–6.5)
NEUTS SEG NFR BLD: 90 %
NEUTS VAC BLD QL SMEAR: ABNORMAL
PLATELET # BLD AUTO: 63 K/UL (ref 130–400)
PLATELET BLD QL SMEAR: ABNORMAL
POIKILOCYTOSIS BLD QL SMEAR: ABNORMAL
POTASSIUM SERPL-SCNC: 3.8 MEQ/L (ref 3.4–4.9)
PROMYELOCYTES NFR BLD MANUAL: 1 %
PROT SERPL-MCNC: 4.4 G/DL (ref 6.3–8)
PROTHROMBIN TIME: 24.3 SEC (ref 12.3–14.9)
RBC # BLD AUTO: 2.7 M/UL (ref 4.2–5.4)
SLIDE REVIEW: ABNORMAL
SMUDGE CELLS BLD QL SMEAR: 31.1
SODIUM SERPL-SCNC: 129 MEQ/L (ref 135–144)
TARGETS BLD QL SMEAR: ABNORMAL
TOXIC GRANULATION: ABNORMAL
WBC # BLD AUTO: 6.8 K/UL (ref 4.8–10.8)

## 2023-10-24 PROCEDURE — 85014 HEMATOCRIT: CPT

## 2023-10-24 PROCEDURE — 80053 COMPREHEN METABOLIC PANEL: CPT

## 2023-10-24 PROCEDURE — 1210000000 HC MED SURG R&B

## 2023-10-24 PROCEDURE — C9113 INJ PANTOPRAZOLE SODIUM, VIA: HCPCS | Performed by: NURSE PRACTITIONER

## 2023-10-24 PROCEDURE — 85610 PROTHROMBIN TIME: CPT

## 2023-10-24 PROCEDURE — 36415 COLL VENOUS BLD VENIPUNCTURE: CPT

## 2023-10-24 PROCEDURE — 2580000003 HC RX 258: Performed by: NURSE PRACTITIONER

## 2023-10-24 PROCEDURE — 6360000002 HC RX W HCPCS: Performed by: NURSE PRACTITIONER

## 2023-10-24 PROCEDURE — 85018 HEMOGLOBIN: CPT

## 2023-10-24 PROCEDURE — 85025 COMPLETE CBC W/AUTO DIFF WBC: CPT

## 2023-10-24 PROCEDURE — 6370000000 HC RX 637 (ALT 250 FOR IP): Performed by: NURSE PRACTITIONER

## 2023-10-24 PROCEDURE — 2580000003 HC RX 258: Performed by: INTERNAL MEDICINE

## 2023-10-24 PROCEDURE — 83735 ASSAY OF MAGNESIUM: CPT

## 2023-10-24 RX ORDER — PANTOPRAZOLE SODIUM 40 MG/1
40 TABLET, DELAYED RELEASE ORAL
Status: DISCONTINUED | OUTPATIENT
Start: 2023-10-25 | End: 2023-10-26 | Stop reason: HOSPADM

## 2023-10-24 RX ADMIN — PANTOPRAZOLE SODIUM 8 MG/HR: 40 INJECTION, POWDER, FOR SOLUTION INTRAVENOUS at 06:28

## 2023-10-24 RX ADMIN — LACTULOSE 20 G: 20 SOLUTION ORAL at 20:41

## 2023-10-24 RX ADMIN — MIDODRINE HYDROCHLORIDE 10 MG: 5 TABLET ORAL at 17:45

## 2023-10-24 RX ADMIN — CEFTRIAXONE SODIUM 1000 MG: 1 INJECTION, POWDER, FOR SOLUTION INTRAMUSCULAR; INTRAVENOUS at 09:15

## 2023-10-24 RX ADMIN — OCTREOTIDE ACETATE 25 MCG/HR: 500 INJECTION, SOLUTION INTRAVENOUS; SUBCUTANEOUS at 15:46

## 2023-10-24 RX ADMIN — MIDODRINE HYDROCHLORIDE 10 MG: 5 TABLET ORAL at 11:52

## 2023-10-24 RX ADMIN — SODIUM CHLORIDE: 9 INJECTION, SOLUTION INTRAVENOUS at 03:48

## 2023-10-24 RX ADMIN — LACTULOSE 20 G: 20 SOLUTION ORAL at 09:09

## 2023-10-24 RX ADMIN — FOLIC ACID 1 MG: 1 TABLET ORAL at 09:08

## 2023-10-24 RX ADMIN — POTASSIUM CHLORIDE 10 MEQ: 750 CAPSULE, EXTENDED RELEASE ORAL at 09:09

## 2023-10-24 RX ADMIN — Medication 10 ML: at 20:46

## 2023-10-24 RX ADMIN — Medication 400 MG: at 20:41

## 2023-10-24 RX ADMIN — Medication 100 MG: at 09:08

## 2023-10-24 RX ADMIN — Medication 400 MG: at 09:08

## 2023-10-24 RX ADMIN — Medication 10 ML: at 09:09

## 2023-10-24 RX ADMIN — MIDODRINE HYDROCHLORIDE 10 MG: 5 TABLET ORAL at 09:08

## 2023-10-24 NOTE — CONSULTS
[facility]. Nephrology  Consult Note           Reason for Consult: WILLIE  Requesting Physician:  Dr. Benita Rondon    Chief Complaint:   bloody emesis   History Obtained From:  patient, electronic medical record    History of Present Ilness:    42 y.o. female with history s/f etOH cirrhosis who presented for hematemesis for 1 day. Admitted on 10/21. Significantly tachycardic on presentation and subsequent hypotension. HR has improved however still having episodes of hypotension. Pt on midodrine 10 mg TID. Also on lasix and aldactone as outpatient. Pt presented w/ function at baseline w/ Scr 0.8-0.9 and now up to 2.2 over the past 3 days. Lasix has been on hold. Has been on NS at 125 ml/hr since 10/22 as well.  Pt had a paracentesis yesterday w/ 4170 ml taken off     Past Medical History:        Diagnosis Date    Alcohol abuse     Chronic kidney disease     Cirrhosis ([address])     Tobacco dependence        Past Surgical History:        Procedure Laterality Date    APPENDECTOMY      COLONOSCOPY N/A 01/13/2023    COLONOSCOPY DIAGNOSTIC performed by Edi Pepe MD at [address] [place] [address] 05/12/2023    COLONOSCOPY DIAGNOSTIC performed by Charlie Diego MD at [facility] N/A 09/22/2023    COLONOSCOPY performed by Razia Donnelly MD at [address] [place] [address]      reports 6 sx on L foot and one on right foot    PARACENTESIS Left 09/13/2022    1510 ml removed per Dr Bennett Scott  specimen obtained    PARACENTESIS Left 01/11/2023    4720 ml removed by Dr. Bennett Scott - therapeutic & diagnostic    PARACENTESIS Left 01/16/2023    4400ml removed by Dr. Chiquis Long Left 02/01/2023    5600 ml removed by Dr. Bridger Ojeda Left 05/16/2023    5835 ml removed by Dr. Art Oquendo - diagnostics sent    PARACENTESIS Left 05/30/2023    5980 ml removed by Dr. Fide Oliveira Left 06/07/2023    3050 ml removed by Dr Bridger Ojeda Left 06/22/2023    4970 ml removed by Deon Holter, CNP hypertensive gastropathy with mucosal edema and granularity. No gastric varices           seen, 2 previously applied endoclips seen in the gastric body. Normal antrum.        -  Normal examined duodenum.        -  No specimens collected. Recommendation:        -  Continue present medications. Procedure Code(s):        - 23902, Esophagogastroduodenoscopy, flexible, transoral; diagnostic,           including collection of specimen(s) by brushing or washing, when performed           (separate procedure) Diagnosis Code(s):        - K22.2, Esophageal obstruction        - K92.2, Gastrointestinal hemorrhage, unspecified       CPT(R) - 2022 copyright American Medical Association. All Rights Reserved. The CPT codes, CCI edits and ICD codes generated are intended as suggestions       and were generated based on input data. These codes are preliminary and upon        review may be revised to meet current compliance and payer requirements. The provider is responsible for the final determination of appropriate codes,       and modifiers. Aide Atkins MD This document has been electronically signed. Note Initiated:10/21/2023 Note Completed:10/21/2023 11:46 AM Aide Atkins MD This document has been electronically signed. Note Amended:10/21/2023 11:51 AM    XR CHEST PORTABLE    Result Date: 10/21/2023  EXAMINATION: ONE XRAY VIEW OF THE CHEST 10/21/2023 9:12 am COMPARISON: The previous study performed 08/21/2023. Tony Wilson HISTORY: ORDERING SYSTEM PROVIDED HISTORY: tachycardia TECHNOLOGIST PROVIDED HISTORY: Reason for exam:->tachycardia Is the patient pregnant?->No What reading provider will be dictating this exam?->CRC FINDINGS: The lungs are hypoinflated. Thick linear densities are identified in the lower lung fields, indicating atelectasis. There is no evidence of acute consolidation or infiltrate. No active pleural disease is seen. The cardiac silhouette and mediastinal structures are unremarkable.   The

## 2023-10-24 NOTE — PLAN OF CARE
Problem: Pain  Goal: Verbalizes/displays adequate comfort level or baseline comfort level  Outcome: Progressing     Problem: Discharge Planning  Goal: Discharge to home or other facility with appropriate resources  10/24/2023 1149 by Lora Silva RN  Outcome: Progressing  Flowsheets  Taken 10/24/2023 0800 by Lora Silva RN  Discharge to home or other facility with appropriate resources:   Identify barriers to discharge with patient and caregiver   Arrange for needed discharge resources and transportation as appropriate   Identify discharge learning needs (meds, wound care, etc)   Arrange for interpreters to assist at discharge as needed   Refer to discharge planning if patient needs post-hospital services based on physician order or complex needs related to functional status, cognitive ability or social support system  Taken 10/24/2023 0334 by Omega Serna RN  Discharge to home or other facility with appropriate resources: Identify barriers to discharge with patient and caregiver  10/24/2023 0306 by Omega Serna RN  Outcome: Progressing     Problem: Safety - Adult  Goal: Free from fall injury  Outcome: Progressing  Flowsheets (Taken 10/24/2023 1149)  Free From Fall Injury:   Instruct family/caregiver on patient safety   Based on caregiver fall risk screen, instruct family/caregiver to ask for assistance with transferring infant if caregiver noted to have fall risk factors

## 2023-10-24 NOTE — PLAN OF CARE
Problem: Pain  Goal: Verbalizes/displays adequate comfort level or baseline comfort level  10/23/2023 1645 by Karen Silva RN  Outcome: Progressing  Flowsheets (Taken 10/23/2023 0732)  Verbalizes/displays adequate comfort level or baseline comfort level:   Encourage patient to monitor pain and request assistance   Assess pain using appropriate pain scale   Implement non-pharmacological measures as appropriate and evaluate response   Administer analgesics based on type and severity of pain and evaluate response   Consider cultural and social influences on pain and pain management   Notify Licensed Independent Practitioner if interventions unsuccessful or patient reports new pain     Problem: Discharge Planning  Goal: Discharge to home or other facility with appropriate resources  10/24/2023 0306 by Camille Ortiz RN  Outcome: Progressing  10/23/2023 1645 by Karen Silva RN  Outcome: Progressing  Flowsheets (Taken 10/23/2023 0800)  Discharge to home or other facility with appropriate resources:   Identify barriers to discharge with patient and caregiver   Arrange for needed discharge resources and transportation as appropriate   Identify discharge learning needs (meds, wound care, etc)   Arrange for interpreters to assist at discharge as needed   Refer to discharge planning if patient needs post-hospital services based on physician order or complex needs related to functional status, cognitive ability or social support system     Problem: Safety - Adult  Goal: Free from fall injury  10/23/2023 1645 by Karen Silva RN  Outcome: Progressing  Flowsheets (Taken 10/23/2023 1644)  Free From Fall Injury:   Instruct family/caregiver on patient safety   Based on caregiver fall risk screen, instruct family/caregiver to ask for assistance with transferring infant if caregiver noted to have fall risk factors

## 2023-10-24 NOTE — CARE COORDINATION
This LSW met with patient at bedside this am. Discharge plans discussed. Patient states that she will be staying at her mothers home upon discharge. Patient denies needs. LSW/CM to follow.   Electronically signed by LIDYA Box, JUHI on 10/24/23 at 8:42 AM EDT

## 2023-10-25 LAB
ALBUMIN SERPL-MCNC: 1.4 G/DL (ref 3.5–4.6)
ALP SERPL-CCNC: 98 U/L (ref 40–130)
ALT SERPL-CCNC: 14 U/L (ref 0–33)
ANION GAP SERPL CALCULATED.3IONS-SCNC: 6 MEQ/L (ref 9–15)
AST SERPL-CCNC: 47 U/L (ref 0–35)
BASOPHILS # BLD: 0 K/UL (ref 0–0.2)
BASOPHILS NFR BLD: 0.1 %
BILIRUB SERPL-MCNC: 3.8 MG/DL (ref 0.2–0.7)
BUN SERPL-MCNC: 51 MG/DL (ref 6–20)
CALCIUM SERPL-MCNC: 7.1 MG/DL (ref 8.5–9.9)
CHLORIDE SERPL-SCNC: 102 MEQ/L (ref 95–107)
CO2 SERPL-SCNC: 21 MEQ/L (ref 20–31)
CREAT SERPL-MCNC: 2.03 MG/DL (ref 0.5–0.9)
EOSINOPHIL # BLD: 0.2 K/UL (ref 0–0.7)
EOSINOPHIL NFR BLD: 2.3 %
ERYTHROCYTE [DISTWIDTH] IN BLOOD BY AUTOMATED COUNT: 17.7 % (ref 11.5–14.5)
GLOBULIN SER CALC-MCNC: 3 G/DL (ref 2.3–3.5)
GLUCOSE SERPL-MCNC: 141 MG/DL (ref 70–99)
HCT VFR BLD AUTO: 23.6 % (ref 37–47)
HGB BLD-MCNC: 8.1 G/DL (ref 12–16)
INR PPP: 2
LYMPHOCYTES # BLD: 1.6 K/UL (ref 1–4.8)
LYMPHOCYTES NFR BLD: 21.9 %
MAGNESIUM SERPL-MCNC: 1.5 MG/DL (ref 1.7–2.4)
MCH RBC QN AUTO: 31.4 PG (ref 27–31.3)
MCHC RBC AUTO-ENTMCNC: 34.3 % (ref 33–37)
MCV RBC AUTO: 91.5 FL (ref 79.4–94.8)
MONOCYTES # BLD: 1.2 K/UL (ref 0.2–0.8)
MONOCYTES NFR BLD: 16.1 %
NEUTROPHILS # BLD: 4.3 K/UL (ref 1.4–6.5)
NEUTS SEG NFR BLD: 58.5 %
PLATELET # BLD AUTO: 68 K/UL (ref 130–400)
POTASSIUM SERPL-SCNC: 4.1 MEQ/L (ref 3.4–4.9)
PROT SERPL-MCNC: 4.4 G/DL (ref 6.3–8)
PROTHROMBIN TIME: 23.2 SEC (ref 12.3–14.9)
RBC # BLD AUTO: 2.58 M/UL (ref 4.2–5.4)
SODIUM SERPL-SCNC: 129 MEQ/L (ref 135–144)
WBC # BLD AUTO: 7.3 K/UL (ref 4.8–10.8)

## 2023-10-25 PROCEDURE — 2580000003 HC RX 258: Performed by: NURSE PRACTITIONER

## 2023-10-25 PROCEDURE — 85610 PROTHROMBIN TIME: CPT

## 2023-10-25 PROCEDURE — 85025 COMPLETE CBC W/AUTO DIFF WBC: CPT

## 2023-10-25 PROCEDURE — 6370000000 HC RX 637 (ALT 250 FOR IP): Performed by: INTERNAL MEDICINE

## 2023-10-25 PROCEDURE — 6360000002 HC RX W HCPCS: Performed by: NURSE PRACTITIONER

## 2023-10-25 PROCEDURE — 6370000000 HC RX 637 (ALT 250 FOR IP): Performed by: NURSE PRACTITIONER

## 2023-10-25 PROCEDURE — 83735 ASSAY OF MAGNESIUM: CPT

## 2023-10-25 PROCEDURE — 80053 COMPREHEN METABOLIC PANEL: CPT

## 2023-10-25 PROCEDURE — 36415 COLL VENOUS BLD VENIPUNCTURE: CPT

## 2023-10-25 PROCEDURE — 99232 SBSQ HOSP IP/OBS MODERATE 35: CPT | Performed by: SPECIALIST

## 2023-10-25 PROCEDURE — 6370000000 HC RX 637 (ALT 250 FOR IP): Performed by: SPECIALIST

## 2023-10-25 PROCEDURE — 1210000000 HC MED SURG R&B

## 2023-10-25 PROCEDURE — 6360000002 HC RX W HCPCS: Performed by: INTERNAL MEDICINE

## 2023-10-25 RX ORDER — MAGNESIUM SULFATE IN WATER 40 MG/ML
2000 INJECTION, SOLUTION INTRAVENOUS ONCE
Status: COMPLETED | OUTPATIENT
Start: 2023-10-25 | End: 2023-10-25

## 2023-10-25 RX ORDER — HYDROCORTISONE 25 MG/G
CREAM TOPICAL 2 TIMES DAILY
Status: DISCONTINUED | OUTPATIENT
Start: 2023-10-25 | End: 2023-10-26 | Stop reason: HOSPADM

## 2023-10-25 RX ADMIN — LACTULOSE 20 G: 20 SOLUTION ORAL at 08:29

## 2023-10-25 RX ADMIN — LACTULOSE 20 G: 20 SOLUTION ORAL at 20:50

## 2023-10-25 RX ADMIN — CEFTRIAXONE SODIUM 1000 MG: 1 INJECTION, POWDER, FOR SOLUTION INTRAMUSCULAR; INTRAVENOUS at 08:34

## 2023-10-25 RX ADMIN — MIDODRINE HYDROCHLORIDE 10 MG: 5 TABLET ORAL at 12:02

## 2023-10-25 RX ADMIN — OCTREOTIDE ACETATE 25 MCG/HR: 500 INJECTION, SOLUTION INTRAVENOUS; SUBCUTANEOUS at 14:09

## 2023-10-25 RX ADMIN — MAGNESIUM SULFATE IN WATER 2000 MG: 40 INJECTION, SOLUTION INTRAVENOUS at 17:18

## 2023-10-25 RX ADMIN — Medication 400 MG: at 08:27

## 2023-10-25 RX ADMIN — MIDODRINE HYDROCHLORIDE 10 MG: 5 TABLET ORAL at 08:27

## 2023-10-25 RX ADMIN — MIDODRINE HYDROCHLORIDE 10 MG: 5 TABLET ORAL at 17:13

## 2023-10-25 RX ADMIN — PANTOPRAZOLE SODIUM 40 MG: 40 TABLET, DELAYED RELEASE ORAL at 05:47

## 2023-10-25 RX ADMIN — Medication 100 MG: at 08:27

## 2023-10-25 RX ADMIN — POTASSIUM CHLORIDE 10 MEQ: 750 CAPSULE, EXTENDED RELEASE ORAL at 08:27

## 2023-10-25 RX ADMIN — FOLIC ACID 1 MG: 1 TABLET ORAL at 08:27

## 2023-10-25 RX ADMIN — Medication 10 ML: at 20:52

## 2023-10-25 RX ADMIN — HYDROCORTISONE: 25 CREAM TOPICAL at 20:50

## 2023-10-25 RX ADMIN — Medication 400 MG: at 20:50

## 2023-10-25 ASSESSMENT — PAIN SCALES - GENERAL: PAINLEVEL_OUTOF10: 0

## 2023-10-25 NOTE — CARE COORDINATION
This LSW met with patient and patients mother at bedside this am. Discharge plans discussed, Patient will stay with mother upon discharge. Patient denies home going needs.   Electronically signed by LIDYA Lambert, LSW on 10/25/23 at 11:53 AM EDT

## 2023-10-25 NOTE — PROGRESS NOTES
Patient had 3 BM with blood in the toilet. Dr. Tien Vazquez rounded; new orders received. No dizziness/SOB/ CP.      Electronically signed by Seth Rodriguez RN on 10/25/2023 at 6:06 PM

## 2023-10-25 NOTE — PROGRESS NOTES
Pt shift assessment completed per flow sheets and meds given per MAR. Pt A&Ox4, respond to commands. She denies any pain. Fall precautions are in place. Will continue to monitor Pt throughout shift.

## 2023-10-26 VITALS
BODY MASS INDEX: 22.49 KG/M2 | DIASTOLIC BLOOD PRESSURE: 54 MMHG | TEMPERATURE: 97.6 F | SYSTOLIC BLOOD PRESSURE: 121 MMHG | HEIGHT: 65 IN | HEART RATE: 71 BPM | OXYGEN SATURATION: 100 % | RESPIRATION RATE: 16 BRPM | WEIGHT: 135 LBS

## 2023-10-26 DIAGNOSIS — K70.31 ALCOHOLIC CIRRHOSIS OF LIVER WITH ASCITES (HCC): Primary | ICD-10-CM

## 2023-10-26 LAB
ALBUMIN SERPL-MCNC: 1.4 G/DL (ref 3.5–4.6)
ALP SERPL-CCNC: 104 U/L (ref 40–130)
ALT SERPL-CCNC: 14 U/L (ref 0–33)
ANION GAP SERPL CALCULATED.3IONS-SCNC: 6 MEQ/L (ref 9–15)
AST SERPL-CCNC: 43 U/L (ref 0–35)
BASOPHILS # BLD: 0 K/UL (ref 0–0.2)
BASOPHILS NFR BLD: 0.3 %
BILIRUB SERPL-MCNC: 3.6 MG/DL (ref 0.2–0.7)
BUN SERPL-MCNC: 46 MG/DL (ref 6–20)
CALCIUM SERPL-MCNC: 7.3 MG/DL (ref 8.5–9.9)
CHLORIDE SERPL-SCNC: 101 MEQ/L (ref 95–107)
CO2 SERPL-SCNC: 20 MEQ/L (ref 20–31)
CREAT SERPL-MCNC: 1.61 MG/DL (ref 0.5–0.9)
EOSINOPHIL # BLD: 0.1 K/UL (ref 0–0.7)
EOSINOPHIL NFR BLD: 2 %
ERYTHROCYTE [DISTWIDTH] IN BLOOD BY AUTOMATED COUNT: 18.6 % (ref 11.5–14.5)
GLOBULIN SER CALC-MCNC: 3.2 G/DL (ref 2.3–3.5)
GLUCOSE SERPL-MCNC: 139 MG/DL (ref 70–99)
HCT VFR BLD AUTO: 24.5 % (ref 37–47)
HGB BLD-MCNC: 8.3 G/DL (ref 12–16)
INR PPP: 2.1
LYMPHOCYTES # BLD: 1.5 K/UL (ref 1–4.8)
LYMPHOCYTES NFR BLD: 20.8 %
MAGNESIUM SERPL-MCNC: 1.5 MG/DL (ref 1.7–2.4)
MCH RBC QN AUTO: 31.1 PG (ref 27–31.3)
MCHC RBC AUTO-ENTMCNC: 33.9 % (ref 33–37)
MCV RBC AUTO: 91.8 FL (ref 79.4–94.8)
MONOCYTES # BLD: 1.3 K/UL (ref 0.2–0.8)
MONOCYTES NFR BLD: 19.1 %
NEUTROPHILS # BLD: 3.9 K/UL (ref 1.4–6.5)
NEUTS SEG NFR BLD: 56.4 %
PLATELET # BLD AUTO: 73 K/UL (ref 130–400)
POTASSIUM SERPL-SCNC: 4.3 MEQ/L (ref 3.4–4.9)
PROT SERPL-MCNC: 4.6 G/DL (ref 6.3–8)
PROTHROMBIN TIME: 23.4 SEC (ref 12.3–14.9)
RBC # BLD AUTO: 2.67 M/UL (ref 4.2–5.4)
SODIUM SERPL-SCNC: 127 MEQ/L (ref 135–144)
WBC # BLD AUTO: 7 K/UL (ref 4.8–10.8)

## 2023-10-26 PROCEDURE — 36415 COLL VENOUS BLD VENIPUNCTURE: CPT

## 2023-10-26 PROCEDURE — 2580000003 HC RX 258: Performed by: NURSE PRACTITIONER

## 2023-10-26 PROCEDURE — 85025 COMPLETE CBC W/AUTO DIFF WBC: CPT

## 2023-10-26 PROCEDURE — 83735 ASSAY OF MAGNESIUM: CPT

## 2023-10-26 PROCEDURE — 6370000000 HC RX 637 (ALT 250 FOR IP): Performed by: INTERNAL MEDICINE

## 2023-10-26 PROCEDURE — 6370000000 HC RX 637 (ALT 250 FOR IP): Performed by: NURSE PRACTITIONER

## 2023-10-26 PROCEDURE — 80053 COMPREHEN METABOLIC PANEL: CPT

## 2023-10-26 PROCEDURE — 85610 PROTHROMBIN TIME: CPT

## 2023-10-26 PROCEDURE — 6360000002 HC RX W HCPCS: Performed by: NURSE PRACTITIONER

## 2023-10-26 RX ORDER — ACETAMINOPHEN 325 MG/1
650 TABLET ORAL EVERY 4 HOURS PRN
Status: DISCONTINUED | OUTPATIENT
Start: 2023-10-26 | End: 2023-10-26 | Stop reason: HOSPADM

## 2023-10-26 RX ADMIN — ACETAMINOPHEN 650 MG: 325 TABLET ORAL at 02:54

## 2023-10-26 RX ADMIN — POTASSIUM CHLORIDE 10 MEQ: 750 CAPSULE, EXTENDED RELEASE ORAL at 08:36

## 2023-10-26 RX ADMIN — MIDODRINE HYDROCHLORIDE 10 MG: 5 TABLET ORAL at 11:59

## 2023-10-26 RX ADMIN — FOLIC ACID 1 MG: 1 TABLET ORAL at 08:36

## 2023-10-26 RX ADMIN — Medication 100 MG: at 08:36

## 2023-10-26 RX ADMIN — LACTULOSE 20 G: 20 SOLUTION ORAL at 08:36

## 2023-10-26 RX ADMIN — Medication 10 ML: at 08:51

## 2023-10-26 RX ADMIN — PANTOPRAZOLE SODIUM 40 MG: 40 TABLET, DELAYED RELEASE ORAL at 06:26

## 2023-10-26 RX ADMIN — MIDODRINE HYDROCHLORIDE 10 MG: 5 TABLET ORAL at 08:36

## 2023-10-26 RX ADMIN — Medication 400 MG: at 08:36

## 2023-10-26 RX ADMIN — CEFTRIAXONE SODIUM 1000 MG: 1 INJECTION, POWDER, FOR SOLUTION INTRAMUSCULAR; INTRAVENOUS at 08:42

## 2023-10-26 RX ADMIN — HYDROCORTISONE: 25 CREAM TOPICAL at 08:51

## 2023-10-26 ASSESSMENT — PAIN DESCRIPTION - FREQUENCY: FREQUENCY: CONTINUOUS

## 2023-10-26 ASSESSMENT — PAIN SCALES - WONG BAKER
WONGBAKER_NUMERICALRESPONSE: 0
WONGBAKER_NUMERICALRESPONSE: 2

## 2023-10-26 ASSESSMENT — PAIN DESCRIPTION - LOCATION
LOCATION: BACK
LOCATION: BACK

## 2023-10-26 ASSESSMENT — PAIN DESCRIPTION - DESCRIPTORS
DESCRIPTORS: ACHING;THROBBING
DESCRIPTORS: ACHING

## 2023-10-26 ASSESSMENT — PAIN DESCRIPTION - ORIENTATION: ORIENTATION: LEFT;RIGHT;LOWER;UPPER

## 2023-10-26 ASSESSMENT — PAIN SCALES - GENERAL
PAINLEVEL_OUTOF10: 7
PAINLEVEL_OUTOF10: 6

## 2023-10-26 NOTE — PLAN OF CARE
Problem: Pain  Goal: Verbalizes/displays adequate comfort level or baseline comfort level  10/26/2023 1549 by Joann Marina RN  Outcome: Adequate for Discharge  10/26/2023 0337 by Eric Cabrera RN  Outcome: Progressing     Problem: Discharge Planning  Goal: Discharge to home or other facility with appropriate resources  10/26/2023 1549 by Joann Marina RN  Outcome: Adequate for Discharge  10/26/2023 1509 by Eric Cabrera RN  Outcome: Progressing     Problem: Safety - Adult  Goal: Free from fall injury  10/26/2023 1549 by Joann Marina RN  Outcome: Adequate for Discharge  10/26/2023 0337 by Eric Cabrera RN  Outcome: Progressing

## 2023-10-26 NOTE — DISCHARGE INSTRUCTIONS
Follow up with Dr. Will Callejas in the next 7 days or sooner if needed. Please return to ER or call 911 if you develop any significant signs or symptoms. I may or may not have addressed all of your symptoms, medical issues,  Illnesses, or all of the abnormal blood work or imaging therefore please ask your PCP and other specialists to obtain [facility] record entirely to follow up on all of your symptoms, illnesses, abnormal labs, imaging and findings that I have and have not addressed during your hospitalization. Discharging you from the hospital does not mean that your medical care ends here and now. You may still need to have additional work up, investigation, monitoring, surveillance, and treatment to be handled from this point on by in the out patient setting by  out patient providers including your PCP, Specialists and other healthcare providers. For medication questions, contact your retail pharmacy and your PCP. Your medical team at [facility] appreciates the opportunity to work with you to get well!     Micah Nguyen MD

## 2023-10-26 NOTE — PROGRESS NOTES
0830 Patient resting in bed. NO complaints of pain or discomfort. No further needs. Call light in reach. 6325 Patient discharge instructions given to patient with no questions. All belongings taken with patient.

## 2023-10-26 NOTE — CARE COORDINATION
This LSW met with patient at bedside this am. Patient is anticipating discharge home today, 10/26/23. Patient will stay with her mother upon discharge. Patient denies home going needs. YEW/FABIAN to follow.   Electronically signed by LIDYA Zavala, JUHI on 10/26/23 at 11:12 AM EDT

## 2023-10-26 NOTE — DISCHARGE SUMMARY
Hospital Medicine Discharge Summary    Randy Malave  :  1985  MRN:  [id number]    Admit date:  10/21/2023  Discharge date:  10/26/2023    Admitting Physician:  Max Delgado MD  Primary Care Physician:  Traci Camp MD      Discharge Diagnoses:    Principal Problem:    GI bleed  Resolved Problems:    * No resolved hospital problems. *      Hospital Course:   Randy Malave is a 42 y.o. female that was admitted and treated at [facility] for the following medical issues:     Acute blood loss anemia  - due to upper GI bleed  - Hb down to 6.5, baseline around 11-12  - improved s/p transfusion of PRBCs   - EGD showed active bleeding from GE junction s/p 2 endoclips placed with cessation of bleeding per report  - off Octreotide and Protonix infusion  - on IV Rocephin empirically  - is having rectal bleeding with brown stools likely related to known internal hemorrhoids  - Hb remained stable   - followed by GI      WILLIE  - in the setting of volume depletion from GI bleed  - slowly improved with s/p IVFs  - followed by nephrology     Alcoholic liver cirrhosis with ascites  - s/p paracentesis with drainage of 4100 ml per IR   - resumed Furosemide     Hypotension   - continued Midodrine      Disposition - home       Patient was seen by the following consultants while admitted to [facility]:   Consults:  [address] [place] [address]    Significant Diagnostic Studies:    IR US GUIDED PARACENTESIS    Result Date: 10/23/2023  1. Status post technically successful ultrasound-guided paracentesis. Tamir Li is a Female of 42 years age, referred for Ultrasound Guided Paracentesis. PROCEDURE: Survey of the abdomen showed large amount of ascites fluid. After obtaining informed consent, the patient was positioned supine on the sonography table.  Using ultrasound, the skin over the left hemiabdomen was Significant improvement in clinical condition compared to initial condition at presentation to hospital    Activity: activity as tolerated, fall precautions. Total time taken for discharging this patient: 40 minutes. Greater than 70% of time was spent focused exclusively on this patient. Time was taken to review chart, discuss plans with consultants, reconciling medications, discussing plan answering questions with patient. Signed:  Powell Mcardle, MD  10/26/2023, 2:02 PM  ----------------------------------------------------------------------------------------------------------------------    Adam Hylton,     Please return to ER or call 911 if you develop any significant signs or symptoms. I may not have addressed all of your medical illnesses or the abnormal blood work or imaging therefore please ask your PCP Leah Larry MD and other out patient specialists and providers  to obtain [facility] record entirely to follow up on all of the abnormal labs, imaging and findings that I have and have not addressed during your hospitalization. Discharging you from the hospital does not mean that your medical care ends here and now. You may still need additional work up, investigation, monitoring, and treatment to be handled from this point on by outside providers including your PCP, Leah Larry MD , Specialists and other healthcare providers. Please review your list of discharge medications prior to resuming medications you might still have at home, as the medications you need to be taking, dosages or how often you must take them may have changed. For medication questions, contact your retail pharmacy and your PCP, Leah Larry MD .     ** I STRONGLY RECOMMEND that you follow up with Leah Larry MD within 3 to 5 days for a post hospitalization evaluation. This specific office visit is covered by your insurance, and is not the same as your annual doctor visit/ check up.  This office visit is important, as

## 2023-10-27 ENCOUNTER — TELEPHONE (OUTPATIENT)
Dept: INTERVENTIONAL RADIOLOGY/VASCULAR | Age: 38
End: 2023-10-27

## 2023-10-27 NOTE — TELEPHONE ENCOUNTER
Patient was given this information via phone conversation - voiced understanding  2. Spoke to [employer] in [facility] to schedule procedure    >  YOUR PROCEDURE IS SCHEDULED ON: 10/30/2023 @ 3:00 pm   >  You will need to arrive at 2:30 pm from home and check in at the Diagnostic Imaging Check In desk.

## 2023-10-30 ENCOUNTER — HOSPITAL ENCOUNTER (OUTPATIENT)
Dept: INTERVENTIONAL RADIOLOGY/VASCULAR | Age: 38
Discharge: HOME | End: 2023-11-01
Payer: COMMERCIAL

## 2023-10-30 VITALS — DIASTOLIC BLOOD PRESSURE: 55 MMHG | HEART RATE: 94 BPM | OXYGEN SATURATION: 100 % | SYSTOLIC BLOOD PRESSURE: 95 MMHG

## 2023-10-30 DIAGNOSIS — K70.31 ALCOHOLIC CIRRHOSIS OF LIVER WITH ASCITES (HCC): ICD-10-CM

## 2023-10-30 PROCEDURE — 49083 ABD PARACENTESIS W/IMAGING: CPT | Performed by: RADIOLOGY

## 2023-10-30 PROCEDURE — C1729 CATH, DRAINAGE: HCPCS

## 2023-10-30 PROCEDURE — 49083 ABD PARACENTESIS W/IMAGING: CPT

## 2023-10-30 PROCEDURE — 2500000003 HC RX 250 WO HCPCS: Performed by: NURSE PRACTITIONER

## 2023-10-30 RX ORDER — LIDOCAINE HYDROCHLORIDE 20 MG/ML
INJECTION, SOLUTION INFILTRATION; PERINEURAL PRN
Status: COMPLETED | OUTPATIENT
Start: 2023-10-30 | End: 2023-10-30

## 2023-10-30 RX ADMIN — LIDOCAINE HYDROCHLORIDE 10 ML: 20 INJECTION, SOLUTION INFILTRATION; PERINEURAL at 16:09

## 2023-10-30 NOTE — OR NURSING
NO SEDATION      Pt arrived to Specials room 6 via steady ambulation. Pt alert and oriented. Allergie and  medications reviewed. Pt offered reassurance and VSS. Pt denies pain at this time. Consent obtained for ultrasound guided paracentesis. Area cleansed with large tinted chloraprep and once dry, draped with fenestrated drape and sterile towels by Mike Cesar. U/S image obtained, Natalya NUÑEZ approved LLQ site. [place] completed for ultrasound guided paracentesis. 1609  Using U/S guidance, Natalya NUÑEZ numbed site with 2% lidocaine, see eMar.     1610 Using U/S guidance, access obtained with [id number] centesis 5F catheter with return of fluid that appears clear yellow. Catheter connected to tubing and Kevin machine with suction at 200 mmHG and draining well. Pt tolerating well. VSS.     1631 Pts VSS. Pt draining and tolerating well.     1653 Pts VSS. Pt draining and tolerating well. 1710 Pts VSS. Pt draining and tolerating well. 1734 Drainage complete. Total 4680 ml removed. Centesis catheter removed and digital pressure held to site. LLQ site soft, no drainage, large bandaid applied. VSS. 1736 Discharge instructions reviewed with pt and pt voices understanding. 1 Pt ambulated with steady gait with all belongings to the bathroom and then for discharge home with mom.

## 2023-10-30 NOTE — DISCHARGE INSTRUCTIONS
Ultrasound Guided Paracentesis Discharge Instructions     Rest today. No heavy lifting, bending, stretching or pulling. Some tenderness will be normal at the procedure site for a few days. You may remove the bandaid in 48 hours. If you experience any drainage or bleeding at the site, hold pressure for 30 minutes and lay on side opposite of the procedure site (move fluid away from the area of leakage). If drainage or bleeding does not stop and seems excessive, call your physician or proceed to the ER. Watch for signs of infection such as fever, chills, drainage or redness at the site. If you experience any of these symptoms, call your primary doctor or go to the emergency room. Please keep all follow-up appointments with your Hepatologist. Schedule follow-up appointment for repeat paracentesis if necessary. If you have any concerns please contact the [address] [place] [address] Radiology Department at [phone].

## 2023-11-09 ENCOUNTER — HOSPITAL ENCOUNTER (OUTPATIENT)
Dept: INTERVENTIONAL RADIOLOGY/VASCULAR | Age: 38
Discharge: HOME | End: 2023-11-11
Payer: COMMERCIAL

## 2023-11-09 VITALS
OXYGEN SATURATION: 100 % | RESPIRATION RATE: 18 BRPM | SYSTOLIC BLOOD PRESSURE: 125 MMHG | HEART RATE: 107 BPM | DIASTOLIC BLOOD PRESSURE: 61 MMHG

## 2023-11-09 DIAGNOSIS — K70.31 ALCOHOLIC CIRRHOSIS OF LIVER WITH ASCITES (HCC): ICD-10-CM

## 2023-11-09 DIAGNOSIS — K70.31 ALCOHOLIC CIRRHOSIS OF LIVER WITH ASCITES (HCC): Primary | ICD-10-CM

## 2023-11-09 PROCEDURE — C1729 CATH, DRAINAGE: HCPCS

## 2023-11-09 PROCEDURE — 49083 ABD PARACENTESIS W/IMAGING: CPT

## 2023-11-09 PROCEDURE — 2500000003 HC RX 250 WO HCPCS: Performed by: NURSE PRACTITIONER

## 2023-11-09 RX ORDER — LIDOCAINE HYDROCHLORIDE 20 MG/ML
INJECTION, SOLUTION INFILTRATION; PERINEURAL PRN
Status: COMPLETED | OUTPATIENT
Start: 2023-11-09 | End: 2023-11-09

## 2023-11-09 RX ADMIN — LIDOCAINE HYDROCHLORIDE 5 ML: 20 INJECTION, SOLUTION INFILTRATION; PERINEURAL at 10:23

## 2023-11-09 NOTE — DISCHARGE INSTRUCTIONS
Ultrasound Guided Paracentesis Discharge Instructions     Rest today. No heavy lifting, bending, stretching or pulling. Some tenderness will be normal at the procedure site for a few days. You may remove the bandaid in 48 hours. If you experience any drainage or bleeding at the site, hold pressure for 30 minutes and lay on side opposite of the procedure site (move fluid away from the area of leakage). If drainage or bleeding does not stop and seems excessive, call your physician or proceed to the ER. Watch for signs of infection such as fever, chills, drainage or redness at the site. If you experience any of these symptoms, call your primary doctor or go to the emergency room. Please keep all follow-up appointments with your Hepatologist. Schedule follow-up appointment for repeat paracentesis if necessary. If you have any concerns please contact the [facility] Radiology Department at [phone].

## 2023-11-09 NOTE — OR NURSING
NO SEDATION    1010 [facility] obtained images prior to start of procedure using U/S. Pt  VSS. Pt's LLQ abdomen prepped LLQ abdomen with Chloraprep and draped with sterile drape and towels. 1021 Procedure explained, consent on chart from 10/30/23. Timeout completed for Ultrasound Guided Paracentesis. Using U/S, Kay Zavala CNP marked pt.     1023 Site numbed with lidocaine. Nod0yscd Centesis 5F catheter placed using Ultrasound guidance. Fluid appears clear yellow. Catheter tubing connected to [employer] PF machine to remove fluid. 1115 Pt drained >5L, needs albumin. IV attempts left arm x2, unsuccessful. Second nurse cartside with vein finder to attempt IV access. 2 more attempts unsuccessful. 1128 Pt tolerated well. Total 6080 ml removed. Catheter removed, pressure held and bandaid applied. Verbal and tactile reassurance given throughout. VSS.    1141 Spoke with Kay Zavala CNP to inform that pt declines albumin, does not want to be poked any more for IV access. Risks of hypotension explained, pt verbalized understanding and continues to decline IV.     1144 Pt ambulatory to lobby with RN. States feeling okay, denies needs. Waiting for her mother to drive her home.

## 2023-11-20 ENCOUNTER — TELEPHONE (OUTPATIENT)
Dept: INTERVENTIONAL RADIOLOGY/VASCULAR | Age: 38
End: 2023-11-20

## 2023-11-20 DIAGNOSIS — K70.31 ALCOHOLIC CIRRHOSIS OF LIVER WITH ASCITES (HCC): Primary | ICD-10-CM

## 2023-11-20 NOTE — TELEPHONE ENCOUNTER
ALTAGRACIA was given this information  via phone conversation - voiced understanding  2. Spoke to [facility] in [address] [facility] to schedule procedure    >  YOUR PROCEDURE IS SCHEDULED ON: 11/24/23 @ 1PM   >  You will need to arrive at 12:30PM (from home ) and check in at the [address] [facility] In desk.

## 2023-11-24 ENCOUNTER — HOSPITAL ENCOUNTER (OUTPATIENT)
Dept: INTERVENTIONAL RADIOLOGY/VASCULAR | Age: 38
Discharge: HOME | End: 2023-11-24
Payer: COMMERCIAL

## 2023-11-24 VITALS
SYSTOLIC BLOOD PRESSURE: 121 MMHG | DIASTOLIC BLOOD PRESSURE: 56 MMHG | OXYGEN SATURATION: 98 % | HEART RATE: 124 BPM | RESPIRATION RATE: 14 BRPM | TEMPERATURE: 97.9 F

## 2023-11-24 DIAGNOSIS — K70.31 ALCOHOLIC CIRRHOSIS OF LIVER WITH ASCITES (HCC): ICD-10-CM

## 2023-11-24 PROCEDURE — P9047 ALBUMIN (HUMAN), 25%, 50ML: HCPCS | Performed by: RADIOLOGY

## 2023-11-24 PROCEDURE — 2709999900 IR US GUIDED PARACENTESIS

## 2023-11-24 PROCEDURE — 6360000002 HC RX W HCPCS: Performed by: RADIOLOGY

## 2023-11-24 PROCEDURE — 49083 ABD PARACENTESIS W/IMAGING: CPT

## 2023-11-24 PROCEDURE — 2500000003 HC RX 250 WO HCPCS: Performed by: RADIOLOGY

## 2023-11-24 RX ORDER — ALBUMIN (HUMAN) 12.5 G/50ML
SOLUTION INTRAVENOUS CONTINUOUS PRN
Status: COMPLETED | OUTPATIENT
Start: 2023-11-24 | End: 2023-11-24

## 2023-11-24 RX ORDER — LIDOCAINE HYDROCHLORIDE 20 MG/ML
INJECTION, SOLUTION INFILTRATION; PERINEURAL PRN
Status: COMPLETED | OUTPATIENT
Start: 2023-11-24 | End: 2023-11-24

## 2023-11-24 RX ADMIN — LIDOCAINE HYDROCHLORIDE 6 ML: 20 INJECTION, SOLUTION INFILTRATION; PERINEURAL at 13:44

## 2023-11-24 RX ADMIN — ALBUMIN (HUMAN) 50 G: 12.5 SOLUTION INTRAVENOUS at 13:25

## 2023-11-24 RX ADMIN — LIDOCAINE HYDROCHLORIDE 10 ML: 20 INJECTION, SOLUTION INFILTRATION; PERINEURAL at 12:52

## 2023-11-24 NOTE — OR NURSING
Pt resting on cart. Albumin finished infusing. HR improved but still elevated. Pt states acid reflux improved. Again pt encouraged to go to ED for evaluation of heart rate and \"heart burn,\" pt declined, wants to go home. Discharge instructions reviewed with and given to pt. Pt left via w/c with tech - JS. Pt states she will go to ED later if feeling any worse.

## 2023-11-24 NOTE — OR NURSING
NO SEDATION      Pt arrived to Specials room 6 via steady ambulation. Pt's abdomen distended. Pt states she has not been feeling well for about a week and heart has been racing. Pt states she saw her family  Dr who is aware pt has not been feeling well and has elevated heart rate. Hx, allergies, medications reviewed. Pt offered reassurance and VSS. Consent obtained for ultrasound guided paracentesis. U/S image obtained. Area cleansed with large tinted chloraprep and once dry, draped with fenestrated drape and sterile towels by Randal Dunham.     1400 [facility] Dr Pranav De Leon present and approved [facility] site. Timeout completed. 1252 Using U/S guidance, Dr. Neal Merlin numbed site with 2% lidocaine, see eMar.     Using U/S guidance, access obtained with [id number] centesis 5F catheter with return of fluid that appears clear yellow. Catheter connected to tubing and Kevin machine with suction at 200 mmHG and draining well. Pt tolerating well. VSS.     1301 PT's VSS. Pt draining and tolerating well. 1325 IV started. Albumin infusing. 1335 Centesis catheter fell out. [id number] Dr Pranav De Leon present. 1344 Using U/S guidance,  Ace Him site with 2% lidocaine, see eMar.     1345 Using U/S guidance, access obtained with [id number] centesis 5F catheter with return of fluid that appears clear yellow. Catheter connected to tubing and Kevin machine with suction at 200 mmHG and draining well. Pt tolerating well.     1350 assume care of pt. Pt with HR of 130, states she is not having any chest pain, but is experiencing acid reflux. States the pressure in her abdomen has improved. 1405  8 liters removed so far. Albumin continues to infuse. 1415 Drainage complete. Total 8820ml removed. Centesis catheter removed and digital pressure held to site for 2 minutes. 1420 Q site soft, no drainage, large bandaid applied. VSS. Albumin continues. Pt on ekg monitor, SVT noted.  Encouraged pt to get evaluated in the ED after her procedure. Explained the importance of getting the elevated heart rate assessed.

## 2023-11-24 NOTE — DISCHARGE INSTRUCTIONS
Ultrasound Guided Paracentesis Discharge Instructions     Rest today. No heavy lifting, bending, stretching or pulling. Some tenderness will be normal at the procedure site for a few days. You may remove the bandaid in 48 hours. If you experience any drainage or bleeding at the site, hold pressure for 30 minutes and lay on side opposite of the procedure site (move fluid away from the area of leakage). If drainage or bleeding does not stop and seems excessive, call your physician or proceed to the ER. Watch for signs of infection such as fever, chills, drainage or redness at the site. If you experience any of these symptoms, call your primary doctor or go to the emergency room. Please keep all follow-up appointments with your Hepatologist. Schedule follow-up appointment for repeat paracentesis if necessary. If you have any concerns please contact the [facility] Radiology Department at [phone].

## 2023-11-27 PROBLEM — K76.82 HEPATIC ENCEPHALOPATHY (HCC): Status: ACTIVE | Noted: 2023-01-01

## 2023-11-27 NOTE — ED NOTES
Pt can be aroused by pain but easier than before, she will on occasion make a grunting noise and swallow.   Pt's temp is up to 97.2 temporal.     Louie Castaneda RN  11/27/23 0463

## 2023-11-27 NOTE — ED NOTES
Fluids running at this time with fluid warmer in place at this time        Pedro Patel RN  11/27/23 4631

## 2023-11-27 NOTE — H&P
Hospital Medicine  History and Physical    Patient:  Tylor Carnes  MRN: [id number]    CHIEF COMPLAINT:    Chief Complaint   Patient presents with    Altered Mental Status     LPD did well check and found pt        History Obtained From:  electronic medical record  Primary Care Physician: Shari Marie MD    HISTORY OF PRESENT ILLNESS:   The patient is a 42 y.o. female who presents with a hx of etoh abuse, liver cirrhosis requiring q2week paracentesis, who had last reported normal last night when calling family. Today was found down unresponsive. Family reports no known changes to care. Had scheduled successful paracentesis November 24, 8800ml removed. No further history available.      Past Medical History:      Diagnosis Date    Alcohol abuse     Chronic kidney disease     Cirrhosis ([address])     Tobacco dependence        Past Surgical History:      Procedure Laterality Date    APPENDECTOMY      COLONOSCOPY N/A 01/13/2023    COLONOSCOPY DIAGNOSTIC performed by Edi Pepe MD at [address] [place] [address] 05/12/2023    COLONOSCOPY DIAGNOSTIC performed by Charlie Diego MD at [facility] N/A 09/22/2023    COLONOSCOPY performed by Razia Donnelly MD at [address] [place] [address]      reports 6 sx on L foot and one on right foot    PARACENTESIS Left 09/13/2022    1510 ml removed per Dr Bennett Scott  specimen obtained    PARACENTESIS Left 01/11/2023    4720 ml removed by Dr. Bennett Scott - therapeutic & diagnostic    PARACENTESIS Left 01/16/2023    4400ml removed by Dr. Chiquis Long Left 02/01/2023    5600 ml removed by Dr. Bridger Ojeda Left 05/16/2023    5835 ml removed by Dr. Art Oquendo - diagnostics sent    PARACENTESIS Left 05/30/2023    5980 ml removed by Dr. Fide Oliveira Left 06/07/2023    3050 ml removed by Dr Bridger Ojeda Left 06/22/2023    4970 ml removed by Deon Holter, CNP    PARACENTESIS Left 07/07/2023    2300ml removed by Dr. Bridger Ojeda abnormal extra-axial fluid collection. The gray-white differentiation is maintained without evidence of an acute infarct. There is no evidence of hydrocephalus. ORBITS: The visualized portion of the orbits demonstrate no acute abnormality. SINUSES: The visualized paranasal sinuses and mastoid air cells demonstrate no acute abnormality. SOFT TISSUES/SKULL:  No acute abnormality of the visualized skull or soft tissues. No acute intracranial abnormality. Assessment and Plan   Severe hepatic encephalopathy without coma  Lactulose po, ng if unable, per rectum if unable. GI consult. Repeat am labs. ICU admission. ABG. WILLIE on CKD3  Cautious IV fluid rescucitation, nephrology consult. Severe lactic acidosis  Likely 2/2 extended down time. Trend with abg. Cirrhosis with acute hepatitis  Unknown if patient relapsed with etoh, gi consult. Will cover for sbp due to recent paracentesis. However no fever.     DVT proph    Patient Active Problem List   Diagnosis Code    Major depressive disorder, severe (HCC) F32.2    Other ascites R18.8    GI bleeding K92.2    Abdominal distention O60.5    Alcoholic cirrhosis of liver with ascites (HCC) K70.31    Elevated INR R79.1    Severe alcohol use disorder (HCC) F10.20    Acute blood loss anemia D62    Melena K92.1    Anemia D64.9    Decompensated hepatic cirrhosis (HCC) K72.90, K74.60    Venous stasis dermatitis of both lower extremities I87.2    GI bleed K92.2    Hypotension I95.9    Rectal bleeding K62.5    Colitis K52.9    Secondary esophageal varices without bleeding (HCC) I85.10    Dieulafoy lesion of stomach K31.82    Hepatic encephalopathy (HCC) K76.82       Michelle Nina MD, MD  Admitting Hospitalist

## 2023-11-28 PROBLEM — Z71.89 ADVANCED CARE PLANNING/COUNSELING DISCUSSION: Status: ACTIVE | Noted: 2023-01-01

## 2023-11-28 PROBLEM — Z71.89 GOALS OF CARE, COUNSELING/DISCUSSION: Status: ACTIVE | Noted: 2023-01-01

## 2023-11-28 PROBLEM — Z51.5 PALLIATIVE CARE ENCOUNTER: Status: ACTIVE | Noted: 2023-01-01

## 2023-11-28 PROBLEM — K92.2 GASTROINTESTINAL HEMORRHAGE: Status: ACTIVE | Noted: 2023-01-01

## 2023-11-28 PROBLEM — R41.82 ALTERED MENTAL STATUS: Status: ACTIVE | Noted: 2023-01-01

## 2023-11-28 NOTE — CONSULTS
Palliative Care Consult Note  Patient: Dominick Meng  Gender: female  YOB: 1985  Unit/Bed: IC05/IC05-01  CodeStatus: Full Code  Inpatient Treatment Team: Treatment Team: Attending Provider: Mary Badillo MD; Consulting Physician: Ezell Gottron, MD; Consulting Physician: Radha Alcantara MD; Registered Nurse: Ashlie Stein, RN; Utilization Reviewer: Michael Nevarez RN; Surgeon: Ezell Gottron, MD  Admit Date:  11/27/2023    Chief Complaint: Altered mental status    History of Presenting Illness:      Dominick Meng is a 42 y.o. female on hospital day 1 with a history of etoh abuse, liver cirrhosis stasis every 2 weeks, history of esophageal varices and GI bleeding, ckd. Patient was brought to the emergency room after being found down unresponsive home. Patient was admitted for severe hepatic encephalopathy without coma. Patient admitted to ICU. Patient noted to have bloody output from NG in nare's. She then began to experience respiratory distress and decreased pulse ox. Rapid response called and patient was emergently intubated. Patient received 1 unit of PRBC. Palliative care was consulted for goals of care, CODE STATUS discussion, family support, and symptom management. Patient seen by palliative care in the outpatient setting. Patient has had multiple hospitalizations in the last 6 months. Upon entering room patient is sedated on ventilator. Patient is on Fentanyl, propofol, octreotide and Protonix gtt. BP decreased this shift and Levophed started. Patient had cvc placed today. GI at bedside to do EGD. I spoke to patients family in the waiting room. I discussed patients case, goals of care and code status. Per patients mother patient would wish to remain a FULL CODE. All questions and concerns addressed. Most recent notable labs: Clemmie Numbers   Sodium 125, chloride 89, BUN 52, creatinine 4.39, GFR 11, lactic acid 3.0, albumin 2.4, alk phos 190, ALT 35, , hemoglobin 8.4, WBC contact me for clarification. Thanks for the opportunity you have allowed us to provide palliative care to Tylor Carnes. We will be in touch as care progresses. Please feel free to reach out to us should you have any questions or requests.

## 2023-11-28 NOTE — PROGRESS NOTES
Hospitalist Daily Progress Note  Name: Jailene Sherwood  Age: 42 y.o. Gender: female  CodeStatus: Full Code  Allergies: Oxycodone-Acetaminophen    Chief Complaint:Altered Mental Status (LPD did well check and found pt )      Primary Care Provider: Griselda Hudson MD    InpatientTreatment Team: Treatment Team: Attending Provider: Velia Donovan MD; Consulting Physician: Silvestre Kiser MD; Consulting Physician: Sara Wyatt MD; Registered Nurse: Nadine Grewal RN; Utilization Reviewer: Brenda Nj RN; Surgeon: Silvestre Kiser MD    Admission Date: 11/27/2023      Subjective: Intubated, sedated, critically ill. Physical Exam  Vitals and nursing note reviewed.          Medications:  Reviewed    Infusion Medications:    propofol 25 mcg/kg/min (11/28/23 0645)    fentaNYL 200 mcg/hr (11/28/23 1015)    sodium chloride      pantoprazole (PROTONIX) 80 mg in sodium chloride 0.9 % 100 mL infusion 8 mg/hr (11/28/23 0645)    octreotide (SANDOSTATIN) 500 mcg in sodium chloride 0.9 % 100 mL infusion 50 mcg/hr (11/28/23 0645)    dextrose      sodium chloride      sodium chloride      sodium chloride      norepinephrine Stopped (11/28/23 0348)     Scheduled Medications:    chlorhexidine  15 mL Mouth/Throat BID    albumin human 25%  50 g IntraVENous Once    insulin lispro  0-8 Units SubCUTAneous Q4H    piperacillin-tazobactam  3,375 mg IntraVENous Q12H    sodium chloride flush  5-40 mL IntraVENous 2 times per day    lactulose  20 g Oral 6 times per day     PRN Meds: sodium chloride, glucose, dextrose bolus **OR** dextrose bolus, glucagon (rDNA), dextrose, sodium chloride, sodium chloride, sodium chloride flush, sodium chloride, ondansetron **OR** ondansetron, polyethylene glycol, potassium chloride **OR** potassium chloride, magnesium sulfate    Labs:   Recent Labs     11/27/23  1600 11/27/23  1732 11/28/23  0348 11/28/23  0439 11/28/23  0949   WBC 13.9*  --  11.2*  --   --    HGB 7.0*   < > 7.6* 7.1* 8.4*   HCT 19.6*

## 2023-11-28 NOTE — CONSENT
Informed Consent for Blood Component Transfusion Note    I have discussed with the father the rationale for blood component transfusion; its benefits in treating or preventing fatigue, organ damage, or death; and its risk which includes mild transfusion reactions, rare risk of blood borne infection, or more serious but rare reactions. I have discussed the alternatives to transfusion, including the risk and consequences of not receiving transfusion. The father had an opportunity to ask questions and had agreed to proceed with transfusion of blood components.     Electronically signed by Francisco Victor DO on 11/28/23 at 3:55 AM EST

## 2023-11-28 NOTE — CARE COORDINATION
This nurse attempted to reach Tashiabaudilio Gilmore who is the patient's mother/hcpoa but I received an unidentified voicemail and no information was left.

## 2023-11-28 NOTE — CONSULTS
Inpatient consult to GI  Consult performed by: SANTA Phan - SAVANNAH  Consult ordered by: Robert Uriarte DO          Patient Name: Sirena López Date: 2023  3:51 PM  MR #: [id number]  : 1985    Attending Physician: Rama Chen MD  Reason for consult: hepatic encephalopathy, GIB    History of Presenting Illness:      Tylor Carnes is a 42 y.o. female on hospital day 1 with a history of decompensated alcoholic cirrhosis with  esophageal varices with history of GI bleeding, hepatic encephalopathy, & ascites. Past surgical history of appendectomy, multiple endoscopic procedures, foot surgery, and multiple paracentesis. Family history is negative for GI malignancy. Social history current nicotine and EtOH use, no history of illicit drug use. History Obtained From:  patient, electronic medical record  GI consult for hepatic encephalopathy and GI bleeding-patient was admitted to the intensive care unit after being found at home unresponsive by [facility] for well check, patient is known to our service and followed as an outpatient however as of recent has not been seen and she is currently drinking alcohol and not following up accordingly. She carries a diagnosis of decompensated alcoholic cirrhosis with history of esophageal varices and GI bleeding, hepatic encephalopathy and refractory ascites requiring large-volume paracentesis every 2 weeks, currently drinks alcohol. Patient was initially admitted to the intensive care unit and throughout the night became more obtunded, unable to protect her airway required intubation upon intubation patient had ad amount of blood from her OG tube approximately 500 cc.   Noted patient had hospitalization approximately 1 month ago for GI bleeding, had EGD which was notable for esophageal narrowing/stricturing at 15 cm to the extent that banding equipment was not able to be passed, did have clips placed to the EG junction for CT ABDOMEN PELVIS WO CONTRAST Additional Contrast? None    Result Date: 11/27/2023  EXAMINATION: CT OF THE ABDOMEN AND PELVIS WITHOUT CONTRAST 11/27/2023 5:56 pm TECHNIQUE: CT of the abdomen and pelvis was performed without the administration of intravenous contrast. Multiplanar reformatted images are provided for review. Automated exposure control, iterative reconstruction, and/or weight based adjustment of the mA/kV was utilized to reduce the radiation dose to as low as reasonably achievable. COMPARISON: August 6, 2023 HISTORY: ORDERING SYSTEM PROVIDED HISTORY: high liver tests, renal failure TECHNOLOGIST PROVIDED HISTORY: Reason for exam:->high liver tests, renal failure Additional Contrast?->None Decision Support Exception - unselect if not a suspected or confirmed emergency medical condition->Emergency Medical Condition (MA) What reading provider will be dictating this exam?->CRC FINDINGS: Moderate ascites throughout abdomen and pelvis notable along margins of liver and spleen, right and left paracolic gutter, and central aspect of the pelvis. There is generalized hazy appearance of abdominal and pelvic mesenteric fat. There are multiple thick walled segments of small bowel throughout the abdomen. No evidence of bowel obstruction. No free air. Moderate amount of stool seen throughout the colon. The appendix is not definitively visualized. Diminutive size of the liver. Generalized decreased attenuation throughout the liver. Gallbladder is mildly distended. Gallstone is seen at level of the gallbladder neck appearing in similar position. Pancreas is grossly unremarkable. No hydronephrosis. No evidence of renal or ureteral calculus. The spleen is prominent in size. There is prominence of portal vasculature notable in the epigastric location. Urinary bladder is grossly unremarkable. There is a small left pleural effusion.      1. Moderate ascites which has increased compared with prior CT from

## 2023-11-28 NOTE — PROGRESS NOTES
20:30-21:45  Bedside handoff report received in ED bed 8 from ED RN. Neuro assessment completed at bedside by this nurse and ED RN prior to transfer pt continues to be obtunded and responsive to pain. Pt transported from ED to ICU via stretcher accompanied by this RN and PCA w/o incident. Pt safely transferred into ICU bed 5 w/o incident. All proper monitoring equipment applied and functioning. Assessments completed (see flowsheets). Pt remains obtunded and responsive to pain. Pt failed bedside swallow study. NG place to right nare, placement verified via respiratory status, external length of 55cm, GI content an pH of 5. PERRLA. Cap refill <3 seconds to all extremities. Bilateral radial and pedal pulses strong and equal. Skin assessment completed by this nurse and secondary RN (Emily,RN). Skin assessment as follows abrasions noted to mid sternum, bilateral knees and mid back, ecchymosis/redness noted to right forehead and needle marks noted to LLQ s/p paracentesis site area cleansed with ns dried, covered with 4x4 and clear dressing applied to site. Preventive mepilex applied to bilateral heels and sacrum. Pts personal belongings accounted for and documented (see flowsheets). Pernell Wang notified of pts arrival to unit and updated on pts condition, SNOs. Family at bedside, HIPAA code and update provided. Family understanding of information. No s/s of distress noted. Safety measures in place. 01:00-01:30 Reassessment completed (see flowsheets). Pt remains obtunded and responsive to pain. Pt will open eyes to pain. IV drip infusing/titrated per orders. R nare NG remains in place. Placement verified via respiratory status, external length of 55cm and GI content. Scant bloody drainage noted from bilateral nares. No s/s of respiratory distress noted. Bladder scan completed total of 528mls noted. Straight cath completed per facility protocol 350mls of malodorous, clear, howard colored urine obtained.  Post straight

## 2023-11-28 NOTE — PROGRESS NOTES
1600 EgD done at bedside. RN and RT at bedside. OG removed for procedure. One clip placed. Pt tolerated well.     1700 continue to titrate Levo gtt down. Hr now in the low 100's.

## 2023-11-28 NOTE — CONSULTS
[facility]. Nephrology  Consult Note           Reason for Consult: WILLIE  Requesting Physician:  Dr. Lilibeth Muller    Chief Complaint:  AMS  History Obtained From:  patient, electronic medical record    History of Present Ilness:    42 y.o. female with history s/f EtOH cirrhosis, GIB, chronic hypotension on midodrine who presented for AMS. Pt was found unresponsive by family. Last nml the night before. Had recently had a paracentesis on 11/24 w/ 8.8 L taken off. On presentation pt hypothermic and tachycardic. Bp initially nml however trended down w/ patient needing to be intubated for airway protection. Labs were notable for WILLIE w/ Scr 4.6 (notably had WILLIE due to hemodynamically instability during last admission in 10/23). Na 122, CO2 17, lcatate 11.1 (now down to 3), elevated liver enzymes, UDS positive for cannabinoids, Hb 7.4, Plt 65. Blood ID panel also showing enterobacter and E.Coli.      Past Medical History:        Diagnosis Date    Alcohol abuse     Chronic kidney disease     Cirrhosis ([address])     Tobacco dependence        Past Surgical History:        Procedure Laterality Date    APPENDECTOMY      COLONOSCOPY N/A 01/13/2023    COLONOSCOPY DIAGNOSTIC performed by Shadi Bardales MD at [address] [place] [address] 05/12/2023    COLONOSCOPY DIAGNOSTIC performed by Jb Wayne MD at [facility] N/A 09/22/2023    COLONOSCOPY performed by Imani Glynn MD at [address] [place] [address]      reports 6 sx on L foot and one on right foot    PARACENTESIS Left 09/13/2022    1510 ml removed per Dr Tia Lopez  specimen obtained    PARACENTESIS Left 01/11/2023    4720 ml removed by Dr. Tia Lopez - therapeutic & diagnostic    PARACENTESIS Left 01/16/2023    4400ml removed by Dr. Cindy Penn Left 02/01/2023    5600 ml removed by Dr. Tatianna Bryson Left 05/16/2023    5835 ml removed by Dr. John Thompson - diagnostics sent    PARACENTESIS Left 05/30/2023    5980 ml removed by Dr. John Thompson [Dear  ___] : Dear  [unfilled], [Consult Letter:] : I had the pleasure of evaluating your patient, [unfilled]. [Please see my note below.] : Please see my note below. [Consult Closing:] : Thank you very much for allowing me to participate in the care of this patient.  If you have any questions, please do not hesitate to contact me. [Sincerely,] : Sincerely, [FreeTextEntry2] : MARY ELLEN PORRAS [FreeTextEntry3] : Rosibel Waldrop MD\par Attending Physician, Division of Allergy/Immunology\par [facility]

## 2023-11-28 NOTE — SIGNIFICANT EVENT
Rapid response called to ICU room 5 at 0321hrs. Patient was admitted yesterday afternoon for decompensated hepatic cirrhosis with hepatic encephalopathy. She was admitted to the ICU in setting of instability. NG tube had been placed as patient was too somnolent/obtunded to swallow safely in setting of an ammonia level >250, NG NG tube was being utilized to administer needed medications including lactulose. Immediately before the rapid response was called, patient began to have active bloody output from NG placed to suction when not being used at the time for medication administration, and also began to have bloody output from bilateral nares. She began to experience respiratory distress and decreases in pulse ox. Rapid response was called in setting of new bleeding and impending failure to protect airway. On my examination the patient on arriving to the room, she is appended, increased work of breathing, actively blowing small amounts of blood repeatedly out of her nostrils on both sides not just the side with the NG tube. Decision was made to rapidly intubate for airway protection. On visualization with the video laryngoscope for intubation, OG tube initially was feared to be placed in the lungs but on suctioning and further review her appeared to appropriately descend down the esophagus. Patient had significant blood in posterior oropharynx, near vallecula, and in the esophagus which was suctioned. Patient was intubated with endotracheal tube under video laryngoscopy. She was placed on mechanical ventilation using lung protective strategy tidal volumes, respiratory rate, FiO2, and PEEP. OG tube was subsequently placed. Placement of both ETT and OG were verified by me with portable bedside x-ray. Subsequent suctioning demonstrated bloody output from both the OG and the ETT. Patient's reported last hemoglobin was already low at ~7.0.   Blood bank was contacted and is found that she is already been

## 2023-11-28 NOTE — ACP (ADVANCE CARE PLANNING)
Advance Care Planning     Advance Care Planning Activator (Inpatient)  Conversation Note      Date of ACP Conversation: 11/27/2023     Conversation Conducted with:   ACP Activator: Lyndon Ames RN    Pt has acp documents on the chart with her mother Stephanie Mccain listed as her decision maker. At this time, pt is a full code.     Health Care Decision Maker:     Current Designated Health Care Decision Maker:     Primary Decision Maker: Suma Norriser  Parent - [phone]    Primary Decision Maker: Willie   Child - [phone]

## 2023-11-28 NOTE — PROGRESS NOTES
0730 Pt assessment complete and documented. 0840 Pt\"s HR elevated and BP lower, resumed Levo gtt and discussed with Dr Reche Mohs and [facility] NP. Instructed to take Fentanyl gtt to 200mcg and turn propofol up to 50 mcg.     0915 Rounds, new orders placed. Pt to receive blood and plasma. 1130 CVC line placed. 1330 Celox placed on CVC site, as area continues to bleed. 1415 Dressing over CVC remains dry, no signs of bleeding. BS 66, Pt given D10 bolus.  Recheck 114

## 2023-11-28 NOTE — CARE COORDINATION
Case Management Assessment  Initial Evaluation    Date/Time of Evaluation: 11/27/2023 8:38 PM  Assessment Completed by: Bess Maldonado RN    If patient is discharged prior to next notation, then this note serves as note for discharge by case management. Patient Name: Bridget Clayton                   YOB: 1985  Diagnosis: Hepatic encephalopathy ([address]) [K76.82]                   Date / Time: 11/27/2023  3:51 PM    Patient Admission Status: Inpatient   Readmission Risk (Low < 19, Mod (19-27), High > 27): Readmission Risk Score: 32.9    Current PCP: Kris De La Cruz MD  PCP verified by CM? Yes    Chart Reviewed: Yes      History Provided by: Child/Family  Patient Orientation: Other (see comment) (pt resting with her eyes closed.)    Patient Cognition: Other (see comment) (unable to assess)    Hospitalization in the last 30 days (Readmission):  No    If yes, Readmission Assessment in CM Navigator will be completed. Advance Directives:      Code Status: Full Code   Patient's Primary Decision Maker is: Named in [facility] [place]     Primary Decision Maker: [facility] - [phone]    Primary Decision Maker: Mady Phalen  Child - [phone]    Discharge Planning:    Patient lives with: (P) Alone Type of Home: (P) House  Primary Care Giver: Self  Patient Support Systems include: Children, Parent   Current Financial resources: (P) Medicaid  Current community resources: (P) None  Current services prior to admission: (P) None            Current DME:              Type of Home Care services:  (P) None    ADLS  Prior functional level: Independent in ADLs/IADLs  Current functional level: Independent in ADLs/IADLs    PT AM-PAC:   /24  OT AM-PAC:   /24    Family can provide assistance at DC: Yes  Would you like Case Management to discuss the discharge plan with any other family members/significant others, and if so, who?  Yes (mom David, daughter Yoselyn Edwards)  Plans to Return to Present Housing: Yes  Other Identified Issues/Barriers to RETURNING to current housing: unknown at this time. Potential Assistance needed at discharge: (P) Other (Comment) (information on hhc/rehab/snf services given to the patient's family.)            Potential DME:    Patient expects to discharge to: (P) Other (comment) (to be determined pending pt's condition.)  Plan for transportation at discharge:      Financial    Payor: Safia Monroe / Plan: Campos Blaise / Product Type: *No Product type* /     Does insurance require precert for SNF: Yes    Potential assistance Purchasing Medications: (P) No  Meds-to-Beds request:        Deven Cramer [address] [place] [address] [place] [phone] Kenna Spurling [phone] [address] [facility] [address]  Phone: [phone] Fax: [phone]      Notes:    Factors facilitating achievement of predicted outcomes: Family support    Barriers to discharge: medical clearance. Additional Case Management Notes: pt is currently minimally responsive. Her family is at the bedside. Pt does not have any services in the home. She does come in for paracenteses. She uses a cane. Her family denied that she is on dialysis or home O2. Information on post d/c services: hhc/rehab/snf given to the patient's family. The Plan for Transition of Care is related to the following treatment goals of Hepatic encephalopathy ([address]) [J56.32]    IF APPLICABLE: The Patient and/or patient representative Kendrick Simpson and her family were provided with a choice of provider and agrees with the discharge plan. Freedom of choice list with basic dialogue that supports the patient's individualized plan of care/goals and shares the quality data associated with the providers was provided to:     Patient Representative Name:       The Patient and/or Patient Representative Agree with the Discharge Plan?       Savana Roy RN  Case Management Department

## 2023-11-28 NOTE — ED PROVIDER NOTES
[facility] [id number] [facility] PAN[facility] - Abnormal; Notable for the following components:    Sodium 122 (*)     Chloride 81 (*)     CO2 17 (*)     Anion Gap 24 (*)     BUN 51 (*)     Creatinine 4.63 (*)     Est, Glom Filt Rate 11.6 (*)     Calcium 7.8 (*)     Total Protein 6.1 (*)     Albumin 2.3 (*)     Total Bilirubin 10.8 (*)     Alkaline Phosphatase 195 (*)     AST 84 (*)     Globulin 3.8 (*)     All other components within normal limits   LACTIC ACID - Abnormal; Notable for the following components:    Lactic Acid 11.1 (*)     All other components within normal limits    Narrative:     CALL  [place]  [facility] tel. [phone],  LACID results called to and read back by [facility], 11/27/2023 16:55, by  Ignacia Abad   MAGNESIUM - Abnormal; Notable for the following components:    Magnesium 1.2 (*)     All other components within normal limits   PROTIME-INR - Abnormal; Notable for the following components:    Protime 24.8 (*)     All other components within normal limits   URINE DRUG SCREEN - Abnormal; Notable for the following components:    Cannabinoid Scrn, Ur POSITIVE (*)     All other components within normal limits   AMMONIA - Abnormal; Notable for the following components:    Ammonia 258 (*)     All other components within normal limits   CBC WITH AUTO DIFFERENTIAL - Abnormal; Notable for the following components:    WBC 11.2 (*)     RBC 2.41 (*)     Hemoglobin 7.6 (*)     Hematocrit 21.7 (*)     MCH 31.5 (*)     RDW 18.0 (*)     Platelets 67 (*)     Neutrophils Absolute 8.4 (*)     Monocytes Absolute 1.2 (*)     All other components within normal limits   COMPREHENSIVE METABOLIC PANEL W/ REFLEX TO MG FOR LOW K - Abnormal; Notable for the following components:    Sodium 125 (*)     Chloride 89 (*)     Anion Gap 16 (*)     BUN 52 (*)     Creatinine 4.39 (*)     Est, Glom Filt Rate 12.4 (*)     Calcium 8.0 (*)     Albumin 2.4 (*)     Total Bilirubin 11.3 (*)     Alkaline Phosphatase 190 (*)     ALT 35 (*)      (*) down from 8.3 within the last couple of weeks. Fever. While in the ER patient was maintaining her airway and her oxygenation. Liver enzymes much more elevated than usual with a total bilirubin of 10 up from 3.8 in October. Patient also had significant deterioration in her renal function BUN 51 creatinine 2.0 in October. Patent today 4.6. Patient presented tachycardic but had adequate blood pressure during most of her stay in the ER. Patient did receive 2 L of fluid and banana bag. Patient's blood pressure started to go down near 7 PM.  Patient treated for sepsis. The other possibility was GI bleed considering the drop in her hemoglobin and her history of cirrhosis. Patient has been typed and screened while in the ER but was not transfused. Acute GI bleed could certainly due to hepatic encephalopathy. Patient admitted to Dr. Nasima Jimenez to the intensive care unit    Medical Decision Making  Amount and/or Complexity of Data Reviewed  Labs: ordered. Radiology: ordered. Risk  Prescription drug management. Decision regarding hospitalization. REASSESSMENT          CRITICAL CARE TIME   Total Critical Care time was 90 minutes, excluding separately reportable procedures. There was a high probability of clinically significant/life threatening deterioration in the patient's condition which required my urgent intervention. CONSULTS:  IP CONSULT TO GI  IP CONSULT TO NEPHROLOGY  IP CONSULT TO GI    PROCEDURES:  Unless otherwise noted below, none     Procedures        FINAL IMPRESSION      1. Altered mental status, unspecified altered mental status type    2. Hepatic encephalopathy ([address])    3. Acute hypoxic respiratory failure ([address])          DISPOSITION/PLAN   DISPOSITION Admitted 11/27/2023 06:44:41 PM      PATIENT REFERRED TO:  No follow-up provider specified.     DISCHARGE MEDICATIONS:  Current Discharge Medication List        Controlled Substances Monitoring:     RX Monitoring Periodic Controlled

## 2023-11-29 PROBLEM — R65.21 SEPSIS DUE TO ESCHERICHIA COLI WITH ACUTE HYPOXIC RESPIRATORY FAILURE AND SEPTIC SHOCK (HCC): Status: ACTIVE | Noted: 2023-01-01

## 2023-11-29 PROBLEM — A41.51 SEPSIS DUE TO ESCHERICHIA COLI WITH ACUTE HYPOXIC RESPIRATORY FAILURE AND SEPTIC SHOCK (HCC): Status: ACTIVE | Noted: 2023-01-01

## 2023-11-29 PROBLEM — Z71.89 ENCOUNTER FOR HOSPICE CARE DISCUSSION: Status: ACTIVE | Noted: 2023-01-01

## 2023-11-29 PROBLEM — J96.01 SEPSIS DUE TO ESCHERICHIA COLI WITH ACUTE HYPOXIC RESPIRATORY FAILURE AND SEPTIC SHOCK (HCC): Status: ACTIVE | Noted: 2023-01-01

## 2023-11-29 NOTE — PLAN OF CARE
Problem: Discharge Planning  Goal: Discharge to home or other facility with appropriate resources  Outcome: Not Progressing  Flowsheets (Taken 11/28/2023 2000)  Discharge to home or other facility with appropriate resources:   Identify barriers to discharge with patient and caregiver   Arrange for needed discharge resources and transportation as appropriate     Problem: Safety - Adult  Goal: Free from fall injury  Outcome: Not Progressing     Problem: Pain  Goal: Verbalizes/displays adequate comfort level or baseline comfort level  Outcome: Not Progressing  Flowsheets (Taken 11/28/2023 2000)  Verbalizes/displays adequate comfort level or baseline comfort level:   Encourage patient to monitor pain and request assistance   Assess pain using appropriate pain scale     Problem: Skin/Tissue Integrity  Goal: Absence of new skin breakdown  Description: 1. Monitor for areas of redness and/or skin breakdown  2. Assess vascular access sites hourly  3. Every 4-6 hours minimum:  Change oxygen saturation probe site  4. Every 4-6 hours:  If on nasal continuous positive airway pressure, respiratory therapy assess nares and determine need for appliance change or resting period.   Outcome: Not Progressing     Problem: ABCDS Injury Assessment  Goal: Absence of physical injury  Outcome: Not Progressing     Problem: Neurosensory - Adult  Goal: Achieves stable or improved neurological status  Outcome: Not Progressing  Flowsheets (Taken 11/28/2023 2000)  Achieves stable or improved neurological status:   Assess for and report changes in neurological status   Initiate measures to prevent increased intracranial pressure  Goal: Achieves maximal functionality and self care  Outcome: Not Progressing  Flowsheets (Taken 11/28/2023 2000)  Achieves maximal functionality and self care:   Monitor swallowing and airway patency with patient fatigue and changes in neurological status   Encourage and assist patient to increase activity and self

## 2023-11-29 NOTE — PROGRESS NOTES
Comprehensive Nutrition Assessment    Type and Reason for Visit:  Initial (new vent)    Nutrition Recommendations/Plan:   Continue NPO     Malnutrition Assessment:  Malnutrition Status:  Severe malnutrition (11/29/23 1053)    Context:  Chronic Illness     Findings of the 6 clinical characteristics of malnutrition:  Energy Intake:  75% or less estimated energy requirements for 1 month or longer  Weight Loss:  Greater than 20% over 1 year     Body Fat Loss:  Mild body fat loss Orbital   Muscle Mass Loss:  Unable to assess    Fluid Accumulation:  Severe Ascites, Generalized   Strength:  Not Performed    Nutrition Assessment:    Pt admitted with of severe malnutrition related to chronic disease, day # 2 intubation. Remains hemodynamically unstable with GIB, follow for ability to start nutrition support when medically appropriate    Nutrition Related Findings:    Hx of decompensated alcoholic cirrhosis with  esophageal varices with history of GI bleeding, hepatic encephalopathy, & ascites. ( CKD) multiple paracentesis. ..intubated 11/28, OG in place with bloody output, EDG 11/28 + clip placed, Sedated with propofol & fentanyl, increasing vasopressor demands, receiving fluid boluses, + ascites/generalized edema/ jaundice present, Multiple abnormal labs noted Wound Type: None       Current Nutrition Intake & Therapies:    Average Meal Intake: NPO  Average Supplements Intake: NPO  Diet NPO Exceptions are: Sips of Water with Meds    Anthropometric Measures:  Height: 165.1 cm (5' 5\")  Ideal Body Weight (IBW): 125 lbs (57 kg)    Admission Body Weight: 60.3 kg (133 lb)  Current Body Weight: 61.7 kg (136 lb) (* ascites/edema), 108.8 % IBW. Weight Source: Bed Scale  Current BMI (kg/m2): 22.6  Usual Body Weight: 78.9 kg (174 lb) (( 1/2023), 185# ( 9/2022))  % Weight Change (Calculated): -21.8                    BMI Categories: Normal Weight (BMI 18.5-24. 9)    Estimated Daily Nutrient Needs:  Energy Requirements Based On:

## 2023-11-29 NOTE — CARE COORDINATION
Team ICU rounds done this am with family at bedside. Dr. Reche Mohs let family know of her critical status and pt to start on another IV Pressor so she will have Levo and Vaso. Pall care following and she continues on vent.

## 2023-11-29 NOTE — PROGRESS NOTES
Palliative Care Progress Note  Patient: Yi Morgan  Gender: female  YOB: 1985  Unit/Bed: IC05/IC05-01  CodeStatus: Full Code  Inpatient Treatment Team: Treatment Team: Attending Provider: Chula Sotelo MD; Consulting Physician: Heather Robert MD; Consulting Physician: Rina Huber MD; Utilization Reviewer: Dilia Hall RN; Surgeon: Heathre Robert MD; Consulting Physician: Andrea French MD; : Deborah Duron; Respiratory Therapist (Day): Carmen Bailey RCP; Registered Nurse: Charisse Brower RN; Registered Nurse: Huber Clark RN; Utilization Reviewer: Estefania Estrada RN  Admit Date:  11/27/2023    Chief Complaint: Altered mental status    History of Presenting Illness:      Yi Morgan is a 42 y.o. female on hospital day 2 with a history of etoh abuse, liver cirrhosis stasis every 2 weeks, history of esophageal varices and GI bleeding, ckd. Patient was brought to the emergency room after being found down unresponsive home. Patient was admitted for severe hepatic encephalopathy without coma. Patient admitted to ICU. Patient noted to have bloody output from NG in nare's. She then began to experience respiratory distress and decreased pulse ox. Rapid response called and patient was emergently intubated. Patient received 1 unit of PRBC. Palliative care was consulted for goals of care, CODE STATUS discussion, family support, and symptom management. Patient seen by palliative care in the outpatient setting. Patient has had multiple hospitalizations in the last 6 months. Upon entering room patient is sedated on ventilator. Patient is on Fentanyl, propofol, octreotide and Protonix gtt. BP decreased this shift and Levophed started. Patient had cvc placed today. GI at bedside to do EGD. I spoke to patients family in the waiting room. I discussed patients case, goals of care and code status. Per patients mother patient would wish to remain a FULL CODE. PARACENTESIS Left 10/23/2023    4170 ml removed by Dr. Ludwin Nascimento Left 11/09/2023    6080 ml removed by Sander Sullivan CNP    PARACENTESIS Left 11/24/2023    8820 mls removed by Dr Brooke Taylor Left 11/24/2023    8820 ml removed by Dr Le Reyes 09/09/2022    EGD DIAGNOSTIC ONLY performed by Christiana Sheikh MD at [address] [place] [address] 05/10/2023    EGD ESOPHAGOGASTRODUODENOSCOPY performed by Victor Manuel Zurita MD at [address] [place] [address]  09/22/2023    EGD CONTROL HEMORRHAGE performed by Neha Gonsalez MD at [address] [place] [address] 10/21/2023    EGD DIAGNOSTIC ONLY performed by Christiana Sheikh MD at [address] [place] [address] 11/28/2023    EGD DIAGNOSTIC ONLY performed by Maycol Small MD at [facility] Hx:  Social History     Socioeconomic History    Marital status: Single     Spouse name: None    Number of children: 2    Years of education: None    Highest education level: None   Tobacco Use    Smoking status: Every Day     Packs/day: .25     Types: Cigarettes    Smokeless tobacco: Never    Tobacco comments:     5/30/23 states 3-4 cigs per day now   Vaping Use    Vaping Use: Never used   Substance and Sexual Activity    Alcohol use:  Yes     Alcohol/week: 3.0 standard drinks of alcohol     Types: 3 Shots of liquor per week     Comment: Last drink yesterday morning    Drug use: Yes     Types: Marijuana [facility]     Comment: gummies    Sexual activity: Not Currently   Social History Narrative    Lives With: 10 yo daughter, SO sometimes stays with pt, he works odd jobs    Older 22 yo dtr lives with a boyfriend    Type of Home: [address] in [address]: Two level, Laundry in basement (flights to and from basement and second floor with HR)    Home Access: Stairs to enter with

## 2023-11-29 NOTE — PROGRESS NOTES
0700  Assumed care of patient, patient family at he bedside. Pt output 50 ml overnight. Patient OG is clamped, can be used for medications. Per Dr. Kal Townsend.      0800  Patient assessment complete, patient suctioned via OG with 550 ml of content from the ABD appearing to be blood, dark red in color. Patient is not responding to painful stimuli at this time. 0900  Patient has an order to receive a paracenteses today. 1015  Patient temp is 96.4 F at this time, bear hugger applied to warm patient up    1200  Bear hugger removed, patient  temp at 97.2 F.   Electronically signed by Sterling Aaron RN on 11/29/2023 at 11:09 AM

## 2023-11-29 NOTE — PROGRESS NOTES
Hospitalist Daily Progress Note  Name: Jodi Wilson  Age: 42 y.o. Gender: female  CodeStatus: Full Code  Allergies: Oxycodone-Acetaminophen    Chief Complaint:Altered Mental Status (LPD did well check and found pt )      Primary Care Provider: Sophie Noel MD    InpatientTreatment Team: Treatment Team: Attending Provider: Nicko Lo MD; Consulting Physician: Gigi Nicole MD; Consulting Physician: Francy Landeros MD; Utilization Reviewer: Becky Osorio RN; Surgeon: Gigi Nicole MD; Consulting Physician: Boni Pineda MD; Respiratory Therapist (Day): Ashleigh Grimm RCP; Registered Nurse: Terry Rondon, RN; Registered Nurse: Marin Kehr, RN; Utilization Reviewer: Brenda Lea RN; Consulting Physician: Trixie Monroe MD; Registered Nurse: Carl Cespedes RN    Admission Date: 11/27/2023      Subjective: Intubated, sedated, critically ill. Physical Exam  Vitals and nursing note reviewed. Constitutional:       Comments: Intubated, sedated. Cardiovascular:      Rate and Rhythm: Normal rate and regular rhythm. Pulmonary:      Effort: Pulmonary effort is normal.      Breath sounds: Normal breath sounds. Abdominal:      General: Bowel sounds are normal.      Palpations: Abdomen is soft. Musculoskeletal:         General: Normal range of motion. Skin:     General: Skin is warm and dry. Neurological:      Comments: Intubated sedated.          Medications:  Reviewed    Infusion Medications:    vasopressin 20 Units in sodium chloride 0.9 % 100 mL infusion 0.03 Units/min (11/29/23 1740)    propofol 40 mcg/kg/min (11/29/23 1740)    fentaNYL 200 mcg/hr (11/29/23 1740)    sodium chloride      pantoprazole (PROTONIX) 80 mg in sodium chloride 0.9 % 100 mL infusion 8 mg/hr (11/29/23 1740)    octreotide (SANDOSTATIN) 500 mcg in sodium chloride 0.9 % 100 mL infusion 50 mcg/hr (11/29/23 1740)    dextrose      sodium chloride      sodium chloride      dextrose 5 % and 0.9 % NaCl 75 mL/hr

## 2023-11-29 NOTE — CONSULTS
Infectious Disease     Patient Name: Fei Hayes  Date: 11/29/2023  YOB: 1985  Medical Record Number: [id number]      E. coli sepsis        History of Present Illness:  Alcoholic cirrhosis alcohol abuse chronic insufficiency cirrhosis tobacco abuse esophageal varices      Presented 11/27/2023 with encephalopathy ammonia 258 paracentesis several days before admission  NG tube showed significant bloody output approximately 500 cc of blood    respiratory distress patient was intubated  With acute kidney injury  Transferred to intensive care    Patient underwent EGD in intensive care unit 11/28/2023 ulcerated macerated mucosa distal esophagus no active bleeding banding of esophageal varices esophageal web no esophageal variceal bleeding noted nonbleeding gastric ulcer  Ulcerative esophagitis    Blood cultures from admission 2 sets growing E. coli    Culture, Blood ID Sensitivity [[id number]] (Abnormal) Collected: 11/27/23 1758   Order Status: Completed Specimen: Blood Updated: 11/29/23 1229    Culture, Blood Id Sensitivity -- Abnormal     Cult,Blood:  POSITIVE Blood Culture   Performed at [facility], [address]   [phone]    Abnormal     Organism Escherichia coli Abnormal                  Review of Systems   Unable to perform ROS: Intubated       Review of Systems: All 14 review of systems negative other than as stated above    Social History     Tobacco Use    Smoking status: Every Day     Packs/day: .25     Types: Cigarettes    Smokeless tobacco: Never    Tobacco comments:     5/30/23 states 3-4 cigs per day now   Vaping Use    Vaping Use: Never used   Substance Use Topics    Alcohol use:  Yes     Alcohol/week: 3.0 standard drinks of alcohol     Types: 3 Shots of liquor per week     Comment: Last drink yesterday morning    Drug use: Yes     Types: Marijuana Wendy Banister)     Comment: gummies         Past Medical History:   Diagnosis Date    Alcohol abuse     Chronic kidney disease 92 11/29/2023 06:00 AM    CO2 22 11/29/2023 06:00 AM    BUN 53 11/29/2023 06:00 AM    CREATININE 4.63 11/29/2023 06:00 AM    GLUCOSE 163 11/29/2023 06:00 AM    CALCIUM 8.4 11/29/2023 06:00 AM    LABGLOM 11.6 11/29/2023 06:00 AM            11/28/2023      Narrative & Impression  EXAMINATION:  ONE XRAY VIEW OF THE CHEST     11/28/2023 11:48 am     COMPARISON:  3:47 a.m. HISTORY:  ORDERING SYSTEM PROVIDED HISTORY: cvc placement  TECHNOLOGIST PROVIDED HISTORY:  Reason for exam:->cvc placement  Is the patient pregnant?->No  What reading provider will be dictating this exam?->CRC     FINDINGS:  Endotracheal tube tip terminates 5.2 cm above the pepper. A right neck  central venous catheter is seen, tip in the expected location of the  cavoatrial junction. An NG tube is in the stomach. Bibasilar atelectasis is seen. The heart size is normal.     IMPRESSION:  Right neck central venous catheter tip in the expected location of the  cavoatrial junction            [phone] 11/27/2023      Narrative & Impression  EXAMINATION:  CT OF THE ABDOMEN AND PELVIS WITHOUT CONTRAST 11/27/2023 5:56 pm     TECHNIQUE:  CT of the abdomen and pelvis was performed without the administration of  intravenous contrast. Multiplanar reformatted images are provided for review. Automated exposure control, iterative reconstruction, and/or weight based  adjustment of the mA/kV was utilized to reduce the radiation dose to as low  as reasonably achievable.      COMPARISON:  August 6, 2023     HISTORY:  ORDERING SYSTEM PROVIDED HISTORY: high liver tests, renal failure  TECHNOLOGIST PROVIDED HISTORY:  Reason for exam:->high liver tests, renal failure  Additional Contrast?->None  Decision Support Exception - unselect if not a suspected or confirmed  emergency medical condition->Emergency Medical Condition (MA)  What reading provider will be dictating this exam?->CRC     FINDINGS:  Moderate ascites throughout abdomen and pelvis notable along

## 2023-11-29 NOTE — CONSULTS
Vascular Medicine and Interventional Radiology    Date of Consultation:2023    Randy Malave  : 1985  MR #: [id number]    Consultant:SANTA Irving CNP  Consulting Physician: Dr. Nelida Floyd, Vascular Medicine and Interventional Radiology     Consult Ordered By: ALVARO Cabello            PCP:  Traci Camp MD     Attending Physician: Maciej Monroe MD     Date of Admission: 2023  3:51 PM     Chief Complaint:   Chief Complaint   Patient presents with    Altered Mental Status     LPD did well check and found pt       Reason for Consultation: Diagnostic/therapeutic paracentesis    History of Present Illness: 23-year-old female with known history of decompensated alcoholic cirrhosis with hepatic encephalopathy (ammonia greater than 250 on admit) and ascites (last paracentesis 23 with 8820mL drained) admitted to the ICU after being found by family member unresponsive. Found to have gram-negative bacteremia, receiving IV Zosyn. Patient began having active bloody output from OG tube after intubation. Had EGD with Dr. Emeterio Romo revealing 1 nonbleeding superficial gastric ulcer with adherent clot (Lazarus class IIb) found in the gastric body, s/p clip placement. Of note no esophageal varices noted, patient with ulcerative esophagitis, esophageal ulcers, and esophageal web. Today, WBCs 13.4, Hgb 8.1, platelets 54, INR 2.4. She is not on anticoagulation/antiplatelet therapy. Past Medical History:   has a past medical history of Alcohol abuse, Chronic kidney disease, Cirrhosis ([address]), and Tobacco dependence. Past SurgicalHistory:   has a past surgical history that includes Appendectomy; Foot surgery; Tubal ligation; Upper gastrointestinal endoscopy (N/A, 2022); Paracentesis (Left, 2022); Paracentesis (Left, 2023); Colonoscopy (N/A, 2023); Paracentesis (Left, 2023); Paracentesis (Left, 2023);  Upper gastrointestinal endoscopy (N/A, enteritis. 4. New small left pleural effusion. 5. Cholelithiasis. ASSESSMENT:  64-year-old female w/decompensated alcoholic cirrhosis w/hepatic encephalopathy, ascites, admitted to the ICU unresponsive, found to have gram-negative bacteremia, had active bloody output from OG tube after intubation. EGD w/ 1 nonbleeding superficial gastric ulcer w/adherent clot s/p clip placement. IR consulted for diagnostic/therapeutic paracentesis. PLAN:   --Plan for ultrasound guided diagnostic/therapeutic paracentesis. Thank you for allowing us to participate in the care of this patient:   [place] with and questions or concerns.

## 2023-11-29 NOTE — PROGRESS NOTES
Physician Progress Note      Robbie Manning  CSN #:                  [id number]  :                       1985  ADMIT DATE:       2023 3:51 PM  [address] [facility] DATE:  RESPONDING  PROVIDER #: Huma Boyer MD          QUERY TEXT:    Patient admitted with severe hepatic encephalopathy, cirrhosis with acute   hepatitis. On admission WBC 3.9, lactic acid 11.1, pulse 128, RR 22,  blood   culture  detected Enterobacterales and E coli. Dr Ty Enriquez, critical care   consult 23 documented \"septic shock\". If possible, please document in   progress notes and discharge summary the present on admission status of   sepsis:    The medical record reflects the following:  Risk Factors: 42 yof,  acute hepatic encephalopathy, Acute respiratory   failure, UGIB, Gram negative kaleb bacteremia,  Septic shock  Clinical Indicators: On admission WBC 3.9, lactic acid 11.1, pulse 128, RR   22,  blood culture  detected Enterobacterales and E coli. Dr Ty Enriquez critical   care note 23 : \"Gram-negative kaleb bacteremia.- Septic shock requiring   vasopressors. \"  Treatment: Zosyn, ICU admit, Levophed, IVF D5 0.9% NS 75 ml/hr, intubation,   GI, Nephrology, Intensivist consult, Blood cultures    Thank you  Emely LONG RN CDS  [phone] M-F 6am-2pm  Options provided:  -- Yes, Sepsis was present at the time of the order to admit to the hospital  -- Other - I will add my own diagnosis  -- Disagree - Not applicable / Not valid  -- Disagree - Clinically unable to determine / Unknown  -- Refer to Clinical Documentation Reviewer    PROVIDER RESPONSE TEXT:    Yes, Sepsis was present at the time of the order to admit to the hospital.    Query created by: Buddy Carnes on 2023 8:20 AM      Electronically signed by:  Huma Boyer MD 2023 12:31 PM

## 2023-11-29 NOTE — PROGRESS NOTES
Shift Summary [id number]:    Handoff report received from [facility] at bedside, skin check completed. Head to toe assessment completed, see flow sheets for details. Dr. Patrick Wesley served due to [address] being pulled during procedure on day shift. Per Dr. Ptarick Morales okay to insert OG, okay to give medications ordered and to leave OG clamped. Levo gtt increased throughout the night per orders. 0110: Dr. June Paulino made aware of pt being on 30mcg of levo. Hemoglobin at 7.8. No new orders at this time. 0630: Dr. June Paulino aware of levo gtt being at 45 mcg. No new orders received. Handoff report given to [address] [place] [address].

## 2023-11-30 PROBLEM — G93.40 ENCEPHALOPATHY ACUTE: Status: ACTIVE | Noted: 2023-01-01

## 2023-11-30 PROBLEM — R65.21 SEPTIC SHOCK (HCC): Status: ACTIVE | Noted: 2023-01-01

## 2023-11-30 PROBLEM — A41.9 SEPTIC SHOCK (HCC): Status: ACTIVE | Noted: 2023-01-01

## 2023-11-30 NOTE — PROGRESS NOTES
Palliative Care Progress Note  Patient: Velvet Zapata  Gender: female  YOB: 1985  Unit/Bed: IC05/IC05-01  CodeStatus: Full Code  Inpatient Treatment Team: Treatment Team: Attending Provider: Adolfo Diaz MD; Consulting Physician: Jarrett Meyers MD; Consulting Physician: Marlen Aquino MD; Utilization Reviewer: Nicole Conn RN; Surgeon: Jarrett Meyers MD; Consulting Physician: Manuel Pa MD; Utilization Reviewer: Elen Singh RN; Consulting Physician: Berna Bonner MD; Registered Nurse: Rigo Diaz, TAMY; Consulting Physician: Radha Reyes MD  Admit Date:  11/27/2023    Chief Complaint: Altered mental status    History of Presenting Illness:      Velvet Zapata is a 42 y.o. female on hospital day 3 with a history of etoh abuse, liver cirrhosis stasis every 2 weeks, history of esophageal varices and GI bleeding, ckd. Patient was brought to the emergency room after being found down unresponsive home. Patient was admitted for severe hepatic encephalopathy without coma. Patient admitted to ICU. Patient noted to have bloody output from NG in nare's. She then began to experience respiratory distress and decreased pulse ox. Rapid response called and patient was emergently intubated. Patient received 1 unit of PRBC. Palliative care was consulted for goals of care, CODE STATUS discussion, family support, and symptom management. Patient seen by palliative care in the outpatient setting. Patient has had multiple hospitalizations in the last 6 months. Upon entering room patient is sedated on ventilator. Patient is on Fentanyl, propofol, octreotide and Protonix gtt. BP decreased this shift and Levophed started. Patient had cvc placed today. GI at bedside to do EGD. I spoke to patients family in the waiting room. I discussed patients case, goals of care and code status. Per patients mother patient would wish to remain a FULL CODE. All questions and concerns addressed.      Most questions or concerns feel free to contact me for clarification. Thanks for the opportunity you have allowed us to provide palliative care to Gamaliel Dumont. We will be in touch as care progresses. Please feel free to reach out to us should you have any questions or requests.

## 2023-11-30 NOTE — CONSULTS
[facility] Neurology Consult Note  Name: Gamaliel Dumont  Age: 42 y.o. Gender: female  CodeStatus: Full Code  Allergies: Oxycodone-Acetaminophen    Chief Complaint:Altered Mental Status (LPD did well check and found pt )    Primary Care Provider: Girish Grant MD  InpatientTreatment Team: Treatment Team: Attending Provider: Michelle Smith MD; Consulting Physician: Bj Stallings MD; Consulting Physician: Vin Lilly MD; Utilization Reviewer: Pavan Shultz RN; Surgeon: Bj Stallings MD; Consulting Physician: Rafy Donovan MD; Utilization Reviewer: Anca Monge RN; Consulting Physician: Leona Tyler MD; Registered Nurse: Emil Nuñez RN; Consulting Physician: Laron Roblero MD  Admission Date: 11/27/2023      HPI   Consulting provider: Edenilson Sosa for altered mental status  Pt seen and examined in the ICU for neurology consult. Patient is a 64-year-old female with past medical history of tobacco dependence, cirrhosis, CKD, alcohol abuse, recurrent paracentesis presented [facility] [place] emergency room on 11/27/2023 after being found unresponsive at home by the police who are making a well check. Family had not heard from the patient since the night prior. EMS reported blood sugar within normal limits. Narcan was given with no improvement in condition. No seizure activity was witnessed. Patient was hypothermic upon arrival with a temp of 95.3. Tachycardic at 135. Tachypneic at 32. Blood pressure 139/68. Patient appeared jaundiced. Was unresponsive in the emergency room. White blood cell count was elevated at 13.9. Hemoglobin low at 7. Platelets low at 65. Sodium low at 122. BUN elevated at 51. Creatinine elevated at 4.63. Total bili elevated at 10.8. Alk phos 195, AST 84. Lactic acid elevated at 11. Magnesium low at 1.2. Urine drug screen positive for cannabinoid. Ammonia level elevated at 258.   CK level was normal at 62.  2 out of 2 blood cultures positive for gram-negative

## 2023-11-30 NOTE — PROGRESS NOTES
Shift Summary [id number]:    Handoff report received from [address] [place] [address] at bedside, skin check completed. Head to toe assessment completed, pt remains intubated and sedated. Bed bath and linen change completed, pt tolerated well with no s/s of distress. Levo gtt titrated through the night per orders. Propofol titrated through the night per orders. Warming blanket applied at 0500 due to temp being 35.7. Pts mother remained at bedside throughout the night, all questions answered. Dr. Lilibeth Muller made aware of 650mls of dark red/bright red coming out of OG. No new orders.

## 2023-11-30 NOTE — PROGRESS NOTES
Infectious Disease     Patient Name: Calos Chase  Date: 11/30/2023  YOB: 1985  Medical Record Number: [id number]      E. coli sepsis          Presented 11/27/2023 with encephalopathy ammonia 258 paracentesis several days before admission  NG tube showed significant bloody output approximately 500 cc of blood    respiratory distress patient was intubated  With acute kidney injury  Transferred to intensive care    Patient underwent EGD in intensive care unit 11/28/2023 ulcerated macerated mucosa distal esophagus no active bleeding banding of esophageal varices esophageal web no esophageal variceal bleeding noted nonbleeding gastric ulcer  Ulcerative esophagitis    Blood cultures from admission 2 sets growing E. coli        Contains abnormal data Culture, Blood ID Sensitivity  Order: [id number]  Status: Final result     Visible to patient: No (not released)     Next appt: None     Specimen Information: Blood   0 Result Notes      Component    Culture, Blood Id Sensitivity  Abnormal   Cult,Blood:  POSITIVE Blood Culture   Performed at [facility], [address]   [phone]     Organism Escherichia coli Abnormal     Resulting Agency [facility]        Susceptibility    Escherichia coli (2)    Antibiotic Interpretation Microscan  Method Status    ampicillin Sensitive 4 mcg/mL BACTERIAL SUSCEPTIBILITY PANEL BY HERNAN     ceFAZolin Sensitive <=4 mcg/mL BACTERIAL SUSCEPTIBILITY PANEL BY HERNAN     cefTRIAXone Sensitive <=0.25 mcg/mL BACTERIAL SUSCEPTIBILITY PANEL BY HERNAN     Confirmatory Extended Spectrum Beta-Lactamase Sensitive NEGATIVE mcg/mL BACTERIAL SUSCEPTIBILITY PANEL BY HERNAN     gentamicin Sensitive <=1 mcg/mL BACTERIAL SUSCEPTIBILITY PANEL BY HERNAN     levofloxacin Sensitive <=0.12 mcg/mL BACTERIAL SUSCEPTIBILITY PANEL BY HERNAN     piperacillin-tazobactam Sensitive <=4 mcg/mL BACTERIAL SUSCEPTIBILITY PANEL BY HERNAN     trimethoprim-sulfamethoxazole Sensitive <=20 mcg/mL of the liver. Generalized  decreased attenuation throughout the liver. Gallbladder is mildly distended. Gallstone is seen at level of the gallbladder neck appearing in similar  position. Pancreas is grossly unremarkable. No hydronephrosis. No evidence  of renal or ureteral calculus. The spleen is prominent in size. There is  prominence of portal vasculature notable in the epigastric location. Urinary  bladder is grossly unremarkable. There is a small left pleural effusion. IMPRESSION:  1. Moderate ascites which has increased compared with prior CT from August 6, 2023.  2. Diminutive size of the liver suggesting chronic hepatocellular disease  with portal venous hypertension and prominent spleen. 3. Multiple thick walled segments of small bowel throughout the abdomen  suggesting nonspecific enteritis. 4. New small left pleural effusion. 5. Cholelithiasis.                       ASSESSMENT:  Patient Active Problem List   Diagnosis    Major depressive disorder, severe (HCC)    Other ascites    GI bleeding    Abdominal distention    Alcoholic cirrhosis of liver with ascites (HCC)    Hematemesis with nausea    Elevated INR    Severe alcohol use disorder (HCC)    Acute blood loss anemia    Melena    Anemia    Decompensated hepatic cirrhosis (HCC)    Venous stasis dermatitis of both lower extremities    GI bleed    Hypotension    Rectal bleeding    Colitis    Secondary esophageal varices without bleeding ([address])    Dieulafoy lesion of stomach    Hepatic encephalopathy (HCC)    Gastrointestinal hemorrhage    Altered mental status    Advanced care planning/counseling discussion    Goals of care, counseling/discussion    Palliative care encounter    Encounter for hospice care discussion    Sepsis due to Escherichia coli with acute hypoxic respiratory failure and septic shock ([address])         PLAN:    E. coli sepsis    Currently on Zosyn

## 2023-11-30 NOTE — OR NURSING
NO SEDATION    1040 Pt arrived to Special Procedures via ICU bed with 2RT, RN, PCA. Pt on vent, unresponsive. Abd soft, distended. Patient's allergies, meds and history reviewed. U/S tech - Solano Skains obtained images prior to start of procedure using U/S. Pt  VSS. Procedure explained, consent obtained by ICU nurse PTA to SP.    1045 Pt's left lower abd prepped with Chloraprep and draped with sterile drape and towels by [address] completed for Ultrasound Guided Paracentesis. 1054 Using U/S, Dr. Tracie Samayoa marked pt's left lower abdomen. 1055 Site numbed with lidocaine. [id number] Centesis 5F catheter placed using Ultrasound guidance. Fluid appears clear yellow. Catheter tubing connected to [employer] in Shorts machine to remove fluid. 1100 Ascitic fluid placed into 1 formulin cup, 1 sterile cup and 2 green top tubes, labeled and taken to lab by , RN.    1129 Pt tolerated well. Total 4310 ml removed. Catheter removed, pressure held and bandaid applied. Verbal and tactile reassurance given throughout. Report given to ICU nurse Benson Whigham in special procedures. Pt awaiting RT and transport to go back to ICU.

## 2023-11-30 NOTE — CARE COORDINATION
TEAM ICU ROUNDS COMPLETE. PATIENT'S MOTHER AT BEDSIDE. PATIENT REMAINS ON VENTILATOR. PLAN FOR PARACENTESIS TODAY. NEW NEURO CONSULT TO BE PLACED AS WELL AS HEAD CT. D/C PLAN TBD.

## 2023-11-30 NOTE — PROGRESS NOTES
Nephrology Progress Note    Assessment:  WILLIE related to hypotension  Cirrhosis with ascites  CLD  Alcohol abuse  Varices esophagus  Anemia      Plan: multiple pressors vasopressin to help with intra vascular distribution, Albumin as needed  watch for  spont peritonitis  HOSPICE to consider  poor candidate for CRRT    Patient Active Problem List:     Major depressive disorder, severe (HCC)     Other ascites     GI bleeding     Abdominal distention     Alcoholic cirrhosis of liver with ascites (HCC)     Hematemesis with nausea     Elevated INR     Severe alcohol use disorder (HCC)     Acute blood loss anemia     Melena     Anemia     Decompensated hepatic cirrhosis (HCC)     Venous stasis dermatitis of both lower extremities     GI bleed     Hypotension     Rectal bleeding     Colitis     Secondary esophageal varices without bleeding ([address])     Dieulafoy lesion of stomach     Hepatic encephalopathy (HCC)     Gastrointestinal hemorrhage     Altered mental status     Advanced care planning/counseling discussion     Goals of care, counseling/discussion     Palliative care encounter     Encounter for hospice care discussion     Sepsis due to Escherichia coli with acute hypoxic respiratory failure and septic shock ([address])      Subjective:  Admit Date: 11/27/2023    Interval History: sedated intubated    Medications:  Scheduled Meds:   chlorhexidine  15 mL Mouth/Throat BID    insulin lispro  0-8 Units SubCUTAneous Q4H    piperacillin-tazobactam  3,375 mg IntraVENous Q12H    sodium chloride flush  5-40 mL IntraVENous 2 times per day    lactulose  20 g Oral 6 times per day     Continuous Infusions:   vasopressin 20 Units in sodium chloride 0.9 % 100 mL infusion      vasopressin 20 Units in sodium chloride 0.9 % 100 mL infusion 0.03 Units/min (11/30/23 0802)    propofol 30 mcg/kg/min (11/30/23 0553)    fentaNYL 200 mcg/hr (11/30/23 0553)    sodium chloride      pantoprazole (PROTONIX) 80 mg in sodium chloride 0.9 % 100 mL

## 2023-12-01 NOTE — PROGRESS NOTES
Comprehensive Nutrition Assessment    Type and Reason for Visit:  Reassess, Consult (TF order and manage)    Nutrition Recommendations/Plan:   Continue NPO  Monitor for decisions regarding goals of care  If enteral nutrition becomes appropriate, recommend renal tube feeding ( [place])      Malnutrition Assessment:  Malnutrition Status:  Severe malnutrition (11/29/23 1053)    Context:  Chronic Illness     Findings of the 6 clinical characteristics of malnutrition:  Energy Intake:  75% or less estimated energy requirements for 1 month or longer  Weight Loss:  Greater than 20% over 1 year     Body Fat Loss:  Mild body fat loss Orbital   Muscle Mass Loss:  Unable to assess    Fluid Accumulation:  Severe Ascites, Generalized   Strength:  Not Performed    Nutrition Assessment:    Severe malnutrtition continues, no progress towards nutrition related goals, pt remains intubated, critically ill, awaiting neuro work up to aid in determination of goals of care. RDN will begin tube feeding when medically appopriate    Nutrition Related Findings:    Hx of decompensated alcoholic cirrhosis with  esophageal varices/ GI bleeding, hepatic encephalopathy, & ascites. ( CKD) multiple paracentesis. ..intubated 11/28, OG in place with bloody output, EDG 11/28 + clip placed, Sedated with propofol & fentanyl, decreasing vasopressor demands, IVF = D5.9NS @ 75 ml/hr, Last BM PTA, worsening renal function and poor urine output., + ascites/generalized edema/ jaundice present, Multiple abnormal labs noted Wound Type: None       Current Nutrition Intake & Therapies:    Average Meal Intake: NPO  Average Supplements Intake: NPO  Diet NPO Exceptions are: Sips of Water with Meds    Anthropometric Measures:  Height: 165.1 cm (5' 5\")  Ideal Body Weight (IBW): 125 lbs (57 kg)    Admission Body Weight: 60.3 kg (133 lb)  Current Body Weight: 68.5 kg (151 lb) (* edema/ascites), 108.8 % IBW.  Weight Source: Bed Scale  Current BMI (kg/m2): 25.1  Usual Body

## 2023-12-01 NOTE — PROGRESS NOTES
results found for this or any previous visit. WITHOUT CONTRAST: No results found for this or any previous visit. CXR      2-view: Results for orders placed during the hospital encounter of 09/19/23    XR CHEST (2 VW)    Narrative  EXAMINATION:  TWO XRAY VIEWS OF THE CHEST    9/21/2023 9:21 am    COMPARISON:  None. HISTORY:  ORDERING SYSTEM PROVIDED HISTORY: moderate left pleural effusion seen on CT  abd/pelvis  TECHNOLOGIST PROVIDED HISTORY:  Reason for exam:->moderate left pleural effusion seen on CT abd/pelvis    FINDINGS:  The cardiac silhouette is within normals. There is no mediastinal widening    The right lung is clear    There is an infiltrate seen within the retrocardiac region. Impression  Left lower lobe airspace disease with dense consolidation seen within the  retrocardiac region. Portable: Results for orders placed during the hospital encounter of 11/27/23    XR CHEST PORTABLE    Narrative  EXAMINATION:  ONE XRAY VIEW OF THE CHEST    11/28/2023 3:51 am    COMPARISON:  Chest x-ray 11/27/2023    HISTORY:  ORDERING SYSTEM PROVIDED HISTORY: post intubation  TECHNOLOGIST PROVIDED HISTORY:  Reason for exam:->post intubation  Is the patient pregnant?->No  What reading provider will be dictating this exam?->CRC    FINDINGS:  Endotracheal tube terminates 5 cm above the level the pepper in satisfactory  positioning. Cardiac size borderline enlarged. Minimal bibasilar  atelectasis without consolidation. No pneumothorax or pleural effusion. Mild asymmetric elevation the right hemidiaphragm. Impression  Endotracheal tube well positioned 5 cm above the level the pepper. No  pneumothorax or large effusion      Echo No results found for this or any previous visit.             Assessment/Plan:    Active Hospital Problems    Diagnosis Date Noted    Encephalopathy acute [G93.40] 11/30/2023    Septic shock ([address]) [A41.9, R65.21] 11/30/2023    Encounter for hospice care discussion [Z71.89] 11/29/2023    Sepsis due to Escherichia coli with acute hypoxic respiratory failure and septic shock ([address]) [A41.51, R65.21, J96.01] 11/29/2023    Altered mental status [R41.82] 11/28/2023    Advanced care planning/counseling discussion [Z71.89] 11/28/2023    Goals of care, counseling/discussion [Z71.89] 11/28/2023    Palliative care encounter [Z51.5] 11/28/2023    Hepatic encephalopathy ([address]) [K76.82] 11/27/2023    Hematemesis with nausea [K92.0] 05/10/2023     Severe hepatic encephalopathy without coma  Lactulose po, ng if unable, per rectum if unable. GI consult. Repeat am labs. ICU admission. ABG. 11/28 - ventilated overnight for airway protection. Cont lactulose. 11/29 - cont vent support, lactulose. 11/30 - plan is for mri and eeg tomorrow if able. Prognosis poor. WILLIE on CKD3  Cautious IV fluid rescucitation, nephrology consult. 11/29 - unchange, cont supportive care. Severe lactic acidosis  Likely 2/2 extended down time. Trend with abg. Cirrhosis with acute hepatitis  Unknown if patient relapsed with etoh, gi consult. Will cover for sbp due to recent paracentesis. However no fever.     Acute upper gi bleed  11/28 - egd with multiple ulcers  DVT proph    Electronically signed by Eugenia Richard MD on 12/1/2023 at 1:01 PM

## 2023-12-01 NOTE — FLOWSHEET NOTE
Patients family educated on weaning off vent and what that entails per Dr. Phil Yancey, Dr. Jefe Cesar, Dr. Ce Fernandez and myself. Family is understanding but hard to accept for them. Patients mom talking about extubating today.

## 2023-12-01 NOTE — PROGRESS NOTES
Patient is a 77-year-old female with past medical history of tobacco dependence, cirrhosis, CKD, alcohol abuse, recurrent paracentesis who presented to [facility] emergency room on 11/27/2023 after being found unresponsive at home with unknown amount of downtime. Work-up revealed decompensated liver cirrhosis with significant hyperammonemia, WILLIE, hyponatremia, gram-negative sepsis with positive blood cultures for E. coli, acute GI bleed requiring blood transfusion. After admitted patient went into acute hypoxic respiratory failure requiring intubation and mechanical ventilation. Patient also in septic shock requiring vasopressor support. .      Patient had CT of the head which was negative for any acute intracranial findings. Patient does have risk for stroke including severe hypotension which can lead to watershed infarcts as well as sepsis which can contribute to septic emboli. Not a candidate for antiplatelet therapy or oral anticoagulation given low platelet count, significant anemia as well as elevated INR. Patient is at risk for provoked seizure in the setting of all of the above although there was no witnessed seizure activity and CK levels were normal.  We did obtain EEG and this did show severe diffuse delta slowing likely consistent with either delta, or severe diffuse encephalopathy. Toxic and or metabolic versus hypoxic ischemic etiology should be considered. Patient's family changed patient's CODE STATUS and also no palliative care and hospice has been consulted.   Will sign off for now unless new changes

## 2023-12-01 NOTE — PROGRESS NOTES
10:10 AM    BLOODU Negative 08/21/2023 08:00 AM    SPECGRAV 1.025 09/23/2023 10:10 AM    GLUCOSEU NEGATIVE 09/23/2023 10:10 AM       Radiology:   Most recent    Chest CT      WITH CONTRAST:No results found for this or any previous visit. WITHOUT CONTRAST: No results found for this or any previous visit. CXR      2-view: Results for orders placed during the hospital encounter of 09/19/23    XR CHEST (2 VW)    Narrative  EXAMINATION:  TWO XRAY VIEWS OF THE CHEST    9/21/2023 9:21 am    COMPARISON:  None. HISTORY:  ORDERING SYSTEM PROVIDED HISTORY: moderate left pleural effusion seen on CT  abd/pelvis  TECHNOLOGIST PROVIDED HISTORY:  Reason for exam:->moderate left pleural effusion seen on CT abd/pelvis    FINDINGS:  The cardiac silhouette is within normals. There is no mediastinal widening    The right lung is clear    There is an infiltrate seen within the retrocardiac region. Impression  Left lower lobe airspace disease with dense consolidation seen within the  retrocardiac region. Portable: Results for orders placed during the hospital encounter of 11/27/23    XR CHEST PORTABLE    Narrative  EXAMINATION:  ONE XRAY VIEW OF THE CHEST    11/28/2023 3:51 am    COMPARISON:  Chest x-ray 11/27/2023    HISTORY:  ORDERING SYSTEM PROVIDED HISTORY: post intubation  TECHNOLOGIST PROVIDED HISTORY:  Reason for exam:->post intubation  Is the patient pregnant?->No  What reading provider will be dictating this exam?->CRC    FINDINGS:  Endotracheal tube terminates 5 cm above the level the pepper in satisfactory  positioning. Cardiac size borderline enlarged. Minimal bibasilar  atelectasis without consolidation. No pneumothorax or pleural effusion. Mild asymmetric elevation the right hemidiaphragm. Impression  Endotracheal tube well positioned 5 cm above the level the pepper.   No  pneumothorax or large effusion      Echo No results found for this or any previous visit. Assessment/Plan:    Active Hospital Problems    Diagnosis Date Noted    Encephalopathy acute [G93.40] 11/30/2023    Septic shock ([address]) [A41.9, R65.21] 11/30/2023    Encounter for hospice care discussion [Z71.89] 11/29/2023    Sepsis due to Escherichia coli with acute hypoxic respiratory failure and septic shock ([facility]) [A41.51, R65.21, J96.01] 11/29/2023    Altered mental status [R41.82] 11/28/2023    Advanced care planning/counseling discussion [Z71.89] 11/28/2023    Goals of care, counseling/discussion [Z71.89] 11/28/2023    Palliative care encounter [Z51.5] 11/28/2023    Hepatic encephalopathy ([address]) [K76.82] 11/27/2023    Hematemesis with nausea [K92.0] 05/10/2023     Severe hepatic encephalopathy without coma  Lactulose po, ng if unable, per rectum if unable. GI consult. Repeat am labs. ICU admission. ABG. 11/28 - ventilated overnight for airway protection. Cont lactulose. 11/29 - cont vent support, lactulose. 11/30 - plan is for mri and eeg tomorrow if able. Prognosis poor. WILLIE on CKD3  Cautious IV fluid rescucitation, nephrology consult. 11/29 - unchange, cont supportive care. Severe lactic acidosis  Likely 2/2 extended down time. Trend with abg. Cirrhosis with acute hepatitis  Unknown if patient relapsed with etoh, gi consult. Will cover for sbp due to recent paracentesis. However no fever.     Acute upper gi bleed  11/28 - egd with multiple ulcers  DVT proph    Electronically signed by Love White MD on 11/30/2023 at 7:19 PM

## 2023-12-01 NOTE — PROGRESS NOTES
Palliative Care Progress Note  Patient: Nick Cortes  Gender: female  YOB: 1985  Unit/Bed: IC05/IC05-01  CodeStatus: Full Code  Inpatient Treatment Team: Treatment Team: Attending Provider: Kaela Torre MD; Consulting Physician: Keisha Alanis MD; Consulting Physician: Luna Wheat MD; Utilization Reviewer: Rukhsana Conrad RN; Surgeon: Keisha Alanis MD; Consulting Physician: Sasha Dowling MD; Utilization Reviewer: Dhiraj Lomax RN; Consulting Physician: Matthew Lombardo MD; Consulting Physician: Breanna Summers MD; Registered Nurse: aKrma Moseley RN  Admit Date:  11/27/2023    Chief Complaint: Altered mental status    History of Presenting Illness:      Nick Cortes is a 42 y.o. female on hospital day 4 with a history of etoh abuse, liver cirrhosis stasis every 2 weeks, history of esophageal varices and GI bleeding, ckd. Patient was brought to the emergency room after being found down unresponsive home. Patient was admitted for severe hepatic encephalopathy without coma. Patient admitted to ICU. Patient noted to have bloody output from NG in nare's. She then began to experience respiratory distress and decreased pulse ox. Rapid response called and patient was emergently intubated. Patient received 1 unit of PRBC. Palliative care was consulted for goals of care, CODE STATUS discussion, family support, and symptom management. Patient seen by palliative care in the outpatient setting. Patient has had multiple hospitalizations in the last 6 months. Upon entering room patient is sedated on ventilator. Patient is on Fentanyl, propofol, octreotide and Protonix gtt. BP decreased this shift and Levophed started. Patient had cvc placed today. GI at bedside to do EGD. I spoke to patients family in the waiting room. I discussed patients case, goals of care and code status. Per patients mother patient would wish to remain a FULL CODE. All questions and concerns addressed.      Most

## 2023-12-01 NOTE — PLAN OF CARE
Problem: Nutrition Deficit:  Goal: Optimize nutritional status  Outcome: Not Progressing  Flowsheets (Taken 11/29/2023 5384)  Nutrient intake appropriate for improving, restoring, or maintaining nutritional needs:   Assess nutritional status and recommend course of action   Monitor oral intake, labs, and treatment plans   Recommend appropriate diets, oral nutritional supplements, and vitamin/mineral supplements   Recommend, monitor, and adjust tube feedings and TPN/PPN based on assessed needs

## 2023-12-01 NOTE — CARE COORDINATION
TEAM ICU ROUNDS COMPLETE. SISTER AT BEDSIDE. PATIENT REMAINS ON VENTILATOR. PLAN TO TURN SEDATION OFF AND ASSESS REFLEXES. PATIENT HAS EEG AND MRI ORDERED.

## 2023-12-01 NOTE — PROGRESS NOTES
Infectious Disease     Patient Name: Bubba Morris  Date: 12/1/2023  YOB: 1985  Medical Record Number: [id number]      E. coli sepsis          Presented 11/27/2023 with encephalopathy ammonia 258 paracentesis several days before admission  NG tube showed significant bloody output approximately 500 cc of blood    respiratory distress patient was intubated  With acute kidney injury  Transferred to intensive care    Patient underwent EGD in intensive care unit 11/28/2023 ulcerated macerated mucosa distal esophagus no active bleeding banding of esophageal varices esophageal web no esophageal variceal bleeding noted nonbleeding gastric ulcer  Ulcerative esophagitis    Blood cultures from admission 2 sets growing E. coli        Contains abnormal data Culture, Blood ID Sensitivity  Order: [id number]  Status: Final result     Visible to patient: No (not released)     Next appt: None     Specimen Information: Blood   0 Result Notes      Component    Culture, Blood Id Sensitivity  Abnormal   Cult,Blood:  POSITIVE Blood Culture   Performed at [address] [facility] [place], [address]   [phone]     Organism Escherichia coli Abnormal     Resulting Agency [facility]        Susceptibility    Escherichia coli (2)    Antibiotic Interpretation Microscan  Method Status    ampicillin Sensitive 4 mcg/mL BACTERIAL SUSCEPTIBILITY PANEL BY HERNAN     ceFAZolin Sensitive <=4 mcg/mL BACTERIAL SUSCEPTIBILITY PANEL BY HERNAN     cefTRIAXone Sensitive <=0.25 mcg/mL BACTERIAL SUSCEPTIBILITY PANEL BY HERNAN     Confirmatory Extended Spectrum Beta-Lactamase Sensitive NEGATIVE mcg/mL BACTERIAL SUSCEPTIBILITY PANEL BY HERNAN     gentamicin Sensitive <=1 mcg/mL BACTERIAL SUSCEPTIBILITY PANEL BY HERNAN     levofloxacin Sensitive <=0.12 mcg/mL BACTERIAL SUSCEPTIBILITY PANEL BY HERNAN     piperacillin-tazobactam Sensitive <=4 mcg/mL BACTERIAL SUSCEPTIBILITY PANEL BY HERNAN     trimethoprim-sulfamethoxazole Sensitive <=20 mcg/mL

## 2023-12-02 NOTE — DEATH NOTES
Death Pronouncement Note  Patient's Name: Zenaida Zamora   Patient's YOB: 1985  MRN Number: [id number]    Admitting Provider: Juliana Fritz MD  Attending Provider: Juliana Fritz MD    Patient was examined and the following were absent: Pulses, Blood Pressure, and Respiratory effort    I declared the patient dead on 12/1/2023 at 6:03 PM    Preliminary Cause of Death: Liver failure [facility])     Electronically signed by Juliana Fritz MD on 12/1/23 at 8:07 PM EST

## 2023-12-02 NOTE — FLOWSHEET NOTE
Shift summary:    2030 Post mortem care completed. Pt did not have any belongings at bedside. Nursing supervisor Kary Kimble notified.      Electronically signed by Fatoumata Garcia RN on 12/1/2023 at 9:10 PM

## 2023-12-03 LAB — BACTERIA FLD AEROBE CULT: NORMAL

## 2023-12-04 NOTE — PROCEDURES
[facility], [address]                          ELECTROENCEPHALOGRAM REPORT    PATIENT NAME: Jailene Sherwood                         :        1985  MED REC NO:   [id number]                            ROOM:       [facility]  ACCOUNT NO:   [de-identified]                           ADMIT DATE: 2023  PROVIDER:     Neal Lin MD    DATE OF EE2023    INDICATION:  This EEG was recorded on a Eqv[employer]ria recording device. The  [employer] 10-20 lead electrode placement was utilized. All  data was available for remontage and reformatting as indicated. EEG FINDINGS:  The encephalograph shows continuous slow generalized  activity in the delta range, i.e., 2 and approaching 3 Hz at times. This occurs continuously throughout the entire recording. No  significant change in reactivity was seen to painful stimulation. No  epileptiform activity, i.e., spikes, sharp waves, or sharp transients  are seen on this recording. In addition, no focal or regional slowing  is identified. EKG monitoring is performed. No significant PACs or  PVCs are identified on this study. IMPRESSION:  Abnormal EEG secondary to continuous slow generalized  activity in the delta more than theta range, consistent with severe  diffuse encephalopathy. CLINICAL CORRELATION:  1. No epileptiform activity is seen on this recording. 2.  No cardiac arrhythmias are identified on this study. 3.  Clinical correlation is required.         Neal Lin MD    D: 2023 15:17:42       T: 2023 17:39:05     [email or web address]  Job#: [id number]     Doc#: [id number]

## 2023-12-07 LAB — BACTERIA FLD AEROBE CULT: NORMAL

## 2024-10-16 NOTE — PROGRESS NOTES
[facility]  Facility/Department: [facility]  Speech Language Pathology   Treatment Note          Naomy Perez  1985  R251/R251-01  [x]   confirmed    Date: 2022    Rehab Diagnosis:  Impaired mobility and ADL's due to alcoholic encephalopathy      Restrictions/Precautions: Seizure, Fall Risk    Weight: 188 lb 4 oz (85.4 kg)     ADULT DIET; Regular; Low Sodium (2 gm)  ADULT ORAL NUTRITION SUPPLEMENT; Dinner; Standard High Calorie/High Protein Oral Supplement    SpO2: 97 % (22 0747)  No active isolations      Subjective:  Alert and Cooperative        Interventions used this date:  Cognitive Skill Development    Objective/Assessment:  Patient progressing towards goals:  Goal 1: To increase safety awareness and judgment for safe completion of ADLs secondary to pt's cognitive deficits, pt will complete abstract reasoning tasks (i.e. Word deduction, convergent and divergent naming, similarities/differences) with 80% accuracy and min cues. Named abstract category given 3 words with 90% accuracy independently. Responses were prompt and pt demonstrated good working memory. Pt added one more to the category with 90% accuracy. Goal 2: To increase independence for functional activities for home and community, pt will complete mid level executive functioning tasks (i.e. Path finding, scheduling appointments, prioritizing tasks) with min cues. Pt sequenced 4 pictures in correct order with 100% accuracy. Self-corrected x 1. Goal 3: Within 1-5 days of implementing the ST POC, pt's functional reading/writing skills will be assessed in relation to executive functioning (e.g. bill paying, reading rx labels, completing personal information), with additional goals added to pt's POC as deemed necesary by treating SLP. Pt wrote activities in a monthly calendar given information with 90% accuracy independently. Reviewed results of testing with pt.   Pt reported she always had test anxiety in school which may have effected performance. Treatment/Activity Tolerance:  Patient tolerated treatment well    Plan:  Continue per POC    Pain Assessment:  Patient c/o pain: Location and pain level: \"not pain just discomfort\"  Pt able to proceed with session. Pain Re-assessment:  Patient c/o pain: Described as same as above    Patient/Caregiver Education:  Patient educated on session and progression towards goals. Patient stated verbal understanding of directions. Safety Devices: All fall risk precautions in place        Speech Therapy Level of Assistance Scale    AUDITORY COMPREHENSION  Rating:  Independent    VERBAL EXPRESSION  Rating:  Independent    MOTOR SPEECH  Rating: Modified Independent    PROBLEM SOLVING  Rating: Modified Independent    MEMORY  Rating:  Modified Independent          Therapy Time   Time in: 0930  Time out: 1000  Minutes: 30            Signature: Electronically signed by CARLOS Carreno on 9/19/2022 at 10:41 AM [facility]  _______________________________________________________________________________  Patient Name: Jacque Martinez         Gender: Female  YOB: 1968               Age: 56  Procedure Date: 10/9/2024 1:06 PM     MRN: [id number]  Account Number: [id number]           Procedure MD: Kurtis Castro MD,   [id number]  _______________________________________________________________________________     Procedure:             Upper GI endoscopy  Indications:           Persistent vomiting of unknown cause  _______________________________________________________________________________  IMPRESSION:       - Z-line regular, 30 cm from the incisors.       - 5 cm hiatal hernia.       - Gastric bypass with a pouch 5 cm in length. Gastrojejunal anastomosis        characterized by healthy appearing mucosa.       - Normal examined jejunum.       - No specimens collected.  FINDINGS:       The Z-line was regular and was found 30 cm from the incisors.       A 5 cm hiatal hernia was present.       Evidence of a gastric bypass was found. A gastric pouch with a 5 cm        length from the GE junction to the gastrojejunal anastomosis was found.        The gastrojejunal anastomosis was characterized by healthy appearing        mucosa. This was traversed. The pouch-to-jejunum limb was characterized        by healthy appearing mucosa. The jejunojejunal anastomosis was        characterized by healthy appearing mucosa.       The examined jejunum was normal.  RECOMMENDATIONS:       - Patient has a contact number available for emergencies. The signs and        symptoms of potential delayed complications were discussed with the        patient. Return to normal activities tomorrow. Written discharge        instructions were provided to the patient.       - Resume previous diet PRN.       - Continue present medications. Her symptoms may represent an internal        hernia and intermittent obstruction . Cyclic vomiting is to be

## 2025-01-12 NOTE — PROGRESS NOTES
First unit of FFP transfusing at this time. GI Center to send RN to transport patient down to gastro center r/t ffp transfusing at this time. PAST MEDICAL HISTORY:  HLD (hyperlipidemia)     HTN (hypertension)     Hypothyroidism     Osteoporosis

## 2025-01-24 NOTE — PROGRESS NOTES
All paperwork has been completed by provider, faxed to the number given and left for pick-up at office  1/24/25    Pt reports no depression @ this time, anxiety level is #1/10. Denies HI/SI/AVH. Pt reports attending all groups, & is visible on unit in the dayroom playing monopoly with her pers. Pt showers daily. Pt reports sleeping 6+ hrs. Pts goal is to feel better & be dc'd.

## 2025-05-20 NOTE — PROGRESS NOTES
[phone] ([facility]) or after regular business hours call [phone] ([facility])     Physician Progress Note    9/5/2022   1:53 PM    Name:  Chanelle Knight  MRN:    [id number]      Day: 1     Admit Date: 9/4/2022  1:47 PM  PCP: Gerson Gipson MD    Code Status:  Full Code    Subjective:     Still with sharp pain on right side of abdomen that radiates to the middle. Mild nausea and diarrhea with some blood in there. No vomiting. Pain has improved significantly since admission.     Current Facility-Administered Medications   Medication Dose Route Frequency Provider Last Rate Last Admin    glucose chewable tablet 16 g  4 tablet Oral PRN [facility] DO        dextrose bolus 10% 125 mL  125 mL IntraVENous PRN [facility] DO   Stopped at 09/05/22 0971    Or    dextrose bolus 10% 250 mL  250 mL IntraVENous PRN [facility] DO        glucagon (rDNA) injection 1 mg  1 mg SubCUTAneous PRN [facility] DO        dextrose 10 % infusion   IntraVENous Continuous PRN [facility] DO        dextrose 5 % and 0.9 % sodium chloride infusion   IntraVENous Continuous [facility],  mL/hr at 09/05/22 1102 New Bag at 09/05/22 1102    chlordiazePOXIDE (LIBRIUM) capsule 25 mg  25 mg Oral TID [facility], DO        Followed by    Pradeep Avelar ON 9/6/2022] chlordiazePOXIDE (LIBRIUM) capsule 10 mg  10 mg Oral TID [facility], DO        oxyCODONE (ROXICODONE) immediate release tablet 5 mg  5 mg Oral Q4H PRN [facility], DO        morphine (PF) injection 2 mg  2 mg IntraVENous Q4H PRN [facility], DO        gabapentin (NEURONTIN) capsule 100 mg  100 mg Oral TID [facility], DO        cloNIDine (CATAPRES) tablet 0.1 mg  0.1 mg Oral BID [facility], DO        albuterol (PROVENTIL) nebulizer solution 2.5 mg  2.5 mg Nebulization Q4H PRN [facility], DO        ondansetron (ZOFRAN-ODT) disintegrating tablet 4 mg  4 mg Oral Q8H PRN Bashir Padilla MD   4 mg at 09/04/22 2044    Or    ondansetron (ZOFRAN) injection 4 mg  4 mg IntraVENous Q6H PRN Bashir Padilla MD        polyethylene glycol (GLYCOLAX) packet 17 g  17 g Oral Daily PRN Evaristo Carolina MD        thiamine (B-1) injection 100 mg  100 mg IntraVENous Daily Evaristo Carolina MD   100 mg at 22 0820    nicotine (NICODERM CQ) 14 MG/24HR 1 patch  1 patch TransDERmal Daily Evaristo Carolina MD        LORazepam (ATIVAN) tablet 1 mg  1 mg Oral Q1H PRN Evaristo Carolina MD        Or    LORazepam (ATIVAN) injection 1 mg  1 mg IntraVENous Q1H PRN Evaristo Carolina MD        Or    LORazepam (ATIVAN) tablet 2 mg  2 mg Oral Q1H PRN Evaristo Carolina MD   2 mg at 22 0443    Or    LORazepam (ATIVAN) injection 2 mg  2 mg IntraVENous Q1H PRN Evaristo Carolina MD   2 mg at 22 9701    Or    LORazepam (ATIVAN) tablet 3 mg  3 mg Oral Q1H PRN Evaristo Carolina MD        Or    LORazepam (ATIVAN) injection 3 mg  3 mg IntraVENous Q1H PRN Evaristo Carolina MD        Or    LORazepam (ATIVAN) tablet 4 mg  4 mg Oral Q1H PRN Evaristo Carolina MD        Or    LORazepam (ATIVAN) injection 4 mg  4 mg IntraVENous Q1H PRN Evaristo Carolina MD           Physical Examination:      Vitals:  BP (!) 105/52   Pulse (!) 130   Temp 98.6 °F (37 °C)   Resp 22   Ht 5' 5\" (1.651 m)   Wt 140 lb (63.5 kg)   LMP  (LMP Unknown) Comment: tubal ligation  SpO2 96%   BMI 23.30 kg/m²   Temp (24hrs), Av.6 °F (37 °C), Min:98.6 °F (37 °C), Max:98.6 °F (37 °C)      General appearance: alert, cooperative and no distress. Scleral icterus present  Mental Status: oriented to person, place and time and normal affect  Lungs: clear to auscultation bilaterally, normal effort  Heart: regular rate and rhythm, no murmur  Abdomen: TTP primarily in right upper quadrant. Otherwise abdomen is soft. Bowel sounds are present. No rebound tenderness or guarding. Extremities: no edema, redness, tenderness in the calves.  Cap refill <2s    Data:     Labs:  Recent Labs     22  0500   WBC 6.3 6.6   HGB 11.1* 9.8*   PLT 50* 53*     Recent Labs     22  0459   * 137   K 3.7 3.6 CL 90* 94*   CO2 23 29   BUN <2* 3*   CREATININE 0.69 0.93*   GLUCOSE 127* 52*     Recent Labs     09/04/22  1415 09/05/22  0459   * 349*   ALT 98* 90*   BILITOT 7.2* 8.8*   ALKPHOS 524* 470*       Assessment and Plan:        1. Acute alcoholic hepatitis and pancreatitis:  -Supportive care with IVF, pain control  -advance diet to clears  -GI consulted on admission. MDF is borderline    2. Severe alcohol use disorder with risk for acute alcohol withdrawal  -Supplement benzodiazepine and monitor on CIWA scale  -Adjunctive gabapentin and clonidine  -Thiamine, multivitamin  -D5 NS     Tobacco use disorder  Thrombocytopenia    Diet: ADULT DIET; Clear Liquid;  Low Sodium (2 gm)  Ppx: SCDs  Full Code    >35 minutes in total care time    Electronically signed by Duy Diamond DO on 9/5/2022 at 1:53 PM

## (undated) PROCEDURE — 0W9G3ZZ DRAINAGE OF PERITONEAL CAVITY, PERCUTANEOUS APPROACH: ICD-10-PCS

## (undated) DEVICE — CONTAINER SPEC COLL 960ML POLYPR TRIANG GRAD INTAKE/OUTPUT

## (undated) DEVICE — Device: Brand: ENDO SMARTCAP

## (undated) DEVICE — BRUSH ENDO CLN L90.5IN SHTH DIA1.7MM BRIST DIA5-7MM 2-6MM

## (undated) DEVICE — FORCEPS BX L240CM JAW DIA2.8MM L CAP W/ NDL MIC MESH TOOTH

## (undated) DEVICE — ADAPTER CLEANING PORPOISE CLEANING

## (undated) DEVICE — LIGATOR ENDOSCP L2.8MM DIA8.6-11.5MM MULT BND SPEEDBAND

## (undated) DEVICE — GLOVE ORANGE PI 8 1/2   MSG9085

## (undated) DEVICE — ENDO CARRY-ON PROCEDURE KIT: Brand: ENDO CARRY-ON PROCEDURE KIT

## (undated) DEVICE — CONMED SCOPE SAVER BITE BLOCK, 20X27 MM: Brand: SCOPE SAVER

## (undated) DEVICE — WIPES SKIN CLOTH READYPREP 9 X 10.5 IN 2% CHLORHEX GLUCONATE CHG PREOP

## (undated) DEVICE — TUBING, SUCTION, 1/4" X 10', STRAIGHT: Brand: MEDLINE

## (undated) DEVICE — SPONGE GZ 4IN 4IN 4 PLY N WVN AVANT

## (undated) DEVICE — TUBE SET 96 MM 64 MM H2O PERISTALTIC STD AUX CHANNEL

## (undated) DEVICE — JELLY,LUBE,STERILE,FLIP TOP,TUBE,4-OZ: Brand: MEDLINE

## (undated) DEVICE — YANKAUER,BULB TIP,W/O VENT,RIGID,STERILE: Brand: MEDLINE

## (undated) DEVICE — MASK,FACE,MAXFLUIDPROTECT,SHIELD/ERLPS: Brand: MEDLINE

## (undated) DEVICE — CLIP HEMO L235CM WRK CHN DIA OPN 17MM BRAID CATH ROT: Type: IMPLANTABLE DEVICE | Status: NON-FUNCTIONAL

## (undated) DEVICE — SINGLE PORT MANIFOLD: Brand: NEPTUNE 2

## (undated) DEVICE — Device: Brand: DEFENDO VALVE AND CONNECTOR KIT

## (undated) DEVICE — GLOVE ORTHO 8   MSG9480

## (undated) DEVICE — KENDALL 450 SERIES MONITORING FOAM ELECTRODE - RECTANGULAR SHAPE ( 3/PK): Brand: KENDALL

## (undated) DEVICE — BRUSH ENDO COMBO

## (undated) DEVICE — COVER,LIGHT HANDLE,FLX,1/PK: Brand: MEDLINE INDUSTRIES, INC.

## (undated) DEVICE — 6 X 9  1.75MIL 4-WALL LABGUARD: Brand: MINIGRIP COMMERCIAL LLC

## (undated) DEVICE — BLOCK BITE 60FR CAREGUARD

## (undated) DEVICE — 4-PORT MANIFOLD: Brand: NEPTUNE 2

## (undated) DEVICE — TOWEL,OR,DSP,ST,BLUE,STD,6/PK,12PK/CS: Brand: MEDLINE

## (undated) DEVICE — KIT BEDSIDE REVITAL OX 500ML

## (undated) DEVICE — GOWN ISOLATN REG YEL M WT MULTIPLY SIDETIE LEV 2